# Patient Record
Sex: FEMALE | Race: WHITE | Employment: OTHER | URBAN - NONMETROPOLITAN AREA
[De-identification: names, ages, dates, MRNs, and addresses within clinical notes are randomized per-mention and may not be internally consistent; named-entity substitution may affect disease eponyms.]

---

## 2016-10-18 LAB — DIABETIC RETINOPATHY: POSITIVE

## 2019-04-25 LAB
ALBUMIN SERPL-MCNC: NORMAL G/DL
ALP BLD-CCNC: NORMAL U/L
ALT SERPL-CCNC: NORMAL U/L
ANION GAP SERPL CALCULATED.3IONS-SCNC: NORMAL MMOL/L
AST SERPL-CCNC: NORMAL U/L
BILIRUB SERPL-MCNC: NORMAL MG/DL (ref 0.1–1.4)
BUN BLDV-MCNC: NORMAL MG/DL
CALCIUM SERPL-MCNC: NORMAL MG/DL
CHLORIDE BLD-SCNC: NORMAL MMOL/L
CO2: NORMAL MMOL/L
CREAT SERPL-MCNC: NORMAL MG/DL
CREATININE, URINE: 128
GFR CALCULATED: NORMAL
GLUCOSE BLD-MCNC: NORMAL MG/DL
MICROALBUMIN/CREAT 24H UR: 1.5 MG/G{CREAT}
MICROALBUMIN/CREAT UR-RTO: 11.7
POTASSIUM SERPL-SCNC: NORMAL MMOL/L
SODIUM BLD-SCNC: NORMAL MMOL/L
TOTAL PROTEIN: NORMAL
TSH SERPL DL<=0.05 MIU/L-ACNC: NORMAL UIU/ML

## 2019-05-06 RX ORDER — INSULIN GLARGINE 100 [IU]/ML
65 INJECTION, SOLUTION SUBCUTANEOUS NIGHTLY
Qty: 5 PEN | Refills: 3 | Status: SHIPPED | OUTPATIENT
Start: 2019-05-06 | End: 2019-10-28 | Stop reason: SDUPTHER

## 2019-05-06 RX ORDER — PEN NEEDLE, DIABETIC 32 GX 1/4"
NEEDLE, DISPOSABLE MISCELLANEOUS
Refills: 3 | COMMUNITY
Start: 2019-02-27 | End: 2019-05-06 | Stop reason: SDUPTHER

## 2019-05-06 RX ORDER — INSULIN GLARGINE 100 [IU]/ML
65 INJECTION, SOLUTION SUBCUTANEOUS NIGHTLY
COMMUNITY
Start: 2019-05-05 | End: 2019-05-06 | Stop reason: SDUPTHER

## 2019-05-06 RX ORDER — PEN NEEDLE, DIABETIC 32 GX 1/4"
NEEDLE, DISPOSABLE MISCELLANEOUS
Qty: 100 EACH | Refills: 3 | Status: SHIPPED
Start: 2019-05-06 | End: 2020-03-02 | Stop reason: SDUPTHER

## 2019-05-30 ENCOUNTER — TELEPHONE (OUTPATIENT)
Dept: FAMILY MEDICINE CLINIC | Age: 74
End: 2019-05-30

## 2019-05-30 DIAGNOSIS — K64.9 ACUTE HEMORRHOID: Primary | ICD-10-CM

## 2019-05-30 NOTE — TELEPHONE ENCOUNTER
Pt states she has external hemrrohoids. She has been doing suppositories but these aren't helping the external and neither is the OTC hemorrhoid creams.  Wants to know if you can order something else for her

## 2019-06-18 ENCOUNTER — TELEPHONE (OUTPATIENT)
Dept: FAMILY MEDICINE CLINIC | Age: 74
End: 2019-06-18

## 2019-06-18 DIAGNOSIS — Z79.4 TYPE 2 DIABETES MELLITUS WITHOUT COMPLICATION, WITH LONG-TERM CURRENT USE OF INSULIN (HCC): Primary | ICD-10-CM

## 2019-06-18 DIAGNOSIS — E11.9 TYPE 2 DIABETES MELLITUS WITHOUT COMPLICATION, WITH LONG-TERM CURRENT USE OF INSULIN (HCC): Primary | ICD-10-CM

## 2019-06-18 NOTE — TELEPHONE ENCOUNTER
Pt states she is having a hard time checking her blood sugar and she wants the freestyle [facility]

## 2019-06-18 NOTE — TELEPHONE ENCOUNTER
Left a message for patient to return call to nurse line. Want to know if she spoke with insurance company in regards to this.

## 2019-06-22 RX ORDER — FLASH GLUCOSE SENSOR
KIT MISCELLANEOUS
Qty: 1 EACH | Refills: 0 | Status: SHIPPED | OUTPATIENT
Start: 2019-06-22 | End: 2022-06-16 | Stop reason: SDUPTHER

## 2019-06-25 RX ORDER — AMLODIPINE BESYLATE 5 MG/1
5 TABLET ORAL DAILY
Qty: 30 TABLET | Status: CANCELLED | OUTPATIENT
Start: 2019-06-25

## 2019-06-25 RX ORDER — LEVOTHYROXINE SODIUM 0.1 MG/1
100 TABLET ORAL DAILY
Qty: 30 TABLET | Status: CANCELLED | OUTPATIENT
Start: 2019-06-25

## 2019-06-25 RX ORDER — GLIPIZIDE 10 MG/1
10 TABLET, FILM COATED, EXTENDED RELEASE ORAL DAILY
Qty: 30 TABLET | Refills: 5 | Status: SHIPPED | OUTPATIENT
Start: 2019-06-25 | End: 2019-10-28 | Stop reason: SDUPTHER

## 2019-06-27 DIAGNOSIS — E11.9 DIABETES MELLITUS WITHOUT COMPLICATION (HCC): Primary | ICD-10-CM

## 2019-06-27 RX ORDER — FLASH GLUCOSE SENSOR
KIT MISCELLANEOUS
Qty: 1 EACH | Refills: 0 | Status: SHIPPED | OUTPATIENT
Start: 2019-06-27 | End: 2022-06-16 | Stop reason: SDUPTHER

## 2019-06-27 RX ORDER — FLASH GLUCOSE SENSOR
1 KIT MISCELLANEOUS 2 TIMES DAILY
Qty: 1 EACH | Refills: 0 | Status: SHIPPED | OUTPATIENT
Start: 2019-06-27 | End: 2022-06-16 | Stop reason: SDUPTHER

## 2019-06-27 NOTE — TELEPHONE ENCOUNTER
UNFORTUNATELY THIS WAS PRINTED I DID NOT GET IT. PLEASE PRINT AGAIN TODAY IF ABLE SO WE CAN HAVE YOU SIGN AND FAX IN.  DOES NOT ALLOW ME TO PRINT FOR YOU IN PHONE ENCOUNTER

## 2019-07-08 ENCOUNTER — TELEPHONE (OUTPATIENT)
Dept: FAMILY MEDICINE CLINIC | Age: 74
End: 2019-07-08

## 2019-07-16 LAB
AVERAGE GLUCOSE: NORMAL
CHOLESTEROL, TOTAL: 193 MG/DL
CHOLESTEROL/HDL RATIO: 2
HBA1C MFR BLD: 9 %
HDLC SERPL-MCNC: 56 MG/DL (ref 35–70)
LDL CHOLESTEROL CALCULATED: 113 MG/DL (ref 0–160)
TRIGL SERPL-MCNC: 228 MG/DL
VLDLC SERPL CALC-MCNC: NORMAL MG/DL

## 2019-07-23 ENCOUNTER — PROCEDURE VISIT (OUTPATIENT)
Dept: PODIATRY | Age: 74
End: 2019-07-23
Payer: MEDICARE

## 2019-07-23 VITALS
SYSTOLIC BLOOD PRESSURE: 123 MMHG | TEMPERATURE: 96.3 F | BODY MASS INDEX: 28.34 KG/M2 | WEIGHT: 160 LBS | DIASTOLIC BLOOD PRESSURE: 78 MMHG

## 2019-07-23 DIAGNOSIS — E11.9 TYPE 2 DIABETES MELLITUS WITHOUT COMPLICATION, WITHOUT LONG-TERM CURRENT USE OF INSULIN (HCC): ICD-10-CM

## 2019-07-23 DIAGNOSIS — R26.2 DIFFICULTY WALKING: ICD-10-CM

## 2019-07-23 DIAGNOSIS — M79.674 PAIN IN TOE OF RIGHT FOOT: ICD-10-CM

## 2019-07-23 DIAGNOSIS — B35.1 TINEA UNGUIUM: Primary | ICD-10-CM

## 2019-07-23 DIAGNOSIS — M79.675 PAIN IN LEFT TOE(S): ICD-10-CM

## 2019-07-23 DIAGNOSIS — I73.9 PERIPHERAL VASCULAR DISEASE, UNSPECIFIED (HCC): ICD-10-CM

## 2019-07-23 PROCEDURE — 11721 DEBRIDE NAIL 6 OR MORE: CPT | Performed by: PODIATRIST

## 2019-07-23 NOTE — PROGRESS NOTES
[x] [x] [x] [x] [x] [x] [x] [x] [x] [x]  5 4 3 2 1 1 2 3 4 5                          Right                                        Left    Incurvation/Ingrowin   [x] [x] [x] [x] [x] [x] [x] [x] [x] [x]  5 4 3 2 1 1 2 3 4 5                         Right                                        Left    Inflammation/Pain   [x] [x] [x] [x] [x] [x] [x] [x] [x] [x]  5 4 3 2 1 1 2 3 4 5                         Right                                        Left      Dermatologic Exam:  Skin lesion/ulceration . Skin callus bilaterally plantar aspect plantar to the second and third metatarsal heads both lesions are about 1 cm x 1 cm tissue  Loss of hair  lower extremity      Musculoskeletal:     1st MPJ ROM decreased, Bilateral.  Muscle Bilateral.  Pain present upon palpation of toenails 1-5, Bilateral. decreased medial longitudinal arch, Bilateral.  Ankle ROM decreased,Bilateral.    Dorsally contracted digits     Vascular: DP and PT pulses absent  CFT <absent seconds, Bilateral.  Hair growth absent to the level of the digits, Bilateral.  Edema minimal Bilateral.  Varicosities severe Bilateral.     Neurological: Sensation diminshed to light touch to level of digits, Bilateral.  Protective sensation decreased sites via 5.07/10g [place]-Ayanna Monofilament, Bilateral.  negative Tinel's, Bilateral.  negative Valleix sign, Bilateral.      Integument: nails   thickened > 3.0 mm, dystrophic and crumbly, discolored with subungual debris. Toes cool to touch    Visual inspection:  Deformity: hammertoe deformity priti feet  amputation: absent    Edema:   Sensory exam:  Monofilament sensation: abnormal - 6/10 via SW 5.07/10g monofilament to the plantar foot bilateral feet    Pulses:     Pinprick: Impaired  Proprioception: Impaired  Vibration (128 Hz): Impaired       DM with PVD       [x]Yes    []no    Foot Exam     Ortho Exam      Assessment:  69 y.o. female with:  1. Tinea unguium    2.  Peripheral vascular disease, unspecified ([facility] 3. Type 2 diabetes mellitus without complication, without long-term current use of insulin (HCC)    4. Pain in left toe(s)    5. Pain in toe of right foot    6. Difficulty walking     No orders of the defined types were placed in this encounter. Q7   []Yes    []No                Q8   [x]Yes    []No                     Q9   []Yes    []No    Plan:   Pt was evaluated and examined. Patient was given personalized discharge instructions. Nails 1-10 were debrided sharply in length and thickness with a nipper and , without incident. Pt will follow up in 3 months or sooner if any problems arise. Diagnosis was discussed with the pt and all of their questions were answered in detail. Proper foot hygiene and care was discussed with the pt. Informed patient on proper diabetic foot care and importance of tight glycemic control. Patient to check feet daily and contact the office with any questions/problems/concerns.    Other comorbidity noted and will be managed by PCP.  7/23/2019    Electronically signed by Sharla Durand DPM on 7/23/2019 at 1:21 PM  7/23/2019

## 2019-07-31 ENCOUNTER — OFFICE VISIT (OUTPATIENT)
Dept: FAMILY MEDICINE CLINIC | Age: 74
End: 2019-07-31
Payer: MEDICARE

## 2019-07-31 VITALS
HEART RATE: 70 BPM | DIASTOLIC BLOOD PRESSURE: 68 MMHG | OXYGEN SATURATION: 98 % | SYSTOLIC BLOOD PRESSURE: 120 MMHG | WEIGHT: 160.6 LBS | HEIGHT: 63 IN | BODY MASS INDEX: 28.46 KG/M2

## 2019-07-31 DIAGNOSIS — F32.9 MAJOR DEPRESSIVE DISORDER WITH SINGLE EPISODE, REMISSION STATUS UNSPECIFIED: ICD-10-CM

## 2019-07-31 DIAGNOSIS — E11.9 TYPE 2 DIABETES MELLITUS WITHOUT COMPLICATION, WITH LONG-TERM CURRENT USE OF INSULIN (HCC): Primary | ICD-10-CM

## 2019-07-31 DIAGNOSIS — E78.5 HYPERLIPIDEMIA, UNSPECIFIED HYPERLIPIDEMIA TYPE: ICD-10-CM

## 2019-07-31 DIAGNOSIS — K21.9 GASTROESOPHAGEAL REFLUX DISEASE WITHOUT ESOPHAGITIS: ICD-10-CM

## 2019-07-31 DIAGNOSIS — Z79.4 TYPE 2 DIABETES MELLITUS WITHOUT COMPLICATION, WITH LONG-TERM CURRENT USE OF INSULIN (HCC): Primary | ICD-10-CM

## 2019-07-31 DIAGNOSIS — E03.9 HYPOTHYROIDISM, UNSPECIFIED TYPE: ICD-10-CM

## 2019-07-31 PROCEDURE — 99215 OFFICE O/P EST HI 40 MIN: CPT | Performed by: INTERNAL MEDICINE

## 2019-07-31 ASSESSMENT — PATIENT HEALTH QUESTIONNAIRE - PHQ9
2. FEELING DOWN, DEPRESSED OR HOPELESS: 1
SUM OF ALL RESPONSES TO PHQ QUESTIONS 1-9: 2
SUM OF ALL RESPONSES TO PHQ QUESTIONS 1-9: 2
SUM OF ALL RESPONSES TO PHQ9 QUESTIONS 1 & 2: 2
1. LITTLE INTEREST OR PLEASURE IN DOING THINGS: 1

## 2019-09-25 RX ORDER — AMLODIPINE BESYLATE 5 MG/1
5 TABLET ORAL DAILY
Qty: 90 TABLET | Refills: 1 | Status: SHIPPED | OUTPATIENT
Start: 2019-09-25 | End: 2019-10-28 | Stop reason: SDUPTHER

## 2019-09-25 RX ORDER — PRAVASTATIN SODIUM 20 MG
20 TABLET ORAL DAILY
Qty: 90 TABLET | Refills: 1 | Status: SHIPPED | OUTPATIENT
Start: 2019-09-25 | End: 2019-10-28 | Stop reason: SDUPTHER

## 2019-09-25 RX ORDER — LEVOTHYROXINE SODIUM 0.1 MG/1
100 TABLET ORAL DAILY
Qty: 90 TABLET | Refills: 1 | Status: SHIPPED | OUTPATIENT
Start: 2019-09-25 | End: 2019-10-28 | Stop reason: SDUPTHER

## 2019-10-11 ENCOUNTER — HOSPITAL ENCOUNTER (OUTPATIENT)
Age: 74
Discharge: HOME | End: 2019-10-13
Payer: MEDICARE

## 2019-10-11 ENCOUNTER — OFFICE VISIT (OUTPATIENT)
Dept: FAMILY MEDICINE CLINIC | Age: 74
End: 2019-10-11
Payer: MEDICARE

## 2019-10-11 VITALS — HEART RATE: 78 BPM | DIASTOLIC BLOOD PRESSURE: 70 MMHG | OXYGEN SATURATION: 97 % | SYSTOLIC BLOOD PRESSURE: 130 MMHG

## 2019-10-11 DIAGNOSIS — M79.641 BILATERAL HAND PAIN: Primary | ICD-10-CM

## 2019-10-11 DIAGNOSIS — M79.642 BILATERAL HAND PAIN: Primary | ICD-10-CM

## 2019-10-11 DIAGNOSIS — M79.642 BILATERAL HAND PAIN: ICD-10-CM

## 2019-10-11 DIAGNOSIS — M79.641 BILATERAL HAND PAIN: ICD-10-CM

## 2019-10-11 LAB — RHEUMATOID FACTOR: 19 IU/ML (ref 0–13)

## 2019-10-11 PROCEDURE — 3017F COLORECTAL CA SCREEN DOC REV: CPT | Performed by: INTERNAL MEDICINE

## 2019-10-11 PROCEDURE — G8419 CALC BMI OUT NRM PARAM NOF/U: HCPCS | Performed by: INTERNAL MEDICINE

## 2019-10-11 PROCEDURE — G8427 DOCREV CUR MEDS BY ELIG CLIN: HCPCS | Performed by: INTERNAL MEDICINE

## 2019-10-11 PROCEDURE — 4040F PNEUMOC VAC/ADMIN/RCVD: CPT | Performed by: INTERNAL MEDICINE

## 2019-10-11 PROCEDURE — 86431 RHEUMATOID FACTOR QUANT: CPT

## 2019-10-11 PROCEDURE — 99213 OFFICE O/P EST LOW 20 MIN: CPT | Performed by: INTERNAL MEDICINE

## 2019-10-11 PROCEDURE — 36415 COLL VENOUS BLD VENIPUNCTURE: CPT

## 2019-10-11 PROCEDURE — G8400 PT W/DXA NO RESULTS DOC: HCPCS | Performed by: INTERNAL MEDICINE

## 2019-10-11 PROCEDURE — G8484 FLU IMMUNIZE NO ADMIN: HCPCS | Performed by: INTERNAL MEDICINE

## 2019-10-11 PROCEDURE — 1090F PRES/ABSN URINE INCON ASSESS: CPT | Performed by: INTERNAL MEDICINE

## 2019-10-11 PROCEDURE — 1036F TOBACCO NON-USER: CPT | Performed by: INTERNAL MEDICINE

## 2019-10-11 PROCEDURE — 1123F ACP DISCUSS/DSCN MKR DOCD: CPT | Performed by: INTERNAL MEDICINE

## 2019-10-11 RX ORDER — PREDNISONE 10 MG/1
TABLET ORAL
Qty: 12 TABLET | Refills: 0 | Status: SHIPPED | OUTPATIENT
Start: 2019-10-11 | End: 2019-10-17

## 2019-10-12 ENCOUNTER — TELEPHONE (OUTPATIENT)
Dept: FAMILY MEDICINE CLINIC | Age: 74
End: 2019-10-12

## 2019-10-12 DIAGNOSIS — M79.642 BILATERAL HAND PAIN: Primary | ICD-10-CM

## 2019-10-12 DIAGNOSIS — M79.641 BILATERAL HAND PAIN: Primary | ICD-10-CM

## 2019-10-22 ENCOUNTER — TELEPHONE (OUTPATIENT)
Dept: ADMINISTRATIVE | Age: 74
End: 2019-10-22

## 2019-10-28 ENCOUNTER — TELEPHONE (OUTPATIENT)
Dept: FAMILY MEDICINE CLINIC | Age: 74
End: 2019-10-28

## 2019-10-28 ENCOUNTER — OFFICE VISIT (OUTPATIENT)
Dept: FAMILY MEDICINE CLINIC | Age: 74
End: 2019-10-28
Payer: MEDICARE

## 2019-10-28 VITALS
HEART RATE: 78 BPM | DIASTOLIC BLOOD PRESSURE: 68 MMHG | BODY MASS INDEX: 29.23 KG/M2 | OXYGEN SATURATION: 97 % | WEIGHT: 165 LBS | SYSTOLIC BLOOD PRESSURE: 150 MMHG

## 2019-10-28 DIAGNOSIS — E78.5 HYPERLIPIDEMIA, UNSPECIFIED HYPERLIPIDEMIA TYPE: ICD-10-CM

## 2019-10-28 DIAGNOSIS — I10 ESSENTIAL HYPERTENSION: ICD-10-CM

## 2019-10-28 DIAGNOSIS — M79.642 BILATERAL HAND PAIN: Primary | ICD-10-CM

## 2019-10-28 DIAGNOSIS — M79.641 BILATERAL HAND PAIN: Primary | ICD-10-CM

## 2019-10-28 DIAGNOSIS — Z79.4 TYPE 2 DIABETES MELLITUS WITH DIABETIC POLYNEUROPATHY, WITH LONG-TERM CURRENT USE OF INSULIN (HCC): ICD-10-CM

## 2019-10-28 DIAGNOSIS — E11.42 TYPE 2 DIABETES MELLITUS WITH DIABETIC POLYNEUROPATHY, WITH LONG-TERM CURRENT USE OF INSULIN (HCC): ICD-10-CM

## 2019-10-28 DIAGNOSIS — F32.9 MAJOR DEPRESSIVE DISORDER WITH SINGLE EPISODE, REMISSION STATUS UNSPECIFIED: ICD-10-CM

## 2019-10-28 DIAGNOSIS — E03.9 HYPOTHYROIDISM, UNSPECIFIED TYPE: ICD-10-CM

## 2019-10-28 PROCEDURE — 1036F TOBACCO NON-USER: CPT | Performed by: INTERNAL MEDICINE

## 2019-10-28 PROCEDURE — 99214 OFFICE O/P EST MOD 30 MIN: CPT | Performed by: INTERNAL MEDICINE

## 2019-10-28 PROCEDURE — G8427 DOCREV CUR MEDS BY ELIG CLIN: HCPCS | Performed by: INTERNAL MEDICINE

## 2019-10-28 PROCEDURE — G8484 FLU IMMUNIZE NO ADMIN: HCPCS | Performed by: INTERNAL MEDICINE

## 2019-10-28 PROCEDURE — G8400 PT W/DXA NO RESULTS DOC: HCPCS | Performed by: INTERNAL MEDICINE

## 2019-10-28 PROCEDURE — G8417 CALC BMI ABV UP PARAM F/U: HCPCS | Performed by: INTERNAL MEDICINE

## 2019-10-28 PROCEDURE — 2022F DILAT RTA XM EVC RTNOPTHY: CPT | Performed by: INTERNAL MEDICINE

## 2019-10-28 PROCEDURE — 4040F PNEUMOC VAC/ADMIN/RCVD: CPT | Performed by: INTERNAL MEDICINE

## 2019-10-28 PROCEDURE — 1090F PRES/ABSN URINE INCON ASSESS: CPT | Performed by: INTERNAL MEDICINE

## 2019-10-28 PROCEDURE — 3017F COLORECTAL CA SCREEN DOC REV: CPT | Performed by: INTERNAL MEDICINE

## 2019-10-28 PROCEDURE — 1123F ACP DISCUSS/DSCN MKR DOCD: CPT | Performed by: INTERNAL MEDICINE

## 2019-10-28 RX ORDER — ESCITALOPRAM OXALATE 10 MG/1
10 TABLET ORAL DAILY
COMMUNITY
End: 2022-01-12 | Stop reason: ALTCHOICE

## 2019-10-28 RX ORDER — PRAVASTATIN SODIUM 20 MG
20 TABLET ORAL DAILY
Qty: 90 TABLET | Refills: 1 | Status: SHIPPED
Start: 2019-10-28 | End: 2020-05-27 | Stop reason: SDUPTHER

## 2019-10-28 RX ORDER — GLIPIZIDE 10 MG/1
10 TABLET, FILM COATED, EXTENDED RELEASE ORAL DAILY
Qty: 90 TABLET | Refills: 1 | Status: SHIPPED
Start: 2019-10-28 | End: 2020-03-02 | Stop reason: SDUPTHER

## 2019-10-28 RX ORDER — LEVOTHYROXINE SODIUM 0.1 MG/1
100 TABLET ORAL DAILY
Qty: 90 TABLET | Refills: 1 | Status: SHIPPED
Start: 2019-10-28 | End: 2020-05-27 | Stop reason: SDUPTHER

## 2019-10-28 RX ORDER — AMLODIPINE BESYLATE 5 MG/1
5 TABLET ORAL DAILY
Qty: 90 TABLET | Refills: 1 | Status: SHIPPED
Start: 2019-10-28 | End: 2020-05-27 | Stop reason: SDUPTHER

## 2019-10-28 RX ORDER — INSULIN GLARGINE 100 [IU]/ML
65 INJECTION, SOLUTION SUBCUTANEOUS NIGHTLY
Qty: 5 PEN | Refills: 3 | Status: SHIPPED | OUTPATIENT
Start: 2019-10-28 | End: 2019-12-05 | Stop reason: SDUPTHER

## 2019-10-29 ENCOUNTER — TELEPHONE (OUTPATIENT)
Dept: FAMILY MEDICINE CLINIC | Age: 74
End: 2019-10-29

## 2019-11-04 ENCOUNTER — PROCEDURE VISIT (OUTPATIENT)
Dept: PODIATRY | Age: 74
End: 2019-11-04
Payer: MEDICARE

## 2019-11-04 VITALS — OXYGEN SATURATION: 95 % | HEIGHT: 63 IN | TEMPERATURE: 96.9 F | WEIGHT: 163 LBS | BODY MASS INDEX: 28.88 KG/M2

## 2019-11-04 DIAGNOSIS — E11.9 TYPE 2 DIABETES MELLITUS WITHOUT COMPLICATION, WITHOUT LONG-TERM CURRENT USE OF INSULIN (HCC): ICD-10-CM

## 2019-11-04 DIAGNOSIS — M79.674 PAIN IN TOE OF RIGHT FOOT: ICD-10-CM

## 2019-11-04 DIAGNOSIS — I73.9 PERIPHERAL VASCULAR DISEASE, UNSPECIFIED (HCC): ICD-10-CM

## 2019-11-04 DIAGNOSIS — B35.1 TINEA UNGUIUM: Primary | ICD-10-CM

## 2019-11-04 DIAGNOSIS — M79.675 PAIN IN LEFT TOE(S): ICD-10-CM

## 2019-11-04 DIAGNOSIS — R26.2 DIFFICULTY WALKING: ICD-10-CM

## 2019-11-04 PROCEDURE — 11721 DEBRIDE NAIL 6 OR MORE: CPT | Performed by: PODIATRIST

## 2019-11-25 LAB
BANDED NEUTROPHILS RELATIVE PERCENT: 3 % (ref 0–6)
BASOPHILS ABSOLUTE: 0 K/UL (ref 0–0.2)
BASOPHILS RELATIVE PERCENT: 0 % (ref 0–1.5)
CELLS COUNTED: 100
DIABETIC RETINOPATHY: POSITIVE
DIFFERENTIAL, MANUAL: MANUAL DIFF
EOSINOPHILS ABSOLUTE: 0.65 K/UL (ref 0–0.33)
EOSINOPHILS RELATIVE PERCENT: 8 % (ref 0–3)
HCT VFR BLD CALC: 43.6 % (ref 36–44)
HEMOGLOBIN: 14.4 G/DL (ref 12–15)
LYMPHOCYTES # BLD: 27 % (ref 24–44)
LYMPHOCYTES ABSOLUTE: 2.21 K/UL (ref 1.1–4.8)
MCH RBC QN AUTO: 29.1 PG (ref 28–34)
MCHC RBC AUTO-ENTMCNC: 33 G/DL (ref 33–37)
MCV RBC AUTO: 88.2 FL (ref 80–100)
MONOCYTES # BLD: 9 % (ref 3.4–9)
MONOCYTES ABSOLUTE: 0.73 K/UL (ref 0.2–0.7)
NEUTROPHILS ABSOLUTE: 4.58 K/UL (ref 1.83–8.7)
PDW BLD-RTO: 13.7 % (ref 10.9–14.3)
PLATELET # BLD: 230 K/UL (ref 150–450)
PLATELET ESTIMATE: ABNORMAL
PMV BLD AUTO: 9.1 FL (ref 7.4–10.4)
RBC # BLD: 4.95 M/UL (ref 4–4.9)
RBC COMMENT: ABNORMAL
SEG NEUTROPHILS: 53 % (ref 40–74)
WBC # BLD: 8.2 K/UL (ref 4.5–11)

## 2019-12-05 RX ORDER — INSULIN GLARGINE 100 [IU]/ML
65 INJECTION, SOLUTION SUBCUTANEOUS NIGHTLY
Qty: 15 PEN | Refills: 1 | Status: SHIPPED | OUTPATIENT
Start: 2019-12-05 | End: 2019-12-20 | Stop reason: SDUPTHER

## 2019-12-19 RX ORDER — INSULIN GLARGINE 100 [IU]/ML
65 INJECTION, SOLUTION SUBCUTANEOUS NIGHTLY
Qty: 15 PEN | Refills: 1 | OUTPATIENT
Start: 2019-12-19

## 2019-12-21 RX ORDER — INSULIN GLARGINE 100 [IU]/ML
65 INJECTION, SOLUTION SUBCUTANEOUS NIGHTLY
Qty: 15 PEN | Refills: 1 | Status: SHIPPED
Start: 2019-12-21 | End: 2020-05-27 | Stop reason: SDUPTHER

## 2020-01-14 ENCOUNTER — TELEPHONE (OUTPATIENT)
Dept: FAMILY MEDICINE CLINIC | Age: 75
End: 2020-01-14

## 2020-01-14 NOTE — TELEPHONE ENCOUNTER
Patient requested dates of her surgeries, gave her info from care everywhere. She also requested x-ray reports sent to [facility] in [facility].   Faxed to [phone] on 1/14 431pm

## 2020-01-27 ENCOUNTER — TELEPHONE (OUTPATIENT)
Dept: FAMILY MEDICINE CLINIC | Age: 75
End: 2020-01-27

## 2020-01-28 LAB
ALBUMIN SERPL-MCNC: 4.6 G/DL
ALP BLD-CCNC: 73 U/L
ALT SERPL-CCNC: 22 U/L
ANION GAP SERPL CALCULATED.3IONS-SCNC: 1.6 MMOL/L
AST SERPL-CCNC: 19 U/L
AVERAGE GLUCOSE: NORMAL
BILIRUB SERPL-MCNC: 0.9 MG/DL (ref 0.1–1.4)
BUN BLDV-MCNC: 23 MG/DL
CALCIUM SERPL-MCNC: 10.3 MG/DL
CHLORIDE BLD-SCNC: 102 MMOL/L
CHOLESTEROL, TOTAL: 180 MG/DL
CHOLESTEROL/HDL RATIO: 3.6
CO2: 30 MMOL/L
CREAT SERPL-MCNC: 0.96 MG/DL
CREATININE, URINE: 164
GFR CALCULATED: 59
GLUCOSE BLD-MCNC: 83 MG/DL
HBA1C MFR BLD: 8.2 %
HDLC SERPL-MCNC: 50 MG/DL (ref 35–70)
LDL CHOLESTEROL CALCULATED: 93 MG/DL (ref 0–160)
MICROALBUMIN/CREAT 24H UR: 1.1 MG/G{CREAT}
MICROALBUMIN/CREAT UR-RTO: 7
POTASSIUM SERPL-SCNC: 4.3 MMOL/L
SODIUM BLD-SCNC: 141 MMOL/L
TOTAL PROTEIN: 7.4
TRIGL SERPL-MCNC: 258 MG/DL
VLDLC SERPL CALC-MCNC: 130 MG/DL

## 2020-01-28 NOTE — TELEPHONE ENCOUNTER
Patient calling back in. She just wanted to clarify. Patient states she had an original appointment with Everette Wilhelm in November but cancelled because she was told it was the wrong doctor. She received a call from 's office who told her they do surgery but they also do other things, such as injections. When patient followed with them they decided to go with the surgery cause the injections would raise her blood sugar. Patient just wants to update you on this.   Thanks

## 2020-02-03 ENCOUNTER — PROCEDURE VISIT (OUTPATIENT)
Dept: PODIATRY | Age: 75
End: 2020-02-03
Payer: MEDICARE

## 2020-02-03 VITALS
SYSTOLIC BLOOD PRESSURE: 136 MMHG | DIASTOLIC BLOOD PRESSURE: 82 MMHG | WEIGHT: 158 LBS | BODY MASS INDEX: 28 KG/M2 | HEIGHT: 63 IN | TEMPERATURE: 97.8 F

## 2020-02-03 PROBLEM — E11.9 TYPE 2 DIABETES MELLITUS WITHOUT COMPLICATION, WITHOUT LONG-TERM CURRENT USE OF INSULIN (HCC): Status: RESOLVED | Noted: 2019-07-23 | Resolved: 2020-02-03

## 2020-02-03 PROBLEM — I73.9 PERIPHERAL VASCULAR DISEASE, UNSPECIFIED (HCC): Status: RESOLVED | Noted: 2019-07-23 | Resolved: 2020-02-03

## 2020-02-03 PROBLEM — M79.674 PAIN IN TOE OF RIGHT FOOT: Status: RESOLVED | Noted: 2019-07-23 | Resolved: 2020-02-03

## 2020-02-03 PROBLEM — R26.2 DIFFICULTY WALKING: Status: RESOLVED | Noted: 2019-07-23 | Resolved: 2020-02-03

## 2020-02-03 PROBLEM — B35.1 TINEA UNGUIUM: Status: RESOLVED | Noted: 2019-07-23 | Resolved: 2020-02-03

## 2020-02-03 PROBLEM — M79.675 PAIN IN LEFT TOE(S): Status: RESOLVED | Noted: 2019-07-23 | Resolved: 2020-02-03

## 2020-02-03 PROCEDURE — 11721 DEBRIDE NAIL 6 OR MORE: CPT | Performed by: PODIATRIST

## 2020-02-03 ASSESSMENT — ENCOUNTER SYMPTOMS
RESPIRATORY NEGATIVE: 1
GASTROINTESTINAL NEGATIVE: 1

## 2020-02-03 NOTE — PROGRESS NOTES
[address] [facility] PODIATRY  [address] [facility] [address]  Dept: [phone]  Dept Fax: [phone]    DIABETIC NAIL PROGRESS NOTE  Date of patient's visit: 2/3/2020  Patient's Name:  Gillian Castaneda YOB: 1945            Patient Care Team:  Anton Macias MD as PCP - General (Internal Medicine)  Anton Macias MD as PCP - REHABILITATION [facility]  Светлана Glass DPM as Consulting Physician (Podiatry)          Chief Complaint   Patient presents with    Toe Pain     saw PCP Dr. Javi Aguilar on 10/28/2019       Subjective: Gillian Castaneda comes to clinic for Toe Pain (saw PCP Dr. Javi Aguilar on 10/28/2019)    she is a diabetic and states that diabetic foot exam .  Pt currently has complaint of thickened, elongated nails that they cannot manage by themselves. Pt's primary care physician is Anton Macias MD last seen   . 10-28-19     Lab Results   Component Value Date    LABA1C 9.0 07/16/2019      Complains of numbness in the feet bilat.   Past Medical History:   Diagnosis Date    Atrial fibrillation ([address]     Chronic kidney disease     Colon polyps     Diabetes mellitus ([address]     Diabetic neuropathy ([address]     Diabetic retinopathy ([address]     Essential hypertension 10/28/2019    Fatty liver     Gastritis     GERD (gastroesophageal reflux disease)     Hyperlipidemia     Hypertension     Hypothyroidism     Irritable bowel syndrome     Major depressive disorder with single episode 10/28/2019    Osteopenia     Photosensitivity disorder     chronic    RBBB     Sleep apnea     on BIPAP    Thyroid disease     Thyroid nodule     neg bx-chronic thyroiditis    Type 2 diabetes mellitus with diabetic polyneuropathy, with long-term current use of insulin ([address] 7/23/2019    Vitamin D deficiency        Allergies   Allergen Reactions    Seasonal      Current Outpatient Medications on File Prior to Visit   Medication Sig Dispense Refill    LE   Nails thick yellow   Absent pt pulses   Cool touch   CFT <absent seconds, Bilateral.  Hair growth absent to the level of the digits, Bilateral.  Edema minimal Bilateral.  Varicosities severe Bilateral.     Neurological: Sensation diminshed to light touch to level of digits, Bilateral.  Protective sensation decreased sites via 5.07/10g [place]Ayanna Monofilament, Bilateral.  negative Tinel's, Bilateral.  negative Valleix sign, Bilateral.      Integument: nails   thickened > 3.0 mm, dystrophic and crumbly, discolored with subungual debris. Toes cool to touch    Visual inspection:  Deformity: hammertoe deformity priti feet  amputation: absent    Edema:   Sensory exam:  Monofilament sensation: abnormal - 6/10 via SW 5.07/10g monofilament to the plantar foot bilateral feet    Pulses:     Pinprick: Impaired  Proprioception: Impaired  Vibration (128 Hz): Impaired       DM with PVD       [x]Yes    []no    Foot Exam    General  General Appearance: appears stated age and healthy   Orientation: alert and oriented to person, place, and time       Right Foot/Ankle     Inspection and Palpation  Skin Exam: abnormal color; no skin changes     Neurovascular  Dorsalis pedis: 2+  Posterior tibial: absent      Left Foot/Ankle      Inspection and Palpation  Skin Exam: skin changes and abnormal color; Neurovascular  Dorsalis pedis: 2+  Posterior tibial: absent             Ortho Exam      Assessment:  69 y.o. female with:  1. Tinea unguium    2. Type 2 diabetes mellitus without complication, without long-term current use of insulin ([address]    3. Peripheral vascular disease, unspecified ([address]    4. Pain in left toe(s)    5. Pain in toe of right foot     No orders of the defined types were placed in this encounter. Q7   []Yes    []No                Q8   [x]Yes    []No                     Q9   []Yes    []No    Plan:   Pt was evaluated and examined. Patient was given personalized discharge instructions.   Nails 1-10 were

## 2020-03-02 ENCOUNTER — OFFICE VISIT (OUTPATIENT)
Dept: FAMILY MEDICINE CLINIC | Age: 75
End: 2020-03-02
Payer: MEDICARE

## 2020-03-02 ENCOUNTER — TELEPHONE (OUTPATIENT)
Dept: FAMILY MEDICINE CLINIC | Age: 75
End: 2020-03-02

## 2020-03-02 ENCOUNTER — HOSPITAL ENCOUNTER (OUTPATIENT)
Age: 75
Discharge: HOME | End: 2020-03-04
Payer: MEDICARE

## 2020-03-02 VITALS
DIASTOLIC BLOOD PRESSURE: 64 MMHG | HEART RATE: 79 BPM | WEIGHT: 160.58 LBS | BODY MASS INDEX: 28.45 KG/M2 | SYSTOLIC BLOOD PRESSURE: 108 MMHG | HEIGHT: 63 IN | OXYGEN SATURATION: 97 %

## 2020-03-02 LAB — VALPROIC ACID LEVEL: 54 MCG/ML (ref 50–100)

## 2020-03-02 PROCEDURE — 1123F ACP DISCUSS/DSCN MKR DOCD: CPT | Performed by: INTERNAL MEDICINE

## 2020-03-02 PROCEDURE — G8417 CALC BMI ABV UP PARAM F/U: HCPCS | Performed by: INTERNAL MEDICINE

## 2020-03-02 PROCEDURE — G8427 DOCREV CUR MEDS BY ELIG CLIN: HCPCS | Performed by: INTERNAL MEDICINE

## 2020-03-02 PROCEDURE — 36415 COLL VENOUS BLD VENIPUNCTURE: CPT

## 2020-03-02 PROCEDURE — 1090F PRES/ABSN URINE INCON ASSESS: CPT | Performed by: INTERNAL MEDICINE

## 2020-03-02 PROCEDURE — 99214 OFFICE O/P EST MOD 30 MIN: CPT | Performed by: INTERNAL MEDICINE

## 2020-03-02 PROCEDURE — 3046F HEMOGLOBIN A1C LEVEL >9.0%: CPT | Performed by: INTERNAL MEDICINE

## 2020-03-02 PROCEDURE — 1036F TOBACCO NON-USER: CPT | Performed by: INTERNAL MEDICINE

## 2020-03-02 PROCEDURE — 80164 ASSAY DIPROPYLACETIC ACD TOT: CPT

## 2020-03-02 PROCEDURE — 3017F COLORECTAL CA SCREEN DOC REV: CPT | Performed by: INTERNAL MEDICINE

## 2020-03-02 PROCEDURE — 4040F PNEUMOC VAC/ADMIN/RCVD: CPT | Performed by: INTERNAL MEDICINE

## 2020-03-02 PROCEDURE — 2022F DILAT RTA XM EVC RTNOPTHY: CPT | Performed by: INTERNAL MEDICINE

## 2020-03-02 PROCEDURE — G8482 FLU IMMUNIZE ORDER/ADMIN: HCPCS | Performed by: INTERNAL MEDICINE

## 2020-03-02 PROCEDURE — G8400 PT W/DXA NO RESULTS DOC: HCPCS | Performed by: INTERNAL MEDICINE

## 2020-03-02 RX ORDER — DIVALPROEX SODIUM 250 MG/1
500 TABLET, DELAYED RELEASE ORAL NIGHTLY
COMMUNITY
End: 2022-06-16 | Stop reason: SDUPTHER

## 2020-03-02 RX ORDER — PEN NEEDLE, DIABETIC 32 GX 1/4"
NEEDLE, DISPOSABLE MISCELLANEOUS
Qty: 100 EACH | Refills: 3 | Status: SHIPPED
Start: 2020-03-02 | End: 2021-08-03 | Stop reason: SDUPTHER

## 2020-03-02 RX ORDER — GLIPIZIDE 10 MG/1
10 TABLET, FILM COATED, EXTENDED RELEASE ORAL DAILY
Qty: 90 TABLET | Refills: 1 | Status: SHIPPED
Start: 2020-03-02 | End: 2020-04-20 | Stop reason: SDUPTHER

## 2020-03-02 NOTE — PROGRESS NOTES
[facility]     3/2/20  Sandy Anthony : 1945 Sex: female  Age: 69 y.o. Chief Complaint   Patient presents with    Diabetes       HPI    Patient presents today for follow-up visit on her medical problems. Abhijeet Abdalla is finally gotten into rheumatology and I reviewed their note. They did not feel she had any major rheumatologic disorder. They felt she was having triggering of her fingers of her right hand and referred her to orthopedics who is going to see her upcoming for potential injections. States her blood pressure is been good. Blood sugars have been up and down. Her last hemoglobin A1c was 8.2. She does follow with endocrinology as well for this. They did blood work in January and I do not have copies but we were attempting get them. She did see psychiatry and they are requesting some labs which we will check including valproic acid and for which some endocrine labs drawn to them as well when available. She is down 5 pounds intentionally. She is asking about the [employer] monitor for glucose. She is going to check with her insurance to see if it is covered. She is following with endocrine ophthalmology,GYN, pulmonary and psychiatry, cardiology      Review of Systems     Const: Denies chills, fever and sweats. Eyes: Reports glaucoma Recent cataract/glaucoma surgery./ diabetic retinopathy changes. /Ophthalmology. States the  patient does have post cataract surgery that will eventually need some laser surgery done  ENMT: Denies ear symptoms. Reports postnasal drip, but denies other nasal symptoms. Denies mouth or throat  symptoms. CV: Denies chest pain, orthopnea and palpitations--recent atrial fibrillation. Currently back in sinus rhythm and off of anticoagulation per cardiology  Resp: Denies cough, SOB and wheezing. GI: Denies diarrhea, nausea and vomiting. : Urinary: denies dysuria, frequency and frequent UTI's.   Musculo: Reports arthritis, lower back pain and right hip ([address] [place] [address]) [facility], PLEASE DISPENSE 1 KIT TO PATIENT, Disp: 1 each, Rfl: 0    Continuous Blood Gluc Sensor ([address] [place] [address]) [facility], Please dispense one free style geetha kit, Disp: 1 each, Rfl: 0    Insulin Lispro (HUMALOG KWIKPEN SC), Inject into the skin Sliding scale, Disp: , Rfl:     latanoprost (XALATAN) 0.005 % ophthalmic solution, 1 drop nightly, Disp: , Rfl:     Omega-3 Fatty Acids (OMEGA 3 500 PO), Take by mouth, Disp: , Rfl:     Calcium Carb-Cholecalciferol (CALCIUM 1000 + D PO), Take by mouth, Disp: , Rfl:   Allergies   Allergen Reactions    Seasonal        Past Medical History:   Diagnosis Date    Atrial fibrillation (HCC)     Chronic kidney disease     Colon polyps     Diabetes mellitus ([address]     Diabetic neuropathy ([address]     Diabetic retinopathy ([address]     Essential hypertension 10/28/2019    Fatty liver     Gastritis     GERD (gastroesophageal reflux disease)     Hyperlipidemia     Hypertension     Hypothyroidism     Irritable bowel syndrome     Major depressive disorder with single episode 10/28/2019    Osteopenia     Photosensitivity disorder     chronic    RBBB     Sleep apnea     on BIPAP    Thyroid disease     Thyroid nodule     neg bx-chronic thyroiditis    Type 2 diabetes mellitus with diabetic polyneuropathy, with long-term current use of insulin ([address] 7/23/2019    Vitamin D deficiency      Past Surgical History:   Procedure Laterality Date    APPENDECTOMY      CARPAL TUNNEL RELEASE Bilateral     CATARACT REMOVAL Bilateral     CHOLECYSTECTOMY      GYNECOLOGIC CRYOSURGERY      HYSTERECTOMY     Manolo Champagne Later TONSILLECTOMY       Family History   Problem Relation Age of Onset    Diabetes Paternal Grandmother     Diabetes Father     Lung Cancer Mother     Pancreatic Cancer Brother     Diabetes Brother      Social History     Socioeconomic History    Marital status:       Spouse name: Not on file    Number of children: Not on file    Years of education: Not on file    Highest education level: Not on file   Occupational History    Not on file   Social Needs    Financial resource strain: Not on file    Food insecurity:     Worry: Not on file     Inability: Not on file    Transportation needs:     Medical: Not on file     Non-medical: Not on file   Tobacco Use    Smoking status: Never Smoker    Smokeless tobacco: Never Used   Substance and Sexual Activity    Alcohol use: Never     Frequency: Never    Drug use: Never    Sexual activity: Not Currently   Lifestyle    Physical activity:     Days per week: Not on file     Minutes per session: Not on file    Stress: Not on file   Relationships    Social connections:     Talks on phone: Not on file     Gets together: Not on file     Attends [employer] service: Not on file     Active member of club or organization: Not on file     Attends meetings of clubs or organizations: Not on file     Relationship status: Not on file    Intimate partner violence:     Fear of current or ex partner: Not on file     Emotionally abused: Not on file     Physically abused: Not on file     Forced sexual activity: Not on file   Other Topics Concern    Not on file   Social History Narrative    Not on file       Vitals:    03/02/20 1353   BP: 108/64   Pulse: 79   SpO2: 97%   Weight: 160 lb 9.3 oz (72.8 kg)   Height: 5' 3\" (1.6 m)       Physical Exam    Const: Appears well developed and well nourished. No signs of acute distress present. Neck: Supple and symmetric. Palpation reveals no adenopathy. No masses appreciated. Thyroid exhibits no nodule  or thyromegaly. No JVD. Carotids: 2+ and equal bilaterally, without bruits. Resp: No rales, rhonchi or wheezes appreciated over the lungs bilaterally. CV: Rate is regular. Rhythm is regular. S1 is normal. S2 is normal. No gallop or rubs. No heart murmur  appreciated. Extremities: No clubbing, cyanosis or edema. Abdomen:  Bowel sounds are normoactive. Palpation reveals softness, with no distension, organomegaly or  tenderness. No abdominal masses palpable. No palpable hepatosplenomegaly. Musculo: Walks with a normal gait. Upper Extremities: Full ROM bilaterally. Lower Extremities: Full ROM  bilaterally. Neuro: Alert and oriented x3. Mood is normal. Affect is normal. Speech is articulate and fluent. Upper Extremities:  motor strength is intact. Normal muscle tone bilaterally. Lower Extremities: motor strength is intact. Normal muscle  tone bilaterally. Sensation intact to light touch  Psych: Patient's attitude is cooperative. Patient's affect is  normal. Judgement is realistic. Insight is appropriate. Assessment and Plan:  Brandon Whitt was seen today for diabetes. Diagnoses and all orders for this visit:    Bilateral hand pain    Hypothyroidism, unspecified type    Major depressive disorder with single episode, remission status unspecified  -     VALPROIC ACID LEVEL, TOTAL; Future    Essential hypertension    Type 2 diabetes mellitus without complication, with long-term current use of insulin (HCC)  -     glipiZIDE (GLUCOTROL XL) 10 MG extended release tablet; Take 1 tablet by mouth daily  -     BD PEN NEEDLE MICRO U/F 32G X 6 MM MISC; USE WITH LANTUS DAILY      Plan: She is going to see orthopedics for upcoming hand injections for triggering fingers. Continue monitoring blood pressure and blood sugar closely. Prescription management performed. Blood work to monitor disease progression medication use. Send copy of the blood work to [facility] of psychiatry. We will also await lab work to review from endocrine. I told her I may need to check some things of my own next time I see her in 3 months. Fall precautions. Continue to follow with above consultants. Notify us with problems in the interim. Return in about 3 months (around 6/2/2020). Seen By:  Saad Santiago MD      *Document was created using voice recognition software. Note was reviewed however may contain grammatical errors.

## 2020-03-24 ENCOUNTER — HOSPITAL ENCOUNTER (OUTPATIENT)
Age: 75
Discharge: HOME | End: 2020-03-26
Payer: MEDICARE

## 2020-03-24 PROCEDURE — 87070 CULTURE OTHR SPECIMN AEROBIC: CPT

## 2020-03-27 LAB — GENITAL CULTURE, ROUTINE: NORMAL

## 2020-04-20 RX ORDER — GLIPIZIDE 10 MG/1
10 TABLET, FILM COATED, EXTENDED RELEASE ORAL DAILY
Qty: 90 TABLET | Refills: 0 | Status: SHIPPED
Start: 2020-04-20 | End: 2020-11-12 | Stop reason: SDUPTHER

## 2020-05-11 ENCOUNTER — PROCEDURE VISIT (OUTPATIENT)
Dept: PODIATRY | Age: 75
End: 2020-05-11
Payer: MEDICARE

## 2020-05-11 ENCOUNTER — HOSPITAL ENCOUNTER (OUTPATIENT)
Age: 75
Discharge: HOME | End: 2020-05-13
Payer: MEDICARE

## 2020-05-11 VITALS
SYSTOLIC BLOOD PRESSURE: 135 MMHG | HEIGHT: 63 IN | DIASTOLIC BLOOD PRESSURE: 75 MMHG | BODY MASS INDEX: 28.87 KG/M2 | TEMPERATURE: 98.6 F

## 2020-05-11 PROCEDURE — 87070 CULTURE OTHR SPECIMN AEROBIC: CPT

## 2020-05-11 PROCEDURE — 99213 OFFICE O/P EST LOW 20 MIN: CPT | Performed by: PODIATRIST

## 2020-05-11 PROCEDURE — 11042 DBRDMT SUBQ TIS 1ST 20SQCM/<: CPT | Performed by: PODIATRIST

## 2020-05-11 PROCEDURE — G8417 CALC BMI ABV UP PARAM F/U: HCPCS | Performed by: PODIATRIST

## 2020-05-11 PROCEDURE — 11721 DEBRIDE NAIL 6 OR MORE: CPT | Performed by: PODIATRIST

## 2020-05-11 PROCEDURE — 1090F PRES/ABSN URINE INCON ASSESS: CPT | Performed by: PODIATRIST

## 2020-05-11 PROCEDURE — L3260 AMBULATORY SURGICAL BOOT EAC: HCPCS | Performed by: PODIATRIST

## 2020-05-11 PROCEDURE — 3052F HG A1C>EQUAL 8.0%<EQUAL 9.0%: CPT | Performed by: PODIATRIST

## 2020-05-11 PROCEDURE — G8427 DOCREV CUR MEDS BY ELIG CLIN: HCPCS | Performed by: PODIATRIST

## 2020-05-11 PROCEDURE — 1123F ACP DISCUSS/DSCN MKR DOCD: CPT | Performed by: PODIATRIST

## 2020-05-11 PROCEDURE — 2022F DILAT RTA XM EVC RTNOPTHY: CPT | Performed by: PODIATRIST

## 2020-05-11 PROCEDURE — 87186 SC STD MICRODIL/AGAR DIL: CPT

## 2020-05-11 PROCEDURE — 3017F COLORECTAL CA SCREEN DOC REV: CPT | Performed by: PODIATRIST

## 2020-05-11 PROCEDURE — 87077 CULTURE AEROBIC IDENTIFY: CPT

## 2020-05-11 PROCEDURE — G8400 PT W/DXA NO RESULTS DOC: HCPCS | Performed by: PODIATRIST

## 2020-05-11 PROCEDURE — 4040F PNEUMOC VAC/ADMIN/RCVD: CPT | Performed by: PODIATRIST

## 2020-05-11 PROCEDURE — 1036F TOBACCO NON-USER: CPT | Performed by: PODIATRIST

## 2020-05-11 RX ORDER — DOXYCYCLINE HYCLATE 100 MG
100 TABLET ORAL 2 TIMES DAILY
Qty: 20 TABLET | Refills: 0 | Status: SHIPPED | OUTPATIENT
Start: 2020-05-11 | End: 2020-05-21

## 2020-05-11 ASSESSMENT — ENCOUNTER SYMPTOMS
GASTROINTESTINAL NEGATIVE: 1
RESPIRATORY NEGATIVE: 1

## 2020-05-11 NOTE — PROGRESS NOTES
DISPENSE 1 KIT TO PATIENT 1 each 0    Continuous Blood Gluc Sensor ([address] [place] [address]) [facility] Please dispense one free style geetha kit 1 each 0    latanoprost (XALATAN) 0.005 % ophthalmic solution 1 drop nightly      Omega-3 Fatty Acids (OMEGA 3 500 PO) Take by mouth      Calcium Carb-Cholecalciferol (CALCIUM 1000 + D PO) Take by mouth       No current facility-administered medications on file prior to visit. Review of Systems   Constitutional: Negative. HENT: Negative. Respiratory: Negative. Cardiovascular: Negative. Gastrointestinal: Negative. Review of Systems:   History obtained from chart review and the patient  General ROS: negative for - chills, fatigue, fever, night sweats or weight gain  Constitutional: Negative for chills, diaphoresis, fatigue, fever and unexpected weight change. Musculoskeletal: Positive for arthralgias, gait problem and joint swelling. Neurological ROS: negative for - behavioral changes, confusion, headaches or seizures. Negative for weakness and numbness. Dermatological ROS: negative for - mole changes, rash  Cardiovascular: Negative for leg swelling. Gastrointestinal: Negative for constipation, diarrhea, nausea and vomiting. Objective:  General: AAO x 3 in NAD.     Derm  Toenail Description nails are thick and mycotic yellow incurvated causing pain with shoe gear  Sites of Onychomycosis Involvement (Check affected area)  [x] [x] [x] [x] [x] [x] [x] [x] [x] [x]  5 4 3 2 1 1 2 3 4 5                          Right                                        Left    Thickness  [x] [x] [x] [x] [x] [x] [x] [x] [x] [x]  5 4 3 2 1 1 2 3 4 5                         Right                                        Left    Dystrophic Changes   [x] [x] [x] [x] [x] [x] [x] [x] [x] [x]  5 4 3 2 1 1 2 3 4 5                         Right                                        Left    Color   [x] [x] [x] [x] [x] [x] [x] [x] [x] [x]  5 4 3 2 1 1 2 3 4 5 Inspection and Palpation  Skin Exam: abnormal color; no skin changes     Neurovascular  Dorsalis pedis: 2+  Posterior tibial: absent      Left Foot/Ankle      Inspection and Palpation  Skin Exam: skin changes and abnormal color; (Plantar aspect left hallux distal phalanx there is an ulcer that is approximately 1 cm x 2 cm there are 2 sections the medial aspect is circular the distal aspect is raised discolored with dried blood erythematous surrounding it purulent drainage noted no )    Neurovascular  Dorsalis pedis: 2+  Posterior tibial: absent           Xr Foot Left (min 3 Views)    Result Date: 5/11/2020  XRAY INTERPREATION Indication: Pain left foot Examination: 3 views weightbearing left foot Narrative: There were no signs of osseous pathology no fractures dislocations noted joints were all within normal limits  Interpretation: No fractures or osseous pathology noted    Ortho Exam      Assessment:  70 y.o. female with:  1. Diabetic ulcer of toe of left foot associated with type 2 diabetes mellitus, with fat layer exposed ([address]    2. Skin ulcer of left foot, limited to breakdown of skin ([address]    3. Tinea unguium    4. Type 2 diabetes mellitus without complication, without long-term current use of insulin ([address]    5. Peripheral vascular disease, unspecified ([address]    6. Pain in left toe(s)    7.  Pain in toe of right foot       Orders Placed This Encounter   Procedures    Culture, Wound     Dm ulcer left great toe     Standing Status:   Future     Standing Expiration Date:   5/11/2021    XR FOOT LEFT (MIN 3 VIEWS)     Standing Status:   Future     Number of Occurrences:   1     Standing Expiration Date:   5/11/2021     Order Specific Question:   Reason for exam:     Answer:   ulcer    MT AMBULATORY SURGICAL BOOT EAC           Q7   []Yes    []No                Q8   [x]Yes    []No                     Q9   []Yes    []No    Plan: The ulcer was addressed today using a sterile 15 blade and pickups and tissue nippers excisional debridement was undertaken. The ulcer was debrided all necrotic tissue approximately 1 cm was debrided through the subcutaneous all debridement was taken down through subcutaneous tissue evacuating all necrotic tissue culture and sensitivity were taken of the drainage. X-ray was taken patient was placed on doxycycline 1 tab twice daily a postop surgical shoe dressing changes daily Silvadene Adaptic and Coban patient advised if any increased redness pain to go immediately to the ER  Pt was evaluated and examined. Patient was given personalized discharge instructions. Nails 1-10 were debrided sharply in length and thickness with a nipper and , without incident. Pt will follow up in 3 months or sooner if any problems arise. Diagnosis was discussed with the pt and all of their questions were answered in detail. Proper foot hygiene and care was discussed with the pt. Informed patient on proper diabetic foot care and importance of tight glycemic control. Patient to check feet daily and contact the office with any questions/problems/concerns. Other comorbidity noted and will be managed by PCP.  5/11/2020    Electronically signed by Umair Atkins DPM on 5/11/2020 at 2:24 PM  5/11/2020  The patient was dispensed a postop shoe. It is medically necessary and within the standard of care for the patient's diagnosis. Its purpose is to control the motion of the foot and to allow a decrease in inflammation. The patient was instructed on its proper application and use. The patient was also instructed to watch for areas of rubbing, irritation, blister formation, or any other signs of abnormal pressure. If this occurs, the patient is to contact the office immediately. The ABN was reviewed and signed by the patient prior to leaving this appointment.

## 2020-05-13 LAB
ORGANISM: ABNORMAL
WOUND/ABSCESS: ABNORMAL

## 2020-05-22 ENCOUNTER — OFFICE VISIT (OUTPATIENT)
Dept: PODIATRY | Age: 75
End: 2020-05-22
Payer: MEDICARE

## 2020-05-22 VITALS
SYSTOLIC BLOOD PRESSURE: 123 MMHG | HEIGHT: 63 IN | WEIGHT: 160 LBS | DIASTOLIC BLOOD PRESSURE: 64 MMHG | TEMPERATURE: 97.5 F | BODY MASS INDEX: 28.35 KG/M2

## 2020-05-22 PROCEDURE — 3052F HG A1C>EQUAL 8.0%<EQUAL 9.0%: CPT | Performed by: PODIATRIST

## 2020-05-22 PROCEDURE — G8427 DOCREV CUR MEDS BY ELIG CLIN: HCPCS | Performed by: PODIATRIST

## 2020-05-22 PROCEDURE — 99213 OFFICE O/P EST LOW 20 MIN: CPT | Performed by: PODIATRIST

## 2020-05-22 PROCEDURE — 1036F TOBACCO NON-USER: CPT | Performed by: PODIATRIST

## 2020-05-22 PROCEDURE — G8417 CALC BMI ABV UP PARAM F/U: HCPCS | Performed by: PODIATRIST

## 2020-05-22 NOTE — PROGRESS NOTES
Father     Lung Cancer Mother     Pancreatic Cancer Brother     Diabetes Brother      Social History     Socioeconomic History    Marital status:      Spouse name: Not on file    Number of children: Not on file    Years of education: Not on file    Highest education level: Not on file   Occupational History    Not on file   Social Needs    Financial resource strain: Not on file    Food insecurity     Worry: Not on file     Inability: Not on file    Transportation needs     Medical: Not on file     Non-medical: Not on file   Tobacco Use    Smoking status: Never Smoker    Smokeless tobacco: Never Used   Substance and Sexual Activity    Alcohol use: Never     Frequency: Never    Drug use: Never    Sexual activity: Not Currently   Lifestyle    Physical activity     Days per week: Not on file     Minutes per session: Not on file    Stress: Not on file   Relationships    Social connections     Talks on phone: Not on file     Gets together: Not on file     Attends Episcopal service: Not on file     Active member of club or organization: Not on file     Attends meetings of clubs or organizations: Not on file     Relationship status: Not on file    Intimate partner violence     Fear of current or ex partner: Not on file     Emotionally abused: Not on file     Physically abused: Not on file     Forced sexual activity: Not on file   Other Topics Concern    Not on file   Social History Narrative    Not on file       Vitals:    05/22/20 1524   BP: 123/64   Temp: 97.5 °F (36.4 °C)   TempSrc: Temporal   Weight: 160 lb (72.6 kg)   Height: 5' 3\" (1.6 m)     Staphylococcus epidermidis    WOUND/ABSCESS 05/11/2020  2:00 PM [address] [facility] growth    Testing Performed By     Gerardo Scott Name Director Address Valid Date Range   49-[facility] - [address] [facility] LAB Moreno Chavira MD [address] [place] [address].   [address] [place] [address] 12/03/19

## 2020-05-22 NOTE — PROGRESS NOTES
Negative for constipation, diarrhea, nausea and vomiting. Objective:       Physical Exam:  /64   Temp 97.5 °F (36.4 °C) (Temporal)   Ht 5' 3\" (1.6 m)   Wt 160 lb (72.6 kg)   BMI 28.34 kg/m²     Pulses ***      Wound #: ***    {HH WOUND DBFB:828259}   {HH WOUND LOCATION:105882} { WOUND LOCATION:439787}    Wound measurements:  #1  {NUMBER:26899} {MM/CM:277719316} by {NUMBER:69700} {MM/CM:996417322}  by   {NUMBER:53249} {MM/CM:863780388}         Wound Depth: {DEPTH OF DEBRIDEMENT:91032154}. Drainage:    {drainage:14144} Type: {drainage:40890}    Wound Bed status:   Granulation Tissue: {0-100%:532008360}   Necrotic {0-100%:742823358}  Type: ***  Epithelialization {0-100%:894952870}   Hypergranular {0-100%:779487495}   Periwound hyperkeratosis:  {DESC; PRESENT/ABSENT:60593::\"present\"}  Erythema {DESC; PRESENT/ABSENT:66483::\"present\"}  Odor:{yes no:904290}  Maceration:{yes no:621724}  Undermining/tract/tunneling:{yes VY:807417}  Culture taken:         Component Collected Lab   Organism Abnormal  05/11/2020  2:00 PM [facility]   Staphylococcus epidermidis    WOUND/ABSCESS 05/11/2020  2:00 PM [address]   Heavy growth    Testing Performed By     Lab - Abbreviation Name Director Address Valid Date Range   49- - [address] [facility] LAB Saranya East MD [address] [place] [address].   [address] [place] [address] 12/03/19 1502-Present   Narrative   Performed by: [address] [place] [address] Lab   Source: TOE       Site:              Susceptibility     Staphylococcus epidermidis (1)     Antibiotic Interpretation MATTHEW Status    clindamycin Resistant >=^4 mcg/mL     doxycycline Sensitive <=^0.5 mcg/mL     erythromycin Resistant >=^8 mcg/mL     gentamicin Sensitive <=^0.5 mcg/mL     oxacillin Resistant <=^0.25 mcg/mL     trimethoprim-sulfamethoxazole Resistant >=^320 mcg/mL     vancomycin Sensitive ^1 mcg/mL     Lab and Collection Culture, Wound - 2020       Assessment:     Assessment: Patient is a 70 y.o. female with:  No diagnosis found. Overall Wound Assessment  Wound is {BETTER/WORSE:29327}. Size has {DECREASED/INCREASED/UNCHANGED:}  Appearance has {IMPROVED/NO CHANGE/WORSE:}      Plan:     Pt examined and evaluated. Current condition and treatment options discussed in detail. Ines Simmons Age:  70 y.o. Gender:  female   :  1945  Today's Date:  2020      Procedure: With the patient in {DESC; PRONE / SUPINE / LATERAL:50026} position, Lidocaine soaked gauze was applied per physician order at beginning of wound evaluation. It was subsequently removed. Using a {DEBRIDEMENT INSTRUMENTS:00046::\"Pick-up\"}, the wound was debrided down to and included the removal of the following tissue:     [] epidermis, [] dermis, []  subcutaneous tissue,  [] muscle/fascia tissue,   and/or  [] bone     Also debrided was all  [] fibrin, [] biofilm, [] slough,  [] necrotic,  [] hypergranulation tissue, and/or  [] hyperkeratotic tissue. Wound was copiously irrigated with normal saline solution. Bleeding:  {ESTIMATED BLOOD URFE:733371505}. Hemostasis:  {Procedures; hemostasis methods:15661}     {0-100%:812983819}  of wound debrided. Patient tolerated procedure {DESC; WELL/FAIRLY WELL/POORLY:85483}. Pain {NUMBERS 0-10:40623} / 10 during procedure and pain {NUMBERS 0-10:61301} / 10 after procedure. Discussed appropriate home care of this wound. Wound redressed. Patient instructions were given.           Electronically signed by Iris Sifuentes DPM on 2020 at 3:43 PM  2020

## 2020-05-27 ENCOUNTER — HOSPITAL ENCOUNTER (OUTPATIENT)
Age: 75
Discharge: HOME | End: 2020-05-29
Payer: MEDICARE

## 2020-05-27 ENCOUNTER — OFFICE VISIT (OUTPATIENT)
Dept: FAMILY MEDICINE CLINIC | Age: 75
End: 2020-05-27
Payer: MEDICARE

## 2020-05-27 VITALS
HEIGHT: 63 IN | TEMPERATURE: 97.3 F | HEART RATE: 71 BPM | BODY MASS INDEX: 28.77 KG/M2 | SYSTOLIC BLOOD PRESSURE: 112 MMHG | DIASTOLIC BLOOD PRESSURE: 60 MMHG | WEIGHT: 162.4 LBS | OXYGEN SATURATION: 97 %

## 2020-05-27 LAB
ALBUMIN SERPL-MCNC: 4.1 G/DL (ref 3.5–5.2)
ALP BLD-CCNC: 68 U/L (ref 35–104)
ALT SERPL-CCNC: 21 U/L (ref 0–32)
ANION GAP SERPL CALCULATED.3IONS-SCNC: 19 MMOL/L (ref 7–16)
AST SERPL-CCNC: 23 U/L (ref 0–31)
BASOPHILS ABSOLUTE: 0.09 E9/L (ref 0–0.2)
BASOPHILS RELATIVE PERCENT: 1.2 % (ref 0–2)
BILIRUB SERPL-MCNC: 0.4 MG/DL (ref 0–1.2)
BUN BLDV-MCNC: 27 MG/DL (ref 8–23)
CALCIUM SERPL-MCNC: 10 MG/DL (ref 8.6–10.2)
CHLORIDE BLD-SCNC: 98 MMOL/L (ref 98–107)
CO2: 20 MMOL/L (ref 22–29)
CREAT SERPL-MCNC: 1.2 MG/DL (ref 0.5–1)
CREATININE URINE: 75 MG/DL (ref 29–226)
EOSINOPHILS ABSOLUTE: 0.46 E9/L (ref 0.05–0.5)
EOSINOPHILS RELATIVE PERCENT: 6 % (ref 0–6)
GFR AFRICAN AMERICAN: 53
GFR NON-AFRICAN AMERICAN: 44 ML/MIN/1.73
GLUCOSE BLD-MCNC: 343 MG/DL (ref 74–99)
HBA1C MFR BLD: 9.5 % (ref 4–5.6)
HCT VFR BLD CALC: 44.8 % (ref 34–48)
HEMOGLOBIN: 14.3 G/DL (ref 11.5–15.5)
IMMATURE GRANULOCYTES #: 0.04 E9/L
IMMATURE GRANULOCYTES %: 0.5 % (ref 0–5)
LYMPHOCYTES ABSOLUTE: 2.48 E9/L (ref 1.5–4)
LYMPHOCYTES RELATIVE PERCENT: 32.4 % (ref 20–42)
MCH RBC QN AUTO: 29.7 PG (ref 26–35)
MCHC RBC AUTO-ENTMCNC: 31.9 % (ref 32–34.5)
MCV RBC AUTO: 93.1 FL (ref 80–99.9)
MICROALBUMIN UR-MCNC: <12 MG/L
MICROALBUMIN/CREAT UR-RTO: ABNORMAL (ref 0–30)
MONOCYTES ABSOLUTE: 0.8 E9/L (ref 0.1–0.95)
MONOCYTES RELATIVE PERCENT: 10.5 % (ref 2–12)
NEUTROPHILS ABSOLUTE: 3.78 E9/L (ref 1.8–7.3)
NEUTROPHILS RELATIVE PERCENT: 49.4 % (ref 43–80)
PDW BLD-RTO: 13.5 FL (ref 11.5–15)
PLATELET # BLD: 215 E9/L (ref 130–450)
PMV BLD AUTO: 11.2 FL (ref 7–12)
POTASSIUM SERPL-SCNC: 4.7 MMOL/L (ref 3.5–5)
RBC # BLD: 4.81 E12/L (ref 3.5–5.5)
SODIUM BLD-SCNC: 137 MMOL/L (ref 132–146)
T4 FREE: 1.25 NG/DL (ref 0.93–1.7)
TOTAL PROTEIN: 7.6 G/DL (ref 6.4–8.3)
TSH SERPL DL<=0.05 MIU/L-ACNC: 5.91 UIU/ML (ref 0.27–4.2)
WBC # BLD: 7.7 E9/L (ref 4.5–11.5)

## 2020-05-27 PROCEDURE — 84439 ASSAY OF FREE THYROXINE: CPT

## 2020-05-27 PROCEDURE — 83036 HEMOGLOBIN GLYCOSYLATED A1C: CPT

## 2020-05-27 PROCEDURE — 82570 ASSAY OF URINE CREATININE: CPT

## 2020-05-27 PROCEDURE — 1090F PRES/ABSN URINE INCON ASSESS: CPT | Performed by: INTERNAL MEDICINE

## 2020-05-27 PROCEDURE — G8400 PT W/DXA NO RESULTS DOC: HCPCS | Performed by: INTERNAL MEDICINE

## 2020-05-27 PROCEDURE — G8417 CALC BMI ABV UP PARAM F/U: HCPCS | Performed by: INTERNAL MEDICINE

## 2020-05-27 PROCEDURE — 2022F DILAT RTA XM EVC RTNOPTHY: CPT | Performed by: INTERNAL MEDICINE

## 2020-05-27 PROCEDURE — 36415 COLL VENOUS BLD VENIPUNCTURE: CPT

## 2020-05-27 PROCEDURE — 84443 ASSAY THYROID STIM HORMONE: CPT

## 2020-05-27 PROCEDURE — 82044 UR ALBUMIN SEMIQUANTITATIVE: CPT

## 2020-05-27 PROCEDURE — 1036F TOBACCO NON-USER: CPT | Performed by: INTERNAL MEDICINE

## 2020-05-27 PROCEDURE — 85025 COMPLETE CBC W/AUTO DIFF WBC: CPT

## 2020-05-27 PROCEDURE — 1123F ACP DISCUSS/DSCN MKR DOCD: CPT | Performed by: INTERNAL MEDICINE

## 2020-05-27 PROCEDURE — 3017F COLORECTAL CA SCREEN DOC REV: CPT | Performed by: INTERNAL MEDICINE

## 2020-05-27 PROCEDURE — 80053 COMPREHEN METABOLIC PANEL: CPT

## 2020-05-27 PROCEDURE — G8427 DOCREV CUR MEDS BY ELIG CLIN: HCPCS | Performed by: INTERNAL MEDICINE

## 2020-05-27 PROCEDURE — 99214 OFFICE O/P EST MOD 30 MIN: CPT | Performed by: INTERNAL MEDICINE

## 2020-05-27 PROCEDURE — 4040F PNEUMOC VAC/ADMIN/RCVD: CPT | Performed by: INTERNAL MEDICINE

## 2020-05-27 PROCEDURE — 3052F HG A1C>EQUAL 8.0%<EQUAL 9.0%: CPT | Performed by: INTERNAL MEDICINE

## 2020-05-27 RX ORDER — INSULIN GLARGINE 100 [IU]/ML
65 INJECTION, SOLUTION SUBCUTANEOUS NIGHTLY
Qty: 15 PEN | Refills: 1 | Status: SHIPPED
Start: 2020-05-27 | End: 2020-09-25

## 2020-05-27 RX ORDER — LEVOTHYROXINE SODIUM 0.1 MG/1
100 TABLET ORAL DAILY
Qty: 90 TABLET | Refills: 1 | Status: SHIPPED
Start: 2020-05-27 | End: 2020-12-02 | Stop reason: SDUPTHER

## 2020-05-27 RX ORDER — AMLODIPINE BESYLATE 5 MG/1
5 TABLET ORAL DAILY
Qty: 90 TABLET | Refills: 1 | Status: SHIPPED
Start: 2020-05-27 | End: 2020-11-23

## 2020-05-27 RX ORDER — PRAVASTATIN SODIUM 20 MG
20 TABLET ORAL DAILY
Qty: 90 TABLET | Refills: 1 | Status: SHIPPED
Start: 2020-05-27 | End: 2020-11-23

## 2020-05-27 NOTE — PROGRESS NOTES
[facility]     [address]  [address] Dwyer : 1945 Sex: female  Age: 70 y.o. Chief Complaint   Patient presents with    Hypertension    Diabetes    Depression       HPI    Patient presents today for 3-month follow-up visit on her multiple medical problems. Reviewed last couple notes. Patient states her blood pressures have been in a good range. Blood sugars have been \"up and down\" last hemoglobin A1c done several months ago was 8.2. She is not very compliant with diet. She is up 2 pounds from last visit. She does state that she did develop a blister to her left great toe which is now pretty much healed up. She was placed on oral and topical antibiotics by podiatry. She does have diabetic neuropathy as a cause of the above. She has been stable from a depression standpoint. She is following with endocrine ophthalmology,GYN, pulmonary and psychiatry, cardiology    Review of Systems     Const: Denies chills, fever and sweats. Eyes: Reports glaucoma Recent cataract/glaucoma surgery./ diabetic retinopathy changes. /Ophthalmology. States the  patient does have post cataract surgery that will eventually need some laser surgery done  ENMT: Denies ear symptoms. Reports postnasal drip, but denies other nasal symptoms. Denies mouth or throat  symptoms. CV: Denies chest pain, orthopnea and palpitations--recent atrial fibrillation. Currently back in sinus rhythm and off of anticoagulation per cardiology  Resp: Denies cough, SOB and wheezing. GI: Denies diarrhea, nausea and vomiting. : Urinary: denies dysuria, frequency and frequent UTI's. Musculo: Reports arthritis, lower back pain and right hip pain/Improved  Skin: Denies eczema, pruritus, rash. Positive left great toe blister healed  Neuro: Denies dizziness, headache, seizures and syncope.  Diabetic neuropathy.   Psych: Reports depression and stress/back in counseling.  endocrine: Reports diabetes marginally controlled/marginal compliance      REST OF PERTINENT ROS GONE OVER AND WAS NEGATIVE. PMH:  Problem List: Type II diabetes mellitus uncontrolled, Essential hypertension, Taking medication, Depressive disorder,  Hypothyroidism, Hyperlipidemia, Type 2 diabetes mellitus  Bryce Singh  1945 Page #2  Health Maintenance:  Influenza Vaccination - (2017)  Couseled on Home Safety - (2017)  Mammogram - (2017)  Bone Density Test Screening - (2012)  Colonoscopy - (2010)  Colonoscopy - (2014)  Colonoscopy - (2017)  Colonoscopy Screening - (2010)  Colonoscopy Screening - (2014)  Colonoscopy Screening - (2017)  Mammogram Screening - (2006)  Mammogram Screening - (3/11/2008)  Mammogram Screening - (2/15/2010)  Mammogram Screening - (2017)  Colonoscopy - ,2/10,-due 17-due 22  Stress Test - , -negative  EKG - ,,,, ,10/18  Rectal Exam - dr Oumou Jay - \"  Breast Exam - \"  EGD - 2/10  Duplex Carotid Ultasound - 3/09-nl  capsule endoscopy - [facility] 4/10-nl  Influenza Vaccination - (10/2018)  Prevnar Vaccine - (2017)  Pneumonia Vaccination - ()  Zoster/Shingles Vaccine - had Zostavax, gave slip for Shingrix  Medical Problems:  photosensitivity disorder/chronic, hx pos Lyla  thyroid nodule-neg bx - chronic thyroiditis  IBS, Depression, Non Insulin Dependent Diabetes, Hyperlipidemia, tubal ligation, appy, hysterectomy-still has  ovaries--non cancerous, Gastroesophageal Reflux Disease (GERD), Hypothyroidism, occular htn, Diabetic Neuropathy  Osteopenia - declined med  Difficult Intubation, sees dr li/gyn, dr Dagoberto Laird eye,psych NP, Hypertension  CKD - sees renal  sinus surg - dr Josefina Palma  follows with counsellor,ophthalmology - GYN  cholecystectomy, Gastritis, vit D defic, Colon Polyps, Fatty Liver, bilateral cataract surg  pelvic and sacral fracture - 2016  Diabetic Retinopathy, Left carpal tunnel surgery. , Right carpal tunnel Continuous Blood Gluc Sensor ([facility]) [facility], 1 box by Does not apply route 2 times daily, Disp: 1 each, Rfl: 0    Continuous Blood Gluc Sensor ([facility]) [facility], PLEASE DISPENSE 1 KIT TO PATIENT, Disp: 1 each, Rfl: 0    Continuous Blood Gluc Sensor ([facility]) [facility], Please dispense one free style geetha kit, Disp: 1 each, Rfl: 0    latanoprost (XALATAN) 0.005 % ophthalmic solution, 1 drop nightly, Disp: , Rfl:     Omega-3 Fatty Acids (OMEGA 3 500 PO), Take by mouth, Disp: , Rfl:     Calcium Carb-Cholecalciferol (CALCIUM 1000 + D PO), Take by mouth, Disp: , Rfl:   Allergies   Allergen Reactions    Seasonal        Past Medical History:   Diagnosis Date    Atrial fibrillation (HCC)     Chronic kidney disease     Colon polyps     Diabetes mellitus ([address]     Diabetic neuropathy ([address]     Diabetic retinopathy ([address]     Essential hypertension 10/28/2019    Fatty liver     Gastritis     GERD (gastroesophageal reflux disease)     Hyperlipidemia     Hypertension     Hypothyroidism     Irritable bowel syndrome     Major depressive disorder with single episode 10/28/2019    Osteopenia     Photosensitivity disorder     chronic    RBBB     Sleep apnea     on BIPAP    Thyroid disease     Thyroid nodule     neg bx-chronic thyroiditis    Type 2 diabetes mellitus with diabetic polyneuropathy, with long-term current use of insulin ([address] 7/23/2019    Vitamin D deficiency      Past Surgical History:   Procedure Laterality Date    APPENDECTOMY      CARPAL TUNNEL RELEASE Bilateral     CATARACT REMOVAL Bilateral     CHOLECYSTECTOMY      GYNECOLOGIC CRYOSURGERY      HYSTERECTOMY     Belen Gardiner Person TONSILLECTOMY       Family History   Problem Relation Age of Onset    Diabetes Paternal Grandmother     Diabetes Father     Lung Cancer Mother     Pancreatic Cancer Brother     Diabetes Brother      Social History

## 2020-05-28 ENCOUNTER — TELEPHONE (OUTPATIENT)
Dept: FAMILY MEDICINE CLINIC | Age: 75
End: 2020-05-28

## 2020-05-28 NOTE — TELEPHONE ENCOUNTER
Kidney function is down slightly. Increase fluid intake. Blood sugar 343 along with hemoglobin A1c of 9.5 which is horrible. She needs to get back with endocrine let them make further adjustments. Please send copy of these labs to her endocrinologist Dr. Jeny Jacobo and psychiatry Naif Murphy at [facility].

## 2020-06-17 ENCOUNTER — TELEPHONE (OUTPATIENT)
Dept: FAMILY MEDICINE CLINIC | Age: 75
End: 2020-06-17

## 2020-06-22 ENCOUNTER — OFFICE VISIT (OUTPATIENT)
Dept: PODIATRY | Age: 75
End: 2020-06-22
Payer: MEDICARE

## 2020-06-22 VITALS
SYSTOLIC BLOOD PRESSURE: 139 MMHG | HEIGHT: 63 IN | BODY MASS INDEX: 28.88 KG/M2 | WEIGHT: 163 LBS | TEMPERATURE: 98.2 F | DIASTOLIC BLOOD PRESSURE: 81 MMHG

## 2020-06-22 PROCEDURE — G8417 CALC BMI ABV UP PARAM F/U: HCPCS | Performed by: PODIATRIST

## 2020-06-22 PROCEDURE — 11042 DBRDMT SUBQ TIS 1ST 20SQCM/<: CPT | Performed by: PODIATRIST

## 2020-06-22 PROCEDURE — G8427 DOCREV CUR MEDS BY ELIG CLIN: HCPCS | Performed by: PODIATRIST

## 2020-06-22 PROCEDURE — 99212 OFFICE O/P EST SF 10 MIN: CPT | Performed by: PODIATRIST

## 2020-06-22 PROCEDURE — 1090F PRES/ABSN URINE INCON ASSESS: CPT | Performed by: PODIATRIST

## 2020-06-22 PROCEDURE — G8400 PT W/DXA NO RESULTS DOC: HCPCS | Performed by: PODIATRIST

## 2020-06-22 PROCEDURE — 1123F ACP DISCUSS/DSCN MKR DOCD: CPT | Performed by: PODIATRIST

## 2020-06-22 PROCEDURE — 3046F HEMOGLOBIN A1C LEVEL >9.0%: CPT | Performed by: PODIATRIST

## 2020-06-22 PROCEDURE — 3017F COLORECTAL CA SCREEN DOC REV: CPT | Performed by: PODIATRIST

## 2020-06-22 PROCEDURE — 4040F PNEUMOC VAC/ADMIN/RCVD: CPT | Performed by: PODIATRIST

## 2020-06-22 PROCEDURE — 1036F TOBACCO NON-USER: CPT | Performed by: PODIATRIST

## 2020-06-22 PROCEDURE — 2022F DILAT RTA XM EVC RTNOPTHY: CPT | Performed by: PODIATRIST

## 2020-06-22 NOTE — PROGRESS NOTES
constipation, diarrhea, nausea and vomiting. Objective:       Physical Exam:  /81   Temp 98.2 °F (36.8 °C) (Temporal)   Ht 5' 3\" (1.6 m)   Wt 163 lb (73.9 kg)   BMI 28.87 kg/m²     Pulses       Wound #: 1    diabetic foot ulcer   left left    Wound measurements:  #1  1 mm by 1 mm  by   1 mm         Wound Depth: subcutaneous. Drainage:    Negative drainage Type: Negative drainage    Wound Bed status:   Granulation Tissue: 100%   Necrotic 100%  Type: Dry eschar  Epithelialization 100%   Hypergranular 100%   Periwound hyperkeratosis:  absent  Erythema absent  Odor:no  Maceration:no  Undermining/tract/tunneling:no  Culture taken:   no             Assessment:     Assessment: Patient is a 70 y.o. female with:  1. Skin ulcer of left foot, limited to breakdown of skin ([address]    2. Diabetic ulcer of toe of left foot associated with type 2 diabetes mellitus, with fat layer exposed ([address]        Overall Wound Assessment  Wound is has significantly improved. Size has decreased  Appearance has significantly improved      Plan:     Pt examined and evaluated. Current condition and treatment options discussed in detail. Cheikh Kemp Age:  70 y.o. Gender:  female   :  1945  Today's Date:  2020      Procedure: With the patient in supine position, Lidocaine soaked gauze was applied per physician order at beginning of wound evaluation. It was subsequently removed. Using a #15 blade scalpel, tissue nippers and Pick-up, the wound was debrided down to and included the removal of the following tissue:     [] epidermis, [] dermis, [x]  subcutaneous tissue,  [] muscle/fascia tissue,   and/or  [] bone     Also debrided was all  [] fibrin, [] biofilm, [] slough,  [x] necrotic,  [] hypergranulation tissue, and/or  [] hyperkeratotic tissue. Wound was copiously irrigated with normal saline solution. Bleeding:  None. Hemostasis:       100%  of wound debrided.     Patient tolerated procedure well.      Pain 0 / 10 during procedure and pain 0 / 10 after procedure. Discussed appropriate home care of this wound. Wound redressed. Patient instructions were given. Patient is to covered during the day leave open at night for 1 week.   Patient is to reappoint in 4 to 6 weeks unless the ulcer progressively gets worse      Electronically signed by Ysabel Lopez DPM on 6/22/2020 at 1:37 PM  6/22/2020

## 2020-07-29 ENCOUNTER — TELEPHONE (OUTPATIENT)
Dept: FAMILY MEDICINE CLINIC | Age: 75
End: 2020-07-29

## 2020-07-29 NOTE — TELEPHONE ENCOUNTER
Pt states that you had wanted her to have labs done. Please place lab orders. She will have done at [facility].   (Call pt when lab orders have been placed)

## 2020-07-30 ENCOUNTER — HOSPITAL ENCOUNTER (OUTPATIENT)
Age: 75
Discharge: HOME | End: 2020-08-01
Payer: MEDICARE

## 2020-07-30 LAB
ALBUMIN SERPL-MCNC: 4.2 G/DL (ref 3.5–5.2)
ALP BLD-CCNC: 64 U/L (ref 35–104)
ALT SERPL-CCNC: 20 U/L (ref 0–32)
ANION GAP SERPL CALCULATED.3IONS-SCNC: 15 MMOL/L (ref 7–16)
AST SERPL-CCNC: 18 U/L (ref 0–31)
BASOPHILS ABSOLUTE: 0.1 E9/L (ref 0–0.2)
BASOPHILS RELATIVE PERCENT: 1.2 % (ref 0–2)
BILIRUB SERPL-MCNC: 0.4 MG/DL (ref 0–1.2)
BUN BLDV-MCNC: 23 MG/DL (ref 8–23)
CALCIUM SERPL-MCNC: 10.1 MG/DL (ref 8.6–10.2)
CHLORIDE BLD-SCNC: 101 MMOL/L (ref 98–107)
CHOLESTEROL, TOTAL: 161 MG/DL (ref 0–199)
CO2: 25 MMOL/L (ref 22–29)
CREAT SERPL-MCNC: 1 MG/DL (ref 0.5–1)
CREATININE URINE: 73 MG/DL (ref 29–226)
EOSINOPHILS ABSOLUTE: 0.51 E9/L (ref 0.05–0.5)
EOSINOPHILS RELATIVE PERCENT: 6.3 % (ref 0–6)
GFR AFRICAN AMERICAN: >60
GFR NON-AFRICAN AMERICAN: 54 ML/MIN/1.73
GLUCOSE BLD-MCNC: 209 MG/DL (ref 74–99)
HBA1C MFR BLD: 9.3 % (ref 4–5.6)
HCT VFR BLD CALC: 43 % (ref 34–48)
HDLC SERPL-MCNC: 47 MG/DL
HEMOGLOBIN: 13.7 G/DL (ref 11.5–15.5)
IMMATURE GRANULOCYTES #: 0.06 E9/L
IMMATURE GRANULOCYTES %: 0.7 % (ref 0–5)
LDL CHOLESTEROL CALCULATED: 63 MG/DL (ref 0–99)
LYMPHOCYTES ABSOLUTE: 3.45 E9/L (ref 1.5–4)
LYMPHOCYTES RELATIVE PERCENT: 42.5 % (ref 20–42)
MCH RBC QN AUTO: 29.3 PG (ref 26–35)
MCHC RBC AUTO-ENTMCNC: 31.9 % (ref 32–34.5)
MCV RBC AUTO: 92.1 FL (ref 80–99.9)
MICROALBUMIN UR-MCNC: <12 MG/L
MICROALBUMIN/CREAT UR-RTO: ABNORMAL (ref 0–30)
MONOCYTES ABSOLUTE: 0.9 E9/L (ref 0.1–0.95)
MONOCYTES RELATIVE PERCENT: 11.1 % (ref 2–12)
NEUTROPHILS ABSOLUTE: 3.09 E9/L (ref 1.8–7.3)
NEUTROPHILS RELATIVE PERCENT: 38.2 % (ref 43–80)
PDW BLD-RTO: 13.1 FL (ref 11.5–15)
PLATELET # BLD: 187 E9/L (ref 130–450)
PMV BLD AUTO: 10.5 FL (ref 7–12)
POTASSIUM SERPL-SCNC: 4.2 MMOL/L (ref 3.5–5)
RBC # BLD: 4.67 E12/L (ref 3.5–5.5)
SODIUM BLD-SCNC: 141 MMOL/L (ref 132–146)
T4 FREE: 1.28 NG/DL (ref 0.93–1.7)
TOTAL PROTEIN: 7 G/DL (ref 6.4–8.3)
TRIGL SERPL-MCNC: 254 MG/DL (ref 0–149)
TSH SERPL DL<=0.05 MIU/L-ACNC: 3.5 UIU/ML (ref 0.27–4.2)
VLDLC SERPL CALC-MCNC: 51 MG/DL
WBC # BLD: 8.1 E9/L (ref 4.5–11.5)

## 2020-07-30 PROCEDURE — 83036 HEMOGLOBIN GLYCOSYLATED A1C: CPT

## 2020-07-30 PROCEDURE — 84439 ASSAY OF FREE THYROXINE: CPT

## 2020-07-30 PROCEDURE — 84443 ASSAY THYROID STIM HORMONE: CPT

## 2020-07-30 PROCEDURE — 80061 LIPID PANEL: CPT

## 2020-07-30 PROCEDURE — 80053 COMPREHEN METABOLIC PANEL: CPT

## 2020-07-30 PROCEDURE — 82044 UR ALBUMIN SEMIQUANTITATIVE: CPT

## 2020-07-30 PROCEDURE — 85025 COMPLETE CBC W/AUTO DIFF WBC: CPT

## 2020-07-30 PROCEDURE — 82570 ASSAY OF URINE CREATININE: CPT

## 2020-07-30 PROCEDURE — 36415 COLL VENOUS BLD VENIPUNCTURE: CPT

## 2020-07-31 ENCOUNTER — TELEPHONE (OUTPATIENT)
Dept: FAMILY MEDICINE CLINIC | Age: 75
End: 2020-07-31

## 2020-07-31 NOTE — TELEPHONE ENCOUNTER
As usual hemoglobin A1c is poor. She is at 9.3. Rest of labs are stable. She needs to follow with endocrine. Send them a copy of these labs please.   Dr. Travis Bonilla

## 2020-08-24 ENCOUNTER — OFFICE VISIT (OUTPATIENT)
Dept: FAMILY MEDICINE CLINIC | Age: 75
End: 2020-08-24
Payer: MEDICARE

## 2020-08-24 VITALS
HEIGHT: 63 IN | WEIGHT: 164.6 LBS | SYSTOLIC BLOOD PRESSURE: 122 MMHG | OXYGEN SATURATION: 98 % | HEART RATE: 72 BPM | DIASTOLIC BLOOD PRESSURE: 60 MMHG | TEMPERATURE: 97.8 F | BODY MASS INDEX: 29.16 KG/M2

## 2020-08-24 PROCEDURE — 1090F PRES/ABSN URINE INCON ASSESS: CPT | Performed by: INTERNAL MEDICINE

## 2020-08-24 PROCEDURE — G8400 PT W/DXA NO RESULTS DOC: HCPCS | Performed by: INTERNAL MEDICINE

## 2020-08-24 PROCEDURE — G8427 DOCREV CUR MEDS BY ELIG CLIN: HCPCS | Performed by: INTERNAL MEDICINE

## 2020-08-24 PROCEDURE — G8417 CALC BMI ABV UP PARAM F/U: HCPCS | Performed by: INTERNAL MEDICINE

## 2020-08-24 PROCEDURE — 3046F HEMOGLOBIN A1C LEVEL >9.0%: CPT | Performed by: INTERNAL MEDICINE

## 2020-08-24 PROCEDURE — 2022F DILAT RTA XM EVC RTNOPTHY: CPT | Performed by: INTERNAL MEDICINE

## 2020-08-24 PROCEDURE — 3017F COLORECTAL CA SCREEN DOC REV: CPT | Performed by: INTERNAL MEDICINE

## 2020-08-24 PROCEDURE — 4040F PNEUMOC VAC/ADMIN/RCVD: CPT | Performed by: INTERNAL MEDICINE

## 2020-08-24 PROCEDURE — 1123F ACP DISCUSS/DSCN MKR DOCD: CPT | Performed by: INTERNAL MEDICINE

## 2020-08-24 PROCEDURE — 99213 OFFICE O/P EST LOW 20 MIN: CPT | Performed by: INTERNAL MEDICINE

## 2020-08-24 PROCEDURE — 1036F TOBACCO NON-USER: CPT | Performed by: INTERNAL MEDICINE

## 2020-08-24 ASSESSMENT — PATIENT HEALTH QUESTIONNAIRE - PHQ9
2. FEELING DOWN, DEPRESSED OR HOPELESS: 1
SUM OF ALL RESPONSES TO PHQ QUESTIONS 1-9: 1
SUM OF ALL RESPONSES TO PHQ QUESTIONS 1-9: 1
1. LITTLE INTEREST OR PLEASURE IN DOING THINGS: 0
SUM OF ALL RESPONSES TO PHQ9 QUESTIONS 1 & 2: 1

## 2020-08-24 NOTE — PROGRESS NOTES
diabetes marginally controlled/marginal compliance         REST OF PERTINENT ROS GONE OVER AND WAS NEGATIVE. Current Outpatient Medications:     LANTUS SOLOSTAR 100 UNIT/ML injection pen, Inject 65 Units into the skin nightly, Disp: 15 pen, Rfl: 1    metoprolol tartrate (LOPRESSOR) 25 MG tablet, Take 1 tablet by mouth 2 times daily, Disp: 180 tablet, Rfl: 1    pravastatin (PRAVACHOL) 20 MG tablet, Take 1 tablet by mouth daily, Disp: 90 tablet, Rfl: 1    levothyroxine (SYNTHROID) 100 MCG tablet, Take 1 tablet by mouth Daily, Disp: 90 tablet, Rfl: 1    amLODIPine (NORVASC) 5 MG tablet, Take 1 tablet by mouth daily, Disp: 90 tablet, Rfl: 1    silver sulfADIAZINE (SILVADENE) 1 % cream, Apply topically daily. , Disp: 400 g, Rfl: 0    glipiZIDE (GLUCOTROL XL) 10 MG extended release tablet, Take 1 tablet by mouth daily, Disp: 90 tablet, Rfl: 0    omega-3 acid ethyl esters (LOVAZA) 1 g capsule, TK 2 CS PO BID, Disp: , Rfl:     INSULIN LISPRO SC, Inject into the skin, Disp: , Rfl:     Multiple Vitamins-Minerals (VITEYES COMPLETE PO), Take by mouth, Disp: , Rfl:     nystatin-triamcinolone (MYCOLOG II) 756744-9.1 UNIT/GM-% cream, Apply topically 2 times daily. , Disp: 1 Tube, Rfl: 1    divalproex (DEPAKOTE) 250 MG DR tablet, Take 250 mg by mouth nightly Take 2 tablets by mouth every night at bedtime, Disp: , Rfl:     BD PEN NEEDLE MICRO U/F 32G X 6 MM MISC, USE WITH LANTUS DAILY, Disp: 100 each, Rfl: 3    escitalopram (LEXAPRO) 10 MG tablet, Take 10 mg by mouth daily, Disp: , Rfl:     JARDIANCE 25 MG tablet, 25 mg daily , Disp: , Rfl: 3    Continuous Blood Gluc Sensor (FREESTYLE ANT SENSOR SYSTEM) MISC, 1 box by Does not apply route 2 times daily, Disp: 1 each, Rfl: 0    Continuous Blood Gluc Sensor (FREESTYLE ANT SENSOR SYSTEM) [facility], PLEASE DISPENSE 1 KIT TO PATIENT, Disp: 1 each, Rfl: 0    Continuous Blood Gluc Sensor ([address] [place] [address]) MISC, Please dispense one free style ant kit, Disp: 1 each, Rfl: 0    latanoprost (XALATAN) 0.005 % ophthalmic solution, 1 drop nightly, Disp: , Rfl:     Omega-3 Fatty Acids (OMEGA 3 500 PO), Take by mouth, Disp: , Rfl:     Calcium Carb-Cholecalciferol (CALCIUM 1000 + D PO), Take by mouth, Disp: , Rfl:   Allergies   Allergen Reactions    Seasonal        Past Medical History:   Diagnosis Date    Atrial [facility] ([facility]     Chronic kidney disease     Colon polyps     Diabetes mellitus ([facility]     Diabetic neuropathy ([facility]     Diabetic retinopathy ([facility]     Essential hypertension 10/28/2019    Fatty liver     Gastritis     GERD (gastroesophageal reflux disease)     Hyperlipidemia     Hypertension     Hypothyroidism     Irritable bowel syndrome     Major depressive disorder with single episode 10/28/2019    Osteopenia     Photosensitivity disorder     chronic    RBBB     Sleep apnea     on BIPAP    Thyroid disease     Thyroid nodule     neg bx-chronic thyroiditis    Type 2 diabetes mellitus with diabetic polyneuropathy, with long-term current use of insulin ([facility] 7/23/2019    Vitamin D deficiency      Past Surgical History:   Procedure Laterality Date    APPENDECTOMY      CARPAL TUNNEL RELEASE Bilateral     CATARACT REMOVAL Bilateral     CHOLECYSTECTOMY      GYNECOLOGIC CRYOSURGERY      HYSTERECTOMY     Chin Reid Mail TONSILLECTOMY       Family History   Problem Relation Age of Onset    Diabetes Paternal Grandmother     Diabetes Father     Lung Cancer Mother     Pancreatic Cancer Brother     Diabetes Brother      Social History     Socioeconomic History    Marital status:       Spouse name: Not on file    Number of children: Not on file    Years of education: Not on file    Highest education level: Not on file   Occupational History    Not on file   Social Needs    Financial resource strain: Not on file    Food insecurity     Worry: Not on file     Inability: Not on file   [employer] needs     Medical: Not on file     Non-medical: Not on file   Tobacco Use    Smoking status: Never Smoker    Smokeless tobacco: Never Used   Substance and Sexual Activity    Alcohol use: Never     Frequency: Never    Drug use: Never    Sexual activity: Not Currently   Lifestyle    Physical activity     Days per week: Not on file     Minutes per session: Not on file    Stress: Not on file   Relationships    Social connections     Talks on phone: Not on file     Gets together: Not on file     Attends Restorationism service: Not on file     Active member of club or organization: Not on file     Attends meetings of clubs or organizations: Not on file     Relationship status: Not on file    Intimate partner violence     Fear of current or ex partner: Not on file     Emotionally abused: Not on file     Physically abused: Not on file     Forced sexual activity: Not on file   Other Topics Concern    Not on file   Social History Narrative    Not on file       Vitals:    08/24/20 1250   BP: 122/60   Pulse: 72   Temp: 97.8 °F (36.6 °C)   TempSrc: Temporal   SpO2: 98%   Weight: 164 lb 9.6 oz (74.7 kg)   Height: 5' 3\" (1.6 m)       Physical Exam      Const: Appears well developed and well nourished. No signs of acute distress present. Neck: Supple and symmetric. Palpation reveals no adenopathy. No masses appreciated. Thyroid exhibits no nodule  or thyromegaly. No JVD. Carotids: 2+ and equal bilaterally, without bruits. Resp: No rales, rhonchi or wheezes appreciated over the lungs bilaterally. CV: Rate is regular. Rhythm is regular. S1 is normal. S2 is normal. No gallop or rubs. No heart murmur  appreciated. Extremities: No clubbing, cyanosis or edema. Abdomen: Bowel sounds are normoactive. Palpation reveals softness, with no distension, organomegaly or  tenderness. No abdominal masses palpable. No palpable hepatosplenomegaly. Musculo: Walks with a normal gait. Upper Extremities: Full ROM bilaterally.  Lower Extremities: Full ROM  bilaterally. Neuro: Alert and oriented x3. Mood is normal. Affect is normal. Speech is articulate and fluent. Upper Extremities:  motor strength is intact. Normal muscle tone bilaterally. Lower Extremities: motor strength is intact. Normal muscle  tone bilaterally. Sensation intact to light touch  Psych: Patient's attitude is cooperative. Patient's affect is  normal. Judgement is realistic. Insight is appropriate.           Assessment and Plan:  Cinthia was seen today for diabetes. Diagnoses and all orders for this visit:    Essential hypertension    Type 2 diabetes mellitus with diabetic polyneuropathy, with long-term current use of insulin ([facility]    Hyperlipidemia, unspecified hyperlipidemia type    Hypothyroidism, unspecified type      Plan: I did review all of her recent blood work And would not check anything further today. She is in need of no medication refills. I did ask her to continue monitoring blood pressure and blood sugar closely. Increase activity level weight loss attempts. Follow with above consultants. We will see her back in 3 months and check all of her labs at that point notify us of problems in the interim. Return in about 3 months (around 11/24/2020). Seen By:  Cristina Yung MD      *Document was created using voice recognition software. Note was reviewed however may contain grammatical errors.

## 2020-10-05 ENCOUNTER — TELEPHONE (OUTPATIENT)
Dept: FAMILY MEDICINE CLINIC | Age: 75
End: 2020-10-05

## 2020-10-05 NOTE — TELEPHONE ENCOUNTER
[facility] calling please refer her to dr Alison Aguilar. Dr Rachael Rivero the sleep specialist is no longer practicing.

## 2020-10-06 ENCOUNTER — TELEPHONE (OUTPATIENT)
Dept: FAMILY MEDICINE CLINIC | Age: 75
End: 2020-10-06

## 2020-11-10 RX ORDER — INSULIN GLARGINE 100 [IU]/ML
65 INJECTION, SOLUTION SUBCUTANEOUS NIGHTLY
Qty: 15 ML | Refills: 1 | Status: SHIPPED
Start: 2020-11-10 | End: 2020-12-02 | Stop reason: SDUPTHER

## 2020-11-10 NOTE — TELEPHONE ENCOUNTER
Last Appointment:  8/24/2020  Future Appointments   Date Time Provider Coleman Garcia   11/11/2020 10:30 AM Adolph Cody DPM [facility]   11/18/2020  2:30 PM Lisa Warren MD [facility]   12/2/2020  1:30 PM Jessica Hickey  [facility] left message asking for refill on lantus pens

## 2020-11-11 ENCOUNTER — PROCEDURE VISIT (OUTPATIENT)
Dept: PODIATRY | Age: 75
End: 2020-11-11
Payer: MEDICARE

## 2020-11-11 VITALS — TEMPERATURE: 97.6 F | DIASTOLIC BLOOD PRESSURE: 65 MMHG | SYSTOLIC BLOOD PRESSURE: 130 MMHG

## 2020-11-11 PROCEDURE — 11721 DEBRIDE NAIL 6 OR MORE: CPT | Performed by: PODIATRIST

## 2020-11-11 ASSESSMENT — ENCOUNTER SYMPTOMS
GASTROINTESTINAL NEGATIVE: 1
RESPIRATORY NEGATIVE: 1

## 2020-11-11 NOTE — PROGRESS NOTES
[facility] PODIATRY  [address] [facility] [address] [address]  Dept: [phone]  Dept Fax: [phone]    DIABETIC NAIL PROGRESS NOTE  Date of patient's visit: 11/11/2020  Patient's Name:  Dion Desai YOB: 1945            Patient Care Team:  Lilli Romero MD as PCP - General (Internal Medicine)  Lilli Romero MD as PCP - [facility] Empaneled Provider  Warden Miya DPM as Consulting Physician (Podiatry)          Chief Complaint   Patient presents with    Diabetes     saw pcp Dr. Edie Aguila on 8/24/2020    Toe Pain       Subjective: Dion Desai comes to clinic for Diabetes (saw pcp Dr. Edie Aguila on 8/24/2020) and Toe Pain    she is a diabetic and states that diabetic foot exam .  Pt currently has complaint of thickened, elongated nails that they cannot manage by themselves. Pt's primary care physician is Lilli Romero MD l   Lab Results   Component Value Date    LABA1C 9.3 (H) 07/30/2020      Complains of numbness in the feet bilat.   Past Medical History:   Diagnosis Date    Atrial fibrillation ([address]     Chronic kidney disease     Colon polyps     Diabetes mellitus ([address]     Diabetic neuropathy ([address]     Diabetic retinopathy ([address]     Essential hypertension 10/28/2019    Fatty liver     Gastritis     GERD (gastroesophageal reflux disease)     Hyperlipidemia     Hypertension     Hypothyroidism     Irritable bowel syndrome     Major depressive disorder with single episode 10/28/2019    Osteopenia     Photosensitivity disorder     chronic    RBBB     Sleep apnea     on BIPAP    Thyroid disease     Thyroid nodule     neg bx-chronic thyroiditis    Type 2 diabetes mellitus with diabetic polyneuropathy, with long-term current use of insulin ([address] 7/23/2019    Vitamin D deficiency        Allergies   Allergen Reactions    Seasonal      Current Outpatient Medications on File Prior to Visit   Medication Sig Dispense Cardiovascular: Negative. Gastrointestinal: Negative. Review of Systems:   History obtained from chart review and the patient  General ROS: negative for - chills, fatigue, fever, night sweats or weight gain  Constitutional: Negative for chills, diaphoresis, fatigue, fever and unexpected weight change. Musculoskeletal: Positive for arthralgias, gait problem and joint swelling. Neurological ROS: negative for - behavioral changes, confusion, headaches or seizures. Negative for weakness and numbness. Dermatological ROS: negative for - mole changes, rash  Cardiovascular: Negative for leg swelling. Gastrointestinal: Negative for constipation, diarrhea, nausea and vomiting. Objective:  General: AAO x 3 in NAD. Derm  Toenail Description nails are thick and mycotic yellow incurvated causing pain with shoe gear  Sites of Onychomycosis Involvement (Check affected area)  [x] [x] [x] [x] [x] [x] [x] [x] [x] [x]  5 4 3 2 1 1 2 3 4 5                          Right                                        Left    Thickness  [x] [x] [x] [x] [x] [x] [x] [x] [x] [x]  5 4 3 2 1 1 2 3 4 5                         Right                                        Left    Dystrophic Changes   [x] [x] [x] [x] [x] [x] [x] [x] [x] [x]  5 4 3 2 1 1 2 3 4 5                         Right                                        Left    Color   [x] [x] [x] [x] [x] [x] [x] [x] [x] [x]  5 4 3 2 1 1 2 3 4 5                          Right                                        Left    Incurvation/Ingrowin   [x] [x] [x] [x] [x] [x] [x] [x] [x] [x]  5 4 3 2 1 1 2 3 4 5                         Right                                        Left    Inflammation/Pain   [x] [x] [x] [x] [x] [x] [x] [x] [x] [x]  5 4 3 2 1 1 2 3 4 5                         Right                                        Left      Dermatologic Exam:  Skin lesion/ulceration .    Skin callus bilaterally plantar aspect plantar to the second and third metatarsal heads both lesions are about 1 cm x 1 cm tissue  Loss of hair  lower extremity      Musculoskeletal:     1st MPJ ROM decreased, Bilateral.  Muscle Bilateral.  Pain present upon palpation of toenails 1-5, Bilateral. decreased medial longitudinal arch, Bilateral.  Ankle ROM decreased,Bilateral.    Dorsally contracted digits     Vascular:  Loss of hair  LE   Nails thick yellow   Absent pt pulses   Cool touch   CFT <absent seconds, Bilateral.  Hair growth absent to the level of the digits, Bilateral.  Edema minimal Bilateral.  Varicosities severe Bilateral.     Neurological: Sensation diminshed to light touch to level of digits, Bilateral.  Protective sensation decreased sites via 5.07/10g [place] Monofilament, Bilateral.  negative Tinel's, Bilateral.  negative Valleix sign, Bilateral.      Integument: nails   thickened > 3.0 mm, dystrophic and crumbly, discolored with subungual debris. Toes cool to touch    Visual inspection:  Deformity: hammertoe deformity priti feet  amputation: absent    Edema:   Sensory exam:  Monofilament sensation: abnormal - 6/10 via SW 5.07/10g monofilament to the plantar foot bilateral feet    Pulses:     Pinprick: Impaired  Proprioception: Impaired  Vibration (128 Hz): Impaired       DM with PVD       [x]Yes    []no    Foot Exam    General  General Appearance: appears stated age and healthy   Orientation: alert and oriented to person, place, and time       Right Foot/Ankle     Inspection and Palpation  Skin Exam: abnormal color; no skin changes     Neurovascular  Dorsalis pedis: 2+  Posterior tibial: absent      Left Foot/Ankle      Inspection and Palpation  Skin Exam: skin changes and abnormal color; Neurovascular  Dorsalis pedis: 2+  Posterior tibial: absent           Xr Foot Left (min 3 Views)      Ortho Exam      Assessment:  70 y.o. female with:  1. Tinea unguium    2. Diabetic ulcer of toe of left foot associated with type 2 diabetes mellitus, with fat layer exposed ([address]    3. Type 2 diabetes mellitus without complication, without long-term current use of insulin ([facility]    4. Peripheral vascular disease, unspecified ([facility]    5. Pain in toe of right foot    6. Difficulty walking       Orders Placed This Encounter   Procedures     DIABETES FOOT EXAM           Q7   []Yes    []No                Q8   [x]Yes    []No                     Q9   []Yes    []No    Plan:Visual inspection:  Deformity/amputation: absent  Skin lesions/pre-ulcerative calluses: present -   Edema: right- trace, left- trace    Sensory exam:  Monofilament sensation: abnormal -   (minimum of 5 random plantar locations tested, avoiding callused areas - > 1 area with absence of sensation is + for neuropathy)    Plus at least one of the following:  Pulses: abnormal - ,   Pinprick: Impaired  Proprioception: Impaired  Vibration (128 Hz): Impaired  Pt was evaluated and examined. Patient was given personalized discharge instructions. Nails 1-10 were debrided sharply in length and thickness with a nipper and , without incident. Pt will follow up in 3 months or sooner if any problems arise. Diagnosis was discussed with the pt and all of their questions were answered in detail. Proper foot hygiene and care was discussed with the pt. Informed patient on proper diabetic foot care and importance of tight glycemic control. Patient to check feet daily and contact the office with any questions/problems/concerns.    Other comorbidity noted and will be managed by PCP.  11/11/2020    Electronically signed by Mario Rodrigues DPM on 11/11/2020 at 11:04 AM  11/11/2020

## 2020-11-12 RX ORDER — GLIPIZIDE 10 MG/1
10 TABLET, FILM COATED, EXTENDED RELEASE ORAL DAILY
Qty: 30 TABLET | Refills: 0 | Status: SHIPPED
Start: 2020-11-12 | End: 2020-12-02 | Stop reason: SDUPTHER

## 2020-11-12 NOTE — TELEPHONE ENCOUNTER
Last Appointment:  8/24/2020  Future Appointments   Date Time Provider Coleman Garcia   11/18/2020  2:30 PM Viry Hernandez MD [facility]   12/2/2020  1:30 PM MD PRITI Reaves [facility]   2/1/2021  1:30 PM Ifrah Diop DPM [facility] Podiatry [facility] calling she will not have enough glipizde xl 10mg daily til appt. Will you order to jono?

## 2020-11-30 ENCOUNTER — TELEPHONE (OUTPATIENT)
Dept: FAMILY MEDICINE CLINIC | Age: 75
End: 2020-11-30

## 2020-11-30 NOTE — TELEPHONE ENCOUNTER
Called patient to see if she would be willing to do a virtual visit instead of coming into the office on Wednesday December 2nd. Left message for patient to call back.

## 2020-12-02 ENCOUNTER — VIRTUAL VISIT (OUTPATIENT)
Dept: FAMILY MEDICINE CLINIC | Age: 75
End: 2020-12-02
Payer: MEDICARE

## 2020-12-02 PROCEDURE — 4040F PNEUMOC VAC/ADMIN/RCVD: CPT | Performed by: INTERNAL MEDICINE

## 2020-12-02 PROCEDURE — 3017F COLORECTAL CA SCREEN DOC REV: CPT | Performed by: INTERNAL MEDICINE

## 2020-12-02 PROCEDURE — G8417 CALC BMI ABV UP PARAM F/U: HCPCS | Performed by: INTERNAL MEDICINE

## 2020-12-02 PROCEDURE — G8484 FLU IMMUNIZE NO ADMIN: HCPCS | Performed by: INTERNAL MEDICINE

## 2020-12-02 PROCEDURE — 2022F DILAT RTA XM EVC RTNOPTHY: CPT | Performed by: INTERNAL MEDICINE

## 2020-12-02 PROCEDURE — 99214 OFFICE O/P EST MOD 30 MIN: CPT | Performed by: INTERNAL MEDICINE

## 2020-12-02 PROCEDURE — G8400 PT W/DXA NO RESULTS DOC: HCPCS | Performed by: INTERNAL MEDICINE

## 2020-12-02 PROCEDURE — 1090F PRES/ABSN URINE INCON ASSESS: CPT | Performed by: INTERNAL MEDICINE

## 2020-12-02 PROCEDURE — 1036F TOBACCO NON-USER: CPT | Performed by: INTERNAL MEDICINE

## 2020-12-02 PROCEDURE — 1123F ACP DISCUSS/DSCN MKR DOCD: CPT | Performed by: INTERNAL MEDICINE

## 2020-12-02 PROCEDURE — 3046F HEMOGLOBIN A1C LEVEL >9.0%: CPT | Performed by: INTERNAL MEDICINE

## 2020-12-02 PROCEDURE — G8427 DOCREV CUR MEDS BY ELIG CLIN: HCPCS | Performed by: INTERNAL MEDICINE

## 2020-12-02 RX ORDER — FLUCONAZOLE 150 MG/1
150 TABLET ORAL ONCE
Qty: 5 TABLET | Refills: 0 | Status: SHIPPED | OUTPATIENT
Start: 2020-12-02 | End: 2020-12-02

## 2020-12-02 RX ORDER — LEVOTHYROXINE SODIUM 0.1 MG/1
100 TABLET ORAL DAILY
Qty: 90 TABLET | Refills: 1 | Status: SHIPPED
Start: 2020-12-02 | End: 2021-06-01

## 2020-12-02 RX ORDER — GLIPIZIDE 10 MG/1
10 TABLET, FILM COATED, EXTENDED RELEASE ORAL DAILY
Qty: 90 TABLET | Refills: 1 | Status: SHIPPED
Start: 2020-12-02 | End: 2021-06-01

## 2020-12-02 RX ORDER — INSULIN GLARGINE 100 [IU]/ML
65 INJECTION, SOLUTION SUBCUTANEOUS NIGHTLY
Qty: 15 ML | Refills: 1 | Status: SHIPPED
Start: 2020-12-02 | End: 2020-12-10 | Stop reason: SDUPTHER

## 2020-12-03 NOTE — PROGRESS NOTES
TeleMedicine Video Visit    This visit was performed as a virtual video visit using a synchronous, two-way, audio-video telehealth technology platform. Patient identification was verified at the start of the visit, including the patient's telephone number and physical location. I discussed with the patient the nature of our telehealth visits, that:     1. Due to the nature of an audio- video modality, the only components of a physical exam that could be done are the elements supported by direct observation. 2. I would evaluate the patient and recommend diagnostics and treatments based on my assessment. 3. If it was felt that the patient should be evaluated in clinic or an emergency room setting, then they would be directed there. 4. Our sessions are not being recorded and that personal health information is protected. 5. Our team would provide follow up care in person if/when the patient needs it. Patient does agree to proceed with telemedicine consultation. Patient's location: home address in [facility] [address]  Physician  location Office address in [place] other people involved in call--none      Time spent: Greater than 30    This visit was completed virtually using Doxy. me        [employer] [facility]     [address]  [address]el[address] Fish : 1945 Sex: female  Age: 70 y.o. Chief Complaint   Patient presents with    Diabetes    Hypertension       HPI    Patient encounter today via virtual video visit secondary to ongoing COVID-19 pandemic status. This is a 3-month visit. In general she says she is doing fairly well. She is complaining of recurrent yeast infection for which she has received Mycolog-II from her gynecologist.  She states it really does not work well. I told her these infections are most likely related to the Jardiance she is using for diabetes. I did give her Diflucan in hopes of relieving this.   I also asked her to call her endocrinologist and explained the situation as they may want to switch (11/6/2017)  Couseled on Home Safety - (8/11/2017)  Mammogram - (2/14/2017)  Bone Density Test Screening - (6/13/2012)  Colonoscopy - (2/22/2010)  Colonoscopy - (12/11/2014)  Colonoscopy - (2/22/2017)  Colonoscopy Screening - (2/22/2010)  Colonoscopy Screening - (12/11/2014)  Colonoscopy Screening - (2/22/2017)  Mammogram Screening - (1/9/2006)  Mammogram Screening - (3/11/2008)  Mammogram Screening - (2/15/2010)  Mammogram Screening - (2/14/2017)  Colonoscopy - 1996,2/10,12/14-due 17-due 22  Stress Test - 2005, 11/18-negative  EKG - 6/08,5/13,4/14,6/14, 4/17,10/18  Rectal Exam - dr King Singh - \"  Breast Exam - \"  EGD - 2/10  Duplex Carotid Ultasound - 3/09-nl  capsule endoscopy - [facility] 4/10-nl  Influenza Vaccination - (10/2018)  Prevnar Vaccine - (4/2017)  Pneumonia Vaccination - (2004)  Zoster/Shingles Vaccine - had Zostavax, gave slip for Shingrix  Medical Problems:  photosensitivity disorder/chronic, hx pos Lyla  thyroid nodule-neg bx - chronic thyroiditis  IBS, Depression, Non Insulin Dependent Diabetes, Hyperlipidemia, tubal ligation, appy, hysterectomy-still has  ovaries--non cancerous, Gastroesophageal Reflux Disease (GERD), Hypothyroidism, occular htn, Diabetic Neuropathy  Osteopenia - declined med  Difficult Intubation, sees dr li/gyn, dr Stephani Chaves eye,psych NP, Hypertension  CKD - sees renal  sinus surg - dr Emory Motta  follows with counsellor,ophthalmology - GYN  cholecystectomy, Gastritis, vit D defic, Colon Polyps, Fatty Liver, bilateral cataract surg  pelvic and sacral fracture - 2016  Diabetic Retinopathy, Left carpal tunnel surgery. , Right carpal tunnel release, RBBB  Sleep Apnea - on BIPAP  Hospitalization 10/18 - New onset atrial fibrillation, UTI, CHF, pneumonia  Atrial Fibrillation, type 2 Non-Stemi  Reviewed and updated. SH:  Marital: Legal Status: . Personal Habits: Cigarette Use: Negative For current cigarette smoker. Alcohol: does not use alcohol. Exercise  Type: She works as a  in [employer]. She has a Bachelors - Degree. --now RETIRED.               Current Outpatient Medications:     levothyroxine (SYNTHROID) 100 MCG tablet, Take 1 tablet by mouth Daily, Disp: 90 tablet, Rfl: 1    glipiZIDE (GLUCOTROL XL) 10 MG extended release tablet, Take 1 tablet by mouth daily, Disp: 90 tablet, Rfl: 1    LANTUS SOLOSTAR 100 UNIT/ML injection pen, Inject 65 Units into the skin nightly, Disp: 15 mL, Rfl: 1    fluconazole (DIFLUCAN) 150 MG tablet, Take 1 tablet by mouth once for 1 dose, Disp: 5 tablet, Rfl: 0    pravastatin (PRAVACHOL) 20 MG tablet, TAKE 1 TABLET DAILY, Disp: 90 tablet, Rfl: 1    amLODIPine (NORVASC) 5 MG tablet, TAKE 1 TABLET DAILY, Disp: 90 tablet, Rfl: 1    metoprolol tartrate (LOPRESSOR) 25 MG tablet, TAKE 1 TABLET TWICE A DAY, Disp: 180 tablet, Rfl: 1    silver sulfADIAZINE (SILVADENE) 1 % cream, Apply topically daily. , Disp: 400 g, Rfl: 0    omega-3 acid ethyl esters (LOVAZA) 1 g capsule, TK 2 CS PO BID, Disp: , Rfl:     INSULIN LISPRO SC, Inject into the skin, Disp: , Rfl:     Multiple Vitamins-Minerals (VITEYES COMPLETE PO), Take by mouth, Disp: , Rfl:     nystatin-triamcinolone (MYCOLOG II) 415645-5.6 UNIT/GM-% cream, Apply topically 2 times daily. , Disp: 1 Tube, Rfl: 1    divalproex (DEPAKOTE) 250 MG DR tablet, Take 250 mg by mouth nightly Take 2 tablets by mouth every night at bedtime, Disp: , Rfl:     BD PEN NEEDLE MICRO U/F 32G X 6 MM MISC, USE WITH LANTUS DAILY, Disp: 100 each, Rfl: 3    escitalopram (LEXAPRO) 10 MG tablet, Take 10 mg by mouth daily, Disp: , Rfl:     JARDIANCE 25 MG tablet, 25 mg daily , Disp: , Rfl: 3    Continuous Blood Gluc Sensor (FREESTYLE ANT SENSOR SYSTEM) MISC, 1 box by Does not apply route 2 times daily, Disp: 1 each, Rfl: 0    Continuous Blood Gluc Sensor (FREESTYLE ANT SENSOR SYSTEM) MISC, PLEASE DISPENSE 1 KIT TO PATIENT, Disp: 1 each, Rfl: 0    Continuous Blood Gluc Sensor ([facility]) MISC, Please dispense one free style geetha kit, Disp: 1 each, Rfl: 0    latanoprost (XALATAN) 0.005 % ophthalmic solution, 1 drop nightly, Disp: , Rfl:     Omega-3 Fatty Acids (OMEGA 3 500 PO), Take by mouth, Disp: , Rfl:     Calcium Carb-Cholecalciferol (CALCIUM 1000 + D PO), Take by mouth, Disp: , Rfl:   Allergies   Allergen Reactions    Seasonal        Past Medical History:   Diagnosis Date    Atrial fibrillation ([facility]     Chronic kidney disease     Colon polyps     Diabetes mellitus ([facility]     Diabetic neuropathy ([facility]     Diabetic retinopathy ([facility]     Essential hypertension 10/28/2019    Fatty liver     Gastritis     GERD (gastroesophageal reflux disease)     Hyperlipidemia     Hypertension     Hypothyroidism     Irritable bowel syndrome     Major depressive disorder with single episode 10/28/2019    Osteopenia     Photosensitivity disorder     chronic    RBBB     Sleep apnea     on BIPAP    Thyroid disease     Thyroid nodule     neg bx-chronic thyroiditis    Type 2 diabetes mellitus with diabetic polyneuropathy, with long-term current use of insulin ([facility] 7/23/2019    Vitamin D deficiency      Past Surgical History:   Procedure Laterality Date    APPENDECTOMY      CARPAL TUNNEL RELEASE Bilateral     CATARACT REMOVAL Bilateral     CHOLECYSTECTOMY      GYNECOLOGIC CRYOSURGERY      HYSTERECTOMY     Ambar [place] TONSILLECTOMY       Family History   Problem Relation Age of Onset    Diabetes Paternal Grandmother     Diabetes Father     Lung Cancer Mother     Pancreatic Cancer Brother     Diabetes Brother      Social History     Socioeconomic History    Marital status:       Spouse name: Not on file    Number of children: Not on file    Years of education: Not on file    Highest education level: Not on file   Occupational History    Not on file   Social Needs    Financial resource strain: Not on file    Food insecurity     Worry: Not on file Inability: Not on file    Transportation needs     Medical: Not on file     Non-medical: Not on file   Tobacco Use    Smoking status: Never Smoker    Smokeless tobacco: Never Used   Substance and Sexual Activity    Alcohol use: Never     Frequency: Never    Drug use: Never    Sexual activity: Not Currently   Lifestyle    Physical activity     Days per week: Not on file     Minutes per session: Not on file    Stress: Not on file   Relationships    Social connections     Talks on phone: Not on file     Gets together: Not on file     Attends [employer] service: Not on file     Active member of club or organization: Not on file     Attends meetings of clubs or organizations: Not on file     Relationship status: Not on file    Intimate partner violence     Fear of current or ex partner: Not on file     Emotionally abused: Not on file     Physically abused: Not on file     Forced sexual activity: Not on file   Other Topics Concern    Not on file   Social History Narrative    Not on file       There were no vitals filed for this visit. Physical Exam  Constitutional:       General: She is not in acute distress. HENT:      Head: Normocephalic and atraumatic. Neck:      Musculoskeletal: Normal range of motion. Pulmonary:      Effort: Pulmonary effort is normal.   Musculoskeletal: Normal range of motion. Neurological:      Mental Status: She is alert and oriented to person, place, and time. Psychiatric:         Mood and Affect: Mood normal.         Behavior: Behavior normal.         Thought Content: Thought content normal.         Judgment: Judgment normal.                 Assessment and Plan:  Una Lopez was seen today for diabetes and hypertension. Diagnoses and all orders for this visit:    Hypothyroidism, unspecified type  -     levothyroxine (SYNTHROID) 100 MCG tablet; Take 1 tablet by mouth Daily  -     T4, Free; Future  -     TSH without Reflex;  Future  Stable on thyroid replacement    Essential hypertension  -     CBC Auto Differential; Future  -     Comprehensive Metabolic Panel; Future  Stable on medication    Obstructive sleep apnea  Stable on nasal BiPAP    Type 2 diabetes mellitus with diabetic polyneuropathy, with long-term current use of insulin (HCC)  Still with marginal control and follows with endocrine    Major depressive disorder with single episode, remission status unspecified  Stable on antidepressant medication and follows with psychiatry    Hyperlipidemia, unspecified hyperlipidemia type  -     Lipid Panel; Future  Stable on statin medication    Other orders  -     glipiZIDE (GLUCOTROL XL) 10 MG extended release tablet; Take 1 tablet by mouth daily  -     LANTUS SOLOSTAR 100 UNIT/ML injection pen; Inject 65 Units into the skin nightly  -     Hemoglobin A1C; Future  -     Microalbumin / Creatinine Urine Ratio; Future  -     fluconazole (DIFLUCAN) 150 MG tablet; Take 1 tablet by mouth once for 1 dose      Plan: I will see her back in 3 months and as needed. Continue to monitor blood pressure and blood sugar very closely. Stressed to be more compliant with diet regarding her diabetes. Continue to follow with above consultants. I did ask her to call endocrine regarding the Jardiance with possible change in medication because of recurrent yeast infections. I did start her on Diflucan. Warned of potential side effects. Prescription management performed. Blood work in the next couple weeks [facility] with copy to Dr. Arabella Aggarwal of endocrine. Fall precautions. Notify us of problems in the interim. Face-to-face time greater than 25 minutes with greater than 50% of that time spent in counseling and coordination of care. Return in about 3 months (around 3/2/2021). Seen By:  Deniz Pereyra MD      *Document was created using voice recognition software. Note was reviewed however may contain grammatical errors.

## 2020-12-10 RX ORDER — INSULIN GLARGINE 100 [IU]/ML
65 INJECTION, SOLUTION SUBCUTANEOUS NIGHTLY
Qty: 15 PEN | Refills: 1 | Status: SHIPPED
Start: 2020-12-10 | End: 2021-02-11

## 2020-12-10 NOTE — TELEPHONE ENCOUNTER
Patient requested refills to be changed so that she can 3 boxes as she was before instead of 1. Med is pended if that is okay.

## 2021-02-08 ENCOUNTER — PROCEDURE VISIT (OUTPATIENT)
Dept: PODIATRY | Age: 76
End: 2021-02-08
Payer: MEDICARE

## 2021-02-08 VITALS
SYSTOLIC BLOOD PRESSURE: 128 MMHG | HEIGHT: 63 IN | WEIGHT: 160 LBS | BODY MASS INDEX: 28.35 KG/M2 | TEMPERATURE: 97.9 F | DIASTOLIC BLOOD PRESSURE: 60 MMHG

## 2021-02-08 DIAGNOSIS — B35.1 TINEA UNGUIUM: Primary | ICD-10-CM

## 2021-02-08 DIAGNOSIS — M79.674 PAIN IN TOE OF RIGHT FOOT: ICD-10-CM

## 2021-02-08 DIAGNOSIS — E11.621 DIABETIC ULCER OF TOE OF LEFT FOOT ASSOCIATED WITH TYPE 2 DIABETES MELLITUS, WITH FAT LAYER EXPOSED (HCC): ICD-10-CM

## 2021-02-08 DIAGNOSIS — I73.9 PERIPHERAL VASCULAR DISEASE, UNSPECIFIED (HCC): ICD-10-CM

## 2021-02-08 DIAGNOSIS — L97.522 DIABETIC ULCER OF TOE OF LEFT FOOT ASSOCIATED WITH TYPE 2 DIABETES MELLITUS, WITH FAT LAYER EXPOSED (HCC): ICD-10-CM

## 2021-02-08 DIAGNOSIS — E11.9 TYPE 2 DIABETES MELLITUS WITHOUT COMPLICATION, WITHOUT LONG-TERM CURRENT USE OF INSULIN (HCC): ICD-10-CM

## 2021-02-08 DIAGNOSIS — R26.2 DIFFICULTY WALKING: ICD-10-CM

## 2021-02-08 PROCEDURE — 11721 DEBRIDE NAIL 6 OR MORE: CPT | Performed by: PODIATRIST

## 2021-02-08 NOTE — PROGRESS NOTES
[facility] PODIATRY  [address] [facility] [address]  Dept: [phone]  Dept Fax: [phone]    DIABETIC NAIL PROGRESS NOTE  Date of patient's visit: 2/8/2021  Patient's Name:  Quan Barrientos YOB: 1945            Patient Care Team:  Kalina Clinton MD as PCP - General (Internal Medicine)  Kalina Clinton MD as PCP - [facility]  Lena Villanueva DPM as Consulting Physician (Lyn March)  Charlene Khan MD as Consulting Physician (Pulmonology)          Chief Complaint   Patient presents with    Diabetes     saw pcp Dr. Fely Goodwin on 12/2/2020    Toe Pain       Subjective: Quan Barrientos comes to clinic for Diabetes (saw pcp Dr. Fely Goodwin on 12/2/2020) and Toe Pain    she is a diabetic and states that diabetic foot exam .  Pt currently has complaint of thickened, elongated nails that they cannot manage by themselves. Pt's primary care physician is Kalina Clinton MD l   Lab Results   Component Value Date    LABA1C 9.3 (H) 07/30/2020      Complains of numbness in the feet bilat.   Past Medical History:   Diagnosis Date    Atrial fibrillation ([address]     Chronic kidney disease     Colon polyps     Diabetes mellitus ([address]     Diabetic neuropathy ([address]     Diabetic retinopathy ([address]     Essential hypertension 10/28/2019    Fatty liver     Gastritis     GERD (gastroesophageal reflux disease)     Hyperlipidemia     Hypertension     Hypothyroidism     Irritable bowel syndrome     Major depressive disorder with single episode 10/28/2019    Osteopenia     Photosensitivity disorder     chronic    RBBB     Sleep apnea     on BIPAP    Thyroid disease     Thyroid nodule     neg bx-chronic thyroiditis    Type 2 diabetes mellitus with diabetic polyneuropathy, with long-term current use of insulin ([address] 7/23/2019    Vitamin D deficiency        Allergies   Allergen Reactions    Seasonal      Current Outpatient Medications on File Prior to Visit   Medication Sig Dispense Refill    LANTUS SOLOSTAR 100 UNIT/ML injection pen Inject 65 Units into the skin nightly 15 pen 1    levothyroxine (SYNTHROID) 100 MCG tablet Take 1 tablet by mouth Daily 90 tablet 1    glipiZIDE (GLUCOTROL XL) 10 MG extended release tablet Take 1 tablet by mouth daily 90 tablet 1    pravastatin (PRAVACHOL) 20 MG tablet TAKE 1 TABLET DAILY 90 tablet 1    amLODIPine (NORVASC) 5 MG tablet TAKE 1 TABLET DAILY 90 tablet 1    metoprolol tartrate (LOPRESSOR) 25 MG tablet TAKE 1 TABLET TWICE A  tablet 1    silver sulfADIAZINE (SILVADENE) 1 % cream Apply topically daily. 400 g 0    omega-3 acid ethyl esters (LOVAZA) 1 g capsule TK 2 CS PO BID      INSULIN LISPRO SC Inject into the skin      Multiple Vitamins-Minerals (VITEYES COMPLETE PO) Take by mouth      nystatin-triamcinolone (MYCOLOG II) 442563-7.1 UNIT/GM-% cream Apply topically 2 times daily. 1 Tube 1    divalproex (DEPAKOTE) 250 MG DR tablet Take 250 mg by mouth nightly Take 2 tablets by mouth every night at bedtime      BD PEN NEEDLE MICRO U/F 32G X 6 MM MISC USE WITH LANTUS DAILY 100 each 3    escitalopram (LEXAPRO) 10 MG tablet Take 10 mg by mouth daily      JARDIANCE 25 MG tablet 25 mg daily   3    Continuous Blood Gluc Sensor (FREESTYLE ANT SENSOR SYSTEM) MISC 1 box by Does not apply route 2 times daily 1 each 0    Continuous Blood Gluc Sensor (FREESTYLE ANT SENSOR SYSTEM) [facility] PLEASE DISPENSE 1 KIT TO PATIENT 1 each 0    Continuous Blood Gluc Sensor (FREESTYLE ANT SENSOR SYSTEM) [facility] Please dispense one free style ant kit 1 each 0    latanoprost (XALATAN) 0.005 % ophthalmic solution 1 drop nightly      Omega-3 Fatty Acids (OMEGA 3 500 PO) Take by mouth      Calcium Carb-Cholecalciferol (CALCIUM 1000 + D PO) Take by mouth       No current facility-administered medications on file prior to visit. Review of Systems   Musculoskeletal: Positive for arthralgias. Review of Systems:   History obtained from chart review and the patient  General ROS: negative for - chills, fatigue, fever, night sweats or weight gain  Constitutional: Negative for chills, diaphoresis, fatigue, fever and unexpected weight change. Musculoskeletal: Positive for arthralgias, gait problem and joint swelling. Neurological ROS: negative for - behavioral changes, confusion, headaches or seizures. Negative for weakness and numbness. Dermatological ROS: negative for - mole changes, rash  Cardiovascular: Negative for leg swelling. Gastrointestinal: Negative for constipation, diarrhea, nausea and vomiting. Objective:  General: AAO x 3 in NAD. Derm  Toenail Description nails are thick and mycotic yellow incurvated causing pain with shoe gear  Sites of Onychomycosis Involvement (Check affected area)  [x] [x] [x] [x] [x] [x] [x] [x] [x] [x]  5 4 3 2 1 1 2 3 4 5                          Right                                        Left    Thickness  [x] [x] [x] [x] [x] [x] [x] [x] [x] [x]  5 4 3 2 1 1 2 3 4 5                         Right                                        Left    Dystrophic Changes   [x] [x] [x] [x] [x] [x] [x] [x] [x] [x]  5 4 3 2 1 1 2 3 4 5                         Right                                        Left    Color   [x] [x] [x] [x] [x] [x] [x] [x] [x] [x]  5 4 3 2 1 1 2 3 4 5                          Right                                        Left    Incurvation/Ingrowin   [x] [x] [x] [x] [x] [x] [x] [x] [x] [x]  5 4 3 2 1 1 2 3 4 5                         Right                                        Left    Inflammation/Pain   [x] [x] [x] [x] [x] [x] [x] [x] [x] [x]  5 4 3 2 1 1 2 3 4 5                         Right                                        Left      Dermatologic Exam:  Skin lesion/ulceration .    Skin callus bilaterally plantar aspect plantar to the second and third metatarsal heads both lesions are about 1 cm x 1 cm tissue  Loss of hair lower extremity      Musculoskeletal:     1st MPJ ROM decreased, Bilateral.  Muscle Bilateral.  Pain present upon palpation of toenails 1-5, Bilateral. decreased medial longitudinal arch, Bilateral.  Ankle ROM decreased,Bilateral.    Dorsally contracted digits     Vascular:  Loss of hair  LE   Nails thick yellow   Absent pt pulses   Cool touch   CFT <absent seconds, Bilateral.  Hair growth absent to the level of the digits, Bilateral.  Edema minimal Bilateral.  Varicosities severe Bilateral.     Neurological: Sensation diminshed to light touch to level of digits, Bilateral.  Protective sensation decreased sites via 5.07/10g [place]-Ayanna Monofilament, Bilateral.  negative Tinel's, Bilateral.  negative Valleix sign, Bilateral.      Integument: nails   thickened > 3.0 mm, dystrophic and crumbly, discolored with subungual debris. Toes cool to touch    Visual inspection:  Deformity: hammertoe deformity priti feet  amputation: absent    Edema:   Sensory exam:  Monofilament sensation: abnormal - 6/10 via SW 5.07/10g monofilament to the plantar foot bilateral feet    Pulses:     Pinprick: Impaired  Proprioception: Impaired  Vibration (128 Hz): Impaired       DM with PVD       [x]Yes    []no    Foot Exam    General  General Appearance: appears stated age and healthy   Orientation: alert and oriented to person, place, and time       Right Foot/Ankle     Inspection and Palpation  Skin Exam: abnormal color; no skin changes     Neurovascular  Dorsalis pedis: 2+  Posterior tibial: absent      Left Foot/Ankle      Inspection and Palpation  Skin Exam: skin changes and abnormal color; Neurovascular  Dorsalis pedis: 2+  Posterior tibial: absent           Xr Foot Left (min 3 Views)      Ortho Exam      Assessment:  70 y.o. female with:  1. Tinea unguium    2. Type 2 diabetes mellitus without complication, without long-term current use of insulin ([address]    3.  Diabetic ulcer of toe of left foot associated with type 2 diabetes

## 2021-02-11 DIAGNOSIS — E11.42 TYPE 2 DIABETES MELLITUS WITH DIABETIC POLYNEUROPATHY, WITH LONG-TERM CURRENT USE OF INSULIN (HCC): ICD-10-CM

## 2021-02-11 DIAGNOSIS — Z79.4 TYPE 2 DIABETES MELLITUS WITH DIABETIC POLYNEUROPATHY, WITH LONG-TERM CURRENT USE OF INSULIN (HCC): ICD-10-CM

## 2021-02-11 RX ORDER — INSULIN GLARGINE 100 [IU]/ML
INJECTION, SOLUTION SUBCUTANEOUS
Qty: 15 ML | Refills: 5 | Status: SHIPPED
Start: 2021-02-11 | End: 2021-03-19 | Stop reason: SDUPTHER

## 2021-02-11 NOTE — TELEPHONE ENCOUNTER
Last Appointment:  12/2/2020  Future Appointments   Date Time Provider Coleman Garcia   3/4/2021 11:00 AM Fazal Carvalho  [address] [place] [address]   5/10/2021  1:00 PM Nan Louise DPM [facility] Pod[facility]   11/18/2021  2:30 PM Marjan Vegas MD [facility]

## 2021-03-08 ENCOUNTER — TELEPHONE (OUTPATIENT)
Dept: FAMILY MEDICINE CLINIC | Age: 76
End: 2021-03-08

## 2021-03-19 DIAGNOSIS — E11.42 TYPE 2 DIABETES MELLITUS WITH DIABETIC POLYNEUROPATHY, WITH LONG-TERM CURRENT USE OF INSULIN (HCC): ICD-10-CM

## 2021-03-19 DIAGNOSIS — Z79.4 TYPE 2 DIABETES MELLITUS WITH DIABETIC POLYNEUROPATHY, WITH LONG-TERM CURRENT USE OF INSULIN (HCC): ICD-10-CM

## 2021-03-19 RX ORDER — INSULIN GLARGINE 100 [IU]/ML
65 INJECTION, SOLUTION SUBCUTANEOUS NIGHTLY
Qty: 15 PEN | Refills: 3 | Status: SHIPPED
Start: 2021-03-19 | End: 2021-10-13 | Stop reason: SDUPTHER

## 2021-03-19 NOTE — TELEPHONE ENCOUNTER
Last Appointment:  12/2/2020  Future Appointments   Date Time Provider Coleman Garcia   4/5/2021  2:45 PM Carmencita Thao  [address] [place] [address]   5/10/2021  1:00 PM Joe Bauer DPM Col Podiatry [facility]   11/18/2021  2:30 PM Shalini Jacques MD [facility]      [facility] calling [facility]us [facility]

## 2021-04-05 ENCOUNTER — OFFICE VISIT (OUTPATIENT)
Dept: FAMILY MEDICINE CLINIC | Age: 76
End: 2021-04-05
Payer: MEDICARE

## 2021-04-05 VITALS
SYSTOLIC BLOOD PRESSURE: 136 MMHG | WEIGHT: 164.6 LBS | TEMPERATURE: 98.1 F | OXYGEN SATURATION: 97 % | HEART RATE: 87 BPM | DIASTOLIC BLOOD PRESSURE: 62 MMHG | BODY MASS INDEX: 29.16 KG/M2 | HEIGHT: 63 IN

## 2021-04-05 DIAGNOSIS — E11.42 TYPE 2 DIABETES MELLITUS WITH DIABETIC POLYNEUROPATHY, WITH LONG-TERM CURRENT USE OF INSULIN (HCC): ICD-10-CM

## 2021-04-05 DIAGNOSIS — Z79.4 TYPE 2 DIABETES MELLITUS WITH DIABETIC POLYNEUROPATHY, WITH LONG-TERM CURRENT USE OF INSULIN (HCC): ICD-10-CM

## 2021-04-05 DIAGNOSIS — E78.5 HYPERLIPIDEMIA, UNSPECIFIED HYPERLIPIDEMIA TYPE: ICD-10-CM

## 2021-04-05 DIAGNOSIS — Z00.00 ROUTINE GENERAL MEDICAL EXAMINATION AT A HEALTH CARE FACILITY: ICD-10-CM

## 2021-04-05 DIAGNOSIS — G47.33 OBSTRUCTIVE SLEEP APNEA: ICD-10-CM

## 2021-04-05 DIAGNOSIS — Z92.29 HISTORY OF REGULAR MEDICATION USE: ICD-10-CM

## 2021-04-05 DIAGNOSIS — E03.9 HYPOTHYROIDISM, UNSPECIFIED TYPE: ICD-10-CM

## 2021-04-05 DIAGNOSIS — F32.9 MAJOR DEPRESSIVE DISORDER WITH SINGLE EPISODE, REMISSION STATUS UNSPECIFIED: ICD-10-CM

## 2021-04-05 DIAGNOSIS — I10 ESSENTIAL HYPERTENSION: ICD-10-CM

## 2021-04-05 DIAGNOSIS — Z00.00 INITIAL MEDICARE ANNUAL WELLNESS VISIT: Primary | ICD-10-CM

## 2021-04-05 DIAGNOSIS — Z92.29 HISTORY OF REGULAR MEDICATION USE: Primary | ICD-10-CM

## 2021-04-05 LAB
ALBUMIN SERPL-MCNC: 4.2 G/DL (ref 3.5–5.2)
ALP BLD-CCNC: 59 U/L (ref 35–104)
ALT SERPL-CCNC: 19 U/L (ref 0–32)
ANION GAP SERPL CALCULATED.3IONS-SCNC: 10 MMOL/L (ref 7–16)
AST SERPL-CCNC: 21 U/L (ref 0–31)
BASOPHILS ABSOLUTE: 0.06 E9/L (ref 0–0.2)
BASOPHILS RELATIVE PERCENT: 1.1 % (ref 0–2)
BILIRUB SERPL-MCNC: 0.6 MG/DL (ref 0–1.2)
BUN BLDV-MCNC: 15 MG/DL (ref 8–23)
CALCIUM SERPL-MCNC: 9.2 MG/DL (ref 8.6–10.2)
CHLORIDE BLD-SCNC: 101 MMOL/L (ref 98–107)
CHOLESTEROL, TOTAL: 171 MG/DL (ref 0–199)
CO2: 26 MMOL/L (ref 22–29)
CREAT SERPL-MCNC: 0.8 MG/DL (ref 0.5–1)
CREATININE URINE: 159 MG/DL (ref 29–226)
EOSINOPHILS ABSOLUTE: 0.31 E9/L (ref 0.05–0.5)
EOSINOPHILS RELATIVE PERCENT: 5.5 % (ref 0–6)
GFR AFRICAN AMERICAN: >60
GFR NON-AFRICAN AMERICAN: >60 ML/MIN/1.73
GLUCOSE BLD-MCNC: 303 MG/DL (ref 74–99)
HBA1C MFR BLD: 9.3 % (ref 4–5.6)
HCT VFR BLD CALC: 39.4 % (ref 34–48)
HDLC SERPL-MCNC: 44 MG/DL
HEMOGLOBIN: 13.1 G/DL (ref 11.5–15.5)
IMMATURE GRANULOCYTES #: 0.05 E9/L
IMMATURE GRANULOCYTES %: 0.9 % (ref 0–5)
LDL CHOLESTEROL CALCULATED: 71 MG/DL (ref 0–99)
LYMPHOCYTES ABSOLUTE: 1.82 E9/L (ref 1.5–4)
LYMPHOCYTES RELATIVE PERCENT: 32.1 % (ref 20–42)
MCH RBC QN AUTO: 30.3 PG (ref 26–35)
MCHC RBC AUTO-ENTMCNC: 33.2 % (ref 32–34.5)
MCV RBC AUTO: 91.2 FL (ref 80–99.9)
MICROALBUMIN UR-MCNC: 34.3 MG/L
MICROALBUMIN/CREAT UR-RTO: 21.6 (ref 0–30)
MONOCYTES ABSOLUTE: 0.59 E9/L (ref 0.1–0.95)
MONOCYTES RELATIVE PERCENT: 10.4 % (ref 2–12)
NEUTROPHILS ABSOLUTE: 2.84 E9/L (ref 1.8–7.3)
NEUTROPHILS RELATIVE PERCENT: 50 % (ref 43–80)
PDW BLD-RTO: 13.1 FL (ref 11.5–15)
PLATELET # BLD: 156 E9/L (ref 130–450)
PMV BLD AUTO: 10.7 FL (ref 7–12)
POTASSIUM SERPL-SCNC: 4.9 MMOL/L (ref 3.5–5)
RBC # BLD: 4.32 E12/L (ref 3.5–5.5)
SODIUM BLD-SCNC: 137 MMOL/L (ref 132–146)
T4 FREE: 1.36 NG/DL (ref 0.93–1.7)
TOTAL PROTEIN: 7 G/DL (ref 6.4–8.3)
TRIGL SERPL-MCNC: 278 MG/DL (ref 0–149)
TSH SERPL DL<=0.05 MIU/L-ACNC: 2.87 UIU/ML (ref 0.27–4.2)
VALPROIC ACID LEVEL: 51 MCG/ML (ref 50–100)
VLDLC SERPL CALC-MCNC: 56 MG/DL
WBC # BLD: 5.7 E9/L (ref 4.5–11.5)

## 2021-04-05 PROCEDURE — 1090F PRES/ABSN URINE INCON ASSESS: CPT | Performed by: INTERNAL MEDICINE

## 2021-04-05 PROCEDURE — 2022F DILAT RTA XM EVC RTNOPTHY: CPT | Performed by: INTERNAL MEDICINE

## 2021-04-05 PROCEDURE — 3046F HEMOGLOBIN A1C LEVEL >9.0%: CPT | Performed by: INTERNAL MEDICINE

## 2021-04-05 PROCEDURE — G8417 CALC BMI ABV UP PARAM F/U: HCPCS | Performed by: INTERNAL MEDICINE

## 2021-04-05 PROCEDURE — 4040F PNEUMOC VAC/ADMIN/RCVD: CPT | Performed by: INTERNAL MEDICINE

## 2021-04-05 PROCEDURE — G8427 DOCREV CUR MEDS BY ELIG CLIN: HCPCS | Performed by: INTERNAL MEDICINE

## 2021-04-05 PROCEDURE — 99214 OFFICE O/P EST MOD 30 MIN: CPT | Performed by: INTERNAL MEDICINE

## 2021-04-05 PROCEDURE — 1123F ACP DISCUSS/DSCN MKR DOCD: CPT | Performed by: INTERNAL MEDICINE

## 2021-04-05 PROCEDURE — G8400 PT W/DXA NO RESULTS DOC: HCPCS | Performed by: INTERNAL MEDICINE

## 2021-04-05 PROCEDURE — 3017F COLORECTAL CA SCREEN DOC REV: CPT | Performed by: INTERNAL MEDICINE

## 2021-04-05 PROCEDURE — G0438 PPPS, INITIAL VISIT: HCPCS | Performed by: INTERNAL MEDICINE

## 2021-04-05 PROCEDURE — 1036F TOBACCO NON-USER: CPT | Performed by: INTERNAL MEDICINE

## 2021-04-05 RX ORDER — ICOSAPENT ETHYL 1000 MG/1
2 CAPSULE ORAL 2 TIMES DAILY
COMMUNITY
Start: 2021-03-29 | End: 2022-06-16 | Stop reason: SDUPTHER

## 2021-04-05 ASSESSMENT — LIFESTYLE VARIABLES
HOW OFTEN DO YOU HAVE A DRINK CONTAINING ALCOHOL: 0
AUDIT TOTAL SCORE: INCOMPLETE
AUDIT-C TOTAL SCORE: INCOMPLETE
HOW OFTEN DO YOU HAVE A DRINK CONTAINING ALCOHOL: NEVER

## 2021-04-05 ASSESSMENT — PATIENT HEALTH QUESTIONNAIRE - PHQ9
2. FEELING DOWN, DEPRESSED OR HOPELESS: 0
SUM OF ALL RESPONSES TO PHQ QUESTIONS 1-9: 0
1. LITTLE INTEREST OR PLEASURE IN DOING THINGS: 0

## 2021-04-05 NOTE — PROGRESS NOTES
mouth daily  Historical Provider, MD   Continuous Blood Gluc Sensor (FREESTYLE ANT SENSOR SYSTEM) MISC 1 box by Does not apply route 2 times daily  Omar Elizondo MD   Continuous Blood Gluc Sensor ([address] [place] [address]) MISC PLEASE DISPENSE 1 KIT TO PATIENT  Omar Elizondo MD   Continuous Blood Gluc Sensor ([address] [place] [address]) MIS Please dispense one free style Donna Morrison MD   latanoprost (XALATAN) 0.005 % ophthalmic solution 1 drop nightly  Historical Provider, MD   Calcium Carb-Cholecalciferol (CALCIUM 1000 + D PO) Take by mouth  Historical Provider, MD       Past Medical History:   Diagnosis Date    Atrial fibrillation ([address]     Chronic kidney disease     Colon polyps     Diabetes mellitus ([address]     Diabetic neuropathy ([address]     Diabetic retinopathy ([address]     Essential hypertension 10/28/2019    Fatty liver     Gastritis     GERD (gastroesophageal reflux disease)     Hyperlipidemia     Hypertension     Hypothyroidism     Irritable bowel syndrome     Major depressive disorder with single episode 10/28/2019    Osteopenia     Photosensitivity disorder     chronic    RBBB     Sleep apnea     on BIPAP    Thyroid disease     Thyroid nodule     neg bx-chronic thyroiditis    Type 2 diabetes mellitus with diabetic polyneuropathy, with long-term current use of insulin ([address] 7/23/2019    Vitamin D deficiency        Past Surgical History:   Procedure Laterality Date    APPENDECTOMY      CARPAL TUNNEL RELEASE Bilateral     CATARACT REMOVAL Bilateral     CHOLECYSTECTOMY      GYNECOLOGIC CRYOSURGERY      HYSTERECTOMY      SINUS SURGERY      Dr. Stanislaw Hutson TONSILLECTOMY         Family History   Problem Relation Age of Onset    Diabetes Paternal Grandmother     Diabetes Father     Lung Cancer Mother     Pancreatic Cancer Brother     Diabetes Brother        CareTeam (Including outside providers/suppliers regularly involved in providing care):   Patient Care Team:  Kirk Garcia MD as PCP - General (Internal Medicine)  Kirk Garcia MD as PCP - [facility] Empaneled Provider  Magalis Loco DPM as Consulting Physician (Podiatry)  Paul Turner MD as Consulting Physician (Pulmonology)    Wt Readings from Last 3 Encounters:   04/05/21 164 lb 9.6 oz (74.7 kg)   02/08/21 160 lb (72.6 kg)   11/18/20 165 lb (74.8 kg)     There were no vitals filed for this visit. There is no height or weight on file to calculate BMI. Based upon direct observation of the patient, evaluation of cognition reveals recent and remote memory intact. Patient's complete Health Risk Assessment and screening values have been reviewed and are found in Flowsheets. The following problems were reviewed today and where indicated follow up appointments were made and/or referrals ordered. Positive Risk Factor Screenings with Interventions:          General Health and ACP:  General  In general, how would you say your health is?: Very Good  In the past 7 days, have you experienced any of the following? New or Increased Pain, New or Increased Fatigue, Loneliness, Social Isolation, Stress or Anger?: (!) New or Increased Fatigue, Social Isolation  Do you get the social and emotional support that you need?: Yes  Do you have a Living Will?: Yes  Advance Directives     Power of [address] Will ACP-Advance Directive ACP-Power of     Not on File Not on File Not on File Not on File      General Health Risk Interventions:  · Has had some fatigue and mild lack of motivation especially in view of the Covid status. She has been vaccinated now just recently and is going to be able to socialize a little bit more. She does also follow with psych.         Personalized Preventive Plan   Current Health Maintenance Status  Immunization History   Administered Date(s) Administered    DT (pediatric) 09/22/2004    Influenza Virus Vaccine 09/21/2009, 11/03/2010, 10/24/2011, 09/13/2012, 10/17/2014, 10/19/2015,

## 2021-04-05 NOTE — PATIENT INSTRUCTIONS
Personalized Preventive Plan for Idelia Jasper - 4/5/2021  Medicare offers a range of preventive health benefits. Some of the tests and screenings are paid in full while other may be subject to a deductible, co-insurance, and/or copay. Some of these benefits include a comprehensive review of your medical history including lifestyle, illnesses that may run in your family, and various assessments and screenings as appropriate. After reviewing your medical record and screening and assessments performed today your provider may have ordered immunizations, labs, imaging, and/or referrals for you. A list of these orders (if applicable) as well as your Preventive Care list are included within your After Visit Summary for your review. Other Preventive Recommendations:    · A preventive eye exam performed by an eye specialist is recommended every 1-2 years to screen for glaucoma; cataracts, macular degeneration, and other eye disorders. · A preventive dental visit is recommended every 6 months. · Try to get at least 150 minutes of exercise per week or 10,000 steps per day on a pedometer . · Order or download the FREE \"Exercise & Physical Activity: Your Everyday Guide\" from [employer]. Call 9-955.839.9086 or search [employer] online. · You need 1381-0367 mg of calcium and 7765-5267 IU of vitamin D per day. It is possible to meet your calcium requirement with diet alone, but a vitamin D supplement is usually necessary to meet this goal.  · When exposed to the sun, use a sunscreen that protects against both UVA and UVB radiation with an SPF of 30 or greater. Reapply every 2 to 3 hours or after sweating, drying off with a towel, or swimming. · Always wear a seat belt when traveling in a car. Always wear a helmet when riding a bicycle or motorcycle.

## 2021-04-06 ENCOUNTER — TELEPHONE (OUTPATIENT)
Dept: FAMILY MEDICINE CLINIC | Age: 76
End: 2021-04-06

## 2021-04-06 NOTE — PROGRESS NOTES
[facility]     21  Minor Nicole : 1945 Sex: female  Age: 78 y.o. Chief Complaint   Patient presents with    Hypertension    Diabetes       HPI    Presents today for 3-month follow-up visit on her multiple medical problems. In general she feels she is doing reasonably well. States she does have a lot of fatigue and sleeps a lot. Some of that she feels is motivational related. She does not keep busy she gets bored and wants to sleep. She does follow with psych and is currently on  medication. She has not been checking blood sugars very closely. And blood pressure she is usually only having checked in the doctor's office and not checking at home. I told her she needs to do both sugars and pressures on a regular basis and document numbers so appropriate adjustments may be made in her care. She tells me she has gotten her Covid shots her thyroids been okay on thyroid replacement. Her lipids have been good on statin medication. She continues to successfully use her BiPAP machine for her obstructive sleep apnea. She is following with endocrine ophthalmology,GYN, pulmonary and psychiatry, cardiology      Review of Systems      Const: Denies chills, fever and sweats. Eyes: Reports glaucoma Recent cataract/glaucoma surgery./ diabetic retinopathy changes. /Ophthalmology. States the  patient does have post cataract surgery that will eventually need some laser surgery done  ENMT: Denies ear symptoms. Reports postnasal drip, but denies other nasal symptoms. Denies mouth or throat  symptoms. CV: Denies chest pain, orthopnea and palpitations--history of atrial fibrillation. Currently back in sinus rhythm and off of anticoagulation per cardiology  Resp: Denies cough, SOB and wheezing. GI: Denies diarrhea, nausea and vomiting. : Urinary: denies dysuria, frequency and frequent UTI's.   Musculo: Reports arthritis, lower back pain and right hip pain/Improved  Skin: Denies eczema, dr Bruno eye,psych NP, Hypertension  CKD - sees renal  sinus surg - dr Paula Kenny  follows with counsellor,ophthalmology - GYN  cholecystectomy, Gastritis, vit D defic, Colon Polyps, Fatty Liver, bilateral cataract surg  pelvic and sacral fracture - 2016  Diabetic Retinopathy, Left carpal tunnel surgery. , Right carpal tunnel release, RBBB  Sleep Apnea - on BIPAP  Hospitalization 10/18 - New onset atrial fibrillation, UTI, CHF, pneumonia  Atrial Fibrillation, type 2 Non-Stemi  [facility]-sees neurology  Reviewed and updated. SH:  Marital: Legal Status: . Personal Habits: Cigarette Use: Negative For current cigarette smoker. Alcohol: does not use alcohol. Exercise  Type: She works as a  in [employer]. She has a Bachelors - Degree. --now RETIRED.               Current Outpatient Medications:     LANTUS SOLOSTAR 100 UNIT/ML injection pen, Inject 65 Units into the skin nightly, Disp: 15 pen, Rfl: 3    levothyroxine (SYNTHROID) 100 MCG tablet, Take 1 tablet by mouth Daily, Disp: 90 tablet, Rfl: 1    glipiZIDE (GLUCOTROL XL) 10 MG extended release tablet, Take 1 tablet by mouth daily, Disp: 90 tablet, Rfl: 1    pravastatin (PRAVACHOL) 20 MG tablet, TAKE 1 TABLET DAILY, Disp: 90 tablet, Rfl: 1    amLODIPine (NORVASC) 5 MG tablet, TAKE 1 TABLET DAILY, Disp: 90 tablet, Rfl: 1    metoprolol tartrate (LOPRESSOR) 25 MG tablet, TAKE 1 TABLET TWICE A DAY, Disp: 180 tablet, Rfl: 1    silver sulfADIAZINE (SILVADENE) 1 % cream, Apply topically daily. , Disp: 400 g, Rfl: 0    omega-3 acid ethyl esters (LOVAZA) 1 g capsule, TK 2 CS PO BID, Disp: , Rfl:     INSULIN LISPRO SC, Inject into the skin, Disp: , Rfl:     Multiple Vitamins-Minerals (VITEYES COMPLETE PO), Take by mouth, Disp: , Rfl:     nystatin-triamcinolone (MYCOLOG II) 450827-8.9 UNIT/GM-% cream, Apply topically 2 times daily. , Disp: 1 Tube, Rfl: 1    divalproex (DEPAKOTE) 250 MG DR tablet, Take 500 mg by mouth nightly , Disp: , Rfl:     BD PEN NEEDLE MICRO U/F 32G X 6 MM MISC, USE WITH LANTUS DAILY, Disp: 100 each, Rfl: 3    escitalopram (LEXAPRO) 10 MG tablet, Take 10 mg by mouth daily, Disp: , Rfl:     Continuous Blood Gluc Sensor ([address] [place] [address]) MISC, 1 box by Does not apply route 2 times daily, Disp: 1 each, Rfl: 0    Continuous Blood Gluc Sensor (FREESTYLE ANT SENSOR SYSTEM) MISC, PLEASE DISPENSE 1 KIT TO PATIENT, Disp: 1 each, Rfl: 0    Continuous Blood Gluc Sensor ([address] [place] [address]) MISC, Please dispense one free style ant kit, Disp: 1 each, Rfl: 0    latanoprost (XALATAN) 0.005 % ophthalmic solution, 1 drop nightly, Disp: , Rfl:     Calcium Carb-Cholecalciferol (CALCIUM 1000 + D PO), Take by mouth, Disp: , Rfl:     VASCEPA 1 g CAPS capsule, Take 2 capsules by mouth 2 times daily, Disp: , Rfl:   Allergies   Allergen Reactions    Seasonal        Past Medical History:   Diagnosis Date    Atrial fibrillation (HCC)     Chronic kidney disease     Colon polyps     Diabetes mellitus ([address]     Diabetic neuropathy ([address]     Diabetic retinopathy ([address]     Essential hypertension 10/28/2019    Fatty liver     Gastritis     GERD (gastroesophageal reflux disease)     Hyperlipidemia     Hypertension     Hypothyroidism     Irritable bowel syndrome     Major depressive disorder with single episode 10/28/2019    Osteopenia     Photosensitivity disorder     chronic    RBBB     Sleep apnea     on BIPAP    Thyroid disease     Thyroid nodule     neg bx-chronic thyroiditis    Type 2 diabetes mellitus with diabetic polyneuropathy, with long-term current use of insulin ([address] 7/23/2019    Vitamin D deficiency      Past Surgical History:   Procedure Laterality Date    APPENDECTOMY      CARPAL TUNNEL RELEASE Bilateral     CATARACT REMOVAL Bilateral     CHOLECYSTECTOMY      GYNECOLOGIC CRYOSURGERY      HYSTERECTOMY      SINUS SURGERY      Dr. Guillaume Stoner TONSILLECTOMY       Family History   Problem Relation Age of Onset    Diabetes Paternal Grandmother     Diabetes Father     Lung Cancer Mother     Pancreatic Cancer Brother     Diabetes Brother      Social History     Socioeconomic History    Marital status:      Spouse name: Not on file    Number of children: Not on file    Years of education: Not on file    Highest education level: Not on file   Occupational History    Not on file   Social Needs    Financial resource strain: Not on file    Food insecurity     Worry: Not on file     Inability: Not on file    Transportation needs     Medical: Not on file     Non-medical: Not on file   Tobacco Use    Smoking status: Never Smoker    Smokeless tobacco: Never Used   Substance and Sexual Activity    Alcohol use: Never     Frequency: Never    Drug use: Never    Sexual activity: Not Currently   Lifestyle    Physical activity     Days per week: Not on file     Minutes per session: Not on file    Stress: Not on file   Relationships    Social connections     Talks on phone: Not on file     Gets together: Not on file     Attends [employer] service: Not on file     Active member of club or organization: Not on file     Attends meetings of clubs or organizations: Not on file     Relationship status: Not on file    Intimate partner violence     Fear of current or ex partner: Not on file     Emotionally abused: Not on file     Physically abused: Not on file     Forced sexual activity: Not on file   Other Topics Concern    Not on file   Social History Narrative    Not on file       Vitals:    04/05/21 1518   BP: 136/62   Pulse: 87   Temp: 98.1 °F (36.7 °C)   TempSrc: Temporal   SpO2: 97%   Weight: 164 lb 9.6 oz (74.7 kg)   Height: 5' 3\" (1.6 m)       Physical Exam      Const: Appears well developed and well nourished. No signs of acute distress present. Neck: Supple and symmetric. Palpation reveals no adenopathy. No masses appreciated. Thyroid exhibits no nodule  or thyromegaly. No JVD.  Carotids: 2+ and equal bilaterally, without bruits. Resp: No rales, rhonchi or wheezes appreciated over the lungs bilaterally. CV: Rate is regular. Rhythm is regular. S1 is normal. S2 is normal. No gallop or rubs. No heart murmur  appreciated. Extremities: No clubbing, cyanosis or edema. Abdomen: Bowel sounds are normoactive. Palpation reveals softness, with no distension, organomegaly or  tenderness. No abdominal masses palpable. No palpable hepatosplenomegaly. Musculo: Walks with a normal gait. Upper Extremities: Full ROM bilaterally. Lower Extremities: Full ROM  bilaterally. Neuro: Alert and oriented x3. Mood is normal. Affect is normal. Speech is articulate and fluent. Upper Extremities:  motor strength is intact. Normal muscle tone bilaterally. Lower Extremities: motor strength is intact. Normal muscle  tone bilaterally. Sensation intact to light touch  Psych: Patient's attitude is cooperative. Patient's affect is  normal. Judgement is realistic. Insight is appropriate. Assessment and Plan:  Carola Christianson was seen today for hypertension and diabetes. Diagnoses and all orders for this visit:    History of regular medication use  -     VALPROIC ACID LEVEL, TOTAL; Future    Type 2 diabetes mellitus with diabetic polyneuropathy, with long-term current use of insulin ([facility])  -     Hemoglobin A1C; Future  -     Microalbumin / Creatinine Urine Ratio; Future    Hypothyroidism, unspecified type  -     T4, Free; Future  -     TSH without Reflex; Future    Essential hypertension  -     Comprehensive Metabolic Panel; Future  -     CBC Auto Differential; Future    Obstructive sleep apnea    Major depressive disorder with single episode, remission status unspecified    Hyperlipidemia, unspecified hyperlipidemia type  -     Lipid Panel; Future    Plan: I will see her back in months and as needed. Needs to start monitoring blood pressure and blood sugar much more closely. Follow-up with above consultants.   Blood work to monitor disease

## 2021-04-06 NOTE — TELEPHONE ENCOUNTER
Hemoglobin A1c remains poor at 9.3 with a glucose of 303. Have patient call endocrine to make adjustments in her medications.   Send copy of this blood work to both Dr. Zacarias Fish office/endocrine and Saul Higuera of psychiatry in [place]

## 2021-04-08 ENCOUNTER — TELEPHONE (OUTPATIENT)
Dept: FAMILY MEDICINE CLINIC | Age: 76
End: 2021-04-08

## 2021-04-08 NOTE — TELEPHONE ENCOUNTER
Pt called to say that she is scheduled to receive her second covid vaccine on 4/12/21. She had a nuclear stress test done and she wanted to know if she could still receive the covid vaccine?

## 2021-04-23 ENCOUNTER — TELEPHONE (OUTPATIENT)
Dept: FAMILY MEDICINE CLINIC | Age: 76
End: 2021-04-23

## 2021-04-23 ENCOUNTER — OFFICE VISIT (OUTPATIENT)
Dept: FAMILY MEDICINE CLINIC | Age: 76
End: 2021-04-23
Payer: MEDICARE

## 2021-04-23 VITALS
DIASTOLIC BLOOD PRESSURE: 68 MMHG | HEIGHT: 63 IN | SYSTOLIC BLOOD PRESSURE: 120 MMHG | BODY MASS INDEX: 29.23 KG/M2 | TEMPERATURE: 97.5 F | WEIGHT: 165 LBS | HEART RATE: 82 BPM | OXYGEN SATURATION: 96 %

## 2021-04-23 DIAGNOSIS — R93.89 CHEST X-RAY ABNORMALITY: ICD-10-CM

## 2021-04-23 DIAGNOSIS — R10.9 FLANK PAIN: Primary | ICD-10-CM

## 2021-04-23 DIAGNOSIS — R10.11 RIGHT UPPER QUADRANT PAIN: ICD-10-CM

## 2021-04-23 LAB
ALBUMIN SERPL-MCNC: NORMAL G/DL
ALP BLD-CCNC: NORMAL U/L
ALT SERPL-CCNC: NORMAL U/L
ANION GAP SERPL CALCULATED.3IONS-SCNC: NORMAL MMOL/L
AST SERPL-CCNC: NORMAL U/L
BASOPHILS ABSOLUTE: NORMAL
BASOPHILS RELATIVE PERCENT: NORMAL
BILIRUB SERPL-MCNC: NORMAL MG/DL
BUN BLDV-MCNC: NORMAL MG/DL
CALCIUM SERPL-MCNC: NORMAL MG/DL
CHLORIDE BLD-SCNC: NORMAL MMOL/L
CO2: NORMAL
CREAT SERPL-MCNC: NORMAL MG/DL
EOSINOPHILS ABSOLUTE: NORMAL
EOSINOPHILS RELATIVE PERCENT: NORMAL
GFR CALCULATED: NORMAL
GLUCOSE BLD-MCNC: NORMAL MG/DL
HCT VFR BLD CALC: NORMAL %
HEMOGLOBIN: NORMAL
LYMPHOCYTES ABSOLUTE: NORMAL
LYMPHOCYTES RELATIVE PERCENT: NORMAL
MCH RBC QN AUTO: NORMAL PG
MCHC RBC AUTO-ENTMCNC: NORMAL G/DL
MCV RBC AUTO: NORMAL FL
MONOCYTES ABSOLUTE: NORMAL
MONOCYTES RELATIVE PERCENT: NORMAL
NEUTROPHILS ABSOLUTE: NORMAL
NEUTROPHILS RELATIVE PERCENT: NORMAL
PDW BLD-RTO: NORMAL %
PLATELET # BLD: NORMAL 10*3/UL
PMV BLD AUTO: NORMAL FL
POTASSIUM SERPL-SCNC: NORMAL MMOL/L
RBC # BLD: NORMAL 10*6/UL
SODIUM BLD-SCNC: NORMAL MMOL/L
TOTAL PROTEIN: NORMAL
WBC # BLD: NORMAL 10*3/UL

## 2021-04-23 PROCEDURE — 71046 X-RAY EXAM CHEST 2 VIEWS: CPT | Performed by: PHYSICIAN ASSISTANT

## 2021-04-23 PROCEDURE — G8427 DOCREV CUR MEDS BY ELIG CLIN: HCPCS | Performed by: PHYSICIAN ASSISTANT

## 2021-04-23 PROCEDURE — G8417 CALC BMI ABV UP PARAM F/U: HCPCS | Performed by: PHYSICIAN ASSISTANT

## 2021-04-23 PROCEDURE — 99214 OFFICE O/P EST MOD 30 MIN: CPT | Performed by: PHYSICIAN ASSISTANT

## 2021-04-23 PROCEDURE — 1123F ACP DISCUSS/DSCN MKR DOCD: CPT | Performed by: PHYSICIAN ASSISTANT

## 2021-04-23 PROCEDURE — 4040F PNEUMOC VAC/ADMIN/RCVD: CPT | Performed by: PHYSICIAN ASSISTANT

## 2021-04-23 PROCEDURE — 1036F TOBACCO NON-USER: CPT | Performed by: PHYSICIAN ASSISTANT

## 2021-04-23 PROCEDURE — G8400 PT W/DXA NO RESULTS DOC: HCPCS | Performed by: PHYSICIAN ASSISTANT

## 2021-04-23 PROCEDURE — 1090F PRES/ABSN URINE INCON ASSESS: CPT | Performed by: PHYSICIAN ASSISTANT

## 2021-04-23 PROCEDURE — 3017F COLORECTAL CA SCREEN DOC REV: CPT | Performed by: PHYSICIAN ASSISTANT

## 2021-04-23 RX ORDER — DIVALPROEX SODIUM 250 MG/1
TABLET, EXTENDED RELEASE ORAL
COMMUNITY
Start: 2021-03-31 | End: 2021-04-23 | Stop reason: ALTCHOICE

## 2021-04-23 NOTE — PROGRESS NOTES
23  Dony Easton : 1945 Sex: female  Age 78 y.o. Subjective:  Chief Complaint   Patient presents with    Flank Pain     flank and side for 1 week         HPI:   Dony Easton , 78 y.o. female presents to [facility] care for evaluation of right-sided pain for 1 week. Patient states the pain is intermittent. She has been unable to identify any pattern to what makes it come and go or better or worse but overall she does states that it is present every evening and that it tends to be worse in the evening. Patient states it feels as though someone punched her but she has not had any trauma or injury. She currently rates pain as a 5 but states at night it will be an 8. Patient has been able to eat and drink normally during this time. She denies any constipation or diarrhea. She denies any nausea or vomiting. She denies any chest pain. Patient denies any shortness of breath but states at times she noticed that she does have to stop and take a deep breath, this is been ongoing for several weeks without any recent changes. Denies any painful respirations. She denies any urinary symptoms such as dysuria urgency frequency hematuria. Denies history of kidney stones. Patient does relate that she woke up on  with chest pain that radiated into her left arm. She did go to [place] ED where she was evaluated and then admitted overnight. She had a nuclear stress test that was negative for any cardiac damage and was told to start on a baby aspirin. Patient has not had any further chest pain. Pt denies other symptoms and presents for evaluation.       ROS:   Unless otherwise stated in this report the patient's positive and negative responses for review of systems for constitutional, eyes, ENT, cardiovascular, respiratory, gastrointestinal, neurological, , musculoskeletal, and integument systems and related systems to the presenting problem are either stated in the history of present tablet, TAKE 1 TABLET TWICE A DAY, Disp: 180 tablet, Rfl: 1    silver sulfADIAZINE (SILVADENE) 1 % cream, Apply topically daily. , Disp: 400 g, Rfl: 0    omega-3 acid ethyl esters (LOVAZA) 1 g capsule, TK 2 CS PO BID, Disp: , Rfl:     INSULIN LISPRO SC, Inject into the skin, Disp: , Rfl:     Multiple Vitamins-Minerals (VITEYES COMPLETE PO), Take by mouth, Disp: , Rfl:     nystatin-triamcinolone (MYCOLOG II) 454744-5.8 UNIT/GM-% cream, Apply topically 2 times daily. , Disp: 1 Tube, Rfl: 1    divalproex (DEPAKOTE) 250 MG DR tablet, Take 500 mg by mouth nightly , Disp: , Rfl:     BD PEN NEEDLE MICRO U/F 32G X 6 MM MISC, USE WITH LANTUS DAILY, Disp: 100 each, Rfl: 3    escitalopram (LEXAPRO) 10 MG tablet, Take 10 mg by mouth daily, Disp: , Rfl:     Continuous Blood Gluc Sensor (FREESTYLE ANT SENSOR SYSTEM) MISC, 1 box by Does not apply route 2 times daily, Disp: 1 each, Rfl: 0    Continuous Blood Gluc Sensor (FREESTYLE ANT SENSOR SYSTEM) [facility], PLEASE DISPENSE 1 KIT TO PATIENT, Disp: 1 each, Rfl: 0    Continuous Blood Gluc Sensor ([address] [place] [address]) [facility], Please dispense one free style ant kit, Disp: 1 each, Rfl: 0    latanoprost (XALATAN) 0.005 % ophthalmic solution, 1 drop nightly, Disp: , Rfl:     Calcium Carb-Cholecalciferol (CALCIUM 1000 + D PO), Take by mouth, Disp: , Rfl:     Allergies: Allergies   Allergen Reactions    Seasonal        Social History:     Social History     Tobacco Use    Smoking status: Never Smoker    Smokeless tobacco: Never Used   Substance Use Topics    Alcohol use: Never     Frequency: Never    Drug use: Never       Physical Exam:     Vitals:    04/23/21 1215   BP: 120/68   Pulse: 82   Temp: 97.5 °F (36.4 °C)   SpO2: 96%   Weight: 165 lb (74.8 kg)   Height: 5' 3\" (1.6 m)         Exam:  Const: Appears healthy and well developed. No signs of acute distress present. Vitals reviewed per triage. Head/Face: Normocephalic, atraumatic. Facies is symmetric. Eyes: Conjunctive are clear bilaterally  ENMT:  Nares are patent. Buccal mucosa is moist.  No erythema in the posterior pharynx. Neck: Supple and symmetric. Palpation reveals no adenopathy. Trachea midline. Resp: Lungs are clear bilaterally. No adventitious sounds audible. CV: S1 is normal. S2 is normal.Pulses are equal bilaterally. Abdomen: Bowel sounds are positive. It is soft and nontender. Nondistended. No rebound rigidity or guarding. Musculo: Nontender over her chest wall or lateral rib cage. No crepitus swelling tenderness noted. Patient moves extremities without pain or limitation. No pedal edema. Skin: Skin is warm and dry. Neuro: Alert and oriented x3. Speech is articulate and fluent. Psych: Patient's mood and affect is appropriate to situation. Testing:           Medical Decision Making:     Upon presentation patient is alert and nontoxic in appearance. She is in no acute distress. Her vital signs are reviewed and they are within normal limits. Per her history, she has been having pain in her upper right quadrant and flank area intermittently for 1 week. She denies any other associated symptoms with this. On her physical exam, she is not currently having pain and  there is no reproducibility to her pain as she has a very benign exam on walk-in today. I did check a point-of-care urinalysis which is negative for blood or signs of infection. I have reviewed the ED visit and hospital stay she had April 7 and April 8. At that time she had a negative chest x-ray troponin D-dimer chemistry and EKG. cardiology note is reviewed. At that time EKG showed a sinus rhythm with chronic right bundle branch block. Nuclear stress test was done and is negative for any signs of ischemia. Given her lack of chest pain and shortness of breath I do not believe this is cardiac in nature especially in light of a negative nuclear stress test on April 8.   I did repeat the chest x-ray to rule out a pleural effusion or subtle pneumonia as well as repeat CBC chemistry and D-dimer. Chest x-ray was negative for infiltrate pneumothorax or pleural effusion but it did show 0.7 cm x 2.0 cm oval density in the left mid lobe that is new from her chest x-ray on April 7. D-dimer was negative  CBC was unremarkable and chemistry does reveal a glucose of 353,  the patient is a known diabetic as well as patient had stopped between her express visit and going to get her lab work for breakfast.    I have reviewed the chest x-ray and lab work with the patient. At this time she is not having pain. I am uncertain of the etiology of this pain but do believe she needs further evaluation. I have instructed her to call her PCP to make an appointment for next week for reevaluation as well as further evaluation of the new density seen on her chest x-ray. In the interim if her pain would worsen or new symptoms would develop she would need to go through the ED. Patient voices understanding of this plan. Clinical Impression:   Kyle Neal was seen today for flank pain. Diagnoses and all orders for this visit:    Flank pain  -     XR CHEST (2 VW)  -     CBC Auto Differential; Future  -     Comprehensive Metabolic Panel; Future  -     D-DIMER, QUANTITATIVE; Future    Right upper quadrant pain  -     XR CHEST (2 VW)  -     CBC Auto Differential; Future  -     Comprehensive Metabolic Panel; Future  -     D-DIMER, QUANTITATIVE; Future    Chest x-ray abnormality        The patient is to call for any concerns or return if any of the signs or symptoms worsen. The patient is to follow-up with PCP in the next 2-3 days for repeat evaluation repeat assessment or go directly to the emergency department.      SIGNATURE: Abi Dwyer PA-C

## 2021-04-23 NOTE — TELEPHONE ENCOUNTER
Andrew Vargas NP from [facility] called to inform nurse that patient came into clinic today with right sided lateral rib pain. Rowena Decent intermittent pain. . Patient stated she was not having any pain during the visit today. Ophelia Dos Santos sent her for labs and a chest xray. . Chest xray showed . 7cm by 2.0cm oval density in left lung.. Dr. Janeth Kelley read the report. . Patient is going to follow up with Dr. More Guerra here within the next week or 2.

## 2021-04-28 ENCOUNTER — OFFICE VISIT (OUTPATIENT)
Dept: FAMILY MEDICINE CLINIC | Age: 76
End: 2021-04-28
Payer: MEDICARE

## 2021-04-28 VITALS
OXYGEN SATURATION: 98 % | HEIGHT: 63 IN | TEMPERATURE: 97.3 F | SYSTOLIC BLOOD PRESSURE: 130 MMHG | DIASTOLIC BLOOD PRESSURE: 58 MMHG | HEART RATE: 69 BPM | BODY MASS INDEX: 29.23 KG/M2 | WEIGHT: 165 LBS

## 2021-04-28 DIAGNOSIS — I10 ESSENTIAL HYPERTENSION: ICD-10-CM

## 2021-04-28 DIAGNOSIS — Z79.4 TYPE 2 DIABETES MELLITUS WITH DIABETIC POLYNEUROPATHY, WITH LONG-TERM CURRENT USE OF INSULIN (HCC): ICD-10-CM

## 2021-04-28 DIAGNOSIS — R93.89 ABNORMAL CXR: ICD-10-CM

## 2021-04-28 DIAGNOSIS — R10.9 ABDOMINAL PAIN, UNSPECIFIED ABDOMINAL LOCATION: ICD-10-CM

## 2021-04-28 DIAGNOSIS — E03.9 HYPOTHYROIDISM, UNSPECIFIED TYPE: ICD-10-CM

## 2021-04-28 DIAGNOSIS — E11.42 TYPE 2 DIABETES MELLITUS WITH DIABETIC POLYNEUROPATHY, WITH LONG-TERM CURRENT USE OF INSULIN (HCC): ICD-10-CM

## 2021-04-28 DIAGNOSIS — R10.9 FLANK PAIN: ICD-10-CM

## 2021-04-28 PROCEDURE — 1090F PRES/ABSN URINE INCON ASSESS: CPT | Performed by: INTERNAL MEDICINE

## 2021-04-28 PROCEDURE — 3046F HEMOGLOBIN A1C LEVEL >9.0%: CPT | Performed by: INTERNAL MEDICINE

## 2021-04-28 PROCEDURE — 99214 OFFICE O/P EST MOD 30 MIN: CPT | Performed by: INTERNAL MEDICINE

## 2021-04-28 PROCEDURE — 1036F TOBACCO NON-USER: CPT | Performed by: INTERNAL MEDICINE

## 2021-04-28 PROCEDURE — 2022F DILAT RTA XM EVC RTNOPTHY: CPT | Performed by: INTERNAL MEDICINE

## 2021-04-28 PROCEDURE — G8427 DOCREV CUR MEDS BY ELIG CLIN: HCPCS | Performed by: INTERNAL MEDICINE

## 2021-04-28 PROCEDURE — 4040F PNEUMOC VAC/ADMIN/RCVD: CPT | Performed by: INTERNAL MEDICINE

## 2021-04-28 PROCEDURE — 3017F COLORECTAL CA SCREEN DOC REV: CPT | Performed by: INTERNAL MEDICINE

## 2021-04-28 PROCEDURE — G8417 CALC BMI ABV UP PARAM F/U: HCPCS | Performed by: INTERNAL MEDICINE

## 2021-04-28 PROCEDURE — 1123F ACP DISCUSS/DSCN MKR DOCD: CPT | Performed by: INTERNAL MEDICINE

## 2021-04-28 PROCEDURE — G8400 PT W/DXA NO RESULTS DOC: HCPCS | Performed by: INTERNAL MEDICINE

## 2021-04-28 RX ORDER — ASPIRIN 81 MG/1
81 TABLET ORAL DAILY
COMMUNITY
End: 2022-06-16 | Stop reason: ALTCHOICE

## 2021-04-28 NOTE — PROGRESS NOTES
[facility]     21  Yaneth Braga : 1945 Sex: female  Age: 78 y.o. Chief Complaint   Patient presents with    Follow-up     follow-up from [facility] ; still having RUQ pain;        HPI  Patient presents today for express follow-up related to right sided pain to abdomen flank area. Patient was hospitalized with chest pain back on 2021 with chest pain. She was admitted observation seen by cardiology. Nuclear stress test was done and unremarkable. She has had no further chest discomfort but does now complain of right upper quadrant with some flank discomfort starting several days after her discharge from the hospital.  I reviewed the hospital notes available to me. As well as x-rays and labs. She ended up going back to [facility] on  for the above complaint and was sent into the hospital to have x-rays done and labs. Both of which I reviewed. The x-ray did read as 7 cm x 2 cm oval density left midlung zone which was stated to be new from examination on the seventh. CAT scan was advised. Again she is having no further chest pain. She denies any nausea vomiting diarrhea change in bowel habits or any urinary difficulty associated with this abdominal pain. Denies any fever or chills. Blood sugars do continue to be elevated with last hemoglobin A1c of 9.6. She does follow with endocrinology does have an appointment upcoming. She tells me blood pressures have been in good range when she does check it although is not very often. Today's numbers look okay. Her lipids have been good on her statin medication. She does continue to successfully use her BiPAP machine for her obstructive sleep apnea. She is following with endocrine ophthalmology,GYN, pulmonary and psychiatry, cardiology    Review of Systems     Const: Denies chills, fever and sweats. Eyes: Reports glaucoma Recent cataract/glaucoma surgery./ diabetic retinopathy changes. /Ophthalmology.  States Duplex Carotid Ultasound - 3/09-nl  capsule endoscopy - [facility] 4/10-nl   MMSE-2/21-23/30-performed by neurology  Influenza Vaccination - (10/2018)  Prevnar Vaccine - (4/2017)  Pneumonia Vaccination - (2004)  Zoster/Shingles Vaccine - had Zostavax, gave slip for Shingrix  Medical Problems:  photosensitivity disorder/chronic, hx pos Lyla  thyroid nodule-neg bx - chronic thyroiditis  IBS, Depression, Non Insulin Dependent Diabetes, Hyperlipidemia, tubal ligation, appy, hysterectomy-still has  ovaries--non cancerous, Gastroesophageal Reflux Disease (GERD), Hypothyroidism, occular htn, Diabetic Neuropathy  Osteopenia - declined med  Difficult Intubation, sees dr li/gyn, dr Shoemaker [facility] eye,psych NP, Hypertension  CKD - sees renal  sinus surg - dr Myles Given  follows with counsellor,ophthalmology - GYN  cholecystectomy, Gastritis, vit D defic, Colon Polyps, Fatty Liver, bilateral cataract surg  pelvic and sacral fracture - 2016  Diabetic Retinopathy, Left carpal tunnel surgery. , Right carpal tunnel release, RBBB  Sleep Apnea - on BIPAP  Hospitalization 10/18 - New onset atrial fibrillation, UTI, CHF, pneumonia  Atrial Fibrillation, type 2 Non-Stemi  [facility]-sees neurology  Reviewed and updated. SH:  Marital: Legal Status: . Personal Habits: Cigarette Use: Negative For current cigarette smoker. Alcohol: does not use alcohol. Exercise  Type: She works as a  in [employer]. She has a Bachelors - Degree. --now retired          Current Outpatient Medications:     aspirin 81 MG EC tablet, Take 81 mg by mouth daily, Disp: , Rfl:     VASCEPA 1 g CAPS capsule, Take 2 capsules by mouth 2 times daily, Disp: , Rfl:     LANTUS SOLOSTAR 100 UNIT/ML injection pen, Inject 65 Units into the skin nightly, Disp: 15 pen, Rfl: 3    levothyroxine (SYNTHROID) 100 MCG tablet, Take 1 tablet by mouth Daily, Disp: 90 tablet, Rfl: 1    glipiZIDE (GLUCOTROL XL) 10 MG extended release tablet, Take 1 tablet by mouth daily, Disp: 90 tablet, Rfl: 1    pravastatin (PRAVACHOL) 20 MG tablet, TAKE 1 TABLET DAILY, Disp: 90 tablet, Rfl: 1    amLODIPine (NORVASC) 5 MG tablet, TAKE 1 TABLET DAILY, Disp: 90 tablet, Rfl: 1    metoprolol tartrate (LOPRESSOR) 25 MG tablet, TAKE 1 TABLET TWICE A DAY, Disp: 180 tablet, Rfl: 1    silver sulfADIAZINE (SILVADENE) 1 % cream, Apply topically daily. , Disp: 400 g, Rfl: 0    omega-3 acid ethyl esters (LOVAZA) 1 g capsule, TK 2 CS PO BID, Disp: , Rfl:     INSULIN LISPRO SC, Inject into the skin, Disp: , Rfl:     Multiple Vitamins-Minerals (VITEYES COMPLETE PO), Take by mouth, Disp: , Rfl:     nystatin-triamcinolone (MYCOLOG II) 576499-3.7 UNIT/GM-% cream, Apply topically 2 times daily. , Disp: 1 Tube, Rfl: 1    divalproex (DEPAKOTE) 250 MG DR tablet, Take 500 mg by mouth nightly , Disp: , Rfl:     BD PEN NEEDLE MICRO U/F 32G X 6 MM MISC, USE WITH LANTUS DAILY, Disp: 100 each, Rfl: 3    escitalopram (LEXAPRO) 10 MG tablet, Take 10 mg by mouth daily, Disp: , Rfl:     Continuous Blood Gluc Sensor (FREESTYLE ANT SENSOR SYSTEM) MISC, 1 box by Does not apply route 2 times daily, Disp: 1 each, Rfl: 0    Continuous Blood Gluc Sensor (FREESTYLE ANT SENSOR SYSTEM) [facility], PLEASE DISPENSE 1 KIT TO PATIENT, Disp: 1 each, Rfl: 0    Continuous Blood Gluc Sensor ([address] [place] [address]) [facility], Please dispense one free style ant kit, Disp: 1 each, Rfl: 0    latanoprost (XALATAN) 0.005 % ophthalmic solution, 1 drop nightly, Disp: , Rfl:     Calcium Carb-Cholecalciferol (CALCIUM 1000 + D PO), Take by mouth, Disp: , Rfl:   Allergies   Allergen Reactions    Seasonal        Past Medical History:   Diagnosis Date    Atrial fibrillation (HCC)     Chronic kidney disease     Colon polyps     Diabetes mellitus ([address]     Diabetic neuropathy ([address]     [facility] retinopathy [facility]     Essential hypertension 10/28/2019    Fatty liver     Gastritis     GERD (gastroesophageal reflux disease)     Hyperlipidemia     Hypertension     Hypothyroidism     Irritable bowel syndrome     Major depressive disorder with single episode 10/28/2019    Osteopenia     Photosensitivity disorder     chronic    RBBB     Sleep apnea     on BIPAP    Thyroid disease     Thyroid nodule     neg bx-chronic thyroiditis    Type 2 diabetes mellitus with diabetic polyneuropathy, with long-term current use of insulin ([facility] 7/23/2019    Vitamin D deficiency      Past Surgical History:   Procedure Laterality Date    APPENDECTOMY      CARPAL TUNNEL RELEASE Bilateral     CATARACT REMOVAL Bilateral     CHOLECYSTECTOMY     [place] TONSILLECTOMY       Family History   Problem Relation Age of Onset    Diabetes Paternal Grandmother     Diabetes Father     Lung Cancer Mother     Pancreatic Cancer Brother     Diabetes Brother      Social History     Socioeconomic History    Marital status:       Spouse name: Not on file    Number of children: Not on file    Years of education: Not on file    Highest education level: Not on file   Occupational History    Not on file   Social Needs    Financial resource strain: Not on file    Food insecurity     Worry: Not on file     Inability: Not on file    Transportation needs     Medical: Not on file     Non-medical: Not on file   Tobacco Use    Smoking status: Never Smoker    Smokeless tobacco: Never Used   Substance and Sexual Activity    Alcohol use: Never     Frequency: Never    Drug use: Never    Sexual activity: Not Currently   Lifestyle    Physical activity     Days per week: Not on file     Minutes per session: Not on file    Stress: Not on file   Relationships    Social connections     Talks on phone: Not on file     Gets together: Not on file     Attends Buddhism service: Not on file     Active member of club or organization: Not on file     Attends meetings of clubs or organizations: Not on file Relationship status: Not on file    Intimate partner violence     Fear of current or ex partner: Not on file     Emotionally abused: Not on file     Physically abused: Not on file     Forced sexual activity: Not on file   Other Topics Concern    Not on file   Social History Narrative    Not on file       Vitals:    04/28/21 1039   BP: (!) 130/58   Pulse: 69   Temp: 97.3 °F (36.3 °C)   TempSrc: Temporal   SpO2: 98%   Weight: 165 lb (74.8 kg)   Height: 5' 3\" (1.6 m)       Physical Exam    Const: Appears well developed and well nourished. No signs of acute distress present. Neck: Supple and symmetric. Palpation reveals no adenopathy. No masses appreciated. Thyroid exhibits no nodule  or thyromegaly. No JVD. Carotids: 2+ and equal bilaterally, without bruits. Resp: No rales, rhonchi or wheezes appreciated over the lungs bilaterally. CV: Rate is regular. Rhythm is regular. S1 is normal. S2 is normal. No gallop or rubs. No heart murmur  appreciated. Extremities: No clubbing, cyanosis or edema. Abdomen: Patient does have reproducible abdominal tenderness from the midline to the right more so right upper quadrant. Patient has had appendectomy and cholecystectomy in the past.  There is no rebound or guarding noted. No notable flank tenderness to percussion noted. Bowel sounds are normoactive. Palpation reveals softness, with no distension, organomegaly or   No abdominal masses palpable. No palpable hepatosplenomegaly. Musculo: Walks with a normal gait. Upper Extremities: Full ROM bilaterally. Lower Extremities: Full ROM  bilaterally. Neuro: Alert and oriented x3. Mood is normal. Affect is normal. Speech is articulate and fluent. Upper Extremities:  motor strength is intact. Normal muscle tone bilaterally. Lower Extremities: motor strength is intact. Normal muscle  tone bilaterally. Sensation intact to light touch  Psych: Patient's attitude is cooperative. Patient's affect is  normal. Judgement is realistic.

## 2021-04-29 ENCOUNTER — TELEPHONE (OUTPATIENT)
Dept: FAMILY MEDICINE CLINIC | Age: 76
End: 2021-04-29

## 2021-04-29 DIAGNOSIS — R10.9 FLANK PAIN: ICD-10-CM

## 2021-04-29 DIAGNOSIS — R10.10 PAIN OF UPPER ABDOMEN: Primary | ICD-10-CM

## 2021-04-29 NOTE — TELEPHONE ENCOUNTER
Called and spoke with Sharif Hobson at [facility], let her know that we were changing the order. She said to go ahead and fax the new order over and they will change it. Order has been faxed.

## 2021-04-30 ENCOUNTER — TELEPHONE (OUTPATIENT)
Dept: FAMILY MEDICINE CLINIC | Age: 76
End: 2021-04-30

## 2021-04-30 ENCOUNTER — HOSPITAL ENCOUNTER (OUTPATIENT)
Dept: CT IMAGING | Age: 76
Discharge: HOME | End: 2021-05-02
Payer: MEDICARE

## 2021-04-30 DIAGNOSIS — R93.5 ABNORMAL CT OF THE ABDOMEN: Primary | ICD-10-CM

## 2021-04-30 DIAGNOSIS — R10.10 PAIN OF UPPER ABDOMEN: ICD-10-CM

## 2021-04-30 DIAGNOSIS — R10.9 FLANK PAIN: ICD-10-CM

## 2021-04-30 PROCEDURE — 6360000004 HC RX CONTRAST MEDICATION: Performed by: RADIOLOGY

## 2021-04-30 PROCEDURE — 74177 CT ABD & PELVIS W/CONTRAST: CPT

## 2021-04-30 RX ADMIN — IOPAMIDOL 75 ML: 755 INJECTION, SOLUTION INTRAVENOUS at 16:14

## 2021-05-04 DIAGNOSIS — R93.5 ABNORMAL CT OF THE ABDOMEN: ICD-10-CM

## 2021-05-04 LAB
BILIRUBIN URINE: NEGATIVE
BLOOD, URINE: NEGATIVE
CLARITY: CLEAR
COLOR: YELLOW
GLUCOSE URINE: 250 MG/DL
KETONES, URINE: NEGATIVE MG/DL
LEUKOCYTE ESTERASE, URINE: NEGATIVE
NITRITE, URINE: NEGATIVE
PH UA: 6.5 (ref 5–9)
PROTEIN UA: NEGATIVE MG/DL
SPECIFIC GRAVITY UA: 1.01 (ref 1–1.03)
UROBILINOGEN, URINE: 0.2 E.U./DL

## 2021-05-06 ENCOUNTER — OFFICE VISIT (OUTPATIENT)
Dept: FAMILY MEDICINE CLINIC | Age: 76
End: 2021-05-06
Payer: MEDICARE

## 2021-05-06 VITALS
TEMPERATURE: 97.2 F | HEART RATE: 75 BPM | SYSTOLIC BLOOD PRESSURE: 122 MMHG | DIASTOLIC BLOOD PRESSURE: 64 MMHG | WEIGHT: 165 LBS | BODY MASS INDEX: 29.23 KG/M2 | HEIGHT: 63 IN | OXYGEN SATURATION: 95 %

## 2021-05-06 DIAGNOSIS — E11.42 TYPE 2 DIABETES MELLITUS WITH DIABETIC POLYNEUROPATHY, WITH LONG-TERM CURRENT USE OF INSULIN (HCC): ICD-10-CM

## 2021-05-06 DIAGNOSIS — M54.6 ACUTE RIGHT-SIDED THORACIC BACK PAIN: ICD-10-CM

## 2021-05-06 DIAGNOSIS — Z79.4 TYPE 2 DIABETES MELLITUS WITH DIABETIC POLYNEUROPATHY, WITH LONG-TERM CURRENT USE OF INSULIN (HCC): ICD-10-CM

## 2021-05-06 DIAGNOSIS — J98.11 ATELECTASIS: ICD-10-CM

## 2021-05-06 DIAGNOSIS — R93.5 ABNORMAL CT OF THE ABDOMEN: ICD-10-CM

## 2021-05-06 DIAGNOSIS — I10 ESSENTIAL HYPERTENSION: ICD-10-CM

## 2021-05-06 DIAGNOSIS — R07.9 CHEST PAIN, UNSPECIFIED TYPE: Primary | ICD-10-CM

## 2021-05-06 LAB — URINE CULTURE, ROUTINE: NORMAL

## 2021-05-06 PROCEDURE — 3046F HEMOGLOBIN A1C LEVEL >9.0%: CPT | Performed by: INTERNAL MEDICINE

## 2021-05-06 PROCEDURE — 1036F TOBACCO NON-USER: CPT | Performed by: INTERNAL MEDICINE

## 2021-05-06 PROCEDURE — 1090F PRES/ABSN URINE INCON ASSESS: CPT | Performed by: INTERNAL MEDICINE

## 2021-05-06 PROCEDURE — 1123F ACP DISCUSS/DSCN MKR DOCD: CPT | Performed by: INTERNAL MEDICINE

## 2021-05-06 PROCEDURE — 3017F COLORECTAL CA SCREEN DOC REV: CPT | Performed by: INTERNAL MEDICINE

## 2021-05-06 PROCEDURE — G8427 DOCREV CUR MEDS BY ELIG CLIN: HCPCS | Performed by: INTERNAL MEDICINE

## 2021-05-06 PROCEDURE — 99214 OFFICE O/P EST MOD 30 MIN: CPT | Performed by: INTERNAL MEDICINE

## 2021-05-06 PROCEDURE — 4040F PNEUMOC VAC/ADMIN/RCVD: CPT | Performed by: INTERNAL MEDICINE

## 2021-05-06 PROCEDURE — G8417 CALC BMI ABV UP PARAM F/U: HCPCS | Performed by: INTERNAL MEDICINE

## 2021-05-06 PROCEDURE — 2022F DILAT RTA XM EVC RTNOPTHY: CPT | Performed by: INTERNAL MEDICINE

## 2021-05-06 PROCEDURE — G8400 PT W/DXA NO RESULTS DOC: HCPCS | Performed by: INTERNAL MEDICINE

## 2021-05-06 RX ORDER — GABAPENTIN 100 MG/1
CAPSULE ORAL
Qty: 90 CAPSULE | Refills: 1 | Status: SHIPPED
Start: 2021-05-06 | End: 2021-05-19 | Stop reason: DRUGHIGH

## 2021-05-06 NOTE — PROGRESS NOTES
Mary Kay Mayes [facility]EL      23  Stephaniemichael Nava : 1945 Sex: female  Age: 78 y.o. Chief Complaint   Patient presents with    Other     1 week follow-up       HPI  Gloria Manzano comes in today for 1 week follow-up visit on her pain. Reviewed previous note including cardiac work-up. The pain seems to be up higher than she initially described more to the lower chest and very upper abdomen. She did have a CAT scan of the abdomen and pelvis done it did demonstrate some subcentimeter hypodense renal lesions which in talking to radiology today about this case felt these were tiny cysts. They do mention the bladder is partially distended with mild wall thickening which they state is probably normal variant although uncertain significance. She did have a normal urinalysis. She does have constipation noted. They do mention no other significant abnormalities outside of some atelectasis in the lung bases which the radiologist felt could be followed by a chest x-ray. She is not complaining of respiratory symptoms of cough or shortness of breath. She does have the pain as a stated to the lower chest on the right radiating around to the back thoracic region. There is no rash of suspected shingles. Does describe it as a burning sensation sometime and other times more sharp while other times feels like she has been punched. She associates it with nothing specific as far as foods or liquids or any certain activity or movement. Blood sugars continue to be marginal.  Blood pressures have been stable. Lipids have been good on her statin medication. Continues with her BiPAP machine successfully for her obstructive sleep apnea  She is following with endocrine ophthalmology,GYN, pulmonary and psychiatry, cardiology    Review of Systems     Const: Denies chills, fever and sweats. Eyes: Reports glaucoma Recent cataract/glaucoma surgery./ diabetic retinopathy changes. /Ophthalmology.  States the  patient does have post cataract surgery that will eventually need some laser surgery done  ENMT: Denies ear symptoms. Reports postnasal drip, but denies other nasal symptoms. Denies mouth or throat  symptoms. CV: Denies chest pain, orthopnea and palpitations--history of atrial fibrillation. Currently back in sinus rhythm and off of anticoagulation per cardiology  Resp: Denies cough, SOB and wheezing. GI: Denies diarrhea, nausea and vomiting. She does have a right upper quadrant abdominal pain/lower right chest discomfort radiating around to the side occasionally the flank area and thoracic region. .  : Urinary: denies dysuria, frequency and frequent UTI's. Musculo: Reports arthritis, lower back pain and right hip pain/Improved  Skin: Denies eczema, pruritus, rash. Neuro: Denies dizziness, headache, seizures and syncope.  Diabetic neuropathy. Psych: Reports depression and stress/back in counseling.  Some lack of motivation  endocrine: Reports diabetes marginally controlled/marginal compliance           REST OF PERTINENT ROS GONE OVER AND WAS NEGATIVE.      PMH:  Problem List: Type II diabetes mellitus uncontrolled, Essential hypertension, Taking medication, Depressive disorder,  Hypothyroidism, Hyperlipidemia, Type 2 diabetes mellitus  Kofi Burt  1945 Page #2  Health Maintenance:  Influenza Vaccination - (2017)  Couseled on Home Safety - (2017)  Mammogram - (2017)  Bone Density Test Screening - (2012)  Colonoscopy - (2010)  Colonoscopy - (2014)  Colonoscopy - (2017)  Colonoscopy Screening - (2010)  Colonoscopy Screening - (2014)  Colonoscopy Screening - (2017)  Mammogram Screening - (2006)  Mammogram Screening - (3/11/2008)  Mammogram Screening - (2/15/2010)  Mammogram Screening - (2017)  Colonoscopy - ,2/10,-due 17-due 22  Stress Test - , -negative  EKG - ,,,, ,10/18  Rectal Exam - dr Ember Cole - \" Breast Exam - \"  EGD - 2/10  Duplex Carotid Ultasound - 3/09-nl  capsule endoscopy - [facility] 4/10-nl   MMSE-2/21-23/30-performed by neurology  Influenza Vaccination - (10/2018)  Prevnar Vaccine - (4/2017)  Pneumonia Vaccination - (2004)  Zoster/Shingles Vaccine - had Zostavax, gave slip for Shingrix  Medical Problems:  photosensitivity disorder/chronic, hx pos Lyla  thyroid nodule-neg bx - chronic thyroiditis  IBS, Depression, Non Insulin Dependent Diabetes, Hyperlipidemia, tubal ligation, appy, hysterectomy-still has  ovaries--non cancerous, Gastroesophageal Reflux Disease (GERD), Hypothyroidism, occular htn, Diabetic Neuropathy  Osteopenia - declined med  Difficult Intubation, sees dr li/gyn, dr Zheng Ramos eye,psych NP, Hypertension  CKD - sees renal  sinus surg - dr Gilford Rodney  follows with counsellor,ophthalmology - GYN  cholecystectomy, Gastritis, vit D defic, Colon Polyps, Fatty Liver, bilateral cataract surg  pelvic and sacral fracture - 2016  Diabetic Retinopathy, Left carpal tunnel surgery. , Right carpal tunnel release, RBBB  Sleep Apnea - on BIPAP  Hospitalization 10/18 - New onset atrial fibrillation, UTI, CHF, pneumonia  Atrial Fibrillation, type 2 Non-Stemi  [facility]-sees neurology  Reviewed and updated. SH:  Marital: Legal Status: . Personal Habits: Cigarette Use: Negative For current cigarette smoker. Alcohol: does not use alcohol. Exercise  Type: She works as a  in [employer]. She has a Bachelors - Degree. --now retired              Current Outpatient Medications:     gabapentin (NEURONTIN) 100 MG capsule, 1 po tonight, 2 po tomorrow night and 3 po nightly thereafter, Disp: 90 capsule, Rfl: 1    aspirin 81 MG EC tablet, Take 81 mg by mouth daily, Disp: , Rfl:     VASCEPA 1 g CAPS capsule, Take 2 capsules by mouth 2 times daily, Disp: , Rfl:     LANTUS SOLOSTAR 100 UNIT/ML injection pen, Inject 65 Units into the skin nightly, Disp: 15 pen, Rfl: 3    levothyroxine (SYNTHROID) 100 MCG tablet, Take 1 tablet by mouth Daily, Disp: 90 tablet, Rfl: 1    glipiZIDE (GLUCOTROL XL) 10 MG extended release tablet, Take 1 tablet by mouth daily, Disp: 90 tablet, Rfl: 1    pravastatin (PRAVACHOL) 20 MG tablet, TAKE 1 TABLET DAILY, Disp: 90 tablet, Rfl: 1    amLODIPine (NORVASC) 5 MG tablet, TAKE 1 TABLET DAILY, Disp: 90 tablet, Rfl: 1    metoprolol tartrate (LOPRESSOR) 25 MG tablet, TAKE 1 TABLET TWICE A DAY, Disp: 180 tablet, Rfl: 1    silver sulfADIAZINE (SILVADENE) 1 % cream, Apply topically daily. , Disp: 400 g, Rfl: 0    omega-3 acid ethyl esters (LOVAZA) 1 g capsule, TK 2 CS PO BID, Disp: , Rfl:     INSULIN LISPRO SC, Inject into the skin, Disp: , Rfl:     Multiple Vitamins-Minerals (VITEYES COMPLETE PO), Take by mouth, Disp: , Rfl:     nystatin-triamcinolone (MYCOLOG II) 073584-0.8 UNIT/GM-% cream, Apply topically 2 times daily. , Disp: 1 Tube, Rfl: 1    divalproex (DEPAKOTE) 250 MG DR tablet, Take 500 mg by mouth nightly , Disp: , Rfl:     BD PEN NEEDLE MICRO U/F 32G X 6 MM MISC, USE WITH LANTUS DAILY, Disp: 100 each, Rfl: 3    escitalopram (LEXAPRO) 10 MG tablet, Take 10 mg by mouth daily, Disp: , Rfl:     Continuous Blood Gluc Sensor (FREESTYLE ANT SENSOR SYSTEM) MISC, 1 box by Does not apply route 2 times daily, Disp: 1 each, Rfl: 0    Continuous Blood Gluc Sensor (FREESTYLE ANT SENSOR SYSTEM) [facility], PLEASE DISPENSE 1 KIT TO PATIENT, Disp: 1 each, Rfl: 0    Continuous Blood Gluc Sensor ([address] [place] [address]) [facility], Please dispense one free style ant kit, Disp: 1 each, Rfl: 0    latanoprost (XALATAN) 0.005 % ophthalmic solution, 1 drop nightly, Disp: , Rfl:     Calcium Carb-Cholecalciferol (CALCIUM 1000 + D PO), Take by mouth, Disp: , Rfl:   Allergies   Allergen Reactions    Seasonal        Past Medical History:   Diagnosis Date    Atrial fibrillation (HCC)     Chronic kidney disease     Colon polyps     Diabetes mellitus ([address]     Diabetic neuropathy ([facility]     file     Attends Sabianism service: Not on file     Active member of club or organization: Not on file     Attends meetings of clubs or organizations: Not on file     Relationship status: Not on file    Intimate partner violence     Fear of current or ex partner: Not on file     Emotionally abused: Not on file     Physically abused: Not on file     Forced sexual activity: Not on file   Other Topics Concern    Not on file   Social History Narrative    Not on file       Vitals:    05/06/21 1301   BP: 122/64   Pulse: 75   Temp: 97.2 °F (36.2 °C)   TempSrc: Temporal   SpO2: 95%   Weight: 165 lb (74.8 kg)   Height: 5' 3\" (1.6 m)       Physical Exam    Const: Appears well developed and well nourished. No signs of acute distress present. Neck: Supple and symmetric. Palpation reveals no adenopathy. No masses appreciated. Thyroid exhibits no nodule  or thyromegaly. No JVD. Carotids: 2+ and equal bilaterally, without bruits. Resp: No rales, rhonchi or wheezes appreciated over the lungs bilaterally. CV: Rate is regular. Rhythm is regular. S1 is normal. S2 is normal. No gallop or rubs. No heart murmur  appreciated. Extremities: No clubbing, cyanosis or edema. Abdomen: Patient does have mild abdominal tenderness from the midline to the right more so right upper quadrant right lower chest with reproducible pain to palpation around the lateral aspect and into the right thoracic region. No gross deformities noted. No skin rashes noted. .  Patient has had appendectomy and cholecystectomy in the past.  There is no rebound or guarding noted. No notable flank tenderness to percussion noted. Bowel sounds are normoactive. Palpation reveals softness, with no distension, organomegaly or   No abdominal masses palpable. No palpable hepatosplenomegaly. Musculo: Walks with a normal gait. Upper Extremities: Full ROM bilaterally. Lower Extremities: Full ROM  bilaterally. Neuro: Alert and oriented x3.  Mood is normal. Affect is normal. Speech is articulate and fluent. Upper Extremities:  motor strength is intact. Normal muscle tone bilaterally. Lower Extremities: motor strength is intact. Normal muscle  tone bilaterally. Sensation intact to light touch  Psych: Patient's attitude is cooperative. Patient's affect is  normal. Judgement is realistic. Insight is appropriate.           Assessment and Plan:  Greg Hickman was seen today for other. Diagnoses and all orders for this visit:    Chest pain, unspecified type  -     CT CHEST WO CONTRAST; Future    Type 2 diabetes mellitus with diabetic polyneuropathy, with long-term current use of insulin ([facility])    Essential hypertension    Abnormal CT of the abdomen  -     CT CHEST WO CONTRAST; Future    Atelectasis  -     CT CHEST WO CONTRAST; Future    Acute right-sided thoracic back pain  -     XR THORACIC SPINE (3 VIEWS); Future    Other orders  -     gabapentin (NEURONTIN) 100 MG capsule; 1 po tonight, 2 po tomorrow night and 3 po nightly thereafter    Plan: After full discussion with patient I did elect to do CAT scan of the chest without contrast as well as thoracic spine films. Still uncertain as to the etiology of this discomfort she is describing. It is sounding perhaps more musculoskeletal in nature. Although uncertain. I did elect to start gabapentin chances of more neuropathic pain related to the back OARRS report was run and okay. Side effects were gone over. Fall precautions. Again I did speak with radiology today. Consideration to have urology look at at some point related to bladder thickening. We will see back in 1 week and as needed. Notify with problems in the interim. Return in about 1 week (around 5/13/2021). Seen By:  Patricia Dillard MD      *Document was created using voice recognition software. Note was reviewed however may contain grammatical errors.

## 2021-05-13 ENCOUNTER — TELEPHONE (OUTPATIENT)
Dept: FAMILY MEDICINE CLINIC | Age: 76
End: 2021-05-13

## 2021-05-13 NOTE — TELEPHONE ENCOUNTER
Meche calling you currently have her on gabapentin 100mg 3 po nightly. This is not controlling her pain during the day. What do you suggest she do to control her pain during the day?

## 2021-05-16 ENCOUNTER — HOSPITAL ENCOUNTER (OUTPATIENT)
Dept: GENERAL RADIOLOGY | Age: 76
Discharge: HOME | End: 2021-05-18
Payer: MEDICARE

## 2021-05-16 ENCOUNTER — HOSPITAL ENCOUNTER (OUTPATIENT)
Age: 76
Discharge: HOME | End: 2021-05-18
Payer: MEDICARE

## 2021-05-16 ENCOUNTER — HOSPITAL ENCOUNTER (OUTPATIENT)
Dept: CT IMAGING | Age: 76
Discharge: HOME | End: 2021-05-18
Payer: MEDICARE

## 2021-05-16 DIAGNOSIS — R07.9 CHEST PAIN, UNSPECIFIED TYPE: ICD-10-CM

## 2021-05-16 DIAGNOSIS — R93.5 ABNORMAL CT OF THE ABDOMEN: ICD-10-CM

## 2021-05-16 DIAGNOSIS — M54.6 ACUTE RIGHT-SIDED THORACIC BACK PAIN: ICD-10-CM

## 2021-05-16 DIAGNOSIS — J98.11 ATELECTASIS: ICD-10-CM

## 2021-05-16 PROCEDURE — 72070 X-RAY EXAM THORAC SPINE 2VWS: CPT

## 2021-05-16 PROCEDURE — 71250 CT THORAX DX C-: CPT

## 2021-05-17 ENCOUNTER — TELEPHONE (OUTPATIENT)
Dept: FAMILY MEDICINE CLINIC | Age: 76
End: 2021-05-17

## 2021-05-19 ENCOUNTER — OFFICE VISIT (OUTPATIENT)
Dept: FAMILY MEDICINE CLINIC | Age: 76
End: 2021-05-19
Payer: MEDICARE

## 2021-05-19 VITALS
WEIGHT: 165 LBS | OXYGEN SATURATION: 97 % | SYSTOLIC BLOOD PRESSURE: 122 MMHG | BODY MASS INDEX: 29.23 KG/M2 | TEMPERATURE: 98.6 F | DIASTOLIC BLOOD PRESSURE: 54 MMHG | HEIGHT: 63 IN | HEART RATE: 75 BPM

## 2021-05-19 DIAGNOSIS — Z79.4 TYPE 2 DIABETES MELLITUS WITH DIABETIC POLYNEUROPATHY, WITH LONG-TERM CURRENT USE OF INSULIN (HCC): ICD-10-CM

## 2021-05-19 DIAGNOSIS — E11.42 TYPE 2 DIABETES MELLITUS WITH DIABETIC POLYNEUROPATHY, WITH LONG-TERM CURRENT USE OF INSULIN (HCC): ICD-10-CM

## 2021-05-19 DIAGNOSIS — M54.6 ACUTE RIGHT-SIDED THORACIC BACK PAIN: ICD-10-CM

## 2021-05-19 DIAGNOSIS — I10 ESSENTIAL HYPERTENSION: ICD-10-CM

## 2021-05-19 DIAGNOSIS — R10.10 PAIN OF UPPER ABDOMEN: ICD-10-CM

## 2021-05-19 PROCEDURE — G8400 PT W/DXA NO RESULTS DOC: HCPCS | Performed by: INTERNAL MEDICINE

## 2021-05-19 PROCEDURE — G8427 DOCREV CUR MEDS BY ELIG CLIN: HCPCS | Performed by: INTERNAL MEDICINE

## 2021-05-19 PROCEDURE — 1090F PRES/ABSN URINE INCON ASSESS: CPT | Performed by: INTERNAL MEDICINE

## 2021-05-19 PROCEDURE — 1123F ACP DISCUSS/DSCN MKR DOCD: CPT | Performed by: INTERNAL MEDICINE

## 2021-05-19 PROCEDURE — 3017F COLORECTAL CA SCREEN DOC REV: CPT | Performed by: INTERNAL MEDICINE

## 2021-05-19 PROCEDURE — 99214 OFFICE O/P EST MOD 30 MIN: CPT | Performed by: INTERNAL MEDICINE

## 2021-05-19 PROCEDURE — 2022F DILAT RTA XM EVC RTNOPTHY: CPT | Performed by: INTERNAL MEDICINE

## 2021-05-19 PROCEDURE — G8417 CALC BMI ABV UP PARAM F/U: HCPCS | Performed by: INTERNAL MEDICINE

## 2021-05-19 PROCEDURE — 1036F TOBACCO NON-USER: CPT | Performed by: INTERNAL MEDICINE

## 2021-05-19 PROCEDURE — 3046F HEMOGLOBIN A1C LEVEL >9.0%: CPT | Performed by: INTERNAL MEDICINE

## 2021-05-19 PROCEDURE — 4040F PNEUMOC VAC/ADMIN/RCVD: CPT | Performed by: INTERNAL MEDICINE

## 2021-05-19 RX ORDER — FAMOTIDINE 20 MG/1
20 TABLET, FILM COATED ORAL 2 TIMES DAILY
Qty: 60 TABLET | Refills: 3 | Status: SHIPPED
Start: 2021-05-19 | End: 2021-05-19

## 2021-05-19 RX ORDER — GABAPENTIN 300 MG/1
300 CAPSULE ORAL 2 TIMES DAILY
Qty: 60 CAPSULE | Refills: 2 | Status: SHIPPED
Start: 2021-05-19 | End: 2021-06-22

## 2021-05-19 SDOH — ECONOMIC STABILITY: FOOD INSECURITY: WITHIN THE PAST 12 MONTHS, YOU WORRIED THAT YOUR FOOD WOULD RUN OUT BEFORE YOU GOT MONEY TO BUY MORE.: NEVER TRUE

## 2021-05-19 NOTE — PROGRESS NOTES
was 9.3. Lipids have been stable on statin medication. She does continue with the BiPAP machine successfully for her obstructive sleep apnea. She is following with endocrine ophthalmology,GYN, pulmonary, neurology  psychiatry, cardiology     Review of Systems     Const: Denies chills, fever and sweats. Eyes: Reports glaucoma Recent cataract/glaucoma surgery./ diabetic retinopathy changes. /Ophthalmology. States the  patient does have post cataract surgery that will eventually need some laser surgery done  ENMT: Denies ear symptoms. Reports postnasal drip, but denies other nasal symptoms. Denies mouth or throat  symptoms. CV: Denies chest pain, orthopnea and palpitations--history of atrial fibrillation. Currently back in sinus rhythm and off of anticoagulation per cardiology  Resp: Denies cough, SOB and wheezing. GI: Denies diarrhea, nausea and vomiting.  She does have a right upper quadrant abdominal pain/lower right chest discomfort radiating around to the side occasionally the flank area and thoracic spine region. .  : Urinary: denies dysuria, frequency and frequent UTI's. Musculo: Reports arthritis, lower back pain and right hip pain/Improved  Skin: Denies eczema, pruritus, rash. Neuro: Denies dizziness, headache, seizures and syncope.  Diabetic neuropathy. Psych: Reports depression and stress/back in counseling.  Some lack of motivation  endocrine: Reports diabetes marginally controlled/marginal compliance           REST OF PERTINENT ROS GONE OVER AND WAS NEGATIVE.        PMH:  Problem List: Type II diabetes mellitus uncontrolled, Essential hypertension, Taking medication, Depressive disorder,  Hypothyroidism, Hyperlipidemia, Type 2 diabetes mellitus  Anna Marie Mendez  1945 Page #2  Health Maintenance:  Influenza Vaccination - (2017)  Couseled on Home Safety - (2017)  Mammogram - (2017)  Bone Density Test Screening - (2012)  Colonoscopy - (2010)  Colonoscopy - (12/11/2014)  Colonoscopy - (2/22/2017)  Colonoscopy Screening - (2/22/2010)  Colonoscopy Screening - (12/11/2014)  Colonoscopy Screening - (2/22/2017)  Mammogram Screening - (1/9/2006)  Mammogram Screening - (3/11/2008)  Mammogram Screening - (2/15/2010)  Mammogram Screening - (2/14/2017)  Colonoscopy - 1996,2/10,12/14-due 17-due 22  Stress Test - 2005, 11/18-negative  EKG - 6/08,5/13,4/14,6/14, 4/17,10/18  Rectal Exam - dr Gina Pepper - \"  Breast Exam - \"  EGD - 2/10  Duplex Carotid Ultasound - 3/09-nl  capsule endoscopy - [facility] 4/10-nl   MMSE-2/21-23/30-performed by neurology  Influenza Vaccination - (10/2018)  Prevnar Vaccine - (4/2017)  Pneumonia Vaccination - (2004)  Zoster/Shingles Vaccine - had Zostavax, gave slip for Shingrix  Medical Problems:  photosensitivity disorder/chronic, hx pos Lyla  thyroid nodule-neg bx - chronic thyroiditis  IBS, Depression, Non Insulin Dependent Diabetes, Hyperlipidemia, tubal ligation, appy, hysterectomy-still has  ovaries--non cancerous, Gastroesophageal Reflux Disease (GERD), Hypothyroidism, occular htn, Diabetic Neuropathy  Osteopenia - declined med  Difficult Intubation, sees dr li/gyn, dr Kimberlyn Arciniega eye,psych NP, Hypertension  CKD - sees renal  sinus surg - dr Heidi Cheema  follows with counsellor,ophthalmology - GYN  cholecystectomy, Gastritis, vit D defic, Colon Polyps, Fatty Liver, bilateral cataract surg  pelvic and sacral fracture - 2016  Diabetic Retinopathy, Left carpal tunnel surgery. , Right carpal tunnel release, RBBB  Sleep Apnea - on BIPAP  Hospitalization 10/18 - New onset atrial fibrillation, UTI, CHF, pneumonia  Atrial Fibrillation, type 2 Non-Stemi  [facility]-sees neurology  Reviewed and updated. SH:  Marital: Legal Status: . Personal Habits: Cigarette Use: Negative For current cigarette smoker. Alcohol: does not use alcohol. Exercise  Type: She works as a  in [employer]. She has a Bachelors - Degree. --now retired                 Current Outpatient Medications:     gabapentin (NEURONTIN) 300 MG capsule, Take 1 capsule by mouth 2 times daily for 90 days. , Disp: 60 capsule, Rfl: 2    aspirin 81 MG EC tablet, Take 81 mg by mouth daily, Disp: , Rfl:     VASCEPA 1 g CAPS capsule, Take 2 capsules by mouth 2 times daily, Disp: , Rfl:     LANTUS SOLOSTAR 100 UNIT/ML injection pen, Inject 65 Units into the skin nightly, Disp: 15 pen, Rfl: 3    levothyroxine (SYNTHROID) 100 MCG tablet, Take 1 tablet by mouth Daily, Disp: 90 tablet, Rfl: 1    glipiZIDE (GLUCOTROL XL) 10 MG extended release tablet, Take 1 tablet by mouth daily, Disp: 90 tablet, Rfl: 1    pravastatin (PRAVACHOL) 20 MG tablet, TAKE 1 TABLET DAILY, Disp: 90 tablet, Rfl: 1    amLODIPine (NORVASC) 5 MG tablet, TAKE 1 TABLET DAILY, Disp: 90 tablet, Rfl: 1    metoprolol tartrate (LOPRESSOR) 25 MG tablet, TAKE 1 TABLET TWICE A DAY, Disp: 180 tablet, Rfl: 1    silver sulfADIAZINE (SILVADENE) 1 % cream, Apply topically daily. , Disp: 400 g, Rfl: 0    omega-3 acid ethyl esters (LOVAZA) 1 g capsule, TK 2 CS PO BID, Disp: , Rfl:     INSULIN LISPRO SC, Inject into the skin, Disp: , Rfl:     Multiple Vitamins-Minerals (VITEYES COMPLETE PO), Take by mouth, Disp: , Rfl:     nystatin-triamcinolone (MYCOLOG II) 505618-4.1 UNIT/GM-% cream, Apply topically 2 times daily. , Disp: 1 Tube, Rfl: 1    divalproex (DEPAKOTE) 250 MG DR tablet, Take 500 mg by mouth nightly , Disp: , Rfl:     BD PEN NEEDLE MICRO U/F 32G X 6 MM MISC, USE WITH LANTUS DAILY, Disp: 100 each, Rfl: 3    escitalopram (LEXAPRO) 10 MG tablet, Take 10 mg by mouth daily, Disp: , Rfl:     Continuous Blood Gluc Sensor (FREESTYLE ANT SENSOR SYSTEM) MISC, 1 box by Does not apply route 2 times daily, Disp: 1 each, Rfl: 0    Continuous Blood Gluc Sensor (FREESTYLE ANT SENSOR SYSTEM) MISC, PLEASE DISPENSE 1 KIT TO PATIENT, Disp: 1 each, Rfl: 0    Continuous Blood Gluc Sensor ([facility]) MISC, Please dispense one free Activity    Alcohol use: Never    Drug use: Never    Sexual activity: Not Currently   Other Topics Concern    Not on file   Social History Narrative    Not on file     Social Determinants of Health     Financial Resource Strain: Low Risk     Difficulty of Paying Living Expenses: Not hard at all   Food Insecurity: No Food Insecurity    Worried About Running Out of Food in the Last Year: Never true    [address] in the Last Year: Never true   Transportation Needs:     Lack of Transportation (Medical):  Lack of Transportation (Non-Medical):    Physical Activity:     Days of Exercise per Week:     Minutes of Exercise per Session:    Stress:     Feeling of Stress :    Social Connections:     Frequency of Communication with Friends and Family:     Frequency of Social Gatherings with Friends and Family:     Attends Spiritism Services:     Active Member of Clubs or Organizations:     Attends Club or Organization Meetings:     Marital Status:    Intimate Partner Violence:     Fear of Current or Ex-Partner:     Emotionally Abused:     Physically Abused:     Sexually Abused:        Vitals:    05/19/21 1330   BP: (!) 122/54   Pulse: 75   Temp: 98.6 °F (37 °C)   TempSrc: Temporal   SpO2: 97%   Weight: 165 lb (74.8 kg)   Height: 5' 3\" (1.6 m)       Physical Exam    Const: Appears well developed and well nourished. No signs of acute distress present. Neck: Supple and symmetric. Palpation reveals no adenopathy. No masses appreciated. Thyroid exhibits no nodule  or thyromegaly. No JVD. Carotids: 2+ and equal bilaterally, without bruits. Resp: No rales, rhonchi or wheezes appreciated over the lungs bilaterally. CV: Rate is regular. Rhythm is regular. S1 is normal. S2 is normal. No gallop or rubs. No heart murmur  appreciated. Extremities: No clubbing, cyanosis or edema.   Abdomen: Patient does have mild abdominal tenderness from the midline to the right more so right upper quadrant right lower chest with reproducible pain to palpation around the lateral aspect and into the right thoracic region. No gross deformities noted. No skin rashes noted. Velma Feldman has had appendectomy and cholecystectomy in the past. Rubi Mettle is no rebound or guarding noted.  No notable flank tenderness to percussion noted.  Bowel sounds are normoactive. Palpation reveals softness, with no distension, organomegaly or   No abdominal masses palpable. No palpable hepatosplenomegaly. Musculo: Walks with a normal gait. Upper Extremities: Full ROM bilaterally. Lower Extremities: Full ROM  bilaterally. Neuro: Alert and oriented x3. Mood is normal. Affect is normal. Speech is articulate and fluent. Upper Extremities:  motor strength is intact. Normal muscle tone bilaterally. Lower Extremities: motor strength is intact. Normal muscle  tone bilaterally. Sensation intact to light touch  Psych: Patient's attitude is cooperative. Patient's affect is  normal. Judgement is realistic. Insight is appropriate.           Assessment and Plan:  Kavya Flores was seen today for follow-up. Diagnoses and all orders for this visit:    Acute right-sided thoracic back pain  -     Morgan Mcpherson MD, Physical Medicine and Rehabilitation, [place]    Pain of upper abdomen  -     Jordyn Pedro MD, Physical Medicine and Rehabilitation, [place]    Type 2 diabetes mellitus with diabetic polyneuropathy, with long-term current use of insulin ([facility]    Essential hypertension    Other orders  -     Discontinue: famotidine (PEPCID) 20 MG tablet; Take 1 tablet by mouth 2 times daily  -     gabapentin (NEURONTIN) 300 MG capsule; Take 1 capsule by mouth 2 times daily for 90 days. Plan: I will see patient back at upcoming appointment in July. Trial of Pepcid as well as increasing gabapentin. I did refer her to physical medicine for evaluation thinking this may be coming from the back with radicular symptoms from the thoracic region.   If this fails may have GI take a look at her. Thus far otherwise studies look pretty unrevealing. Continue to monitor blood pressure and blood sugar closely. Warned of potential side effects of the gabapentin. Fall precautions. Give us a call in couple weeks with an update. Return keep appt. Seen By:  Dimitry Fairchild MD      *Document was created using voice recognition software. Note was reviewed however may contain grammatical errors.

## 2021-05-22 DIAGNOSIS — I10 ESSENTIAL HYPERTENSION: ICD-10-CM

## 2021-05-24 RX ORDER — PRAVASTATIN SODIUM 20 MG
TABLET ORAL
Qty: 90 TABLET | Refills: 1 | Status: SHIPPED
Start: 2021-05-24 | End: 2021-10-13 | Stop reason: SDUPTHER

## 2021-05-24 RX ORDER — AMLODIPINE BESYLATE 5 MG/1
TABLET ORAL
Qty: 90 TABLET | Refills: 1 | Status: SHIPPED
Start: 2021-05-24 | End: 2021-10-13 | Stop reason: SDUPTHER

## 2021-05-24 NOTE — TELEPHONE ENCOUNTER
Last Appointment:  5/19/2021  Future Appointments   Date Time Provider Coleman Garcia   6/16/2021 11:00 AM Tracy Murillo DPM Col Podiatry [facility]   7/14/2021  1:15 PM Gregory Arita MD [facility]   11/18/2021  2:30 PM Hai Mireles MD [facility]

## 2021-05-31 DIAGNOSIS — E03.9 HYPOTHYROIDISM, UNSPECIFIED TYPE: ICD-10-CM

## 2021-05-31 DIAGNOSIS — E11.42 TYPE 2 DIABETES MELLITUS WITH DIABETIC POLYNEUROPATHY, WITH LONG-TERM CURRENT USE OF INSULIN (HCC): ICD-10-CM

## 2021-05-31 DIAGNOSIS — Z79.4 TYPE 2 DIABETES MELLITUS WITH DIABETIC POLYNEUROPATHY, WITH LONG-TERM CURRENT USE OF INSULIN (HCC): ICD-10-CM

## 2021-06-01 ENCOUNTER — TELEPHONE (OUTPATIENT)
Dept: FAMILY MEDICINE CLINIC | Age: 76
End: 2021-06-01

## 2021-06-01 RX ORDER — LEVOTHYROXINE SODIUM 0.1 MG/1
TABLET ORAL
Qty: 90 TABLET | Refills: 0 | Status: SHIPPED
Start: 2021-06-01 | End: 2021-07-14 | Stop reason: SDUPTHER

## 2021-06-01 RX ORDER — GLIPIZIDE 10 MG/1
TABLET, FILM COATED, EXTENDED RELEASE ORAL
Qty: 90 TABLET | Refills: 0 | Status: SHIPPED
Start: 2021-06-01 | End: 2021-07-14 | Stop reason: SDUPTHER

## 2021-06-01 NOTE — TELEPHONE ENCOUNTER
Last Appointment:  5/19/2021  Future Appointments   Date Time Provider Coleman Garcia   6/16/2021 11:00 AM Igor Gamez DPM Col Podiatry [facility]   6/22/2021 11:00 AM DO Kiko Mattson PMR [facility]   7/14/2021  1:15 PM Carloz Hernandez MD [facility]   11/18/2021  2:30 PM Elnita Closs, MD [facility]

## 2021-06-01 NOTE — TELEPHONE ENCOUNTER
Increase the gabapentin to 300mg a.m. and 600mg  P.m. maximize her acetaminophen dose up to 3000 mg a day and have her  lidocaine patches to be placed over the areas that cause most pain. See if Lora Minor could get her appointment moved up please.

## 2021-06-01 NOTE — TELEPHONE ENCOUNTER
The patient said that she was unable to get an appt w/ physical medicine until 6/26.  She said that she is still in a great deal of pain and wants to know if there is a medication she can be given to lessen the pain

## 2021-06-16 ENCOUNTER — PROCEDURE VISIT (OUTPATIENT)
Dept: PODIATRY | Age: 76
End: 2021-06-16
Payer: MEDICARE

## 2021-06-16 VITALS — SYSTOLIC BLOOD PRESSURE: 144 MMHG | TEMPERATURE: 98.2 F | DIASTOLIC BLOOD PRESSURE: 74 MMHG

## 2021-06-16 DIAGNOSIS — L97.522 DIABETIC ULCER OF TOE OF LEFT FOOT ASSOCIATED WITH TYPE 2 DIABETES MELLITUS, WITH FAT LAYER EXPOSED (HCC): ICD-10-CM

## 2021-06-16 DIAGNOSIS — I73.9 PERIPHERAL VASCULAR DISEASE, UNSPECIFIED (HCC): ICD-10-CM

## 2021-06-16 DIAGNOSIS — E11.9 TYPE 2 DIABETES MELLITUS WITHOUT COMPLICATION, WITHOUT LONG-TERM CURRENT USE OF INSULIN (HCC): ICD-10-CM

## 2021-06-16 DIAGNOSIS — E11.621 DIABETIC ULCER OF TOE OF LEFT FOOT ASSOCIATED WITH TYPE 2 DIABETES MELLITUS, WITH FAT LAYER EXPOSED (HCC): ICD-10-CM

## 2021-06-16 DIAGNOSIS — R26.2 DIFFICULTY WALKING: ICD-10-CM

## 2021-06-16 DIAGNOSIS — B35.1 TINEA UNGUIUM: Primary | ICD-10-CM

## 2021-06-16 DIAGNOSIS — M79.674 PAIN IN TOE OF RIGHT FOOT: ICD-10-CM

## 2021-06-16 PROCEDURE — 11721 DEBRIDE NAIL 6 OR MORE: CPT | Performed by: PODIATRIST

## 2021-06-16 NOTE — PROGRESS NOTES
[facility] PODIATRY  [address] [facility] [address]  Dept: [phone]  Dept Fax: [phone]    DIABETIC NAIL PROGRESS NOTE  Date of patient's visit: 6/16/2021  Patient's Name:  Berta Monzon YOB: 1945            Patient Care Team:  Norma Allred MD as PCP - General (Internal Medicine)  Norma Allred MD as PCP - [facility] [place] [facility]  Lewis Christian DPM as Consulting Physician (Radames Arnett)  Chloe Faye MD as Consulting Physician (Pulmonology)          Chief Complaint   Patient presents with    Toe Pain     saw pcp Dr. Santo Hwang 5/19/2021    Diabetes       Subjective: Berta Monzon comes to clinic for Toe Pain (saw pcp Dr. Santo Hwang 5/19/2021) and Diabetes    she is a diabetic and states that diabetic foot exam .  Pt currently has complaint of thickened, elongated nails that they cannot manage by themselves. Pt's primary care physician is Norma Allred MD l   Lab Results   Component Value Date    LABA1C 9.6 (H) 04/07/2021      Complains of numbness in the feet bilat.   Past Medical History:   Diagnosis Date    Atrial fibrillation ([address]     Chronic kidney disease     Colon polyps     Diabetes mellitus ([address]     Diabetic neuropathy ([address]     Diabetic retinopathy ([address]     Essential hypertension 10/28/2019    Fatty liver     Gastritis     GERD (gastroesophageal reflux disease)     Hyperlipidemia     Hypertension     Hypothyroidism     Irritable bowel syndrome     Major depressive disorder with single episode 10/28/2019    Osteopenia     Photosensitivity disorder     chronic    RBBB     Sleep apnea     on BIPAP    Thyroid disease     Thyroid nodule     neg bx-chronic thyroiditis    Type 2 diabetes mellitus with diabetic polyneuropathy, with long-term current use of insulin ([address] 7/23/2019    Vitamin D deficiency        Allergies   Allergen Reactions    Seasonal      Current Outpatient Medications on File Prior to Visit   Medication Sig Dispense Refill    glipiZIDE (GLUCOTROL XL) 10 MG extended release tablet TAKE 1 TABLET DAILY 90 tablet 0    levothyroxine (SYNTHROID) 100 MCG tablet TAKE 1 TABLET DAILY 90 tablet 0    amLODIPine (NORVASC) 5 MG tablet TAKE 1 TABLET DAILY 90 tablet 1    metoprolol tartrate (LOPRESSOR) 25 MG tablet TAKE 1 TABLET TWICE A  tablet 1    pravastatin (PRAVACHOL) 20 MG tablet TAKE 1 TABLET DAILY 90 tablet 1    gabapentin (NEURONTIN) 300 MG capsule Take 1 capsule by mouth 2 times daily for 90 days. 60 capsule 2    famotidine (PEPCID) 20 MG tablet Take 1 tablet by mouth 2 times daily 180 tablet 0    aspirin 81 MG EC tablet Take 81 mg by mouth daily      VASCEPA 1 g CAPS capsule Take 2 capsules by mouth 2 times daily      LANTUS SOLOSTAR 100 UNIT/ML injection pen Inject 65 Units into the skin nightly 15 pen 3    silver sulfADIAZINE (SILVADENE) 1 % cream Apply topically daily. 400 g 0    omega-3 acid ethyl esters (LOVAZA) 1 g capsule TK 2 CS PO BID      INSULIN LISPRO SC Inject into the skin      Multiple Vitamins-Minerals (VITEYES COMPLETE PO) Take by mouth      nystatin-triamcinolone (MYCOLOG II) 593221-5.1 UNIT/GM-% cream Apply topically 2 times daily.  1 Tube 1    divalproex (DEPAKOTE) 250 MG DR tablet Take 500 mg by mouth nightly       BD PEN NEEDLE MICRO U/F 32G X 6 MM MISC USE WITH LANTUS DAILY 100 each 3    escitalopram (LEXAPRO) 10 MG tablet Take 10 mg by mouth daily      Continuous Blood Gluc Sensor (FREESTYLE ANT SENSOR SYSTEM) MISC 1 box by Does not apply route 2 times daily 1 each 0    Continuous Blood Gluc Sensor (FREESTYLE ANT SENSOR SYSTEM) [facility] PLEASE DISPENSE 1 KIT TO PATIENT 1 each 0    Continuous Blood Gluc Sensor (FREESTYLE ANT SENSOR SYSTEM) [facility] Please dispense one free style ant kit 1 each 0    latanoprost (XALATAN) 0.005 % ophthalmic solution 1 drop nightly      Calcium Carb-Cholecalciferol (CALCIUM 1000 + D PO) Take by mouth       No current facility-administered medications on file prior to visit. Review of Systems   Musculoskeletal: Positive for arthralgias. Review of Systems:   History obtained from chart review and the patient  General ROS: negative for - chills, fatigue, fever, night sweats or weight gain  Constitutional: Negative for chills, diaphoresis, fatigue, fever and unexpected weight change. Musculoskeletal: Positive for arthralgias, gait problem and joint swelling. Neurological ROS: negative for - behavioral changes, confusion, headaches or seizures. Negative for weakness and numbness. Dermatological ROS: negative for - mole changes, rash  Cardiovascular: Negative for leg swelling. Gastrointestinal: Negative for constipation, diarrhea, nausea and vomiting. Objective:  General: AAO x 3 in NAD.     Derm  Toenail Description nails are thick and mycotic yellow incurvated causing pain with shoe gear  Sites of Onychomycosis Involvement (Check affected area)  [x] [x] [x] [x] [x] [x] [x] [x] [x] [x]  5 4 3 2 1 1 2 3 4 5                          Right                                        Left    Thickness  [x] [x] [x] [x] [x] [x] [x] [x] [x] [x]  5 4 3 2 1 1 2 3 4 5                         Right                                        Left    Dystrophic Changes   [x] [x] [x] [x] [x] [x] [x] [x] [x] [x]  5 4 3 2 1 1 2 3 4 5                         Right                                        Left    Color   [x] [x] [x] [x] [x] [x] [x] [x] [x] [x]  5 4 3 2 1 1 2 3 4 5                          Right                                        Left    Incurvation/Ingrowin   [x] [x] [x] [x] [x] [x] [x] [x] [x] [x]  5 4 3 2 1 1 2 3 4 5                         Right                                        Left    Inflammation/Pain   [x] [x] [x] [x] [x] [x] [x] [x] [x] [x]  5 4 3 2 1 1 2 3 4 5                         Right                                        Left      Dermatologic Exam:  Skin lesion/ulceration . Skin callus bilaterally plantar aspect plantar to the second and third metatarsal heads both lesions are about 1 cm x 1 cm tissue  Loss of hair  lower extremity      Musculoskeletal:     1st MPJ ROM decreased, Bilateral.  Muscle Bilateral.  Pain present upon palpation of toenails 1-5, Bilateral. decreased medial longitudinal arch, Bilateral.  Ankle ROM decreased,Bilateral.    Dorsally contracted digits     Vascular:  Loss of hair  LE   Nails thick yellow   Absent pt pulses   Cool touch   CFT <absent seconds, Bilateral.  Hair growth absent to the level of the digits, Bilateral.  Edema minimal Bilateral.  Varicosities severe Bilateral.     Neurological: Sensation diminshed to light touch to level of digits, Bilateral.  Protective sensation decreased sites via 5.07/10g [place] Monofilament, Bilateral.  negative Tinel's, Bilateral.  negative Valleix sign, Bilateral.      Integument: nails   thickened > 3.0 mm, dystrophic and crumbly, discolored with subungual debris. Toes cool to touch    Visual inspection:  Deformity: hammertoe deformity priti feet  amputation: absent    Edema:   Sensory exam:  Monofilament sensation: abnormal - 6/10 via SW 5.07/10g monofilament to the plantar foot bilateral feet    Pulses:     Pinprick: Impaired  Proprioception: Impaired  Vibration (128 Hz): Impaired       DM with PVD       [x]Yes    []no    Foot Exam    General  General Appearance: appears stated age and healthy   Orientation: alert and oriented to person, place, and time       Right Foot/Ankle     Inspection and Palpation  Skin Exam: abnormal color; no skin changes     Neurovascular  Dorsalis pedis: 2+  Posterior tibial: absent      Left Foot/Ankle      Inspection and Palpation  Skin Exam: skin changes and abnormal color; Neurovascular  Dorsalis pedis: 2+  Posterior tibial: absent           Xr Foot Left (min 3 Views)      Ortho Exam      Assessment:  78 y.o. female with:  1. Tinea unguium    2. Type 2 diabetes mellitus without complication, without long-term current use of insulin ([address]    3. Diabetic ulcer of toe of left foot associated with type 2 diabetes mellitus, with fat layer exposed ([address]    4. Peripheral vascular disease, unspecified ([address]    5. Pain in toe of right foot    6. Difficulty walking       Orders Placed This Encounter   Procedures     DIABETES FOOT EXAM           Q7   []Yes    []No                Q8   [x]Yes    []No                     Q9   []Yes    []No    Plan:Visual inspection:  Deformity/amputation: absent  Skin lesions/pre-ulcerative calluses: present -   Edema: right- trace, left- trace    Sensory exam:  Monofilament sensation: abnormal -   (minimum of 5 random plantar locations tested, avoiding callused areas - > 1 area with absence of sensation is + for neuropathy)    Plus at least one of the following:  Pulses: abnormal - ,   Pinprick: Impaired  Proprioception: Impaired  Vibration (128 Hz): Impaired  Pt was evaluated and examined. Patient was given personalized discharge instructions. Nails 1-10 were debrided sharply in length and thickness with a nipper and , without incident. Pt will follow up in 3 months or sooner if any problems arise. Diagnosis was discussed with the pt and all of their questions were answered in detail. Proper foot hygiene and care was discussed with the pt. Informed patient on proper diabetic foot care and importance of tight glycemic control. Patient to check feet daily and contact the office with any questions/problems/concerns.    Other comorbidity noted and will be managed by PCP.  6/16/2021    Electronically signed by Yin Hardin DPM on 6/16/2021 at 11:18 AM  6/16/2021

## 2021-06-22 ENCOUNTER — OFFICE VISIT (OUTPATIENT)
Dept: PHYSICAL MEDICINE AND REHAB | Age: 76
End: 2021-06-22
Payer: MEDICARE

## 2021-06-22 VITALS
BODY MASS INDEX: 29.41 KG/M2 | HEIGHT: 63 IN | SYSTOLIC BLOOD PRESSURE: 156 MMHG | WEIGHT: 166 LBS | HEART RATE: 74 BPM | DIASTOLIC BLOOD PRESSURE: 76 MMHG

## 2021-06-22 DIAGNOSIS — M54.10 RADICULAR PAIN: ICD-10-CM

## 2021-06-22 DIAGNOSIS — M79.2 NEUROPATHIC PAIN: Primary | ICD-10-CM

## 2021-06-22 PROCEDURE — 1090F PRES/ABSN URINE INCON ASSESS: CPT | Performed by: PHYSICAL MEDICINE & REHABILITATION

## 2021-06-22 PROCEDURE — 3017F COLORECTAL CA SCREEN DOC REV: CPT | Performed by: PHYSICAL MEDICINE & REHABILITATION

## 2021-06-22 PROCEDURE — 99204 OFFICE O/P NEW MOD 45 MIN: CPT | Performed by: PHYSICAL MEDICINE & REHABILITATION

## 2021-06-22 PROCEDURE — G8417 CALC BMI ABV UP PARAM F/U: HCPCS | Performed by: PHYSICAL MEDICINE & REHABILITATION

## 2021-06-22 PROCEDURE — G8427 DOCREV CUR MEDS BY ELIG CLIN: HCPCS | Performed by: PHYSICAL MEDICINE & REHABILITATION

## 2021-06-22 PROCEDURE — 1036F TOBACCO NON-USER: CPT | Performed by: PHYSICAL MEDICINE & REHABILITATION

## 2021-06-22 PROCEDURE — G8400 PT W/DXA NO RESULTS DOC: HCPCS | Performed by: PHYSICAL MEDICINE & REHABILITATION

## 2021-06-22 PROCEDURE — 4040F PNEUMOC VAC/ADMIN/RCVD: CPT | Performed by: PHYSICAL MEDICINE & REHABILITATION

## 2021-06-22 PROCEDURE — 1123F ACP DISCUSS/DSCN MKR DOCD: CPT | Performed by: PHYSICAL MEDICINE & REHABILITATION

## 2021-06-22 RX ORDER — GABAPENTIN 100 MG/1
CAPSULE ORAL
COMMUNITY
Start: 2021-06-02 | End: 2021-06-22

## 2021-06-22 RX ORDER — PREGABALIN 75 MG/1
75 CAPSULE ORAL 2 TIMES DAILY
Qty: 60 CAPSULE | Refills: 1 | Status: SHIPPED
Start: 2021-06-22 | End: 2021-12-17 | Stop reason: ALTCHOICE

## 2021-06-22 NOTE — PROGRESS NOTES
Ezio Barreto, [address] [facility]  [address] [facility]  Phone: [phone]  Fax: [phone]    PCP: Dimitry Fairchild MD  Date of visit: 6/22/21    Chief Complaint   Patient presents with    Back Pain     right sided upper back       Dear Dr. Roly Barriga,     Thank you for referring your patient to be seen. As you know,  Sarai Velez is a 78 y.o. female with past medical history as below who presents with right upper quadrant pain for 2 month(s). There was a sudden onset of pain after no known injury. Now, the pain is intermittent and occurs daily. The pain is rated Pain Score:   0 - No pain, is described as burning in the RUQ on the skin and it can radiate to midline below sternum and sometimes to back. The symptoms have been better since onset. The pain is better with nothing in particular. She thinks gabapentin is helping but is unsure. The pain is worse with none. There is no associated numbness/tingling. There is no weakness. There is no bowel/bladder changes. She has had a medical work up. There is no associated rash about the area. She has had shingles on her face in the past.     The prior workup has included: Xray T spine, CT chest, CT abd/pelvis    The prior treatment has included:  PT: none   Membrane stabilizers: neurontin      Allergies   Allergen Reactions    Seasonal        Current Outpatient Medications   Medication Sig Dispense Refill    pregabalin (LYRICA) 75 MG capsule Take 1 capsule by mouth 2 times daily for 30 days.  60 capsule 1    glipiZIDE (GLUCOTROL XL) 10 MG extended release tablet TAKE 1 TABLET DAILY 90 tablet 0    levothyroxine (SYNTHROID) 100 MCG tablet TAKE 1 TABLET DAILY 90 tablet 0    amLODIPine (NORVASC) 5 MG tablet TAKE 1 TABLET DAILY 90 tablet 1    metoprolol tartrate (LOPRESSOR) 25 MG tablet TAKE 1 TABLET TWICE A  tablet 1    pravastatin (PRAVACHOL) 20 MG tablet TAKE 1 TABLET DAILY 90 tablet 1    famotidine (PEPCID) 20 MG tablet Take 1 tablet by mouth 2 times daily 180 tablet 0    aspirin 81 MG EC tablet Take 81 mg by mouth daily      VASCEPA 1 g CAPS capsule Take 2 capsules by mouth 2 times daily      LANTUS SOLOSTAR 100 UNIT/ML injection pen Inject 65 Units into the skin nightly 15 pen 3    INSULIN LISPRO SC Inject into the skin      Multiple Vitamins-Minerals (VITEYES COMPLETE PO) Take by mouth      divalproex (DEPAKOTE) 250 MG DR tablet Take 500 mg by mouth nightly       BD PEN NEEDLE MICRO U/F 32G X 6 MM MISC USE WITH LANTUS DAILY 100 each 3    escitalopram (LEXAPRO) 10 MG tablet Take 10 mg by mouth daily      Continuous Blood Gluc Sensor (FREESTYLE ANT SENSOR SYSTEM) MISC 1 box by Does not apply route 2 times daily 1 each 0    Continuous Blood Gluc Sensor (FREESTYLE ANT SENSOR SYSTEM) [facility] PLEASE DISPENSE 1 KIT TO PATIENT 1 each 0    Continuous Blood Gluc Sensor (FREESTYLE ANT SENSOR SYSTEM) [facility] Please dispense one free style ant kit 1 each 0    latanoprost (XALATAN) 0.005 % ophthalmic solution 1 drop nightly      Calcium Carb-Cholecalciferol (CALCIUM 1000 + D PO) Take by mouth      silver sulfADIAZINE (SILVADENE) 1 % cream Apply topically daily. (Patient not taking: Reported on 6/22/2021) 400 g 0    omega-3 acid ethyl esters (LOVAZA) 1 g capsule TK 2 CS PO BID (Patient not taking: Reported on 6/22/2021)      nystatin-triamcinolone (MYCOLOG II) 315571-7.1 UNIT/GM-% cream Apply topically 2 times daily. (Patient not taking: Reported on 6/22/2021) 1 Tube 1     No current facility-administered medications for this visit.        Past Medical History:   Diagnosis Date    Atrial fibrillation ([address]     Chronic kidney disease     Colon polyps     Diabetes mellitus ([address]     Diabetic neuropathy ([address]     [facility] retinopathy [facility]     Essential hypertension 10/28/2019    Fatty liver     Gastritis     GERD (gastroesophageal reflux disease)     Hyperlipidemia     Hypertension     Hypothyroidism     Irritable bowel syndrome     Major depressive disorder with single episode 10/28/2019    Osteopenia     Photosensitivity disorder     chronic    RBBB     Sleep apnea     on BIPAP    Thyroid disease     Thyroid nodule     neg bx-chronic thyroiditis    Type 2 diabetes mellitus with diabetic polyneuropathy, with long-term current use of insulin ([facility] 7/23/2019    Vitamin D deficiency        Past Surgical History:   Procedure Laterality Date    APPENDECTOMY      CARPAL TUNNEL RELEASE Bilateral     CATARACT REMOVAL Bilateral     CHOLECYSTECTOMY     Cholo Lerma TONSILLECTOMY         Family History   Problem Relation Age of Onset    Diabetes Paternal Grandmother     Diabetes Father     Lung Cancer Mother     Pancreatic Cancer Brother     Diabetes Brother        Social History     Tobacco Use    Smoking status: Never Smoker    Smokeless tobacco: Never Used   Substance Use Topics    Alcohol use: Never    Drug use: Never          Functional Status: The patient is able to ambulate and perform activities of daily living without the use of an assistive device. ROS: For more complete ROS answered by the patient, please see . Constitutional: Denies fevers, chills, night sweats, unintentional weight loss     Skin: Denies rash or skin changes     Eyes: Denies vision changes    Ears/Nose/Throat: Denies nasal congestion or sore throat     Respiratory: Denies SOB or cough     Cardiovascular: Denies CP, palpitations, edema      Gastrointestinal: Denies abdominal pain,  N/V, constipation, or diarrhea    Genitourinary: Denies urinary symptoms    Neurologic: See HPI.     MSK: See HPI.      Psychiatric: Denies sleep disturbance, anxiety, depression    Hematologic/Lymphatic/Immunologic: Denies bruising       Physical Exam:   Blood pressure (!) 156/76, pulse 74, height 5' 3\" (1.6 m), the consultation and for allowing me to participate in the care of this patient. Sincerely,     Charles Bhardwaj DO, [facility]   Board Certified Physical Medicine and Rehabilitation    Controlled Substance Monitoring:    Acute and Chronic Pain Monitoring:   RX Monitoring 6/22/2021   Periodic Controlled Substance Monitoring No signs of potential drug abuse or diversion identified.

## 2021-07-06 ENCOUNTER — TELEPHONE (OUTPATIENT)
Dept: PHYSICAL MEDICINE AND REHAB | Age: 76
End: 2021-07-06

## 2021-07-06 DIAGNOSIS — M54.10 RADICULAR PAIN: Primary | ICD-10-CM

## 2021-07-06 DIAGNOSIS — M51.24 THORACIC DISC HERNIATION: ICD-10-CM

## 2021-07-06 NOTE — TELEPHONE ENCOUNTER
Called and spoke with the patient and she was informed of the results of MRI. Patient stated that she does not know of any pain management places by her and she would go to [place]. Please advise.

## 2021-07-06 NOTE — TELEPHONE ENCOUNTER
----- Message from Ana Dominguez DO sent at 7/6/2021 11:59 AM EDT -----  Please call patient with MRI results-- there is a right herniated disc at T7-8 impressing the spinal cord and a right herniated disc at T8-9. This could explain her pain on the right side. I recommend referral to pain clinic for possible injections. I can place a referral wherever she would like to go. Let me know.

## 2021-07-07 ENCOUNTER — TELEPHONE (OUTPATIENT)
Dept: FAMILY MEDICINE CLINIC | Age: 76
End: 2021-07-07

## 2021-07-07 DIAGNOSIS — R93.7 ABNORMAL MRI, THORACIC SPINE: Primary | ICD-10-CM

## 2021-07-07 NOTE — TELEPHONE ENCOUNTER
The patient called in and said that Dr. Michael Garcia ordered an MRI and this came back that she has a herniated right disc and they recommended a pain clinic for possible injections. She wanted to know if you had someone you would recommend that would be close to [place].

## 2021-07-07 NOTE — TELEPHONE ENCOUNTER
Pt advised. She asked if a referral can be placed prior to her appt?    She is frustrated and \"Fed up with the pain\"     Please advise

## 2021-07-14 ENCOUNTER — OFFICE VISIT (OUTPATIENT)
Dept: FAMILY MEDICINE CLINIC | Age: 76
End: 2021-07-14
Payer: MEDICARE

## 2021-07-14 VITALS
WEIGHT: 167 LBS | BODY MASS INDEX: 29.59 KG/M2 | HEART RATE: 70 BPM | TEMPERATURE: 96.5 F | SYSTOLIC BLOOD PRESSURE: 130 MMHG | DIASTOLIC BLOOD PRESSURE: 58 MMHG | HEIGHT: 63 IN | OXYGEN SATURATION: 96 %

## 2021-07-14 DIAGNOSIS — I10 ESSENTIAL HYPERTENSION: ICD-10-CM

## 2021-07-14 DIAGNOSIS — F32.9 MAJOR DEPRESSIVE DISORDER WITH SINGLE EPISODE, REMISSION STATUS UNSPECIFIED: ICD-10-CM

## 2021-07-14 DIAGNOSIS — H57.12 PAIN OF LEFT EYE: ICD-10-CM

## 2021-07-14 DIAGNOSIS — E11.42 TYPE 2 DIABETES MELLITUS WITH DIABETIC POLYNEUROPATHY, WITH LONG-TERM CURRENT USE OF INSULIN (HCC): ICD-10-CM

## 2021-07-14 DIAGNOSIS — R93.7 ABNORMAL MRI, THORACIC SPINE: ICD-10-CM

## 2021-07-14 DIAGNOSIS — E78.5 HYPERLIPIDEMIA, UNSPECIFIED HYPERLIPIDEMIA TYPE: ICD-10-CM

## 2021-07-14 DIAGNOSIS — E03.9 HYPOTHYROIDISM, UNSPECIFIED TYPE: ICD-10-CM

## 2021-07-14 DIAGNOSIS — Z79.4 TYPE 2 DIABETES MELLITUS WITH DIABETIC POLYNEUROPATHY, WITH LONG-TERM CURRENT USE OF INSULIN (HCC): ICD-10-CM

## 2021-07-14 PROCEDURE — 2022F DILAT RTA XM EVC RTNOPTHY: CPT | Performed by: INTERNAL MEDICINE

## 2021-07-14 PROCEDURE — 4040F PNEUMOC VAC/ADMIN/RCVD: CPT | Performed by: INTERNAL MEDICINE

## 2021-07-14 PROCEDURE — G8427 DOCREV CUR MEDS BY ELIG CLIN: HCPCS | Performed by: INTERNAL MEDICINE

## 2021-07-14 PROCEDURE — G8400 PT W/DXA NO RESULTS DOC: HCPCS | Performed by: INTERNAL MEDICINE

## 2021-07-14 PROCEDURE — 1036F TOBACCO NON-USER: CPT | Performed by: INTERNAL MEDICINE

## 2021-07-14 PROCEDURE — 1090F PRES/ABSN URINE INCON ASSESS: CPT | Performed by: INTERNAL MEDICINE

## 2021-07-14 PROCEDURE — 99214 OFFICE O/P EST MOD 30 MIN: CPT | Performed by: INTERNAL MEDICINE

## 2021-07-14 PROCEDURE — 3046F HEMOGLOBIN A1C LEVEL >9.0%: CPT | Performed by: INTERNAL MEDICINE

## 2021-07-14 PROCEDURE — G8417 CALC BMI ABV UP PARAM F/U: HCPCS | Performed by: INTERNAL MEDICINE

## 2021-07-14 PROCEDURE — 3017F COLORECTAL CA SCREEN DOC REV: CPT | Performed by: INTERNAL MEDICINE

## 2021-07-14 PROCEDURE — 1123F ACP DISCUSS/DSCN MKR DOCD: CPT | Performed by: INTERNAL MEDICINE

## 2021-07-14 RX ORDER — FAMOTIDINE 20 MG/1
20 TABLET, FILM COATED ORAL 2 TIMES DAILY
Qty: 180 TABLET | Refills: 1 | Status: SHIPPED
Start: 2021-07-14 | End: 2022-01-12 | Stop reason: SDUPTHER

## 2021-07-14 RX ORDER — PYRIDOXINE HCL (VITAMIN B6) 50 MG
TABLET ORAL
COMMUNITY

## 2021-07-14 RX ORDER — LEVOTHYROXINE SODIUM 0.1 MG/1
TABLET ORAL
Qty: 90 TABLET | Refills: 1 | Status: SHIPPED
Start: 2021-07-14 | End: 2021-08-30

## 2021-07-14 RX ORDER — GLIPIZIDE 10 MG/1
TABLET, FILM COATED, EXTENDED RELEASE ORAL
Qty: 90 TABLET | Refills: 1 | Status: SHIPPED
Start: 2021-07-14 | End: 2021-08-30

## 2021-07-14 NOTE — PROGRESS NOTES
[address] [facility]     21  Alexei Whitfield : 1945 Sex: female  Age: 78 y.o. Chief Complaint   Patient presents with    Hypertension     3 months       HPI    Patient presents today for follow-up visit on her multiple medical problems. Since I have seen her we had sent her to physical medicine who did MRI and felt that her abdominal pain was radicular from her thoracic spine. The interns placed her on Lyrica and referred her to pain clinic however patient requested to be sent locally in [place] so I switched her referral to Dr. Simin Larkin whom she is going to see tomorrow. She is getting some relief with the Lyrica. Blood pressures she is not checking. I told her she must get a new monitor and start checking her pressures she is hypertensive and on medication. She agrees to do so. Today's number was okay. Blood sugars likewise she is not checking. She has an uncontrolled diabetic who is seeing endocrine at this time and should be due to see them sometime in the next month again as well. Last hemoglobin A1c was 9.3. Her lipids have been stable on statin medication which she is tolerating. Depression has been stable on her SSRI. Thyroid has been stable on thyroid replacement. She does continue with the BiPAP machine successfully for obstructive sleep apnea. She is complaining of new issue today with some left eye discomfort feeling like something is in the eye. States she woke up with it this morning. There was nothing obviously in the eye. She did try to flush it some without success. She was having some blurring vision to that side as well. She is following with endocrine ophthalmology,GYN, pulmonary, neurology  psychiatry, cardiology, physical medicine and pain management.     Review of Systems     Const: Denies chills, fever and sweats. Eyes: Reports glaucoma Recent cataract/glaucoma surgery./ diabetic retinopathy changes. /Ophthalmology.  States the  patient does have post cataract surgery that will eventually need some laser surgery done--new eye changes as stated above. ENMT: Denies ear symptoms. Reports postnasal drip, but denies other nasal symptoms. Denies mouth or throat  symptoms. CV: Denies chest pain, orthopnea and palpitations--history of atrial fibrillation. Currently back in sinus rhythm and off of anticoagulation per cardiology  Resp: Denies cough, SOB and wheezing. GI: Denies diarrhea, nausea and vomiting.  She does have a right upper quadrant abdominal pain/lower right chest discomfort radiating around to the side occasionally the flank area and thoracic spine region. .  : Urinary: denies dysuria, frequency and frequent UTI's. Musculo: Reports arthritis, lower back pain and right hip pain/Improved  Skin: Denies eczema, pruritus, rash. Neuro: Denies dizziness, headache, seizures and syncope.  Diabetic neuropathy. Psych: Reports depression and stress/back in counseling.  Some lack of motivation  endocrine: Reports diabetes marginally controlled/marginal compliance         REST OF PERTINENT ROS GONE OVER AND WAS NEGATIVE.      PMH:  Problem List: Type II diabetes mellitus uncontrolled, Essential hypertension, Taking medication, Depressive disorder,  Hypothyroidism, Hyperlipidemia, Type 2 diabetes mellitus  Jung Lagunas DOB 1945 Page #2  Health Maintenance:  Influenza Vaccination - (2017)  Couseled on Home Safety - (2017)  Mammogram - (2017)  Bone Density Test Screening - (2012)  Colonoscopy - (2010)  Colonoscopy - (2014)  Colonoscopy - (2017)  Colonoscopy Screening - (2010)  Colonoscopy Screening - (2014)  Colonoscopy Screening - (2017)  Mammogram Screening - (2006)  Mammogram Screening - (3/11/2008)  Mammogram Screening - (2/15/2010)  Mammogram Screening - (2017)  Colonoscopy - ,2/10,-due 17-due 22  Stress Test - -negative  EKG - ,,,, ,10/18  Rectal Exam - dr Mica Watson - \"  Breast Exam - \"  EGD - 2/10  Duplex Carotid Ultasound - 3/09-nl  capsule endoscopy - [facility] 4/10-nl   MMSE-2/21-23/30-performed by neurology  Influenza Vaccination - (10/2018)  Prevnar Vaccine - (4/2017)  Pneumonia Vaccination - (2004)  Zoster/Shingles Vaccine - had Zostavax, gave slip for Shingrix  Medical Problems:  photosensitivity disorder/chronic, hx pos Lyla  thyroid nodule-neg bx - chronic thyroiditis  IBS, Depression, Non Insulin Dependent Diabetes, Hyperlipidemia, tubal ligation, appy, hysterectomy-still has  ovaries--non cancerous, Gastroesophageal Reflux Disease (GERD), Hypothyroidism, occular htn, Diabetic Neuropathy  Osteopenia - declined med  Difficult Intubation, sees dr li/gyn, dr Mcmanus [facility],psych NP, Hypertension  CKD - sees renal  sinus surg - dr Shiv Chino  follows with counsellor,ophthalmology - GYN  cholecystectomy, Gastritis, vit D defic, Colon Polyps, Fatty Liver, bilateral cataract surg  pelvic and sacral fracture - 2016  Diabetic Retinopathy, Left carpal tunnel surgery. , Right carpal tunnel release, RBBB  Sleep Apnea - on BIPAP  Hospitalization 10/18 - New onset atrial fibrillation, UTI, CHF, pneumonia  Atrial Fibrillation-no longer on anticoagulation per cardiology, type 2 Non-Stemi  [facility]-sees neurology  Reviewed and updated. SH:  Marital: Legal Status: . Personal Habits: Cigarette Use: Negative For current cigarette smoker. Alcohol: does not use alcohol. Exercise  Type: She works as a  in [employer]. She has a Bachelors - Degree. --now retired           Current Outpatient Medications:     insulin lispro (HUMALOG) 100 UNIT/ML injection vial, Inject into the skin 3 times daily (before meals), Disp: , Rfl:     Cyanocobalamin (B-12) 5000 MCG CAPS, Take by mouth, Disp: , Rfl:     famotidine (PEPCID) 20 MG tablet, Take 1 tablet by mouth 2 times daily, Disp: 180 tablet, Rfl: 1    levothyroxine (SYNTHROID) 100 MCG tablet, TAKE 1 TABLET [facility])     Essential hypertension 10/28/2019    Fatty liver     Gastritis     GERD (gastroesophageal reflux disease)     Hyperlipidemia     Hypertension     Hypothyroidism     Irritable bowel syndrome     Major depressive disorder with single episode 10/28/2019    Osteopenia     Photosensitivity disorder     chronic    RBBB     Sleep apnea     on BIPAP    Thyroid disease     Thyroid nodule     neg bx-chronic thyroiditis    Type 2 diabetes mellitus with diabetic polyneuropathy, with long-term current use of insulin ([address] 7/23/2019    Vitamin D deficiency      Past Surgical History:   Procedure Laterality Date    APPENDECTOMY      CARPAL TUNNEL RELEASE Bilateral     CATARACT REMOVAL Bilateral     CHOLECYSTECTOMY     Saad Hancock TONSILLECTOMY       Family History   Problem Relation Age of Onset    Diabetes Paternal Grandmother     Diabetes Father     Lung Cancer Mother     Pancreatic Cancer Brother     Diabetes Brother      Social History     Socioeconomic History    Marital status:      Spouse name: Not on file    Number of children: Not on file    Years of education: Not on file    Highest education level: Not on file   Occupational History    Not on file   Tobacco Use    Smoking status: Never Smoker    Smokeless tobacco: Never Used   Substance and Sexual Activity    Alcohol use: Never    Drug use: Never    Sexual activity: Not Currently   Other Topics Concern    Not on file   Social History Narrative    Not on file     Social Determinants of Health     Financial Resource Strain: Low Risk     Difficulty of Paying Living Expenses: Not hard at all   Food Insecurity: No Food Insecurity    Worried About Running Out of Food in the Last Year: Never true    [address] in the Last Year: Never true   Transportation Needs:     Lack of Transportation (Medical):      Lack of Transportation (Non-Medical): Physical Activity:     Days of Exercise per Week:     Minutes of Exercise per Session:    Stress:     Feeling of Stress :    Social Connections:     Frequency of Communication with Friends and Family:     Frequency of Social Gatherings with Friends and Family:     Attends Jainism Services:     Active Member of Clubs or Organizations:     Attends Club or Organization Meetings:     Marital Status:    Intimate Partner Violence:     Fear of Current or Ex-Partner:     Emotionally Abused:     Physically Abused:     Sexually Abused:        Vitals:    07/14/21 1315   BP: (!) 130/58   Pulse: 70   Temp: 96.5 °F (35.8 °C)   TempSrc: Temporal   SpO2: 96%   Weight: 167 lb (75.8 kg)   Height: 5' 3\" (1.6 m)       Physical Exam    Const: Appears well developed and well nourished. No signs of acute distress present. Eye: Eye examination demonstrating some mild erythema of conjunctiva. Watering. No foreign body to inspection. Did have some mild tenderness to palpation of the globe on the left side. The eye was stained with fluorescein and no corneal abrasions were noted. The eye was subsequently flushed and we did speak with her optometrist Dr. Sumi Guan and she will see the patient today. Neck: Supple and symmetric. Palpation reveals no adenopathy. No masses appreciated. Thyroid exhibits no nodule  or thyromegaly. No JVD. Carotids: 2+ and equal bilaterally, without bruits. Resp: No rales, rhonchi or wheezes appreciated over the lungs bilaterally. CV: Rate is regular. Rhythm is regular. S1 is normal. S2 is normal. No gallop or rubs. No heart murmur  appreciated. Extremities: No clubbing, cyanosis or edema. Abdomen: Patient does have mild abdominal tenderness from the midline to the right more so right upper quadrant right lower chest with reproducible pain to palpation around the lateral aspect and into the right thoracic region. No gross deformities noted. No skin rashes noted. Linda Beaver has had appendectomy and cholecystectomy in the past. Maureen An is no rebound or guarding noted.  No notable flank tenderness to percussion noted.  Bowel sounds are normoactive. Palpation reveals softness, with no distension, organomegaly or   No abdominal masses palpable. No palpable hepatosplenomegaly. Psych: Patient's attitude is cooperative. Patient's affect is  normal. Judgement is realistic. Insight is appropriate.         Assessment and Plan:  Liv Schwartz was seen today for hypertension. Diagnoses and all orders for this visit:    Hypothyroidism, unspecified type  -     levothyroxine (SYNTHROID) 100 MCG tablet; TAKE 1 TABLET DAILY  -     T4, Free; Future  -     TSH without Reflex; Future    Type 2 diabetes mellitus with diabetic polyneuropathy, with long-term current use of insulin (HCC)  -     glipiZIDE (GLUCOTROL XL) 10 MG extended release tablet; TAKE 1 TABLET DAILY  -     Hemoglobin A1C; Future  -     Microalbumin / Creatinine Urine Ratio; Future    Abnormal MRI, thoracic spine    Essential hypertension  -     Comprehensive Metabolic Panel; Future  -     CBC Auto Differential; Future  -     Magnesium; Future    Major depressive disorder with single episode, remission status unspecified    Hyperlipidemia, unspecified hyperlipidemia type  -     Lipid Panel; Future    Pain of left eye    Other orders  -     famotidine (PEPCID) 20 MG tablet; Take 1 tablet by mouth 2 times daily    Plan: Patient will be seen by her optometrist today related to her left eye pain and redness. [facility] work in the next week in [facility] to monitor disease progression and medication use. Prescription management performed. Weight loss attempts. Stressed better compliance with her blood pressure and diabetes checks. Follow with Dr. Tracey Baker of pain management tomorrow. Follow with other consultants as above. I will see him back in the next 3 months and as needed. Notify us of problems in the interim. Return in about 3 months (around 10/14/2021).     Seen By:  Darrell Perez MD      *Document was created using voice recognition software. Note was reviewed however may contain grammatical errors.

## 2021-07-21 DIAGNOSIS — Z79.4 TYPE 2 DIABETES MELLITUS WITHOUT COMPLICATION, WITH LONG-TERM CURRENT USE OF INSULIN (HCC): ICD-10-CM

## 2021-07-21 DIAGNOSIS — E11.9 TYPE 2 DIABETES MELLITUS WITHOUT COMPLICATION, WITH LONG-TERM CURRENT USE OF INSULIN (HCC): ICD-10-CM

## 2021-07-23 DIAGNOSIS — E11.9 TYPE 2 DIABETES MELLITUS WITHOUT COMPLICATION, WITH LONG-TERM CURRENT USE OF INSULIN (HCC): ICD-10-CM

## 2021-07-23 DIAGNOSIS — Z79.4 TYPE 2 DIABETES MELLITUS WITHOUT COMPLICATION, WITH LONG-TERM CURRENT USE OF INSULIN (HCC): ICD-10-CM

## 2021-08-03 DIAGNOSIS — E11.9 TYPE 2 DIABETES MELLITUS WITHOUT COMPLICATION, WITH LONG-TERM CURRENT USE OF INSULIN (HCC): ICD-10-CM

## 2021-08-03 DIAGNOSIS — Z79.4 TYPE 2 DIABETES MELLITUS WITHOUT COMPLICATION, WITH LONG-TERM CURRENT USE OF INSULIN (HCC): ICD-10-CM

## 2021-08-03 RX ORDER — PEN NEEDLE, DIABETIC 32 GX 1/4"
NEEDLE, DISPOSABLE MISCELLANEOUS
Qty: 100 EACH | Refills: 3 | Status: SHIPPED
Start: 2021-08-03 | End: 2022-04-11 | Stop reason: SDUPTHER

## 2021-08-16 ENCOUNTER — TELEPHONE (OUTPATIENT)
Dept: PHYSICAL MEDICINE AND REHAB | Age: 76
End: 2021-08-16

## 2021-08-16 NOTE — TELEPHONE ENCOUNTER
Patient called in stating she is out of Lyrica and did not know if she needed to titer down to stop taking the medication? States she no longer has the pain after the injections she received from her other provider. Please advise.

## 2021-08-16 NOTE — TELEPHONE ENCOUNTER
Okay. If she wants to try to come off of it then taper what she has left. Take 75mg daily instead of BID until she runs out.

## 2021-08-16 NOTE — TELEPHONE ENCOUNTER
Called and spoke with patient, informed her she could taper her Lyrica, instead of BID she could take once a day until out and then stop. Patient verbalized an understanding.

## 2021-08-16 NOTE — TELEPHONE ENCOUNTER
Patient states she has about a week left when I called her back that she was not fully out just wanted to know if she needed a refill.

## 2021-08-29 DIAGNOSIS — Z79.4 TYPE 2 DIABETES MELLITUS WITH DIABETIC POLYNEUROPATHY, WITH LONG-TERM CURRENT USE OF INSULIN (HCC): ICD-10-CM

## 2021-08-29 DIAGNOSIS — E03.9 HYPOTHYROIDISM, UNSPECIFIED TYPE: ICD-10-CM

## 2021-08-29 DIAGNOSIS — E11.42 TYPE 2 DIABETES MELLITUS WITH DIABETIC POLYNEUROPATHY, WITH LONG-TERM CURRENT USE OF INSULIN (HCC): ICD-10-CM

## 2021-08-30 RX ORDER — LEVOTHYROXINE SODIUM 0.1 MG/1
TABLET ORAL
Qty: 90 TABLET | Refills: 1 | Status: SHIPPED
Start: 2021-08-30 | End: 2022-01-12 | Stop reason: SDUPTHER

## 2021-08-30 RX ORDER — GLIPIZIDE 10 MG/1
TABLET, FILM COATED, EXTENDED RELEASE ORAL
Qty: 90 TABLET | Refills: 1 | Status: SHIPPED
Start: 2021-08-30 | End: 2022-01-12 | Stop reason: SDUPTHER

## 2021-08-30 NOTE — TELEPHONE ENCOUNTER
Last Appointment:  7/14/2021  Future Appointments   Date Time Provider Coleman Garcia   9/13/2021  2:00 PM Edward Liang DPM [facility]   10/13/2021  1:15 PM MD Deyvi Hoang [facility]   11/18/2021  2:30 PM Porsche Valdes MD [facility]

## 2021-09-13 ENCOUNTER — PROCEDURE VISIT (OUTPATIENT)
Dept: PODIATRY | Age: 76
End: 2021-09-13
Payer: MEDICARE

## 2021-09-13 VITALS
BODY MASS INDEX: 29.58 KG/M2 | DIASTOLIC BLOOD PRESSURE: 72 MMHG | WEIGHT: 167 LBS | SYSTOLIC BLOOD PRESSURE: 130 MMHG | TEMPERATURE: 97 F

## 2021-09-13 DIAGNOSIS — R26.2 DIFFICULTY WALKING: ICD-10-CM

## 2021-09-13 DIAGNOSIS — M79.674 PAIN IN TOE OF RIGHT FOOT: ICD-10-CM

## 2021-09-13 DIAGNOSIS — B35.1 TINEA UNGUIUM: Primary | ICD-10-CM

## 2021-09-13 DIAGNOSIS — E11.9 TYPE 2 DIABETES MELLITUS WITHOUT COMPLICATION, WITHOUT LONG-TERM CURRENT USE OF INSULIN (HCC): ICD-10-CM

## 2021-09-13 DIAGNOSIS — I73.9 PERIPHERAL VASCULAR DISEASE, UNSPECIFIED (HCC): ICD-10-CM

## 2021-09-13 PROCEDURE — 11721 DEBRIDE NAIL 6 OR MORE: CPT | Performed by: PODIATRIST

## 2021-09-13 NOTE — PROGRESS NOTES
[facility] PODIATRY  [address] [facility] [address]  Dept: [phone]  Dept Fax: [phone]    DIABETIC NAIL PROGRESS NOTE  Date of patient's visit: 9/13/2021  Patient's Name:  Olinda Chandler YOB: 1945            Patient Care Team:  Yanely Rizzo MD as PCP - General (Internal Medicine)  Yanely Rizzo MD as PCP - [facility]  Dom Marshall DPM as Consulting Physician (Sola Kothari)  Farzaneh Casillas MD as Consulting Physician (Pulmonology)          Chief Complaint   Patient presents with    Toe Pain     SAW PCP DR. HOWARD ZAVALETA 7/14/21    Diabetes       Subjective: Olinda Chandler comes to clinic for Toe Pain (SAW PCP DR. HOWARD ZAVALETA 7/14/21) and Diabetes    she is a diabetic and states that diabetic foot exam .  Pt currently has complaint of thickened, elongated nails that they cannot manage by themselves. Pt's primary care physician is Yanely Rizzo MD l   Lab Results   Component Value Date    LABA1C 11.5 (H) 07/24/2021      Complains of numbness in the feet bilat.   Past Medical History:   Diagnosis Date    Atrial fibrillation ([address]     Chronic kidney disease     Colon polyps     Diabetes mellitus ([address]     Diabetic neuropathy ([address]     Diabetic retinopathy ([address]     Essential hypertension 10/28/2019    Fatty liver     Gastritis     GERD (gastroesophageal reflux disease)     Hyperlipidemia     Hypertension     Hypothyroidism     Irritable bowel syndrome     Major depressive disorder with single episode 10/28/2019    Osteopenia     Photosensitivity disorder     chronic    RBBB     Sleep apnea     on BIPAP    Thyroid disease     Thyroid nodule     neg bx-chronic thyroiditis    Type 2 diabetes mellitus with diabetic polyneuropathy, with long-term current use of insulin ([address] 7/23/2019    Vitamin D deficiency        Allergies   Allergen Reactions    Seasonal      Current Outpatient Medications on File Prior to Visit   Medication Sig Dispense Refill    levothyroxine (SYNTHROID) 100 MCG tablet TAKE 1 TABLET DAILY 90 tablet 1    glipiZIDE (GLUCOTROL XL) 10 MG extended release tablet TAKE 1 TABLET DAILY 90 tablet 1    BD PEN NEEDLE MICRO U/F 32G X 6 MM MISC USE WITH LANTUS DAILY 100 each 3    insulin lispro (HUMALOG) 100 UNIT/ML injection vial Inject into the skin 3 times daily (before meals)      Cyanocobalamin (B-12) 5000 MCG CAPS Take by mouth      famotidine (PEPCID) 20 MG tablet Take 1 tablet by mouth 2 times daily 180 tablet 1    amLODIPine (NORVASC) 5 MG tablet TAKE 1 TABLET DAILY 90 tablet 1    metoprolol tartrate (LOPRESSOR) 25 MG tablet TAKE 1 TABLET TWICE A  tablet 1    pravastatin (PRAVACHOL) 20 MG tablet TAKE 1 TABLET DAILY 90 tablet 1    aspirin 81 MG EC tablet Take 81 mg by mouth daily      VASCEPA 1 g CAPS capsule Take 2 capsules by mouth 2 times daily      LANTUS SOLOSTAR 100 UNIT/ML injection pen Inject 65 Units into the skin nightly 15 pen 3    Multiple Vitamins-Minerals (VITEYES COMPLETE PO) Take by mouth      divalproex (DEPAKOTE) 250 MG DR tablet Take 500 mg by mouth nightly       escitalopram (LEXAPRO) 10 MG tablet Take 10 mg by mouth daily      Continuous Blood Gluc Sensor (FREESTYLE ANT SENSOR SYSTEM) MISC 1 box by Does not apply route 2 times daily 1 each 0    Continuous Blood Gluc Sensor (FREESTYLE ANT SENSOR SYSTEM) [facility] PLEASE DISPENSE 1 KIT TO PATIENT 1 each 0    Continuous Blood Gluc Sensor (FREESTYLE ANT SENSOR SYSTEM) [facility] Please dispense one free style nat kit 1 each 0    latanoprost (XALATAN) 0.005 % ophthalmic solution 1 drop nightly      Calcium Carb-Cholecalciferol (CALCIUM 1000 + D PO) Take by mouth      pregabalin (LYRICA) 75 MG capsule Take 1 capsule by mouth 2 times daily for 30 days. 60 capsule 1     No current facility-administered medications on file prior to visit.        Review of Systems   Musculoskeletal: Positive for arthralgias. Review of Systems:   History obtained from chart review and the patient  General ROS: negative for - chills, fatigue, fever, night sweats or weight gain  Constitutional: Negative for chills, diaphoresis, fatigue, fever and unexpected weight change. Musculoskeletal: Positive for arthralgias, gait problem and joint swelling. Neurological ROS: negative for - behavioral changes, confusion, headaches or seizures. Negative for weakness and numbness. Dermatological ROS: negative for - mole changes, rash  Cardiovascular: Negative for leg swelling. Gastrointestinal: Negative for constipation, diarrhea, nausea and vomiting. Objective:  General: AAO x 3 in NAD. Derm  Toenail Description nails are thick and mycotic yellow incurvated causing pain with shoe gear  Sites of Onychomycosis Involvement (Check affected area)  [x] [x] [x] [x] [x] [x] [x] [x] [x] [x]  5 4 3 2 1 1 2 3 4 5                          Right                                        Left    Thickness  [x] [x] [x] [x] [x] [x] [x] [x] [x] [x]  5 4 3 2 1 1 2 3 4 5                         Right                                        Left    Dystrophic Changes   [x] [x] [x] [x] [x] [x] [x] [x] [x] [x]  5 4 3 2 1 1 2 3 4 5                         Right                                        Left    Color   [x] [x] [x] [x] [x] [x] [x] [x] [x] [x]  5 4 3 2 1 1 2 3 4 5                          Right                                        Left    Incurvation/Ingrowin   [x] [x] [x] [x] [x] [x] [x] [x] [x] [x]  5 4 3 2 1 1 2 3 4 5                         Right                                        Left    Inflammation/Pain   [x] [x] [x] [x] [x] [x] [x] [x] [x] [x]  5 4 3 2 1 1 2 3 4 5                         Right                                        Left      Dermatologic Exam:  Skin lesion/ulceration .    Skin callus bilaterally plantar aspect plantar to the second and third metatarsal heads both lesions are about 1 cm x 1 cm tissue  Loss of hair  lower extremity      Musculoskeletal:     1st MPJ ROM decreased, Bilateral.  Muscle Bilateral.  Pain present upon palpation of toenails 1-5, Bilateral. decreased medial longitudinal arch, Bilateral.  Ankle ROM decreased,Bilateral.    Dorsally contracted digits     Vascular:  Loss of hair  LE   Nails thick yellow   Absent pt pulses   Cool touch   CFT <absent seconds, Bilateral.  Hair growth absent to the level of the digits, Bilateral.  Edema minimal Bilateral.  Varicosities severe Bilateral.     Neurological: Sensation diminshed to light touch to level of digits, Bilateral.  Protective sensation decreased sites via 5.07/10g Olla-Ayanna Monofilament, Bilateral.  negative Tinel's, Bilateral.  negative Valleix sign, Bilateral.      Integument: nails   thickened > 3.0 mm, dystrophic and crumbly, discolored with subungual debris. Toes cool to touch    Visual inspection:  Deformity: hammertoe deformity priti feet  amputation: absent    Edema:   Sensory exam:  Monofilament sensation: abnormal - 6/10 via SW 5.07/10g monofilament to the plantar foot bilateral feet    Pulses:     Pinprick: Impaired  Proprioception: Impaired  Vibration (128 Hz): Impaired       DM with PVD       [x]Yes    []no    Foot Exam    General  General Appearance: appears stated age and healthy   Orientation: alert and oriented to person, place, and time       Right Foot/Ankle     Inspection and Palpation  Skin Exam: abnormal color; no skin changes     Neurovascular  Dorsalis pedis: 2+  Posterior tibial: absent      Left Foot/Ankle      Inspection and Palpation  Skin Exam: skin changes and abnormal color; Neurovascular  Dorsalis pedis: 2+  Posterior tibial: absent           Xr Foot Left (min 3 Views)      Ortho Exam      Assessment:  78 y.o. female with:  1. Tinea unguium    2. Type 2 diabetes mellitus without complication, without long-term current use of insulin ([address]    3.  Peripheral vascular disease, unspecified ([address] 4. Pain in toe of right foot    5. Difficulty walking       Orders Placed This Encounter   Procedures     DIABETES FOOT EXAM           Q7   []Yes    []No                Q8   [x]Yes    []No                     Q9   []Yes    []No    Plan:Visual inspection:  Deformity/amputation: absent  Skin lesions/pre-ulcerative calluses: present -   Edema: right- trace, left- trace    Sensory exam:  Monofilament sensation: abnormal -   (minimum of 5 random plantar locations tested, avoiding callused areas - > 1 area with absence of sensation is + for neuropathy)    Plus at least one of the following:  Pulses: abnormal - ,   Pinprick: Impaired  Proprioception: Impaired  Vibration (128 Hz): Impaired  Pt was evaluated and examined. Patient was given personalized discharge instructions. Nails 1-10 were debrided sharply in length and thickness with a nipper and , without incident. Pt will follow up in 3 months or sooner if any problems arise. Diagnosis was discussed with the pt and all of their questions were answered in detail. Proper foot hygiene and care was discussed with the pt. Informed patient on proper diabetic foot care and importance of tight glycemic control. Patient to check feet daily and contact the office with any questions/problems/concerns.    Other comorbidity noted and will be managed by PCP.  9/13/2021    Electronically signed by Scott Perez DPM on 9/13/2021 at 2:10 PM  9/13/2021

## 2021-10-13 ENCOUNTER — OFFICE VISIT (OUTPATIENT)
Dept: FAMILY MEDICINE CLINIC | Age: 76
End: 2021-10-13
Payer: MEDICARE

## 2021-10-13 VITALS
DIASTOLIC BLOOD PRESSURE: 54 MMHG | SYSTOLIC BLOOD PRESSURE: 134 MMHG | HEART RATE: 84 BPM | BODY MASS INDEX: 29.45 KG/M2 | OXYGEN SATURATION: 97 % | HEIGHT: 63 IN | WEIGHT: 166.2 LBS | TEMPERATURE: 98.1 F

## 2021-10-13 DIAGNOSIS — E78.5 HYPERLIPIDEMIA, UNSPECIFIED HYPERLIPIDEMIA TYPE: ICD-10-CM

## 2021-10-13 DIAGNOSIS — G47.33 OBSTRUCTIVE SLEEP APNEA: ICD-10-CM

## 2021-10-13 DIAGNOSIS — I10 ESSENTIAL HYPERTENSION: ICD-10-CM

## 2021-10-13 DIAGNOSIS — E03.9 HYPOTHYROIDISM, UNSPECIFIED TYPE: ICD-10-CM

## 2021-10-13 DIAGNOSIS — E11.42 TYPE 2 DIABETES MELLITUS WITH DIABETIC POLYNEUROPATHY, WITH LONG-TERM CURRENT USE OF INSULIN (HCC): ICD-10-CM

## 2021-10-13 DIAGNOSIS — Z79.4 TYPE 2 DIABETES MELLITUS WITH DIABETIC POLYNEUROPATHY, WITH LONG-TERM CURRENT USE OF INSULIN (HCC): ICD-10-CM

## 2021-10-13 DIAGNOSIS — Z92.29 HISTORY OF REGULAR MEDICATION USE: ICD-10-CM

## 2021-10-13 DIAGNOSIS — F32.9 MAJOR DEPRESSIVE DISORDER WITH SINGLE EPISODE, REMISSION STATUS UNSPECIFIED: ICD-10-CM

## 2021-10-13 PROCEDURE — 4040F PNEUMOC VAC/ADMIN/RCVD: CPT | Performed by: INTERNAL MEDICINE

## 2021-10-13 PROCEDURE — 99214 OFFICE O/P EST MOD 30 MIN: CPT | Performed by: INTERNAL MEDICINE

## 2021-10-13 PROCEDURE — 1090F PRES/ABSN URINE INCON ASSESS: CPT | Performed by: INTERNAL MEDICINE

## 2021-10-13 PROCEDURE — G8484 FLU IMMUNIZE NO ADMIN: HCPCS | Performed by: INTERNAL MEDICINE

## 2021-10-13 PROCEDURE — G8400 PT W/DXA NO RESULTS DOC: HCPCS | Performed by: INTERNAL MEDICINE

## 2021-10-13 PROCEDURE — G8427 DOCREV CUR MEDS BY ELIG CLIN: HCPCS | Performed by: INTERNAL MEDICINE

## 2021-10-13 PROCEDURE — G8417 CALC BMI ABV UP PARAM F/U: HCPCS | Performed by: INTERNAL MEDICINE

## 2021-10-13 PROCEDURE — 2022F DILAT RTA XM EVC RTNOPTHY: CPT | Performed by: INTERNAL MEDICINE

## 2021-10-13 PROCEDURE — 3017F COLORECTAL CA SCREEN DOC REV: CPT | Performed by: INTERNAL MEDICINE

## 2021-10-13 PROCEDURE — G0008 ADMIN INFLUENZA VIRUS VAC: HCPCS | Performed by: INTERNAL MEDICINE

## 2021-10-13 PROCEDURE — 3046F HEMOGLOBIN A1C LEVEL >9.0%: CPT | Performed by: INTERNAL MEDICINE

## 2021-10-13 PROCEDURE — 1123F ACP DISCUSS/DSCN MKR DOCD: CPT | Performed by: INTERNAL MEDICINE

## 2021-10-13 PROCEDURE — 90694 VACC AIIV4 NO PRSRV 0.5ML IM: CPT | Performed by: INTERNAL MEDICINE

## 2021-10-13 PROCEDURE — 1036F TOBACCO NON-USER: CPT | Performed by: INTERNAL MEDICINE

## 2021-10-13 RX ORDER — AMLODIPINE BESYLATE 5 MG/1
TABLET ORAL
Qty: 90 TABLET | Refills: 1 | Status: SHIPPED
Start: 2021-10-13 | End: 2021-12-27 | Stop reason: SDUPTHER

## 2021-10-13 RX ORDER — INSULIN GLARGINE 100 [IU]/ML
65 INJECTION, SOLUTION SUBCUTANEOUS NIGHTLY
Qty: 15 PEN | Refills: 3 | Status: SHIPPED
Start: 2021-10-13 | End: 2022-01-10 | Stop reason: SDUPTHER

## 2021-10-13 RX ORDER — PRAVASTATIN SODIUM 20 MG
TABLET ORAL
Qty: 90 TABLET | Refills: 1 | Status: SHIPPED
Start: 2021-10-13 | End: 2021-12-27 | Stop reason: SDUPTHER

## 2021-10-14 NOTE — PROGRESS NOTES
[facility]     10/13/21  Narendra Figueroa : 1945 Sex: female  Age: 78 y.o. Chief Complaint   Patient presents with    Hypothyroidism     3 months    Hypertension       HPI  Presents today for 3-month follow-up visit on her multiple medical problems. I have seen her she has seen pain management for epidurals which is helped her significantly. Dietary, endocrine very poorly controlled sugars and hemoglobin A1c of 11.5 last check, ophthalmology and podiatry. Did review all of their notes. She is not checking her blood pressures despite being on antihypertensive medication. I told her this is very important and she needs to start doing so. But she been okay on her statin medication. Depression has been stable on her SSRI. Obstructive sleep apnea has been stable on her BiPAP. Her thyroid numbers have been stable on her thyroid replacement. Other issue today is that of her memory. She states she has become very forgetful. She did have mental status testing done through neurology earlier in the year scoring 24 out of 30. She was told she had MCI at that time. States she is going to call neurology in the next week or so and try to get in sooner. She has not been getting lost.  And does feel that she is very functional.  Weight has been stable. She is following with endocrine ophthalmology,GYN, pulmonary, neurology  psychiatry, cardiology, physical medicine and pain management.         Review of Systems     Const: Denies chills, fever and sweats. Eyes: Reports glaucoma Recent cataract/glaucoma surgery./ diabetic retinopathy changes. /Ophthalmology. States the  patient does have post cataract surgery changes that will eventually need some laser surgery done  ENMT: Denies ear symptoms. Reports postnasal drip, but denies other nasal symptoms. Denies mouth or throat  symptoms. CV: Denies chest pain, orthopnea and palpitations--history of atrial fibrillation.  Currently back in sinus rhythm and off of anticoagulation per cardiology  Resp: Denies cough, SOB and wheezing. GI: Denies diarrhea, nausea and vomiting.    : Urinary: denies dysuria, frequency and frequent UTI's. Musculo: Reports arthritis, lower back pain and right hip pain/Improved  Skin: Denies eczema, pruritus, rash. Neuro: Denies dizziness, headache, seizures and syncope.  Diabetic neuropathy. Psych: Reports depression and stress/back in counseling.  Some lack of motivation  endocrine: Reports diabetes marginally controlled/marginal compliance            REST OF PERTINENT ROS GONE OVER AND WAS NEGATIVE.    PMH:  Problem List: Type II diabetes mellitus uncontrolled, Essential hypertension, Taking medication, Depressive disorder,  Hypothyroidism, Hyperlipidemia, Type 2 diabetes mellitus  Greg Vidales  1945 Page #2  Health Maintenance:  Influenza Vaccination - (2017)  Couseled on Home Safety - (2017)  Mammogram - (2017)  Bone Density Test Screening - (2012)  Colonoscopy - (2010)  Colonoscopy - (2014)  Colonoscopy - (2017)  Colonoscopy Screening - (2010)  Colonoscopy Screening - (2014)  Colonoscopy Screening - (2017)  Mammogram Screening - (2006)  Mammogram Screening - (3/11/2008)  Mammogram Screening - (2/15/2010)  Mammogram Screening - (2017)  Colonoscopy - ,2/10,-due 17-due 22  Stress Test - , -negative  EKG - ,,,, ,10/18  Rectal Exam - dr Alston Gilford - \"  Breast Exam - \"  EGD - 2/10  Duplex Carotid Ultasound - 3/09-nl  capsule endoscopy - [facility] 4/10-nl   MMSE---performed by neurology  Influenza Vaccination - (10/2018)  Prevnar Vaccine - (2017)  Pneumonia Vaccination - ()  Zoster/Shingles Vaccine - had Zostavax, gave slip for Shingrix  Medical Problems:  photosensitivity disorder/chronic, hx pos Lyla  thyroid nodule-neg bx - chronic thyroiditis  IBS, Depression, Non Insulin Dependent Diabetes, , Rfl:     famotidine (PEPCID) 20 MG tablet, Take 1 tablet by mouth 2 times daily, Disp: 180 tablet, Rfl: 1    aspirin 81 MG EC tablet, Take 81 mg by mouth daily, Disp: , Rfl:     VASCEPA 1 g CAPS capsule, Take 2 capsules by mouth 2 times daily, Disp: , Rfl:     Multiple Vitamins-Minerals (VITEYES COMPLETE PO), Take by mouth, Disp: , Rfl:     divalproex (DEPAKOTE) 250 MG DR tablet, Take 500 mg by mouth nightly , Disp: , Rfl:     escitalopram (LEXAPRO) 10 MG tablet, Take 10 mg by mouth daily, Disp: , Rfl:     Continuous Blood Gluc Sensor ([address] [place] [address]) [facility] [place], 1 box by Does not apply route 2 times daily, Disp: 1 each, Rfl: 0    Continuous Blood Gluc Sensor (FREESTYLE ANT SENSOR SYSTEM) [facility] [place], PLEASE DISPENSE 1 KIT TO PATIENT, Disp: 1 each, Rfl: 0    Continuous Blood Gluc Sensor ([address] [place] [address]) [facility] [place], Please dispense one free style ant kit, Disp: 1 each, Rfl: 0    latanoprost (XALATAN) 0.005 % ophthalmic solution, 1 drop nightly, Disp: , Rfl:     Calcium Carb-Cholecalciferol (CALCIUM 1000 + D PO), Take by mouth, Disp: , Rfl:     pregabalin (LYRICA) 75 MG capsule, Take 1 capsule by mouth 2 times daily for 30 days. , Disp: 60 capsule, Rfl: 1  Allergies   Allergen Reactions    Seasonal        Past Medical History:   Diagnosis Date    Atrial fibrillation (HCC)     Chronic kidney disease     Colon polyps     Diabetes mellitus ([address]     Diabetic neuropathy [facility]     Diabetic retinopathy ([facility]     Essential hypertension 10/28/2019    Fatty liver     Gastritis     GERD (gastroesophageal reflux disease)     Hyperlipidemia     Hypertension     Hypothyroidism     Irritable bowel syndrome     Major depressive disorder with single episode 10/28/2019    Osteopenia     Photosensitivity disorder     chronic    RBBB     Sleep apnea     on BIPAP    Thyroid disease     Thyroid nodule     neg bx-chronic thyroiditis    Type 2 diabetes mellitus with diabetic polyneuropathy, with long-term current use of insulin ([facility] 7/23/2019    Vitamin D deficiency      Past Surgical History:   Procedure Laterality Date    APPENDECTOMY      CARPAL TUNNEL RELEASE Bilateral     CATARACT REMOVAL Bilateral     CHOLECYSTECTOMY     Osvaldo Garcia TONSILLECTOMY       Family History   Problem Relation Age of Onset    Diabetes Paternal Grandmother     Diabetes Father     Lung Cancer Mother     Pancreatic Cancer Brother     Diabetes Brother      Social History     Socioeconomic History    Marital status:      Spouse name: Not on file    Number of children: Not on file    Years of education: Not on file    Highest education level: Not on file   Occupational History    Not on file   Tobacco Use    Smoking status: Never Smoker    Smokeless tobacco: Never Used   Substance and Sexual Activity    Alcohol use: Never    Drug use: Never    Sexual activity: Not Currently   Other Topics Concern    Not on file   Social History Narrative    Not on file     Social Determinants of Health     Financial Resource Strain: Low Risk     Difficulty of Paying Living Expenses: Not hard at all   Food Insecurity: No Food Insecurity    Worried About Running Out of Food in the Last Year: Never true    [address] in the Last Year: Never true   Transportation Needs:     Lack of Transportation (Medical):      Lack of Transportation (Non-Medical):    Physical Activity:     Days of Exercise per Week:     Minutes of Exercise per Session:    Stress:     Feeling of Stress :    Social Connections:     Frequency of Communication with Friends and Family:     Frequency of Social Gatherings with Friends and Family:     Attends Hinduism Services:     Active Member of Clubs or Organizations:     Attends Club or Organization Meetings:     Marital Status:    Intimate Partner Violence:     Fear of Current or Ex-Partner:     Emotionally Abused:     Physically Abused:     Sexually Abused:        Vitals:    10/13/21 1324   BP: (!) 134/54   Pulse: 84   Temp: 98.1 °F (36.7 °C)   TempSrc: Temporal   SpO2: 97%   Weight: 166 lb 3.2 oz (75.4 kg)   Height: 5' 3\" (1.6 m)       Physical Exam      Const: Appears well developed and well nourished. No signs of acute distress present. Neck: Supple and symmetric. Palpation reveals no adenopathy. No masses appreciated. Thyroid exhibits no nodule  or thyromegaly. No JVD. Carotids: 2+ and equal bilaterally, without bruits. Resp: No rales, rhonchi or wheezes appreciated over the lungs bilaterally. CV: Rate is regular. Rhythm is regular. S1 is normal. S2 is normal. No gallop or rubs. No heart murmur  appreciated. Extremities: No clubbing, cyanosis or edema. Abdomen:   Bowel sounds are normoactive. Palpation reveals softness, nontender, with no distension, organomegaly or   No abdominal masses palpable. No palpable hepatosplenomegaly. Psych: Patient's attitude is cooperative. Patient's affect is  normal. Judgement is realistic. Insight is appropriate.            Assessment and Plan:  Rene Santana was seen today for hypothyroidism and hypertension. Diagnoses and all orders for this visit:    Essential hypertension  -     amLODIPine (NORVASC) 5 MG tablet; TAKE 1 TABLET DAILY  -     metoprolol tartrate (LOPRESSOR) 25 MG tablet; TAKE 1 TABLET TWICE A DAY  -     Comprehensive Metabolic Panel; Future  -     CBC Auto Differential; Future  Appears to be stable on antihypertensive medication. She is not checking at home however. Type 2 diabetes mellitus with diabetic polyneuropathy, with long-term current use of insulin (HCC)  -     LANTUS SOLOSTAR 100 UNIT/ML injection pen; Inject 65 Units into the skin nightly  -     Hemoglobin A1C; Future  -     Microalbumin / Creatinine Urine Ratio;  Future  -     [facility], [place]  Poorly controlled and following with endocrine  Admits to poor compliance    Hypothyroidism, unspecified type  -     T4, Free; Future  -     TSH without Reflex; Future  Stable on thyroid replacement    Major depressive disorder with single episode, remission status unspecified  Stable on medication and following with psych    Hyperlipidemia, unspecified hyperlipidemia type  -     Lipid Panel; Future  Stable on statin medication    Obstructive sleep apnea  Stable on BiPAP    History of regular medication use    Other orders  -     INFLUENZA, QUADV, ADJUVANTED, 65 YRS =, IM, PF, PREFILL SYR, 0.5ML (FLUAD)  -     pravastatin (PRAVACHOL) 20 MG tablet; TAKE 1 TABLET DAILY    Plan: I will see her back in 3 months and as needed. We will get blood work fasting within the next 2 weeks to monitor disease progression and medication use. And we will send copy to her endocrinologist.  With above consultants. Set her up for diabetic teaching. I do not think she is following proper diet. Pressure management performed. Weight loss attempts. Fall precautions. Notify us of problems in the interim. Return in about 3 months (around 1/13/2022). Seen By:  Holli Pisano MD      *Document was created using voice recognition software. Note was reviewed however may contain grammatical errors.

## 2021-10-25 ENCOUNTER — TELEPHONE (OUTPATIENT)
Dept: FAMILY MEDICINE CLINIC | Age: 76
End: 2021-10-25

## 2021-10-25 NOTE — TELEPHONE ENCOUNTER
Patient calling in she is eligible for covid booster. Do you recommend she get the moderna(which she originally got ) or pfizer? She heard on the news you can mix and match. Per patient ok to leave a detailed message.

## 2021-10-27 ENCOUNTER — OFFICE VISIT (OUTPATIENT)
Dept: FAMILY MEDICINE CLINIC | Age: 76
End: 2021-10-27
Payer: MEDICARE

## 2021-10-27 ENCOUNTER — TELEPHONE (OUTPATIENT)
Dept: FAMILY MEDICINE CLINIC | Age: 76
End: 2021-10-27

## 2021-10-27 VITALS
OXYGEN SATURATION: 97 % | HEART RATE: 84 BPM | DIASTOLIC BLOOD PRESSURE: 70 MMHG | TEMPERATURE: 97.5 F | SYSTOLIC BLOOD PRESSURE: 125 MMHG | WEIGHT: 168 LBS | BODY MASS INDEX: 29.76 KG/M2 | RESPIRATION RATE: 16 BRPM

## 2021-10-27 DIAGNOSIS — Z79.4 TYPE 2 DIABETES MELLITUS WITH DIABETIC POLYNEUROPATHY, WITH LONG-TERM CURRENT USE OF INSULIN (HCC): ICD-10-CM

## 2021-10-27 DIAGNOSIS — K59.00 CONSTIPATION, UNSPECIFIED CONSTIPATION TYPE: Primary | ICD-10-CM

## 2021-10-27 DIAGNOSIS — E11.42 TYPE 2 DIABETES MELLITUS WITH DIABETIC POLYNEUROPATHY, WITH LONG-TERM CURRENT USE OF INSULIN (HCC): ICD-10-CM

## 2021-10-27 DIAGNOSIS — I10 ESSENTIAL HYPERTENSION: ICD-10-CM

## 2021-10-27 DIAGNOSIS — E78.5 HYPERLIPIDEMIA, UNSPECIFIED HYPERLIPIDEMIA TYPE: ICD-10-CM

## 2021-10-27 DIAGNOSIS — E03.9 HYPOTHYROIDISM, UNSPECIFIED TYPE: ICD-10-CM

## 2021-10-27 LAB
ALBUMIN SERPL-MCNC: 4.5 G/DL (ref 3.5–5.2)
ALP BLD-CCNC: 68 U/L (ref 35–104)
ALT SERPL-CCNC: 25 U/L (ref 0–32)
ANION GAP SERPL CALCULATED.3IONS-SCNC: 11 MMOL/L (ref 7–16)
AST SERPL-CCNC: 26 U/L (ref 0–31)
BASOPHILS ABSOLUTE: 0.07 E9/L (ref 0–0.2)
BASOPHILS RELATIVE PERCENT: 1.3 % (ref 0–2)
BILIRUB SERPL-MCNC: 0.6 MG/DL (ref 0–1.2)
BUN BLDV-MCNC: 17 MG/DL (ref 6–23)
CALCIUM SERPL-MCNC: 9.5 MG/DL (ref 8.6–10.2)
CHLORIDE BLD-SCNC: 98 MMOL/L (ref 98–107)
CHOLESTEROL, TOTAL: 188 MG/DL (ref 0–199)
CO2: 27 MMOL/L (ref 22–29)
CREAT SERPL-MCNC: 0.9 MG/DL (ref 0.5–1)
CREATININE URINE: 121 MG/DL (ref 29–226)
EOSINOPHILS ABSOLUTE: 0.33 E9/L (ref 0.05–0.5)
EOSINOPHILS RELATIVE PERCENT: 6.2 % (ref 0–6)
GFR AFRICAN AMERICAN: >60
GFR NON-AFRICAN AMERICAN: >60 ML/MIN/1.73
GLUCOSE BLD-MCNC: 219 MG/DL (ref 74–99)
HBA1C MFR BLD: 9.7 % (ref 4–5.6)
HCT VFR BLD CALC: 42 % (ref 34–48)
HDLC SERPL-MCNC: 49 MG/DL
HEMOGLOBIN: 14 G/DL (ref 11.5–15.5)
IMMATURE GRANULOCYTES #: 0.03 E9/L
IMMATURE GRANULOCYTES %: 0.6 % (ref 0–5)
LDL CHOLESTEROL CALCULATED: 87 MG/DL (ref 0–99)
LYMPHOCYTES ABSOLUTE: 2.12 E9/L (ref 1.5–4)
LYMPHOCYTES RELATIVE PERCENT: 39.6 % (ref 20–42)
MCH RBC QN AUTO: 29.5 PG (ref 26–35)
MCHC RBC AUTO-ENTMCNC: 33.3 % (ref 32–34.5)
MCV RBC AUTO: 88.6 FL (ref 80–99.9)
MICROALBUMIN UR-MCNC: 33.2 MG/L
MICROALBUMIN/CREAT UR-RTO: 27.4 (ref 0–30)
MONOCYTES ABSOLUTE: 0.52 E9/L (ref 0.1–0.95)
MONOCYTES RELATIVE PERCENT: 9.7 % (ref 2–12)
NEUTROPHILS ABSOLUTE: 2.28 E9/L (ref 1.8–7.3)
NEUTROPHILS RELATIVE PERCENT: 42.6 % (ref 43–80)
PDW BLD-RTO: 13.2 FL (ref 11.5–15)
PLATELET # BLD: 185 E9/L (ref 130–450)
PMV BLD AUTO: 10.2 FL (ref 7–12)
POTASSIUM SERPL-SCNC: 4.3 MMOL/L (ref 3.5–5)
RBC # BLD: 4.74 E12/L (ref 3.5–5.5)
SODIUM BLD-SCNC: 136 MMOL/L (ref 132–146)
T4 FREE: 1.28 NG/DL (ref 0.93–1.7)
TOTAL PROTEIN: 6.7 G/DL (ref 6.4–8.3)
TRIGL SERPL-MCNC: 261 MG/DL (ref 0–149)
TSH SERPL DL<=0.05 MIU/L-ACNC: 3.41 UIU/ML (ref 0.27–4.2)
VLDLC SERPL CALC-MCNC: 52 MG/DL
WBC # BLD: 5.4 E9/L (ref 4.5–11.5)

## 2021-10-27 PROCEDURE — G8484 FLU IMMUNIZE NO ADMIN: HCPCS | Performed by: NURSE PRACTITIONER

## 2021-10-27 PROCEDURE — 4040F PNEUMOC VAC/ADMIN/RCVD: CPT | Performed by: NURSE PRACTITIONER

## 2021-10-27 PROCEDURE — G8400 PT W/DXA NO RESULTS DOC: HCPCS | Performed by: NURSE PRACTITIONER

## 2021-10-27 PROCEDURE — 1090F PRES/ABSN URINE INCON ASSESS: CPT | Performed by: NURSE PRACTITIONER

## 2021-10-27 PROCEDURE — 3017F COLORECTAL CA SCREEN DOC REV: CPT | Performed by: NURSE PRACTITIONER

## 2021-10-27 PROCEDURE — 99213 OFFICE O/P EST LOW 20 MIN: CPT | Performed by: NURSE PRACTITIONER

## 2021-10-27 PROCEDURE — 1123F ACP DISCUSS/DSCN MKR DOCD: CPT | Performed by: NURSE PRACTITIONER

## 2021-10-27 PROCEDURE — 1036F TOBACCO NON-USER: CPT | Performed by: NURSE PRACTITIONER

## 2021-10-27 PROCEDURE — G8417 CALC BMI ABV UP PARAM F/U: HCPCS | Performed by: NURSE PRACTITIONER

## 2021-10-27 PROCEDURE — G8427 DOCREV CUR MEDS BY ELIG CLIN: HCPCS | Performed by: NURSE PRACTITIONER

## 2021-10-27 RX ORDER — INSULIN LISPRO 100 [IU]/ML
INJECTION, SOLUTION INTRAVENOUS; SUBCUTANEOUS
Qty: 5 PEN | Refills: 5 | Status: SHIPPED
Start: 2021-10-27 | End: 2022-01-10 | Stop reason: SDUPTHER

## 2021-10-27 NOTE — TELEPHONE ENCOUNTER
Name of Medication(s) Requested:  Humalog Pen    Pharmacy Requested:   [facility]    Medication(s) pended? [x] Yes  [] No    Last Appointment:  10/13/2021    Future appts:  Future Appointments   Date Time Provider Coleman Garcia   11/18/2021  2:30 PM Iven Goodell, MD [facility]   12/13/2021  1:45 PM Nish Limon DPM [facility]   1/12/2022  2:45 PM James Vasquez MD [facility]          Does patient need call back? [] Yes  [x] No      Emily Neither needing a new script for the Humalog pens sent to [place].

## 2021-10-27 NOTE — PATIENT INSTRUCTIONS
Patient Education        Constipation: Care Instructions  Your Care Instructions     Constipation means that you have a hard time passing stools (bowel movements). People pass stools from 3 times a day to once every 3 days. What is normal for you may be different. Constipation may occur with pain in the rectum and cramping. The pain may get worse when you try to pass stools. Sometimes there are small amounts of bright red blood on toilet paper or the surface of stools. This is because of enlarged veins near the rectum (hemorrhoids). A few changes in your diet and lifestyle may help you avoid ongoing constipation. Your doctor may also prescribe medicine to help loosen your stool. Some medicines can cause constipation. These include pain medicines and antidepressants. Tell your doctor about all the medicines you take. Your doctor may want to make a medicine change to ease your symptoms. Follow-up care is a key part of your treatment and safety. Be sure to make and go to all appointments, and call your doctor if you are having problems. It's also a good idea to know your test results and keep a list of the medicines you take. How can you care for yourself at home? · Drink plenty of fluids. If you have kidney, heart, or liver disease and have to limit fluids, talk with your doctor before you increase the amount of fluids you drink. · Include high-fiber foods in your diet each day. These include fruits, vegetables, beans, and whole grains. · Get at least 30 minutes of exercise on most days of the week. Walking is a good choice. You also may want to do other activities, such as running, swimming, cycling, or playing tennis or team sports. · Take a fiber supplement, such as Citrucel or Metamucil, every day. Read and follow all instructions on the label. · Schedule time each day for a bowel movement. A daily routine may help. Take your time having your bowel movement.   · Support your feet with a small step stool when you sit on the toilet. This helps flex your hips and places your pelvis in a squatting position. · Your doctor may recommend an over-the-counter laxative to relieve your constipation. Examples are Milk of Magnesia and MiraLax. Read and follow all instructions on the label. Do not use laxatives on a long-term basis. When should you call for help? Call your doctor now or seek immediate medical care if:    · You have new or worse belly pain.     · You have new or worse nausea or vomiting.     · You have blood in your stools. Watch closely for changes in your health, and be sure to contact your doctor if:    · Your constipation is getting worse.     · You do not get better as expected. Where can you learn more? Go to [email or web address] and sign in to your [employer] account. Enter 21 [phone] in the [employer] box to learn more about \"Constipation: Care Instructions. \"     If you do not have an account, please click on the \"Sign Up Now\" link. Current as of: July 1, 2021               Content Version: 13.0  © 0625-5512 Healthwise, Incorporated. Care instructions adapted under license by [facility] ([facility]). If you have questions about a medical condition or this instruction, always ask your healthcare professional. [address] any warranty or liability for your use of this information.

## 2021-10-27 NOTE — PROGRESS NOTES
10/27/21  Cheri Narayanan : 1945 Sex: female  Age 78 y.o. Subjective:  Chief Complaint   Patient presents with    Constipation     bloating and nausea with eating        HPI:   Cheri Narayanan , 78 y.o. female presents to the clinic for evaluation of intermittent constipation x 2 weeks. The patient also reports intermittent bloating and nausea. Patient denies any dietary or medication changes. The patient has not taken any treatment for symptoms. The patient reports unchanged symptoms over time. The patient denies blood or mucus of stools. The patient also denies headache, fever, chest pain, abdominal pain, shortness of breath, and vomiting / diarrhea. ROS:   Unless otherwise stated in this report the patient's positive and negative responses for review of systems for constitutional, eyes, ENT, cardiovascular, respiratory, gastrointestinal, neurological, , musculoskeletal, and integument systems and related systems to the presenting problem are either stated in the history of present illness or were not pertinent or were negative for the symptoms and/or complaints related to the presenting medical problem. Positives and pertinent negatives as per HPI. All others reviewed and are negative.       PMH:     Past Medical History:   Diagnosis Date    Atrial fibrillation ([address]     Chronic kidney disease     Colon polyps     Diabetes mellitus ([address]     Diabetic neuropathy ([address]     Diabetic retinopathy ([address]     Essential hypertension 10/28/2019    Fatty liver     Gastritis     GERD (gastroesophageal reflux disease)     Hyperlipidemia     Hypertension     Hypothyroidism     Irritable bowel syndrome     Major depressive disorder with single episode 10/28/2019    Osteopenia     Photosensitivity disorder     chronic    RBBB     Sleep apnea     on BIPAP    Thyroid disease     Thyroid nodule     neg bx-chronic thyroiditis    Type 2 diabetes mellitus with diabetic polyneuropathy, with long-term current use of insulin ([facility] 7/23/2019    Vitamin D deficiency        Past Surgical History:   Procedure Laterality Date    APPENDECTOMY      CARPAL TUNNEL RELEASE Bilateral     CATARACT REMOVAL Bilateral     CHOLECYSTECTOMY     Donya Delarosa         Family History   Problem Relation Age of Onset    Diabetes Paternal Grandmother     Diabetes Father     Lung Cancer Mother     Pancreatic Cancer Brother     Diabetes Brother        Medications:     Current Outpatient Medications:     amLODIPine (NORVASC) 5 MG tablet, TAKE 1 TABLET DAILY, Disp: 90 tablet, Rfl: 1    LANTUS SOLOSTAR 100 UNIT/ML injection pen, Inject 65 Units into the skin nightly, Disp: 15 pen, Rfl: 3    metoprolol tartrate (LOPRESSOR) 25 MG tablet, TAKE 1 TABLET TWICE A DAY, Disp: 180 tablet, Rfl: 1    pravastatin (PRAVACHOL) 20 MG tablet, TAKE 1 TABLET DAILY, Disp: 90 tablet, Rfl: 1    levothyroxine (SYNTHROID) 100 MCG tablet, TAKE 1 TABLET DAILY, Disp: 90 tablet, Rfl: 1    glipiZIDE (GLUCOTROL XL) 10 MG extended release tablet, TAKE 1 TABLET DAILY, Disp: 90 tablet, Rfl: 1    BD PEN NEEDLE MICRO U/F 32G X 6 MM MISC, USE WITH LANTUS DAILY, Disp: 100 each, Rfl: 3    insulin lispro (HUMALOG) 100 UNIT/ML injection vial, Inject into the skin 3 times daily (before meals), Disp: , Rfl:     Cyanocobalamin (B-12) 5000 MCG CAPS, Take by mouth, Disp: , Rfl:     famotidine (PEPCID) 20 MG tablet, Take 1 tablet by mouth 2 times daily, Disp: 180 tablet, Rfl: 1    aspirin 81 MG EC tablet, Take 81 mg by mouth daily, Disp: , Rfl:     VASCEPA 1 g CAPS capsule, Take 2 capsules by mouth 2 times daily, Disp: , Rfl:     Multiple Vitamins-Minerals (VITEYES COMPLETE PO), Take by mouth, Disp: , Rfl:     divalproex (DEPAKOTE) 250 MG DR tablet, Take 500 mg by mouth nightly , Disp: , Rfl:     escitalopram (LEXAPRO) 10 MG tablet, Take 10 mg by mouth daily, Disp: , Rfl:     Continuous Blood Gluc Sensor ([address] [place] [address]) [facility] [place], 1 box by Does not apply route 2 times daily, Disp: 1 each, Rfl: 0    Continuous Blood Gluc Sensor (FREESTYLE ANT SENSOR SYSTEM) [facility] [place] PLEASE DISPENSE 1 KIT TO PATIENT, Disp: 1 each, Rfl: 0    Continuous Blood Gluc Sensor ([address] [facility]) [facility] [place] Please dispense one free style ant kit, Disp: 1 each, Rfl: 0    latanoprost (XALATAN) 0.005 % ophthalmic solution, 1 drop nightly, Disp: , Rfl:     Calcium Carb-Cholecalciferol (CALCIUM 1000 + D PO), Take by mouth, Disp: , Rfl:     pregabalin (LYRICA) 75 MG capsule, Take 1 capsule by mouth 2 times daily for 30 days. , Disp: 60 capsule, Rfl: 1    Allergies: Allergies   Allergen Reactions    Seasonal        Social History:     Social History     Tobacco Use    Smoking status: Never Smoker    Smokeless tobacco: Never Used   Substance Use Topics    Alcohol use: Never    Drug use: Never       Patient lives at home. Physical Exam:     Vitals:    10/27/21 1258   BP: 125/70   Pulse: 84   Resp: 16   Temp: 97.5 °F (36.4 °C)   TempSrc: Temporal   SpO2: 97%   Weight: 168 lb (76.2 kg)       Physical Exam (PE)    Physical Exam  Constitutional:       Appearance: Normal appearance. HENT:      Head: Normocephalic. Right Ear: External ear normal.      Left Ear: External ear normal.      Nose: Nose normal.      Mouth/Throat:      Mouth: Mucous membranes are moist.      Pharynx: Oropharynx is clear. Eyes:      Pupils: Pupils are equal, round, and reactive to light. Cardiovascular:      Rate and Rhythm: Normal rate and regular rhythm. Pulses: Normal pulses. Heart sounds: Normal heart sounds. Pulmonary:      Effort: Pulmonary effort is normal.      Breath sounds: Normal breath sounds. Abdominal:      General: Bowel sounds are normal. There is no distension. Palpations: Abdomen is soft. Tenderness: There is no abdominal tenderness. There is no guarding or rebound. Musculoskeletal:         General: Normal range of motion. Cervical back: Normal range of motion and neck supple. Skin:     General: Skin is warm and dry. Capillary Refill: Capillary refill takes less than 2 seconds. Neurological:      General: No focal deficit present. Mental Status: She is alert and oriented to person, place, and time. Psychiatric:         Mood and Affect: Mood normal.         Behavior: Behavior normal.          Testing:   (All laboratory and radiology results have been personally reviewed by myself)  Labs:  No results found for this visit on 10/27/21. Imaging: All Radiology results interpreted by Radiologist unless otherwise noted. No orders to display       Assessment / Plan:   The patient's vitals, allergies, medications, and past medical history have been reviewed. Sunny Ulloa was seen today for constipation. Diagnoses and all orders for this visit:    Constipation, unspecified constipation type        - Disposition: Home    - Educational material printed for patient's review and were included in patient instructions. After Visit Summary and given to patient at the end of visit. - The patient is to call for any concerns or return if any changing symptoms. The patient is to follow-up with PCP in the next 2-3 days for repeat evaluation. Discussed symptomatic treatments with the patient today including incorporating fiber 20 g daily, increase water intake 8 cups daily, increase daily exercise. Also discussed supplementing fiber with 1 tablespoon of Metamucil daily in 8 ounces of water. Red flags were discussed with the patient today. If symptoms worsen the patient is to go directly to the emergency department. Pt verbalizes understanding and is in agreement with plan of care. All questions answered. SIGNATURE: ILIANA Castañeda    *NOTE: This report was transcribed using voice recognition software.  Every effort was made to ensure accuracy; however, inadvertent computerized transcription errors may be present.

## 2021-10-28 ENCOUNTER — TELEPHONE (OUTPATIENT)
Dept: FAMILY MEDICINE CLINIC | Age: 76
End: 2021-10-28

## 2021-10-28 NOTE — TELEPHONE ENCOUNTER
Hemoglobin A1c went from 11.5-9.7. Still very poor but coming down.   Send copy of these labs to her endocrinologist.

## 2021-11-02 ENCOUNTER — TELEPHONE (OUTPATIENT)
Dept: FAMILY MEDICINE CLINIC | Age: 76
End: 2021-11-02

## 2021-11-02 NOTE — TELEPHONE ENCOUNTER
----- Message from Neva Levi sent at 11/1/2021  4:42 PM EDT -----  Subject: Message to Provider    QUESTIONS  Information for Provider? Patient calling with covid questions, would like   to know if family member could still possibly be shedding virus, they were   never tested so unsure whether they had covid. States they had symptoms a   month ago, wants to know so she does not expose herself. Please call back   to advise.   ---------------------------------------------------------------------------  --------------  CALL BACK INFO  What is the best way for the office to contact you? OK to leave message on   voicemail  Preferred Call Back Phone Number? [phone]  ---------------------------------------------------------------------------  --------------  SCRIPT ANSWERS  Relationship to Patient?  Self

## 2021-11-15 ENCOUNTER — HOSPITAL ENCOUNTER (OUTPATIENT)
Dept: DIABETES SERVICES | Age: 76
Setting detail: THERAPIES SERIES
Discharge: HOME | End: 2021-11-15
Payer: MEDICARE

## 2021-11-15 PROCEDURE — G0108 DIAB MANAGE TRN  PER INDIV: HCPCS

## 2021-11-15 SDOH — ECONOMIC STABILITY: FOOD INSECURITY: ADDITIONAL INFORMATION: NO

## 2021-11-15 ASSESSMENT — PROBLEM AREAS IN DIABETES QUESTIONNAIRE (PAID)
PAID-5 TOTAL SCORE: 0
FEELING SCARED WHEN YOU THINK ABOUT LIVING WITH DIABETES: 0
COPING WITH COMPLICATIONS OF DIABETES: 0
FEELING DEPRESSED WHEN YOU THINK ABOUT LIVING WITH DIABETES: 0
FEELING THAT DIABETES IS TAKING UP TOO MUCH OF YOUR MENTAL AND PHYSICAL ENERGY EVERY DAY: 0
WORRYING ABOUT THE FUTURE AND THE POSSIBILITY OF SERIOUS COMPLICATIONS: 0

## 2021-11-15 NOTE — PROGRESS NOTES
Diabetes Self-Management Education Record    Participant Name: Glenis Harvey  Referring Provider: Cindy Jhaveri MD  Assessment/Evaluation Ratings:  1=Needs Instruction   4=Demonstrates Understanding/Competency  2=Needs Review   NC=Not Covered    3=Comprehends Key Points  N/A=Not Applicable  Topics/Learning Objectives Pre-session Assess Date:  Instructor initials/date  11/15/21 KMS Instr. Date    Instructor initials/date  11/15/21 KMS Follow-up Post- session Eval Comments   Diabetes disease process & Treatment process:   -Define type of diabetes in simple terms.  - Describe the ABCs of  diabetes management  -Identify own type of diabetes  -Identify lifestyle changes/treatment options  -other:  2 [x] All     []  []  []  []  []  4 Type 2 DM since 1994   A1C 9.7%    Developing strategies for Healthy coping/psychosocial issues:    -Describe feelings about living with diabetes  -Identify coping strategies and sources of stress  -Identify support needed & support network available  -Complete PAID-5 Diabetes questionnaire 1 [x] All     []  []  []    []  4   11/15/21 KMS  PAID-5 Score: 0       Prevention, detection & treatment of Chronic complications:    -Identify the prevention, detection and treatment for complications including immunizations, preventive eye, foot, dental and renal exams as indicated per the participant's duration of diabetes and health status.  -Define the natural course of diabetes and the relationship of blood glucose levels to long term complications of diabetes.   1 [x] All     []            []  4 11/15/21 KMS  Neuropathy    Prevention, detection & treatment of acute complications:    -State the causes,signs & symptoms of hyper & hypoglycemia, and prevention & treatment strategies.   -Describe sick day guidelines  DKA /indications for ketone testing &  when to call physician  2 [x] All     []      []    4          -Identify severe weather/situation crisis  & diabetes supplies management  [] Using medications safely:   -State effects of diabetes medicines on blood glucose levels;  -List diabetes medication taken, action & side effects 2 [x] All     []  []  4 11/15/21 KMS  Glipizide 10 mg XR daily    Insulin/Injectables/glucagon  -Name appropriate injection sites; proper storage; supplies needed;  2   [x]  4 11/15/21 KMS  Humalog 5 units plus s.s. per meal TID  Lantus 65 units nightly     Demonstrate proper technique  []      Monitoring blood glucose, interpreting and using results:   -Identify the purpose of testing   -Identify recommended & personal blood glucose targets & HgbA1C target levels  -State the Importance of logging blood glucose levels for pattern recognition;   -State benefits of reading/using pt generated health data  -Verbalize safe lancet disposal 2 [x] All     []  []    []  []  []  4 11/15/21 KMS  FreeStyle Lisa CGM, monitors 3 times daily before meals    -Demonstrate proper testing technique  []      Incorporating physical activity into lifestyle:   -State effect of exercise on blood glucose levels;   -State benefits of regular exercise;   -Define safety considerations/food choices if needed.  -Describe contraindications/maintenance of activity. 1 [x] All     []  []    []  []  4 11/15/21 KMS  Not physically active, plans to restart exercising using [employer] membership    Incorporating nutritional management into lifestyle:   -Describe effect of type, amount & timing of food on blood glucose  -Describe methods for preparing and planning healthy meals  -Correctly read food labels  -Name 3 foods high in Carbohydrate 1 [x] All       []    []    []  []  4 11/15/21 Antonieta Montero a good understanding of which foods are carbohydrates, how to use the plate method, and how to read labels to determine carb portions. She will aim for 3 carb choices per meal and a 1 carb snack at . She tends to wake up late and stay up late, so her first meal is usually around 10 a.m. to 12 p.m. She will space out her meals every 4.5 to 5 hours, accordingly.     -Plan a carbohydrate-controlled meal based on individualized meal plan  -Demonstrate CHO counting/portion control   []  []      Developing strategies for problem solving to promote health/change behavior. -Identify 7 self-care behaviors; Personal health risk factors; Benefits, challenges & strategies for behavioral change and set an individualized goal selection. 1 [x]  4 11/15/21 KMS  [x]Nutrition:  Eat three carb consistent meals daily on a schedule. []Monitoring  []Exercise  []Medication  []Other     Identified Barriers to learning/adherence to self management plan:    Emotional  []  other    Instruction Method:  Lecture/Discussion and Handouts    Supporting Education Materials/Equipment Provided: Self-management manual and Nutritional Packet   []Macedonian materials       [] services     []Other:      Encounter Type Date Attended Start Time End Time Comments No Show Dates   Assessment          Session 1         Session 2        1:1 DSMES  11/15/21 KMS 1435 [id number]      In person Follow-up         Gestational Diabetes         Individual MNT        Meter Instrx        Insulin Instrx           Additional Comments: [x] Pt seen individually due to Covid-19 Safety precautions and no group session available. Doctor notified via EMR.          Date:   Follow-up goal attainment based on patients initial DSMES goal    Dr Notified by [] EMR []Fax        []Post class Hgb A1C  []Medication compliance   []Plate method/meal plan compliance   []Able to state the number of Carbohydrate servings eaten at B,L,D   []Testing blood glucose as prescribed by PCP   []Exercise Routine   []Other:   []Other:     []Patient lost to follow-up  Dr Won Donaldson by []EMR []Fax     Personal Support Plan:      [x] Keep all scheduled doctor appointments   [] Make and keep appointments with specialists (foot, eye, dentist) as recommended   [] Consult my pharmacist about all new medications or to ask any medication questions   [] Get tested for sleep apnea   [] Seek help for:   [] Make an appointment with:   [] Attend smoking cessation classes or call 1-800-QUIT-NOW  [] Attend Diabetes Support Group   [] Use diabetes magazines, books, or credible web-sites like the ADA for more information  [x] Increase exercise at home or join an exercise program:   [] Other:

## 2021-11-15 NOTE — LETTER
[facility]- Diabetes Education Department Initial Diabetes Education Letter    11/15/2021       Re:     Shawn Stephens         :  1945  Dear Dr. Michael Davenport: Thank you for referring your patient, Shawn Stephens, for diabetes education. Shawn Stephens has completed their comprehensive education plan today which included the topics below:    Nursing/Medical [x]      Nutrition [x]  [] Diabetes disease process & treatment   [] Diabetes medication use and safety   [] Self-monitoring blood glucose/interpreting results  [] Prevention, detection and treatment of acute complications  [] Prevention, detection and treatment of chronic complications  [] Developing strategies to address psychosocial issues    [] Nutritional management: basic principles   [] Carbohydrate counting, plate method, label reading  [] Benefits of/ways to incorporate physical activity  [] Problem solving and preparing for crisis situations  [] Diabetes supply management  [] Goal setting and behavior modification       PAID -5 (Problem Areas in Diabetes) Survey Results  0  A total score of 8 or greater indicates possible diabetes related emotional distress which warrants further assessment.  [] [address] and Crisis Resource information provided. Comments:     PATIENT SELECTED GOAL:   [x]  I will follow my meal plan and measure my carbohydrate foods. []  I will increase my activity to:  []  I will check my blood glucose as ordered by my doctor. []  I will take my medications at the correct times as ordered by my doctor.   []  Other:      DIABETES SELF-MANAGEMENT SUPPORT PLAN/REFERRALS (patient identified):  [x] Keep my scheduled visits with my doctor   [] Make and keep appointments with specialists (foot, eye, dentist) as recommended  [] Consult my pharmacist with all new medications and/or any medication questions  [] Get tested for sleep apnea  [] Seek help for:   [] Make an appointment with:   [] Attend smoking cessation classes or call help-line (9-717-QUIT-NOW; [phone])  [] Attend a diabetes support group  [] Use diabetes magazines, books, or the American Diabetes Association website (www.diabetes. org) for more information    [x] Start or increase exercising at home or join a program:  [] Other: There will be a follow-up in 3 months to evaluate the attainment of their chosen goal, and self identified support plan and you will be notified of their progress. Thank you for referring this patient to our program.  Please do not hesitate to call if you have any questions at [phone] [facility] or [facility]) or [phone] ([address] [place] [address]).         Sincerely,    Jas Bonilla, RN, BSN, [facility] Diabetes Education Department  American Diabetes Association Recognized DSMES Program

## 2021-12-03 DIAGNOSIS — E11.42 TYPE 2 DIABETES MELLITUS WITH DIABETIC POLYNEUROPATHY, WITH LONG-TERM CURRENT USE OF INSULIN (HCC): ICD-10-CM

## 2021-12-03 DIAGNOSIS — Z79.4 TYPE 2 DIABETES MELLITUS WITH DIABETIC POLYNEUROPATHY, WITH LONG-TERM CURRENT USE OF INSULIN (HCC): ICD-10-CM

## 2021-12-06 RX ORDER — INSULIN GLARGINE 100 [IU]/ML
INJECTION, SOLUTION SUBCUTANEOUS
Qty: 15 PEN | Refills: 3 | OUTPATIENT
Start: 2021-12-06

## 2021-12-06 NOTE — TELEPHONE ENCOUNTER
Last Appointment:  10/13/2021  Future Appointments   Date Time Provider Coleman Langfordi   12/13/2021  1:45 PM ALMA Gupta [facility]   1/12/2022  2:45 PM MD PRITI Mares [facility]   11/22/2022  1:15 PM JULIET Weeks - NP [facility]

## 2021-12-13 ENCOUNTER — PROCEDURE VISIT (OUTPATIENT)
Dept: PODIATRY | Age: 76
End: 2021-12-13
Payer: MEDICARE

## 2021-12-13 VITALS
SYSTOLIC BLOOD PRESSURE: 142 MMHG | TEMPERATURE: 98.2 F | BODY MASS INDEX: 29.76 KG/M2 | DIASTOLIC BLOOD PRESSURE: 72 MMHG | WEIGHT: 168 LBS

## 2021-12-13 DIAGNOSIS — E11.9 TYPE 2 DIABETES MELLITUS WITHOUT COMPLICATION, WITHOUT LONG-TERM CURRENT USE OF INSULIN (HCC): ICD-10-CM

## 2021-12-13 DIAGNOSIS — I73.9 PERIPHERAL VASCULAR DISEASE, UNSPECIFIED (HCC): ICD-10-CM

## 2021-12-13 DIAGNOSIS — M79.674 PAIN IN TOE OF RIGHT FOOT: ICD-10-CM

## 2021-12-13 DIAGNOSIS — R26.2 DIFFICULTY WALKING: ICD-10-CM

## 2021-12-13 DIAGNOSIS — B35.1 TINEA UNGUIUM: Primary | ICD-10-CM

## 2021-12-13 PROCEDURE — 11721 DEBRIDE NAIL 6 OR MORE: CPT | Performed by: PODIATRIST

## 2021-12-13 NOTE — PROGRESS NOTES
[facility] PODIATRY  [facility] [address]  Dept: [phone]  Dept Fax: [phone]    DIABETIC NAIL PROGRESS NOTE  Date of patient's visit: 12/13/2021  Patient's Name:  Marlen Burrows YOB: 1945            Patient Care Team:  James Vasquez MD as PCP - General (Internal Medicine)  James Vasquez MD as PCP - [address] [place] [address] Provider  Nish Limon DPM as Consulting Physician (Podiatry)  Iven Goodell, MD as Consulting Physician (Pulmonology)          Chief Complaint   Patient presents with    Diabetes     saw pcp dr. Carey sandoval 11/18/21    Toe Pain       Subjective: Marlen Burrows comes to clinic for Diabetes (saw pcp dr. Peng Khan 11/18/21) and Toe Pain    she is a diabetic and states that diabetic foot exam .  Pt currently has complaint of thickened, elongated nails that they cannot manage by themselves. Pt's primary care physician is James Vasquez MD l   Lab Results   Component Value Date    LABA1C 9.7 (H) 11/29/2021      Complains of numbness in the feet bilat.   Past Medical History:   Diagnosis Date    Atrial fibrillation ([address]     Chronic kidney disease     Colon polyps     Diabetes mellitus ([address]     Diabetic neuropathy ([address]     Diabetic retinopathy ([address]     Essential hypertension 10/28/2019    Fatty liver     Gastritis     GERD (gastroesophageal reflux disease)     Hyperlipidemia     Hypertension     Hypothyroidism     Irritable bowel syndrome     Major depressive disorder with single episode 10/28/2019    Osteopenia     Photosensitivity disorder     chronic    RBBB     Sleep apnea     on BIPAP    Thyroid disease     Thyroid nodule     neg bx-chronic thyroiditis    Type 2 diabetes mellitus with diabetic polyneuropathy, with long-term current use of insulin ([address] 7/23/2019    Vitamin D deficiency        Allergies   Allergen Reactions    Seasonal      Current Outpatient Medications on File Prior to Visit   Medication Sig Dispense Refill    insulin lispro, 1 Unit Dial, (HUMALOG KWIKPEN) 100 UNIT/ML SOPN Use on a sliding scale 3 times a day with meals. Maximum dose 30 units per day. 5 pen 5    amLODIPine (NORVASC) 5 MG tablet TAKE 1 TABLET DAILY 90 tablet 1    LANTUS SOLOSTAR 100 UNIT/ML injection pen Inject 65 Units into the skin nightly 15 pen 3    metoprolol tartrate (LOPRESSOR) 25 MG tablet TAKE 1 TABLET TWICE A  tablet 1    pravastatin (PRAVACHOL) 20 MG tablet TAKE 1 TABLET DAILY 90 tablet 1    levothyroxine (SYNTHROID) 100 MCG tablet TAKE 1 TABLET DAILY 90 tablet 1    glipiZIDE (GLUCOTROL XL) 10 MG extended release tablet TAKE 1 TABLET DAILY 90 tablet 1    BD PEN NEEDLE MICRO U/F 32G X 6 MM MISC USE WITH LANTUS DAILY 100 each 3    Cyanocobalamin (B-12) 5000 MCG CAPS Take by mouth      famotidine (PEPCID) 20 MG tablet Take 1 tablet by mouth 2 times daily 180 tablet 1    aspirin 81 MG EC tablet Take 81 mg by mouth daily      VASCEPA 1 g CAPS capsule Take 2 capsules by mouth 2 times daily      Multiple Vitamins-Minerals (VITEYES COMPLETE PO) Take by mouth      divalproex (DEPAKOTE) 250 MG DR tablet Take 500 mg by mouth nightly       escitalopram (LEXAPRO) 10 MG tablet Take 10 mg by mouth daily      Continuous Blood Gluc Sensor (FREESTYLE ANT SENSOR SYSTEM) MISC 1 box by Does not apply route 2 times daily 1 each 0    Continuous Blood Gluc Sensor (FREESTYLE ANT SENSOR SYSTEM) [facility] PLEASE DISPENSE 1 KIT TO PATIENT 1 each 0    Continuous Blood Gluc Sensor (FREESTYLE ANT SENSOR SYSTEM) [facility] Please dispense one free style ant kit 1 each 0    latanoprost (XALATAN) 0.005 % ophthalmic solution 1 drop nightly      Calcium Carb-Cholecalciferol (CALCIUM 1000 + D PO) Take by mouth      pregabalin (LYRICA) 75 MG capsule Take 1 capsule by mouth 2 times daily for 30 days. 60 capsule 1     No current facility-administered medications on file prior to visit. Review of Systems   Musculoskeletal: Positive for arthralgias. Review of Systems:   History obtained from chart review and the patient  General ROS: negative for - chills, fatigue, fever, night sweats or weight gain  Constitutional: Negative for chills, diaphoresis, fatigue, fever and unexpected weight change. Musculoskeletal: Positive for arthralgias, gait problem and joint swelling. Neurological ROS: negative for - behavioral changes, confusion, headaches or seizures. Negative for weakness and numbness. Dermatological ROS: negative for - mole changes, rash  Cardiovascular: Negative for leg swelling. Gastrointestinal: Negative for constipation, diarrhea, nausea and vomiting. Objective:  General: AAO x 3 in NAD. Derm  Toenail Description nails are thick and mycotic yellow incurvated causing pain with shoe gear  Sites of Onychomycosis Involvement (Check affected area)  [x] [x] [x] [x] [x] [x] [x] [x] [x] [x]  5 4 3 2 1 1 2 3 4 5                          Right                                        Left    Thickness  [x] [x] [x] [x] [x] [x] [x] [x] [x] [x]  5 4 3 2 1 1 2 3 4 5                         Right                                        Left    Dystrophic Changes   [x] [x] [x] [x] [x] [x] [x] [x] [x] [x]  5 4 3 2 1 1 2 3 4 5                         Right                                        Left    Color   [x] [x] [x] [x] [x] [x] [x] [x] [x] [x]  5 4 3 2 1 1 2 3 4 5                          Right                                        Left    Incurvation/Ingrowin   [x] [x] [x] [x] [x] [x] [x] [x] [x] [x]  5 4 3 2 1 1 2 3 4 5                         Right                                        Left    Inflammation/Pain   [x] [x] [x] [x] [x] [x] [x] [x] [x] [x]  5 4 3 2 1 1 2 3 4 5                         Right                                        Left      Dermatologic Exam:  Skin lesion/ulceration .    Skin callus bilaterally plantar aspect plantar to the second and third metatarsal heads both lesions are about 1 cm x 1 cm tissue  Loss of hair  lower extremity      Musculoskeletal:     1st MPJ ROM decreased, Bilateral.  Muscle Bilateral.  Pain present upon palpation of toenails 1-5, Bilateral. decreased medial longitudinal arch, Bilateral.  Ankle ROM decreased,Bilateral.    Dorsally contracted digits     Vascular:  Loss of hair  LE   Nails thick yellow   Absent pt pulses   Cool touch   CFT <absent seconds, Bilateral.  Hair growth absent to the level of the digits, Bilateral.  Edema minimal Bilateral.  Varicosities severe Bilateral.     Neurological: Sensation diminshed to light touch to level of digits, Bilateral.  Protective sensation decreased sites via 5.07/10g Heflin-Ayanna Monofilament, Bilateral.  negative Tinel's, Bilateral.  negative Valleix sign, Bilateral.      Integument: nails   thickened > 3.0 mm, dystrophic and crumbly, discolored with subungual debris. Toes cool to touch    Visual inspection:  Deformity: hammertoe deformity priti feet  amputation: absent    Edema:   Sensory exam:  Monofilament sensation: abnormal - 6/10 via SW 5.07/10g monofilament to the plantar foot bilateral feet    Pulses:     Pinprick: Impaired  Proprioception: Impaired  Vibration (128 Hz): Impaired       DM with PVD       [x]Yes    []no    Foot Exam    General  General Appearance: appears stated age and healthy   Orientation: alert and oriented to person, place, and time       Right Foot/Ankle     Inspection and Palpation  Skin Exam: abnormal color; no skin changes     Neurovascular  Dorsalis pedis: 2+  Posterior tibial: absent      Left Foot/Ankle      Inspection and Palpation  Skin Exam: skin changes and abnormal color; Neurovascular  Dorsalis pedis: 2+  Posterior tibial: absent           Xr Foot Left (min 3 Views)      Ortho Exam      Assessment:  78 y.o. female with:  1. Tinea unguium    2.  Type 2 diabetes mellitus without complication, without long-term current use of insulin ([address] 3. Peripheral vascular disease, unspecified ([facility]    4. Pain in toe of right foot    5. Difficulty walking       Orders Placed This Encounter   Procedures     DIABETES FOOT EXAM           Q7   []Yes    []No                Q8   [x]Yes    []No                     Q9   []Yes    []No    Plan:Visual inspection:  Deformity/amputation: absent  Skin lesions/pre-ulcerative calluses: present -   Edema: right- trace, left- trace    Sensory exam:  Monofilament sensation: abnormal -   (minimum of 5 random plantar locations tested, avoiding callused areas - > 1 area with absence of sensation is + for neuropathy)    Plus at least one of the following:  Pulses: abnormal - ,   Pinprick: Impaired  Proprioception: Impaired  Vibration (128 Hz): Impaired  Pt was evaluated and examined. Patient was given personalized discharge instructions. Nails 1-10 were debrided sharply in length and thickness with a nipper and , without incident. Pt will follow up in 3 months or sooner if any problems arise. Diagnosis was discussed with the pt and all of their questions were answered in detail. Proper foot hygiene and care was discussed with the pt. Informed patient on proper diabetic foot care and importance of tight glycemic control. Patient to check feet daily and contact the office with any questions/problems/concerns.    Other comorbidity noted and will be managed by PCP.  12/13/2021    Electronically signed by Mendel Calix, DPM on 12/13/2021 at 2:05 PM  12/13/2021

## 2021-12-17 ENCOUNTER — OFFICE VISIT (OUTPATIENT)
Dept: FAMILY MEDICINE CLINIC | Age: 76
End: 2021-12-17
Payer: MEDICARE

## 2021-12-17 ENCOUNTER — TELEPHONE (OUTPATIENT)
Dept: FAMILY MEDICINE CLINIC | Age: 76
End: 2021-12-17

## 2021-12-17 VITALS
DIASTOLIC BLOOD PRESSURE: 60 MMHG | WEIGHT: 161 LBS | HEIGHT: 63 IN | OXYGEN SATURATION: 98 % | TEMPERATURE: 97.2 F | HEART RATE: 78 BPM | BODY MASS INDEX: 28.53 KG/M2 | SYSTOLIC BLOOD PRESSURE: 132 MMHG

## 2021-12-17 DIAGNOSIS — F41.9 ANXIETY: ICD-10-CM

## 2021-12-17 DIAGNOSIS — R11.0 NAUSEA: Primary | ICD-10-CM

## 2021-12-17 DIAGNOSIS — R19.7 DIARRHEA, UNSPECIFIED TYPE: ICD-10-CM

## 2021-12-17 LAB
Lab: NORMAL
PERFORMING INSTRUMENT: NORMAL
QC PASS/FAIL: NORMAL
SARS-COV-2, POC: NORMAL

## 2021-12-17 PROCEDURE — 1090F PRES/ABSN URINE INCON ASSESS: CPT | Performed by: INTERNAL MEDICINE

## 2021-12-17 PROCEDURE — G8484 FLU IMMUNIZE NO ADMIN: HCPCS | Performed by: INTERNAL MEDICINE

## 2021-12-17 PROCEDURE — 87426 SARSCOV CORONAVIRUS AG IA: CPT | Performed by: INTERNAL MEDICINE

## 2021-12-17 PROCEDURE — G8400 PT W/DXA NO RESULTS DOC: HCPCS | Performed by: INTERNAL MEDICINE

## 2021-12-17 PROCEDURE — G8417 CALC BMI ABV UP PARAM F/U: HCPCS | Performed by: INTERNAL MEDICINE

## 2021-12-17 PROCEDURE — 99213 OFFICE O/P EST LOW 20 MIN: CPT | Performed by: INTERNAL MEDICINE

## 2021-12-17 PROCEDURE — 1123F ACP DISCUSS/DSCN MKR DOCD: CPT | Performed by: INTERNAL MEDICINE

## 2021-12-17 PROCEDURE — 4040F PNEUMOC VAC/ADMIN/RCVD: CPT | Performed by: INTERNAL MEDICINE

## 2021-12-17 PROCEDURE — 3017F COLORECTAL CA SCREEN DOC REV: CPT | Performed by: INTERNAL MEDICINE

## 2021-12-17 PROCEDURE — 1036F TOBACCO NON-USER: CPT | Performed by: INTERNAL MEDICINE

## 2021-12-17 PROCEDURE — G8427 DOCREV CUR MEDS BY ELIG CLIN: HCPCS | Performed by: INTERNAL MEDICINE

## 2021-12-17 RX ORDER — ALPRAZOLAM 0.25 MG/1
0.25 TABLET ORAL 2 TIMES DAILY PRN
Qty: 30 TABLET | Refills: 0 | Status: SHIPPED | OUTPATIENT
Start: 2021-12-17 | End: 2022-01-16

## 2021-12-17 RX ORDER — ONDANSETRON 4 MG/1
4 TABLET, FILM COATED ORAL 3 TIMES DAILY PRN
Qty: 15 TABLET | Refills: 0 | Status: SHIPPED
Start: 2021-12-17 | End: 2022-01-12 | Stop reason: SDUPTHER

## 2021-12-17 ASSESSMENT — ENCOUNTER SYMPTOMS
ABDOMINAL PAIN: 0
COUGH: 0
DIARRHEA: 1
NAUSEA: 1
VOMITING: 1
SHORTNESS OF BREATH: 0

## 2021-12-17 NOTE — TELEPHONE ENCOUNTER
Patient called. She said she has not felt well for past two days and has felt shaky, vomiting and nauseas. She said she has been very stressed all week. I explained to her she may need to be seen and she was not happy about that and stated it was unacceptable that she had to be seen for her symptoms she is experiencing. I advised her to be seen through express care. Please advise.

## 2021-12-18 NOTE — PROGRESS NOTES
[address] [place]     21  Constance Nunez : 1945 Sex: female  Age: 78 y.o. Chief Complaint   Patient presents with    Nausea & Vomiting     wednesday; upset stomach, nausea, diarrhea, vomitting    Diarrhea     think this is all mental, not physical       HPI    Patient presents to express care today complaining of 2 days worth of upset stomach/nausea, vomiting. Denies fever or chills. Denies loss of taste or smell. She states she feels this is all psychological issues been very upset recently about her son and daughter-in-law and her [place] plans. Apparently is not going to be able to go to visit them because of some personal issues. She has been upset. She thinks this has exacerbated her symptoms. States she is not eating very much. She is anxious. She is currently on Lexapro. States she had a friend bring her in today she did not feel she was able to drive because of not feeling well. Patient is vaccinated against Covid. Review of Systems   Constitutional: Negative for chills and fever. HENT: Negative. Respiratory: Negative for cough and shortness of breath. Cardiovascular: Negative for chest pain and palpitations. Gastrointestinal: Positive for diarrhea, nausea and vomiting. Negative for abdominal pain. Endocrine:        Type 2 diabetes   Genitourinary: Negative. Musculoskeletal: Negative for myalgias. Neurological: Negative for headaches. Psychiatric/Behavioral: Positive for dysphoric mood. Negative for suicidal ideas. The patient is nervous/anxious. REST OF PERTINENT ROS GONE OVER AND WAS NEGATIVE. Current Outpatient Medications:     ondansetron (ZOFRAN) 4 MG tablet, Take 1 tablet by mouth 3 times daily as needed for Nausea or Vomiting, Disp: 15 tablet, Rfl: 0    ALPRAZolam (XANAX) 0.25 MG tablet, Take 1 tablet by mouth 2 times daily as needed for Anxiety for up to 30 days. , Disp: 30 tablet, Rfl: 0    insulin lispro, 1 Unit Dial, (HUMALOG KWIKPEN) 100 UNIT/ML SOPN, Use on a sliding scale 3 times a day with meals.  Maximum dose 30 units per day., Disp: 5 pen, Rfl: 5    amLODIPine (NORVASC) 5 MG tablet, TAKE 1 TABLET DAILY, Disp: 90 tablet, Rfl: 1    LANTUS SOLOSTAR 100 UNIT/ML injection pen, Inject 65 Units into the skin nightly, Disp: 15 pen, Rfl: 3    metoprolol tartrate (LOPRESSOR) 25 MG tablet, TAKE 1 TABLET TWICE A DAY, Disp: 180 tablet, Rfl: 1    pravastatin (PRAVACHOL) 20 MG tablet, TAKE 1 TABLET DAILY, Disp: 90 tablet, Rfl: 1    levothyroxine (SYNTHROID) 100 MCG tablet, TAKE 1 TABLET DAILY, Disp: 90 tablet, Rfl: 1    glipiZIDE (GLUCOTROL XL) 10 MG extended release tablet, TAKE 1 TABLET DAILY, Disp: 90 tablet, Rfl: 1    BD PEN NEEDLE MICRO U/F 32G X 6 MM MISC, USE WITH LANTUS DAILY, Disp: 100 each, Rfl: 3    Cyanocobalamin (B-12) 5000 MCG CAPS, Take by mouth, Disp: , Rfl:     famotidine (PEPCID) 20 MG tablet, Take 1 tablet by mouth 2 times daily, Disp: 180 tablet, Rfl: 1    aspirin 81 MG EC tablet, Take 81 mg by mouth daily, Disp: , Rfl:     VASCEPA 1 g CAPS capsule, Take 2 capsules by mouth 2 times daily, Disp: , Rfl:     Multiple Vitamins-Minerals (VITEYES COMPLETE PO), Take by mouth, Disp: , Rfl:     divalproex (DEPAKOTE) 250 MG DR tablet, Take 500 mg by mouth nightly , Disp: , Rfl:     escitalopram (LEXAPRO) 10 MG tablet, Take 10 mg by mouth daily, Disp: , Rfl:     Continuous Blood Gluc Sensor (FREESTYLE ANT SENSOR SYSTEM) MISC, 1 box by Does not apply route 2 times daily, Disp: 1 each, Rfl: 0    Continuous Blood Gluc Sensor (FREESTYLE ANT SENSOR SYSTEM) [facility], PLEASE DISPENSE 1 KIT TO PATIENT, Disp: 1 each, Rfl: 0    Continuous Blood Gluc Sensor ([address] [place] [address]) [facility], Please dispense one free style ant kit, Disp: 1 each, Rfl: 0    latanoprost (XALATAN) 0.005 % ophthalmic solution, 1 drop nightly, Disp: , Rfl:     Calcium Carb-Cholecalciferol (CALCIUM 1000 + D PO), Take by mouth, Disp: , Rfl:   Allergies   Allergen Reactions    Seasonal        Past Medical History:   Diagnosis Date    Atrial fibrillation (HCC)     Chronic kidney disease     Colon polyps     Diabetes mellitus ([facility]     Diabetic neuropathy ([facility]     [facility]     Essential hypertension 10/28/2019    Fatty liver     Gastritis     GERD (gastroesophageal reflux disease)     Hyperlipidemia     Hypertension     Hypothyroidism     Irritable bowel syndrome     Major depressive disorder with single episode 10/28/2019    Osteopenia     Photosensitivity disorder     chronic    RBBB     Sleep apnea     on BIPAP    Thyroid disease     Thyroid nodule     neg bx-chronic thyroiditis    Type 2 diabetes mellitus with diabetic polyneuropathy, with long-term current use of insulin ([facility] 7/23/2019    Vitamin D deficiency      Past Surgical History:   Procedure Laterality Date    APPENDECTOMY      CARPAL TUNNEL RELEASE Bilateral     CATARACT REMOVAL Bilateral     CHOLECYSTECTOMY     [facility] TONSILLECTOMY       Family History   Problem Relation Age of Onset    Diabetes Paternal Grandmother     Diabetes Father     Lung Cancer Mother     Pancreatic Cancer Brother     Diabetes Brother      Social History     Socioeconomic History    Marital status:       Spouse name: Not on file    Number of children: Not on file    Years of education: Not on file    Highest education level: Not on file   Occupational History    Not on file   Tobacco Use    Smoking status: Never Smoker    Smokeless tobacco: Never Used   Substance and Sexual Activity    Alcohol use: Never    Drug use: Never    Sexual activity: Not Currently   Other Topics Concern    Not on file   Social History Narrative    Not on file     Social Determinants of Health     Financial Resource Strain: Low Risk     Difficulty of Paying Living Expenses: Not hard at all   Food Insecurity: No Food Insecurity    Worried About [facility] in the Last Year: Never true    Ran Out of Food in the Last Year: Never true   Transportation Needs:     Lack of Transportation (Medical): Not on file    Lack of Transportation (Non-Medical): Not on file   Physical Activity:     Days of Exercise per Week: Not on file    Minutes of Exercise per Session: Not on file   Stress:     Feeling of Stress : Not on file   Social Connections:     Frequency of Communication with Friends and Family: Not on file    Frequency of Social Gatherings with Friends and Family: Not on file    Attends [employer] Services: Not on file    Active Member of [address] [place] [address] or Organizations: Not on file    Attends Club or Organization Meetings: Not on file    Marital Status: Not on file   Intimate Partner Violence:     Fear of Current or Ex-Partner: Not on file    Emotionally Abused: Not on file    Physically Abused: Not on file    Sexually Abused: Not on file   Housing Stability:     Unable to Pay for Housing in the Last Year: Not on file    Number of [place] in the Last Year: Not on file    Unstable Housing in the Last Year: Not on file       Vitals:    12/17/21 1541   BP: 132/60   Pulse: 78   Temp: 97.2 °F (36.2 °C)   TempSrc: Temporal   SpO2: 98%   Weight: 161 lb (73 kg)   Height: 5' 3\" (1.6 m)       Physical Exam  Vitals and nursing note reviewed. Constitutional:       General: She is not in acute distress. Cardiovascular:      Rate and Rhythm: Normal rate and regular rhythm. Pulmonary:      Effort: Pulmonary effort is normal. No respiratory distress. Breath sounds: Normal breath sounds. No wheezing, rhonchi or rales. Abdominal:      General: Bowel sounds are normal. There is no distension. Palpations: Abdomen is soft. Tenderness: There is no abdominal tenderness. Musculoskeletal:      Cervical back: Normal range of motion and neck supple. No tenderness. Skin:     General: Skin is warm and dry. Neurological:      Mental Status: She is alert and oriented to person, place, and time. Psychiatric:         Mood and Affect: Mood normal.         Behavior: Behavior normal.         Thought Content: Thought content normal.         Judgment: Judgment normal.                 Assessment and Plan:  Wes Farris was seen today for nausea & vomiting and diarrhea. Diagnoses and all orders for this visit:    Nausea  -     POCT COVID-19, Antigen    Diarrhea, unspecified type  -     POCT COVID-19, Antigen    Anxiety  -     ALPRAZolam (XANAX) 0.25 MG tablet; Take 1 tablet by mouth 2 times daily as needed for Anxiety for up to 30 days. Other orders  -     ondansetron (ZOFRAN) 4 MG tablet; Take 1 tablet by mouth 3 times daily as needed for Nausea or Vomiting      Plan: I gave her Zofran for nausea. Asked her to start pushing fluids. Limited supply of Xanax for increased anxiety which I told her would only be on a temporary basis. Warned of potential side effects of the medication. OARRS report was run and okay. Covid antigen testing was negative. PCR testing was sent and I asked patient to maintain quarantine until this result is back. She is to keep next appointment. Notify me if not improving. Return for keep appt. Seen By:  Gianna Arciniega MD      *Document was created using voice recognition software. Note was reviewed however may contain grammatical errors.

## 2021-12-22 ENCOUNTER — TELEPHONE (OUTPATIENT)
Dept: FAMILY MEDICINE CLINIC | Age: 76
End: 2021-12-22

## 2021-12-22 NOTE — TELEPHONE ENCOUNTER
Meche calling to tell you that the nausea and diarrhea. That has passed since taking the medication that you gave her. However, she is bloated and now has black, sticky stools. She is only passing small amounts, but all are black and sticky.

## 2021-12-23 ENCOUNTER — OFFICE VISIT (OUTPATIENT)
Dept: FAMILY MEDICINE CLINIC | Age: 76
End: 2021-12-23
Payer: MEDICARE

## 2021-12-23 VITALS
OXYGEN SATURATION: 95 % | SYSTOLIC BLOOD PRESSURE: 128 MMHG | WEIGHT: 162.2 LBS | DIASTOLIC BLOOD PRESSURE: 88 MMHG | RESPIRATION RATE: 17 BRPM | HEIGHT: 63 IN | HEART RATE: 84 BPM | TEMPERATURE: 98.8 F | BODY MASS INDEX: 28.74 KG/M2

## 2021-12-23 DIAGNOSIS — K59.00 CONSTIPATION, UNSPECIFIED CONSTIPATION TYPE: Primary | ICD-10-CM

## 2021-12-23 DIAGNOSIS — K92.1 BLACK STOOL: ICD-10-CM

## 2021-12-23 DIAGNOSIS — E11.9 TYPE 2 DIABETES MELLITUS WITHOUT COMPLICATION, WITH LONG-TERM CURRENT USE OF INSULIN (HCC): ICD-10-CM

## 2021-12-23 DIAGNOSIS — Z79.4 TYPE 2 DIABETES MELLITUS WITHOUT COMPLICATION, WITH LONG-TERM CURRENT USE OF INSULIN (HCC): ICD-10-CM

## 2021-12-23 PROCEDURE — G8484 FLU IMMUNIZE NO ADMIN: HCPCS | Performed by: INTERNAL MEDICINE

## 2021-12-23 PROCEDURE — 99213 OFFICE O/P EST LOW 20 MIN: CPT | Performed by: INTERNAL MEDICINE

## 2021-12-23 PROCEDURE — 2022F DILAT RTA XM EVC RTNOPTHY: CPT | Performed by: INTERNAL MEDICINE

## 2021-12-23 PROCEDURE — 4040F PNEUMOC VAC/ADMIN/RCVD: CPT | Performed by: INTERNAL MEDICINE

## 2021-12-23 PROCEDURE — G8427 DOCREV CUR MEDS BY ELIG CLIN: HCPCS | Performed by: INTERNAL MEDICINE

## 2021-12-23 PROCEDURE — G8400 PT W/DXA NO RESULTS DOC: HCPCS | Performed by: INTERNAL MEDICINE

## 2021-12-23 PROCEDURE — 1123F ACP DISCUSS/DSCN MKR DOCD: CPT | Performed by: INTERNAL MEDICINE

## 2021-12-23 PROCEDURE — 1090F PRES/ABSN URINE INCON ASSESS: CPT | Performed by: INTERNAL MEDICINE

## 2021-12-23 PROCEDURE — 1036F TOBACCO NON-USER: CPT | Performed by: INTERNAL MEDICINE

## 2021-12-23 PROCEDURE — G8417 CALC BMI ABV UP PARAM F/U: HCPCS | Performed by: INTERNAL MEDICINE

## 2021-12-23 PROCEDURE — 3017F COLORECTAL CA SCREEN DOC REV: CPT | Performed by: INTERNAL MEDICINE

## 2021-12-23 NOTE — TELEPHONE ENCOUNTER
Advised ja of the response. She is too nervous to wait even thru the long weekend. She is going to come in to [facility] some time today.

## 2021-12-24 PROBLEM — E11.9 TYPE 2 DIABETES MELLITUS (HCC): Status: ACTIVE | Noted: 2021-12-24

## 2021-12-24 ASSESSMENT — ENCOUNTER SYMPTOMS
DIARRHEA: 1
SHORTNESS OF BREATH: 0
COUGH: 0
NAUSEA: 0
BLOOD IN STOOL: 0
CONSTIPATION: 1
ABDOMINAL DISTENTION: 0
ABDOMINAL PAIN: 0

## 2021-12-24 NOTE — PROGRESS NOTES
[facility]     21  Colon [place] : 1945 Sex: female  Age: 78 y.o. Chief Complaint   Patient presents with    Other     Patient stated that she is still have GI problems ( discoloration in fecal)    Anorexia     patient stated that food is not setting well with her x 1 day        HPI    Patient presents to express care today complaining of her bowels. States she did have some loose stool initially several days ago has now become constipated. She denies any abdominal pain. She has had no blood in stool. She does admit to couple episodes of black stool yesterday. When she did start to move her bowels some. She states she has not had Pepto-Bismol for a few days but did have it back in the beginning when she was having some diarrhea. She tells me now that she thinks she did turn the corner this morning and is doing better from the stool standpoint as she did move her bowels well today. Denies any fever or chills. Denies nausea or vomiting. States she has had some decreased appetite. Review of Systems   Constitutional: Negative for chills and fever. Respiratory: Negative for cough and shortness of breath. Gastrointestinal: Positive for constipation and diarrhea. Negative for abdominal distention, abdominal pain, blood in stool and nausea. Genitourinary: Negative for dysuria, frequency and pelvic pain. Neurological: Negative for light-headedness and headaches. REST OF PERTINENT ROS GONE OVER AND WAS NEGATIVE. Current Outpatient Medications:     ondansetron (ZOFRAN) 4 MG tablet, Take 1 tablet by mouth 3 times daily as needed for Nausea or Vomiting, Disp: 15 tablet, Rfl: 0    ALPRAZolam (XANAX) 0.25 MG tablet, Take 1 tablet by mouth 2 times daily as needed for Anxiety for up to 30 days. , Disp: 30 tablet, Rfl: 0    insulin lispro, 1 Unit Dial, (HUMALOG KWIKPEN) 100 UNIT/ML SOPN, Use on a sliding scale 3 times a day with meals. Maximum dose 30 units per day., Disp: 5 pen, Rfl: 5    amLODIPine (NORVASC) 5 MG tablet, TAKE 1 TABLET DAILY, Disp: 90 tablet, Rfl: 1    LANTUS SOLOSTAR 100 UNIT/ML injection pen, Inject 65 Units into the skin nightly, Disp: 15 pen, Rfl: 3    metoprolol tartrate (LOPRESSOR) 25 MG tablet, TAKE 1 TABLET TWICE A DAY, Disp: 180 tablet, Rfl: 1    pravastatin (PRAVACHOL) 20 MG tablet, TAKE 1 TABLET DAILY, Disp: 90 tablet, Rfl: 1    levothyroxine (SYNTHROID) 100 MCG tablet, TAKE 1 TABLET DAILY, Disp: 90 tablet, Rfl: 1    glipiZIDE (GLUCOTROL XL) 10 MG extended release tablet, TAKE 1 TABLET DAILY, Disp: 90 tablet, Rfl: 1    BD PEN NEEDLE MICRO U/F 32G X 6 MM MISC, USE WITH LANTUS DAILY, Disp: 100 each, Rfl: 3    Cyanocobalamin (B-12) 5000 MCG CAPS, Take by mouth, Disp: , Rfl:     famotidine (PEPCID) 20 MG tablet, Take 1 tablet by mouth 2 times daily, Disp: 180 tablet, Rfl: 1    aspirin 81 MG EC tablet, Take 81 mg by mouth daily, Disp: , Rfl:     VASCEPA 1 g CAPS capsule, Take 2 capsules by mouth 2 times daily, Disp: , Rfl:     Multiple Vitamins-Minerals (VITEYES COMPLETE PO), Take by mouth, Disp: , Rfl:     divalproex (DEPAKOTE) 250 MG DR tablet, Take 500 mg by mouth nightly , Disp: , Rfl:     escitalopram (LEXAPRO) 10 MG tablet, Take 10 mg by mouth daily, Disp: , Rfl:     Continuous Blood Gluc Sensor (FREESTYLE ANT SENSOR SYSTEM) MISC, 1 box by Does not apply route 2 times daily, Disp: 1 each, Rfl: 0    Continuous Blood Gluc Sensor (FREESTYLE ANT SENSOR SYSTEM) [facility], PLEASE DISPENSE 1 KIT TO PATIENT, Disp: 1 each, Rfl: 0    Continuous Blood Gluc Sensor ([address] [place] [address]) [facility], Please dispense one free style ant kit, Disp: 1 each, Rfl: 0    latanoprost (XALATAN) 0.005 % ophthalmic solution, 1 drop nightly, Disp: , Rfl:     Calcium Carb-Cholecalciferol (CALCIUM 1000 + D PO), Take by mouth, Disp: , Rfl:   Allergies   Allergen Reactions    Seasonal        Past Medical History:   Diagnosis Date    Atrial fibrillation ([facility]     Chronic kidney disease     Colon polyps     Diabetes mellitus ([facility]     Diabetic neuropathy ([facility]     [facility]     Essential hypertension 10/28/2019    Fatty liver     Gastritis     GERD (gastroesophageal reflux disease)     Hyperlipidemia     Hypertension     Hypothyroidism     Irritable bowel syndrome     Major depressive disorder with single episode 10/28/2019    Osteopenia     Photosensitivity disorder     chronic    RBBB     Sleep apnea     on BIPAP    Thyroid disease     Thyroid nodule     neg bx-chronic thyroiditis    Type 2 diabetes mellitus with diabetic polyneuropathy, with long-term current use of insulin ([facility] 7/23/2019    Vitamin D deficiency      Past Surgical History:   Procedure Laterality Date    APPENDECTOMY      CARPAL TUNNEL RELEASE Bilateral     CATARACT REMOVAL Bilateral     CHOLECYSTECTOMY     [facility] M[facility]ica TONSILLECTOMY       Family History   Problem Relation Age of Onset    Diabetes Paternal Grandmother     Diabetes Father     Lung Cancer Mother     Pancreatic Cancer Brother     Diabetes Brother      Social History     Socioeconomic History    Marital status:       Spouse name: Not on file    Number of children: Not on file    Years of education: Not on file    Highest education level: Not on file   Occupational History    Not on file   Tobacco Use    Smoking status: Never Smoker    Smokeless tobacco: Never Used   Substance and Sexual Activity    Alcohol use: Never    Drug use: Never    Sexual activity: Not Currently   Other Topics Concern    Not on file   Social History Narrative    Not on file     Social Determinants of Health     Financial Resource Strain: Low Risk     Difficulty of Paying Living Expenses: Not hard at all   Food Insecurity: No Food Insecurity    Worried About [facility] in the Last Year: Never true    Ran Out of Food in the Last Year: Never true   Transportation Needs:     Lack of Transportation (Medical): Not on file    Lack of Transportation (Non-Medical): Not on file   Physical Activity:     Days of Exercise per Week: Not on file    Minutes of Exercise per Session: Not on file   Stress:     Feeling of Stress : Not on file   Social Connections:     Frequency of Communication with Friends and Family: Not on file    Frequency of Social Gatherings with Friends and Family: Not on file    Attends Gnosticist Services: Not on file    Active Member of [address] [place] [address] [employer] or Organizations: Not on file    Attends Club or Organization Meetings: Not on file    Marital Status: Not on file   Intimate Partner Violence:     Fear of Current or Ex-Partner: Not on file    Emotionally Abused: Not on file    Physically Abused: Not on file    Sexually Abused: Not on file   Housing Stability:     Unable to Pay for Housing in the Last Year: Not on file    Number of [place] in the Last Year: Not on file    Unstable Housing in the Last Year: Not on file       Vitals:    12/23/21 1513   BP: 128/88   Pulse: 84   Resp: 17   Temp: 98.8 °F (37.1 °C)   TempSrc: Temporal   SpO2: 95%   Weight: 162 lb 3.2 oz (73.6 kg)   Height: 5' 3\" (1.6 m)       Physical Exam  Vitals and nursing note reviewed. Constitutional:       General: She is not in acute distress. Appearance: She is not ill-appearing. Cardiovascular:      Rate and Rhythm: Normal rate and regular rhythm. Pulmonary:      Effort: Pulmonary effort is normal.      Breath sounds: Normal breath sounds. Abdominal:      General: Bowel sounds are normal. There is no distension. Palpations: Abdomen is soft. Tenderness: There is no abdominal tenderness. Genitourinary:     Rectum: Normal. Guaiac result negative. Comments: Rectal exam: Sphincter tone good. Stool for occult blood guaiac negative. No rectal masses noted.   Skin:     General: Skin is warm and dry. Neurological:      General: No focal deficit present. Mental Status: She is alert and oriented to person, place, and time. Psychiatric:         Mood and Affect: Mood normal.         Behavior: Behavior normal.         Thought Content: Thought content normal.         Judgment: Judgment normal.                 Assessment and Plan:  Chace Dale was seen today for other and anorexia. Diagnoses and all orders for this visit:    Constipation, unspecified constipation type    Type 2 diabetes mellitus without complication, with long-term current use of insulin ([address]    Black stool    Plan: Patient states she thinks she is doing better at this time. With negative Hemoccult to dark stool was related to the Pepto-Bismol she had taken several days ago. I did ask her to start using Metamucil to regulate her bowels and add fiber to her diet. Keep upcoming appointment. Notify us if not improving. Make sure drinking enough liquids. No follow-ups on file. Seen By:  Ann Antoine MD      *Document was created using voice recognition software. Note was reviewed however may contain grammatical errors.

## 2021-12-27 DIAGNOSIS — I10 ESSENTIAL HYPERTENSION: ICD-10-CM

## 2021-12-27 RX ORDER — PRAVASTATIN SODIUM 20 MG
TABLET ORAL
Qty: 90 TABLET | Refills: 1 | Status: SHIPPED
Start: 2021-12-27 | End: 2022-06-16

## 2021-12-27 RX ORDER — AMLODIPINE BESYLATE 5 MG/1
TABLET ORAL
Qty: 90 TABLET | Refills: 1 | Status: SHIPPED
Start: 2021-12-27 | End: 2022-01-12 | Stop reason: SDUPTHER

## 2021-12-27 NOTE — TELEPHONE ENCOUNTER
Pt would like her script sent to [facility].     Last Appointment:  12/23/2021  Future Appointments   Date Time Provider Coleman Garcia   1/12/2022  2:45 PM Ann Antoine MD [address] [place] [address]   3/21/2022  1:00 PM Marco Lamb DPM [facility]   11/22/2022  1:15 PM JULIET Harris - NP [facility]

## 2022-01-03 ENCOUNTER — TELEPHONE (OUTPATIENT)
Dept: FAMILY MEDICINE CLINIC | Age: 77
End: 2022-01-03

## 2022-01-03 DIAGNOSIS — R19.7 DIARRHEA, UNSPECIFIED TYPE: Primary | ICD-10-CM

## 2022-01-03 NOTE — TELEPHONE ENCOUNTER
Meche calling about her recent Covid test. It was a rapid, and she is still experiencing symptoms. She has lost 7 pounds, and dose not feel any better. She is asking if you recommend a PCR test, since the other test was a rapid and may be wrong.

## 2022-01-03 NOTE — TELEPHONE ENCOUNTER
At the visit she had told me that she felt she turned the corner and was starting to feel better. I will put a PCR order in that she could have done. no way for me to tell. If she continues with her symptoms she will need to be seen.   Make sure she is hydrating herself

## 2022-01-04 ENCOUNTER — OFFICE VISIT (OUTPATIENT)
Dept: FAMILY MEDICINE CLINIC | Age: 77
End: 2022-01-04
Payer: MEDICARE

## 2022-01-04 VITALS
HEART RATE: 73 BPM | WEIGHT: 164 LBS | RESPIRATION RATE: 18 BRPM | TEMPERATURE: 97.5 F | SYSTOLIC BLOOD PRESSURE: 128 MMHG | BODY MASS INDEX: 29.06 KG/M2 | DIASTOLIC BLOOD PRESSURE: 66 MMHG | HEIGHT: 63 IN | OXYGEN SATURATION: 98 %

## 2022-01-04 DIAGNOSIS — R53.83 FATIGUE, UNSPECIFIED TYPE: ICD-10-CM

## 2022-01-04 DIAGNOSIS — R53.83 FATIGUE, UNSPECIFIED TYPE: Primary | ICD-10-CM

## 2022-01-04 DIAGNOSIS — R11.0 NAUSEA: ICD-10-CM

## 2022-01-04 DIAGNOSIS — R19.7 DIARRHEA, UNSPECIFIED TYPE: ICD-10-CM

## 2022-01-04 LAB
ALBUMIN SERPL-MCNC: 4.2 G/DL (ref 3.5–5.2)
ALP BLD-CCNC: 72 U/L (ref 35–104)
ALT SERPL-CCNC: 18 U/L (ref 0–32)
ANION GAP SERPL CALCULATED.3IONS-SCNC: 16 MMOL/L (ref 7–16)
AST SERPL-CCNC: 18 U/L (ref 0–31)
BASOPHILS ABSOLUTE: 0.09 E9/L (ref 0–0.2)
BASOPHILS RELATIVE PERCENT: 1.2 % (ref 0–2)
BILIRUB SERPL-MCNC: 0.6 MG/DL (ref 0–1.2)
BUN BLDV-MCNC: 10 MG/DL (ref 6–23)
CALCIUM SERPL-MCNC: 10.6 MG/DL (ref 8.6–10.2)
CHLORIDE BLD-SCNC: 100 MMOL/L (ref 98–107)
CO2: 25 MMOL/L (ref 22–29)
CREAT SERPL-MCNC: 0.9 MG/DL (ref 0.5–1)
EOSINOPHILS ABSOLUTE: 0.32 E9/L (ref 0.05–0.5)
EOSINOPHILS RELATIVE PERCENT: 4.4 % (ref 0–6)
GFR AFRICAN AMERICAN: >60
GFR NON-AFRICAN AMERICAN: >60 ML/MIN/1.73
GLUCOSE BLD-MCNC: 115 MG/DL (ref 74–99)
HCT VFR BLD CALC: 40.9 % (ref 34–48)
HEMOGLOBIN: 13.3 G/DL (ref 11.5–15.5)
IMMATURE GRANULOCYTES #: 0.05 E9/L
IMMATURE GRANULOCYTES %: 0.7 % (ref 0–5)
LYMPHOCYTES ABSOLUTE: 2.79 E9/L (ref 1.5–4)
LYMPHOCYTES RELATIVE PERCENT: 38.7 % (ref 20–42)
MCH RBC QN AUTO: 28.5 PG (ref 26–35)
MCHC RBC AUTO-ENTMCNC: 32.5 % (ref 32–34.5)
MCV RBC AUTO: 87.6 FL (ref 80–99.9)
MONOCYTES ABSOLUTE: 0.64 E9/L (ref 0.1–0.95)
MONOCYTES RELATIVE PERCENT: 8.9 % (ref 2–12)
NEUTROPHILS ABSOLUTE: 3.32 E9/L (ref 1.8–7.3)
NEUTROPHILS RELATIVE PERCENT: 46.1 % (ref 43–80)
PDW BLD-RTO: 13.3 FL (ref 11.5–15)
PLATELET # BLD: 258 E9/L (ref 130–450)
PMV BLD AUTO: 10.3 FL (ref 7–12)
POTASSIUM SERPL-SCNC: 4.2 MMOL/L (ref 3.5–5)
RBC # BLD: 4.67 E12/L (ref 3.5–5.5)
SODIUM BLD-SCNC: 141 MMOL/L (ref 132–146)
TOTAL PROTEIN: 7.5 G/DL (ref 6.4–8.3)
WBC # BLD: 7.2 E9/L (ref 4.5–11.5)

## 2022-01-04 PROCEDURE — 4040F PNEUMOC VAC/ADMIN/RCVD: CPT | Performed by: PHYSICIAN ASSISTANT

## 2022-01-04 PROCEDURE — 1036F TOBACCO NON-USER: CPT | Performed by: PHYSICIAN ASSISTANT

## 2022-01-04 PROCEDURE — 99213 OFFICE O/P EST LOW 20 MIN: CPT | Performed by: PHYSICIAN ASSISTANT

## 2022-01-04 PROCEDURE — G8484 FLU IMMUNIZE NO ADMIN: HCPCS | Performed by: PHYSICIAN ASSISTANT

## 2022-01-04 PROCEDURE — G8427 DOCREV CUR MEDS BY ELIG CLIN: HCPCS | Performed by: PHYSICIAN ASSISTANT

## 2022-01-04 PROCEDURE — G8400 PT W/DXA NO RESULTS DOC: HCPCS | Performed by: PHYSICIAN ASSISTANT

## 2022-01-04 PROCEDURE — G8417 CALC BMI ABV UP PARAM F/U: HCPCS | Performed by: PHYSICIAN ASSISTANT

## 2022-01-04 PROCEDURE — 1090F PRES/ABSN URINE INCON ASSESS: CPT | Performed by: PHYSICIAN ASSISTANT

## 2022-01-04 PROCEDURE — 1123F ACP DISCUSS/DSCN MKR DOCD: CPT | Performed by: PHYSICIAN ASSISTANT

## 2022-01-04 PROCEDURE — 3017F COLORECTAL CA SCREEN DOC REV: CPT | Performed by: PHYSICIAN ASSISTANT

## 2022-01-04 ASSESSMENT — ENCOUNTER SYMPTOMS
COUGH: 0
ABDOMINAL PAIN: 0
DIARRHEA: 0
SORE THROAT: 0
PHOTOPHOBIA: 0
NAUSEA: 0
VOMITING: 0
SHORTNESS OF BREATH: 0
BACK PAIN: 0

## 2022-01-04 NOTE — PROGRESS NOTES
22  Elle Worrell : 1945 Sex: female  Age 78 y.o. Subjective:  Chief Complaint   Patient presents with    Nausea         81-year-old female with a history of hypertension, diabetes, hyperlipidemia, depression and hypothyroidism presents to the walk-in clinic for a PCR COVID-19 test.  Patient states she started to have nausea, vomiting and diarrhea on . At that time she had a rapid test which was negative. Patient states overall her symptoms are starting to improve. She was able to drive herself here. She states now she is left with fatigue. She states that she easily gets worn out after simple household chores like taking out the trash. She is eating and drinking. She states she knows she is not drinking as much as she should. She denies any fever or chills. No abdominal pain. No continued diarrhea. No shortness of breath or chest pain. No headache or neck pain. No lateralizing weakness. Review of Systems   Constitutional: Positive for fatigue. Negative for chills and fever. HENT: Negative for congestion, ear pain and sore throat. Eyes: Negative for photophobia and visual disturbance. Respiratory: Negative for cough and shortness of breath. Cardiovascular: Negative for chest pain. Gastrointestinal: Negative for abdominal pain, diarrhea, nausea and vomiting. Genitourinary: Negative for difficulty urinating, dysuria, frequency and urgency. Musculoskeletal: Negative for back pain, neck pain and neck stiffness. Skin: Negative for rash. Neurological: Negative for dizziness, syncope, weakness, light-headedness and headaches. Hematological: Negative for adenopathy. Does not bruise/bleed easily. Psychiatric/Behavioral: Negative for agitation and confusion. All other systems reviewed and are negative.         PMH:     Past Medical History:   Diagnosis Date    Atrial fibrillation ([facility]     Chronic kidney disease     Colon polyps     [facility]     Diabetic neuropathy ([facility]     Diabetic retinopathy ([facility]     Essential hypertension 10/28/2019    Fatty liver     Gastritis     GERD (gastroesophageal reflux disease)     Hyperlipidemia     Hypertension     Hypothyroidism     Irritable bowel syndrome     Major depressive disorder with single episode 10/28/2019    Osteopenia     Photosensitivity disorder     chronic    RBBB     Sleep apnea     on BIPAP    Thyroid disease     Thyroid nodule     neg bx-chronic thyroiditis    Type 2 diabetes mellitus with diabetic polyneuropathy, with long-term current use of insulin ([facility] 7/23/2019    Vitamin D deficiency        Past Surgical History:   Procedure Laterality Date    APPENDECTOMY      CARPAL TUNNEL RELEASE Bilateral     CATARACT REMOVAL Bilateral     CHOLECYSTECTOMY      GYNECOLOGIC CRYOSURGERY      HYSTERECTOMY      SINUS SURGERY      Dr. Chi Joseph TONSILLECTOMY         Family History   Problem Relation Age of Onset    Diabetes Paternal Grandmother     Diabetes Father     Lung Cancer Mother     Pancreatic Cancer Brother     Diabetes Brother        Medications:     Current Outpatient Medications:     amLODIPine (NORVASC) 5 MG tablet, TAKE 1 TABLET DAILY, Disp: 90 tablet, Rfl: 1    pravastatin (PRAVACHOL) 20 MG tablet, TAKE 1 TABLET DAILY, Disp: 90 tablet, Rfl: 1    ondansetron (ZOFRAN) 4 MG tablet, Take 1 tablet by mouth 3 times daily as needed for Nausea or Vomiting, Disp: 15 tablet, Rfl: 0    ALPRAZolam (XANAX) 0.25 MG tablet, Take 1 tablet by mouth 2 times daily as needed for Anxiety for up to 30 days. , Disp: 30 tablet, Rfl: 0    insulin lispro, 1 Unit Dial, (HUMALOG KWIKPEN) 100 UNIT/ML SOPN, Use on a sliding scale 3 times a day with meals.  Maximum dose 30 units per day., Disp: 5 pen, Rfl: 5    LANTUS SOLOSTAR 100 UNIT/ML injection pen, Inject 65 Units into the skin nightly, Disp: 15 pen, Rfl: 3    metoprolol tartrate (LOPRESSOR) 25 MG tablet, TAKE 1 TABLET TWICE A DAY, Disp: 180 tablet, Rfl: 1    levothyroxine (SYNTHROID) 100 MCG tablet, TAKE 1 TABLET DAILY, Disp: 90 tablet, Rfl: 1    glipiZIDE (GLUCOTROL XL) 10 MG extended release tablet, TAKE 1 TABLET DAILY, Disp: 90 tablet, Rfl: 1    BD PEN NEEDLE MICRO U/F 32G X 6 MM MISC, USE WITH LANTUS DAILY, Disp: 100 each, Rfl: 3    Cyanocobalamin (B-12) 5000 MCG CAPS, Take by mouth, Disp: , Rfl:     famotidine (PEPCID) 20 MG tablet, Take 1 tablet by mouth 2 times daily, Disp: 180 tablet, Rfl: 1    aspirin 81 MG EC tablet, Take 81 mg by mouth daily, Disp: , Rfl:     VASCEPA 1 g CAPS capsule, Take 2 capsules by mouth 2 times daily, Disp: , Rfl:     Multiple Vitamins-Minerals (VITEYES COMPLETE PO), Take by mouth, Disp: , Rfl:     divalproex (DEPAKOTE) 250 MG DR tablet, Take 500 mg by mouth nightly , Disp: , Rfl:     escitalopram (LEXAPRO) 10 MG tablet, Take 10 mg by mouth daily, Disp: , Rfl:     Continuous Blood Gluc Sensor (FREESTYLE ANT SENSOR SYSTEM) MISC, 1 box by Does not apply route 2 times daily, Disp: 1 each, Rfl: 0    Continuous Blood Gluc Sensor (FREESTYLE ANT SENSOR SYSTEM) [facility] [place] [place], PLEASE DISPENSE 1 KIT TO PATIENT, Disp: 1 each, Rfl: 0    Continuous Blood Gluc Sensor ([address] [place] [address]) [facility] [place] [place], Please dispense one free style ant kit, Disp: 1 each, Rfl: 0    latanoprost (XALATAN) 0.005 % ophthalmic solution, 1 drop nightly, Disp: , Rfl:     Calcium Carb-Cholecalciferol (CALCIUM 1000 + D PO), Take by mouth, Disp: , Rfl:     Allergies: Allergies   Allergen Reactions    Seasonal        Social History:     Social History     Tobacco Use    Smoking status: Never Smoker    Smokeless tobacco: Never Used   Substance Use Topics    Alcohol use: Never    Drug use: Never       Patient lives at home.     Physical Exam:     Vitals:    01/04/22 1054   BP: 128/66   Pulse: 73   Resp: 18   Temp: 97.5 °F (36.4 °C)   TempSrc: Temporal   SpO2: 98%   Weight: 164 lb (74.4 kg)   Height: 5' 3\" (1.6 m) Exam:  Physical Exam  Vitals and nursing note reviewed. Constitutional:       General: She is not in acute distress. Appearance: She is well-developed. HENT:      Head: Normocephalic and atraumatic. Right Ear: Tympanic membrane normal.      Left Ear: Tympanic membrane normal.      Nose: Nose normal.      Mouth/Throat:      Mouth: Mucous membranes are moist.      Pharynx: Oropharynx is clear. Eyes:      Conjunctiva/sclera: Conjunctivae normal.      Pupils: Pupils are equal, round, and reactive to light. Cardiovascular:      Rate and Rhythm: Normal rate and regular rhythm. Pulmonary:      Effort: Pulmonary effort is normal. No respiratory distress. Breath sounds: Normal breath sounds. Abdominal:      General: Bowel sounds are normal.      Palpations: Abdomen is soft. Tenderness: There is no abdominal tenderness. Musculoskeletal:         General: Normal range of motion. Cervical back: Normal range of motion. No rigidity. Lymphadenopathy:      Cervical: No cervical adenopathy. Skin:     General: Skin is warm and dry. Neurological:      General: No focal deficit present. Mental Status: She is alert and oriented to person, place, and time. Psychiatric:         Mood and Affect: Mood normal.         Behavior: Behavior normal.         Thought Content: Thought content normal.         Judgment: Judgment normal.           Testing:           Medical Decision Making:     Patient upon arrival did not appear toxic or lethargic. Vital signs were reviewed. Past medical history reviewed. Allergies reviewed. Medications reviewed. Patient is presenting with the above complaint of fatigue. Differential diagnosis was discussed. Patient's PCP has ordered a Covid PCR test and this was collected today with pending results. She will have CBC and CMP drawn. She is to drink plenty of fluids.   She is to follow-up with her PCP and states she has an appointment next week.  She is to return to the emergency department for worsening symptoms. Clinical Impression:   James Mullen was seen today for nausea. Diagnoses and all orders for this visit:    Fatigue, unspecified type  -     CBC WITH AUTO DIFFERENTIAL; Future  -     COMPREHENSIVE METABOLIC PANEL; Future    Nausea        The patient is to call for any concerns or return if any of the signs or symptoms worsen. The patient is to follow-up with PCP in the next 2-3 days for repeat evaluation repeat assessment or go directly to the emergency department. SIGNATURE: Pretty Nieves PA-C

## 2022-01-06 ENCOUNTER — TELEPHONE (OUTPATIENT)
Dept: FAMILY MEDICINE CLINIC | Age: 77
End: 2022-01-06

## 2022-01-06 LAB
SARS-COV-2: NOT DETECTED
SOURCE: NORMAL

## 2022-01-10 DIAGNOSIS — Z79.4 TYPE 2 DIABETES MELLITUS WITH DIABETIC POLYNEUROPATHY, WITH LONG-TERM CURRENT USE OF INSULIN (HCC): ICD-10-CM

## 2022-01-10 DIAGNOSIS — E11.42 TYPE 2 DIABETES MELLITUS WITH DIABETIC POLYNEUROPATHY, WITH LONG-TERM CURRENT USE OF INSULIN (HCC): ICD-10-CM

## 2022-01-10 RX ORDER — INSULIN GLARGINE 100 [IU]/ML
65 INJECTION, SOLUTION SUBCUTANEOUS NIGHTLY
Qty: 15 PEN | Refills: 3 | Status: SHIPPED
Start: 2022-01-10 | End: 2022-06-16 | Stop reason: SDUPTHER

## 2022-01-10 RX ORDER — INSULIN LISPRO 100 [IU]/ML
INJECTION, SOLUTION INTRAVENOUS; SUBCUTANEOUS
Qty: 15 PEN | Refills: 1 | Status: SHIPPED
Start: 2022-01-10 | End: 2022-10-10 | Stop reason: SDUPTHER

## 2022-01-10 NOTE — TELEPHONE ENCOUNTER
Last Appointment:  12/23/2021  Future Appointments   Date Time Provider Coleman Garcia   1/12/2022  2:45 PM Jody Velázquez  [address] [place] [address]   3/21/2022  1:00 PM Estuardo Marino DPM Col Podiat[facility]   11/22/2022  1:15 PM JULIET Steel - NP [facility]      Changed pharmacies with new year.  rxs pended

## 2022-01-10 NOTE — TELEPHONE ENCOUNTER
Called patient and notified her that her covid results came back and she is negative. Patient verbalized understanding and thanked me for calling.

## 2022-01-10 NOTE — TELEPHONE ENCOUNTER
Quite honestly I do not know why endocrine is not ordering her diabetes related medications since they taking care of her diabetes I would like them to start ordering. . I sent them over but she should discuss that with them

## 2022-01-12 ENCOUNTER — TELEPHONE (OUTPATIENT)
Dept: FAMILY MEDICINE CLINIC | Age: 77
End: 2022-01-12

## 2022-01-12 ENCOUNTER — OFFICE VISIT (OUTPATIENT)
Dept: FAMILY MEDICINE CLINIC | Age: 77
End: 2022-01-12
Payer: MEDICARE

## 2022-01-12 VITALS
WEIGHT: 164 LBS | DIASTOLIC BLOOD PRESSURE: 56 MMHG | HEART RATE: 72 BPM | BODY MASS INDEX: 29.06 KG/M2 | SYSTOLIC BLOOD PRESSURE: 122 MMHG | HEIGHT: 63 IN | OXYGEN SATURATION: 96 % | TEMPERATURE: 98 F

## 2022-01-12 DIAGNOSIS — E03.9 HYPOTHYROIDISM, UNSPECIFIED TYPE: ICD-10-CM

## 2022-01-12 DIAGNOSIS — R14.0 ABDOMINAL BLOATING: ICD-10-CM

## 2022-01-12 DIAGNOSIS — E78.5 HYPERLIPIDEMIA, UNSPECIFIED HYPERLIPIDEMIA TYPE: ICD-10-CM

## 2022-01-12 DIAGNOSIS — R53.83 FATIGUE, UNSPECIFIED TYPE: ICD-10-CM

## 2022-01-12 DIAGNOSIS — I10 ESSENTIAL HYPERTENSION: ICD-10-CM

## 2022-01-12 DIAGNOSIS — G47.33 OBSTRUCTIVE SLEEP APNEA: ICD-10-CM

## 2022-01-12 DIAGNOSIS — R11.0 NAUSEA: ICD-10-CM

## 2022-01-12 DIAGNOSIS — Z79.4 TYPE 2 DIABETES MELLITUS WITH DIABETIC POLYNEUROPATHY, WITH LONG-TERM CURRENT USE OF INSULIN (HCC): ICD-10-CM

## 2022-01-12 DIAGNOSIS — F32.9 MAJOR DEPRESSIVE DISORDER WITH SINGLE EPISODE, REMISSION STATUS UNSPECIFIED: ICD-10-CM

## 2022-01-12 DIAGNOSIS — F41.9 ANXIETY: ICD-10-CM

## 2022-01-12 DIAGNOSIS — E11.42 TYPE 2 DIABETES MELLITUS WITH DIABETIC POLYNEUROPATHY, WITH LONG-TERM CURRENT USE OF INSULIN (HCC): ICD-10-CM

## 2022-01-12 LAB
ALBUMIN SERPL-MCNC: 4.4 G/DL (ref 3.5–5.2)
ALP BLD-CCNC: 79 U/L (ref 35–104)
ALT SERPL-CCNC: 17 U/L (ref 0–32)
AMYLASE: 67 U/L (ref 20–100)
ANION GAP SERPL CALCULATED.3IONS-SCNC: 22 MMOL/L (ref 7–16)
AST SERPL-CCNC: 20 U/L (ref 0–31)
BILIRUB SERPL-MCNC: 0.6 MG/DL (ref 0–1.2)
BUN BLDV-MCNC: 17 MG/DL (ref 6–23)
CALCIUM SERPL-MCNC: 10.2 MG/DL (ref 8.6–10.2)
CHLORIDE BLD-SCNC: 94 MMOL/L (ref 98–107)
CO2: 21 MMOL/L (ref 22–29)
CREAT SERPL-MCNC: 0.9 MG/DL (ref 0.5–1)
GFR AFRICAN AMERICAN: >60
GFR NON-AFRICAN AMERICAN: >60 ML/MIN/1.73
GLUCOSE BLD-MCNC: 327 MG/DL (ref 74–99)
POTASSIUM SERPL-SCNC: 4.4 MMOL/L (ref 3.5–5)
SODIUM BLD-SCNC: 137 MMOL/L (ref 132–146)
T4 FREE: 1.33 NG/DL (ref 0.93–1.7)
TOTAL PROTEIN: 7.7 G/DL (ref 6.4–8.3)
TSH SERPL DL<=0.05 MIU/L-ACNC: 2.03 UIU/ML (ref 0.27–4.2)

## 2022-01-12 PROCEDURE — G8427 DOCREV CUR MEDS BY ELIG CLIN: HCPCS | Performed by: INTERNAL MEDICINE

## 2022-01-12 PROCEDURE — 99214 OFFICE O/P EST MOD 30 MIN: CPT | Performed by: INTERNAL MEDICINE

## 2022-01-12 PROCEDURE — G8417 CALC BMI ABV UP PARAM F/U: HCPCS | Performed by: INTERNAL MEDICINE

## 2022-01-12 PROCEDURE — 1123F ACP DISCUSS/DSCN MKR DOCD: CPT | Performed by: INTERNAL MEDICINE

## 2022-01-12 PROCEDURE — 2022F DILAT RTA XM EVC RTNOPTHY: CPT | Performed by: INTERNAL MEDICINE

## 2022-01-12 PROCEDURE — 1036F TOBACCO NON-USER: CPT | Performed by: INTERNAL MEDICINE

## 2022-01-12 PROCEDURE — 3046F HEMOGLOBIN A1C LEVEL >9.0%: CPT | Performed by: INTERNAL MEDICINE

## 2022-01-12 PROCEDURE — 4040F PNEUMOC VAC/ADMIN/RCVD: CPT | Performed by: INTERNAL MEDICINE

## 2022-01-12 PROCEDURE — 3017F COLORECTAL CA SCREEN DOC REV: CPT | Performed by: INTERNAL MEDICINE

## 2022-01-12 PROCEDURE — G8484 FLU IMMUNIZE NO ADMIN: HCPCS | Performed by: INTERNAL MEDICINE

## 2022-01-12 PROCEDURE — 1090F PRES/ABSN URINE INCON ASSESS: CPT | Performed by: INTERNAL MEDICINE

## 2022-01-12 PROCEDURE — G8400 PT W/DXA NO RESULTS DOC: HCPCS | Performed by: INTERNAL MEDICINE

## 2022-01-12 RX ORDER — ALPRAZOLAM 0.25 MG/1
0.25 TABLET ORAL 2 TIMES DAILY PRN
Qty: 30 TABLET | Refills: 0 | Status: CANCELLED | OUTPATIENT
Start: 2022-01-12 | End: 2022-02-11

## 2022-01-12 RX ORDER — AMLODIPINE BESYLATE 5 MG/1
TABLET ORAL
Qty: 90 TABLET | Refills: 1 | Status: SHIPPED
Start: 2022-01-12 | End: 2022-06-16 | Stop reason: SDUPTHER

## 2022-01-12 RX ORDER — FAMOTIDINE 20 MG/1
20 TABLET, FILM COATED ORAL 2 TIMES DAILY
Qty: 180 TABLET | Refills: 1 | Status: SHIPPED
Start: 2022-01-12 | End: 2022-06-16 | Stop reason: SDUPTHER

## 2022-01-12 RX ORDER — DONEPEZIL HYDROCHLORIDE 10 MG/1
10 TABLET, FILM COATED ORAL NIGHTLY
COMMUNITY
End: 2022-06-16

## 2022-01-12 RX ORDER — GLIPIZIDE 10 MG/1
TABLET, FILM COATED, EXTENDED RELEASE ORAL
Qty: 90 TABLET | Refills: 1 | Status: SHIPPED
Start: 2022-01-12 | End: 2022-06-16 | Stop reason: SDUPTHER

## 2022-01-12 RX ORDER — ONDANSETRON 4 MG/1
4 TABLET, FILM COATED ORAL 3 TIMES DAILY PRN
Qty: 30 TABLET | Refills: 0 | Status: SHIPPED | OUTPATIENT
Start: 2022-01-12

## 2022-01-12 RX ORDER — LEVOTHYROXINE SODIUM 0.1 MG/1
TABLET ORAL
Qty: 90 TABLET | Refills: 1 | Status: SHIPPED
Start: 2022-01-12 | End: 2022-06-16 | Stop reason: SDUPTHER

## 2022-01-12 RX ORDER — DULOXETIN HYDROCHLORIDE 20 MG/1
20 CAPSULE, DELAYED RELEASE ORAL DAILY
COMMUNITY
Start: 2022-01-04 | End: 2022-06-16 | Stop reason: SDUPTHER

## 2022-01-13 ENCOUNTER — TELEPHONE (OUTPATIENT)
Dept: FAMILY MEDICINE CLINIC | Age: 77
End: 2022-01-13

## 2022-01-13 NOTE — PROGRESS NOTES
[facility]     22  Rowan Olsen : 1945 Sex: female  Age: 78 y.o. Chief Complaint   Patient presents with    Hypertension     3 months    Nausea     going onto 4 weeks where she hasnt felt good       HPI  Patient presents today for 3-month follow-up visit on her medical problems. Looked over last several notes couple of which were [facility] visits abdominal discomfort and diarrhea. She states she is continued to have a bloating type sensation. Diarrhea has subsided. She additionally complains of fatigue and some nausea intermittently. This has not felt right for the past 3 to 4 weeks. We did once again go through the whole scenario and upon prompting her she does state there was a medication started by her neurologist Dr. Floyd Morel. This was Aricept for memory/MCI. I do not have her note. This was started just before all of her symptoms began. I told her there is rather high frequency of gastrointestinal complaints with the medication and wondering if this is not the source. I asked her to go every other day for a week and then stop it as a trial.  She tells me blood pressures been in a good range. Her thyroid numbers are stable on replacement therapy. Lipids are stable on her statin medication. Depression has been stable on her SSRI which she is tolerating. Obstructive sleep apnea stable on her BiPAP that she has recently seen pulmonary for this. Weight is down 2 pounds from last visit. I did review endocrine and pulmonary notes. She is following with endocrine ophthalmology,GYN, pulmonary, neurology  psychiatry, cardiology, physical medicine and pain management. Review of Systems     Const: Denies chills, fever and sweats. Eyes: Reports glaucoma Recent cataract/glaucoma surgery./ diabetic retinopathy changes. /Ophthalmology.  States the  patient does have post cataract surgery changes that will eventually need some laser surgery done  ENMT: Denies ear symptoms. Reports postnasal drip, but denies other nasal symptoms. Denies mouth or throat  symptoms. CV: Denies chest pain, orthopnea and palpitations--history of atrial fibrillation. Currently back in sinus rhythm and off of anticoagulation per cardiology  Resp: Denies cough, SOB and wheezing. GI:  patient has had abdominal bloating, diarrhea, nausea-see above   : Urinary: denies dysuria, frequency and frequent UTI's. Musculo: Reports arthritis, lower back pain and right hip pain/Improved  Skin: Denies eczema, pruritus, rash. Neuro: Denies dizziness, headache, seizures and syncope.  Diabetic neuropathy. Psych: Reports depression and stress/back in counseling.  Some lack of motivation seems to have some improvement on her SSRI  endocrine: Reports diabetes marginally controlled/marginal compliance              REST OF PERTINENT ROS GONE OVER AND WAS NEGATIVE.      PMH:  Problem List: Type II diabetes mellitus uncontrolled, Essential hypertension, Taking medication, Depressive disorder,  Hypothyroidism, Hyperlipidemia, Type 2 diabetes mellitus  Jodie Bonds  1945 Page #2  Health Maintenance:  Influenza Vaccination - (2017)  Couseled on Home Safety - (2017)  Mammogram - (2017)  Bone Density Test Screening - (2012)  Colonoscopy - (2010)  Colonoscopy - (2014)  Colonoscopy - (2017)  Colonoscopy Screening - (2010)  Colonoscopy Screening - (2014)  Colonoscopy Screening - (2017)  Mammogram Screening - (2006)  Mammogram Screening - (3/11/2008)  Mammogram Screening - (2/15/2010)  Mammogram Screening - (2017)  Colonoscopy - ,2/10,-due 17-due 22  Stress Test - , -negative  EKG - ,,,, ,10/18  Rectal Exam - dr Edmundo Boston - \"  Breast Exam - \"  EGD - 2/10  Duplex Carotid Ultasound - 3/09-nl  capsule endoscopy - [facility] 4/10-nl   MMSE---performed by neurology  Influenza Vaccination - (10/2018)  Prevnar Vaccine - (4/2017)  Pneumonia Vaccination - (2004)  Zoster/Shingles Vaccine - had Zostavax, gave slip for Shingrix    Medical Problems:  photosensitivity disorder/chronic, hx pos Lyla  thyroid nodule-neg bx - chronic thyroiditis  IBS, Depression, Non Insulin Dependent Diabetes, Hyperlipidemia, tubal ligation, appy, hysterectomy-still has  ovaries--non cancerous, Gastroesophageal Reflux Disease (GERD), Hypothyroidism, occular htn, Diabetic Neuropathy  Osteopenia - declined med  Difficult Intubation, sees dr li/gyn, dr Claudio Ramirez eye,psych NP, Hypertension  CKD - sees renal  sinus surg - dr Becky Avalos  follows with counsellor,ophthalmology - GYN  cholecystectomy, Gastritis, vit D defic, Colon Polyps, Fatty Liver, bilateral cataract surg  pelvic and sacral fracture - 2016  Diabetic Retinopathy, Left carpal tunnel surgery. , Right carpal tunnel release, RBBB  Sleep Apnea - on BIPAP  Hospitalization 10/18 - New onset atrial fibrillation, UTI, CHF, pneumonia  Atrial Fibrillation-no longer on anticoagulation per cardiology, type 2 Non-Stemi  MCI-sees neurology  Reviewed and updated. SH:  Marital: Legal Status: . Personal Habits: Cigarette Use: Negative For current cigarette smoker. Alcohol: does not use alcohol. Exercise  Type: She works as a  in [employer]. She has a Bachelors - Degree. --now retired                     Current Outpatient Medications:     DULoxetine (CYMBALTA) 20 MG extended release capsule, , Disp: , Rfl:     donepezil (ARICEPT) 10 MG tablet, Take 10 mg by mouth nightly, Disp: , Rfl:     famotidine (PEPCID) 20 MG tablet, Take 1 tablet by mouth 2 times daily, Disp: 180 tablet, Rfl: 1    amLODIPine (NORVASC) 5 MG tablet, TAKE 1 TABLET DAILY, Disp: 90 tablet, Rfl: 1    glipiZIDE (GLUCOTROL XL) 10 MG extended release tablet, Take 1 tablet daily, Disp: 90 tablet, Rfl: 1    ondansetron (ZOFRAN) 4 MG tablet, Take 1 tablet by mouth 3 times daily as needed for Nausea or Vomiting, Disp: 30 tablet, Rfl: 0    levothyroxine (SYNTHROID) 100 MCG tablet, Take 1 tablet daily, Disp: 90 tablet, Rfl: 1    metoprolol tartrate (LOPRESSOR) 25 MG tablet, TAKE 1 TABLET TWICE A DAY, Disp: 180 tablet, Rfl: 1    insulin lispro, 1 Unit Dial, (HUMALOG KWIKPEN) 100 UNIT/ML SOPN, Use on a sliding scale 3 times a day with meals. Maximum dose 30 units per day., Disp: 15 pen, Rfl: 1    LANTUS SOLOSTAR 100 UNIT/ML injection pen, Inject 65 Units into the skin nightly, Disp: 15 pen, Rfl: 3    pravastatin (PRAVACHOL) 20 MG tablet, TAKE 1 TABLET DAILY, Disp: 90 tablet, Rfl: 1    ALPRAZolam (XANAX) 0.25 MG tablet, Take 1 tablet by mouth 2 times daily as needed for Anxiety for up to 30 days. , Disp: 30 tablet, Rfl: 0    BD PEN NEEDLE MICRO U/F 32G X 6 MM MISC, USE WITH LANTUS DAILY, Disp: 100 each, Rfl: 3    Cyanocobalamin (B-12) 50 MCG TABS, Take by mouth , Disp: , Rfl:     aspirin 81 MG EC tablet, Take 81 mg by mouth daily, Disp: , Rfl:     VASCEPA 1 g CAPS capsule, Take 2 capsules by mouth 2 times daily, Disp: , Rfl:     Multiple Vitamins-Minerals (VITEYES COMPLETE PO), Take by mouth, Disp: , Rfl:     divalproex (DEPAKOTE) 250 MG DR tablet, Take 500 mg by mouth nightly , Disp: , Rfl:     Continuous Blood Gluc Sensor ([address] [place] [address]) [facility], 1 box by Does not apply route 2 times daily, Disp: 1 each, Rfl: 0    Continuous Blood Gluc Sensor (FREESTYLE ANT SENSOR SYSTEM) [facility], PLEASE DISPENSE 1 KIT TO PATIENT, Disp: 1 each, Rfl: 0    Continuous Blood Gluc Sensor ([address] [place] [address]) [facility], Please dispense one free style ant kit, Disp: 1 each, Rfl: 0    latanoprost (XALATAN) 0.005 % ophthalmic solution, 1 drop nightly, Disp: , Rfl:     Calcium Carb-Cholecalciferol (CALCIUM 1000 + D PO), Take by mouth, Disp: , Rfl:   Allergies   Allergen Reactions    Seasonal        Past Medical History:   Diagnosis Date    Atrial fibrillation (HCC)     Chronic kidney disease     Colon polyps     Diabetes mellitus [facility]     Diabetic neuropathy ([facility]     Diabetic retinopathy ([facility]     Essential hypertension 10/28/2019    Fatty liver     Gastritis     GERD (gastroesophageal reflux disease)     Hyperlipidemia     Hypertension     Hypothyroidism     Irritable bowel syndrome     Major depressive disorder with single episode 10/28/2019    Osteopenia     Photosensitivity disorder     chronic    RBBB     Sleep apnea     on BIPAP    Thyroid disease     Thyroid nodule     neg bx-chronic thyroiditis    Type 2 diabetes mellitus with diabetic polyneuropathy, with long-term current use of insulin ([facility] 7/23/2019    Vitamin D deficiency      Past Surgical History:   Procedure Laterality Date    APPENDECTOMY      CARPAL TUNNEL RELEASE Bilateral     CATARACT REMOVAL Bilateral     CHOLECYSTECTOMY     Isabella Frost TONSILLECTOMY       Family History   Problem Relation Age of Onset    Diabetes Paternal Grandmother     Diabetes Father     Lung Cancer Mother     Pancreatic Cancer Brother     Diabetes Brother      Social History     Socioeconomic History    Marital status:       Spouse name: Not on file    Number of children: Not on file    Years of education: Not on file    Highest education level: Not on file   Occupational History    Not on file   Tobacco Use    Smoking status: Never Smoker    Smokeless tobacco: Never Used   Substance and Sexual Activity    Alcohol use: Never    Drug use: Never    Sexual activity: Not Currently   Other Topics Concern    Not on file   Social History Narrative    Not on file     Social Determinants of Health     Financial Resource Strain: Low Risk     Difficulty of Paying Living Expenses: Not hard at all   Food Insecurity: No Food Insecurity    Worried About [address] in the Last Year: Never true    [address] [facility] [address] in the Last Year: Never true Transportation Needs:     Lack of Transportation (Medical): Not on file    Lack of Transportation (Non-Medical): Not on file   Physical Activity:     Days of Exercise per Week: Not on file    Minutes of Exercise per Session: Not on file   Stress:     Feeling of Stress : Not on file   Social Connections:     Frequency of Communication with Friends and Family: Not on file    Frequency of Social Gatherings with Friends and Family: Not on file    Attends Hindu Services: Not on file    Active Member of [address] [place] [address] or Organizations: Not on file    Attends Club or Organization Meetings: Not on file    Marital Status: Not on file   Intimate Partner Violence:     Fear of Current or Ex-Partner: Not on file    Emotionally Abused: Not on file    Physically Abused: Not on file    Sexually Abused: Not on file   Housing Stability:     Unable to Pay for Housing in the Last Year: Not on file    Number of [place] in the Last Year: Not on file    Unstable Housing in the Last Year: Not on file       Vitals:    01/12/22 1451   BP: (!) 122/56   Pulse: 72   Temp: 98 °F (36.7 °C)   TempSrc: Temporal   SpO2: 96%   Weight: 164 lb (74.4 kg)   Height: 5' 3\" (1.6 m)       Physical Exam    Const: Appears well developed and well nourished. No signs of acute distress present. Neck: Supple and symmetric. Palpation reveals no adenopathy. No masses appreciated. Thyroid exhibits no nodule  or thyromegaly. No JVD. Carotids: 2+ and equal bilaterally, without bruits. Resp: No rales, rhonchi or wheezes appreciated over the lungs bilaterally. CV: Rate is regular. Rhythm is regular. S1 is normal. S2 is normal. No gallop or rubs. No heart murmur  appreciated. Extremities: No clubbing, cyanosis or edema. Abdomen:   Bowel sounds are normoactive. Palpation reveals softness, nontender, with no distension, organomegaly or   No abdominal masses palpable. No palpable hepatosplenomegaly. Psych: Patient's attitude is cooperative.  Patient's how she does off of her Aricept first.  I feel patient is going to need colonoscopy and EGD. Fall precautions. Notify us of problems in the interim. Return in about 3 months (around 4/12/2022). Seen By:  Antolin Sahu MD      *Document was created using voice recognition software. Note was reviewed however may contain grammatical errors.

## 2022-01-13 NOTE — TELEPHONE ENCOUNTER
Labs stable outside of blood sugar of 327.   Send copy of this to Dr. Reinoso President her endocrinologist.

## 2022-02-07 ENCOUNTER — OFFICE VISIT (OUTPATIENT)
Dept: FAMILY MEDICINE CLINIC | Age: 77
End: 2022-02-07
Payer: MEDICARE

## 2022-02-07 ENCOUNTER — TELEPHONE (OUTPATIENT)
Dept: FAMILY MEDICINE CLINIC | Age: 77
End: 2022-02-07

## 2022-02-07 VITALS
WEIGHT: 164 LBS | TEMPERATURE: 97.3 F | SYSTOLIC BLOOD PRESSURE: 136 MMHG | OXYGEN SATURATION: 99 % | HEART RATE: 76 BPM | BODY MASS INDEX: 29.06 KG/M2 | DIASTOLIC BLOOD PRESSURE: 68 MMHG | HEIGHT: 63 IN

## 2022-02-07 DIAGNOSIS — K59.00 CONSTIPATION, UNSPECIFIED CONSTIPATION TYPE: ICD-10-CM

## 2022-02-07 DIAGNOSIS — R11.0 NAUSEA: Primary | ICD-10-CM

## 2022-02-07 PROCEDURE — 4040F PNEUMOC VAC/ADMIN/RCVD: CPT | Performed by: INTERNAL MEDICINE

## 2022-02-07 PROCEDURE — 3017F COLORECTAL CA SCREEN DOC REV: CPT | Performed by: INTERNAL MEDICINE

## 2022-02-07 PROCEDURE — 99213 OFFICE O/P EST LOW 20 MIN: CPT | Performed by: INTERNAL MEDICINE

## 2022-02-07 PROCEDURE — G8417 CALC BMI ABV UP PARAM F/U: HCPCS | Performed by: INTERNAL MEDICINE

## 2022-02-07 PROCEDURE — 1036F TOBACCO NON-USER: CPT | Performed by: INTERNAL MEDICINE

## 2022-02-07 PROCEDURE — G8400 PT W/DXA NO RESULTS DOC: HCPCS | Performed by: INTERNAL MEDICINE

## 2022-02-07 PROCEDURE — 1123F ACP DISCUSS/DSCN MKR DOCD: CPT | Performed by: INTERNAL MEDICINE

## 2022-02-07 PROCEDURE — 1090F PRES/ABSN URINE INCON ASSESS: CPT | Performed by: INTERNAL MEDICINE

## 2022-02-07 PROCEDURE — G8484 FLU IMMUNIZE NO ADMIN: HCPCS | Performed by: INTERNAL MEDICINE

## 2022-02-07 PROCEDURE — G8427 DOCREV CUR MEDS BY ELIG CLIN: HCPCS | Performed by: INTERNAL MEDICINE

## 2022-02-07 NOTE — TELEPHONE ENCOUNTER
Patient called and stated she is still having difficulty with nausea, still having constipation. She is taking metamucil. Is there anything else she should be taking?

## 2022-02-07 NOTE — PROGRESS NOTES
[facility]     22  Barak Heaton : 1945 Sex: female  Age: 78 y.o. Chief Complaint   Patient presents with    Nausea     anytime she eats, she doesnt feel good after    Constipation     takes metamucil; can go but not all the way; will only go a little bit; feels really bloated       HPI  Patient presents today for acute visit complaining of ongoing nausea and constipation (formerly was having diarrhea). Patient has been using Metamucil daily. Just recently. Has been taking it every couple days and then went to daily just a couple days ago. She denies any abdominal pain with this. Denies any black tarry stool or blood in the stool. She has had ongoing nausea and GI complaints now for the last couple months. Complains of early satiety weight is down a few pounds. She is due for colonoscopy this year. Denies fever or chills. Did have her wean off her Aricept last visit thinking this might have been part of the cause she states she felt better for several days but now symptoms back again. Review of Systems   Const: Denies chills, fever and sweats. Eyes: Reports glaucoma Recent cataract/glaucoma surgery./ diabetic retinopathy changes. /Ophthalmology. States the  patient does have post cataract surgery changes that will eventually need some laser surgery done  ENMT: Denies ear symptoms. Reports postnasal drip, but denies other nasal symptoms. Denies mouth or throat  symptoms. CV: Denies chest pain, orthopnea and palpitations--history of atrial fibrillation. Currently back in sinus rhythm and off of anticoagulation per cardiology  Resp: Denies cough, SOB and wheezing. GI:  patient has had abdominal bloating, constipation, nausea-see above   : Urinary: denies dysuria, frequency and frequent UTI's. Musculo: Reports arthritis, lower back pain and right hip pain/Improved  Skin: Denies eczema, pruritus, rash.    Neuro: Denies dizziness, headache, seizures and syncope.  Diabetic neuropathy. Psych: Reports depression and stress/back in counseling.  Some lack of motivation seems to have some improvement on her SSRI  endocrine: Reports diabetes marginally controlled/marginal compliance               REST OF PERTINENT ROS GONE OVER AND WAS NEGATIVE. Current Outpatient Medications:     METAMUCIL FIBER PO, Take by mouth, Disp: , Rfl:     DULoxetine (CYMBALTA) 20 MG extended release capsule, , Disp: , Rfl:     donepezil (ARICEPT) 10 MG tablet, Take 10 mg by mouth nightly, Disp: , Rfl:     famotidine (PEPCID) 20 MG tablet, Take 1 tablet by mouth 2 times daily, Disp: 180 tablet, Rfl: 1    amLODIPine (NORVASC) 5 MG tablet, TAKE 1 TABLET DAILY, Disp: 90 tablet, Rfl: 1    glipiZIDE (GLUCOTROL XL) 10 MG extended release tablet, Take 1 tablet daily, Disp: 90 tablet, Rfl: 1    ondansetron (ZOFRAN) 4 MG tablet, Take 1 tablet by mouth 3 times daily as needed for Nausea or Vomiting, Disp: 30 tablet, Rfl: 0    levothyroxine (SYNTHROID) 100 MCG tablet, Take 1 tablet daily, Disp: 90 tablet, Rfl: 1    metoprolol tartrate (LOPRESSOR) 25 MG tablet, TAKE 1 TABLET TWICE A DAY, Disp: 180 tablet, Rfl: 1    insulin lispro, 1 Unit Dial, (HUMALOG KWIKPEN) 100 UNIT/ML SOPN, Use on a sliding scale 3 times a day with meals.  Maximum dose 30 units per day., Disp: 15 pen, Rfl: 1    LANTUS SOLOSTAR 100 UNIT/ML injection pen, Inject 65 Units into the skin nightly, Disp: 15 pen, Rfl: 3    pravastatin (PRAVACHOL) 20 MG tablet, TAKE 1 TABLET DAILY, Disp: 90 tablet, Rfl: 1    BD PEN NEEDLE MICRO U/F 32G X 6 MM MISC, USE WITH LANTUS DAILY, Disp: 100 each, Rfl: 3    Cyanocobalamin (B-12) 50 MCG TABS, Take by mouth , Disp: , Rfl:     aspirin 81 MG EC tablet, Take 81 mg by mouth daily, Disp: , Rfl:     VASCEPA 1 g CAPS capsule, Take 2 capsules by mouth 2 times daily, Disp: , Rfl:     Multiple Vitamins-Minerals (VITEYES COMPLETE PO), Take by mouth, Disp: , Rfl:     divalproex (DEPAKOTE) 250 MG DR tablet, Take 500 mg by mouth nightly , Disp: , Rfl:     Continuous Blood Gluc Sensor ([facility]) [facility], 1 box by Does not apply route 2 times daily, Disp: 1 each, Rfl: 0    Continuous Blood Gluc Sensor (FREESTYLE ANT SENSOR SYSTEM) [facility], PLEASE DISPENSE 1 KIT TO PATIENT, Disp: 1 each, Rfl: 0    Continuous Blood Gluc Sensor ([facility]) [facility], Please dispense one free style ant kit, Disp: 1 each, Rfl: 0    latanoprost (XALATAN) 0.005 % ophthalmic solution, 1 drop nightly, Disp: , Rfl:     Calcium Carb-Cholecalciferol (CALCIUM 1000 + D PO), Take by mouth, Disp: , Rfl:   Allergies   Allergen Reactions    Seasonal        Past Medical History:   Diagnosis Date    Atrial fibrillation (HCC)     Chronic kidney disease     Colon polyps     Diabetes mellitus ([address]     Diabetic neuropathy ([address]     Diabetic retinopathy ([address]     Essential hypertension 10/28/2019    Fatty liver     Gastritis     GERD (gastroesophageal reflux disease)     Hyperlipidemia     Hypertension     Hypothyroidism     Irritable bowel syndrome     Major depressive disorder with single episode 10/28/2019    Osteopenia     Photosensitivity disorder     chronic    RBBB     Sleep apnea     on BIPAP    Thyroid disease     Thyroid nodule     neg bx-chronic thyroiditis    Type 2 diabetes mellitus with diabetic polyneuropathy, with long-term current use of insulin ([address] 7/23/2019    Vitamin D deficiency      Past Surgical History:   Procedure Laterality Date    APPENDECTOMY      CARPAL TUNNEL RELEASE Bilateral     CATARACT REMOVAL Bilateral     CHOLECYSTECTOMY      GYNECOLOGIC CRYOSURGERY      HYSTERECTOMY     Catarina Stiles TONSILLECTOMY       Family History   Problem Relation Age of Onset    Diabetes Paternal Grandmother     Diabetes Father     Lung Cancer Mother     Pancreatic Cancer Brother     Diabetes Brother      Social History Socioeconomic History    Marital status:      Spouse name: Not on file    Number of children: Not on file    Years of education: Not on file    Highest education level: Not on file   Occupational History    Not on file   Tobacco Use    Smoking status: Never Smoker    Smokeless tobacco: Never Used   Substance and Sexual Activity    Alcohol use: Never    Drug use: Never    Sexual activity: Not Currently   Other Topics Concern    Not on file   Social History Narrative    Not on file     Social Determinants of Health     Financial Resource Strain: Low Risk     Difficulty of Paying Living Expenses: Not hard at all   Food Insecurity: No Food Insecurity    Worried About Running Out of Food in the Last Year: Never true    [address] in the Last Year: Never true   Transportation Needs:     Lack of Transportation (Medical): Not on file    Lack of Transportation (Non-Medical):  Not on file   Physical Activity:     Days of Exercise per Week: Not on file    Minutes of Exercise per Session: Not on file   Stress:     Feeling of Stress : Not on file   Social Connections:     Frequency of Communication with Friends and Family: Not on file    Frequency of Social Gatherings with Friends and Family: Not on file    Attends Oriental orthodox Services: Not on file    Active Member of [address] [place] [address] or Organizations: Not on file    Attends Club or Organization Meetings: Not on file    Marital Status: Not on file   Intimate Partner Violence:     Fear of Current or Ex-Partner: Not on file    Emotionally Abused: Not on file    Physically Abused: Not on file    Sexually Abused: Not on file   Housing Stability:     Unable to Pay for Housing in the Last Year: Not on file    Number of [place] in the Last Year: Not on file    Unstable Housing in the Last Year: Not on file       Vitals:    02/07/22 1401   BP: 136/68   Pulse: 76   Temp: 97.3 °F (36.3 °C)   TempSrc: Temporal   SpO2: 99%   Weight: 164 lb (74.4 kg) Height: 5' 3\" (1.6 m)       Physical Exam    Const: Appears well developed and well nourished. No signs of acute distress present. Neck: Supple and symmetric. Palpation reveals no adenopathy. No masses appreciated. Thyroid exhibits no nodule  or thyromegaly. No JVD. Carotids: 2+ and equal bilaterally, without bruits. Resp: No rales, rhonchi or wheezes appreciated over the lungs bilaterally. CV: Rate is regular. Rhythm is regular. S1 is normal. S2 is normal. No gallop or rubs. No heart murmur  appreciated. Extremities: No clubbing, cyanosis or edema. Abdomen:   Bowel sounds are normoactive. Palpation reveals softness, nontender, with no distension, organomegaly or   No abdominal masses palpable. No palpable hepatosplenomegaly. Psych: Patient's attitude is cooperative. Patient's affect is  normal. Judgement is realistic. Insight is appropriate.               Assessment and Plan:  Long Jackson was seen today for nausea and constipation. Diagnoses and all orders for this visit:    Nausea  -     Lalito Concepcion MD, General Surgery, [facility]    Constipation, unspecified constipation type  -     Lalito Concepcion MD, General Surgery, [facility]    Plan: I told her at this point we will have her increase her Metamucil to twice a day. Add stool softener. Push fluids. Try prune juice. Set her up with Dr. Ricky Nowak for evaluation and probable upper or lower scope. Keep next appointment. Notify us with problems in the interim. Return for keep a. Seen By:  Danielle Norwood MD      *Document was created using voice recognition software. Note was reviewed however may contain grammatical errors.

## 2022-02-18 ENCOUNTER — OFFICE VISIT (OUTPATIENT)
Dept: SURGERY | Age: 77
End: 2022-02-18
Payer: MEDICARE

## 2022-02-18 VITALS
TEMPERATURE: 96.8 F | RESPIRATION RATE: 18 BRPM | HEART RATE: 84 BPM | BODY MASS INDEX: 29.06 KG/M2 | DIASTOLIC BLOOD PRESSURE: 70 MMHG | OXYGEN SATURATION: 98 % | SYSTOLIC BLOOD PRESSURE: 160 MMHG | WEIGHT: 164 LBS | HEIGHT: 63 IN

## 2022-02-18 DIAGNOSIS — R68.81 EARLY SATIETY: Primary | ICD-10-CM

## 2022-02-18 DIAGNOSIS — K58.2 IRRITABLE BOWEL SYNDROME WITH BOTH CONSTIPATION AND DIARRHEA: ICD-10-CM

## 2022-02-18 PROCEDURE — 4040F PNEUMOC VAC/ADMIN/RCVD: CPT | Performed by: SURGERY

## 2022-02-18 PROCEDURE — 1036F TOBACCO NON-USER: CPT | Performed by: SURGERY

## 2022-02-18 PROCEDURE — 99203 OFFICE O/P NEW LOW 30 MIN: CPT | Performed by: SURGERY

## 2022-02-18 PROCEDURE — 1090F PRES/ABSN URINE INCON ASSESS: CPT | Performed by: SURGERY

## 2022-02-18 PROCEDURE — G8400 PT W/DXA NO RESULTS DOC: HCPCS | Performed by: SURGERY

## 2022-02-18 PROCEDURE — 1123F ACP DISCUSS/DSCN MKR DOCD: CPT | Performed by: SURGERY

## 2022-02-18 PROCEDURE — G8484 FLU IMMUNIZE NO ADMIN: HCPCS | Performed by: SURGERY

## 2022-02-18 PROCEDURE — G8417 CALC BMI ABV UP PARAM F/U: HCPCS | Performed by: SURGERY

## 2022-02-18 PROCEDURE — G8427 DOCREV CUR MEDS BY ELIG CLIN: HCPCS | Performed by: SURGERY

## 2022-02-18 PROCEDURE — 3017F COLORECTAL CA SCREEN DOC REV: CPT | Performed by: SURGERY

## 2022-02-18 NOTE — PATIENT INSTRUCTIONS
Patient Education        Irritable Bowel Syndrome: Care Instructions  Your Care Instructions  Irritable bowel syndrome, or IBS, is a problem with the intestines that causes belly pain, bloating, gas, constipation, and diarrhea. The cause of IBS is not well known. IBS can last for many years, but it does not get worse over time or lead to serious disease. Most people can control their symptoms by changing their diet and reducing stress. Follow-up care is a key part of your treatment and safety. Be sure to make and go to all appointments, and call your doctor if you are having problems. It's also a good idea to know your test results and keep a list of the medicines you take. How can you care for yourself at home? · To reduce diarrhea, limit or avoid:  ? Alcohol. ? Caffeine, which is found in coffee, tea, lauren, and chocolate. ? Nicotine from smoking or chewing tobacco.  ? Gas-producing foods, such as beans, broccoli, cabbage, or apples. ? Dairy products that contain lactose (milk sugar), such as ice cream or milk. ? Foods and drinks high in sugar, especially fruit juice, soda, candy, and other packaged sweets (such as cookies). ? Foods high in fat, including fernandez, sausage, butter, oils, and anything deep-fried. ? Sorbitol and xylitol. These are artificial sweeteners found in some sugarless candies and chewing gum. · To reduce constipation:  ? Slowly increase the amount of fiber you eat. For some people who have IBS, eating more fiber may make some symptoms worse, including bloating. Adding fiber slowly may help you avoid these problems. ? Include fruits, vegetables, beans, and whole grains in your diet each day. These foods are high in fiber. ? Drink plenty of fluids. If you have kidney, heart, or liver disease and have to limit fluids, talk with your doctor before you increase the amount of fluids you drink. ? Get some exercise every day.  Build up slowly to 30 to 60 minutes a day on 5 or more days of the week. ? Take a fiber supplement, such as Citrucel or Metamucil, every day if needed. Read and follow all instructions on the label. ? Schedule time each day for a bowel movement. Having a daily routine may help. Take your time and do not strain when having a bowel movement. · Keep a daily diary of what you eat and what symptoms you have. This may help find foods that cause you problems. · Eat slowly. Try to make mealtime relaxing. · Find ways to reduce stress. When should you call for help? Call your doctor now or seek immediate medical care if:    · Your pain is different than usual or occurs with fever.     · You lose weight without trying, or you lose your appetite and you do not know why.     · Your symptoms often wake you from sleep.     · Your stools are black and tarlike or have streaks of blood. Watch closely for changes in your health, and be sure to contact your doctor if:    · Your IBS symptoms get worse or begin to disrupt your day-to-day life.     · You become more tired than usual.     · Your home treatment stops working. Where can you learn more? Go to [email or web address] and sign in to your [employer] account. Enter L194 in the [facility] box to learn more about \"Irritable Bowel Syndrome: Care Instructions. \"     If you do not have an account, please click on the \"Sign Up Now\" link. Current as of: September 8, 2021               Content Version: 13.1  © 6068-6785 [email or web address]. Care instructions adapted under license by [facility] ([facility]). If you have questions about a medical condition or this instruction, always ask your healthcare professional. [place] [address] any warranty or liability for your use of this information. Patient Education        Learning About the Low FODMAP Diet for Irritable Bowel Syndrome (IBS)  What is the low-FODMAP diet?   A low-FODMAP diet is used to find out if certain foods make irritable bowel syndrome (IBS) worse. You stop eating high-FODMAP foods for 2 to 6 weeks. Then you slowly add them back to see how your body reacts. This is called an elimination diet. A dietitian or doctor can help you follow this diet. FODMAPs are types of carbohydrates that can be hard for your body to digest. They are in many types of foods. FODMAP stands for:  · F ermentable. · O ligosaccharides. · D isaccharides. · M onosaccharides. · A nd p olyols. If you have IBS, foods that are high in FODMAPs may make your symptoms worse. When you are on this diet, you can still eat carbohydrates that are low in FODMAPs. This includes certain fruits, vegetables, grains, and low-lactose dairy products. What is it used for? This diet is used to help manage symptoms of irritable bowel syndrome (IBS). The diet limits foods that are high in FODMAPs. High-FODMAP foods can be hard to digest. They pull more fluid into your intestines. They are also easily fermented. This can lead to bloating, belly pain, gas, and diarrhea. The low-FODMAP diet can help you figure out what foods to avoid. And it can help you find foods that are easier to digest.  This diet can help with IBS symptoms. But it's not a cure. You will still need to manage your condition. How does it work? At first, you won't eat any high-FODMAP foods for a few weeks. It can be helpful to work with a dietitian who is trained in the [address] when you try this diet. They can help you find recipes and FODMAP food lists to use while you are on the diet. After 2 to 6 weeks, you will start to try high-FODMAP foods again. You will add those foods back to your diet, one at a time. Your doctor or dietitian will probably have you wait a few days before you add each new food. Keep a food diary. You can write down the foods you try and note how they make you feel.   After a few weeks, you may have a better idea of what foods you should avoid and what foods you can eat without triggering IBS symptoms. What are the risks? There is some risk of not getting all of the vitamins and nutrients you need on the low-FODMAP diet. These include:  · Folate. · Thiamin. · Vitamin B6.  · Calcium. · Vitamin D. Your dietitian or doctor can help you find other sources of these if needed. This diet may limit your fiber intake. If you need more fiber, ask your doctor or dietitian about low-FODMAP fiber sources. What foods are on the low-FODMAP diet? Here is a guide to foods that you can eat, plus the foods that you should avoid, when you are on the low-FODMAP diet. Grains  Okay to eat: Foods made from grains like arrowroot, buckwheat, cornmeal, millet, and oats. You can also eat potato flour, quinoa, rice, sorghum, tapioca, and teff. Cereals, pasta, breads, corn tortillas and baked goods made from these grains are also okay. (These grains may be labeled \"gluten-free. \")  Avoid: Grains like wheat, barley, and rye. Avoid ingredients such as bulgur, couscous, durum, and semolina. And avoid cereals, breads, and pastas made from these grains. Avoid chickpea, lentil, and pea flour. Proteins  Okay to eat: Most meat, fish, and eggs without high-FODMAP sauces. You can have small amounts of almonds or hazelnuts (10 nuts). Macadamia nuts, peanuts, pecans, pine nuts, and walnuts are also okay. You can also eat jagruti and pumpkin seeds, tofu, and tempeh. Avoid: Beans, chickpeas, lentils, and soybeans. Avoid pistachio and cashew nuts. Avoid fatty or fried meats. And some sausages may have high-FODMAP ingredients. Dairy  Okay to eat: Lactose-free dairy milks. Rice milk and almond milk are okay. So are lactose-free yogurts, kefirs, ice creams, and sorbet from low-FODMAP fruits and sweeteners. (These are often labeled \"lactose-free. \") You can have small amounts (2 Tbsp) of cottage, cream, or ricotta cheese. Hard cheeses like cheddar, Upatoi, ROSS, and Swiss are okay.  So are small amounts (1 oz) of aged sauce (no garlic), tamari, and vinegar are also okay. Sweeteners that are okay include sugar (sucrose), powdered (confectioner's) sugar, brown sugar, glucose, and maple syrup. You can also have some artificial sweeteners like aspartame, saccharine, and stevia. Avoid: Chutneys, hummus, jellies, garlic sauces, and gravies made with onion or garlic. Avoid pickles, relish, some salad dressings and soup stocks, salsa, and tomato paste. And avoid sauces and other foods with high fructose corn syrup, honey, molasses, and agave. Avoid artificial sweeteners (isomalt, mannitol, malitol, sorbitol, and xylitol). Avoid corn syrup solids, fructose, fruit juice concentrate, and polydextrose. Other foods and drinks  Okay to have: Water, soda water, tonic, soft drinks sweetened with sugar, ½ cup of low-FODMAP fruit juice, and most teas and alcohols. You can also eat foods made with baking powder and soda, cocoa, and gelatin. Avoid: Juices from high-FODMAP fruits and vegetables. And avoid fortified asia, chamomile and fennel teas, chicory-based drinks and coffee substitutes, and bouillon cubes. Follow-up care is a key part of your treatment and safety. Be sure to make and go to all appointments, and call your doctor if you are having problems. It's also a good idea to know your test results and keep a list of the medicines you take. Where can you learn more? Go to https://[email or web address] [email or web address] org and sign in to your [facility] account. Enter L235 in the [facility] box to learn more about \"Learning About the Low FODMAP Diet for Irritable Bowel Syndrome (IBS). \"     If you do not have an account, please click on the \"Sign Up Now\" link. Current as of: September 8, 2021               Content Version: 13.1  © 4527-3070 [employer]. Care instructions adapted under license by [facility] ([facility]).  If you have questions about a medical condition or this instruction, always ask your healthcare professional. [employer] disclaims any warranty or liability for your use of this information.

## 2022-02-18 NOTE — PROGRESS NOTES
[address] [facility] Note    Assessment/Plan:      Diagnosis Orders   1. Early satiety  NM GASTRIC EMPTYING    Plan for EGD evaluation. Also get gastric emptying test given her diabetes with poor control    2. Irritable bowel syndrome with both constipation and diarrhea  linaclotide (LINZESS) 145 MCG capsule    Constipation predominant. Trial Linzess. We will plan for colonoscopy. Return for Endoscopy. Chief Complaint   Patient presents with    New Patient     colonoscopy        PCP: Veronica Mock MD    HPI: Cecilia Villa is a 78 y.o. female who presents in consultation for GI issues. She is issues for over 2 months she states. She will have nausea and vomiting. She also has intermittent constipation alternating with loose and occasionally normal stools. Rarely will she have watery diarrhea. She says she started Aricept around the time that it began, so they did stop that but she still having issues. She does complain of early satiety symptoms. She denies any reflux. She does have diabetic neuropathy and retinopathy. She does not want to take a PPI. She is taking Metamucil as well as has tried multiple stool softeners and is taking prunes. She had an x-ray showing some colonic fecal retention. She denies any melena or hematochezia. Her last colonoscopy was around 5 years ago and she has a history of polyps. She does complain of some lower abdominal discomfort intermittently but no pain per se. She has poorly controlled diabetes with hemoglobins in the 9-11 range. She has not had prior EGD.     Past Medical History:   Diagnosis Date    Atrial fibrillation ([address]     Chronic kidney disease     Colon polyps     Diabetes mellitus ([address]     Diabetic neuropathy ([address]     Diabetic retinopathy ([address]     Essential hypertension 10/28/2019    Fatty liver     Gastritis     GERD (gastroesophageal reflux disease)     Hyperlipidemia     Hypertension     Hypothyroidism     Irritable bowel syndrome     Major depressive disorder with single episode 10/28/2019    Osteopenia     Photosensitivity disorder     chronic    RBBB     Sleep apnea     on BIPAP    Thyroid disease     Thyroid nodule     neg bx-chronic thyroiditis    Type 2 diabetes mellitus with diabetic polyneuropathy, with long-term current use of insulin ([facility] 7/23/2019    Vitamin D deficiency        Past Surgical History:   Procedure Laterality Date    APPENDECTOMY      CARPAL TUNNEL RELEASE Bilateral     CATARACT REMOVAL Bilateral     CHOLECYSTECTOMY      GYNECOLOGIC CRYOSURGERY      Brennan Santos         Prior to Admission medications    Medication Sig Start Date End Date Taking? Authorizing Provider   linaclotide Chidi Benton) 145 MCG capsule Take 1 capsule by mouth every morning (before breakfast) 2/18/22  Yes Ranjith Ash MD   METAMUCIL FIBER PO Take by mouth   Yes Historical Provider, MD   DULoxetine (CYMBALTA) 20 MG extended release capsule  1/4/22  Yes Historical Provider, MD   donepezil (ARICEPT) 10 MG tablet Take 10 mg by mouth nightly   Yes Historical Provider, MD   famotidine (PEPCID) 20 MG tablet Take 1 tablet by mouth 2 times daily 1/12/22  Yes Colt Meyer MD   amLODIPine (NORVASC) 5 MG tablet TAKE 1 TABLET DAILY 1/12/22  Yes Colt Meyer MD   glipiZIDE (GLUCOTROL XL) 10 MG extended release tablet Take 1 tablet daily 1/12/22  Yes Colt Meyer MD   ondansetron (ZOFRAN) 4 MG tablet Take 1 tablet by mouth 3 times daily as needed for Nausea or Vomiting 1/12/22  Yes Colt Meyer MD   levothyroxine (SYNTHROID) 100 MCG tablet Take 1 tablet daily 1/12/22  Yes Colt Meyer MD   metoprolol tartrate (LOPRESSOR) 25 MG tablet TAKE 1 TABLET TWICE A DAY 1/12/22  Yes Colt Meyer MD   insulin lispro, 1 Unit Dial, (HUMALOG KWIKPEN) 100 UNIT/ML SOPN Use on a sliding scale 3 times a day with meals. Maximum dose 30 units per day.  1/10/22  Yes Colt Meyer MD LANTUS SOLOSTAR 100 UNIT/ML injection pen Inject 65 Units into the skin nightly 1/10/22  Yes Diya Nur MD   pravastatin (PRAVACHOL) 20 MG tablet TAKE 1 TABLET DAILY 12/27/21  Yes Diya Nur MD   BD PEN NEEDLE MICRO U/F 32G X 6 MM MISC USE WITH LANTUS DAILY 8/3/21  Yes Diya Nur MD   Cyanocobalamin (B-12) 50 MCG TABS Take by mouth    Yes Historical Provider, MD   aspirin 81 MG EC tablet Take 81 mg by mouth daily   Yes Historical Provider, MD   VASCEPA 1 g CAPS capsule Take 2 capsules by mouth 2 times daily 3/29/21  Yes Historical Provider, MD   Multiple Vitamins-Minerals (VITEYES COMPLETE PO) Take by mouth   Yes Historical Provider, MD   divalproex (DEPAKOTE) 250 MG DR tablet Take 500 mg by mouth nightly    Yes Historical Provider, MD   Continuous Blood Gluc Sensor ([address] [facility]) MISC 1 box by Does not apply route 2 times daily 6/27/19  Yes Diya Nur MD   Continuous Blood Gluc Sensor ([address]) MISC PLEASE DISPENSE 1 KIT TO PATIENT 6/27/19  Yes Diya Nur MD   Continuous Blood Gluc Sensor ([address]) [address] Please dispense one free style geetha kit 6/22/19  Yes Diya Nur MD   latanoprost (XALATAN) 0.005 % ophthalmic solution 1 drop nightly   Yes Historical Provider, MD   Calcium Carb-Cholecalciferol (CALCIUM 1000 + D PO) Take by mouth   Yes Historical Provider, MD       Allergies   Allergen Reactions    Seasonal        Social History     Socioeconomic History    Marital status:       Spouse name: None    Number of children: None    Years of education: None    Highest education level: None   Occupational History    None   Tobacco Use    Smoking status: Never Smoker    Smokeless tobacco: Never Used   Substance and Sexual Activity    Alcohol use: Never    Drug use: Never    Sexual activity: Not Currently   Other Topics Concern    None   Social History Narrative    None     Social Determinants of Health     Financial Resource Strain: Low Risk     Difficulty of Paying Living Expenses: Not hard at all   Food Insecurity: No Food Insecurity    Worried About Running Out of Food in the Last Year: Never true    Junaid of Food in the Last Year: Never true   Transportation Needs:     Lack of Transportation (Medical): Not on file    Lack of Transportation (Non-Medical): Not on file   Physical Activity:     Days of Exercise per Week: Not on file    Minutes of Exercise per Session: Not on file   Stress:     Feeling of Stress : Not on file   Social Connections:     Frequency of Communication with Friends and Family: Not on file    Frequency of Social Gatherings with Friends and Family: Not on file    Attends Voodoo Services: Not on file    Active Member of [address] [place] [address] [employer] or Organizations: Not on file    Attends Club or Organization Meetings: Not on file    Marital Status: Not on file   Intimate Partner Violence:     Fear of Current or Ex-Partner: Not on file    Emotionally Abused: Not on file    Physically Abused: Not on file    Sexually Abused: Not on file   Housing Stability:     Unable to Pay for Housing in the Last Year: Not on file    Number of [place] in the Last Year: Not on file    Unstable Housing in the Last Year: Not on file       Family History   Problem Relation Age of Onset    Diabetes Paternal Grandmother     Diabetes Father     Lung Cancer Mother     Pancreatic Cancer Brother     Diabetes Brother        Review of Systems   All other systems reviewed and are negative. Objective:  Vitals:    02/18/22 1024   BP: (!) 160/70   Pulse: 84   Resp: 18   Temp: 96.8 °F (36 °C)   TempSrc: Temporal   SpO2: 98%   Weight: 164 lb (74.4 kg)   Height: 5' 3\" (1.6 m)          Physical Exam  HENT:      Head: Normocephalic and atraumatic. Eyes:      General:         Right eye: No discharge. Left eye: No discharge. Neck:      Trachea: No tracheal deviation.    Cardiovascular:      Rate and Rhythm: Normal rate. Pulmonary:      Effort: Pulmonary effort is normal. No respiratory distress. Abdominal:      General: There is no distension. Palpations: Abdomen is soft. Tenderness: There is no guarding or rebound. Skin:     General: Skin is warm and dry. Neurological:      Mental Status: She is alert and oriented to person, place, and time. Fatou Leonard MD      NOTE: This report, in part or full,may have been transcribed using voice recognition software. Every effort was made to ensure accuracy; however, inadvertent computerized transcription errors may be present. Please excuse any transcriptional grammatical or spelling errors that may have escaped my editorial review.     CC: Gary Frye MD

## 2022-02-21 ENCOUNTER — TELEPHONE (OUTPATIENT)
Dept: SURGERY | Age: 77
End: 2022-02-21

## 2022-02-21 NOTE — TELEPHONE ENCOUNTER
Prior Authorization Form:      DEMOGRAPHICS:                     Patient Name:  Gio Butcher  Patient :  1945            Insurance:  Payor: MEDICARE / Plan: MEDICARE PART A AND B / Product Type: *No Product type* /   Insurance ID Number:    Payor/Plan Subscr  Sex Relation Sub. Ins. ID Effective Group Num   1. MEDICARE - Hussain Romero* 1945 Female Self [id number]                                    [address] [address]   2.  [address]* 1945 Female Self [id number] Plan F                                   [address]         DIAGNOSIS & PROCEDURE:                       Procedure/Operation: egd/colonoscopy            CPT Code: 47321/35724     Diagnosis:  Early satiety/ibs with constipation    ICD10 Code: R68.61/ K58.2    Location:  [facility]    Surgeon:  Dr. Kashif Menchaca INFORMATION:                          Date: 22    Time: 12:30pm              Anesthesia:  [facility]                                                       Status:  Outpatient        Special Comments:         Electronically signed by Jadon Artis on 2022 at 1:16 PM

## 2022-02-21 NOTE — TELEPHONE ENCOUNTER
MA called patient regarding her gastric emptying study test. It is scheduled at [facility] on March 14th at 9:00am. Patient is to arrive at 8:45. And enter through the xray department. She is NPO after midnight. Follow up is 3/25 in [facility] at 10:15am    Electronically signed by Melody Xiao on 2/21/2022 at 12:40 PM

## 2022-02-21 NOTE — TELEPHONE ENCOUNTER
Milo Montes is scheduled for egd/colonoscopy with Dr Paulina Phelps on 4/6/22 at 12:30pm at [facility]. Patient was told to arrive at 11:30am. Patient needs to be NPO after midnight the night before procedure. All surgery instructions were explained to the patient and a surgery letter was also mailed out. MA informed patient that PAT will also be calling to review pre-op instructions and medications. Patient verbalized understanding.     Electronically signed by Marcie Lopez on 2/21/2022 at 1:15 PM

## 2022-02-22 ENCOUNTER — FOLLOWUP TELEPHONE ENCOUNTER (OUTPATIENT)
Dept: DIABETES SERVICES | Age: 77
End: 2022-02-22

## 2022-02-22 NOTE — PROGRESS NOTES
Diabetes Self-Management Education Record    Participant Name: Svetlana Rodriguez  Referring Provider: Sergei Bee MD  Assessment/Evaluation Ratings:  1=Needs Instruction   4=Demonstrates Understanding/Competency  2=Needs Review   NC=Not Covered    3=Comprehends Key Points  N/A=Not Applicable  Topics/Learning Objectives Pre-session Assess Date:  Instructor initials/date  11/15/21 KMS Instr. Date    Instructor initials/date  11/15/21 KMS Follow-up Post- session Eval Comments   Diabetes disease process & Treatment process:   -Define type of diabetes in simple terms.  - Describe the ABCs of  diabetes management  -Identify own type of diabetes  -Identify lifestyle changes/treatment options  -other:  2 [x] All     []  []  []  []  []  4 Type 2 DM since 1994   A1C 9.7%    Developing strategies for Healthy coping/psychosocial issues:    -Describe feelings about living with diabetes  -Identify coping strategies and sources of stress  -Identify support needed & support network available  -Complete PAID-5 Diabetes questionnaire 1 [x] All     []  []  []    []  4   11/15/21 KMS  PAID-5 Score: 0       Prevention, detection & treatment of Chronic complications:    -Identify the prevention, detection and treatment for complications including immunizations, preventive eye, foot, dental and renal exams as indicated per the participant's duration of diabetes and health status.  -Define the natural course of diabetes and the relationship of blood glucose levels to long term complications of diabetes.   1 [x] All     []            []  4 11/15/21 KMS  Neuropathy    Prevention, detection & treatment of acute complications:    -State the causes,signs & symptoms of hyper & hypoglycemia, and prevention & treatment strategies.   -Describe sick day guidelines  DKA /indications for ketone testing &  when to call physician  2 [x] All     []      []    4          -Identify severe weather/situation crisis  & diabetes supplies management  [] Using medications safely:   -State effects of diabetes medicines on blood glucose levels;  -List diabetes medication taken, action & side effects 2 [x] All     []  []  4 11/15/21 KMS  Glipizide 10 mg XR daily    Insulin/Injectables/glucagon  -Name appropriate injection sites; proper storage; supplies needed;  2   [x]  4 11/15/21 KMS  Humalog 5 units plus s.s. per meal TID  Lantus 65 units nightly     Demonstrate proper technique  []      Monitoring blood glucose, interpreting and using results:   -Identify the purpose of testing   -Identify recommended & personal blood glucose targets & HgbA1C target levels  -State the Importance of logging blood glucose levels for pattern recognition;   -State benefits of reading/using pt generated health data  -Verbalize safe lancet disposal 2 [x] All     []  []    []  []  []  4 11/15/21 KMS  FreeStyle Lisa CGM, monitors 3 times daily before meals    -Demonstrate proper testing technique  []      Incorporating physical activity into lifestyle:   -State effect of exercise on blood glucose levels;   -State benefits of regular exercise;   -Define safety considerations/food choices if needed.  -Describe contraindications/maintenance of activity. 1 [x] All     []  []    []  []  4 11/15/21 KMS  Not physically active, plans to restart exercising using [employer] membership    Incorporating nutritional management into lifestyle:   -Describe effect of type, amount & timing of food on blood glucose  -Describe methods for preparing and planning healthy meals  -Correctly read food labels  -Name 3 foods high in Carbohydrate 1 [x] All       []    []    []  []  4 11/15/21 Citlalli Escobedo a good understanding of which foods are carbohydrates, how to use the plate method, and how to read labels to determine carb portions. She will aim for 3 carb choices per meal and a 1 carb snack at . She tends to wake up late and stay up late, so her first meal is usually around 10 a.m. to 12 p.m. She will space out her meals every 4.5 to 5 hours, accordingly.     -Plan a carbohydrate-controlled meal based on individualized meal plan  -Demonstrate CHO counting/portion control   []  []      Developing strategies for problem solving to promote health/change behavior. -Identify 7 self-care behaviors; Personal health risk factors; Benefits, challenges & strategies for behavioral change and set an individualized goal selection. 1 [x]  4 11/15/21 KMS  [x]Nutrition:  Eat three carb consistent meals daily on a schedule. []Monitoring  []Exercise  []Medication  []Other     Identified Barriers to learning/adherence to self management plan:    Emotional  []  other    Instruction Method:  Lecture/Discussion and Handouts    Supporting Education Materials/Equipment Provided: Self-management manual and Nutritional Packet   []Wallisian materials       [] services     []Other:      Encounter Type Date Attended Start Time End Time Comments No Show Dates   Assessment          Session 1         Session 2        1:1 DSMES  11/15/21 KMS 1435 [id number]      In person Follow-up         Gestational Diabetes         Individual MNT        Meter Instrx        Insulin Instrx           Additional Comments: [x] Pt seen individually due to Covid-19 Safety precautions and no group session available. Doctor notified via EMR. Date: 2/22/22   Follow-up goal attainment based on patients initial DSMES goal: Eat three, carb-consistent meals. Goal met 75% of the time--patient due for colonoscopy in April, has been having constipation/nausea since December, making it difficult to eat regular sized meals. Has lost weight d/t reduced portions.       Notified by [x] EMR []Fax        []Post class Hgb A1C  [x]Medication compliance   [x]Plate method/meal plan compliance   []Able to state the number of Carbohydrate servings eaten at B,L,D   []Testing blood glucose as prescribed by PCP   []Exercise Routine   []Other:   []Other:     []Patient lost to follow-up  Dr Notified by []EMR []Fax     Personal Support Plan:      [x] Keep all scheduled doctor appointments   [] Make and keep appointments with specialists (foot, eye, dentist) as recommended   [] Consult my pharmacist about all new medications or to ask any medication questions   [] Get tested for sleep apnea   [] Seek help for:   [] Make an appointment with:   [] Attend smoking cessation classes or call 1-800-QUIT-NOW  [] Attend Diabetes Support Group   [] Use diabetes magazines, books, or credible web-sites like the ADA for more information  [x] Increase exercise at home or join an exercise program:   [] Other:

## 2022-02-22 NOTE — LETTER
[facility] Diabetes Education DSMES Follow-up Letter    Name: Maryann Squires  :   1945    Follow-up plan/Date:   2022               Rajat Ni     Dear Dr. Clifton Dunn: Thank you for referring  Maryann Squires  to [facility] Diabetes Education Services. Maryann Squires  has completed their personalized comprehensive education plan. The education plan included the following topics: Diabetes Disease Process, Nutrition, Exercise, Glucose Monitoring, Acute and Chronic Complication, Behavioral and Lifestyle Change, Healthy Coping and goal setting. We contact participants 3 months after attending our services to review their progress on their chosen goal.      The following area was chosen: [x] Healthy Eating   [] Being Active  [] Monitoring [] Problem Solving [] Taking Medication [] Healthy Coping  []Reducing Risks    Selected goal outcome Post Education: Eat three carb-consistent meals daily. Patient states they met their goal 75% of time. She has been experiencing nausea/constipation since December, which has reduced her portion size to less than usual as of late. She hopes to be able to follow her normal meal plan once her GI troubles are resolved. Thank you for referring this patient to our program. Please do not hesitate to call if we can be of any service for this patient.     Roverto Harley or [facility]: Fredis: Johanna    [facility] Diabetes Education Department  American Diabetes Association Recognized [facility] Program

## 2022-02-23 ENCOUNTER — TELEPHONE (OUTPATIENT)
Dept: SURGERY | Age: 77
End: 2022-02-23

## 2022-02-23 NOTE — TELEPHONE ENCOUNTER
MA received a called from patient regarding the linzess. Patient states she is taking linzess, metamucil, mild laxative and also prunes. She feels she is not moving her bowels completely. Would like to know since she is on linzess if she can come off of the other laxatives. Patient last saw you on 2/19/22 and is having a colonoscopy done on 4/6/22. Please advise.     Electronically signed by Christelle Wei on 2/23/2022 at 2:00 PM

## 2022-02-24 NOTE — TELEPHONE ENCOUNTER
MA spoke to patient regarding her message. Dr. Fay Payne advised she should stay on the linzess and metamucil. Patient acknowleged.

## 2022-03-18 ENCOUNTER — TELEPHONE (OUTPATIENT)
Dept: SURGERY | Age: 77
End: 2022-03-18

## 2022-03-18 NOTE — TELEPHONE ENCOUNTER
Patient advised about the mild gastric empyting study results. Patient will keep appointment on 3/25/22

## 2022-03-18 NOTE — TELEPHONE ENCOUNTER
Patient called regarding her Gastric swallowing test on 3/14/22. Patient has a follow up appointment on 3/25/22 with Dr. Tavares Schulte but states that she has been having anxiety over this test. Patient states that she has had this ongoing problem since December 2021. Patient is scheduled for EGD on 4/6/22. Patient wanted to me to check with Doctor and see if she can get her results on this test sooner.     Electronically signed by López Johnston on 3/18/2022 at 9:03 AM

## 2022-03-21 ENCOUNTER — PROCEDURE VISIT (OUTPATIENT)
Dept: PODIATRY | Age: 77
End: 2022-03-21
Payer: MEDICARE

## 2022-03-21 VITALS
WEIGHT: 164 LBS | TEMPERATURE: 98.2 F | DIASTOLIC BLOOD PRESSURE: 80 MMHG | SYSTOLIC BLOOD PRESSURE: 138 MMHG | BODY MASS INDEX: 29.05 KG/M2

## 2022-03-21 DIAGNOSIS — M79.674 PAIN IN TOE OF RIGHT FOOT: ICD-10-CM

## 2022-03-21 DIAGNOSIS — R26.2 DIFFICULTY WALKING: ICD-10-CM

## 2022-03-21 DIAGNOSIS — B35.1 TINEA UNGUIUM: Primary | ICD-10-CM

## 2022-03-21 DIAGNOSIS — E11.9 TYPE 2 DIABETES MELLITUS WITHOUT COMPLICATION, WITHOUT LONG-TERM CURRENT USE OF INSULIN (HCC): ICD-10-CM

## 2022-03-21 DIAGNOSIS — I73.9 PERIPHERAL VASCULAR DISEASE, UNSPECIFIED (HCC): ICD-10-CM

## 2022-03-21 PROCEDURE — 11721 DEBRIDE NAIL 6 OR MORE: CPT | Performed by: PODIATRIST

## 2022-03-21 NOTE — PROGRESS NOTES
[facility] PODIATRY  [address] [facility] [address]  Dept: [phone]  Dept Fax: [phone]    DIABETIC NAIL PROGRESS NOTE  Date of patient's visit: 3/21/2022  Patient's Name:  Myah Godoy YOB: 1945            Patient Care Team:  Gary Frye MD as PCP - General (Internal Medicine)  [facility]  Ashley Spence DPM as Consulting Physician (Gabriela Mcknight)  Britney Nunes MD as Consulting Physician (Pulmonology)          Chief Complaint   Patient presents with    Toe Pain     saw pcp Dr. Merle Freeamn 2/18/22    Diabetes       Subjective: Myah Godoy comes to clinic for Toe Pain (saw pcp Dr. Merle Freeman 2/18/22) and Diabetes    she is a diabetic and states that diabetic foot exam .  Pt currently has complaint of thickened, elongated nails that they cannot manage by themselves. Pt's primary care physician is Gary Frye MD l   Lab Results   Component Value Date    LABA1C 9.7 (H) 11/29/2021      Complains of numbness in the feet bilat.   Past Medical History:   Diagnosis Date    Atrial fibrillation ([address]     Chronic kidney disease     Colon polyps     Diabetes mellitus ([address]     Diabetic neuropathy ([address]     Diabetic retinopathy ([address]     Essential hypertension 10/28/2019    Fatty liver     Gastritis     GERD (gastroesophageal reflux disease)     Hyperlipidemia     Hypertension     Hypothyroidism     Irritable bowel syndrome     Major depressive disorder with single episode 10/28/2019    Osteopenia     Photosensitivity disorder     chronic    RBBB     Sleep apnea     on BIPAP    Thyroid disease     Thyroid nodule     neg bx-chronic thyroiditis    Type 2 diabetes mellitus with diabetic polyneuropathy, with long-term current use of insulin ([address] 7/23/2019    Vitamin D deficiency        Allergies   Allergen Reactions    Seasonal      Current Outpatient Medications on File Prior to Visit   Medication Sig Dispense Refill    linaclotide (LINZESS) 145 MCG capsule Take 1 capsule by mouth every morning (before breakfast) 30 capsule 2    METAMUCIL FIBER PO Take by mouth      DULoxetine (CYMBALTA) 20 MG extended release capsule       donepezil (ARICEPT) 10 MG tablet Take 10 mg by mouth nightly      famotidine (PEPCID) 20 MG tablet Take 1 tablet by mouth 2 times daily 180 tablet 1    amLODIPine (NORVASC) 5 MG tablet TAKE 1 TABLET DAILY 90 tablet 1    glipiZIDE (GLUCOTROL XL) 10 MG extended release tablet Take 1 tablet daily 90 tablet 1    ondansetron (ZOFRAN) 4 MG tablet Take 1 tablet by mouth 3 times daily as needed for Nausea or Vomiting 30 tablet 0    levothyroxine (SYNTHROID) 100 MCG tablet Take 1 tablet daily 90 tablet 1    metoprolol tartrate (LOPRESSOR) 25 MG tablet TAKE 1 TABLET TWICE A  tablet 1    insulin lispro, 1 Unit Dial, (HUMALOG KWIKPEN) 100 UNIT/ML SOPN Use on a sliding scale 3 times a day with meals. Maximum dose 30 units per day.  15 pen 1    LANTUS SOLOSTAR 100 UNIT/ML injection pen Inject 65 Units into the skin nightly 15 pen 3    pravastatin (PRAVACHOL) 20 MG tablet TAKE 1 TABLET DAILY 90 tablet 1    BD PEN NEEDLE MICRO U/F 32G X 6 MM MISC USE WITH LANTUS DAILY 100 each 3    Cyanocobalamin (B-12) 50 MCG TABS Take by mouth       aspirin 81 MG EC tablet Take 81 mg by mouth daily      VASCEPA 1 g CAPS capsule Take 2 capsules by mouth 2 times daily      Multiple Vitamins-Minerals (VITEYES COMPLETE PO) Take by mouth      divalproex (DEPAKOTE) 250 MG DR tablet Take 500 mg by mouth nightly       Continuous Blood Gluc Sensor (FREESTYLE ANT SENSOR SYSTEM) MISC 1 box by Does not apply route 2 times daily 1 each 0    Continuous Blood Gluc Sensor (FREESTYLE ANT SENSOR SYSTEM) [facility] PLEASE DISPENSE 1 KIT TO PATIENT 1 each 0    Continuous Blood Gluc Sensor (FREESTYLE ANT SENSOR SYSTEM) [facility] Please dispense one free style ant kit 1 each 0    latanoprost (XALATAN) 0.005 % ophthalmic solution 1 drop nightly      Calcium Carb-Cholecalciferol (CALCIUM 1000 + D PO) Take by mouth       No current facility-administered medications on file prior to visit. Review of Systems   Musculoskeletal: Positive for arthralgias. Review of Systems:   History obtained from chart review and the patient  General ROS: negative for - chills, fatigue, fever, night sweats or weight gain  Constitutional: Negative for chills, diaphoresis, fatigue, fever and unexpected weight change. Musculoskeletal: Positive for arthralgias, gait problem and joint swelling. Neurological ROS: negative for - behavioral changes, confusion, headaches or seizures. Negative for weakness and numbness. Dermatological ROS: negative for - mole changes, rash  Cardiovascular: Negative for leg swelling. Gastrointestinal: Negative for constipation, diarrhea, nausea and vomiting. Objective:  General: AAO x 3 in NAD.     Derm  Toenail Description nails are thick and mycotic yellow incurvated causing pain with shoe gear  Sites of Onychomycosis Involvement (Check affected area)  [x] [x] [x] [x] [x] [x] [x] [x] [x] [x]  5 4 3 2 1 1 2 3 4 5                          Right                                        Left    Thickness  [x] [x] [x] [x] [x] [x] [x] [x] [x] [x]  5 4 3 2 1 1 2 3 4 5                         Right                                        Left    Dystrophic Changes   [x] [x] [x] [x] [x] [x] [x] [x] [x] [x]  5 4 3 2 1 1 2 3 4 5                         Right                                        Left    Color   [x] [x] [x] [x] [x] [x] [x] [x] [x] [x]  5 4 3 2 1 1 2 3 4 5                          Right                                        Left    Incurvation/Ingrowin   [x] [x] [x] [x] [x] [x] [x] [x] [x] [x]  5 4 3 2 1 1 2 3 4 5                         Right                                        Left    Inflammation/Pain [x] [x] [x] [x] [x] [x] [x] [x] [x] [x]  5 4 3 2 1 1 2 3 4 5                         Right                                        Left      Dermatologic Exam:  Skin lesion/ulceration . Skin callus bilaterally plantar aspect plantar to the second and third metatarsal heads both lesions are about 1 cm x 1 cm tissue  Loss of hair  lower extremity      Musculoskeletal:     1st MPJ ROM decreased, Bilateral.  Muscle Bilateral.  Pain present upon palpation of toenails 1-5, Bilateral. decreased medial longitudinal arch, Bilateral.  Ankle ROM decreased,Bilateral.    Dorsally contracted digits     Vascular:  Loss of hair  LE   Nails thick yellow   Absent pt pulses   Cool touch   CFT <absent seconds, Bilateral.  Hair growth absent to the level of the digits, Bilateral.  Edema minimal Bilateral.  Varicosities severe Bilateral.     Neurological: Sensation diminshed to light touch to level of digits, Bilateral.  Protective sensation decreased sites via 5.07/10g [place] Monofilament, Bilateral.  negative Tinel's, Bilateral.  negative Valleix sign, Bilateral.      Integument: nails   thickened > 3.0 mm, dystrophic and crumbly, discolored with subungual debris. Toes cool to touch    Visual inspection:  Deformity: hammertoe deformity priti feet  amputation: absent    Edema:   Sensory exam:  Monofilament sensation: abnormal - 6/10 via SW 5.07/10g monofilament to the plantar foot bilateral feet    Pulses:     Pinprick: Impaired  Proprioception: Impaired  Vibration (128 Hz):  Impaired       DM with PVD       [x]Yes    []no    Foot Exam    General  General Appearance: appears stated age and healthy   Orientation: alert and oriented to person, place, and time       Right Foot/Ankle     Inspection and Palpation  Skin Exam: abnormal color; no skin changes     Neurovascular  Dorsalis pedis: 2+  Posterior tibial: absent      Left Foot/Ankle      Inspection and Palpation  Skin Exam: skin changes and abnormal color; Neurovascular  Dorsalis pedis: 2+  Posterior tibial: absent           Xr Foot Left (min 3 Views)      Ortho Exam      Assessment:  78 y.o. female with:  1. Tinea unguium    2. Type 2 diabetes mellitus without complication, without long-term current use of insulin ([address]    3. Peripheral vascular disease, unspecified ([address]    4. Pain in toe of right foot    5. Difficulty walking       Orders Placed This Encounter   Procedures     DIABETES FOOT EXAM           Q7   []Yes    []No                Q8   [x]Yes    []No                     Q9   []Yes    []No    Plan:Visual inspection:  Deformity/amputation: absent  Skin lesions/pre-ulcerative calluses: present -   Edema: right- trace, left- trace    Sensory exam:  Monofilament sensation: abnormal -   (minimum of 5 random plantar locations tested, avoiding callused areas - > 1 area with absence of sensation is + for neuropathy)    Plus at least one of the following:  Pulses: abnormal - ,   Pinprick: Impaired  Proprioception: Impaired  Vibration (128 Hz): Impaired  Pt was evaluated and examined. Patient was given personalized discharge instructions. Nails 1-10 were debrided sharply in length and thickness with a nipper and , without incident. Pt will follow up in 3 months or sooner if any problems arise. Diagnosis was discussed with the pt and all of their questions were answered in detail. Proper foot hygiene and care was discussed with the pt. Informed patient on proper diabetic foot care and importance of tight glycemic control. Patient to check feet daily and contact the office with any questions/problems/concerns.    Other comorbidity noted and will be managed by PCP.  3/21/2022    Electronically signed by Quinn Castano DPM on 3/21/2022 at 1:26 PM  3/21/2022

## 2022-03-25 ENCOUNTER — OFFICE VISIT (OUTPATIENT)
Dept: SURGERY | Age: 77
End: 2022-03-25
Payer: MEDICARE

## 2022-03-25 VITALS
BODY MASS INDEX: 29.41 KG/M2 | TEMPERATURE: 97.8 F | HEART RATE: 70 BPM | WEIGHT: 166 LBS | HEIGHT: 63 IN | SYSTOLIC BLOOD PRESSURE: 145 MMHG | DIASTOLIC BLOOD PRESSURE: 75 MMHG

## 2022-03-25 DIAGNOSIS — K58.2 IRRITABLE BOWEL SYNDROME WITH BOTH CONSTIPATION AND DIARRHEA: ICD-10-CM

## 2022-03-25 DIAGNOSIS — K30 DELAYED GASTRIC EMPTYING: Primary | ICD-10-CM

## 2022-03-25 PROCEDURE — G8427 DOCREV CUR MEDS BY ELIG CLIN: HCPCS | Performed by: SURGERY

## 2022-03-25 PROCEDURE — 1036F TOBACCO NON-USER: CPT | Performed by: SURGERY

## 2022-03-25 PROCEDURE — G8484 FLU IMMUNIZE NO ADMIN: HCPCS | Performed by: SURGERY

## 2022-03-25 PROCEDURE — 1123F ACP DISCUSS/DSCN MKR DOCD: CPT | Performed by: SURGERY

## 2022-03-25 PROCEDURE — 4040F PNEUMOC VAC/ADMIN/RCVD: CPT | Performed by: SURGERY

## 2022-03-25 PROCEDURE — 99213 OFFICE O/P EST LOW 20 MIN: CPT | Performed by: SURGERY

## 2022-03-25 PROCEDURE — 3017F COLORECTAL CA SCREEN DOC REV: CPT | Performed by: SURGERY

## 2022-03-25 PROCEDURE — G8400 PT W/DXA NO RESULTS DOC: HCPCS | Performed by: SURGERY

## 2022-03-25 PROCEDURE — G8417 CALC BMI ABV UP PARAM F/U: HCPCS | Performed by: SURGERY

## 2022-03-25 PROCEDURE — 1090F PRES/ABSN URINE INCON ASSESS: CPT | Performed by: SURGERY

## 2022-03-25 NOTE — PATIENT INSTRUCTIONS
Patient Education        Gastroparesis: Care Instructions  Overview     When you have gastroparesis, your stomach takes a lot longer to empty. This delay can cause belly pain, bloating, and belching. It also can cause hiccups, heartburn, nausea or vomiting. You may not feel like eating. These symptoms may come and go. They most often occur during and after meals. You may feel full after only a few bites of food. This condition occurs when the nerves or muscles to the stomach don't work properly. Diabetes is one of the most common causes of this nerve damage. Gastroparesis can make it harder to control your blood sugar levels. But keeping your blood sugar levels under control may help with your symptoms. Parkinson's disease, stroke, and some medicines can also cause this condition. Home treatment can often help. Follow-up care is a key part of your treatment and safety. Be sure to make and go to all appointments, and call your doctor if you are having problems. It's also a good idea to know your test results and keep a list of the medicines you take. How can you care for yourself at home? · Eat several small meals each day rather than three large meals. · Eat foods that are low in fiber and fat. · If your doctor suggests it, take medicines that help the stomach empty more quickly. These are called motility agents. When should you call for help? Call your doctor now or seek immediate medical care if:    · You are vomiting.     · You have new or worse belly pain.     · You have a fever.     · You cannot pass stools or gas. Watch closely for changes in your health, and be sure to contact your doctor if you have any problems. Where can you learn more? Go to https://Traxomohan.Primus Power. org and sign in to your [employer]ics account. Enter M106 in the Attune box to learn more about \"Gastroparesis: Care Instructions. \"     If you do not have an account, please click on the \"Sign Up Now\" link.  Current as of: September 8, 2021               Content Version: 13.1  © 6567-7284 Healthwise, [employer]. Care instructions adapted under license by [facility] ([facility]). If you have questions about a medical condition or this instruction, always ask your healthcare professional. [address] any warranty or liability for your use of this information. Patient Education        Learning About Low-Fat Eating  What is low-fat eating? Most food has some fat in it. Your body needs some fat to be healthy. But some kinds of fats are healthier than others. In a low-fat eating plan, you try to choose healthier fats and eat fewer unhealthy fats. Healthy fats include olive and canola oil. Try to avoid eating too much saturated fat, such as in cheese and meats. You do not need to cut all fat from your diet. But you can make healthier choices about the types and amount of fat you eat. Even though it is a good idea to choose healthier fats, it is still important to be careful of how much fat you eat, because all fats are high in calories. What are the different types of fats? Unhealthy fat  · Saturated fat. Saturated fats are mostly in animal foods, such as meat and dairy foods. Tropical oils, such as coconut oil, palm oil, and cocoa butter, are also saturated fats. Healthy fats  · Monounsaturated fat. Monounsaturated fats are liquid at room temperature but get solid when refrigerated. Eating foods that are high in this fat may help lower your \"bad\" (LDL) cholesterol, keep your \"good\" (HDL) cholesterol level up, and lower your chances of getting coronary artery disease. This fat is found in canola oil, olive oil, peanut oil, olives, avocados, nuts, and nut butters. · Polyunsaturated fat. Polyunsaturated fats are liquid at room temperature. They are in safflower, sunflower, and corn oils. They are also the main fat in seafood. Omega-3 fatty acids are types of polyunsaturated fat.  Eating fish may lower your chances of getting coronary artery disease. Fatty fish such as salmon and mackerel contain these healthy fatty acids. So do ground flaxseeds and flaxseed oil, soybeans, walnuts, and seeds. Why cut down on unhealthy fats? Eating foods that contain saturated fats can raise the LDL (\"bad\") cholesterol in your blood. Having a high level of LDL cholesterol increases your chance of hardening of the arteries (atherosclerosis), which can lead to heart disease, heart attack, and stroke. In general:  · No more than 10% of your daily calories should come from saturated fat. This is about 20 grams in a 2,000-calorie diet. · No more than 10% of your daily calories should come from polyunsaturated fat. This is about 20 grams in a 2,000-calorie diet. · Monounsaturated fats can be up to 15% of your daily calories. This is about 25 to 30 grams in a 2,000-calorie diet. If you're not sure how much fat you should be eating or how many calories you need each day to stay at a healthy weight, talk to a registered dietitian. A dietitian can help you create a plan that's right for you. What can you do to cut down on fat? Foods like cheese, butter, sausage, and desserts can have a lot of unhealthy fats. Try these tips for healthier meals at home and when you eat out. At home  · Fill up on fruits, vegetables, and whole grains. · Think of meat as a side dish instead of as the main part of your meal.  · When you do eat meat, make it extra-lean ground beef (97% lean), ground turkey breast (without skin added), meats with fat trimmed off before cooking, or skinless chicken. · Try main dishes that use whole wheat pasta, brown rice, dried beans, or vegetables. · Use cooking methods that use little or no fat, such as broiling, steaming, or grilling. Use cooking spray instead of oil. If you use oil, use a monounsaturated oil, such as canola or olive oil. · Read food labels on canned, bottled, or packaged foods.  Choose temp= 100.6F those with little saturated fat. When eating out at a restaurant  · Order foods that are broiled or poached instead of fried or breaded. · Cut back on the amount of butter or margarine that you use on bread. Use small amounts of olive oil instead. · Order sauces, gravies, and salad dressings on the side, and use only a little. · When you order pasta, choose tomato sauce instead of cream sauce. · Ask for salsa with your baked potato instead of sour cream, butter, cheese, or fernandez. Where can you learn more? Go to https://Xanicpepiceweb.Wickr. org and sign in to your [employer] account. Enter M530 in the Getup Cloud box to learn more about \"Learning About Low-Fat Eating. \"     If you do not have an account, please click on the \"Sign Up Now\" link. Current as of: September 8, 2021               Content Version: 13.1  © 2006-2021 Snackr. Care instructions adapted under license by [facility] ([facility]). If you have questions about a medical condition or this instruction, always ask your healthcare professional. [place] [address] any warranty or liability for your use of this information. Patient Education        Low-Fiber Diet: Care Instructions  Overview     When your bowels are irritated, you may need to limit fiber in your diet until the problem clears up. Your doctor and dietitian can help you design a low-fiber diet based on your health and what you prefer to eat. Ask your doctor how long you should stay on a low-fiber diet. Your doctor probably will have you start adding more fiber to your diet as you get better. Always talk with your doctor or dietitian before you make changes in your diet. Follow-up care is a key part of your treatment and safety. Be sure to make and go to all appointments, and call your doctor if you are having problems. It's also a good idea to know your test results and keep a list of the medicines you take.   How can you care for yourself at home? · Choose white or refined grains, and avoid whole grains. That means eating white or refined cereals, breads, crackers, rice, or pasta. · Choose fruits and vegetables that have been peeled and cooked. Avoid fruits and vegetables that are raw or that have skins, seeds, or hulls. ? Eat cooked or canned fruit. Low-fiber fruits include applesauce, canned peaches, canned pears, and fruit juice without pulp. ? Eat low-fiber vegetables, which include well-cooked vegetables and vegetable juice. · Drink or eat milk, yogurt, or other milk products if you can digest dairy without too many problems. Your doctor may limit milk and milk products for a while. If so, they may recommend a calcium and vitamin D supplement. · Eat well-cooked meat, such as chicken, turkey, fish, or lean meat. You also can eat eggs and tofu. · Avoid these foods:  ? Bran, brown or wild rice, oatmeal, granola, corn, levi crackers, barley, and whole wheat and other whole-grain breads, such as rye bread  ? Cereals with more than 2 grams of fiber a serving  ? Berries, rhubarb, prunes, prune juice, and all dried fruits  ? Raw vegetables and fruits  ? Foods that may cause gas, such as raw or cooked cabbage, broccoli, brussels sprouts, and cauliflower  ? Cooked dried beans, lentils, and split peas  ? Crunchy peanut butter  ? Ice cream with fruit pieces in it  ? Coconut, nuts, popcorn, raisins, and seeds, or any ice cream, yogurt, or cheese with these in them  Where can you learn more? Go to https://BusyFlowpewilbereweb.healthMyGardenSchool. org and sign in to your Wiz Maps account. Enter X724 in the TC Website Promotions box to learn more about \"Low-Fiber Diet: Care Instructions. \"     If you do not have an account, please click on the \"Sign Up Now\" link. Current as of: September 8, 2021               Content Version: 13.1  © 2006-2021 Healthwise, T[employer]-f Systems. Care instructions adapted under license by [facility] ([facility]).  If you have questions about a medical condition or this instruction, always ask your healthcare professional. [place] [address] any warranty or liability for your use of this information.

## 2022-03-25 NOTE — PROGRESS NOTES
[address] [facility] Note    Assessment/Plan:     Diagnosis Orders   1. Delayed gastric emptying      Dietary modifications discussed. EGD is pending. 2. Irritable bowel syndrome with both constipation and diarrhea      Constipation predominant. Continue Linzess as needed. Colonoscopy pending. Return for Endoscopy. Chief Complaint   Patient presents with    Results     gastric swallowing results        PCP: Jonas Wiley MD    HPI: Darren Yoon is a 78 y.o. female here for follow-up of gastric emptying study for early satiety. It did showed mildly delayed gastric emptying. She does have her endoscopy scheduled for 2 weeks from now. She says her nausea is not as severe. There is no emesis. Cole Hansen does help her have bowel movements. She denies any melena or hematochezia. There is no significant abdominal pain currently      Review of Systems   All other systems reviewed and are negative. The remainder of the past medical, past surgical, family, and psychosocial history, as well as medication and allergy review, were completed and are as documented elsewhere in the chart. Objective:  Vitals:    03/25/22 1017   BP: (!) 145/75   Pulse: 70   Temp: 97.8 °F (36.6 °C)   Weight: 166 lb (75.3 kg)   Height: 5' 3\" (1.6 m)          Physical Exam  Constitutional:       General: She is not in acute distress. Appearance: She is not diaphoretic. Cardiovascular:      Rate and Rhythm: Normal rate. Pulmonary:      Effort: Pulmonary effort is normal. No respiratory distress. Abdominal:      General: There is no distension. Palpations: Abdomen is soft. Tenderness: There is no abdominal tenderness. There is no guarding or rebound. Angeline Dove MD      NOTE: This report, in part or full, may have been transcribed using voice recognition software.  Every effort was made to ensure accuracy; however, inadvertent computerized transcription errors may be present. Please excuse any transcriptional grammatical or spelling errors that may have escaped my editorial review.       CC: Deidre Wu MD

## 2022-03-28 ENCOUNTER — TELEPHONE (OUTPATIENT)
Dept: FAMILY MEDICINE CLINIC | Age: 77
End: 2022-03-28

## 2022-03-28 NOTE — TELEPHONE ENCOUNTER
Patient calling in she is getting a colonoscopy and upper scope on 04/06. She will be on a 2 day cleanse prior. They told her to contact pcp for advisement on diabetic meds and insulin use.  Please advise

## 2022-03-28 NOTE — TELEPHONE ENCOUNTER
Please find out exactly how she is taking her insulin. Not sure why she is not calling endocrine to address this.   let me know what her dose is  please

## 2022-03-29 NOTE — TELEPHONE ENCOUNTER
Have her take a half dose of her Lantus days of her prep and watch blood sugars closely. Also hold her glipizide those days. Make sure she has Instant-Glucose available in case she drops.

## 2022-03-30 NOTE — TELEPHONE ENCOUNTER
Called and notified the patient of the instructions from Dr Robyn Arciniega. She verbalized understanding and wrote them down and read them back to me correctly.

## 2022-04-04 ENCOUNTER — TELEPHONE (OUTPATIENT)
Dept: FAMILY MEDICINE CLINIC | Age: 77
End: 2022-04-04

## 2022-04-04 NOTE — TELEPHONE ENCOUNTER
Patient realized she was still taking pepcid and didn't realize this, she wants to know if she should still be taking this? Also, it was translated to her that she was to stop her Humalog sliding scale + 8 additional units. This is not in the instructions you gave. So she would like this clarified as well.     She begins her colonoscopy prep today

## 2022-04-04 NOTE — PROGRESS NOTES
[facility] PRE-ADMISSION TESTING INSTRUCTIONS      Have you been tested for COVID  No           Have you been told you were positive for COVID No  Have you had any known exposure to someone that is positive for COVID No  Do you have a cough                   No              Do you have shortness of breath No                 Do you have a sore throat            No                Are you having chills                    No                Are you having muscle aches. No                    Please come to the hospital wearing a mask and have your significant other wear a mask as well. Both of you should check your temperature before leaving to come here,  if it is 100 or higher please call [phone] for instruction. ARRIVAL INSTRUCTIONS:  [x] Parking the day of Surgery is located in the [facility]. Upon entering the main door make an immediate right to the surgery reception desk. [x] Bring photo ID and insurance card    [] Bring in a copy of Living will or Durable Power of  papers.     [x] Please be sure to arrange for responsible adult to provide transportation to and from the hospital    [x] Please arrange for responsible adult to be with you for the 24 hour period post procedure due to having anesthesia      GENERAL INSTRUCTIONS:    [x] Nothing by mouth after midnight, including gum, candy, mints or water    [x] You may brush your teeth, but do not swallow any water    [x] Take medications as instructed with 1-2 oz of water    [x] Stop herbal supplements and vitamins 5 days prior to procedure    [x] Follow preop dosing of blood thinners per physician instructions    [x] Take 1/2 dose of evening insulin, but no insulin after midnight    [x] No oral diabetic medications after midnight    [x] If diabetic and have low blood sugar or feel symptomatic, take 1-2oz apple juice only    [] Bring inhalers day of surgery    [] Bring C-PAP/ Bi-Pap day of surgery    [] Bring urine specimen day of surgery    [x] Shower or bath with soap, lather and rinse well, AM of Surgery, no lotion, powders or creams to surgical site    [x] Follow bowel prep as instructed per surgeon    [x] No tobacco products within 24 hours of surgery     [x] No alcohol or illegal drug use within 24 hours of surgery.     [x] Jewelry, body piercing's, eyeglasses, contact lenses and dentures are not permitted into surgery (bring cases)      [x] Please do not wear any nail polish, make up or hair products on the day of surgery    [x] You can expect a call the business day prior to procedure to notify you if your arrival time changes    [x] If you receive a survey after surgery we would greatly appreciate your comments    [x] Please notify surgeon if you develop any illness between now and time of surgery (cold, cough, sore throat, fever, nausea, vomiting) or any signs of infections  including skin, wounds, and dental.    []  The Outpatient Pharm[facility] is available to fill your prescription here on your day of surgery, ask your preop nurse for details

## 2022-04-04 NOTE — TELEPHONE ENCOUNTER
Unless Dr. Gi Nunes stopped it she can continue this. She really should not need a sliding scale if she is not eating. Monitor sugars very closely.

## 2022-04-04 NOTE — PROGRESS NOTES
Pt instructed to review pre op colon  prep instructions regarding dosing of Lantus insulin and glipizide as per Dr Anamaria Wyatt. any further questions pt to call office. See office note 3/28/22.  Pt verbalized understanding

## 2022-04-05 ENCOUNTER — TELEPHONE (OUTPATIENT)
Dept: FAMILY MEDICINE CLINIC | Age: 77
End: 2022-04-05

## 2022-04-05 ENCOUNTER — TELEPHONE (OUTPATIENT)
Dept: SURGERY | Age: 77
End: 2022-04-05

## 2022-04-05 NOTE — TELEPHONE ENCOUNTER
I spoke with patient this morning after having seen she went to the emergency room last night for fluctuating blood sugars. She did not take her Lantus last night. She states her blood sugar is going up this morning and 192 currently. I told her to take 20 units of Lantus tonight only set her alarm and make sure she wakes up and check sugar. Monitor closely throughout the day. May cover herself if sugar gets over 250. She is awaiting call from her endocrinologist and I did tell her to expect that and do it over they tell her. She can call me back with any issues. She does have colonoscopy planned around noon.

## 2022-04-05 NOTE — TELEPHONE ENCOUNTER
Patient called stating her blood sugars are all up and down . Lowest it has been is 70. Patient is doing a 2-day prep for her colonoscopy. Patient was unable to reach the office due to calling after hours so she proceeded to the ER. Patient was instructed she needed protein to maintain her sugars. Patient is scheduled for colonoscopy/egd on 4/6/22. Instructed patient to call pcp on to maintain her glucose during the prep. Also patient would like to see a dietician. Patient eats out a lot and does not cook at home and is looking for someone to make her a menu on what she can and can not eat for all meals of the day. Patient has seen one in past and asked her to call her to PCP for the referral back to the dietician.     Electronically signed by Janis Bermudez on 4/5/2022 at 8:58 AM

## 2022-04-06 ENCOUNTER — ANESTHESIA EVENT (OUTPATIENT)
Dept: ENDOSCOPY | Age: 77
End: 2022-04-06
Payer: MEDICARE

## 2022-04-06 ENCOUNTER — HOSPITAL ENCOUNTER (OUTPATIENT)
Age: 77
Setting detail: OUTPATIENT SURGERY
Discharge: HOME | End: 2022-04-06
Attending: SURGERY | Admitting: SURGERY
Payer: MEDICARE

## 2022-04-06 ENCOUNTER — ANESTHESIA (OUTPATIENT)
Dept: ENDOSCOPY | Age: 77
End: 2022-04-06
Payer: MEDICARE

## 2022-04-06 VITALS
DIASTOLIC BLOOD PRESSURE: 69 MMHG | WEIGHT: 161 LBS | HEART RATE: 66 BPM | RESPIRATION RATE: 18 BRPM | OXYGEN SATURATION: 100 % | SYSTOLIC BLOOD PRESSURE: 149 MMHG | TEMPERATURE: 96.9 F | HEIGHT: 63 IN | BODY MASS INDEX: 28.53 KG/M2

## 2022-04-06 VITALS
RESPIRATION RATE: 22 BRPM | SYSTOLIC BLOOD PRESSURE: 127 MMHG | OXYGEN SATURATION: 98 % | DIASTOLIC BLOOD PRESSURE: 59 MMHG

## 2022-04-06 LAB — METER GLUCOSE: 203 MG/DL (ref 74–99)

## 2022-04-06 PROCEDURE — 45385 COLONOSCOPY W/LESION REMOVAL: CPT | Performed by: SURGERY

## 2022-04-06 PROCEDURE — 3700000001 HC ADD 15 MINUTES (ANESTHESIA): Performed by: SURGERY

## 2022-04-06 PROCEDURE — 2580000003 HC RX 258: Performed by: NURSE ANESTHETIST, CERTIFIED REGISTERED

## 2022-04-06 PROCEDURE — 82962 GLUCOSE BLOOD TEST: CPT

## 2022-04-06 PROCEDURE — 3700000000 HC ANESTHESIA ATTENDED CARE: Performed by: SURGERY

## 2022-04-06 PROCEDURE — 88305 TISSUE EXAM BY PATHOLOGIST: CPT | Performed by: ANESTHESIOLOGY

## 2022-04-06 PROCEDURE — 2709999900 HC NON-CHARGEABLE SUPPLY: Performed by: SURGERY

## 2022-04-06 PROCEDURE — 43239 EGD BIOPSY SINGLE/MULTIPLE: CPT | Performed by: SURGERY

## 2022-04-06 PROCEDURE — 88342 IMHCHEM/IMCYTCHM 1ST ANTB: CPT

## 2022-04-06 PROCEDURE — 3609012400 HC EGD TRANSORAL BIOPSY SINGLE/MULTIPLE: Performed by: SURGERY

## 2022-04-06 PROCEDURE — 88305 TISSUE EXAM BY PATHOLOGIST: CPT

## 2022-04-06 PROCEDURE — 7100000010 HC PHASE II RECOVERY - FIRST 15 MIN: Performed by: SURGERY

## 2022-04-06 PROCEDURE — 7100000011 HC PHASE II RECOVERY - ADDTL 15 MIN: Performed by: SURGERY

## 2022-04-06 PROCEDURE — 6360000002 HC RX W HCPCS: Performed by: NURSE ANESTHETIST, CERTIFIED REGISTERED

## 2022-04-06 PROCEDURE — 3609010600 HC COLONOSCOPY POLYPECTOMY SNARE/COLD BIOPSY: Performed by: SURGERY

## 2022-04-06 RX ORDER — SODIUM CHLORIDE 9 MG/ML
INJECTION, SOLUTION INTRAVENOUS CONTINUOUS PRN
Status: DISCONTINUED | OUTPATIENT
Start: 2022-04-06 | End: 2022-04-06 | Stop reason: SDUPTHER

## 2022-04-06 RX ORDER — PROPOFOL 10 MG/ML
INJECTION, EMULSION INTRAVENOUS PRN
Status: DISCONTINUED | OUTPATIENT
Start: 2022-04-06 | End: 2022-04-06 | Stop reason: SDUPTHER

## 2022-04-06 RX ADMIN — PROPOFOL 380 MG: 10 INJECTION, EMULSION INTRAVENOUS at 13:40

## 2022-04-06 RX ADMIN — SODIUM CHLORIDE: 9 INJECTION, SOLUTION INTRAVENOUS at 13:31

## 2022-04-06 ASSESSMENT — PAIN - FUNCTIONAL ASSESSMENT: PAIN_FUNCTIONAL_ASSESSMENT: 0-10

## 2022-04-06 ASSESSMENT — LIFESTYLE VARIABLES: SMOKING_STATUS: 0

## 2022-04-06 NOTE — H&P
[facility] Surgery Clinic Note     Assessment/Plan:       Diagnosis Orders   1. Delayed gastric emptying        Dietary modifications discussed. EGD is pending. 2. Irritable bowel syndrome with both constipation and diarrhea        Constipation predominant. Continue Linzess as needed. Colonoscopy pending.         Return for Endoscopy.             Chief Complaint   Patient presents with    Results       gastric swallowing results          PCP: Sejal Manzano MD     HPI: Evan Ruiz is a 78 y.o. female here for follow-up of gastric emptying study for early satiety. It did showed mildly delayed gastric emptying. She does have her endoscopy scheduled for 2 weeks from now. She says her nausea is not as severe. There is no emesis. Sandy Shipley does help her have bowel movements. She denies any melena or hematochezia. There is no significant abdominal pain currently        Review of Systems   All other systems reviewed and are negative.        The remainder of the past medical, past surgical, family, and psychosocial history, as well as medication and allergy review, were completed and are as documented elsewhere in the chart.            Objective:  Vitals       Vitals:     03/25/22 1017   BP: (!) 145/75   Pulse: 70   Temp: 97.8 °F (36.6 °C)   Weight: 166 lb (75.3 kg)   Height: 5' 3\" (1.6 m)            Physical Exam  Constitutional:       General: She is not in acute distress. Appearance: She is not diaphoretic. Cardiovascular:      Rate and Rhythm: Normal rate. Pulmonary:      Effort: Pulmonary effort is normal. No respiratory distress. Abdominal:      General: There is no distension. Palpations: Abdomen is soft. Tenderness: There is no abdominal tenderness. There is no guarding or rebound.                     Nay Hahn MD        NOTE: This report, in part or full, may have been transcribed using voice recognition software.  Every effort was made to ensure accuracy; however, inadvertent computerized transcription errors may be present. Please excuse any transcriptional grammatical or spelling errors that may have escaped my editorial review.        CC:  Lilli Romero MD

## 2022-04-06 NOTE — ANESTHESIA POSTPROCEDURE EVALUATION
Department of Anesthesiology  Postprocedure Note    Patient: Kendra Frank  MRN: [id number]  [place]: 1945  Date of evaluation: 4/6/2022  Time:  2:04 PM     Procedure Summary     Date: 04/06/22 Room / Location: [facility] / [facility]    Anesthesia Start: 7830 Anesthesia Stop: 7791    Procedures:       EGD BIOPSY (N/A )      COLONOSCOPY POLYPECTOMY SNARE/COLD BIOPSY (N/A ) Diagnosis: (EARLY SATIETY IRRITABLE BOWEL SYNDROME WITH CONSTIPATION)    Surgeons: Willie Apley, MD Responsible Provider: Merary Adam MD    Anesthesia Type: MAC ASA Status: 3          Anesthesia Type: MAC    Malcolm Phase I: Malcolm Score: 10    Malcolm Phase II:      Last vitals: Reviewed and per EMR flowsheets.        Anesthesia Post Evaluation    Patient location during evaluation: PACU  Patient participation: complete - patient participated  Level of consciousness: awake  Airway patency: patent  Nausea & Vomiting: no nausea and no vomiting  Complications: no  Cardiovascular status: hemodynamically stable  Respiratory status: acceptable  Hydration status: euvolemic

## 2022-04-06 NOTE — ANESTHESIA PRE PROCEDURE
Department of Anesthesiology  Preprocedure Note       Name:  Lyubov Pereyra   Age:  78 y.o.  :  1945                                          MRN:  [id number]         Date:  2022      Surgeon: Brittany Del Rosario):  María Rodas MD    Procedure: Procedure(s):  EGD ESOPHAGOGASTRODUODENOSCOPY  COLONOSCOPY DIAGNOSTIC    Medications prior to admission:   Prior to Admission medications    Medication Sig Start Date End Date Taking? Authorizing Provider   linaclotide Gregary Debbie) 145 MCG capsule Take 1 capsule by mouth every morning (before breakfast) 22   María Rodas MD   METAMUCIL FIBER PO Take by mouth    Historical Provider, MD   DULoxetine (CYMBALTA) 20 MG extended release capsule  22   Historical Provider, MD   donepezil (ARICEPT) 10 MG tablet Take 10 mg by mouth nightly    Historical Provider, MD   famotidine (PEPCID) 20 MG tablet Take 1 tablet by mouth 2 times daily 22   Levar Atkinson MD   amLODIPine (NORVASC) 5 MG tablet TAKE 1 TABLET DAILY  Patient taking differently: at bedtime TAKE 1 TABLET 22   Levar Atkinson MD   glipiZIDE (GLUCOTROL XL) 10 MG extended release tablet Take 1 tablet daily  Patient taking differently: Indications: pt to hold days of her prep per pt per Dr Aysha Burnette Take 1 tablet daily 22   Levar Atkinson MD   ondansetron (ZOFRAN) 4 MG tablet Take 1 tablet by mouth 3 times daily as needed for Nausea or Vomiting 22   Levar Atkinson MD   levothyroxine (SYNTHROID) 100 MCG tablet Take 1 tablet daily 22   Levar Atkinson MD   metoprolol tartrate (LOPRESSOR) 25 MG tablet TAKE 1 TABLET TWICE A DAY 22   Levar Atkinson MD   insulin lispro, 1 Unit Dial, (HUMALOG KWIKPEN) 100 UNIT/ML SOPN Use on a sliding scale 3 times a day with meals. Maximum dose 30 units per day. Patient taking differently: Indications: hold am of procedure Use on a sliding scale 3 times a day with meals. Maximum dose 30 units per day.  1/10/22   Levar Atkinson MD   LANTUS SOLOSTAR 100 UNIT/ML injection pen Inject 65 Units into the skin nightly  Patient taking differently: Inject 65 Units into the skin nightly Indications: pt to take 1/2 dose days of her prep per pt per Dr Jasvir Payne  1/10/22   Cuco Fung MD   pravastatin (PRAVACHOL) 20 MG tablet TAKE 1 TABLET DAILY  Patient taking differently: nightly TAKE 1 TABLET 12/27/21   Cuco Fung MD   BD PEN NEEDLE MICRO U/F 32G X 6 MM MISC USE WITH LANTUS DAILY 8/3/21   Cuco Fung MD   Cyanocobalamin (B-12) 50 MCG TABS Take by mouth     Historical Provider, MD   aspirin 81 MG EC tablet Take 81 mg by mouth daily    Historical Provider, MD   VASCEPA 1 g CAPS capsule Take 2 capsules by mouth 2 times daily 3/29/21   Historical Provider, MD   Multiple Vitamins-Minerals (VITEYES COMPLETE PO) Take by mouth    Historical Provider, MD   divalproex (DEPAKOTE) 250 MG DR tablet Take 500 mg by mouth nightly     Historical Provider, MD   Continuous Blood Gluc Sensor ([address] [place] [address]) MISC 1 box by Does not apply route 2 times daily 6/27/19   Cuco Fung MD   Continuous Blood Gluc Sensor (FREESTYLE ANT SENSOR SYSTEM) MISC PLEASE DISPENSE 1 KIT TO PATIENT 6/27/19   Cuco Fung MD   Continuous Blood Gluc Sensor ([address] [place] [address]) MISC Please dispense one free style ant kit 6/22/19   Cuco Fung MD   latanoprost (XALATAN) 0.005 % ophthalmic solution 1 drop nightly    Historical Provider, MD   Calcium Carb-Cholecalciferol (CALCIUM 1000 + D PO) Take by mouth    Historical Provider, MD       Current medications:    No current facility-administered medications for this encounter. Allergies:     Allergies   Allergen Reactions    Seasonal        Problem List:    Patient Active Problem List   Diagnosis Code    Hypothyroidism E03.9    Major depressive disorder with single episode F32.9    Essential hypertension I10    Hyperlipidemia E78.5    Type 2 diabetes mellitus E11.9       Past Medical History:        Diagnosis Date    Chronic kidney disease     Colon polyps     [facility]     Diabetic neuropathy [facility]     Diabetic retinopathy [facility]     Essential hypertension 10/28/2019    Fatty liver     Gastritis     GERD (gastroesophageal reflux disease)     Hyperlipidemia     Hypertension     Hypothyroidism     Irritable bowel syndrome     Major depressive disorder with single episode 10/28/2019    Osteopenia     Photosensitivity disorder     chronic    RBBB     Sleep apnea     on BIPAP    Thyroid disease     Thyroid nodule     neg bx-chronic thyroiditis    Type 2 diabetes mellitus with diabetic polyneuropathy, with long-term current use of insulin ([facility] 7/23/2019    Vitamin D deficiency        Past Surgical History:        Procedure Laterality Date    APPENDECTOMY      CARPAL TUNNEL RELEASE Bilateral     CATARACT REMOVAL Bilateral     CHOLECYSTECTOMY      GYNECOLOGIC CRYOSURGERY      HYSTERECTOMY      SINUS SURGERY      Dr. Yohana Houser TONSILLECTOMY         Social History:    Social History     Tobacco Use    Smoking status: Never Smoker    Smokeless tobacco: Never Used   Substance Use Topics    Alcohol use: Never                                Counseling given: Not Answered      Vital Signs (Current):   Vitals:    04/04/22 1213 04/06/22 1214   BP:  (!) 205/83   Pulse:  80   Resp:  15   Temp:  97.3 °F (36.3 °C)   TempSrc:  Temporal   SpO2:  99%   Weight: 161 lb (73 kg) 161 lb (73 kg)   Height: 5' 3\" (1.6 m) 5' 3\" (1.6 m)                                              BP Readings from Last 3 Encounters:   04/06/22 (!) 205/83   03/25/22 (!) 145/75   03/21/22 138/80       NPO Status:                                                                                 BMI:   Wt Readings from Last 3 Encounters:   04/06/22 161 lb (73 kg)   03/25/22 166 lb (75.3 kg)   03/21/22 164 lb (74.4 kg)     Body mass index is 28.52 kg/m².     CBC:   Lab Results   Component Value Date    WBC 7.2 01/04/2022    RBC 4.67 01/04/2022 HGB 13.3 01/04/2022    HCT 40.9 01/04/2022    MCV 87.6 01/04/2022    RDW 13.3 01/04/2022     01/04/2022       CMP:   Lab Results   Component Value Date     01/12/2022    K 4.4 01/12/2022    CL 94 01/12/2022    CO2 21 01/12/2022    BUN 17 01/12/2022    CREATININE 0.9 01/12/2022    GFRAA >60 01/12/2022    AGRATIO 1.1 11/29/2021    LABGLOM >60 01/12/2022    GLUCOSE 327 01/12/2022    PROT 7.7 01/12/2022    CALCIUM 10.2 01/12/2022    BILITOT 0.6 01/12/2022    ALKPHOS 79 01/12/2022    AST 20 01/12/2022    ALT 17 01/12/2022       POC Tests: No results for input(s): POCGLU, POCNA, POCK, POCCL, POCBUN, POCHEMO, POCHCT in the last 72 hours.     Coags: No results found for: PROTIME, INR, APTT    HCG (If Applicable): No results found for: PREGTESTUR, PREGSERUM, HCG, HCGQUANT     ABGs: No results found for: PHART, PO2ART, AUW1SJH, EWW4HCT, BEART, K9JUYAHK     Type & Screen (If Applicable):  No results found for: LABABO, LABRH    Drug/Infectious Status (If Applicable):  No results found for: HIV, HEPCAB    COVID-19 Screening (If Applicable):   Lab Results   Component Value Date    COVID19 Not Detected 01/04/2022           Anesthesia Evaluation  Patient summary reviewed and Nursing notes reviewed no history of anesthetic complications:   Airway: Mallampati: II  TM distance: >3 FB   Neck ROM: full  Mouth opening: > = 3 FB Dental: normal exam         Pulmonary: breath sounds clear to auscultation  (+) sleep apnea: on CPAP,      (-) not a current smoker                           Cardiovascular:  Exercise tolerance: good (>4 METS),   (+) hypertension:, dysrhythmias: atrial fibrillation, hyperlipidemia      ECG reviewed  Rhythm: regular  Rate: normal           Beta Blocker:  Dose within 24 Hrs         Neuro/Psych:   (+) neuromuscular disease:, depression/anxiety             GI/Hepatic/Renal:   (+) GERD: poorly controlled, bowel prep,           Endo/Other:    (+) DiabetesType II DM, , hypothyroidism::., . Abdominal:             Vascular: negative vascular ROS. Other Findings:             Anesthesia Plan      MAC     ASA 3       Induction: intravenous. Anesthetic plan and risks discussed with patient. Chart reviewed . Patient assessed before induction. I agree with the above note.   JULIET Bryant - Viet Guillermo MD   4/6/2022

## 2022-04-06 NOTE — PROGRESS NOTES
Iv removed without incident, dc instructions provided, questions answered. Escort to the restroom.    Call to ride for transport home

## 2022-04-06 NOTE — OP NOTE
EGD/Colonoscopy Op Note    PATIENT: Sri Larson    DATE OF PROCEDURE: 4/6/2022     SURGEON: Cristobal Mena MD    PREOPERATIVE DIAGNOSIS: Early satiety, constipation    POSTOPERATIVE DIAGNOSIS: Same, erosive gastroduodenitis, colon polyps, moderately poor prep, hemorrhoids    OPERATION: Procedure(s):  EGD BIOPSY  COLONOSCOPY POLYPECTOMY SNARE/COLD BIOPSY    ANESTHESIA: Local monitored anesthesia. ESTIMATED BLOOD LOSS: minimal    COMPLICATIONS: None. SPECIMENS:    ID Type Source Tests Collected by Time Destination   A : ANTRAL BX Tissue Stomach SURGICAL PATHOLOGY Cristobal Mena MD 4/6/2022 1343    B : DUODENAL BX Tissue Duodenum SURGICAL PATHOLOGY Cristobal Mena MD 4/6/2022 1344    C : SIGMOID COLON POLYPECTOMY Tissue Colon SURGICAL PATHOLOGY Cristobal Mena MD 4/6/2022 1401        HISTORY: The patient is a 78y.o. year old female with history of above preop diagnosis. I recommended esophagogastroduodenoscopy and colonoscopy with possible biopsy/polypectomy and I explained the risk, benefits, expected outcome, and alternatives to the procedure. Risks included but are not limited to bleeding, infection, respiratory distress, hypotension, and perforation. Patient understands and is in agreement. PROCEDURE: The patient was given IV conscious sedation per anesthesia. The patient was given supplemental oxygen by nasal cannula. The gastroscope was inserted orally and advanced under direct vision through the esophagus, through the stomach, through the pylorus, and into the duodenum. FINDINGS:    Duodenum: Duodenitis with superficial ulcerations status post biopsies    Stomach: There is gastritis with few areas of erosive gastritis status post biopsies    Esophagus: normal    Larynx: not examined    The scope was removed and the patient tolerated the procedure well.        The colonoscope was inserted per rectum and advanced under direct vision to the cecum without difficulty, identified by appendiceal orifice and ileocecal valve. The prep was Moderately poor so exam was Suboptimal and a lesion could have been missed. FINDINGS:    ANA: Hemorrhoids    Colon: Prep was moderately poor. In the sigmoid there is a 3 mm polyp status post cold snare polypectomy. No other obvious lesions were noted. Rectum/Anus: examined in normal and retroflexed positions -hemorrhoids    The colon was decompressed and the scope was removed. The withdraw time was approximately 9 minutes. The patient tolerated the procedure well. ASSESSMENT/PLAN:   1. Follow-up biopsies  2. Continue Pepcid, Linzess  3. Colorectal Cancer Screening - recommend repeat colonoscopy in 3 years (may change pending biopsy results). Sooner if issues/concerns.     Cher Trujillo MD  04/06/22  2:07 PM

## 2022-04-08 ENCOUNTER — TELEPHONE (OUTPATIENT)
Dept: SURGERY | Age: 77
End: 2022-04-08

## 2022-04-08 NOTE — TELEPHONE ENCOUNTER
MA returned patients phone call regarding a menu of what she should be eating. Instructed patient that she could get a hold of her PCP to have a referral sent in for a dietician. Patient advised.   Electronically signed by Coral Reyes on 4/8/2022 at 12:25 PM

## 2022-04-11 ENCOUNTER — OFFICE VISIT (OUTPATIENT)
Dept: FAMILY MEDICINE CLINIC | Age: 77
End: 2022-04-11
Payer: MEDICARE

## 2022-04-11 VITALS
OXYGEN SATURATION: 99 % | WEIGHT: 160 LBS | HEIGHT: 63 IN | TEMPERATURE: 98.1 F | DIASTOLIC BLOOD PRESSURE: 64 MMHG | HEART RATE: 71 BPM | BODY MASS INDEX: 28.35 KG/M2 | SYSTOLIC BLOOD PRESSURE: 152 MMHG

## 2022-04-11 VITALS
SYSTOLIC BLOOD PRESSURE: 152 MMHG | BODY MASS INDEX: 28.49 KG/M2 | WEIGHT: 160.8 LBS | DIASTOLIC BLOOD PRESSURE: 64 MMHG | OXYGEN SATURATION: 99 % | HEART RATE: 71 BPM | TEMPERATURE: 98.1 F | HEIGHT: 63 IN

## 2022-04-11 DIAGNOSIS — Z79.4 TYPE 2 DIABETES MELLITUS WITH DIABETIC POLYNEUROPATHY, WITH LONG-TERM CURRENT USE OF INSULIN (HCC): ICD-10-CM

## 2022-04-11 DIAGNOSIS — F32.9 MAJOR DEPRESSIVE DISORDER WITH SINGLE EPISODE, REMISSION STATUS UNSPECIFIED: ICD-10-CM

## 2022-04-11 DIAGNOSIS — Z00.00 MEDICARE ANNUAL WELLNESS VISIT, SUBSEQUENT: Primary | ICD-10-CM

## 2022-04-11 DIAGNOSIS — E78.5 HYPERLIPIDEMIA, UNSPECIFIED HYPERLIPIDEMIA TYPE: ICD-10-CM

## 2022-04-11 DIAGNOSIS — G47.33 OBSTRUCTIVE SLEEP APNEA: ICD-10-CM

## 2022-04-11 DIAGNOSIS — I10 ESSENTIAL HYPERTENSION: Primary | ICD-10-CM

## 2022-04-11 DIAGNOSIS — Z92.29 HISTORY OF REGULAR MEDICATION USE: ICD-10-CM

## 2022-04-11 DIAGNOSIS — E03.9 HYPOTHYROIDISM, UNSPECIFIED TYPE: ICD-10-CM

## 2022-04-11 DIAGNOSIS — F41.9 ANXIETY: ICD-10-CM

## 2022-04-11 DIAGNOSIS — E11.42 TYPE 2 DIABETES MELLITUS WITH DIABETIC POLYNEUROPATHY, WITH LONG-TERM CURRENT USE OF INSULIN (HCC): ICD-10-CM

## 2022-04-11 PROCEDURE — 1036F TOBACCO NON-USER: CPT | Performed by: INTERNAL MEDICINE

## 2022-04-11 PROCEDURE — G8427 DOCREV CUR MEDS BY ELIG CLIN: HCPCS | Performed by: INTERNAL MEDICINE

## 2022-04-11 PROCEDURE — G8417 CALC BMI ABV UP PARAM F/U: HCPCS | Performed by: INTERNAL MEDICINE

## 2022-04-11 PROCEDURE — 2022F DILAT RTA XM EVC RTNOPTHY: CPT | Performed by: INTERNAL MEDICINE

## 2022-04-11 PROCEDURE — 99214 OFFICE O/P EST MOD 30 MIN: CPT | Performed by: INTERNAL MEDICINE

## 2022-04-11 PROCEDURE — G8400 PT W/DXA NO RESULTS DOC: HCPCS | Performed by: INTERNAL MEDICINE

## 2022-04-11 PROCEDURE — 3046F HEMOGLOBIN A1C LEVEL >9.0%: CPT | Performed by: INTERNAL MEDICINE

## 2022-04-11 PROCEDURE — 4040F PNEUMOC VAC/ADMIN/RCVD: CPT | Performed by: INTERNAL MEDICINE

## 2022-04-11 PROCEDURE — G0439 PPPS, SUBSEQ VISIT: HCPCS | Performed by: INTERNAL MEDICINE

## 2022-04-11 PROCEDURE — 1090F PRES/ABSN URINE INCON ASSESS: CPT | Performed by: INTERNAL MEDICINE

## 2022-04-11 PROCEDURE — 1123F ACP DISCUSS/DSCN MKR DOCD: CPT | Performed by: INTERNAL MEDICINE

## 2022-04-11 PROCEDURE — 3017F COLORECTAL CA SCREEN DOC REV: CPT | Performed by: INTERNAL MEDICINE

## 2022-04-11 RX ORDER — PEN NEEDLE, DIABETIC 32 GX 1/4"
NEEDLE, DISPOSABLE MISCELLANEOUS
Qty: 100 EACH | Refills: 5 | Status: SHIPPED | OUTPATIENT
Start: 2022-04-11

## 2022-04-11 SDOH — HEALTH STABILITY: PHYSICAL HEALTH: ON AVERAGE, HOW MANY DAYS PER WEEK DO YOU ENGAGE IN MODERATE TO STRENUOUS EXERCISE (LIKE A BRISK WALK)?: 2 DAYS

## 2022-04-11 SDOH — HEALTH STABILITY: PHYSICAL HEALTH: ON AVERAGE, HOW MANY MINUTES DO YOU ENGAGE IN EXERCISE AT THIS LEVEL?: 20 MIN

## 2022-04-11 ASSESSMENT — PATIENT HEALTH QUESTIONNAIRE - PHQ9
SUM OF ALL RESPONSES TO PHQ QUESTIONS 1-9: 0
SUM OF ALL RESPONSES TO PHQ QUESTIONS 1-9: 0
1. LITTLE INTEREST OR PLEASURE IN DOING THINGS: 0
SUM OF ALL RESPONSES TO PHQ QUESTIONS 1-9: 0
10. IF YOU CHECKED OFF ANY PROBLEMS, HOW DIFFICULT HAVE THESE PROBLEMS MADE IT FOR YOU TO DO YOUR WORK, TAKE CARE OF THINGS AT HOME, OR GET ALONG WITH OTHER PEOPLE: 0
SUM OF ALL RESPONSES TO PHQ QUESTIONS 1-9: 0
SUM OF ALL RESPONSES TO PHQ QUESTIONS 1-9: 0
2. FEELING DOWN, DEPRESSED OR HOPELESS: 0
8. MOVING OR SPEAKING SO SLOWLY THAT OTHER PEOPLE COULD HAVE NOTICED. OR THE OPPOSITE, BEING SO FIGETY OR RESTLESS THAT YOU HAVE BEEN MOVING AROUND A LOT MORE THAN USUAL: 0
6. FEELING BAD ABOUT YOURSELF - OR THAT YOU ARE A FAILURE OR HAVE LET YOURSELF OR YOUR FAMILY DOWN: 0
5. POOR APPETITE OR OVEREATING: 0
9. THOUGHTS THAT YOU WOULD BE BETTER OFF DEAD, OR OF HURTING YOURSELF: 0
3. TROUBLE FALLING OR STAYING ASLEEP: 0
SUM OF ALL RESPONSES TO PHQ9 QUESTIONS 1 & 2: 0
4. FEELING TIRED OR HAVING LITTLE ENERGY: 0
SUM OF ALL RESPONSES TO PHQ QUESTIONS 1-9: 0
SUM OF ALL RESPONSES TO PHQ9 QUESTIONS 1 & 2: 0
1. LITTLE INTEREST OR PLEASURE IN DOING THINGS: 0
SUM OF ALL RESPONSES TO PHQ QUESTIONS 1-9: 0
SUM OF ALL RESPONSES TO PHQ QUESTIONS 1-9: 0
7. TROUBLE CONCENTRATING ON THINGS, SUCH AS READING THE NEWSPAPER OR WATCHING TELEVISION: 0
2. FEELING DOWN, DEPRESSED OR HOPELESS: 0

## 2022-04-11 ASSESSMENT — LIFESTYLE VARIABLES
HOW OFTEN DO YOU HAVE A DRINK CONTAINING ALCOHOL: 1
HOW MANY STANDARD DRINKS CONTAINING ALCOHOL DO YOU HAVE ON A TYPICAL DAY: 1 OR 2
HOW OFTEN DO YOU HAVE SIX OR MORE DRINKS ON ONE OCCASION: 1
HOW MANY STANDARD DRINKS CONTAINING ALCOHOL DO YOU HAVE ON A TYPICAL DAY: 1
HOW OFTEN DO YOU HAVE A DRINK CONTAINING ALCOHOL: NEVER

## 2022-04-11 NOTE — PROGRESS NOTES
Medicare Annual Wellness Visit    Tish Alegria is here for Medicare AWV    Assessment & Plan    Recommendations for Preventive Services Due: see orders and patient instructions/AVS.  Recommended screening schedule for the next 5-10 years is provided to the patient in written form: see Patient Instructions/AVS.     No follow-ups on file. Subjective   The following acute and/or chronic problems were also addressed today:  Separate visit was performed and addressed her chronic problems    Patient's complete Health Risk Assessment and screening values have been reviewed and are found in Flowsheets. The following problems were reviewed today and where indicated follow up appointments were made and/or referrals ordered. Positive Risk Factor Screenings with Interventions:             General Health and ACP:  General  In general, how would you say your health is?: Fair  In the past 7 days, have you experienced any of the following: New or Increased Pain, New or Increased Fatigue, Loneliness, Social Isolation, Stress or Anger?: No  Do you get the social and emotional support that you need?: Yes  Do you have a Living Will?: Yes    Advance Directives     Power of [address] Will ACP-Advance Directive ACP-Power of     Not on File Not on File Not on File Not on File      General Health Risk Interventions:  · no issues found              Objective   Vitals:    04/11/22 1324   BP: (!) 152/64   Pulse: 71   Temp: 98.1 °F (36.7 °C)   TempSrc: Temporal   SpO2: 99%   Weight: 160 lb (72.6 kg)   Height: 5' 3\" (1.6 m)      Body mass index is 28.34 kg/m². Allergies   Allergen Reactions    Seasonal      Prior to Visit Medications    Medication Sig Taking?  Authorizing Provider   linaclotide (LINZESS) 145 MCG capsule Take 1 capsule by mouth every morning (before breakfast)  Nicolle Saenz MD   METAMUCIL FIBER PO Take by mouth  Historical Provider, MD   DULoxetine (CYMBALTA) 20 MG extended release capsule   Historical Provider, MD   donepezil (ARICEPT) 10 MG tablet Take 10 mg by mouth nightly  Patient not taking: Reported on 4/11/2022  Historical Provider, MD   famotidine (PEPCID) 20 MG tablet Take 1 tablet by mouth 2 times daily  Patient not taking: Reported on 4/11/2022  Jarek Louis MD   amLODIPine (NORVASC) 5 MG tablet TAKE 1 TABLET DAILY  Patient taking differently: at bedtime TAKE 1 TABLET  Jarek Louis MD   glipiZIDE (GLUCOTROL XL) 10 MG extended release tablet Take 1 tablet daily  Patient taking differently: Take 10 mg by mouth daily   Jarek Louis MD   ondansetron (ZOFRAN) 4 MG tablet Take 1 tablet by mouth 3 times daily as needed for Nausea or Vomiting  Jarek Louis MD   levothyroxine (SYNTHROID) 100 MCG tablet Take 1 tablet daily  Jarek Louis MD   metoprolol tartrate (LOPRESSOR) 25 MG tablet TAKE 1 TABLET TWICE A DAY  Jarek Louis MD   insulin lispro, 1 Unit Dial, (HUMALOG KWIKPEN) 100 UNIT/ML SOPN Use on a sliding scale 3 times a day with meals. Maximum dose 30 units per day. Patient taking differently: Indications: hold am of procedure Use on a sliding scale 3 times a day with meals. Maximum dose 30 units per day.   Jarek Louis MD   LANTUS SOLOSTAR 100 UNIT/ML injection pen Inject 65 Units into the skin nightly  Jarek Louis MD   pravastatin (PRAVACHOL) 20 MG tablet TAKE 1 TABLET DAILY  Patient taking differently: nightly TAKE 1 TABLET  Jarek Louis MD   BD PEN NEEDLE MICRO U/F 32G X 6 MM MISC USE WITH LANTUS DAILY  Jarek Louis MD   Cyanocobalamin (B-12) 50 MCG TABS Take by mouth   Historical Provider, MD   aspirin 81 MG EC tablet Take 81 mg by mouth daily  Historical Provider, MD   VASCEPA 1 g CAPS capsule Take 2 capsules by mouth 2 times daily  Historical Provider, MD   Multiple Vitamins-Minerals (VITEYES COMPLETE PO) Take by mouth  Historical Provider, MD   divalproex (DEPAKOTE) 250 MG DR tablet Take 500 mg by mouth nightly   Historical Provider, MD   Continuous Blood Gluc Sensor ([address] [place] [address]) MISC 1 box by Does not apply route 2 times daily  Rosi Cosme MD   Continuous Blood Gluc Sensor ([address] [place] [address]) MISC PLEASE DISPENSE 1 KIT TO PATIENT  Rosi Cosme MD   Continuous Blood Gluc Sensor ([address] [place] [address]) MISC Please dispense one free style Osbaldoroberto Mcmahan MD   latanoprost (XALATAN) 0.005 % ophthalmic solution 1 drop nightly  Historical Provider, MD   Calcium Carb-Cholecalciferol (CALCIUM 1000 + D PO) Take by mouth  Historical Provider, MD Cisneros (Including outside providers/suppliers regularly involved in providing care):   Patient Care Team:  Rosi Cosme MD as PCP - General (Internal Medicine)  Rosi Cosme MD as PCP - [facility] Empaneled Provider  Azalea Haji DPM as Consulting Physician (Podiatry)  Leopold Lindsay, MD as Consulting Physician (Pulmonology)    Reviewed and updated this visit:

## 2022-04-11 NOTE — PATIENT INSTRUCTIONS
Personalized Preventive Plan for Jung Lagunas - 4/11/2022  Medicare offers a range of preventive health benefits. Some of the tests and screenings are paid in full while other may be subject to a deductible, co-insurance, and/or copay. Some of these benefits include a comprehensive review of your medical history including lifestyle, illnesses that may run in your family, and various assessments and screenings as appropriate. After reviewing your medical record and screening and assessments performed today your provider may have ordered immunizations, labs, imaging, and/or referrals for you. A list of these orders (if applicable) as well as your Preventive Care list are included within your After Visit Summary for your review. Other Preventive Recommendations:    · A preventive eye exam performed by an eye specialist is recommended every 1-2 years to screen for glaucoma; cataracts, macular degeneration, and other eye disorders. · A preventive dental visit is recommended every 6 months. · Try to get at least 150 minutes of exercise per week or 10,000 steps per day on a pedometer . · Order or download the FREE \"Exercise & Physical Activity: Your Everyday Guide\" from [employer]. Call 7-704.788.3938 or search [employer] online. · You need 2662-6082 mg of calcium and 7500-8114 IU of vitamin D per day. It is possible to meet your calcium requirement with diet alone, but a vitamin D supplement is usually necessary to meet this goal.  · When exposed to the sun, use a sunscreen that protects against both UVA and UVB radiation with an SPF of 30 or greater. Reapply every 2 to 3 hours or after sweating, drying off with a towel, or swimming. · Always wear a seat belt when traveling in a car. Always wear a helmet when riding a bicycle or motorcycle.

## 2022-04-12 NOTE — PROGRESS NOTES
[facility]     [facility]  [facility] : 1945 Sex: female  Age: 78 y.o. Chief Complaint   Patient presents with    Hypertension     3 months       HPI  Presents today for follow-up visit on her multiple medical problems. Patient just undergone EGD and colonoscopy by Dr. Johanna Farris. Unfortunately she had a long prep to half days and being diabetic did have some difficulties with her sugars. I made adjustments in her insulin hold her oral medications. She did make it through okay and was found to have erosive gastritis and colonic polyp. I did review this with the patient and she will be seeing Dr. Johanna Farris in the next week or so also. Blood sugars have been in pretty good range on her current medication. Blood pressures have recently in the past week or so been elevated with this colonic prep. Tells me prior to that they had been doing well on her current hypertensive medication. Thyroid numbers have been stable and controlled on thyroid replacement. Her lipids have been stable and controlled on her statin medication. Depression has been stable and controlled on her SSRI. Obstructive sleep apnea stable on her BiPAP which she is using on a regular basis. She does have some complaint of memory issues and has seen neurology on the Mr. Last time in. We did hold her donezepil in the recent past because of loose stool. At this point I think she ought to go back on it again and I relate this to her. She did bring out a list today of things to go over which I took the time to do. She had questions about screening hepatitis C which I told her it looks as though she never has had we will check that. COVID posterior questions which I answered and she plans to get the flu shot. She had questions about gastroparesis which she was told she had and I told her this is all related to her very poorly controlled diabetes HypoTears.   Is not related to medication that she is taking that I am aware of. She needs to be more diligent about her blood sugar management control she had numerous dietary questions related to her diabetes and I did tell her I feel she needs another refresher course with the dietitian and we will set her up. I did review surgery notes and podiatry notes. She is following with endocrine ophthalmology,GYN, pulmonary, neurology  psychiatry, cardiology, physical medicine and pain management.         Review of Systems     Const: Denies chills, fever and sweats. Eyes: Reports glaucoma Recent cataract/glaucoma surgery./ diabetic retinopathy changes. /Ophthalmology. States the  patient does have post cataract surgery changes that will eventually need some laser surgery done  ENMT: Denies ear symptoms. Reports postnasal drip, but denies other nasal symptoms. Denies mouth or throat  symptoms. CV: Denies chest pain, orthopnea and palpitations--history of atrial fibrillation. Currently back in sinus rhythm and off of anticoagulation per cardiology  Resp: Denies cough, SOB and wheezing. GI:  patient has had abdominal bloating, diarrhea, nausea-see above -felt to have diabetic gastroparesis  : Urinary: denies dysuria, frequency and frequent UTI's. Musculo: Reports arthritis, lower back pain and right hip pain/Improved  Skin: Denies eczema, pruritus, rash. Neuro: Denies dizziness, headache, seizures and syncope.  Diabetic neuropathy. Psych: Reports depression and stress/back in counseling.  Is going to have a new counselor upcoming.  Some lack of motivation seems to have some improvement on her SSRI  endocrine: Reports diabetes marginally controlled/marginal compliance             REST OF PERTINENT ROS GONE OVER AND WAS NEGATIVE.      PMH:  Problem List: Type II diabetes mellitus uncontrolled, Essential hypertension, Taking medication, Depressive disorder,  Hypothyroidism, Hyperlipidemia, Type 2 diabetes mellitus  Martha Dutta  1945 Page #2  Health Maintenance:  Influenza Vaccination - (11/6/2017)  Couseled on Home Safety - (8/11/2017)  Mammogram - (2/14/2017)  Bone Density Test Screening - (6/13/2012)  Mammogram Screening - (1/9/2006)  Mammogram Screening - (3/11/2008)  Mammogram Screening - (2/15/2010)  Mammogram Screening - (2/14/2017)  Colonoscopy - 1996,2/10,12/14, 2/17, 4/22  Stress Test - 2005, 11/18-negative  EKG - 6/08,5/13,4/14,6/14, 4/17,10/18  Rectal Exam - dr Donta Costello - \"  Breast Exam - \"  EGD - 2/10, 4/22  Duplex Carotid Ultasound - 3/09-nl  capsule endoscopy - [facility] 4/10-nl   MMSE-2/21-23/30-performed by neurology  Influenza Vaccination - (10/2018)  Prevnar Vaccine - (4/2017)  Pneumonia Vaccination - (2004)  Zoster/Shingles Vaccine - had Zostavax, gave slip for Shingrix     Medical Problems:  photosensitivity disorder/chronic, hx pos Lyla  thyroid nodule-neg bx - chronic thyroiditis  IBS, Depression, Non Insulin Dependent Diabetes, Hyperlipidemia, tubal ligation, appy, hysterectomy-still has  ovaries--non cancerous, Gastroesophageal Reflux Disease (GERD), Hypothyroidism, occular htn, Diabetic Neuropathy  Osteopenia - declined med  Difficult Intubation, sees dr li/gyn, dr Santo Im eye,psych NP, Hypertension  CKD - sees renal  sinus surg - dr Noemi Zabala  follows with counsellor,ophthalmology - GYN  cholecystectomy, Gastritis, vit D defic, Colon Polyps, Fatty Liver, bilateral cataract surg  pelvic and sacral fracture - 2016  Diabetic Retinopathy, Left carpal tunnel surgery. , Right carpal tunnel release, RBBB  Sleep Apnea - on BIPAP  Hospitalization 10/18 - New onset atrial fibrillation, UTI, CHF, pneumonia  Atrial Fibrillation-no longer on anticoagulation per cardiology, type 2 Non-Stemi  [facility]-sees neurology  Reviewed and updated. SH:  Marital: Legal Status: . Personal Habits: Cigarette Use: Negative For current cigarette smoker. Alcohol: does not use alcohol. Exercise  Type: She works as a  in [employer].  She has a Bachelors - Degree. --now retired                     Current Outpatient Medications:     BD PEN NEEDLE MICRO U/F 32G X 6 MM MISC, USE WITH LANTUS DAILY, Disp: 100 each, Rfl: 5    linaclotide (LINZESS) 145 MCG capsule, Take 1 capsule by mouth every morning (before breakfast), Disp: 30 capsule, Rfl: 2    METAMUCIL FIBER PO, Take by mouth, Disp: , Rfl:     DULoxetine (CYMBALTA) 20 MG extended release capsule, , Disp: , Rfl:     amLODIPine (NORVASC) 5 MG tablet, TAKE 1 TABLET DAILY (Patient taking differently: at bedtime TAKE 1 TABLET), Disp: 90 tablet, Rfl: 1    glipiZIDE (GLUCOTROL XL) 10 MG extended release tablet, Take 1 tablet daily (Patient taking differently: Take 10 mg by mouth daily ), Disp: 90 tablet, Rfl: 1    ondansetron (ZOFRAN) 4 MG tablet, Take 1 tablet by mouth 3 times daily as needed for Nausea or Vomiting, Disp: 30 tablet, Rfl: 0    levothyroxine (SYNTHROID) 100 MCG tablet, Take 1 tablet daily, Disp: 90 tablet, Rfl: 1    metoprolol tartrate (LOPRESSOR) 25 MG tablet, TAKE 1 TABLET TWICE A DAY, Disp: 180 tablet, Rfl: 1    insulin lispro, 1 Unit Dial, (HUMALOG KWIKPEN) 100 UNIT/ML SOPN, Use on a sliding scale 3 times a day with meals. Maximum dose 30 units per day. (Patient taking differently: Indications: hold am of procedure Use on a sliding scale 3 times a day with meals.  Maximum dose 30 units per day.), Disp: 15 pen, Rfl: 1    LANTUS SOLOSTAR 100 UNIT/ML injection pen, Inject 65 Units into the skin nightly, Disp: 15 pen, Rfl: 3    pravastatin (PRAVACHOL) 20 MG tablet, TAKE 1 TABLET DAILY (Patient taking differently: nightly TAKE 1 TABLET), Disp: 90 tablet, Rfl: 1    Cyanocobalamin (B-12) 50 MCG TABS, Take by mouth , Disp: , Rfl:     aspirin 81 MG EC tablet, Take 81 mg by mouth daily, Disp: , Rfl:     VASCEPA 1 g CAPS capsule, Take 2 capsules by mouth 2 times daily, Disp: , Rfl:     Multiple Vitamins-Minerals (VITEYES COMPLETE PO), Take by mouth, Disp: , Rfl:     divalproex (DEPAKOTE) 250 MG DR tablet, Take 500 mg by mouth nightly , Disp: , Rfl:     Continuous Blood Gluc Sensor ([facility]) [facility], 1 box by Does not apply route 2 times daily, Disp: 1 each, Rfl: 0    Continuous Blood Gluc Sensor (FREESTYLE ANT SENSOR SYSTEM) [facility], PLEASE DISPENSE 1 KIT TO PATIENT, Disp: 1 each, Rfl: 0    Continuous Blood Gluc Sensor ([facility]) [facility], Please dispense one free style ant kit, Disp: 1 each, Rfl: 0    latanoprost (XALATAN) 0.005 % ophthalmic solution, 1 drop nightly, Disp: , Rfl:     Calcium Carb-Cholecalciferol (CALCIUM 1000 + D PO), Take by mouth, Disp: , Rfl:     donepezil (ARICEPT) 10 MG tablet, Take 10 mg by mouth nightly (Patient not taking: Reported on 4/11/2022), Disp: , Rfl:     famotidine (PEPCID) 20 MG tablet, Take 1 tablet by mouth 2 times daily (Patient not taking: Reported on 4/11/2022), Disp: 180 tablet, Rfl: 1  Allergies   Allergen Reactions    Seasonal        Past Medical History:   Diagnosis Date    Chronic kidney disease     Colon polyps     Diabetes mellitus ([address]     Diabetic neuropathy ([address]     Diabetic retinopathy ([address]     Essential hypertension 10/28/2019    Fatty liver     Gastritis     GERD (gastroesophageal reflux disease)     Hyperlipidemia     Hypertension     Hypothyroidism     Irritable bowel syndrome     Major depressive disorder with single episode 10/28/2019    Osteopenia     Photosensitivity disorder     chronic    RBBB     Sleep apnea     on BIPAP    Thyroid disease     Thyroid nodule     neg bx-chronic thyroiditis    Type 2 diabetes mellitus with diabetic polyneuropathy, with long-term current use of insulin ([address] 7/23/2019    Vitamin D deficiency      Past Surgical History:   Procedure Laterality Date    APPENDECTOMY      CARPAL TUNNEL RELEASE Bilateral     CATARACT REMOVAL Bilateral     CHOLECYSTECTOMY      COLONOSCOPY N/A 4/6/2022    COLONOSCOPY POLYPECTOMY SNARE/COLD BIOPSY performed by Paul Graham MD at [place] [id number]     [address]      Dr. Alma Munroe TONSILLECTOMY      UPPER GASTROINTESTINAL ENDOSCOPY N/A 4/6/2022    EGD [facility]OPSY [facility] at [facility] ENDOSCOPY     Family History   Problem Relation Age of Onset    Diabetes Paternal Grandmother     Diabetes Father     Lung Cancer Mother     Pancreatic Cancer Brother     Diabetes Brother      Social History     Socioeconomic History    Marital status:      Spouse name: Not on file    Number of children: Not on file    Years of education: Not on file    Highest education level: Not on file   Occupational History    Not on file   Tobacco Use    Smoking status: Never Smoker    Smokeless tobacco: Never Used   Substance and Sexual Activity    Alcohol use: Never    Drug use: Never    Sexual activity: Not on file   Other Topics Concern    Not on file   Social History Narrative    Not on file     Social Determinants of Health     Financial Resource Strain: Low Risk     Difficulty of Paying Living Expenses: Not hard at all   Food Insecurity: No Food Insecurity    Worried About Running Out of Food in the Last Year: Never true    [address] in the Last Year: Never true   Transportation Needs:     Lack of Transportation (Medical): Not on file    Lack of Transportation (Non-Medical):  Not on file   Physical Activity: Insufficiently Active    Days of Exercise per Week: 2 days    Minutes of Exercise per Session: 20 min   Stress:     Feeling of Stress : Not on file   Social Connections:     Frequency of Communication with Friends and Family: Not on file    Frequency of Social Gatherings with Friends and Family: Not on file    Attends Evangelical Services: Not on file    Active Member of Clubs or Organizations: Not on file    Attends Club or Organization Meetings: Not on file    Marital Status: Not on file   Intimate Partner Violence:     Fear of Current or Ex-Partner: Not on file    Emotionally Abused: Not on file    Physically Abused: Not on file    Sexually Abused: Not on file   Housing Stability:     Unable to Pay for Housing in the Last Year: Not on file    Number of Places Lived in the Last Year: Not on file    Unstable Housing in the Last Year: Not on file       Vitals:    04/11/22 1313   BP: (!) 152/64   Pulse: 71   Temp: 98.1 °F (36.7 °C)   TempSrc: Temporal   SpO2: 99%   Weight: 160 lb 12.8 oz (72.9 kg)   Height: 5' 3\" (1.6 m)       Physical Exam    Const: Appears well developed and well nourished. No signs of acute distress present. Neck: Supple and symmetric. Palpation reveals no adenopathy. No masses appreciated. Thyroid exhibits no nodule  or thyromegaly. No JVD. Carotids: 2+ and equal bilaterally, without bruits. Resp: No rales, rhonchi or wheezes appreciated over the lungs bilaterally. CV: Rate is regular. Rhythm is regular. S1 is normal. S2 is normal. No gallop or rubs. No heart murmur  appreciated. Extremities: No clubbing, cyanosis or edema. Abdomen:   Bowel sounds are normoactive. Palpation reveals softness, nontender, with no distension, organomegaly or   No abdominal masses palpable. No palpable hepatosplenomegaly. Psych: Patient's attitude is cooperative. Patient's affect is  normal. Judgement is realistic. Insight is appropriate.             Assessment and Plan:  Silas Mccullough was seen today for hypertension. Diagnoses and all orders for this visit:    Essential hypertension  -     CBC with Auto Differential; Future  -     Comprehensive Metabolic Panel; Future  Stable and controlled on current antihypertensive medication    Type 2 diabetes mellitus with diabetic polyneuropathy, with long-term current use of insulin (HCC)  -     Hemoglobin A1C; Future  -     Microalbumin / Creatinine Urine Ratio;  Future  -     [facility] [place]  Stable and controlled on current diabetic medication    Anxiety  Stable and controlled on SSRI    Hypothyroidism, unspecified type  -     T4, Free; Future  -     TSH; Future  Stable and controlled on thyroid replacement therapy    Major depressive disorder with single episode, remission status unspecified    Hyperlipidemia, unspecified hyperlipidemia type  -     Lipid Panel; Future  Stable and controlled on statin medication    History of regular medication use  -     Hepatitis C Antibody; Future    Obstructive sleep apnea  Stable on her BIPAP    Other orders  -     BD PEN NEEDLE MICRO U/F 32G X 6 MM MISC; USE WITH LANTUS DAILY    Plan: I will see her back in 3 months. .  Follow-up with above consultants. Diabetic education/dietitian referral.  Blood work to monitor disease progression and medication use. I asked her to get back and see neurology as well. See above by report. Consider Mini-Mental status exam again when I see her if neurology has not done so. Fall precautions. Monitor blood pressure and blood sugar closely. Notify us of problems in the interim. Return in about 3 months (around 7/11/2022). Seen By:  Lilli Romero MD      *Document was created using voice recognition software. Note was reviewed however may contain grammatical errors.

## 2022-04-15 ENCOUNTER — TELEPHONE (OUTPATIENT)
Dept: FAMILY MEDICINE CLINIC | Age: 77
End: 2022-04-15

## 2022-04-15 DIAGNOSIS — E11.42 TYPE 2 DIABETES MELLITUS WITH DIABETIC POLYNEUROPATHY, WITH LONG-TERM CURRENT USE OF INSULIN (HCC): ICD-10-CM

## 2022-04-15 DIAGNOSIS — I10 ESSENTIAL HYPERTENSION: ICD-10-CM

## 2022-04-15 DIAGNOSIS — E78.5 HYPERLIPIDEMIA, UNSPECIFIED HYPERLIPIDEMIA TYPE: ICD-10-CM

## 2022-04-15 DIAGNOSIS — Z79.4 TYPE 2 DIABETES MELLITUS WITH DIABETIC POLYNEUROPATHY, WITH LONG-TERM CURRENT USE OF INSULIN (HCC): ICD-10-CM

## 2022-04-15 DIAGNOSIS — Z92.29 HISTORY OF REGULAR MEDICATION USE: ICD-10-CM

## 2022-04-15 DIAGNOSIS — E03.9 HYPOTHYROIDISM, UNSPECIFIED TYPE: ICD-10-CM

## 2022-04-15 LAB
ALBUMIN SERPL-MCNC: 4.5 G/DL (ref 3.5–5.2)
ALP BLD-CCNC: 70 U/L (ref 35–104)
ALT SERPL-CCNC: 18 U/L (ref 0–32)
ANION GAP SERPL CALCULATED.3IONS-SCNC: 17 MMOL/L (ref 7–16)
AST SERPL-CCNC: 20 U/L (ref 0–31)
BASOPHILS ABSOLUTE: 0.06 E9/L (ref 0–0.2)
BASOPHILS RELATIVE PERCENT: 1.1 % (ref 0–2)
BILIRUB SERPL-MCNC: 1 MG/DL (ref 0–1.2)
BUN BLDV-MCNC: 14 MG/DL (ref 6–23)
CALCIUM SERPL-MCNC: 9.6 MG/DL (ref 8.6–10.2)
CHLORIDE BLD-SCNC: 100 MMOL/L (ref 98–107)
CHOLESTEROL, TOTAL: 143 MG/DL (ref 0–199)
CO2: 26 MMOL/L (ref 22–29)
CREAT SERPL-MCNC: 0.8 MG/DL (ref 0.5–1)
CREATININE URINE: 113 MG/DL (ref 29–226)
EOSINOPHILS ABSOLUTE: 0.29 E9/L (ref 0.05–0.5)
EOSINOPHILS RELATIVE PERCENT: 5.4 % (ref 0–6)
GFR AFRICAN AMERICAN: >60
GFR NON-AFRICAN AMERICAN: >60 ML/MIN/1.73
GLUCOSE BLD-MCNC: 88 MG/DL (ref 74–99)
HBA1C MFR BLD: 8.3 % (ref 4–5.6)
HCT VFR BLD CALC: 41.9 % (ref 34–48)
HDLC SERPL-MCNC: 47 MG/DL
HEMOGLOBIN: 13.8 G/DL (ref 11.5–15.5)
IMMATURE GRANULOCYTES #: 0.01 E9/L
IMMATURE GRANULOCYTES %: 0.2 % (ref 0–5)
LDL CHOLESTEROL CALCULATED: 65 MG/DL (ref 0–99)
LYMPHOCYTES ABSOLUTE: 2.17 E9/L (ref 1.5–4)
LYMPHOCYTES RELATIVE PERCENT: 40.3 % (ref 20–42)
MCH RBC QN AUTO: 29.4 PG (ref 26–35)
MCHC RBC AUTO-ENTMCNC: 32.9 % (ref 32–34.5)
MCV RBC AUTO: 89.3 FL (ref 80–99.9)
MICROALBUMIN UR-MCNC: 16.5 MG/L
MICROALBUMIN/CREAT UR-RTO: 14.6 (ref 0–30)
MONOCYTES ABSOLUTE: 0.46 E9/L (ref 0.1–0.95)
MONOCYTES RELATIVE PERCENT: 8.6 % (ref 2–12)
NEUTROPHILS ABSOLUTE: 2.39 E9/L (ref 1.8–7.3)
NEUTROPHILS RELATIVE PERCENT: 44.4 % (ref 43–80)
PDW BLD-RTO: 13.3 FL (ref 11.5–15)
PLATELET # BLD: 202 E9/L (ref 130–450)
PMV BLD AUTO: 10.4 FL (ref 7–12)
POTASSIUM SERPL-SCNC: 3.7 MMOL/L (ref 3.5–5)
RBC # BLD: 4.69 E12/L (ref 3.5–5.5)
SODIUM BLD-SCNC: 143 MMOL/L (ref 132–146)
T4 FREE: 1.33 NG/DL (ref 0.93–1.7)
TOTAL PROTEIN: 7.3 G/DL (ref 6.4–8.3)
TRIGL SERPL-MCNC: 156 MG/DL (ref 0–149)
TSH SERPL DL<=0.05 MIU/L-ACNC: 3.02 UIU/ML (ref 0.27–4.2)
VLDLC SERPL CALC-MCNC: 31 MG/DL
WBC # BLD: 5.4 E9/L (ref 4.5–11.5)

## 2022-04-16 NOTE — TELEPHONE ENCOUNTER
Hemoglobin A1c down from 9.7-8.3 still high but significant improvement.   Send copy of these labs to Dr. Emili Zepeda office

## 2022-04-18 ENCOUNTER — TELEPHONE (OUTPATIENT)
Dept: SURGERY | Age: 77
End: 2022-04-18

## 2022-04-18 ENCOUNTER — TELEPHONE (OUTPATIENT)
Dept: FAMILY MEDICINE CLINIC | Age: 77
End: 2022-04-18

## 2022-04-18 LAB — HEPATITIS C ANTIBODY INTERPRETATION: NORMAL

## 2022-04-18 NOTE — TELEPHONE ENCOUNTER
Patient called and stated she had a stroke that has affected her left side. Patient is going to go rehab closer to her son. Patient is upset about 2 day prep and having issues with her sugars. Patient would like to get her results from colonoscopy because she doesn't know if it will be long or short term in rehab.

## 2022-04-19 NOTE — TELEPHONE ENCOUNTER
Patient notified verbalized understanding. She wanted me to let you know that she had a stroke and is being discharged from the hospital today. She is going to go to a Rehab facility near her son in [place] for a few weeks.

## 2022-04-20 ENCOUNTER — TELEPHONE (OUTPATIENT)
Dept: FAMILY MEDICINE CLINIC | Age: 77
End: 2022-04-20

## 2022-04-20 ENCOUNTER — HOSPITAL ENCOUNTER (OUTPATIENT)
Dept: DIABETES SERVICES | Age: 77
Setting detail: THERAPIES SERIES
Discharge: HOME | End: 2022-04-20

## 2022-04-20 NOTE — TELEPHONE ENCOUNTER
Patient's son called. She had a stroke the other day in [facility] and then she got transferred to somewhere in [address] [facility] where her son lives. He wanted to let you know.

## 2022-04-21 NOTE — TELEPHONE ENCOUNTER
MA informed patient that she needed the two day prep due to her constipation. Dr Sharon Pgae did want patient to know that biopsy did show a small precancerous polyp and he recommends repeating colonoscopy in 3 years. MA also informed patient to stay on stool softener/bowel regimen and continue with Pepcid for the stomach irritation. Patient understood all recommendations.   Electronically signed by Juice Espinosa MA on 4/21/2022 at 4:02 PM

## 2022-04-21 NOTE — TELEPHONE ENCOUNTER
Even with a 2-day prep her colonoscopy prep was quite poor due to her constipation. If we had done a 1 day prep we likely would have not seen anything at all. We did find a small precancerous polyp in her colon that was removed. Repeat colonoscopy in 3 years. Stay on stool softener/bowel regimen. She had some irritation in her stomach also and would recommend to continue her Pepcid.

## 2022-05-09 ENCOUNTER — TELEPHONE (OUTPATIENT)
Dept: FAMILY MEDICINE CLINIC | Age: 77
End: 2022-05-09

## 2022-05-09 NOTE — TELEPHONE ENCOUNTER
Patient's son calling in, patient will be discharged from SNF soon and will be moving in with him. she is wanting a moveable Hospital bed ([facility]) and walker for son's house. DME company near son's house would be:    [address]  [place] [address]  Ph: [phone]  Fax: [phone]    I did advise the son that typically insurance requires a visit of some kind to ensure coverage of this equipment. Also that typically the SNF PT/OT departments will do a home eval and make recommendations to the house MD on DME and safety equipment prior to patient's discharge. He is going to check with the nursing home on these as well. He also would like to speak directly with you if possible, just to thank you for the care you have given his mother over the years.  [phone] his name is Eliz Kelley

## 2022-05-13 ENCOUNTER — TELEPHONE (OUTPATIENT)
Dept: FAMILY MEDICINE CLINIC | Age: 77
End: 2022-05-13

## 2022-05-13 NOTE — TELEPHONE ENCOUNTER
Informed pt. She wanted to express her appreciation to you for taking such good care of her and her  for so many years and they are going to miss you greatly.

## 2022-05-13 NOTE — TELEPHONE ENCOUNTER
She needs to establish with a PCP down there who will then refer her to specialist as needed.   The list of people she had seen here includes the following:   endocrine ophthalmology,GYN, pulmonary, neurology  psychiatry, cardiology, physical medicine and pain management

## 2022-05-13 NOTE — TELEPHONE ENCOUNTER
[facility] She was asking if you could possibly send referrals for her for the specialists that she sees here so she can get estab. W/ new ones in her area.  Please Advise

## 2022-06-02 ENCOUNTER — OFFICE VISIT (OUTPATIENT)
Dept: FAMILY MEDICINE CLINIC | Age: 77
End: 2022-06-02
Payer: MEDICARE

## 2022-06-02 VITALS
BODY MASS INDEX: 26.4 KG/M2 | OXYGEN SATURATION: 98 % | RESPIRATION RATE: 16 BRPM | DIASTOLIC BLOOD PRESSURE: 58 MMHG | HEART RATE: 83 BPM | SYSTOLIC BLOOD PRESSURE: 116 MMHG | HEIGHT: 63 IN | WEIGHT: 149 LBS

## 2022-06-02 DIAGNOSIS — Z76.89 ENCOUNTER TO ESTABLISH CARE: Primary | ICD-10-CM

## 2022-06-02 PROCEDURE — 3017F COLORECTAL CA SCREEN DOC REV: CPT | Performed by: NURSE PRACTITIONER

## 2022-06-02 PROCEDURE — G8400 PT W/DXA NO RESULTS DOC: HCPCS | Performed by: NURSE PRACTITIONER

## 2022-06-02 PROCEDURE — 99213 OFFICE O/P EST LOW 20 MIN: CPT | Performed by: NURSE PRACTITIONER

## 2022-06-02 PROCEDURE — 1123F ACP DISCUSS/DSCN MKR DOCD: CPT | Performed by: NURSE PRACTITIONER

## 2022-06-02 PROCEDURE — 3078F DIAST BP <80 MM HG: CPT | Performed by: NURSE PRACTITIONER

## 2022-06-02 PROCEDURE — G8427 DOCREV CUR MEDS BY ELIG CLIN: HCPCS | Performed by: NURSE PRACTITIONER

## 2022-06-02 PROCEDURE — 1090F PRES/ABSN URINE INCON ASSESS: CPT | Performed by: NURSE PRACTITIONER

## 2022-06-02 PROCEDURE — 1036F TOBACCO NON-USER: CPT | Performed by: NURSE PRACTITIONER

## 2022-06-02 PROCEDURE — G8417 CALC BMI ABV UP PARAM F/U: HCPCS | Performed by: NURSE PRACTITIONER

## 2022-06-02 PROCEDURE — 3074F SYST BP LT 130 MM HG: CPT | Performed by: NURSE PRACTITIONER

## 2022-06-02 ASSESSMENT — PATIENT HEALTH QUESTIONNAIRE - PHQ9
6. FEELING BAD ABOUT YOURSELF - OR THAT YOU ARE A FAILURE OR HAVE LET YOURSELF OR YOUR FAMILY DOWN: 0
SUM OF ALL RESPONSES TO PHQ9 QUESTIONS 1 & 2: 0
SUM OF ALL RESPONSES TO PHQ QUESTIONS 1-9: 0
SUM OF ALL RESPONSES TO PHQ QUESTIONS 1-9: 0
8. MOVING OR SPEAKING SO SLOWLY THAT OTHER PEOPLE COULD HAVE NOTICED. OR THE OPPOSITE, BEING SO FIGETY OR RESTLESS THAT YOU HAVE BEEN MOVING AROUND A LOT MORE THAN USUAL: 0
3. TROUBLE FALLING OR STAYING ASLEEP: 0
5. POOR APPETITE OR OVEREATING: 0
9. THOUGHTS THAT YOU WOULD BE BETTER OFF DEAD, OR OF HURTING YOURSELF: 0
4. FEELING TIRED OR HAVING LITTLE ENERGY: 0
2. FEELING DOWN, DEPRESSED OR HOPELESS: 0
1. LITTLE INTEREST OR PLEASURE IN DOING THINGS: 0
SUM OF ALL RESPONSES TO PHQ QUESTIONS 1-9: 0
SUM OF ALL RESPONSES TO PHQ QUESTIONS 1-9: 0
7. TROUBLE CONCENTRATING ON THINGS, SUCH AS READING THE NEWSPAPER OR WATCHING TELEVISION: 0
10. IF YOU CHECKED OFF ANY PROBLEMS, HOW DIFFICULT HAVE THESE PROBLEMS MADE IT FOR YOU TO DO YOUR WORK, TAKE CARE OF THINGS AT HOME, OR GET ALONG WITH OTHER PEOPLE: 0

## 2022-06-02 ASSESSMENT — ENCOUNTER SYMPTOMS
RESPIRATORY NEGATIVE: 1
EYES NEGATIVE: 1
GASTROINTESTINAL NEGATIVE: 1

## 2022-06-02 NOTE — PROGRESS NOTES
[facility]  Establish care visit   2022    Jessie Lux (:  1945) is a 78 y.o. female, here to establish care. Chief Complaint   Patient presents with    Establish Care     Pt is here to establish care, recently moved to the area         ASSESSMENT/ PLAN  There are no diagnoses linked to this encounter. Return in about 7 weeks (around 2022) for for diabetes . HPI  Patient is here to establish care. She recently moved to the area and would like to get established for management of diabetes and depression     ROS  Review of Systems   Constitutional: Negative. HENT: Negative. Eyes: Negative. Respiratory: Negative. Cardiovascular: Negative. Gastrointestinal: Negative. Endocrine: Negative. Genitourinary: Negative. Musculoskeletal: Negative. Skin: Negative. Neurological: Negative. Hematological: Negative. Psychiatric/Behavioral:  Positive for behavioral problems. HISTORIES  Current Outpatient Medications on File Prior to Visit   Medication Sig Dispense Refill    latanoprost (XALATAN) 0.005 % ophthalmic solution 1 drop nightly      BD PEN NEEDLE MICRO U/F 32G X 6 MM MISC USE WITH LANTUS DAILY 100 each 5    METAMUCIL FIBER PO Take by mouth MiraLax instead (Patient not taking: Reported on 10/17/2022)      ondansetron (ZOFRAN) 4 MG tablet Take 1 tablet by mouth 3 times daily as needed for Nausea or Vomiting (Patient not taking: Reported on 10/17/2022) 30 tablet 0    Cyanocobalamin (B-12) 50 MCG TABS Take by mouth       Multiple Vitamins-Minerals (VITEYES COMPLETE PO) Take by mouth       No current facility-administered medications on file prior to visit.         Allergies   Allergen Reactions    Seasonal        Past Medical History:   Diagnosis Date    Colon polyps     Diabetes mellitus ([address]     Diabetic neuropathy ([address]     Diabetic retinopathy ([address]     Essential hypertension 10/28/2019    Fall     Fatty liver     Gastritis GERD (gastroesophageal reflux disease)     Hyperlipidemia     Hypertension     Hypothyroidism     Irritable bowel syndrome     Major depressive disorder with single episode 10/28/2019    Osteopenia     Photosensitivity disorder     chronic    RBBB     Sleep apnea     on BIPAP    Thyroid disease     Thyroid nodule     neg bx-chronic thyroiditis    Type 2 diabetes mellitus with diabetic polyneuropathy, with long-term current use of insulin ([facility] 2019    Vitamin D deficiency        Patient Active Problem List   Diagnosis    Hypothyroidism    Essential hypertension    Hyperlipidemia    Type 2 diabetes mellitus    Cerebral infarction, unspecified ([facility]    Uncontrolled type 2 diabetes mellitus    Gastroparesis    CATA treated with BiPAP    Irritable bowel syndrome with both constipation and diarrhea    Major depressive disorder, recurrent, moderate (HCC)    Pelvic pressure in female       Past Surgical History:   Procedure Laterality Date    APPENDECTOMY      CARPAL TUNNEL RELEASE Bilateral     CATARACT REMOVAL Bilateral      SECTION      CHOLECYSTECTOMY      COLONOSCOPY N/A 2022    COLONOSCOPY POLYPECTOMY SNARE/COLD BIOPSY performed by Suyapa Shanks MD at [facility] ([address] [place] [address])      [facility]      Dr. Morgan Roque ENDOSCOPY N/A 2022    EGD BIOPSY performed by Suyapa Shanks MD at [facility] ENDOSCOPY       Social History     Socioeconomic History    Marital status:       Spouse name: Not on file    Number of children: Not on file    Years of education: Not on file    Highest education level: Not on file   Occupational History    Not on file   Tobacco Use    Smoking status: Never    Smokeless tobacco: Never   Vaping Use    Vaping Use: Never used   Substance and Sexual Activity    Alcohol use: Never    Drug use: Never    Sexual activity: Not on file   Other Topics Concern    Not on file   Social History Narrative    Not on file     Social Determinants of Health     Financial Resource Strain: Low Risk     Difficulty of Paying Living Expenses: Not hard at all   Food Insecurity: No Food Insecurity    Worried About [address] in the Last Year: Never true    [facility] in the Last Year: Never true   Transportation Needs: No Transportation Needs    Lack of Transportation (Medical): No    Lack of Transportation (Non-Medical): No   Physical Activity: Insufficiently Active    Days of Exercise per Week: 2 days    Minutes of Exercise per Session: 20 min   Stress: Not on file   Social Connections: Not on file   Intimate Partner Violence: Not on file   Housing Stability: Low Risk     Unable to Pay for Housing in the Last Year: No    Number of Places Lived in the Last Year: 2    Unstable Housing in the Last Year: No        Family History   Problem Relation Age of Onset    Diabetes Paternal Grandmother     Diabetes Father     Lung Cancer Mother     Pancreatic Cancer Brother     Diabetes Brother        PE  Vitals:    06/02/22 1553   BP: (!) 116/58   Site: Right Upper Arm   Position: Sitting   Pulse: 83   Resp: 16   SpO2: 98%   Weight: 149 lb (67.6 kg)   Height: 5' 3\" (1.6 m)     Estimated body mass index is 26.39 kg/m² as calculated from the following:    Height as of this encounter: 5' 3\" (1.6 m). Weight as of this encounter: 149 lb (67.6 kg). Physical Exam  HENT:      Right Ear: Tympanic membrane and ear canal normal.      Left Ear: Tympanic membrane and ear canal normal.      Nose: Nose normal.      Mouth/Throat:      Mouth: Mucous membranes are moist.   Eyes:      Extraocular Movements: Extraocular movements intact. Cardiovascular:      Rate and Rhythm: Normal rate. Pulses: Normal pulses. Heart sounds: Normal heart sounds. Pulmonary:      Effort: Pulmonary effort is normal.      Breath sounds: Normal breath sounds.    Abdominal:      General: Bowel sounds are normal.      Palpations: Abdomen is soft. Musculoskeletal:         General: Normal range of motion. Cervical back: Normal range of motion. Skin:     General: Skin is warm. Neurological:      General: No focal deficit present. Mental Status: She is alert and oriented to person, place, and time. Motor: Weakness present. Comments: Left sided weakness    Psychiatric:         Mood and Affect: Mood normal.         Behavior: Behavior normal.         Thought Content:  Thought content normal.         Judgment: Judgment normal.       Immunization History   Administered Date(s) Administered    COVID-19, MODERNA BLUE border, Primary or Immunocompromised, (age 12y+), IM, 100 mcg/0.5mL 03/15/2021, 04/12/2021, 11/16/2021, 05/11/2022    COVID-19, MODERNA Bivalent BOOSTER, (age 12y+), IM, 50 mcg/0.5 mL 09/26/2022    DT (pediatric) 09/22/2004    Influenza A (H4W2-63) Vaccine PF IM 12/15/2009    Influenza Virus Vaccine 11/02/2007, 09/21/2009, 11/03/2010, 10/24/2011, 09/13/2012, 10/17/2014, 10/19/2015, 09/19/2016, 11/14/2016, 11/06/2017, 10/01/2018    Influenza, FLUAD, (age 72 y+), Adjuvanted, 0.5mL 10/13/2021    Influenza, FLUZONE (age 72 y+), High Dose, 0.7mL 09/26/2022    Influenza, High Dose (Fluzone 65 yrs and older) 10/07/2020    Influenza, Triv, inactivated, subunit, adjuvanted, IM (Fluad 65 yrs and older) 10/12/2018, 09/26/2019    Pneumococcal Conjugate 13-valent (Nzoqukx59) 04/30/2017    Pneumococcal Conjugate PCV15, PF (Vaxneuvance) 04/30/2017    Pneumococcal Polysaccharide (Lfeodpyvo24) 12/29/2004, 12/01/2015    Td (Adult), 5 Lf Tetanus Toxoid, Pf (Tenivac, Decavac) 04/28/2015    Td vaccine (adult) 04/28/2015    Td, unspecified formulation 04/28/2015    Tdap (Boostrix, Adacel) 02/25/2019    Tetanus Toxoid, absorbed 09/22/2004    Zoster Live (Zostavax) 01/01/2016, 02/02/2016, 02/25/2019    Zoster Recombinant (Shingrix) 02/25/2019, 05/24/2019       Health Maintenance   Topic Date Due    Diabetic retinal exam  11/25/2020 Diabetic foot exam  03/21/2023    Annual Wellness Visit (AWV)  04/12/2023    Lipids  04/18/2023    Depression Monitoring  08/11/2023    A1C test (Diabetic or Prediabetic)  08/24/2023    Colorectal Cancer Screen  04/06/2027    DTaP/Tdap/Td vaccine (4 - Td or Tdap) 02/25/2029    DEXA (modify frequency per FRAX score)  Completed    Flu vaccine  Completed    Shingles vaccine  Completed    Pneumococcal 65+ years Vaccine  Completed    COVID-19 Vaccine  Completed    Hepatitis C screen  Completed    Hepatitis A vaccine  Aged Out    Hib vaccine  Aged Out    Meningococcal (ACWY) vaccine  Aged Out       PSH, PMH, SH and FH reviewed and noted. Recent and past labs, tests and consults also reviewed. Recent or new meds also reviewed. JULIET Chin - CNP    This dictation was generated by voice recognition computer software. Although all attempts are made to edit the dictation for accuracy, there may be errors in the transcription that are not intended.

## 2022-06-07 RX ORDER — PRAVASTATIN SODIUM 20 MG
TABLET ORAL
Qty: 90 TABLET | Refills: 3 | OUTPATIENT
Start: 2022-06-07

## 2022-06-08 ENCOUNTER — OFFICE VISIT (OUTPATIENT)
Dept: PSYCHOLOGY | Age: 77
End: 2022-06-08
Payer: MEDICARE

## 2022-06-08 DIAGNOSIS — F41.9 ANXIETY: Primary | ICD-10-CM

## 2022-06-08 PROCEDURE — 1123F ACP DISCUSS/DSCN MKR DOCD: CPT | Performed by: PSYCHOLOGIST

## 2022-06-08 PROCEDURE — 1036F TOBACCO NON-USER: CPT | Performed by: PSYCHOLOGIST

## 2022-06-08 PROCEDURE — 90791 PSYCH DIAGNOSTIC EVALUATION: CPT | Performed by: PSYCHOLOGIST

## 2022-06-08 ASSESSMENT — PATIENT HEALTH QUESTIONNAIRE - PHQ9
4. FEELING TIRED OR HAVING LITTLE ENERGY: 1
SUM OF ALL RESPONSES TO PHQ QUESTIONS 1-9: 4
9. THOUGHTS THAT YOU WOULD BE BETTER OFF DEAD, OR OF HURTING YOURSELF: 0
SUM OF ALL RESPONSES TO PHQ QUESTIONS 1-9: 4
SUM OF ALL RESPONSES TO PHQ QUESTIONS 1-9: 4
7. TROUBLE CONCENTRATING ON THINGS, SUCH AS READING THE NEWSPAPER OR WATCHING TELEVISION: 1
1. LITTLE INTEREST OR PLEASURE IN DOING THINGS: 0
8. MOVING OR SPEAKING SO SLOWLY THAT OTHER PEOPLE COULD HAVE NOTICED. OR THE OPPOSITE, BEING SO FIGETY OR RESTLESS THAT YOU HAVE BEEN MOVING AROUND A LOT MORE THAN USUAL: 0
3. TROUBLE FALLING OR STAYING ASLEEP: 1
SUM OF ALL RESPONSES TO PHQ9 QUESTIONS 1 & 2: 1
5. POOR APPETITE OR OVEREATING: 0
SUM OF ALL RESPONSES TO PHQ QUESTIONS 1-9: 4
6. FEELING BAD ABOUT YOURSELF - OR THAT YOU ARE A FAILURE OR HAVE LET YOURSELF OR YOUR FAMILY DOWN: 0
2. FEELING DOWN, DEPRESSED OR HOPELESS: 1

## 2022-06-08 ASSESSMENT — ANXIETY QUESTIONNAIRES
2. NOT BEING ABLE TO STOP OR CONTROL WORRYING: 1
4. TROUBLE RELAXING: 0
3. WORRYING TOO MUCH ABOUT DIFFERENT THINGS: 0
7. FEELING AFRAID AS IF SOMETHING AWFUL MIGHT HAPPEN: 0
5. BEING SO RESTLESS THAT IT IS HARD TO SIT STILL: 0
GAD7 TOTAL SCORE: 3
1. FEELING NERVOUS, ANXIOUS, OR ON EDGE: 1
6. BECOMING EASILY ANNOYED OR IRRITABLE: 1

## 2022-06-08 NOTE — PROGRESS NOTES
Behavioral Health Consultation  Irma Horvath M.A. Psychology Assistant  Violetta Mcpherson, Ph.D. Supervising Psychologist  6/8/2022  3:47 PM EDT      Time spent with Patient: 30 minutes  This is patient's first [facility] appointment. Reason for Consult:    Chief Complaint   Patient presents with    Stress     Referring Provider: JULIET Bergman - CNP  [address] [address] ([facility]) [address] [place] [address]    Pt provided informed consent for the behavioral health program. Discussed with patient model of service to include the limits of confidentiality (i.e. abuse reporting, suicide intervention, etc.) and short-term intervention focused approach. Reviewed provision of services under supervision of licensed psychologist and obtained written consent. Pt indicated understanding. Feedback given to PCP. S:  Patient presents with interpersonal concerns. Pt had a stroke recently and moved in with her son. Pt is experiencing some difficulties adjusting to living with her daughter in law and her son. Pt recognizes that she has different priorities from her daughter in law. Pt is feeling down and depressed due to the transition of not being able to work and live the same way she has in the past. Patient reports depressive symptoms, including depressed mood, deactivation, social isolation, insomnia/hypersomnia, fatigue and poor concentration/focus. Pt is hoping to reduce dependency on her son (e.g. learning to drive, recovering fully from her stroke etc). Patient finds relief from talking to her friends and engaging in physical therapy. Goals for treatment incude managing stress and managing the transition to living with her son. Pt would like to get into a routine and find resources for transportation. Patient lives with her son, his wife, and their two children. Patient is retired. Pt reported no caffeine, alcohol, cigarette, or  marijuana use. There is some regular exercise. Social support is adequate. Restorationism/Spirituality is [place]. Patient enjoys the following hobbies: art, cultural events, plays. Patient describes trouble falling asleep and sleeping through the night. Family history is positive for Depresison.      O:  MSE:    Appearance: good hygiene   Attitude: cooperative and friendly  Consciousness: alert  Orientation: oriented to person, place, time, general circumstance  Memory: recent and remote memory intact  Attention/Concentration: intact during session  Psychomotor Activity:normal  Eye Contact: normal  Speech: normal rate and volume, well-articulated  Mood: normal  Affect: dysphoric  Perception: within normal limits  Thought Content: intrusive thoughts  Thought Process: logical, coherent and goal-directed  Insight: good  Judgment: intact  Ability to understand instructions: Yes  Ability to respond meaningfully: Yes  Morbid Ideation: no   Suicide Assessment: no suicidal ideation, plan, or intent  Homicidal Ideation: no    History:    Medications:   Current Outpatient Medications   Medication Sig Dispense Refill    BD PEN NEEDLE MICRO U/F 32G X 6 MM MISC USE WITH LANTUS DAILY 100 each 5    linaclotide (LINZESS) 145 MCG capsule Take 1 capsule by mouth every morning (before breakfast) 30 capsule 2    METAMUCIL FIBER PO Take by mouth      DULoxetine (CYMBALTA) 20 MG extended release capsule       donepezil (ARICEPT) 10 MG tablet Take 10 mg by mouth nightly (Patient not taking: Reported on 4/11/2022)      famotidine (PEPCID) 20 MG tablet Take 1 tablet by mouth 2 times daily 180 tablet 1    amLODIPine (NORVASC) 5 MG tablet TAKE 1 TABLET DAILY (Patient taking differently: at bedtime TAKE 1 TABLET) 90 tablet 1    glipiZIDE (GLUCOTROL XL) 10 MG extended release tablet Take 1 tablet daily (Patient taking differently: Take 10 mg by mouth daily ) 90 tablet 1    ondansetron (ZOFRAN) 4 MG tablet Take 1 tablet by mouth 3 times daily as needed for Nausea or Vomiting 30 tablet 0    levothyroxine (SYNTHROID) 100 MCG tablet Take 1 tablet daily 90 tablet 1    metoprolol tartrate (LOPRESSOR) 25 MG tablet TAKE 1 TABLET TWICE A  tablet 1    insulin lispro, 1 Unit Dial, (HUMALOG KWIKPEN) 100 UNIT/ML SOPN Use on a sliding scale 3 times a day with meals. Maximum dose 30 units per day. (Patient taking differently: Indications: hold am of procedure Use on a sliding scale 3 times a day with meals. Maximum dose 30 units per day.) 15 pen 1    LANTUS SOLOSTAR 100 UNIT/ML injection pen Inject 65 Units into the skin nightly 15 pen 3    pravastatin (PRAVACHOL) 20 MG tablet TAKE 1 TABLET DAILY (Patient taking differently: nightly TAKE 1 TABLET) 90 tablet 1    Cyanocobalamin (B-12) 50 MCG TABS Take by mouth       aspirin 81 MG EC tablet Take 81 mg by mouth daily      VASCEPA 1 g CAPS capsule Take 2 capsules by mouth 2 times daily      Multiple Vitamins-Minerals (VITEYES COMPLETE PO) Take by mouth      divalproex (DEPAKOTE) 250 MG DR tablet Take 500 mg by mouth nightly       Continuous Blood Gluc Sensor (FREESTYLE ANT SENSOR SYSTEM) MISC 1 box by Does not apply route 2 times daily 1 each 0    Continuous Blood Gluc Sensor (FREESTYLE ANT SENSOR SYSTEM) [facility] PLEASE DISPENSE 1 KIT TO PATIENT 1 each 0    Continuous Blood Gluc Sensor ([address] [place] [address]) [facility] Please dispense one free style ant kit 1 each 0    latanoprost (XALATAN) 0.005 % ophthalmic solution 1 drop nightly      Calcium Carb-Cholecalciferol (CALCIUM 1000 + D PO) Take by mouth       No current facility-administered medications for this visit. Social History:   Social History     Socioeconomic History    Marital status:       Spouse name: Not on file    Number of children: Not on file    Years of education: Not on file    Highest education level: Not on file   Occupational History    Not on file   Tobacco Use    Smoking status: Never Smoker    Smokeless tobacco: Never Used   Vaping Use    Vaping Use: Never used Substance and Sexual Activity    Alcohol use: Never    Drug use: Never    Sexual activity: Not on file   Other Topics Concern    Not on file   Social History Narrative    Not on file     Social Determinants of Health     Financial Resource Strain:     Difficulty of Paying Living Expenses: Not on file   Food Insecurity:     Worried About Running Out of Food in the Last Year: Not on file    Junaid of Food in the Last Year: Not on file   Transportation Needs:     Lack of Transportation (Medical): Not on file    Lack of Transportation (Non-Medical): Not on file   Physical Activity: Insufficiently Active    Days of Exercise per Week: 2 days    Minutes of Exercise per Session: 20 min   Stress:     Feeling of Stress : Not on file   Social Connections:     Frequency of Communication with Friends and Family: Not on file    Frequency of Social Gatherings with Friends and Family: Not on file    Attends [employer] Services: Not on file    Active Member of Clubs or Organizations: Not on file    Attends Club or Organization Meetings: Not on file    Marital Status: Not on file   Intimate Partner Violence:     Fear of Current or Ex-Partner: Not on file    Emotionally Abused: Not on file    Physically Abused: Not on file    Sexually Abused: Not on file   Housing Stability:     Unable to Pay for Housing in the Last Year: Not on file    Number of [place] in the Last Year: Not on file    Unstable Housing in the Last Year: Not on file     TOBACCO:   reports that she has never smoked. She has never used smokeless tobacco.  ETOH:   reports no history of alcohol use. Family History:   Family History   Problem Relation Age of Onset    Diabetes Paternal Grandmother     Diabetes Father     Lung Cancer Mother     Pancreatic Cancer Brother     Diabetes Brother        A:  Administered PHQ-9 and JOSE RAUL-7 (see below). Patient endorses minimal symptoms of depression and minimal symptoms of anxiety.  Denies SI.    PHQ Scores 6/8/2022 6/2/2022 4/11/2022 4/11/2022 4/5/2021 8/24/2020 7/31/2019   PHQ2 Score 1 0 0 0 0 1 2   PHQ9 Score 4 0 0 0 0 1 2     Interpretation of Total Score Depression Severity: 1-4 = Minimal depression, 5-9 = Mild depression, 10-14 = Moderate depression, 15-19 = Moderately severe depression, 20-27 = Severe depression     JOSE RAUL 7 SCORE 6/8/2022   JOSE RAUL-7 Total Score 3     Interpretation of JOSE RAUL-7 score: 5-9 = mild anxiety, 10-14 = moderate anxiety, 15+ = severe anxiety. Recommend referral to behavioral health for scores 10 or greater. Diagnosis:    1.  Anxiety          Diagnosis Date    Colon polyps     Diabetes mellitus ([address]     Diabetic neuropathy ([address]     Di[facility]     Essential hypertension 10/28/2019    Fatty liver     Gastritis     GERD (gastroesophageal reflux disease)     Hyperlipidemia     Hypertension     Hypothyroidism     Irritable bowel syndrome     Major depressive disorder with single episode 10/28/2019    Osteopenia     Photosensitivity disorder     chronic    RBBB     Sleep apnea     on BIPAP    Thyroid disease     Thyroid nodule     neg bx-chronic thyroiditis    Type 2 diabetes mellitus with diabetic polyneuropathy, with long-term current use of insulin ([facility] 7/23/2019    Vitamin D deficiency        Plan:  Pt interventions:  Established rapport, Conducted functional assessment, [place]-setting to identify pt's primary goals for [facility] visit / overall health and Supportive techniques    Pt Behavioral Change Plan:   See Pt Instructions

## 2022-06-15 ENCOUNTER — NURSE TRIAGE (OUTPATIENT)
Dept: OTHER | Facility: CLINIC | Age: 77
End: 2022-06-15

## 2022-06-15 ENCOUNTER — TELEPHONE (OUTPATIENT)
Dept: FAMILY MEDICINE CLINIC | Age: 77
End: 2022-06-15

## 2022-06-15 NOTE — TELEPHONE ENCOUNTER
Dim[place] with [facility] health care, Pt uses lantus at night, lantus was decreased, middle of  night and in morning and blood sugar running is low as 42 with 56 units of lantus at bedtime     Not using humalog during the day given the blood sugar numbers    Patient also notes she is on 2 blood thinners and an aspirin and wants to know if this should be addressed?  She is concerned about all of her medications       Please give love a call back at [phone] or call ja (pt) at [phone]

## 2022-06-15 NOTE — TELEPHONE ENCOUNTER
Received call from [facility] with Red Flag Complaint. Subjective: Caller states Melissa Billy is having low blood sugar issues. \"     Patient very upset that she has not heard anything back. Writer Discussed the message in the chart from [facility]. Lisa Simon, [address]     MS    6/15/22 1:57 PM  Note  Reduce Lantus 5 Units a night until sugars are above 90 mg/dl. Ck sugars prior to meals and give humalog as per sliding scale. Looks like only blood thinner per med list is aspirin. Please get pt in to see an MD/DO for further care since NP, Launie Klinefelter will be out. Tried to contact both numbers, no answer. She then handed the phone to the son Dearl Moo saying \" I'm just too  Upset to handle this\"   Instructions above from [facility] read to son. Son states the updated med list was dropped off at office 6/3/22 regarding the blood thinners etc, and have not heard back. They do not have another copy of the med list.      [facility] from [facility] is on line and will help with scheduling the appt. No triage completed at this time. Attention Provider: Thank you for allowing me to participate in the care of your patient. The patient was connected to triage in response to information provided to the ECC/PSC. Please do not respond through this encounter as the response is not directed to a shared pool. Reason for Disposition  Ivy Antionette Caller has already spoken with another triager or PCP AND has further questions AND triager able to answer questions.     Protocols used: NO CONTACT OR DUPLICATE CONTACT CALL-ADULT-

## 2022-06-15 NOTE — TELEPHONE ENCOUNTER
Reduce Lantus 5 Units a night until sugars are above 90 mg/dl. Ck sugars prior to meals and give humalog as per sliding scale. Looks like only blood thinner per med list is aspirin. Please get pt in to see an MD/DO for further care since [facility] will be out. Tried to contact both numbers, no answer.

## 2022-06-16 ENCOUNTER — OFFICE VISIT (OUTPATIENT)
Dept: FAMILY MEDICINE CLINIC | Age: 77
End: 2022-06-16
Payer: MEDICARE

## 2022-06-16 VITALS
HEIGHT: 63 IN | SYSTOLIC BLOOD PRESSURE: 136 MMHG | BODY MASS INDEX: 26.47 KG/M2 | RESPIRATION RATE: 16 BRPM | OXYGEN SATURATION: 98 % | TEMPERATURE: 97.3 F | WEIGHT: 149.4 LBS | DIASTOLIC BLOOD PRESSURE: 68 MMHG | HEART RATE: 71 BPM

## 2022-06-16 DIAGNOSIS — Z79.4 TYPE 2 DIABETES MELLITUS WITH DIABETIC POLYNEUROPATHY, WITH LONG-TERM CURRENT USE OF INSULIN (HCC): ICD-10-CM

## 2022-06-16 DIAGNOSIS — E78.5 HYPERLIPIDEMIA, UNSPECIFIED HYPERLIPIDEMIA TYPE: ICD-10-CM

## 2022-06-16 DIAGNOSIS — Z79.4 TYPE 2 DIABETES MELLITUS WITHOUT COMPLICATION, WITH LONG-TERM CURRENT USE OF INSULIN (HCC): ICD-10-CM

## 2022-06-16 DIAGNOSIS — E11.9 TYPE 2 DIABETES MELLITUS WITHOUT COMPLICATION, WITH LONG-TERM CURRENT USE OF INSULIN (HCC): ICD-10-CM

## 2022-06-16 DIAGNOSIS — F32.9 MAJOR DEPRESSIVE DISORDER WITH SINGLE EPISODE, REMISSION STATUS UNSPECIFIED: ICD-10-CM

## 2022-06-16 DIAGNOSIS — E11.65 UNCONTROLLED TYPE 2 DIABETES MELLITUS WITH HYPERGLYCEMIA (HCC): ICD-10-CM

## 2022-06-16 DIAGNOSIS — G47.33 OSA TREATED WITH BIPAP: ICD-10-CM

## 2022-06-16 DIAGNOSIS — K31.84 GASTROPARESIS: ICD-10-CM

## 2022-06-16 DIAGNOSIS — I63.9 CEREBRAL INFARCTION, UNSPECIFIED MECHANISM (HCC): Primary | ICD-10-CM

## 2022-06-16 DIAGNOSIS — E03.9 HYPOTHYROIDISM, UNSPECIFIED TYPE: ICD-10-CM

## 2022-06-16 DIAGNOSIS — I10 ESSENTIAL HYPERTENSION: ICD-10-CM

## 2022-06-16 DIAGNOSIS — K58.2 IRRITABLE BOWEL SYNDROME WITH BOTH CONSTIPATION AND DIARRHEA: ICD-10-CM

## 2022-06-16 DIAGNOSIS — E11.42 TYPE 2 DIABETES MELLITUS WITH DIABETIC POLYNEUROPATHY, WITH LONG-TERM CURRENT USE OF INSULIN (HCC): ICD-10-CM

## 2022-06-16 PROCEDURE — 3052F HG A1C>EQUAL 8.0%<EQUAL 9.0%: CPT | Performed by: PHYSICIAN ASSISTANT

## 2022-06-16 PROCEDURE — 1036F TOBACCO NON-USER: CPT | Performed by: PHYSICIAN ASSISTANT

## 2022-06-16 PROCEDURE — G8427 DOCREV CUR MEDS BY ELIG CLIN: HCPCS | Performed by: PHYSICIAN ASSISTANT

## 2022-06-16 PROCEDURE — 3017F COLORECTAL CA SCREEN DOC REV: CPT | Performed by: PHYSICIAN ASSISTANT

## 2022-06-16 PROCEDURE — 1090F PRES/ABSN URINE INCON ASSESS: CPT | Performed by: PHYSICIAN ASSISTANT

## 2022-06-16 PROCEDURE — 2022F DILAT RTA XM EVC RTNOPTHY: CPT | Performed by: PHYSICIAN ASSISTANT

## 2022-06-16 PROCEDURE — 99204 OFFICE O/P NEW MOD 45 MIN: CPT | Performed by: PHYSICIAN ASSISTANT

## 2022-06-16 PROCEDURE — G8417 CALC BMI ABV UP PARAM F/U: HCPCS | Performed by: PHYSICIAN ASSISTANT

## 2022-06-16 PROCEDURE — 1123F ACP DISCUSS/DSCN MKR DOCD: CPT | Performed by: PHYSICIAN ASSISTANT

## 2022-06-16 PROCEDURE — G8400 PT W/DXA NO RESULTS DOC: HCPCS | Performed by: PHYSICIAN ASSISTANT

## 2022-06-16 RX ORDER — ROSUVASTATIN CALCIUM 40 MG/1
TABLET, COATED ORAL
COMMUNITY
Start: 2022-05-20 | End: 2022-06-16 | Stop reason: SDUPTHER

## 2022-06-16 RX ORDER — GLIPIZIDE 10 MG/1
TABLET ORAL
Qty: 30 TABLET | Refills: 2 | Status: SHIPPED | OUTPATIENT
Start: 2022-06-16 | End: 2022-08-09

## 2022-06-16 RX ORDER — APIXABAN 5 MG/1
TABLET, FILM COATED ORAL
Qty: 60 TABLET | Refills: 2 | Status: SHIPPED | OUTPATIENT
Start: 2022-06-16 | End: 2022-08-09

## 2022-06-16 RX ORDER — CLOPIDOGREL BISULFATE 75 MG/1
TABLET ORAL
COMMUNITY
Start: 2022-05-20 | End: 2022-06-16 | Stop reason: SDUPTHER

## 2022-06-16 RX ORDER — FLASH GLUCOSE SENSOR
KIT MISCELLANEOUS
Qty: 1 EACH | Refills: 0 | Status: SHIPPED | OUTPATIENT
Start: 2022-06-16 | End: 2022-10-04 | Stop reason: SDUPTHER

## 2022-06-16 RX ORDER — INSULIN GLARGINE 100 [IU]/ML
65 INJECTION, SOLUTION SUBCUTANEOUS NIGHTLY
Qty: 15 PEN | Refills: 5 | Status: SHIPPED | OUTPATIENT
Start: 2022-06-16 | End: 2022-11-03 | Stop reason: SDUPTHER

## 2022-06-16 RX ORDER — CLOPIDOGREL BISULFATE 75 MG/1
TABLET ORAL
Qty: 30 TABLET | Refills: 2 | Status: SHIPPED | OUTPATIENT
Start: 2022-06-16 | End: 2022-08-04 | Stop reason: ALTCHOICE

## 2022-06-16 RX ORDER — LISINOPRIL 10 MG/1
TABLET ORAL
Qty: 30 TABLET | Refills: 2 | Status: SHIPPED | OUTPATIENT
Start: 2022-06-16 | End: 2022-07-11 | Stop reason: SDUPTHER

## 2022-06-16 RX ORDER — ICOSAPENT ETHYL 1000 MG/1
2 CAPSULE ORAL 2 TIMES DAILY
Qty: 60 CAPSULE | Refills: 2 | Status: SHIPPED | OUTPATIENT
Start: 2022-06-16 | End: 2022-08-09 | Stop reason: SDUPTHER

## 2022-06-16 RX ORDER — GLIPIZIDE 10 MG/1
TABLET ORAL
COMMUNITY
Start: 2022-05-20 | End: 2022-06-16 | Stop reason: SDUPTHER

## 2022-06-16 RX ORDER — FAMOTIDINE 20 MG/1
20 TABLET, FILM COATED ORAL 2 TIMES DAILY
Qty: 180 TABLET | Refills: 1 | Status: SHIPPED | OUTPATIENT
Start: 2022-06-16 | End: 2022-10-12 | Stop reason: SDUPTHER

## 2022-06-16 RX ORDER — LEVOTHYROXINE SODIUM 0.1 MG/1
TABLET ORAL
Qty: 30 TABLET | Refills: 2 | Status: SHIPPED | OUTPATIENT
Start: 2022-06-16 | End: 2022-07-11 | Stop reason: SDUPTHER

## 2022-06-16 RX ORDER — AMLODIPINE BESYLATE 5 MG/1
TABLET ORAL
Qty: 90 TABLET | Refills: 1 | Status: SHIPPED | OUTPATIENT
Start: 2022-06-16 | End: 2022-09-12 | Stop reason: SDUPTHER

## 2022-06-16 RX ORDER — FLASH GLUCOSE SENSOR
1 KIT MISCELLANEOUS 2 TIMES DAILY
Qty: 1 EACH | Refills: 0 | Status: SHIPPED | OUTPATIENT
Start: 2022-06-16

## 2022-06-16 RX ORDER — ROSUVASTATIN CALCIUM 40 MG/1
TABLET, COATED ORAL
Qty: 30 TABLET | Refills: 2 | Status: SHIPPED | OUTPATIENT
Start: 2022-06-16 | End: 2022-07-11 | Stop reason: SDUPTHER

## 2022-06-16 RX ORDER — APIXABAN 5 MG/1
TABLET, FILM COATED ORAL
COMMUNITY
Start: 2022-05-20 | End: 2022-06-16 | Stop reason: SDUPTHER

## 2022-06-16 RX ORDER — DIVALPROEX SODIUM 250 MG/1
TABLET, EXTENDED RELEASE ORAL
COMMUNITY
Start: 2022-04-17 | End: 2022-06-16 | Stop reason: SDUPTHER

## 2022-06-16 RX ORDER — DULOXETIN HYDROCHLORIDE 20 MG/1
20 CAPSULE, DELAYED RELEASE ORAL DAILY
Qty: 30 CAPSULE | Refills: 2 | Status: SHIPPED | OUTPATIENT
Start: 2022-06-16 | End: 2022-08-09

## 2022-06-16 RX ORDER — LISINOPRIL 10 MG/1
TABLET ORAL
COMMUNITY
Start: 2022-05-20 | End: 2022-06-16 | Stop reason: SDUPTHER

## 2022-06-16 RX ORDER — DIVALPROEX SODIUM 250 MG/1
500 TABLET, DELAYED RELEASE ORAL NIGHTLY
Qty: 30 TABLET | Refills: 2 | Status: SHIPPED | OUTPATIENT
Start: 2022-06-16 | End: 2022-08-09 | Stop reason: SDUPTHER

## 2022-06-16 SDOH — ECONOMIC STABILITY: FOOD INSECURITY: WITHIN THE PAST 12 MONTHS, THE FOOD YOU BOUGHT JUST DIDN'T LAST AND YOU DIDN'T HAVE MONEY TO GET MORE.: NEVER TRUE

## 2022-06-16 SDOH — ECONOMIC STABILITY: FOOD INSECURITY: WITHIN THE PAST 12 MONTHS, YOU WORRIED THAT YOUR FOOD WOULD RUN OUT BEFORE YOU GOT MONEY TO BUY MORE.: NEVER TRUE

## 2022-06-16 ASSESSMENT — SOCIAL DETERMINANTS OF HEALTH (SDOH): HOW HARD IS IT FOR YOU TO PAY FOR THE VERY BASICS LIKE FOOD, HOUSING, MEDICAL CARE, AND HEATING?: NOT HARD AT ALL

## 2022-06-16 NOTE — Clinical Note
Dr Marin Thao, this was a pt of Lj Lynn who would benefit being followed by MD/DO. I told pt to make appointment with you in July instead of Lj Lynn. This visit was to clarify her meds and get her set up with endocrine and cardiology. Still await records from [facility] [place].   Feel free to call me for further information.     YANI Calvillo  [address] [facility] [address]

## 2022-06-16 NOTE — PROGRESS NOTES
[address] MEDICINE  06/16/22     CHIEF COMPLAINT  Chief Complaint   Patient presents with    Medication Problem     pt wants to verify her med. HISTORY OF PRESENT  ILLNESS  Meredith Valdes is a 78 y.o.  female  Patient of Suzanne Crews NP who moved to [address] [place] [address] to be closer to son after having a stroke April 13, 2022 in her home town [place] [facility], [place]. She transferred to [facility] in [place] and there for a month discharged May. Now stays w son, Heavenly Diego and wife, Alex Jamison and grandson, Svetlana Lewis. Await full report from [facility]. CT reveals sm vessel disease, carotids clear, PMD was  Dr Rosi Cosme in [place]. Has uncontrolled Insulin dependent diabetes for several years. Has been on Lantus 65 U qhs, and Humalog per sliding scale for years. Admits non adherence. Also has  HTN, HLD, HypoThyroid, CATA on BIpap,  Depression, gastroparesis, IBS. Admits since moving to [place] her sugars and diet are out of control, Uses a Lisa glucose monitor and sugars range 47 to 308. Sugars are very low in mornings. Was using a different SS Humalog while in rehab but she has an at home SS used for years. ROS:  Remaining 14 ROS were reviewed and are unremarkable for other constitutional, EENT, cardiac, pulmonary, GI, , neurologic, musculoskeletal, or integumentary complaints. PAST MEDICAL/SURGICAL, SOCIAL, &  FAMILY HISTORY:  Reviewed and updated accordingly.      ALLERGIES : Seasonal    MEDICATIONS:  Current Outpatient Medications   Medication Sig Dispense Refill    ELIQUIS 5 MG TABS tablet TAKE 1 TABLET BY MOUTH TWICE DAILY 60 tablet 2    amLODIPine (NORVASC) 5 MG tablet TAKE 1 TABLET DAILY 90 tablet 1    Continuous Blood Gluc Sensor (FREESTYLE LISA SENSOR SYSTEM) MISC Please dispense one free style lisa kit 1 each 0    Continuous Blood Gluc Sensor (FREESTYLE LISA SENSOR SYSTEM) MISC 1 box by Does not apply route 2 times daily 1 each 0    famotidine (PEPCID) 20 MG tablet Take 1 tablet by mouth 2 times daily 180 tablet 1    LANTUS SOLOSTAR 100 UNIT/ML injection pen Inject 65 Units into the skin nightly 15 pen 5    levothyroxine (SYNTHROID) 100 MCG tablet Take 1 tablet daily 30 tablet 2    linaclotide (LINZESS) 145 MCG capsule Take 1 capsule by mouth every morning (before breakfast) 30 capsule 2    metoprolol tartrate (LOPRESSOR) 25 MG tablet TAKE 1 TABLET TWICE A DAY 60 tablet 2    clopidogrel (PLAVIX) 75 MG tablet TAKE 1 TABLET BY MOUTH EVERY DAY 30 tablet 2    divalproex (DEPAKOTE) 250 MG DR tablet Take 2 tablets by mouth nightly 30 tablet 2    DULoxetine (CYMBALTA) 20 MG extended release capsule Take 1 capsule by mouth daily 30 capsule 2    glipiZIDE (GLUCOTROL) 10 MG tablet TAKE 1 TABLET BY MOUTH EVERY DAY 30 tablet 2    lisinopril (PRINIVIL;ZESTRIL) 10 MG tablet TAKE 1 TABLET BY MOUTH EVERY DAY 30 tablet 2    VASCEPA 1 g CAPS capsule Take 2 capsules by mouth 2 times daily 60 capsule 2    rosuvastatin (CRESTOR) 40 MG tablet TAKE 1 TABLET BY MOUTH EVERY DAY 30 tablet 2    BD PEN NEEDLE MICRO U/F 32G X 6 MM MISC USE WITH LANTUS DAILY 100 each 5    METAMUCIL FIBER PO Take by mouth MiraLax instead      ondansetron (ZOFRAN) 4 MG tablet Take 1 tablet by mouth 3 times daily as needed for Nausea or Vomiting 30 tablet 0    insulin lispro, 1 Unit Dial, (HUMALOG KWIKPEN) 100 UNIT/ML SOPN Use on a sliding scale 3 times a day with meals. Maximum dose 30 units per day. (Patient taking differently: Indications: hold am of procedure Use on a sliding scale 3 times a day with meals. Maximum dose 30 units per day.) 15 pen 1    Cyanocobalamin (B-12) 50 MCG TABS Take by mouth       Multiple Vitamins-Minerals (VITEYES COMPLETE PO) Take by mouth      latanoprost (XALATAN) 0.005 % ophthalmic solution 1 drop nightly      Calcium Carb-Cholecalciferol (CALCIUM 1000 + D PO) Take by mouth       No current facility-administered medications for this visit.         PHYSICAL EXAM Vitals:    06/16/22 1526   BP: 136/68   Site: Right Upper Arm   Position: Sitting   Cuff Size: Medium Adult   Pulse: 71   Resp: 16   Temp: 97.3 °F (36.3 °C)   SpO2: 98%   Weight: 149 lb 6.4 oz (67.8 kg)   Height: 5' 3\" (1.6 m)   Body mass index is 26.47 kg/m². APPEARANCE: Well nourished. No distress. Using walker. HEENT: Head: Normocephalic, atraumatic. EOMI,PERRLA. Conjunctiva pink and moist. Sclera white. Hearing intact. Nares patent. Oral mucosa moist. Throat clear. NECK: No lymphadenopathy or masses. Thyroid is smooth. Moves neck fully. HEART: Reg rate and rhythm. No murmurs, rubs, or gallops. LUNGS: Clear to auscultation. No wheezes, rales, or rhonchi. ABDOMEN:  Soft, bowel sounds present, non-tender, no masses or organomegaly. MUSCULOSKELETAL:  No clubbing, cyanosis or edema. Moves all joints without eliciting pain. NEUROLOGIC: Grossly non focal. CN II-XII intact. No pronator drift. Left sided limb weakness, no facial paralysis. SKIN: Warm, dry, normal turgor. Cap refill <3secs. No rashes, petechiae, purpura. PSYCHIATRIC:  Mood, behavior, and judgement normal. Thought content normal.      ADDITIONAL STUDIES     Pertinent prior laboratory results and/or imaging reviewed. Reviewed Dr Ebony Scanlon This Encounter   Procedures    Amb External Referral To Endocrinology    Amb External Referral To Cardiology       IMPRESSION/ PLAN  1. Cerebral infarction, unspecified mechanism ([address]    2. Uncontrolled type 2 diabetes mellitus with hyperglycemia ([address]    3. Essential hypertension    4. Hyperlipidemia, unspecified hyperlipidemia type    5. Hypothyroidism, unspecified type    6. Major depressive disorder with single episode, remission status unspecified    7. Type 2 diabetes mellitus without complication, with long-term current use of insulin ([address]    8. Type 2 diabetes mellitus with diabetic polyneuropathy, with long-term current use of insulin ([address]    9.  Irritable bowel syndrome with both constipation and diarrhea    10. CATA treated with BiPAP    11. Gastroparesis    - due to low AM sugars, she will decrease Lantus by 5Units Lantus at night until sugars above 80mg/dl in morning fasting. 51U last night and 46 U tonight. - referred to [facility] Endocrinology  - referred to [facility] Cardiology  - advised to set up appointment with Dr Zhao Reardon as her PMD in July 2022. - Med list updated today.          Electronically signed by YANI Payton on 6/16/2022 at 5:05 PM

## 2022-06-17 NOTE — PROGRESS NOTES
Please call patient to help set up follow-up appointment with me in July. Okay to use ED follow-up or other visit type if necessary if no open new patient appointment slots available.

## 2022-06-17 NOTE — PROGRESS NOTES
NOTE from Dr Carin Neville. Maria Del Carmen Casas Please call patient to help set up follow-up appointment with me in July. Okay to use ED follow-up or other visit type if necessary if no open new patient appointment slots available.

## 2022-06-17 NOTE — PROGRESS NOTES
Called and spoke to the patient and provided her with the address and phone number to [facility], advised her to call and make an appt with  in July.

## 2022-06-20 ENCOUNTER — TELEPHONE (OUTPATIENT)
Dept: FAMILY MEDICINE CLINIC | Age: 77
End: 2022-06-20

## 2022-06-20 NOTE — TELEPHONE ENCOUNTER
Patient states she has been taking the medication and does not have any diarrhea side effects any longer  She is wondering is she should resume or is there something else that she should be taking for her memory?

## 2022-06-20 NOTE — TELEPHONE ENCOUNTER
Please inform pt Donepezil was held in April 2022 by Dr Destiny Ashley due to adverse effects of diarrhea.

## 2022-06-20 NOTE — TELEPHONE ENCOUNTER
Donepezil 10mg 1 times daily, pt states that she got refills on her medications and this was not included, she is not sure if she needs to continue to take it.  Please advise

## 2022-06-21 ENCOUNTER — TELEPHONE (OUTPATIENT)
Dept: FAMILY MEDICINE CLINIC | Age: 77
End: 2022-06-21

## 2022-06-21 NOTE — TELEPHONE ENCOUNTER
Krystal Carrel called from [facility]. She said that Meche slipped in the bathroom. Her left ankle is swollen. Because of neuropathy pt is unable to say if she is having pain in her left ankle. Krystal Carrel is requesting a verbal order for a 2 view left ankle Xray. Velia called [address] but it looks like she no longer follows w/ Dr Ladi Villarreal and is now your patient. If this is not correct feel free to forward the message back to me.      Thanks

## 2022-06-22 NOTE — TELEPHONE ENCOUNTER
Pt has been deferred to Dr Melissa Cee since Dian Salas is out of office for a while. Please reach out to him. Or she can go to ER.

## 2022-06-23 NOTE — TELEPHONE ENCOUNTER
Pt called back. She is now scheduled for July 12th. Pt is going to confirm that she will have transportation this day. If she doesn't she will call back and cancel for another day.

## 2022-06-23 NOTE — TELEPHONE ENCOUNTER
Pt called back and is worried because her appt with Dr. Geri Ramos is not until August 11th an she is worried about her memory and is wondering if she can be seen sooner?

## 2022-06-29 ENCOUNTER — SCHEDULED TELEPHONE ENCOUNTER (OUTPATIENT)
Dept: PSYCHOLOGY | Age: 77
End: 2022-06-29
Payer: MEDICARE

## 2022-06-29 ENCOUNTER — TELEPHONE (OUTPATIENT)
Dept: PSYCHOLOGY | Age: 77
End: 2022-06-29

## 2022-06-29 DIAGNOSIS — F41.9 ANXIETY: Primary | ICD-10-CM

## 2022-06-29 PROCEDURE — 98967 PH1 ASSMT&MGMT NQHP 11-20: CPT | Performed by: PSYCHOLOGIST

## 2022-06-29 NOTE — TELEPHONE ENCOUNTER
Late entry. Spoke with patient after needed to cancel today's appointment. She reported feeling distress about current living situation and relationship dynamics. Provided support and scheduled f/u for next week (6/29 at 10:30 am).

## 2022-06-29 NOTE — PROGRESS NOTES
Behavioral Health Consultation  Kinza Denise, Ph.D.  Psychologist  6/29/2022  10:38 AM EDT      Time spent with Patient: 25 minutes  This is patient's second [facility] appointment. Reason for Consult:    Chief Complaint   Patient presents with    Anxiety     Referring Provider: JULIET Jiménez CNP  [address] [address] ([facility]) [address] [place] [address]    Feedback given to PCP. TELEHEALTH VISIT -- Audio Only (During XKXBF-12 public health emergency)  }  Pursuant to the emergency declaration under the [address] [place] [address], formerly [facility] waiver authority and the [employer] Act, this phone call was conducted, with patient's consent, to reduce the patient's risk of exposure to COVID-19 and provide continuity of care for an established patient. Services were provided through a phone call discussion to substitute for in-person clinic visit. Pt gave verbal informed consent to participate in telehealth services. Consent:  She and/or health care decision maker is aware that that she may receive a bill for this telephone service, depending on her insurance coverage, and has provided verbal consent to proceed: Yes    Conducted a risk-benefit analysis and determined that the patient's presenting problems are consistent with the use of telepsychology. Determined that the patient has sufficient knowledge and skills in the use of technology enabling them to adequately benefit from telepsychology. It was determined that this patient was able to be properly treated without an in-person session. Patient verified that they were currently located at the [facility] address that was provided during registration.     Verified the following information:  Patient's identification: Yes  Patient location: [address]  Patient's call back number: [phone]  Patient's emergency contact's name and number, as well as permission to contact them if needed: Extended Emergency Contact Information  Primary Emergency Contact: PRINCESS GUTIERREZ [facility] Phone: [phone]  Mobile Phone: [phone]  Relation: Child  Secondary Emergency Contact: Marlene Cristobal  Home Phone: [phone]  Relation: Daughter-in-Law   needed? No    Provider location: [facility] [place] [address] affirm this is an episode with a patient who has not had a related appointment within my department in the past 7 days or scheduled within the next 24 hours. S:  Patient reported that she and her son went back to her home this weekend to help get ready to put it on the market. She had a few conversations with her son, but he seemed dismissive at times. Patient felt supported by her neighbors, and feels hopeful about selling her home. Processed concerns about finances, current living situation. However, patient notes some positive interactions with her son, grandsons, and daughter-in-law. She feels calmer, more hopeful today.      O:  MSE:    Attitude: cooperative and friendly  Consciousness: alert  Orientation: oriented to person, place, time, general circumstance  Memory: recent and remote memory intact  Attention/Concentration: intact during session  Speech: normal rate and volume, well-articulated  Mood: stressed  Affect: anxious  Perception: within normal limits  Thought Content: all-or-none thinking and intrusive thoughts  Thought Process: logical, coherent and goal-directed  Insight: good  Judgment: intact  Ability to understand instructions: Yes  Ability to respond meaningfully: Yes  Morbid Ideation: no   Suicide Assessment: no suicidal ideation, plan, or intent  Homicidal Ideation: no    History:    Medications:   Current Outpatient Medications   Medication Sig Dispense Refill    ELIQUIS 5 MG TABS tablet TAKE 1 TABLET BY MOUTH TWICE DAILY 60 tablet 2    amLODIPine (NORVASC) 5 MG tablet TAKE 1 TABLET DAILY 90 tablet 1    Continuous Blood Gluc Sensor ([facility]) [facility] Please dispense one free style geetha kit 1 each 0    Continuous Blood Gluc Sensor ([address] [place] [address]) MISC 1 box by Does not apply route 2 times daily 1 each 0    famotidine (PEPCID) 20 MG tablet Take 1 tablet by mouth 2 times daily 180 tablet 1    LANTUS SOLOSTAR 100 UNIT/ML injection pen Inject 65 Units into the skin nightly 15 pen 5    levothyroxine (SYNTHROID) 100 MCG tablet Take 1 tablet daily 30 tablet 2    linaclotide (LINZESS) 145 MCG capsule Take 1 capsule by mouth every morning (before breakfast) 30 capsule 2    metoprolol tartrate (LOPRESSOR) 25 MG tablet TAKE 1 TABLET TWICE A DAY 60 tablet 2    clopidogrel (PLAVIX) 75 MG tablet TAKE 1 TABLET BY MOUTH EVERY DAY 30 tablet 2    divalproex (DEPAKOTE) 250 MG DR tablet Take 2 tablets by mouth nightly 30 tablet 2    DULoxetine (CYMBALTA) 20 MG extended release capsule Take 1 capsule by mouth daily 30 capsule 2    glipiZIDE (GLUCOTROL) 10 MG tablet TAKE 1 TABLET BY MOUTH EVERY DAY 30 tablet 2    lisinopril (PRINIVIL;ZESTRIL) 10 MG tablet TAKE 1 TABLET BY MOUTH EVERY DAY 30 tablet 2    VASCEPA 1 g CAPS capsule Take 2 capsules by mouth 2 times daily 60 capsule 2    rosuvastatin (CRESTOR) 40 MG tablet TAKE 1 TABLET BY MOUTH EVERY DAY 30 tablet 2    BD PEN NEEDLE MICRO U/F 32G X 6 MM MISC USE WITH LANTUS DAILY 100 each 5    METAMUCIL FIBER PO Take by mouth MiraLax instead      ondansetron (ZOFRAN) 4 MG tablet Take 1 tablet by mouth 3 times daily as needed for Nausea or Vomiting 30 tablet 0    insulin lispro, 1 Unit Dial, (HUMALOG KWIKPEN) 100 UNIT/ML SOPN Use on a sliding scale 3 times a day with meals. Maximum dose 30 units per day. (Patient taking differently: Indications: hold am of procedure Use on a sliding scale 3 times a day with meals.  Maximum dose 30 units per day.) 15 pen 1    Cyanocobalamin (B-12) 50 MCG TABS Take by mouth       Multiple Vitamins-Minerals (VITEYES COMPLETE PO) Take by mouth      latanoprost (XALATAN) 0.005 % ophthalmic solution 1 drop nightly      Calcium Carb-Cholecalciferol (CALCIUM 1000 + D PO) Take by mouth       No current facility-administered medications for this visit. Social History:   Social History     Socioeconomic History    Marital status:      Spouse name: Not on file    Number of children: Not on file    Years of education: Not on file    Highest education level: Not on file   Occupational History    Not on file   Tobacco Use    Smoking status: Never Smoker    Smokeless tobacco: Never Used   Vaping Use    Vaping Use: Never used   Substance and Sexual Activity    Alcohol use: Never    Drug use: Never    Sexual activity: Not on file   Other Topics Concern    Not on file   Social History Narrative    Not on file     Social Determinants of Health     Financial Resource Strain: Low Risk     Difficulty of Paying Living Expenses: Not hard at all   Food Insecurity: No Food Insecurity    Worried About Running Out of Food in the Last Year: Never true    [address] in the Last Year: Never true   Transportation Needs:     Lack of Transportation (Medical): Not on file    Lack of Transportation (Non-Medical):  Not on file   Physical Activity: Insufficiently Active    Days of Exercise per Week: 2 days    Minutes of Exercise per Session: 20 min   Stress:     Feeling of Stress : Not on file   Social Connections:     Frequency of Communication with Friends and Family: Not on file    Frequency of Social Gatherings with Friends and Family: Not on file    Attends Confucianist Services: Not on file    Active Member of Clubs or Organizations: Not on file    Attends Club or Organization Meetings: Not on file    Marital Status: Not on file   Intimate Partner Violence:     Fear of Current or Ex-Partner: Not on file    Emotionally Abused: Not on file    Physically Abused: Not on file    Sexually Abused: Not on file   Housing Stability:     Unable to Pay for Housing in the Last Year: Not on file    Number of Places Lived in the Last Year: Not on file    Unstable Housing in the Last Year: Not on file     TOBACCO:   reports that she has never smoked. She has never used smokeless tobacco.  ETOH:   reports no history of alcohol use. Family History:   Family History   Problem Relation Age of Onset    Diabetes Paternal Grandmother     Diabetes Father     Lung Cancer Mother     Pancreatic Cancer Brother     Diabetes Brother      A:  Patient engaged and cooperative. Denies SI. Insight and motivation are good. Diagnosis:    1. Anxiety          Diagnosis Date    Colon polyps     Diabetes mellitus ([address]     Diabetic neuropathy ([address]     Diabetic retinopathy ([address]     Essential hypertension 10/28/2019    Fatty liver     Gastritis     GERD (gastroesophageal reflux disease)     Hyperlipidemia     Hypertension     Hypothyroidism     Irritable bowel syndrome     Major depressive disorder with single episode 10/28/2019    Osteopenia     Photosensitivity disorder     chronic    RBBB     Sleep apnea     on BIPAP    Thyroid disease     Thyroid nodule     neg bx-chronic thyroiditis    Type 2 diabetes mellitus with diabetic polyneuropathy, with long-term current use of insulin ([address] 7/23/2019    Vitamin D deficiency      Plan:  Pt interventions:  Conducted functional assessment, [place]-setting to identify pt's primary goals for [facility] visit / overall health, Supportive techniques and Emphasized self-care as important for managing overall health.     Pt Behavioral Change Plan:   See Pt Instructions

## 2022-07-05 ENCOUNTER — TELEPHONE (OUTPATIENT)
Dept: FAMILY MEDICINE CLINIC | Age: 77
End: 2022-07-05

## 2022-07-05 NOTE — TELEPHONE ENCOUNTER
Returned call to Roman Ferguson, advised her that Melissa Peoples is currently out of the office and this foot drop/AFO has never been discussed here. She states that this has fell through the cracks and the nursing facility she was at should have taken care of it but did not.  Patient does have an appt with  on 07/12 where this can be discussed,

## 2022-07-05 NOTE — TELEPHONE ENCOUNTER
----- Message from Sonu Mandel sent at 7/5/2022  1:09 PM EDT -----  Subject: Message to Provider    QUESTIONS  Information for Provider? [facility] Nurse would like a call   back to see what the steps are and or whom to speak with to get Kezia Cunha   fitted for a AFO for her left leg for her foot drop from the stroke in   April. She can be reached at [phone] ok to leave a message  ---------------------------------------------------------------------------  --------------  [id number] [employer]  [phone]; OK to leave message on voicemail  ---------------------------------------------------------------------------  --------------  SCRIPT ANSWERS  Relationship to Patient? Third Party  Third Party Type? Home Health Care? Representative Name?  [facility] Nurse

## 2022-07-11 ENCOUNTER — OFFICE VISIT (OUTPATIENT)
Dept: FAMILY MEDICINE CLINIC | Age: 77
End: 2022-07-11
Payer: MEDICARE

## 2022-07-11 ENCOUNTER — TELEPHONE (OUTPATIENT)
Dept: FAMILY MEDICINE CLINIC | Age: 77
End: 2022-07-11

## 2022-07-11 VITALS
WEIGHT: 148.6 LBS | OXYGEN SATURATION: 98 % | SYSTOLIC BLOOD PRESSURE: 126 MMHG | DIASTOLIC BLOOD PRESSURE: 60 MMHG | RESPIRATION RATE: 16 BRPM | HEIGHT: 63 IN | HEART RATE: 73 BPM | BODY MASS INDEX: 26.33 KG/M2

## 2022-07-11 DIAGNOSIS — E83.51 CALCIUM DEFICIENCY DISEASE: ICD-10-CM

## 2022-07-11 DIAGNOSIS — E78.2 MIXED HYPERLIPIDEMIA: ICD-10-CM

## 2022-07-11 DIAGNOSIS — E03.9 HYPOTHYROIDISM, UNSPECIFIED TYPE: ICD-10-CM

## 2022-07-11 DIAGNOSIS — K59.04 CHRONIC IDIOPATHIC CONSTIPATION: ICD-10-CM

## 2022-07-11 DIAGNOSIS — K58.2 IRRITABLE BOWEL SYNDROME WITH BOTH CONSTIPATION AND DIARRHEA: ICD-10-CM

## 2022-07-11 DIAGNOSIS — I63.9 CEREBRAL INFARCTION, UNSPECIFIED MECHANISM (HCC): Primary | ICD-10-CM

## 2022-07-11 DIAGNOSIS — M21.372 LEFT FOOT DROP: Primary | ICD-10-CM

## 2022-07-11 DIAGNOSIS — I10 HYPERTENSION, UNSPECIFIED TYPE: ICD-10-CM

## 2022-07-11 PROCEDURE — 99214 OFFICE O/P EST MOD 30 MIN: CPT

## 2022-07-11 PROCEDURE — 1123F ACP DISCUSS/DSCN MKR DOCD: CPT

## 2022-07-11 RX ORDER — PSYLLIUM HUSK 0.4 G
1000 CAPSULE ORAL DAILY
Qty: 90 TABLET | Refills: 1 | Status: SHIPPED | OUTPATIENT
Start: 2022-07-11 | End: 2022-11-03 | Stop reason: SDUPTHER

## 2022-07-11 RX ORDER — ROSUVASTATIN CALCIUM 40 MG/1
TABLET, COATED ORAL
Qty: 90 TABLET | Refills: 1 | Status: SHIPPED | OUTPATIENT
Start: 2022-07-11 | End: 2022-08-31 | Stop reason: SDUPTHER

## 2022-07-11 RX ORDER — ASPIRIN 81 MG/1
81 TABLET, CHEWABLE ORAL DAILY
COMMUNITY
End: 2022-11-03 | Stop reason: SDUPTHER

## 2022-07-11 RX ORDER — LISINOPRIL 10 MG/1
TABLET ORAL
Qty: 90 TABLET | Refills: 1 | Status: SHIPPED | OUTPATIENT
Start: 2022-07-11 | End: 2022-08-31 | Stop reason: SDUPTHER

## 2022-07-11 RX ORDER — LEVOTHYROXINE SODIUM 0.1 MG/1
TABLET ORAL
Qty: 90 TABLET | Refills: 1 | Status: SHIPPED | OUTPATIENT
Start: 2022-07-11 | End: 2022-10-12 | Stop reason: SDUPTHER

## 2022-07-11 ASSESSMENT — ENCOUNTER SYMPTOMS
BACK PAIN: 0
SHORTNESS OF BREATH: 0
WHEEZING: 0
COUGH: 0
CHEST TIGHTNESS: 0

## 2022-07-11 NOTE — TELEPHONE ENCOUNTER
Tesfaye Gibson is calling with [facility] and needs orders for AFO for left drop feet. Please fax to this number is [phone].

## 2022-07-11 NOTE — PROGRESS NOTES
Soha Zelaya (:  1945) is a 78 y.o. female,Established patient, here for evaluation of the following chief complaint(s):  Memory Loss (Pt would like to discuss going back on Donepezil, would like to get back to driving, ) and Incontinence         ASSESSMENT/PLAN:  1. Cerebral infarction, unspecified mechanism ([address]  -She is here today requesting to see a neurologist to see if she can drive. Pt is still in rehab and making good progres. -Donepezil- she was on this med in the past and this was stop d/t side effects. She is also on depakote. Spoke with pt and it's best to see neurologist regarding these meds. She will see neurologist for final approval on driving, she has not seen a neurologist since being D/C from the hospital.       Subjective   SUBJECTIVE/OBJECTIVE:  HPI     Soha Zelaya is a 78 y.o.  female  Patient of [facility] who moved to [address] [place] [address] to be closer to son after having a stroke 2022 in her home town of [place], [place]. She transferred to [facility] [place] and there for a month discharged May. Now stays w son, Jaiden Blackwood and wife, Kyleigh and grandson, Deborah Dimas. Await full report from [facility]. CT reveals sm vessel disease, carotids clear, PMD was  Dr Davis Li in [place].     Has uncontrolled Insulin dependent diabetes for several years. Has been on Lantus 65 U qhs, and Humalog per sliding scale for years. Admits non adherence. Also has  HTN, HLD, HypoThyroid, CATA on BIpap,  Depression, gastroparesis, IBS.      Admits since moving to [place] her sugars and diet are out of control, Uses a Lisa glucose monitor and sugars range 47 to 308. Sugars are very low in mornings. Was using a different SS Humalog while in rehab but she has an at home SS used for years. Moved out [place], living with son. Still getting rehab and thepary.      Driving is her biggest request, she feels that she is able to drive and request to see a neurologist for approval.            Review of Systems   Respiratory: Negative for cough, chest tightness, shortness of breath and wheezing. Musculoskeletal: Negative for back pain and neck stiffness. Neurological: Negative for dizziness, seizures, weakness, light-headedness and headaches. Objective   Physical Exam  Musculoskeletal:         General: Normal range of motion. Cervical back: Normal range of motion. Neurological:      Mental Status: She is alert. Cranial Nerves: Cranial nerve deficit present. Sensory: Sensory deficit present. Comments: Does use a walker to get around, no weakness noted to legs or hands noted                     An electronic signature was used to authenticate this note.     --Catina Benitez, JULIET - CNP

## 2022-07-15 ENCOUNTER — TELEPHONE (OUTPATIENT)
Dept: FAMILY MEDICINE CLINIC | Age: 77
End: 2022-07-15

## 2022-07-15 ENCOUNTER — SCHEDULED TELEPHONE ENCOUNTER (OUTPATIENT)
Dept: PSYCHOLOGY | Age: 77
End: 2022-07-15
Payer: MEDICARE

## 2022-07-15 DIAGNOSIS — I63.9 CEREBRAL INFARCTION, UNSPECIFIED MECHANISM (HCC): Primary | ICD-10-CM

## 2022-07-15 DIAGNOSIS — F41.9 ANXIETY: Primary | ICD-10-CM

## 2022-07-15 PROCEDURE — 98968 PH1 ASSMT&MGMT NQHP 21-30: CPT | Performed by: PSYCHOLOGIST

## 2022-07-15 NOTE — PROGRESS NOTES
Behavioral Health Consultation  Ruthie Lofton, Ph.D.  Psychologist  7/15/2022  2:11 PM EDT      Time spent with Patient: 20 minutes  This is patient's third [facility] appointment. Reason for Consult:    Chief Complaint   Patient presents with    Anxiety       Referring Provider: JULIET Lacy CNP  [address] [address] ([facility]) [address] [place] [address]    Feedback given to PCP. TELEHEALTH VISIT -- Audio Only (During AMUNS-03 public health emergency)    Pursuant to the emergency declaration under the [address] [place] [address], [facility] waiver authority and the [employer] Act, this phone call was conducted, with patient's consent, to reduce the patient's risk of exposure to COVID-19 and provide continuity of care for an established patient. Services were provided through a phone call discussion to substitute for in-person clinic visit. Pt gave verbal informed consent to participate in telehealth services. Consent:  She and/or health care decision maker is aware that that she may receive a bill for this telephone service, depending on her insurance coverage, and has provided verbal consent to proceed: Yes    Conducted a risk-benefit analysis and determined that the patient's presenting problems are consistent with the use of telepsychology. Determined that the patient has sufficient knowledge and skills in the use of technology enabling them to adequately benefit from telepsychology. It was determined that this patient was able to be properly treated without an in-person session. Patient verified that they were currently located at the [facility] address that was provided during registration.     Verified the following information:  Patient's identification: Yes  Patient location: [address]  Patient's call back number: [phone]  Patient's emergency contact's name and number, as well as permission to contact them if needed: Extended Emergency Contact Information  Primary Emergency Contact: PRINCESS GUTIERREZ [facility] Phone: [phone]  Mobile Phone: [phone]  Relation: Child  Secondary Emergency Contact: Jackeline Bullock  Home Phone: [phone]  Relation: Daughter-in-Law   needed? No    Provider location: Haris Gaona[address] [place], [place] [address] this is an episode with a patient who has not had a related appointment within my department in the past 7 days or scheduled within the next 24 hours. S:  Patient reported that she has felt \"on a roller coaster\" since last visit. She expressed frustration with difficulty getting scheduled with neurology. She hopes to be given permission to drive, but needs clearance from neurology first. There has also been continued tension at home. She described ways in which she continues to feel uncomfortable in her son's home. She has had a conversation with her son where she suggested that he accompany her to an in-person visit with [facility]. She identifies a number of anxieties, including being stressed to the point of having another stroke. She feels lonely, hopes she can gain more independence.     O:  MSE:    Attitude: cooperative and friendly  Consciousness: alert  Orientation: oriented to person, place, time, general circumstance  Memory: recent and remote memory intact  Attention/Concentration: intact during session  Speech: normal rate and volume, well-articulated  Mood: anxious  Affect: anxious  Perception: within normal limits  Thought Content: all-or-none thinking and intrusive thoughts  Thought Process: logical, coherent and goal-directed  Insight: good  Judgment: intact  Ability to understand instructions: Yes  Ability to respond meaningfully: Yes  Morbid Ideation: no   Suicide Assessment: no suicidal ideation, plan, or intent  Homicidal Ideation: no    History:    Medications:   Current Outpatient Medications   Medication Sig Dispense Refill    aspirin 81 MG chewable tablet Take 81 mg by mouth daily      linaclotide (LINZESS) 145 MCG capsule Take 1 capsule by mouth every morning (before breakfast) 90 capsule 1    levothyroxine (SYNTHROID) 100 MCG tablet Take 1 tablet daily 90 tablet 1    lisinopril (PRINIVIL;ZESTRIL) 10 MG tablet TAKE 1 TABLET BY MOUTH EVERY DAY 90 tablet 1    rosuvastatin (CRESTOR) 40 MG tablet TAKE 1 TABLET BY MOUTH EVERY DAY 90 tablet 1    Calcium Carb-Cholecalciferol (CALCIUM 1000 + D) 1000-800 MG-UNIT TABS Take 1,000 mg by mouth daily 90 tablet 1    ELIQUIS 5 MG TABS tablet TAKE 1 TABLET BY MOUTH TWICE DAILY 60 tablet 2    amLODIPine (NORVASC) 5 MG tablet TAKE 1 TABLET DAILY 90 tablet 1    Continuous Blood Gluc Sensor (FREESTYLE ANT SENSOR SYSTEM) [facility] Please dispense one free style ant kit 1 each 0    Continuous Blood Gluc Sensor (FREESTYLE ANT SENSOR SYSTEM) MISC 1 box by Does not apply route 2 times daily 1 each 0    famotidine (PEPCID) 20 MG tablet Take 1 tablet by mouth 2 times daily 180 tablet 1    LANTUS SOLOSTAR 100 UNIT/ML injection pen Inject 65 Units into the skin nightly 15 pen 5    metoprolol tartrate (LOPRESSOR) 25 MG tablet TAKE 1 TABLET TWICE A DAY 60 tablet 2    clopidogrel (PLAVIX) 75 MG tablet TAKE 1 TABLET BY MOUTH EVERY DAY 30 tablet 2    divalproex (DEPAKOTE) 250 MG DR tablet Take 2 tablets by mouth nightly 30 tablet 2    DULoxetine (CYMBALTA) 20 MG extended release capsule Take 1 capsule by mouth daily 30 capsule 2    glipiZIDE (GLUCOTROL) 10 MG tablet TAKE 1 TABLET BY MOUTH EVERY DAY 30 tablet 2    VASCEPA 1 g CAPS capsule Take 2 capsules by mouth 2 times daily 60 capsule 2    BD PEN NEEDLE MICRO U/F 32G X 6 MM MISC USE WITH LANTUS DAILY 100 each 5    METAMUCIL FIBER PO Take by mouth MiraLax instead      ondansetron (ZOFRAN) 4 MG tablet Take 1 tablet by mouth 3 times daily as needed for Nausea or Vomiting 30 tablet 0    insulin lispro, 1 Unit Dial, (HUMALOG KWIKPEN) 100 UNIT/ML SOPN Use on a sliding scale 3 times a day with meals.  Maximum dose 30 units per day. (Patient taking differently: Indications: hold am of procedure Use on a sliding scale 3 times a day with meals. Maximum dose 30 units per day.) 15 pen 1    Cyanocobalamin (B-12) 50 MCG TABS Take by mouth       Multiple Vitamins-Minerals (VITEYES COMPLETE PO) Take by mouth      latanoprost (XALATAN) 0.005 % ophthalmic solution 1 drop nightly       No current facility-administered medications for this visit. Social History:   Social History     Socioeconomic History    Marital status:      Spouse name: Not on file    Number of children: Not on file    Years of education: Not on file    Highest education level: Not on file   Occupational History    Not on file   Tobacco Use    Smoking status: Never    Smokeless tobacco: Never   Vaping Use    Vaping Use: Never used   Substance and Sexual Activity    Alcohol use: Never    Drug use: Never    Sexual activity: Not on file   Other Topics Concern    Not on file   Social History Narrative    Not on file     Social Determinants of Health     Financial Resource Strain: Low Risk     Difficulty of Paying Living Expenses: Not hard at all   Food Insecurity: No Food Insecurity    Worried About Running Out of Food in the Last Year: Never true    Junaid of Food in the Last Year: Never true   Transportation Needs: Not on file   Physical Activity: Insufficiently Active    Days of Exercise per Week: 2 days    Minutes of Exercise per Session: 20 min   Stress: Not on file   Social Connections: Not on file   Intimate Partner Violence: Not on file   Housing Stability: Not on file     TOBACCO:   reports that she has never smoked. She has never used smokeless tobacco.  ETOH:   reports no history of alcohol use. Family History:   Family History   Problem Relation Age of Onset    Diabetes Paternal Grandmother     Diabetes Father     Lung Cancer Mother     Pancreatic Cancer Brother     Diabetes Brother      A:  Patient engaged and cooperative. Alise BOYLE.  Insight and motivation are good. Diagnosis:    1. Anxiety          Diagnosis Date    Colon polyps     Diabetes mellitus ([facility]     Diabetic neuropathy ([facility]     Diabetic retinopathy ([facility]     Essential hypertension 10/28/2019    Fatty liver     Gastritis     GERD (gastroesophageal reflux disease)     Hyperlipidemia     Hypertension     Hypothyroidism     Irritable bowel syndrome     Major depressive disorder with single episode 10/28/2019    Osteopenia     Photosensitivity disorder     chronic    RBBB     Sleep apnea     on BIPAP    Thyroid disease     Thyroid nodule     neg bx-chronic thyroiditis    Type 2 diabetes mellitus with diabetic polyneuropathy, with long-term current use of insulin ([facility] 7/23/2019    Vitamin D deficiency      Plan:  Pt interventions:  Conducted functional assessment, Dow-setting to identify pt's primary goals for [facility] visit / overall health, Supportive techniques, and Emphasized self-care as important for managing overall health.     Pt Behavioral Change Plan:   See Pt Instructions

## 2022-07-15 NOTE — TELEPHONE ENCOUNTER
I spoke to the patient who stated that she did call her insurance and got the name of Norbert White from [address] [place] [address], referral pended

## 2022-07-15 NOTE — TELEPHONE ENCOUNTER
Patient called states she called referred Neurologist and can't get in for an appt until Nov. Patient states she will be released from OT this week, and PT next week. Patient asking for a new referral to a different Neurologist to get in sooner because she needs to be released so she can drive.  Please advise patient, Thanks

## 2022-07-22 ENCOUNTER — TELEPHONE (OUTPATIENT)
Dept: FAMILY MEDICINE CLINIC | Age: 77
End: 2022-07-22

## 2022-07-22 NOTE — TELEPHONE ENCOUNTER
Please advise the patient given her previous history of strokes that I would still recommend her to stay on this medication. This medication will help prevent any further buildup of plaques that can further lead to a new stroke. The benefits of this medication outweigh the possible side effects at this time. We can only screen her urine for possible blood but if she is having large visible blood in her urine she can be seen at any time.

## 2022-08-01 NOTE — PROGRESS NOTES
endorsed side effects. Past Medical History:   has a past medical history of Colon polyps, Diabetes mellitus ([address] Diabetic neuropathy ([address] Diabetic retinopathy ([address] Essential hypertension, Fatty liver, Gastritis, GERD (gastroesophageal reflux disease), Hyperlipidemia, Hypertension, Hypothyroidism, Irritable bowel syndrome, Major depressive disorder with single episode, Osteopenia, Photosensitivity disorder, RBBB, Sleep apnea, Thyroid disease, Thyroid nodule, Type 2 diabetes mellitus with diabetic polyneuropathy, with long-term current use of insulin ([address] and Vitamin D deficiency. Surgical History:   has a past surgical history that includes Tonsillectomy; Appendectomy; Gynecologic cryosurgery; Hysterectomy; Cholecystectomy; Cataract removal (Bilateral); Carpal tunnel release (Bilateral); sinus surgery; Upper gastrointestinal endoscopy (N/A, 4/6/2022); and Colonoscopy (N/A, 4/6/2022). Social History:   reports that she has never smoked. She has never used smokeless tobacco. She reports that she does not drink alcohol and does not use drugs. Family History:  family history includes Diabetes in her brother, father, and paternal grandmother; Gordon Piggs in her mother; Pancreatic Cancer in her brother. Home Medications:  Were reviewed and are listed in nursing record and/or below  Prior to Admission medications    Medication Sig Start Date End Date Taking?  Authorizing Provider   aspirin 81 MG chewable tablet Take 81 mg by mouth daily   Yes Historical Provider, MD   linaclotide [place] [employer]) 145 MCG capsule Take 1 capsule by mouth every morning (before breakfast) 7/11/22  Yes JULIET Thorpe CNP   levothyroxine (SYNTHROID) 100 MCG tablet Take 1 tablet daily 7/11/22  Yes JULIET Gaffney CNP   lisinopril (PRINIVIL;ZESTRIL) 10 MG tablet TAKE 1 TABLET BY MOUTH EVERY DAY 7/11/22  Yes JULIET Thorpe CNP   rosuvastatin (CRESTOR) 40 MG tablet TAKE 1 TABLET BY MOUTH EVERY DAY 7/11/22  Yes JULIET Suazo CNP   Calcium Carb-Cholecalciferol (CALCIUM 1000 + D) 1000-800 MG-UNIT TABS Take 1,000 mg by mouth daily 7/11/22  Yes JULIET Suazo CNP   ELIQUIS 5 MG TABS tablet TAKE 1 TABLET BY MOUTH TWICE DAILY 6/16/22  Yes YANI Chambers   amLODIPine (NORVASC) 5 MG tablet TAKE 1 TABLET DAILY 6/16/22  Yes YANI Geller   Continuous Blood Gluc Sensor ([address] [place] [address]) [facility] Please dispense one free style geetha kit 6/16/22  Yes YANI Geller   Continuous Blood Gluc Sensor ([address] [place] [address]) [facility] 1 box by Does not apply route 2 times daily 6/16/22  Yes YANI Geller   famotidine (PEPCID) 20 MG tablet Take 1 tablet by mouth 2 times daily 6/16/22  Yes Katya Viveros [facility] Enrico Durand   LANTUS SOLOSTAR 100 UNIT/ML injection pen Inject 65 Units into the skin nightly 6/16/22  Yes YANI Purdy   metoprolol tartrate (LOPRESSOR) 25 MG tablet TAKE 1 TABLET TWICE A DAY 6/16/22  Yes YANI Geller   divalproex (DEPAKOTE) 250 MG DR tablet Take 2 tablets by mouth nightly 6/16/22  Yes YANI Geller   DULoxetine (CYMBALTA) 20 MG extended release capsule Take 1 capsule by mouth daily 6/16/22  Yes YANI Purdy   glipiZIDE (GLUCOTROL) 10 MG tablet TAKE 1 TABLET BY MOUTH EVERY DAY 6/16/22  Yes Soraya [address] Enrico Durand   VASCEPA 1 g CAPS capsule Take 2 capsules by mouth 2 times daily 6/16/22 9/14/22 Yes YANI Marin   BD PEN NEEDLE MICRO U/F 32G X 6 MM MISC USE WITH LANTUS DAILY 4/11/22  Yes Sylvie Gonsales MD   METAMUCIL FIBER PO Take by mouth MiraLax instead   Yes Historical MD Azul   ondansetron (ZOFRAN) 4 MG tablet Take 1 tablet by mouth 3 times daily as needed for Nausea or Vomiting 1/12/22  Yes Sylvie Gonsales MD   insulin lispro, 1 Unit Dial, (HUMALOG KWIKPEN) 100 UNIT/ML SOPN Use on a sliding scale 3 times a day with meals. Maximum dose 30 units per day.   Patient taking differently: Indications: hold am of procedure Use on a sliding scale 3 times a day with meals. Maximum dose 30 units per day. 1/10/22  Yes Gianna Arciniega MD   Cyanocobalamin (B-12) 50 MCG TABS Take by mouth    Yes Historical Provider, MD   Multiple Vitamins-Minerals (VITEYES COMPLETE PO) Take by mouth   Yes Historical Provider, MD   latanoprost (XALATAN) 0.005 % ophthalmic solution 1 drop nightly   Yes Historical Provider, MD   clopidogrel (PLAVIX) 75 MG tablet TAKE 1 TABLET BY MOUTH EVERY DAY  Patient not taking: Reported on 8/4/2022 6/16/22   YANI Perez        CURRENT Medications:  No current facility-administered medications for this visit. Allergies:  Seasonal     Review of Systems:   A 14 point review of symptoms completed. Pertinent positives identified in the HPI, all other review of symptoms negative as below. Objective:     Vitals:    08/04/22 1432   BP: 130/60   Pulse: 69   Temp: 98.6 °F (37 °C)   SpO2: 98%    Weight: 147 lb 9.6 oz (67 kg)       PHYSICAL EXAM:    General:  Alert and oriented, woman in no acute distress, mildly anxious appearing   Head:  Normocephalic, atraumatic   Eyes:  Conjunctiva/corneas clear, anicteric sclerae    Nose: Nares normal, no drainage or sinus tenderness   Throat: No abnormalities of the lips, oral mucosa or tongue. Neck: Trachea midline. Neck supple with no lymphadenopathy, thyroid not enlarged, symmetric, no tenderness/mass/nodules, no Jugular venous pressure elevation    Lungs:   Clear to auscultation bilaterally, no wheezes, no rales, no respiratory distress   Chest Wall:  No deformity or tenderness to palpation   Heart:  Regular rate and rhythm, normal S1, normal S2, no murmur, no rub, no S3/S4, PMI non-displaced. Abdomen:   Soft, non-tender, with normoactive bowel sounds. No masses, no hepatosplenomegaly   Extremities: No cyanosis, clubbing or pitting edema. Vascular: 2+ radial, 2+ dorsalis pedis and posterior tibial pulses bilaterally. Brisk carotid upstrokes without carotid bruit.     Skin: Skin color, texture, turgor are normal with no rashes or ulceration. Pysch: Euthymic mood, appropriate affect   Neurologic: No slurred speech or facial asymmetry. Left foot drop        Labs:   CBC:   Lab Results   Component Value Date/Time    WBC 6.8 04/19/2022 06:25 AM    RBC 4.65 04/19/2022 06:25 AM    HGB 13.9 04/19/2022 06:25 AM    HCT 40.5 04/19/2022 06:25 AM    MCV 87.2 04/19/2022 06:25 AM    RDW 13.9 04/19/2022 06:25 AM     04/19/2022 06:25 AM     CMP:  Lab Results   Component Value Date/Time     04/19/2022 06:25 AM    K 4.2 04/19/2022 06:25 AM     04/19/2022 06:25 AM    CO2 25 04/19/2022 06:25 AM    BUN 15 04/19/2022 06:25 AM    CREATININE 0.7 04/19/2022 06:25 AM    GFRAA >60 04/15/2022 11:33 AM    AGRATIO 1.1 04/17/2022 10:28 AM    LABGLOM 82 04/19/2022 06:25 AM    GLUCOSE 75 04/19/2022 06:25 AM    PROT 7.5 04/17/2022 10:28 AM    CALCIUM 9.0 04/19/2022 06:25 AM    BILITOT 1.0 04/17/2022 10:28 AM    ALKPHOS 57 04/17/2022 10:28 AM    AST 19 04/17/2022 10:28 AM    ALT 20 04/17/2022 10:28 AM     PT/INR:  No results found for: PTINR  HgBA1c:  Lab Results   Component Value Date    LABA1C 8.2 (H) 04/17/2022     Lab Results   Component Value Date    CKTOTAL 43 04/07/2021    CKMB 2.5 04/07/2021    TROPONINI < 0.03 04/17/2022     Cardiac Data:   CT Chest: 05/17/2021  1. No acute airspace disease identified. 2.  Calcified atheromatous plaque and coronary calcification. 3.  Multilevel degenerative change in the spine with notable disc disease at T7-8 and T8-9.  4.  Findings compatible with hepatic steatosis     MRI Brain :04/17/2022  IMPRESSION:     Acute to subacute infarct in the right para midline tarik. This is associated with   mild edema, but no mass effect or hemorrhage. Mild chronic small vessel ischemic disease. CTA Head: 04/17/2022  IMPRESSION:   CTA of the head is unremarkable. There is no vascular cut off, significant   stenosis, or evidence for vasculitis. CTA of the neck is unremarkable. There are no significant carotid stenoses by   NASCET criteria. [id number]   NSR  Right bundle branch block    Echo: [facility]  EF 60%. Stress Test: 2021        Impression and Plan:      Paroxysmal atrial fibrillation, maintaining sinus rhythm by exam  Right cerebral CVA 3601, unclear embolic  Coronary artery disease by CT  Mild to moderate aortic regurgitation  Normal LV function  Diabetes mellitus  Hypertension  Hyperlipidemia    Patient Active Problem List   Diagnosis    Hypothyroidism    Major depressive disorder with single episode    Essential hypertension    Hyperlipidemia    Type 2 diabetes mellitus    Cerebral infarction, unspecified ([address]    Uncontrolled type 2 diabetes mellitus ([address]    Gastroparesis    CATA treated with BiPAP    Irritable bowel syndrome with both constipation and diarrhea         PLAN:  Continue aspirin and Eliquis twice daily  Continue Lipitor and Vascepa, lipids well controlled  Continue amlodipine, lisinopril, metoprolol for blood pressure management, BP at goal  Non-smoker  5. Recommend E echocardiogram in 3 to 5 years for aortic regurgitation surveillance. Last echo 2022  6. Follow-up with neurology as planned      Follow up with me in 6 months     This note is scribed in the presence of Dave Chairez by Ning Kang RN      The scribes documentation has been prepared under my direction and personally reviewed by me in its entirety. I confirm that the note above accurately reflects all work, treatment, procedures, and medical decision making performed by me. Dae Boogie MD, personally performed the services described in this documentation as scribed by Ning Kang RN in my presence, and it is both accurate and complete to the best of our ability. I will address the patient's cardiac risk factors and adjusted pharmacologic treatment as needed.  In addition, I have reinforced the need for patient

## 2022-08-02 ENCOUNTER — TELEPHONE (OUTPATIENT)
Dept: FAMILY MEDICINE CLINIC | Age: 77
End: 2022-08-02

## 2022-08-02 DIAGNOSIS — Z79.4 TYPE 2 DIABETES MELLITUS WITH DIABETIC POLYNEUROPATHY, WITH LONG-TERM CURRENT USE OF INSULIN (HCC): ICD-10-CM

## 2022-08-02 DIAGNOSIS — E11.42 TYPE 2 DIABETES MELLITUS WITH DIABETIC POLYNEUROPATHY, WITH LONG-TERM CURRENT USE OF INSULIN (HCC): ICD-10-CM

## 2022-08-02 RX ORDER — GLIPIZIDE 10 MG/1
TABLET, FILM COATED, EXTENDED RELEASE ORAL
Qty: 90 TABLET | Refills: 3 | OUTPATIENT
Start: 2022-08-02

## 2022-08-02 NOTE — TELEPHONE ENCOUNTER
----- Message from [facility] sent at 8/1/2022  5:31 PM EDT -----  Subject: Message to Provider    QUESTIONS  Information for Provider? pt wants to reschedule psychology appt tried to   call office no answer and phone was not ringing please contact this pt   back   ---------------------------------------------------------------------------  --------------  [address] [employer]  [phone]; OK to leave message on voicemail  ---------------------------------------------------------------------------  --------------  SCRIPT ANSWERS  Relationship to Patient?  Self

## 2022-08-04 ENCOUNTER — OFFICE VISIT (OUTPATIENT)
Dept: CARDIOLOGY CLINIC | Age: 77
End: 2022-08-04
Payer: MEDICARE

## 2022-08-04 VITALS
SYSTOLIC BLOOD PRESSURE: 130 MMHG | OXYGEN SATURATION: 98 % | WEIGHT: 147.6 LBS | HEIGHT: 63 IN | TEMPERATURE: 98.6 F | HEART RATE: 69 BPM | BODY MASS INDEX: 26.15 KG/M2 | DIASTOLIC BLOOD PRESSURE: 60 MMHG

## 2022-08-04 DIAGNOSIS — E78.5 HYPERLIPIDEMIA, UNSPECIFIED HYPERLIPIDEMIA TYPE: ICD-10-CM

## 2022-08-04 DIAGNOSIS — I10 ESSENTIAL HYPERTENSION: Primary | ICD-10-CM

## 2022-08-04 DIAGNOSIS — I63.9 CEREBRAL INFARCTION, UNSPECIFIED MECHANISM (HCC): ICD-10-CM

## 2022-08-04 PROCEDURE — G8417 CALC BMI ABV UP PARAM F/U: HCPCS | Performed by: INTERNAL MEDICINE

## 2022-08-04 PROCEDURE — 1123F ACP DISCUSS/DSCN MKR DOCD: CPT | Performed by: INTERNAL MEDICINE

## 2022-08-04 PROCEDURE — G8427 DOCREV CUR MEDS BY ELIG CLIN: HCPCS | Performed by: INTERNAL MEDICINE

## 2022-08-04 PROCEDURE — 99204 OFFICE O/P NEW MOD 45 MIN: CPT | Performed by: INTERNAL MEDICINE

## 2022-08-04 PROCEDURE — 1090F PRES/ABSN URINE INCON ASSESS: CPT | Performed by: INTERNAL MEDICINE

## 2022-08-04 PROCEDURE — G8400 PT W/DXA NO RESULTS DOC: HCPCS | Performed by: INTERNAL MEDICINE

## 2022-08-04 PROCEDURE — 3017F COLORECTAL CA SCREEN DOC REV: CPT | Performed by: INTERNAL MEDICINE

## 2022-08-04 PROCEDURE — 1036F TOBACCO NON-USER: CPT | Performed by: INTERNAL MEDICINE

## 2022-08-04 NOTE — PATIENT INSTRUCTIONS
PLAN:  Continue current medications. Eliquis is life long treatment. Recommend Echo/ultrasound of the heart every 3 to 5 years.  Last echo 04/2022      Follow up with me in 6 months

## 2022-08-09 ENCOUNTER — OFFICE VISIT (OUTPATIENT)
Dept: FAMILY MEDICINE CLINIC | Age: 77
End: 2022-08-09
Payer: MEDICARE

## 2022-08-09 VITALS
DIASTOLIC BLOOD PRESSURE: 50 MMHG | WEIGHT: 148 LBS | OXYGEN SATURATION: 98 % | BODY MASS INDEX: 26.22 KG/M2 | HEART RATE: 71 BPM | SYSTOLIC BLOOD PRESSURE: 118 MMHG

## 2022-08-09 DIAGNOSIS — I63.9 CEREBRAL INFARCTION, UNSPECIFIED MECHANISM (HCC): ICD-10-CM

## 2022-08-09 DIAGNOSIS — E78.5 HYPERLIPIDEMIA, UNSPECIFIED HYPERLIPIDEMIA TYPE: ICD-10-CM

## 2022-08-09 DIAGNOSIS — F33.1 MAJOR DEPRESSIVE DISORDER, RECURRENT, MODERATE (HCC): ICD-10-CM

## 2022-08-09 DIAGNOSIS — I10 ESSENTIAL HYPERTENSION: Primary | ICD-10-CM

## 2022-08-09 DIAGNOSIS — F32.9 MAJOR DEPRESSIVE DISORDER WITH SINGLE EPISODE, REMISSION STATUS UNSPECIFIED: ICD-10-CM

## 2022-08-09 DIAGNOSIS — G47.33 OSA TREATED WITH BIPAP: ICD-10-CM

## 2022-08-09 DIAGNOSIS — E11.65 UNCONTROLLED TYPE 2 DIABETES MELLITUS WITH HYPERGLYCEMIA (HCC): ICD-10-CM

## 2022-08-09 PROCEDURE — 3052F HG A1C>EQUAL 8.0%<EQUAL 9.0%: CPT | Performed by: STUDENT IN AN ORGANIZED HEALTH CARE EDUCATION/TRAINING PROGRAM

## 2022-08-09 PROCEDURE — G8417 CALC BMI ABV UP PARAM F/U: HCPCS | Performed by: STUDENT IN AN ORGANIZED HEALTH CARE EDUCATION/TRAINING PROGRAM

## 2022-08-09 PROCEDURE — 1090F PRES/ABSN URINE INCON ASSESS: CPT | Performed by: STUDENT IN AN ORGANIZED HEALTH CARE EDUCATION/TRAINING PROGRAM

## 2022-08-09 PROCEDURE — G8427 DOCREV CUR MEDS BY ELIG CLIN: HCPCS | Performed by: STUDENT IN AN ORGANIZED HEALTH CARE EDUCATION/TRAINING PROGRAM

## 2022-08-09 PROCEDURE — 99213 OFFICE O/P EST LOW 20 MIN: CPT | Performed by: STUDENT IN AN ORGANIZED HEALTH CARE EDUCATION/TRAINING PROGRAM

## 2022-08-09 PROCEDURE — 3017F COLORECTAL CA SCREEN DOC REV: CPT | Performed by: STUDENT IN AN ORGANIZED HEALTH CARE EDUCATION/TRAINING PROGRAM

## 2022-08-09 PROCEDURE — G8400 PT W/DXA NO RESULTS DOC: HCPCS | Performed by: STUDENT IN AN ORGANIZED HEALTH CARE EDUCATION/TRAINING PROGRAM

## 2022-08-09 PROCEDURE — 1036F TOBACCO NON-USER: CPT | Performed by: STUDENT IN AN ORGANIZED HEALTH CARE EDUCATION/TRAINING PROGRAM

## 2022-08-09 PROCEDURE — 1123F ACP DISCUSS/DSCN MKR DOCD: CPT | Performed by: STUDENT IN AN ORGANIZED HEALTH CARE EDUCATION/TRAINING PROGRAM

## 2022-08-09 PROCEDURE — 2022F DILAT RTA XM EVC RTNOPTHY: CPT | Performed by: STUDENT IN AN ORGANIZED HEALTH CARE EDUCATION/TRAINING PROGRAM

## 2022-08-09 RX ORDER — GLIPIZIDE 10 MG/1
TABLET ORAL
Qty: 90 TABLET | Refills: 2 | Status: SHIPPED | OUTPATIENT
Start: 2022-08-09 | End: 2022-09-13

## 2022-08-09 RX ORDER — DIVALPROEX SODIUM 250 MG/1
500 TABLET, DELAYED RELEASE ORAL NIGHTLY
Qty: 180 TABLET | Refills: 1 | Status: SHIPPED | OUTPATIENT
Start: 2022-08-09 | End: 2022-11-03 | Stop reason: SDUPTHER

## 2022-08-09 RX ORDER — APIXABAN 5 MG/1
TABLET, FILM COATED ORAL
Qty: 180 TABLET | Refills: 2 | Status: SHIPPED | OUTPATIENT
Start: 2022-08-09 | End: 2022-09-12

## 2022-08-09 RX ORDER — ICOSAPENT ETHYL 1000 MG/1
2 CAPSULE ORAL 2 TIMES DAILY
Qty: 360 CAPSULE | Refills: 2 | Status: SHIPPED | OUTPATIENT
Start: 2022-08-09 | End: 2022-11-03 | Stop reason: SDUPTHER

## 2022-08-09 RX ORDER — DULOXETIN HYDROCHLORIDE 20 MG/1
20 CAPSULE, DELAYED RELEASE ORAL DAILY
Qty: 90 CAPSULE | Refills: 2 | Status: SHIPPED | OUTPATIENT
Start: 2022-08-09 | End: 2022-09-13

## 2022-08-09 NOTE — PROGRESS NOTES
Patient: Mic Osborn is a 78 y.o. female who presents today with the following Chief Complaint(s):  Chief Complaint   Patient presents with    New Patient         HPI    Stroke April 13th  Going to get APO tomorrow for left ankle with drop foot  Trying to walk without walker    Saw neurologist  They recommended against driving  Has scheduled memory test in October to evaluate ability to drive  Following with Dr. Joe Daugherty ([facility], [phone])  Dr. Joe Daugherty stopped Paxil. Continuing Eliquis and ASA 81mg  Has previously followed with neurologist Dr. Abdiel Corrales due to memory loss  Was placed on aricept previously and had some diarrhea. Reports placed on this a second time without side effects. Depression  Has been on Depakote for years  Previously did see psychiatry for a short period    DM2  Doing 36U nightly    Current Outpatient Medications   Medication Sig Dispense Refill    Handicap Placard MISC by Does not apply route 08/09/22 1 each 0    ELIQUIS 5 MG TABS tablet TAKE 1 TABLET BY MOUTH TWICE DAILY 180 tablet 2    glipiZIDE (GLUCOTROL) 10 MG tablet TAKE 1 TABLET BY MOUTH EVERY DAY 90 tablet 2    metoprolol tartrate (LOPRESSOR) 25 MG tablet TAKE 1 TABLET TWICE A  tablet 2    VASCEPA 1 g CAPS capsule Take 2 capsules by mouth 2 times daily 360 capsule 2    DULoxetine (CYMBALTA) 20 MG extended release capsule Take 1 capsule by mouth in the morning.  90 capsule 2    divalproex (DEPAKOTE) 250 MG DR tablet Take 2 tablets by mouth nightly 180 tablet 1    aspirin 81 MG chewable tablet Take 81 mg by mouth daily      linaclotide (LINZESS) 145 MCG capsule Take 1 capsule by mouth every morning (before breakfast) 90 capsule 1    levothyroxine (SYNTHROID) 100 MCG tablet Take 1 tablet daily 90 tablet 1    lisinopril (PRINIVIL;ZESTRIL) 10 MG tablet TAKE 1 TABLET BY MOUTH EVERY DAY 90 tablet 1    rosuvastatin (CRESTOR) 40 MG tablet TAKE 1 TABLET BY MOUTH EVERY DAY 90 tablet 1    Calcium Carb-Cholecalciferol (CALCIUM 1000 + D) 1000-800 MG-UNIT TABS Take 1,000 mg by mouth daily 90 tablet 1    amLODIPine (NORVASC) 5 MG tablet TAKE 1 TABLET DAILY 90 tablet 1    Continuous Blood Gluc Sensor ([address] [place] [address]) [facility]IS Please dispense one free style geetha kit 1 each 0    Continuous Blood Gluc Sensor ([address] [place] [address]) [facility] 1 box by Does not apply route 2 times daily 1 each 0    famotidine (PEPCID) 20 MG tablet Take 1 tablet by mouth 2 times daily 180 tablet 1    LANTUS SOLOSTAR 100 UNIT/ML injection pen Inject 65 Units into the skin nightly 15 pen 5    BD PEN NEEDLE MICRO U/F 32G X 6 MM MISC USE WITH LANTUS DAILY 100 each 5    METAMUCIL FIBER PO Take by mouth MiraLax instead      ondansetron (ZOFRAN) 4 MG tablet Take 1 tablet by mouth 3 times daily as needed for Nausea or Vomiting 30 tablet 0    insulin lispro, 1 Unit Dial, (HUMALOG KWIKPEN) 100 UNIT/ML SOPN Use on a sliding scale 3 times a day with meals. Maximum dose 30 units per day. (Patient taking differently: Indications: hold am of procedure Use on a sliding scale 3 times a day with meals. Maximum dose 30 units per day.) 15 pen 1    Cyanocobalamin (B-12) 50 MCG TABS Take by mouth       Multiple Vitamins-Minerals (VITEYES COMPLETE PO) Take by mouth      latanoprost (XALATAN) 0.005 % ophthalmic solution 1 drop nightly       No current facility-administered medications for this visit. Patient's past medical history, surgical history, family history, medications,  andallergies  were all reviewed and updated as appropriate today. Review of Systems  All other systems reviewed and negative    Physical Exam  Vitals reviewed. Constitutional:       Appearance: Normal appearance. HENT:      Head: Normocephalic and atraumatic. Cardiovascular:      Rate and Rhythm: Normal rate and regular rhythm. Pulmonary:      Effort: Pulmonary effort is normal.      Breath sounds: Normal breath sounds.    Neurological:      General: No focal deficit present. Mental Status: She is alert and oriented to person, place, and time. Psychiatric:         Behavior: Behavior normal.         Thought Content: Thought content normal.     Vitals:    08/09/22 1425   BP: (!) 118/50   Pulse: 71   SpO2: 98%       Assessment:  Encounter Diagnoses   Name Primary? Essential hypertension Yes    Uncontrolled type 2 diabetes mellitus with hyperglycemia (HCC)     Cerebral infarction, unspecified mechanism (HCC)     CATA treated with BiPAP     Major depressive disorder with single episode, remission status unspecified     Hyperlipidemia, unspecified hyperlipidemia type     Major depressive disorder, recurrent, moderate        Plan:  1. Essential hypertension  At goal today. No hypotensive symptoms. Continue current  medications  - metoprolol tartrate (LOPRESSOR) 25 MG tablet; TAKE 1 TABLET TWICE A DAY  Dispense: 180 tablet; Refill: 2    2. Uncontrolled type 2 diabetes mellitus with hyperglycemia (HCC)  Last A1c 8.2. Will recheck A1c.   - glipiZIDE (GLUCOTROL) 10 MG tablet; TAKE 1 TABLET BY MOUTH EVERY DAY  Dispense: 90 tablet; Refill: 2  - Hemoglobin A1C; Future    3. Cerebral infarction, unspecified mechanism ([facility] [address]  Still unable to drive until cleared by neurology. Planning for formalized memory testing. Will give handicap placard to use when driving with children.   - Handicap Placard MISC; by Does not apply route 08/09/22  Dispense: 1 each; Refill: 0    4. CATA treated with BiPAP      5. Major depressive disorder with single episode, remission status unspecified  Reports depression OK at the moment. Will continue current medications. - DULoxetine (CYMBALTA) 20 MG extended release capsule; Take 1 capsule by mouth in the morning. Dispense: 90 capsule; Refill: 2  - VALPROIC ACID LEVEL, FREE; Future  - divalproex (DEPAKOTE) 250 MG DR tablet; Take 2 tablets by mouth nightly  Dispense: 180 tablet; Refill: 1    6.  Hyperlipidemia, unspecified hyperlipidemia type  At goal on recent lipid panel earlier this year. - VASCEPA 1 g CAPS capsule; Take 2 capsules by mouth 2 times daily  Dispense: 360 capsule; Refill: 2    Patient unsure who she will continue to follow with long term. May continue to see Adarsh Muse or me depending on which her children feels is an easier drive. No follow-ups on file.

## 2022-08-09 NOTE — LETTER
[address]  [facility] [address]  Phone: [phone]  Fax: [phone]    Martir Lyons DO         August 9, 2022     Patient: Fausto Leyva   YOB: 1945   Date of Visit: 8/9/2022       To Whom It May Concern: It is my medical opinion that An Virk requires a disability parking placard for the following reasons:  She has limited walking ability due to a neurologic condition. Duration of need: 3 years    If you have any questions or concerns, please don't hesitate to call.     Sincerely,      Martir Lyons DO

## 2022-08-10 ENCOUNTER — TELEPHONE (OUTPATIENT)
Dept: FAMILY MEDICINE CLINIC | Age: 77
End: 2022-08-10

## 2022-08-10 RX ORDER — DIVALPROEX SODIUM 250 MG/1
TABLET, DELAYED RELEASE ORAL
Qty: 30 TABLET | OUTPATIENT
Start: 2022-08-10

## 2022-08-10 NOTE — TELEPHONE ENCOUNTER
This medication was sent to the pharmacy on 8/9/22 as a 90 day supply with 1 refill. This refill is not needed.

## 2022-08-10 NOTE — TELEPHONE ENCOUNTER
FYI: Patient called wants it noted that she has decided to have  as her PCP. Noted change in patients chart.

## 2022-08-11 ENCOUNTER — OFFICE VISIT (OUTPATIENT)
Dept: PSYCHOLOGY | Age: 77
End: 2022-08-11
Payer: MEDICARE

## 2022-08-11 DIAGNOSIS — F41.9 ANXIETY: Primary | ICD-10-CM

## 2022-08-11 PROCEDURE — 1036F TOBACCO NON-USER: CPT | Performed by: PSYCHOLOGIST

## 2022-08-11 PROCEDURE — 1123F ACP DISCUSS/DSCN MKR DOCD: CPT | Performed by: PSYCHOLOGIST

## 2022-08-11 PROCEDURE — 90832 PSYTX W PT 30 MINUTES: CPT | Performed by: PSYCHOLOGIST

## 2022-08-11 ASSESSMENT — ANXIETY QUESTIONNAIRES
4. TROUBLE RELAXING: 0-NOT AT ALL
6. BECOMING EASILY ANNOYED OR IRRITABLE: 1-SEVERAL DAYS
3. WORRYING TOO MUCH ABOUT DIFFERENT THINGS: 0-NOT AT ALL
1. FEELING NERVOUS, ANXIOUS, OR ON EDGE: 1
5. BEING SO RESTLESS THAT IT IS HARD TO SIT STILL: 0-NOT AT ALL
2. NOT BEING ABLE TO STOP OR CONTROL WORRYING: 1-SEVERAL DAYS
GAD7 TOTAL SCORE: 3

## 2022-08-11 ASSESSMENT — PATIENT HEALTH QUESTIONNAIRE - PHQ9
SUM OF ALL RESPONSES TO PHQ QUESTIONS 1-9: 1
1. LITTLE INTEREST OR PLEASURE IN DOING THINGS: 0
SUM OF ALL RESPONSES TO PHQ QUESTIONS 1-9: 1
SUM OF ALL RESPONSES TO PHQ9 QUESTIONS 1 & 2: 1
2. FEELING DOWN, DEPRESSED OR HOPELESS: 1
SUM OF ALL RESPONSES TO PHQ QUESTIONS 1-9: 1
SUM OF ALL RESPONSES TO PHQ QUESTIONS 1-9: 1

## 2022-08-11 NOTE — PATIENT INSTRUCTIONS
Consider scheduling \"staff meetings\" with Aleksandr Elder and Kathrin George to discuss practical issues related to the home and scheduling. Return to see Dr. Jordana Xie in 2 weeks.

## 2022-08-11 NOTE — PROGRESS NOTES
Behavioral Health Consultation  Livier Gill, Ph.D.  Psychologist  8/11/2022  4:05 PM EDT      Time spent with Patient: 25 minutes  This is patient's fourth  [facility] appointment. Reason for Consult:    Chief Complaint   Patient presents with    Anxiety     Referring Provider: JULIET Samaniego CNP  [address] [address] ([facility]) [address] [place] [address]    Feedback given to PCP. S:  Patient and her son Ralph Posey) present for visit today. Discussed dynamics in their relationship, relationship between patient and her daughter-in-law. Patient and her son identify increased emotional reactivity since her stroke. They agree that this contributes to relationship dynamics. Her son is also concerned about her reactivity if she were to be allowed to drive again. Processed communication strategies and suggested scheduled times for the patient and her son to have conversations about relevant issues related to managing home life.      O:  MSE:    Appearance: good hygiene   Attitude: cooperative and friendly  Consciousness: alert  Orientation: oriented to person, place, time, general circumstance  Memory: recent and remote memory intact  Attention/Concentration: intact during session  Psychomotor Activity:normal  Eye Contact: normal  Speech: normal rate and volume, well-articulated  Mood: variable  Affect: labile  Perception: within normal limits  Thought Content: all-or-none thinking and intrusive thoughts  Thought Process: logical, coherent and goal-directed  Insight: good  Judgment: intact  Ability to understand instructions: Yes  Ability to respond meaningfully: Yes  Morbid Ideation: no   Suicide Assessment: no suicidal ideation, plan, or intent  Homicidal Ideation: no    History:    Medications:   Current Outpatient Medications   Medication Sig Dispense Refill    Handicap Placard MISC by Does not apply route 08/09/22 1 each 0    ELIQUIS 5 MG TABS tablet TAKE 1 TABLET BY MOUTH TWICE DAILY 180 tablet 2    glipiZIDE (GLUCOTROL) 10 MG Maximum dose 30 units per day.) 15 pen 1    Cyanocobalamin (B-12) 50 MCG TABS Take by mouth       Multiple Vitamins-Minerals (VITEYES COMPLETE PO) Take by mouth      latanoprost (XALATAN) 0.005 % ophthalmic solution 1 drop nightly       No current facility-administered medications for this visit. Social History:   Social History     Socioeconomic History    Marital status:      Spouse name: Not on file    Number of children: Not on file    Years of education: Not on file    Highest education level: Not on file   Occupational History    Not on file   Tobacco Use    Smoking status: Never    Smokeless tobacco: Never   Vaping Use    Vaping Use: Never used   Substance and Sexual Activity    Alcohol use: Never    Drug use: Never    Sexual activity: Not on file   Other Topics Concern    Not on file   Social History Narrative    Not on file     Social Determinants of Health     Financial Resource Strain: Low Risk     Difficulty of Paying Living Expenses: Not hard at all   Food Insecurity: No Food Insecurity    Worried About Running Out of Food in the Last Year: Never true    Junaid of Food in the Last Year: Never true   Transportation Needs: Not on file   Physical Activity: Insufficiently Active    Days of Exercise per Week: 2 days    Minutes of Exercise per Session: 20 min   Stress: Not on file   Social Connections: Not on file   Intimate Partner Violence: Not on file   Housing Stability: Not on file     TOBACCO:   reports that she has never smoked. She has never used smokeless tobacco.  ETOH:   reports no history of alcohol use. Family History:   Family History   Problem Relation Age of Onset    Diabetes Paternal Grandmother     Diabetes Father     Lung Cancer Mother     Pancreatic Cancer Brother     Diabetes Brother        A:  Administered PHQ-9 and JOSE RAUL-7 (see below). Patient endorses minimal symptoms of depression and subclinical symptoms of anxiety. Denies SI. Insight and motivation are good.      PHQ Scores 8/11/2022 6/8/2022 6/2/2022 4/11/2022 4/11/2022 4/5/2021 8/24/2020   PHQ2 Score 1 1 0 0 0 0 1   PHQ9 Score 1 4 0 0 0 0 1     Interpretation of Total Score Depression Severity: 1-4 = Minimal depression, 5-9 = Mild depression, 10-14 = Moderate depression, 15-19 = Moderately severe depression, 20-27 = Severe depression    JOSE RAUL 7 SCORE 8/11/2022 6/8/2022   JOSE RAUL-7 Total Score - 3   JOSE RAUL-7 Total Score 3 -     Interpretation of JOSE RAUL-7 score: 5-9 = mild anxiety, 10-14 = moderate anxiety, 15+ = severe anxiety. Recommend referral to behavioral health for scores 10 or greater. Diagnosis:    1. Anxiety          Diagnosis Date    Colon polyps     Diabetes mellitus ([address]     Diabetic neuropathy ([facility] [address]     Diabetic retinopathy ([facility] [address]     Essential hypertension 10/28/2019    Fatty liver     Gastritis     GERD (gastroesophageal reflux disease)     Hyperlipidemia     Hypertension     Hypothyroidism     Irritable bowel syndrome     Major depressive disorder with single episode 10/28/2019    Osteopenia     Photosensitivity disorder     chronic    RBBB     Sleep apnea     on BIPAP    Thyroid disease     Thyroid nodule     neg bx-chronic thyroiditis    Type 2 diabetes mellitus with diabetic polyneuropathy, with long-term current use of insulin ([facility] [address] 7/23/2019    Vitamin D deficiency      Plan:  Pt interventions:  Practiced assertive communication, Trained in improving communication skills, Conducted functional assessment, [place]-setting to identify pt's primary goals for [facility] visit / overall health, Supportive techniques, and Emphasized self-care as important for managing overall health.     Pt Behavioral Change Plan:   See Pt Instructions

## 2022-08-17 ENCOUNTER — TELEPHONE (OUTPATIENT)
Dept: FAMILY MEDICINE CLINIC | Age: 77
End: 2022-08-17

## 2022-08-17 NOTE — TELEPHONE ENCOUNTER
Telephone Call regarding Forms    If the patient has had recent visit with the provider AND discussed the forms during this visit then NO appointment is necessary. Please advise patient that forms require an appointment to be scheduled so that patient and provider can review the necessary documentation together. Type of Form:  Handicap placard  Reason for the form/medical condition: NA     Forms to be sent to:  pt will  In office   Contact Name: Patient     Date needed: 08/18/2022     Appointment scheduled:  No na    Future Appointments   Date Time Provider Coleman Garcia   8/31/2022  1:00 PM Van Boogie, PhD  [facility]   11/22/2022  1:15 PM JULIET Ovalle - NP [facility]       Please consult list of providers who do NOT provide forms for [employer].

## 2022-08-19 ENCOUNTER — TELEPHONE (OUTPATIENT)
Dept: FAMILY MEDICINE CLINIC | Age: 77
End: 2022-08-19

## 2022-08-19 NOTE — TELEPHONE ENCOUNTER
Spoke with pt, pt stated that the handicap placard needs to have an end date on it as to which it does not have this. Please mail this out to the pt once this is completed.

## 2022-08-19 NOTE — TELEPHONE ENCOUNTER
pt stated that she had a review from [place] about her medications with discrepancies that are interacting with one another. She stated that the medication duloxetine and eliquis interact with one another and the duloxetine is acting as a blood thinner     Pt stated that they also noted that she is on famotidine causing confusion and pt stated that she has been diagnosed with cognitive decline. In which she is being tested for this. Pt stated that this medication can cause confusion and cognitive problems. Pt stated that she is on several medications for high cholesterol. Which is a concern for her Pt stated that she is on 3 medications for high BP amlodipine lisinopril and metoprolol as to which is a concern for her. Pt stated that she has also had a fall recently. On Tuesday, she was out at open house when she came outside went across a driveway and gave walked to daughter in law went to walk in grass lost balance fell hard did not break anything has bad bruises across back stiff having pain pt is concerned due to her being on blood thinners pt did not go to the hospital pt been using pain patches and Advil, she is getting up walking around. Pt has not been experiencing any headaches.  Left hip, pain up neck to head that is now gone, up and is walking but stiff bending and reaching is hard still

## 2022-08-19 NOTE — TELEPHONE ENCOUNTER
It says it is good for 3 years. Which would be 3 years from the date she submits this. If needing a specific date then please place 8/19/2025 and mail.  Thank you

## 2022-08-24 ENCOUNTER — OFFICE VISIT (OUTPATIENT)
Dept: FAMILY MEDICINE CLINIC | Age: 77
End: 2022-08-24
Payer: MEDICARE

## 2022-08-24 VITALS
HEIGHT: 63 IN | WEIGHT: 145 LBS | TEMPERATURE: 96.8 F | SYSTOLIC BLOOD PRESSURE: 138 MMHG | DIASTOLIC BLOOD PRESSURE: 70 MMHG | HEART RATE: 76 BPM | OXYGEN SATURATION: 97 % | BODY MASS INDEX: 25.69 KG/M2

## 2022-08-24 DIAGNOSIS — M25.552 LEFT HIP PAIN: Primary | ICD-10-CM

## 2022-08-24 DIAGNOSIS — E11.65 UNCONTROLLED TYPE 2 DIABETES MELLITUS WITH HYPERGLYCEMIA (HCC): ICD-10-CM

## 2022-08-24 DIAGNOSIS — F32.9 MAJOR DEPRESSIVE DISORDER WITH SINGLE EPISODE, REMISSION STATUS UNSPECIFIED: ICD-10-CM

## 2022-08-24 PROCEDURE — 3017F COLORECTAL CA SCREEN DOC REV: CPT | Performed by: STUDENT IN AN ORGANIZED HEALTH CARE EDUCATION/TRAINING PROGRAM

## 2022-08-24 PROCEDURE — 1090F PRES/ABSN URINE INCON ASSESS: CPT | Performed by: STUDENT IN AN ORGANIZED HEALTH CARE EDUCATION/TRAINING PROGRAM

## 2022-08-24 PROCEDURE — G8417 CALC BMI ABV UP PARAM F/U: HCPCS | Performed by: STUDENT IN AN ORGANIZED HEALTH CARE EDUCATION/TRAINING PROGRAM

## 2022-08-24 PROCEDURE — 99213 OFFICE O/P EST LOW 20 MIN: CPT | Performed by: STUDENT IN AN ORGANIZED HEALTH CARE EDUCATION/TRAINING PROGRAM

## 2022-08-24 PROCEDURE — 1036F TOBACCO NON-USER: CPT | Performed by: STUDENT IN AN ORGANIZED HEALTH CARE EDUCATION/TRAINING PROGRAM

## 2022-08-24 PROCEDURE — G8427 DOCREV CUR MEDS BY ELIG CLIN: HCPCS | Performed by: STUDENT IN AN ORGANIZED HEALTH CARE EDUCATION/TRAINING PROGRAM

## 2022-08-24 PROCEDURE — 1123F ACP DISCUSS/DSCN MKR DOCD: CPT | Performed by: STUDENT IN AN ORGANIZED HEALTH CARE EDUCATION/TRAINING PROGRAM

## 2022-08-24 PROCEDURE — G8400 PT W/DXA NO RESULTS DOC: HCPCS | Performed by: STUDENT IN AN ORGANIZED HEALTH CARE EDUCATION/TRAINING PROGRAM

## 2022-08-24 NOTE — PROGRESS NOTES
tarPatient: Ary Pope is a 78 y.o. female who presents today with the following Chief Complaint(s):  Chief Complaint   Patient presents with    Lower Back Pain     Zilphia Moat a week ago Monday -hit her back on the car. Pain is getting worse- using OTC ibuprofen- Not helping. Has noticed bright red blood when having BM's the past few days. HPI    Machanical fall on grass. Zilphia Moat and hit upper back on mirror of car and low back on tire well. Pain started on left hip then moved to mid back. Now pain with bending, reaching, twisting. Has been less able to do her exercises since her injury and now feeling increased weakness. Moving more stiffly and slower. Current Outpatient Medications   Medication Sig Dispense Refill    diclofenac sodium (VOLTAREN) 1 % GEL Apply 4 g topically 4 times daily 100 g 1    Handicap Placard MISC by Does not apply route 08/09/22 1 each 0    ELIQUIS 5 MG TABS tablet TAKE 1 TABLET BY MOUTH TWICE DAILY 180 tablet 2    glipiZIDE (GLUCOTROL) 10 MG tablet TAKE 1 TABLET BY MOUTH EVERY DAY 90 tablet 2    metoprolol tartrate (LOPRESSOR) 25 MG tablet TAKE 1 TABLET TWICE A  tablet 2    VASCEPA 1 g CAPS capsule Take 2 capsules by mouth 2 times daily 360 capsule 2    DULoxetine (CYMBALTA) 20 MG extended release capsule Take 1 capsule by mouth in the morning.  90 capsule 2    divalproex (DEPAKOTE) 250 MG DR tablet Take 2 tablets by mouth nightly 180 tablet 1    aspirin 81 MG chewable tablet Take 81 mg by mouth daily      linaclotide (LINZESS) 145 MCG capsule Take 1 capsule by mouth every morning (before breakfast) 90 capsule 1    levothyroxine (SYNTHROID) 100 MCG tablet Take 1 tablet daily 90 tablet 1    lisinopril (PRINIVIL;ZESTRIL) 10 MG tablet TAKE 1 TABLET BY MOUTH EVERY DAY 90 tablet 1    rosuvastatin (CRESTOR) 40 MG tablet TAKE 1 TABLET BY MOUTH EVERY DAY 90 tablet 1    Calcium Carb-Cholecalciferol (CALCIUM 1000 + D) 1000-800 MG-UNIT TABS Take 1,000 mg by mouth daily 90 tablet 1    amLODIPine (NORVASC) 5 MG tablet TAKE 1 TABLET DAILY 90 tablet 1    Continuous Blood Gluc Sensor ([address] [place] [address]) [facility] Please dispense one free style geetha kit 1 each 0    Continuous Blood Gluc Sensor ([address] [place] [address]) [facility] 1 box by Does not apply route 2 times daily 1 each 0    LANTUS SOLOSTAR 100 UNIT/ML injection pen Inject 65 Units into the skin nightly 15 pen 5    BD PEN NEEDLE MICRO U/F 32G X 6 MM MISC USE WITH LANTUS DAILY 100 each 5    METAMUCIL FIBER PO Take by mouth MiraLax instead      ondansetron (ZOFRAN) 4 MG tablet Take 1 tablet by mouth 3 times daily as needed for Nausea or Vomiting 30 tablet 0    insulin lispro, 1 Unit Dial, (HUMALOG KWIKPEN) 100 UNIT/ML SOPN Use on a sliding scale 3 times a day with meals. Maximum dose 30 units per day. (Patient taking differently: Indications: hold am of procedure Use on a sliding scale 3 times a day with meals. Maximum dose 30 units per day.) 15 pen 1    Cyanocobalamin (B-12) 50 MCG TABS Take by mouth       Multiple Vitamins-Minerals (VITEYES COMPLETE PO) Take by mouth      latanoprost (XALATAN) 0.005 % ophthalmic solution 1 drop nightly      famotidine (PEPCID) 20 MG tablet Take 1 tablet by mouth 2 times daily (Patient not taking: Reported on 8/24/2022) 180 tablet 1     No current facility-administered medications for this visit. Patient's past medical history, surgical history, family history, medications,  andallergies  were all reviewed and updated as appropriate today. Review of Systems  All other systems reviewed and negative    Physical Exam  Vitals reviewed. Constitutional:       Appearance: Normal appearance. HENT:      Head: Normocephalic and atraumatic. Cardiovascular:      Rate and Rhythm: Normal rate and regular rhythm. Pulmonary:      Effort: Pulmonary effort is normal.      Breath sounds: Normal breath sounds. Neurological:      General: No focal deficit present.       Mental Status: She is alert

## 2022-08-25 ENCOUNTER — TELEPHONE (OUTPATIENT)
Dept: FAMILY MEDICINE CLINIC | Age: 77
End: 2022-08-25

## 2022-08-25 DIAGNOSIS — M25.552 LEFT HIP PAIN: ICD-10-CM

## 2022-08-25 DIAGNOSIS — R53.81 PHYSICAL DEBILITY: Primary | ICD-10-CM

## 2022-08-25 DIAGNOSIS — M54.50 ACUTE BILATERAL LOW BACK PAIN WITHOUT SCIATICA: ICD-10-CM

## 2022-08-25 DIAGNOSIS — Z91.81 AT HIGH RISK FOR FALLS: ICD-10-CM

## 2022-08-25 LAB
ESTIMATED AVERAGE GLUCOSE: 191.5 MG/DL
HBA1C MFR BLD: 8.3 %

## 2022-08-25 NOTE — PROGRESS NOTES
Patient calling office because she was told that the referral that was sent for her hip pain needs more clarification to be excepted. She stated that it needs to refer to the left hip pain along with the lower back pain and the arthritis. She is asking that this be redone as soon as possible so that they can start coming to her home.

## 2022-08-25 NOTE — TELEPHONE ENCOUNTER
Patient calling office because she was told that the referral that was sent for her hip pain from her appointment yesterday needs more clarification to be excepted. She stated that it needs to refer to the left hip pain along with the lower back pain and the arthritis. She is asking that this be redone as soon as possible so that they can start coming to her home.

## 2022-08-26 LAB — VALPROIC ACID, FREE: 4.7 UG/ML (ref 7–23)

## 2022-08-30 ENCOUNTER — TELEPHONE (OUTPATIENT)
Dept: FAMILY MEDICINE CLINIC | Age: 77
End: 2022-08-30

## 2022-08-30 NOTE — TELEPHONE ENCOUNTER
Juani Lopez a physical therapist went to see the patient for physical therapy and is looking to get verbal orders for Twice a week this week and once a week for 2 weeks along with 2 remote visits. Please advise. OK TO LEAVE VM IT IS CONFIDENTIAL.

## 2022-08-31 ENCOUNTER — SCHEDULED TELEPHONE ENCOUNTER (OUTPATIENT)
Dept: PSYCHOLOGY | Age: 77
End: 2022-08-31
Payer: MEDICARE

## 2022-08-31 DIAGNOSIS — I10 HYPERTENSION, UNSPECIFIED TYPE: ICD-10-CM

## 2022-08-31 DIAGNOSIS — E78.2 MIXED HYPERLIPIDEMIA: ICD-10-CM

## 2022-08-31 DIAGNOSIS — F41.9 ANXIETY: Primary | ICD-10-CM

## 2022-08-31 DIAGNOSIS — I10 ESSENTIAL HYPERTENSION: ICD-10-CM

## 2022-08-31 PROCEDURE — 98968 PH1 ASSMT&MGMT NQHP 21-30: CPT | Performed by: PSYCHOLOGIST

## 2022-08-31 RX ORDER — AMLODIPINE BESYLATE 5 MG/1
TABLET ORAL
Qty: 90 TABLET | Refills: 3 | OUTPATIENT
Start: 2022-08-31

## 2022-08-31 RX ORDER — ROSUVASTATIN CALCIUM 40 MG/1
TABLET, COATED ORAL
Qty: 90 TABLET | Refills: 1 | Status: SHIPPED | OUTPATIENT
Start: 2022-08-31 | End: 2022-11-03 | Stop reason: SDUPTHER

## 2022-08-31 RX ORDER — LISINOPRIL 10 MG/1
TABLET ORAL
Qty: 30 TABLET | OUTPATIENT
Start: 2022-08-31

## 2022-08-31 RX ORDER — ROSUVASTATIN CALCIUM 40 MG/1
TABLET, COATED ORAL
Qty: 30 TABLET | OUTPATIENT
Start: 2022-08-31

## 2022-08-31 RX ORDER — LISINOPRIL 10 MG/1
TABLET ORAL
Qty: 90 TABLET | Refills: 1 | Status: SHIPPED | OUTPATIENT
Start: 2022-08-31 | End: 2022-11-03 | Stop reason: SDUPTHER

## 2022-08-31 NOTE — PROGRESS NOTES
Behavioral Health Consultation  Shaila Saucedo, Ph.D.  Psychologist  8/31/2022  1:08 PM EDT      Time spent with Patient: 25 minutes  This is patient's fifth [facility] appointment. Reason for Consult:    Chief Complaint   Patient presents with    Anxiety     Referring Provider: JULIET Nichols CNP  [address] [address] ([facility]) [address] [place] [address]    Feedback given to PCP. TELEHEALTH VISIT -- Audio Only (During ZWZSN-30 public health emergency)    Pursuant to the emergency declaration under the [address] [place] [address], [facility] waiver authority and the [employer] and [employer] Act, this phone call was conducted, with patient's consent, to reduce the patient's risk of exposure to COVID-19 and provide continuity of care for an established patient. Services were provided through a phone call discussion to substitute for in-person clinic visit. Pt gave verbal informed consent to participate in telehealth services. Consent:  She and/or health care decision maker is aware that that she may receive a bill for this telephone service, depending on her insurance coverage, and has provided verbal consent to proceed: Yes    Conducted a risk-benefit analysis and determined that the patient's presenting problems are consistent with the use of telepsychology. Determined that the patient has sufficient knowledge and skills in the use of technology enabling them to adequately benefit from telepsychology. It was determined that this patient was able to be properly treated without an in-person session. Patient verified that they were currently located at the [facility] address that was provided during registration.     Verified the following information:  Patient's identification: Yes  Patient location: [address]  Patient's call back number: [phone]  Patient's emergency contact's name and number, as well as permission to contact them if needed: Extended Emergency Contact Information  Primary Emergency Contact: PRINCESS GUTIERREZ [facility] Phone: [phone]  Mobile Phone: [phone]  Relation: Child  Secondary Emergency Contact: Marco Antonio Gorman  Home Phone: [phone]  Relation: Daughter-in-Law   needed? No    Provider location: [place], [address] [place] [address] affirm this is an episode with a patient who has not had a related appointment within my department in the past 7 days or scheduled within the next 24 hours. S:  Patient noted that she fell since last visit and hurt her back. Processed last appointment where her son was present. She felt it was \"productive,\" feels some of the dynamics at home have changed. Mostly, patient is respecting boundaries and roles in the home. This has helped her compartmentalize stress, she feels more in control. \"I'm getting better at letting go of stuff. \" She is relieved to know that her neuropsych appointment was moved up to October. She has also scheduled transportation through senior services. This helps her feel more independent.      O:  MSE:    Attitude: cooperative and friendly  Consciousness: alert  Orientation: oriented to person, place, time, general circumstance  Memory: recent and remote memory intact  Attention/Concentration: intact during session  Speech: normal rate and volume, well-articulated  Mood: stressed  Affect: euthymic  Perception: within normal limits  Thought Content: all-or-none thinking and intrusive thoughts  Thought Process: logical, coherent and goal-directed  Insight: good  Judgment: intact  Ability to understand instructions: Yes  Ability to respond meaningfully: Yes  Morbid Ideation: no   Suicide Assessment: no suicidal ideation, plan, or intent  Homicidal Ideation: no    History:    Medications:   Current Outpatient Medications   Medication Sig Dispense Refill    diclofenac sodium (VOLTAREN) 1 % GEL Apply 4 g topically 4 times daily 100 g 1    Handicap Placard MISC by Does not apply route 08/09/22 1 each 0    ELIQUIS 5 MG TABS tablet TAKE 1 TABLET BY MOUTH TWICE DAILY 180 tablet 2    glipiZIDE (GLUCOTROL) 10 MG tablet TAKE 1 TABLET BY MOUTH EVERY DAY 90 tablet 2    metoprolol tartrate (LOPRESSOR) 25 MG tablet TAKE 1 TABLET TWICE A  tablet 2    VASCEPA 1 g CAPS capsule Take 2 capsules by mouth 2 times daily 360 capsule 2    DULoxetine (CYMBALTA) 20 MG extended release capsule Take 1 capsule by mouth in the morning.  90 capsule 2    divalproex (DEPAKOTE) 250 MG DR tablet Take 2 tablets by mouth nightly 180 tablet 1    aspirin 81 MG chewable tablet Take 81 mg by mouth daily      linaclotide (LINZESS) 145 MCG capsule Take 1 capsule by mouth every morning (before breakfast) 90 capsule 1    levothyroxine (SYNTHROID) 100 MCG tablet Take 1 tablet daily 90 tablet 1    lisinopril (PRINIVIL;ZESTRIL) 10 MG tablet TAKE 1 TABLET BY MOUTH EVERY DAY 90 tablet 1    rosuvastatin (CRESTOR) 40 MG tablet TAKE 1 TABLET BY MOUTH EVERY DAY 90 tablet 1    Calcium Carb-Cholecalciferol (CALCIUM 1000 + D) 1000-800 MG-UNIT TABS Take 1,000 mg by mouth daily 90 tablet 1    amLODIPine (NORVASC) 5 MG tablet TAKE 1 TABLET DAILY 90 tablet 1    Continuous Blood Gluc Sensor (FREESTYLE ANT SENSOR SYSTEM) [facility] Please dispense one free style ant kit 1 each 0    Continuous Blood Gluc Sensor (FREESTYLE ANT SENSOR SYSTEM) MISC 1 box by Does not apply route 2 times daily 1 each 0    famotidine (PEPCID) 20 MG tablet Take 1 tablet by mouth 2 times daily (Patient not taking: Reported on 8/24/2022) 180 tablet 1    LANTUS SOLOSTAR 100 UNIT/ML injection pen Inject 65 Units into the skin nightly 15 pen 5    BD PEN NEEDLE MICRO U/F 32G X 6 MM MISC USE WITH LANTUS DAILY 100 each 5    METAMUCIL FIBER PO Take by mouth MiraLax instead      ondansetron (ZOFRAN) 4 MG tablet Take 1 tablet by mouth 3 times daily as needed for Nausea or Vomiting 30 tablet 0    insulin lispro, 1 Unit Dial, (HUMALOG KWIKPEN) 100 UNIT/ML SOPN Use on a sliding scale 3 times a day with meals. Maximum dose 30 units per day. (Patient taking differently: Indications: hold am of procedure Use on a sliding scale 3 times a day with meals. Maximum dose 30 units per day.) 15 pen 1    Cyanocobalamin (B-12) 50 MCG TABS Take by mouth       Multiple Vitamins-Minerals (VITEYES COMPLETE PO) Take by mouth      latanoprost (XALATAN) 0.005 % ophthalmic solution 1 drop nightly       No current facility-administered medications for this visit. Social History:   Social History     Socioeconomic History    Marital status:      Spouse name: Not on file    Number of children: Not on file    Years of education: Not on file    Highest education level: Not on file   Occupational History    Not on file   Tobacco Use    Smoking status: Never    Smokeless tobacco: Never   Vaping Use    Vaping Use: Never used   Substance and Sexual Activity    Alcohol use: Never    Drug use: Never    Sexual activity: Not on file   Other Topics Concern    Not on file   Social History Narrative    Not on file     Social Determinants of Health     Financial Resource Strain: Low Risk     Difficulty of Paying Living Expenses: Not hard at all   Food Insecurity: No Food Insecurity    Worried About Running Out of Food in the Last Year: Never true    [address] [place] [address] Place of Food in the Last Year: Never true   Transportation Needs: Not on file   Physical Activity: Insufficiently Active    Days of Exercise per Week: 2 days    Minutes of Exercise per Session: 20 min   Stress: Not on file   Social Connections: Not on file   Intimate Partner Violence: Not on file   Housing Stability: Not on file     TOBACCO:   reports that she has never smoked. She has never used smokeless tobacco.  ETOH:   reports no history of alcohol use.   Family History:   Family History   Problem Relation Age of Onset    Diabetes Paternal Grandmother     Diabetes Father     Lung Cancer Mother     Pancreatic Cancer Brother     Diabetes Brother      A:  Patient engaged and cooperative. Denies SI. Insight and motivation are good. Diagnosis:    1. Anxiety          Diagnosis Date    Colon polyps     Diabetes mellitus ([facility]     Diabetic neuropathy ([facility]     Diabetic retinopathy ([facility]     Essential hypertension 10/28/2019    Fall     Fatty liver     Gastritis     GERD (gastroesophageal reflux disease)     Hyperlipidemia     Hypertension     Hypothyroidism     Irritable bowel syndrome     Major depressive disorder with single episode 10/28/2019    Osteopenia     Photosensitivity disorder     chronic    RBBB     Sleep apnea     on BIPAP    Thyroid disease     Thyroid nodule     neg bx-chronic thyroiditis    Type 2 diabetes mellitus with diabetic polyneuropathy, with long-term current use of insulin ([facility] 07/23/2019    Vitamin D deficiency      Plan:  Pt interventions:  Conducted functional assessment, [place]-setting to identify pt's primary goals for [facility] visit / overall health, Supportive techniques, Emphasized self-care as important for managing overall health, and Collaboratively set goals with pt re: treatment.      Pt Behavioral Change Plan:   See Pt Instructions

## 2022-09-12 DIAGNOSIS — K58.2 IRRITABLE BOWEL SYNDROME WITH BOTH CONSTIPATION AND DIARRHEA: ICD-10-CM

## 2022-09-12 DIAGNOSIS — I10 ESSENTIAL HYPERTENSION: ICD-10-CM

## 2022-09-12 DIAGNOSIS — F32.9 MAJOR DEPRESSIVE DISORDER WITH SINGLE EPISODE, REMISSION STATUS UNSPECIFIED: ICD-10-CM

## 2022-09-12 DIAGNOSIS — E11.65 UNCONTROLLED TYPE 2 DIABETES MELLITUS WITH HYPERGLYCEMIA (HCC): ICD-10-CM

## 2022-09-12 RX ORDER — AMLODIPINE BESYLATE 5 MG/1
TABLET ORAL
Qty: 90 TABLET | Refills: 0 | Status: SHIPPED | OUTPATIENT
Start: 2022-09-12 | End: 2022-10-12 | Stop reason: SDUPTHER

## 2022-09-12 RX ORDER — APIXABAN 5 MG/1
TABLET, FILM COATED ORAL
Qty: 60 TABLET | Refills: 1 | Status: SHIPPED | OUTPATIENT
Start: 2022-09-12 | End: 2022-11-03 | Stop reason: SDUPTHER

## 2022-09-12 NOTE — TELEPHONE ENCOUNTER
Refill Request     CONFIRM preferrred pharmacy with the patient. If Mail Order Rx - Pend for 90 day refill. Last Seen: Last Seen Department: 8/24/2022  Last Seen by PCP: 8/24/2022    Last Written: Ryne Quarto 07/11/22 90 with 1  Amlodipine 06/16/22 90 with 1    If no future appointment scheduled, route STAFF MESSAGE with patient name to the [facility] for scheduling. Next Appointment:   Future Appointments   Date Time Provider Coleman Garcia   9/14/2022 11:30 AM Renee Merrill, PhD [address]   11/22/2022  1:15 PM Azael Ruiz, APRN - NP [facility]   11/29/2022  1:00 PM DO KRISTIN Shabazz - [place]       Message sent to [address] [place] [address] to schedule appt with patient?   NO      Requested Prescriptions     Pending Prescriptions Disp Refills    linaclotide (LINZESS) 145 MCG capsule 90 capsule 1     Sig: Take 1 capsule by mouth every morning (before breakfast)    amLODIPine (NORVASC) 5 MG tablet 90 tablet 1     Sig: TAKE 1 TABLET DAILY

## 2022-09-12 NOTE — TELEPHONE ENCOUNTER
Refill Request     CONFIRM preferrred pharmacy with the patient. If Mail Order Rx - Pend for 90 day refill. Last Seen: Last Seen Department: 6/16/2022  Last Seen by PCP: 6/16/2022    Last Written: 08/09/2022 180 tablet 2 refills     If no future appointment scheduled, route STAFF MESSAGE with patient name to the [facility] for scheduling. Next Appointment:   Future Appointments   Date Time Provider Coleman Garcia   9/14/2022 11:30 AM Sharda Loco, PhD [facility]   11/22/2022  1:15 PM JULIET Frank NP [facility]   11/29/2022  1:00 PM DO KRISTIN Pelaez - [place]       Message sent to [address] [place] [address] to schedule appt with patient?   NO        Requested Prescriptions     Pending Prescriptions Disp Refills    ELIQUIS 5 MG TABS tablet [Pharmacy Med Name: ELIQUIS 5MG TABLETS] 60 tablet      Sig: TAKE 1 TABLET BY MOUTH TWICE DAILY

## 2022-09-13 RX ORDER — GLIPIZIDE 10 MG/1
TABLET ORAL
Qty: 90 TABLET | Refills: 1 | Status: SHIPPED | OUTPATIENT
Start: 2022-09-13 | End: 2022-11-03 | Stop reason: SDUPTHER

## 2022-09-13 RX ORDER — DULOXETIN HYDROCHLORIDE 20 MG/1
CAPSULE, DELAYED RELEASE ORAL
Qty: 90 CAPSULE | Refills: 1 | Status: SHIPPED | OUTPATIENT
Start: 2022-09-13 | End: 2022-09-27 | Stop reason: SDUPTHER

## 2022-09-14 ENCOUNTER — SCHEDULED TELEPHONE ENCOUNTER (OUTPATIENT)
Dept: PSYCHOLOGY | Age: 77
End: 2022-09-14
Payer: MEDICARE

## 2022-09-14 DIAGNOSIS — F41.9 ANXIETY: Primary | ICD-10-CM

## 2022-09-14 PROCEDURE — 98968 PH1 ASSMT&MGMT NQHP 21-30: CPT | Performed by: PSYCHOLOGIST

## 2022-09-14 NOTE — PROGRESS NOTES
Extended Emergency Contact Information  Primary Emergency Contact: PRINCESS GUTIERREZ [facility] Phone: [phone]  Mobile Phone: [phone]  Relation: Child  Secondary Emergency Contact: Maura Mccormack  Home Phone: [phone]  Relation: Daughter-in-Law   needed? No    Provider location: [place] [address] [place] [address] this is an episode with a patient who has not had a related appointment within my department in the past 7 days or scheduled within the next 24 hours. S:  Patient reported that there has been increased stress since last visit. Patient is struggling with communication at home, is worried about being home alone more. Processed anxiety about losing her independence and discussed boundaries in relationships.     O:  MSE:    Attitude: cooperative and friendly  Consciousness: alert  Orientation: oriented to person, place, time, general circumstance  Memory: recent and remote memory intact  Attention/Concentration: intact during session  Speech: normal rate and volume, well-articulated  Mood: anxious  Affect: anxious  Perception: within normal limits  Thought Content: all-or-none thinking and intrusive thoughts  Thought Process: logical, coherent and goal-directed  Insight: good  Judgment: intact  Ability to understand instructions: Yes  Ability to respond meaningfully: Yes  Morbid Ideation: no   Suicide Assessment: no suicidal ideation, plan, or intent  Homicidal Ideation: no    History:    Medications:   Current Outpatient Medications   Medication Sig Dispense Refill    ELIQUIS 5 MG TABS tablet TAKE 1 TABLET BY MOUTH TWICE DAILY 60 tablet 1    DULoxetine (CYMBALTA) 20 MG extended release capsule TAKE 1 CAPSULE BY MOUTH DAILY 90 capsule 1    glipiZIDE (GLUCOTROL) 10 MG tablet TAKE 1 TABLET BY MOUTH EVERY DAY 90 tablet 1    linaclotide (LINZESS) 145 MCG capsule Take 1 capsule by mouth every morning (before breakfast) 90 capsule 0    amLODIPine (NORVASC) 5 MG tablet TAKE 1 TABLET DAILY 90 tablet 0 rosuvastatin (CRESTOR) 40 MG tablet TAKE 1 TABLET BY MOUTH EVERY DAY 90 tablet 1    lisinopril (PRINIVIL;ZESTRIL) 10 MG tablet TAKE 1 TABLET BY MOUTH EVERY DAY 90 tablet 1    diclofenac sodium (VOLTAREN) 1 % GEL Apply 4 g topically 4 times daily 100 g 1    Handicap Placard MISC by Does not apply route 08/09/22 1 each 0    metoprolol tartrate (LOPRESSOR) 25 MG tablet TAKE 1 TABLET TWICE A  tablet 2    VASCEPA 1 g CAPS capsule Take 2 capsules by mouth 2 times daily 360 capsule 2    divalproex (DEPAKOTE) 250 MG DR tablet Take 2 tablets by mouth nightly 180 tablet 1    aspirin 81 MG chewable tablet Take 81 mg by mouth daily      levothyroxine (SYNTHROID) 100 MCG tablet Take 1 tablet daily 90 tablet 1    Calcium Carb-Cholecalciferol (CALCIUM 1000 + D) 1000-800 MG-UNIT TABS Take 1,000 mg by mouth daily 90 tablet 1    Continuous Blood Gluc Sensor ([address] [place] [address]) [facility] Please dispense one free style ant kit 1 each 0    Continuous Blood Gluc Sensor (FREESTYLE ANT SENSOR SYSTEM) MISC 1 box by Does not apply route 2 times daily 1 each 0    famotidine (PEPCID) 20 MG tablet Take 1 tablet by mouth 2 times daily (Patient not taking: Reported on 8/24/2022) 180 tablet 1    LANTUS SOLOSTAR 100 UNIT/ML injection pen Inject 65 Units into the skin nightly 15 pen 5    BD PEN NEEDLE MICRO U/F 32G X 6 MM MISC USE WITH LANTUS DAILY 100 each 5    METAMUCIL FIBER PO Take by mouth MiraLax instead      ondansetron (ZOFRAN) 4 MG tablet Take 1 tablet by mouth 3 times daily as needed for Nausea or Vomiting 30 tablet 0    insulin lispro, 1 Unit Dial, (HUMALOG KWIKPEN) 100 UNIT/ML SOPN Use on a sliding scale 3 times a day with meals. Maximum dose 30 units per day. (Patient taking differently: Indications: hold am of procedure Use on a sliding scale 3 times a day with meals.  Maximum dose 30 units per day.) 15 pen 1    Cyanocobalamin (B-12) 50 MCG TABS Take by mouth       Multiple Vitamins-Minerals (VITEYES COMPLETE PO) Take by mouth      latanoprost (XALATAN) 0.005 % ophthalmic solution 1 drop nightly       No current facility-administered medications for this visit. Social History:   Social History     Socioeconomic History    Marital status:      Spouse name: Not on file    Number of children: Not on file    Years of education: Not on file    Highest education level: Not on file   Occupational History    Not on file   Tobacco Use    Smoking status: Never    Smokeless tobacco: Never   Vaping Use    Vaping Use: Never used   Substance and Sexual Activity    Alcohol use: Never    Drug use: Never    Sexual activity: Not on file   Other Topics Concern    Not on file   Social History Narrative    Not on file     Social Determinants of Health     Financial Resource Strain: Low Risk     Difficulty of Paying Living Expenses: Not hard at all   Food Insecurity: No Food Insecurity    Worried About Running Out of Food in the Last Year: Never true    [place] of Food in the Last Year: Never true   Transportation Needs: Not on file   Physical Activity: Insufficiently Active    Days of Exercise per Week: 2 days    Minutes of Exercise per Session: 20 min   Stress: Not on file   Social Connections: Not on file   Intimate Partner Violence: Not on file   Housing Stability: Not on file     TOBACCO:   reports that she has never smoked. She has never used smokeless tobacco.  ETOH:   reports no history of alcohol use. Family History:   Family History   Problem Relation Age of Onset    Diabetes Paternal Grandmother     Diabetes Father     Lung Cancer Mother     Pancreatic Cancer Brother     Diabetes Brother      A:  Patient engaged and cooperative. Denies SI. Insight and motivation are good. Diagnosis:    1.  Anxiety          Diagnosis Date    Colon polyps     Diabetes mellitus ([facility]     Diabetic neuropathy ([facility]     Diabetic retinopathy ([facility]     Essential hypertension 10/28/2019    Fall     Fatty liver     Gastritis     GERD (gastroesophageal reflux disease)     Hyperlipidemia     Hypertension     Hypothyroidism     Irritable bowel syndrome     Major depressive disorder with single episode 10/28/2019    Osteopenia     Photosensitivity disorder     chronic    RBBB     Sleep apnea     on BIPAP    Thyroid disease     Thyroid nodule     neg bx-chronic thyroiditis    Type 2 diabetes mellitus with diabetic polyneuropathy, with long-term current use of insulin ([facility] 07/23/2019    Vitamin D deficiency      Plan:  Pt interventions:  Conducted functional assessment, [place]-setting to identify pt's primary goals for [facility]IND[facility]NAPP [facility] visit / overall health, Supportive techniques, and Emphasized self-care as important for managing overall health.     Pt Behavioral Change Plan:   See Pt Instructions

## 2022-09-15 DIAGNOSIS — E03.9 HYPOTHYROIDISM, UNSPECIFIED TYPE: ICD-10-CM

## 2022-09-15 RX ORDER — LEVOTHYROXINE SODIUM 0.1 MG/1
TABLET ORAL
Qty: 90 TABLET | Refills: 3 | OUTPATIENT
Start: 2022-09-15

## 2022-09-26 DIAGNOSIS — M79.642 BILATERAL HAND PAIN: ICD-10-CM

## 2022-09-26 DIAGNOSIS — F32.9 MAJOR DEPRESSIVE DISORDER WITH SINGLE EPISODE, REMISSION STATUS UNSPECIFIED: ICD-10-CM

## 2022-09-26 DIAGNOSIS — E11.42 TYPE 2 DIABETES MELLITUS WITH DIABETIC POLYNEUROPATHY, WITH LONG-TERM CURRENT USE OF INSULIN (HCC): ICD-10-CM

## 2022-09-26 DIAGNOSIS — M79.641 BILATERAL HAND PAIN: ICD-10-CM

## 2022-09-26 DIAGNOSIS — Z79.4 TYPE 2 DIABETES MELLITUS WITH DIABETIC POLYNEUROPATHY, WITH LONG-TERM CURRENT USE OF INSULIN (HCC): ICD-10-CM

## 2022-09-26 NOTE — TELEPHONE ENCOUNTER
Refill Request     CONFIRM preferrred pharmacy with the patient. If Mail Order Rx - Pend for 90 day refill. Last Seen: Last Seen Department: 8/24/2022  Last Seen by PCP: 8/24/2022    Last Written: 9/25/2019 90 with `1     If no future appointment scheduled, route STAFF MESSAGE with patient name to the [facility] for scheduling. Next Appointment:   Future Appointments   Date Time Provider Coleman Garcia   9/28/2022  1:00 PM Deangelo Jolly, PhD [facility]   10/17/2022  9:00 AM DO KAVYA Thomas OB[facility]   10/17/2022  9:30 AM SCHEDULE, [facility] OB[facility]GYN [facility] 1 [facility] OB[facility]GYN [facility]   11/22/2022  1:15 PM JULIET Pritchett NP [facility]   11/29/2022  1:00 PM DO KRISTIN Cordero  [place] - [facility]       Message sent to [employer] to schedule appt with patient? NO      Requested Prescriptions     Pending Prescriptions Disp Refills    predniSONE (DELTASONE) 10 MG tablet 12 tablet 0     Sig: Take 3 tablets by mouth daily for 2 days, THEN 2 tablets daily for 2 days, THEN 1 tablet daily for 2 days.     pravastatin (PRAVACHOL) 20 MG tablet 90 tablet 1     Sig: Take 1 tablet by mouth daily

## 2022-09-27 RX ORDER — DULOXETIN HYDROCHLORIDE 20 MG/1
20 CAPSULE, DELAYED RELEASE ORAL DAILY
Qty: 90 CAPSULE | Refills: 1 | Status: SHIPPED | OUTPATIENT
Start: 2022-09-27 | End: 2022-11-03 | Stop reason: SDUPTHER

## 2022-09-27 RX ORDER — PRAVASTATIN SODIUM 20 MG
20 TABLET ORAL DAILY
Qty: 90 TABLET | Refills: 1 | Status: SHIPPED | OUTPATIENT
Start: 2022-09-27 | End: 2022-10-10

## 2022-09-27 RX ORDER — INSULIN GLARGINE 100 [IU]/ML
INJECTION, SOLUTION SUBCUTANEOUS
Qty: 45 ML | Refills: 4 | OUTPATIENT
Start: 2022-09-27

## 2022-09-27 RX ORDER — PREDNISONE 10 MG/1
TABLET ORAL
Qty: 12 TABLET | Refills: 0 | Status: CANCELLED | OUTPATIENT
Start: 2022-09-27 | End: 2022-10-03

## 2022-09-27 NOTE — TELEPHONE ENCOUNTER
Last Appointment:  4/11/2022  Future Appointments   Date Time Provider Coleman Langfordi   9/28/2022  1:00 PM Jimena Shelley, PhD [facility] PS[facility]CH[facility]   10/17/2022  9:00 AM DO KAVYA Hudson OB/GYN [facility]   10/17/2022  9:30 AM SCHEDULE, [facility] OB[facility]N [facility] 1 [facility] OB/GYN [facility]   11/22/2022  1:15 PM Kelton Rowe, APRN - NP [facility]   11/29/2022  1:00 PM DO KRISTIN Tate

## 2022-09-28 ENCOUNTER — SCHEDULED TELEPHONE ENCOUNTER (OUTPATIENT)
Dept: PSYCHOLOGY | Age: 77
End: 2022-09-28
Payer: MEDICARE

## 2022-09-28 DIAGNOSIS — F41.9 ANXIETY: Primary | ICD-10-CM

## 2022-09-28 PROCEDURE — 98967 PH1 ASSMT&MGMT NQHP 11-20: CPT | Performed by: PSYCHOLOGIST

## 2022-09-28 NOTE — PROGRESS NOTES
Behavioral Health Consultation  Peter Leon, Ph.D.  Psychologist  9/28/2022  1:16 PM EDT      Time spent with Patient: 20 minutes  This is patient's seventh [facility] appointment. Reason for Consult:    Chief Complaint   Patient presents with    Anxiety     Referring Provider: JULIET Cardenas CNP  [address] ([facility]) [address] [place] [address]    Feedback given to PCP. TELEHEALTH VISIT -- Audio Only (During EDQSX-83 public health emergency)    Pursuant to the emergency declaration under the [facility], [facility] waiver authority and the [employer] Act, this phone call was conducted, with patient's consent, to reduce the patient's risk of exposure to COVID-19 and provide continuity of care for an established patient. Services were provided through a phone call discussion to substitute for in-person clinic visit. Pt gave verbal informed consent to participate in telehealth services. Consent:  She and/or health care decision maker is aware that that she may receive a bill for this telephone service, depending on her insurance coverage, and has provided verbal consent to proceed: Yes    Conducted a risk-benefit analysis and determined that the patient's presenting problems are consistent with the use of telepsychology. Determined that the patient has sufficient knowledge and skills in the use of technology enabling them to adequately benefit from telepsychology. It was determined that this patient was able to be properly treated without an in-person session. Patient verified that they were currently located at the [facility] address that was provided during registration.     Verified the following information:  Patient's identification: Yes  Patient location: [address]  Patient's call back number: [phone]  Patient's emergency contact's name and number, as well as permission to contact them if needed: capsule Take 1 capsule by mouth daily 90 capsule 1    glipiZIDE (GLUCOTROL) 10 MG tablet TAKE 1 TABLET BY MOUTH EVERY DAY 90 tablet 1    linaclotide (LINZESS) 145 MCG capsule Take 1 capsule by mouth every morning (before breakfast) 90 capsule 0    amLODIPine (NORVASC) 5 MG tablet TAKE 1 TABLET DAILY 90 tablet 0    rosuvastatin (CRESTOR) 40 MG tablet TAKE 1 TABLET BY MOUTH EVERY DAY 90 tablet 1    lisinopril (PRINIVIL;ZESTRIL) 10 MG tablet TAKE 1 TABLET BY MOUTH EVERY DAY 90 tablet 1    diclofenac sodium (VOLTAREN) 1 % GEL Apply 4 g topically 4 times daily 100 g 1    Handicap Placard MISC by Does not apply route 08/09/22 1 each 0    metoprolol tartrate (LOPRESSOR) 25 MG tablet TAKE 1 TABLET TWICE A  tablet 2    VASCEPA 1 g CAPS capsule Take 2 capsules by mouth 2 times daily 360 capsule 2    divalproex (DEPAKOTE) 250 MG DR tablet Take 2 tablets by mouth nightly 180 tablet 1    aspirin 81 MG chewable tablet Take 81 mg by mouth daily      levothyroxine (SYNTHROID) 100 MCG tablet Take 1 tablet daily 90 tablet 1    Calcium Carb-Cholecalciferol (CALCIUM 1000 + D) 1000-800 MG-UNIT TABS Take 1,000 mg by mouth daily 90 tablet 1    Continuous Blood Gluc Sensor (FREESTYLE ANT SENSOR SYSTEM) [facility] Please dispense one free style ant kit 1 each 0    Continuous Blood Gluc Sensor (FREESTYLE ANT SENSOR SYSTEM) MISC 1 box by Does not apply route 2 times daily 1 each 0    famotidine (PEPCID) 20 MG tablet Take 1 tablet by mouth 2 times daily (Patient not taking: Reported on 8/24/2022) 180 tablet 1    LANTUS SOLOSTAR 100 UNIT/ML injection pen Inject 65 Units into the skin nightly 15 pen 5    BD PEN NEEDLE MICRO U/F 32G X 6 MM MISC USE WITH LANTUS DAILY 100 each 5    METAMUCIL FIBER PO Take by mouth MiraLax instead      ondansetron (ZOFRAN) 4 MG tablet Take 1 tablet by mouth 3 times daily as needed for Nausea or Vomiting 30 tablet 0    insulin lispro, 1 Unit Dial, (HUMALOG KWIKPEN) 100 UNIT/ML SOPN Use on a sliding scale 3 times a day with meals. Maximum dose 30 units per day. (Patient taking differently: Indications: hold am of procedure Use on a sliding scale 3 times a day with meals. Maximum dose 30 units per day.) 15 pen 1    Cyanocobalamin (B-12) 50 MCG TABS Take by mouth       Multiple Vitamins-Minerals (VITEYES COMPLETE PO) Take by mouth      latanoprost (XALATAN) 0.005 % ophthalmic solution 1 drop nightly       No current facility-administered medications for this visit. Social History:   Social History     Socioeconomic History    Marital status:      Spouse name: Not on file    Number of children: Not on file    Years of education: Not on file    Highest education level: Not on file   Occupational History    Not on file   Tobacco Use    Smoking status: Never    Smokeless tobacco: Never   Vaping Use    Vaping Use: Never used   Substance and Sexual Activity    Alcohol use: Never    Drug use: Never    Sexual activity: Not on file   Other Topics Concern    Not on file   Social History Narrative    Not on file     Social Determinants of Health     Financial Resource Strain: Low Risk     Difficulty of Paying Living Expenses: Not hard at all   Food Insecurity: No Food Insecurity    Worried About Running Out of Food in the Last Year: Never true    Junaid of Food in the Last Year: Never true   Transportation Needs: Not on file   Physical Activity: Insufficiently Active    Days of Exercise per Week: 2 days    Minutes of Exercise per Session: 20 min   Stress: Not on file   Social Connections: Not on file   Intimate Partner Violence: Not on file   Housing Stability: Not on file     TOBACCO:   reports that she has never smoked. She has never used smokeless tobacco.  ETOH:   reports no history of alcohol use.   Family History:   Family History   Problem Relation Age of Onset    Diabetes Paternal Grandmother     Diabetes Father     Lung Cancer Mother     Pancreatic Cancer Brother     Diabetes Brother      A:  Patient engaged and cooperative. No SI. Insight and motivation are good. Diagnosis:    1. Anxiety          Diagnosis Date    Colon polyps     Diabetes mellitus ([facility]     Diabetic neuropathy ([facility]     Diabetic retinopathy ([facility]     Essential hypertension 10/28/2019    Fall     Fatty liver     Gastritis     GERD (gastroesophageal reflux disease)     Hyperlipidemia     Hypertension     Hypothyroidism     Irritable bowel syndrome     Major depressive disorder with single episode 10/28/2019    Osteopenia     Photosensitivity disorder     chronic    RBBB     Sleep apnea     on BIPAP    Thyroid disease     Thyroid nodule     neg bx-chronic thyroiditis    Type 2 diabetes mellitus with diabetic polyneuropathy, with long-term current use of insulin ([facility] 07/23/2019    Vitamin D deficiency      Plan:  Pt interventions:  Conducted functional assessment, [place]-setting to identify pt's primary goals for [facility] visit / overall health, Supportive techniques, and Emphasized self-care as important for managing overall health.     Pt Behavioral Change Plan:   See Pt Instructions

## 2022-10-03 ENCOUNTER — TELEPHONE (OUTPATIENT)
Dept: FAMILY MEDICINE CLINIC | Age: 77
End: 2022-10-03

## 2022-10-03 NOTE — TELEPHONE ENCOUNTER
Refill Request     CONFIRM preferrred pharmacy with the patient. If Mail Order Rx - Pend for 90 day refill. Last Seen: Last Seen Department: 8/24/2022  Last Seen by PCP: 8/24/2022    Last Written: 6/16/2022 1 with 0    If no future appointment scheduled, route STAFF MESSAGE with patient name to the [facility] for scheduling. Next Appointment:   Future Appointments   Date Time Provider Coleman Garcia   10/12/2022  2:00 PM Pepper Melendez, PhD [facility] PS[facility]CHG [facility]   10/17/2022  9:00 AM DO KAVYA Root OB/GYN [facility]   10/17/2022  9:30 AM SCHEDULE, [facility] OB[facility]GYN [facility] OB/GYN [facility]   11/22/2022  1:15 PM JULIET Bennett - NP [facility]   11/29/2022  1:00 PM DO KRISTIN Torre  [facility] - [place]       Message sent to [employer] to schedule appt with patient?   NO      Requested Prescriptions     Pending Prescriptions Disp Refills    Continuous Blood Gluc Sensor (FREESTYLE GEETHA SENSOR SYSTEM) MISC 1 each 0     Sig: Please dispense one free style geetha kit

## 2022-10-03 NOTE — TELEPHONE ENCOUNTER
Refill Request     CONFIRM preferrred pharmacy with the patient. If Mail Order Rx - Pend for 90 day refill. Last Seen: Last Seen Department: 8/24/2022  Last Seen by PCP: 8/24/2022    Last Written: 1/12/2022     If no future appointment scheduled, route STAFF MESSAGE with patient name to the [facility] for scheduling. Next Appointment:   Future Appointments   Date Time Provider Coleman Garcia   10/12/2022  2:00 PM Luciana Smith, PhD [facility] PSYCHG [facility]   10/17/2022  9:00 AM DO KAVYA Franco OB/GYN [facility]   10/17/2022  9:30 AM SCHEDULE, [facility] [place] [facility] OB[facility]GYN [facility]   11/22/2022  1:15 PM JULIET Lorenzana NP [facility]   11/29/2022  1:00 PM DO KRISTIN Willett [facility] [place]       Message sent to [address] [place] [address] to schedule appt with patient?   NO      Requested Prescriptions     Pending Prescriptions Disp Refills    escitalopram (LEXAPRO) 10 MG tablet 30 tablet 0     Sig: Take 1 tablet by mouth daily

## 2022-10-04 RX ORDER — FLASH GLUCOSE SENSOR
1 KIT MISCELLANEOUS
Qty: 6 EACH | Refills: 0 | Status: SHIPPED | OUTPATIENT
Start: 2022-10-04 | End: 2023-01-02

## 2022-10-05 ENCOUNTER — CARE COORDINATION (OUTPATIENT)
Dept: CARE COORDINATION | Age: 77
End: 2022-10-05

## 2022-10-05 RX ORDER — ESCITALOPRAM OXALATE 10 MG/1
10 TABLET ORAL DAILY
Qty: 30 TABLET | Refills: 0 | OUTPATIENT
Start: 2022-10-05

## 2022-10-05 SDOH — ECONOMIC STABILITY: HOUSING INSECURITY: IN THE LAST 12 MONTHS, HOW MANY PLACES HAVE YOU LIVED?: 2

## 2022-10-05 SDOH — ECONOMIC STABILITY: TRANSPORTATION INSECURITY
IN THE PAST 12 MONTHS, HAS THE LACK OF TRANSPORTATION KEPT YOU FROM MEDICAL APPOINTMENTS OR FROM GETTING MEDICATIONS?: NO

## 2022-10-05 SDOH — ECONOMIC STABILITY: HOUSING INSECURITY
IN THE LAST 12 MONTHS, WAS THERE A TIME WHEN YOU DID NOT HAVE A STEADY PLACE TO SLEEP OR SLEPT IN A SHELTER (INCLUDING NOW)?: NO

## 2022-10-05 SDOH — ECONOMIC STABILITY: TRANSPORTATION INSECURITY
IN THE PAST 12 MONTHS, HAS LACK OF TRANSPORTATION KEPT YOU FROM MEETINGS, WORK, OR FROM GETTING THINGS NEEDED FOR DAILY LIVING?: NO

## 2022-10-05 SDOH — ECONOMIC STABILITY: INCOME INSECURITY: IN THE LAST 12 MONTHS, WAS THERE A TIME WHEN YOU WERE NOT ABLE TO PAY THE MORTGAGE OR RENT ON TIME?: NO

## 2022-10-05 NOTE — TELEPHONE ENCOUNTER
Patient called today and states she had an outburst yesterday and she would like to talk with Dr Lyssa Camacho. She was informed that she is out of the office today, Dr Lyssa Camacho will see message tomorrow.
Patient's son is requesting a return call from . He wants to discuss something that occurred today.    Please call him at  [phone]
Spoke with patient and plan to process in more detail at next appointment.
Spoke with patient's son (on HIPAA), who was concerned after overhearing an argument between his wife and his mother (patient). Encouraged him to discuss boundaries with his wife, calmly discuss with his mother. Suggested that he join mom for an upcoming visit with [facility].
CLARKE Reis: CLARKE Reis:  I have seen and evaluated this patient and do not believe the patient is septic at this time.  I will continue to evaluate.

## 2022-10-06 NOTE — CARE COORDINATION
M placed [employer] referral for patient who will be contacting patient regarding programs patient is eligible for and senior housing patient was interested in.     ACM called [facility] who said patient was within delivery service and would just need new Rx faxed over to get started with [facility]. [facility] will have PCP send all Rx over to the [facility].

## 2022-10-07 ENCOUNTER — CARE COORDINATION (OUTPATIENT)
Dept: CARE COORDINATION | Age: 77
End: 2022-10-07

## 2022-10-07 DIAGNOSIS — I10 ESSENTIAL HYPERTENSION: Primary | ICD-10-CM

## 2022-10-07 RX ORDER — DONEPEZIL HYDROCHLORIDE 10 MG/1
10 TABLET, FILM COATED ORAL NIGHTLY
COMMUNITY
End: 2022-11-03 | Stop reason: SDUPTHER

## 2022-10-07 NOTE — CARE COORDINATION
ACM called patient who said she was at an appointment but would call ACM back to verify list of medications.

## 2022-10-07 NOTE — CARE COORDINATION
Remote Patient Monitoring Enrollment Note      Date/Time:  10/7/2022 2:06 PM    Offered patient enrollment in the [employer] Remote Patient Monitoring (RPM) program for in home monitoring for HTN. Patient accepted RPM services. Patient will be monitoring the following daily:  blood pressure reading  blood pressure heart rate    ACM reviewed the information below with patient:    Emergency Contact: Trinidad Starks    [x] A member from the care coordination team will reach out to notify the patient once the RPM kit is ordered. [x] Once the kit is delivered, the [facility] team will contact the patient after UPS deliver to assist with set up. [x] Determined BP cuff size: regular (9.05\"-15.74\")      [x] Determined weight scale: regular (<330lbs)                                                 [x] Hours of ACM monitoring - Monday-Friday 3340-0125                         All questions about RPM program answered at this time.

## 2022-10-07 NOTE — PROGRESS NOTES
10/7/22 4:17 PM       Remote Patient Monitoring Treatment Plan    Received request from OLLIE/TIFFANIE Holliday RN to order remote patient monitoring for in home monitoring of HTN and order completed. Patient will be monitoring blood pressure   pulse ox   weight  survey questions daily. Patient will engage in Remote Patient Monitoring each day to develop the skills necessary for self management. RPM Care Team Responsibilities:   Alerts will be reviewed daily and addressed within 2-4 hours during operational hours (Monday -Friday 9 am-4 pm)  Alert response and intervention documented in patient medical record  Alert response escalated to PCP per protocol and documented in patient medical record  Patient monitored over approximately  days  Discharge from program based on self-management readiness    See care coordination encounters for additional details.      Yessenia Mon DNP, FNP-C, Remote Patient Monitoring NP, (Ph) [phone]

## 2022-10-07 NOTE — TELEPHONE ENCOUNTER
Dr. Phoebe Trinidad I completed medication review with patient and updated patients list. Patient said Lexapro 10mg QD is not currently ordered but has been taking this for a long time. Patient said Silverio Andrade expressed concern with Lexapro and Cymbalta being prescribed together. Patient has not had her lexapro for over a month but has been struggling with depression. Patient is also taking Pravastatin and Rosuvastatin for cholesterol, do you want both of these prescribed together? Patient needs refill for sensors.

## 2022-10-07 NOTE — CARE COORDINATION
Remote Patient Kit Ordering Note      Date/Time:  10/7/2022 2:26 PM      [x] CCSS confirmed patient shipping address  [x] Patient will receive package over the next 2-4 business days. Someone 21 years or older must be present to sign for UPS delivery. [x] Patient to contact virtual installation-specific phone number listed in the patient instructions. [x] If the patient does not contact HRS within 24 hours, an [employer] [facility] will call the patient directly: If the patient does not answer, HRS will follow up with the clinical team notifying them about the unsuccessful attempt to contact the patient. HRS will make three call attempts to the patient. [x] LPN will contact patient once equipment is active to welcome them to the program.                                                         [x] Hours of RPM monitoring - Monday-Friday [id number]                     All questions answered at this time. ACM made aware the Remote Patient Monitoring set up has been completed. CCSS notified patient of RPM equipment order.

## 2022-10-09 DIAGNOSIS — E03.9 HYPOTHYROIDISM, UNSPECIFIED TYPE: ICD-10-CM

## 2022-10-10 ENCOUNTER — TELEPHONE (OUTPATIENT)
Dept: FAMILY MEDICINE CLINIC | Age: 77
End: 2022-10-10

## 2022-10-10 ENCOUNTER — OFFICE VISIT (OUTPATIENT)
Dept: FAMILY MEDICINE CLINIC | Age: 77
End: 2022-10-10
Payer: MEDICARE

## 2022-10-10 VITALS
OXYGEN SATURATION: 98 % | HEART RATE: 75 BPM | BODY MASS INDEX: 24.98 KG/M2 | WEIGHT: 141 LBS | DIASTOLIC BLOOD PRESSURE: 68 MMHG | SYSTOLIC BLOOD PRESSURE: 130 MMHG

## 2022-10-10 DIAGNOSIS — Z79.4 TYPE 2 DIABETES MELLITUS WITH HYPERGLYCEMIA, WITH LONG-TERM CURRENT USE OF INSULIN (HCC): ICD-10-CM

## 2022-10-10 DIAGNOSIS — E11.65 TYPE 2 DIABETES MELLITUS WITH HYPERGLYCEMIA, WITH LONG-TERM CURRENT USE OF INSULIN (HCC): ICD-10-CM

## 2022-10-10 DIAGNOSIS — E03.9 HYPOTHYROIDISM, UNSPECIFIED TYPE: ICD-10-CM

## 2022-10-10 DIAGNOSIS — F33.1 MAJOR DEPRESSIVE DISORDER, RECURRENT, MODERATE (HCC): Primary | ICD-10-CM

## 2022-10-10 DIAGNOSIS — I10 ESSENTIAL HYPERTENSION: ICD-10-CM

## 2022-10-10 PROCEDURE — 1123F ACP DISCUSS/DSCN MKR DOCD: CPT | Performed by: STUDENT IN AN ORGANIZED HEALTH CARE EDUCATION/TRAINING PROGRAM

## 2022-10-10 PROCEDURE — G8400 PT W/DXA NO RESULTS DOC: HCPCS | Performed by: STUDENT IN AN ORGANIZED HEALTH CARE EDUCATION/TRAINING PROGRAM

## 2022-10-10 PROCEDURE — 2022F DILAT RTA XM EVC RTNOPTHY: CPT | Performed by: STUDENT IN AN ORGANIZED HEALTH CARE EDUCATION/TRAINING PROGRAM

## 2022-10-10 PROCEDURE — G8484 FLU IMMUNIZE NO ADMIN: HCPCS | Performed by: STUDENT IN AN ORGANIZED HEALTH CARE EDUCATION/TRAINING PROGRAM

## 2022-10-10 PROCEDURE — G8420 CALC BMI NORM PARAMETERS: HCPCS | Performed by: STUDENT IN AN ORGANIZED HEALTH CARE EDUCATION/TRAINING PROGRAM

## 2022-10-10 PROCEDURE — G8427 DOCREV CUR MEDS BY ELIG CLIN: HCPCS | Performed by: STUDENT IN AN ORGANIZED HEALTH CARE EDUCATION/TRAINING PROGRAM

## 2022-10-10 PROCEDURE — 99214 OFFICE O/P EST MOD 30 MIN: CPT | Performed by: STUDENT IN AN ORGANIZED HEALTH CARE EDUCATION/TRAINING PROGRAM

## 2022-10-10 PROCEDURE — 1090F PRES/ABSN URINE INCON ASSESS: CPT | Performed by: STUDENT IN AN ORGANIZED HEALTH CARE EDUCATION/TRAINING PROGRAM

## 2022-10-10 PROCEDURE — 3052F HG A1C>EQUAL 8.0%<EQUAL 9.0%: CPT | Performed by: STUDENT IN AN ORGANIZED HEALTH CARE EDUCATION/TRAINING PROGRAM

## 2022-10-10 PROCEDURE — 3017F COLORECTAL CA SCREEN DOC REV: CPT | Performed by: STUDENT IN AN ORGANIZED HEALTH CARE EDUCATION/TRAINING PROGRAM

## 2022-10-10 PROCEDURE — 1036F TOBACCO NON-USER: CPT | Performed by: STUDENT IN AN ORGANIZED HEALTH CARE EDUCATION/TRAINING PROGRAM

## 2022-10-10 RX ORDER — LEVOTHYROXINE SODIUM 0.1 MG/1
TABLET ORAL
Qty: 90 TABLET | Refills: 1 | OUTPATIENT
Start: 2022-10-10

## 2022-10-10 RX ORDER — BUPROPION HYDROCHLORIDE 150 MG/1
150 TABLET ORAL EVERY MORNING
Qty: 30 TABLET | Refills: 3 | Status: SHIPPED | OUTPATIENT
Start: 2022-10-10

## 2022-10-10 RX ORDER — INSULIN LISPRO 100 [IU]/ML
INJECTION, SOLUTION INTRAVENOUS; SUBCUTANEOUS
Qty: 15 ADJUSTABLE DOSE PRE-FILLED PEN SYRINGE | Refills: 0 | Status: SHIPPED | OUTPATIENT
Start: 2022-10-10

## 2022-10-10 NOTE — PROGRESS NOTES
Patient: Anne James is a 78 y.o. female who presents today with the following Chief Complaint(s):  Chief Complaint   Patient presents with    Discuss Medications     Take over DM medication, dr is in [place] [address] and is wanting to see closer .          HPI    DM2  On Jardiance 10 mg daily, did have difficulty with nausea when restarting but done at the same time as flu/covid booster. Lantus 40 units nightly  Humalog 4 units with breakfast, 8 units with lunch, 8 units with dinner +2:50 sliding scale. Utilizing freestyle geetha  Not eating a diabetic diet while living with son. Has been trying to meal prep her own meals or get meal service delivery. Has been taking rosuvastatin and pravastatin. Has been off of escitalopram for over a month. On cymbalta. Was previously on both  Has been noticing worsening depression and frustration related to home situation and medical care. Current Outpatient Medications   Medication Sig Dispense Refill    buPROPion (WELLBUTRIN XL) 150 MG extended release tablet Take 1 tablet by mouth every morning 30 tablet 3    insulin lispro, 1 Unit Dial, (HUMALOG KWIKPEN) 100 UNIT/ML SOPN Use on a sliding scale 3 times a day with meals. Maximum dose 30 units per day.  15 Adjustable Dose Pre-filled Pen Syringe 0    donepezil (ARICEPT) 10 MG tablet Take 10 mg by mouth nightly      Continuous Blood Gluc Sensor ([address] [place] [address]) MISC 1 Units by Other route every 14 days Place 1 sensor on back of arm every 14 days 6 each 0    DULoxetine (CYMBALTA) 20 MG extended release capsule Take 1 capsule by mouth daily 90 capsule 1    glipiZIDE (GLUCOTROL) 10 MG tablet TAKE 1 TABLET BY MOUTH EVERY DAY 90 tablet 1    linaclotide (LINZESS) 145 MCG capsule Take 1 capsule by mouth every morning (before breakfast) 90 capsule 0    ELIQUIS 5 MG TABS tablet TAKE 1 TABLET BY MOUTH TWICE DAILY 60 tablet 1    rosuvastatin (CRESTOR) 40 MG tablet TAKE 1 TABLET BY MOUTH EVERY DAY 90 tablet 1 lisinopril (PRINIVIL;ZESTRIL) 10 MG tablet TAKE 1 TABLET BY MOUTH EVERY DAY 90 tablet 1    diclofenac sodium (VOLTAREN) 1 % GEL Apply 4 g topically 4 times daily 100 g 1    Handicap Placard MISC by Does not apply route 08/09/22 1 each 0    metoprolol tartrate (LOPRESSOR) 25 MG tablet TAKE 1 TABLET TWICE A  tablet 2    VASCEPA 1 g CAPS capsule Take 2 capsules by mouth 2 times daily 360 capsule 2    divalproex (DEPAKOTE) 250 MG DR tablet Take 2 tablets by mouth nightly 180 tablet 1    aspirin 81 MG chewable tablet Take 81 mg by mouth daily      Calcium Carb-Cholecalciferol (CALCIUM 1000 + D) 1000-800 MG-UNIT TABS Take 1,000 mg by mouth daily 90 tablet 1    Continuous Blood Gluc Sensor ([address] [place] [address]) MISC 1 box by Does not apply route 2 times daily 1 each 0    LANTUS SOLOSTAR 100 UNIT/ML injection pen Inject 65 Units into the skin nightly (Patient taking differently: Inject 40 Units into the skin nightly) 15 pen 5    BD PEN NEEDLE MICRO U/F 32G X 6 MM MISC USE WITH LANTUS DAILY 100 each 5    METAMUCIL FIBER PO Take by mouth MiraLax instead      ondansetron (ZOFRAN) 4 MG tablet Take 1 tablet by mouth 3 times daily as needed for Nausea or Vomiting 30 tablet 0    Cyanocobalamin (B-12) 50 MCG TABS Take by mouth       Multiple Vitamins-Minerals (VITEYES COMPLETE PO) Take by mouth      latanoprost (XALATAN) 0.005 % ophthalmic solution 1 drop nightly      levothyroxine (SYNTHROID) 100 MCG tablet Take 1 tablet daily 90 tablet 1    amLODIPine (NORVASC) 5 MG tablet TAKE 1 TABLET DAILY 90 tablet 1    famotidine (PEPCID) 20 MG tablet Take 1 tablet by mouth 2 times daily 180 tablet 1     No current facility-administered medications for this visit. Patient's past medical history, surgical history, family history, medications,  andallergies  were all reviewed and updated as appropriate today.       Review of Systems  All other systems reviewed and negative    Physical Exam  Vitals:    10/10/22 1404   BP: 130/68   Pulse: 75   SpO2: 98%       Assessment:  Encounter Diagnoses   Name Primary? Major depressive disorder, recurrent, moderate (HCC) Yes    Essential hypertension     Hypothyroidism, unspecified type     Type 2 diabetes mellitus with hyperglycemia, with long-term current use of insulin ([facility]        Plan:  1. Major depressive disorder, recurrent, moderate ([facility]  Will continue off of lexapro and add wellbutrin rather than continuing SSRI/SNRI combination after discuss fo risks. Will follow up in 1 month. Also discussed working on external stressors and patient reports she is considering assisted living  - buPROPion (WELLBUTRIN XL) 150 MG extended release tablet; Take 1 tablet by mouth every morning  Dispense: 30 tablet; Refill: 3    2. Essential hypertension  At goal    3. Hypothyroidism, unspecified type  At [facility]. Continue 100mcg synthroid. 4. Type 2 diabetes mellitus with hyperglycemia, with long-term current use of insulin (HCC)  Stop pravastatin and continue rosuvastatin. Will renew script for CGM. Work on [employer]. Will also restart jardiance. - insulin lispro, 1 Unit Dial, (HUMALOG KWIKPEN) 100 UNIT/ML SOPN; Use on a sliding scale 3 times a day with meals. Maximum dose 30 units per day. Dispense: 15 Adjustable Dose Pre-filled Pen Syringe; Refill: 0        Return in about 4 weeks (around 11/7/2022).

## 2022-10-10 NOTE — TELEPHONE ENCOUNTER
Order for [place] 2 CGM system submitted to [address] [place] [address] [address] medical via [employer] online portal per PCP verbal request. Will continue to monitor and update patient on order status.

## 2022-10-11 RX ORDER — LEVOTHYROXINE SODIUM 0.1 MG/1
TABLET ORAL
Qty: 90 TABLET | Refills: 1 | OUTPATIENT
Start: 2022-10-11

## 2022-10-11 NOTE — TELEPHONE ENCOUNTER
Pt calling to see why Levothyroxine was denied. She states she takes 100mcg daily. I advised the patient I would ask the PCP and find out and call her back. Please advise does patient need thyroid levels checked first?  Was there another reason?

## 2022-10-12 ENCOUNTER — SCHEDULED TELEPHONE ENCOUNTER (OUTPATIENT)
Dept: PSYCHOLOGY | Age: 77
End: 2022-10-12
Payer: MEDICARE

## 2022-10-12 DIAGNOSIS — K58.2 IRRITABLE BOWEL SYNDROME WITH BOTH CONSTIPATION AND DIARRHEA: ICD-10-CM

## 2022-10-12 DIAGNOSIS — I10 ESSENTIAL HYPERTENSION: ICD-10-CM

## 2022-10-12 DIAGNOSIS — E03.9 HYPOTHYROIDISM, UNSPECIFIED TYPE: ICD-10-CM

## 2022-10-12 DIAGNOSIS — K21.9 GASTROESOPHAGEAL REFLUX DISEASE, UNSPECIFIED WHETHER ESOPHAGITIS PRESENT: Primary | ICD-10-CM

## 2022-10-12 DIAGNOSIS — F41.9 ANXIETY: Primary | ICD-10-CM

## 2022-10-12 PROCEDURE — 98968 PH1 ASSMT&MGMT NQHP 21-30: CPT | Performed by: PSYCHOLOGIST

## 2022-10-12 RX ORDER — LEVOTHYROXINE SODIUM 0.1 MG/1
TABLET ORAL
Qty: 90 TABLET | Refills: 1 | Status: SHIPPED | OUTPATIENT
Start: 2022-10-12 | End: 2022-10-19 | Stop reason: SDUPTHER

## 2022-10-12 RX ORDER — FAMOTIDINE 20 MG/1
20 TABLET, FILM COATED ORAL 2 TIMES DAILY
Qty: 180 TABLET | Refills: 1 | Status: SHIPPED | OUTPATIENT
Start: 2022-10-12 | End: 2022-10-19 | Stop reason: SDUPTHER

## 2022-10-12 RX ORDER — AMLODIPINE BESYLATE 5 MG/1
TABLET ORAL
Qty: 90 TABLET | Refills: 1 | Status: SHIPPED | OUTPATIENT
Start: 2022-10-12 | End: 2022-10-19 | Stop reason: SDUPTHER

## 2022-10-12 NOTE — PROGRESS NOTES
Behavioral Health Consultation  Jessica Perez, Ph.D.  Psychologist  10/12/2022  2:18 PM EDT      Time spent with Patient: 25 minutes  This is patient's eighth [facility] appointment. Reason for Consult:    Chief Complaint   Patient presents with    Anxiety     Referring Provider: JULIET Alva CNP  [address] [id number] ([facility]) [address] [place] [address]    Feedback given to PCP. TELEHEALTH VISIT -- Audio Only (During VOCSR-84 public health emergency)    Pursuant to the emergency declaration under the [facility], [facility] waiver authority and the [email or web address] Act, this phone call was conducted, with patient's consent, to reduce the patient's risk of exposure to COVID-19 and provide continuity of care for an established patient. Services were provided through a phone call discussion to substitute for in-person clinic visit. Pt gave verbal informed consent to participate in telehealth services. Consent:  She and/or health care decision maker is aware that that she may receive a bill for this telephone service, depending on her insurance coverage, and has provided verbal consent to proceed: Yes    Conducted a risk-benefit analysis and determined that the patient's presenting problems are consistent with the use of telepsychology. Determined that the patient has sufficient knowledge and skills in the use of technology enabling them to adequately benefit from telepsychology. It was determined that this patient was able to be properly treated without an in-person session. Patient verified that they were currently located at the [facility] address that was provided during registration.     Verified the following information:  Patient's identification: Yes  Patient location: [address]  Patient's call back number: [phone]  Patient's emergency contact's name and number, as well as permission to contact them if needed: Extended Emergency Contact Information  Primary Emergency Contact: PRINCESS GUTIERREZ [facility] Phone: [phone]  Mobile Phone: [phone]  Relation: Child  Secondary Emergency Contact: Nicole Diego  Home Phone: [phone]  Relation: Daughter-in-Law   needed? No    Provider location: [place] Gaona[address] [place], [place] [address]celi affirm this is an episode with a patient who has not had a related appointment within my department in the past 7 days or scheduled within the next 24 hours. S:  Patient expressed frustration with family dynamics. Feels like she is on a roller coaster. Identified feeling anxious about safety, especially as she becomes older and may lose mobility. However, there are no imminent safety concerns. Encouraged mindfulness and reviewed though stopping as a coping strategy for intrusive thoughts. Patient has been in touch with [facility], who is helping connect her with community resources. Prioritizing medication management and meal services first. Completed neuropsych testing. Dx of MCI was continued. No evidence of impairment related to her stroke.     O:  MSE:    Attitude: cooperative and friendly  Consciousness: alert  Orientation: oriented to person, place, time, general circumstance  Memory: recent and remote memory intact  Attention/Concentration: intact during session  Speech: normal rate and volume, well-articulated  Mood: anxious  Affect: anxious  Perception: within normal limits  Thought Content: all-or-none thinking and intrusive thoughts  Thought Process: logical, coherent and goal-directed  Insight: good  Judgment: intact  Ability to understand instructions: Yes  Ability to respond meaningfully: Yes  Morbid Ideation: no   Suicide Assessment: no suicidal ideation, plan, or intent  Homicidal Ideation: no    History:    Medications:   Current Outpatient Medications   Medication Sig Dispense Refill    levothyroxine (SYNTHROID) 100 MCG tablet Take 1 tablet daily 90 tablet 1    amLODIPine (NORVASC) 5 MG tablet TAKE 1 TABLET DAILY 90 tablet 1    famotidine (PEPCID) 20 MG tablet Take 1 tablet by mouth 2 times daily 180 tablet 1    buPROPion (WELLBUTRIN XL) 150 MG extended release tablet Take 1 tablet by mouth every morning 30 tablet 3    insulin lispro, 1 Unit Dial, (HUMALOG KWIKPEN) 100 UNIT/ML SOPN Use on a sliding scale 3 times a day with meals. Maximum dose 30 units per day.  15 Adjustable Dose Pre-filled Pen Syringe 0    donepezil (ARICEPT) 10 MG tablet Take 10 mg by mouth nightly      Continuous Blood Gluc Sensor ([address] [place] [address]) MISC 1 Units by Other route every 14 days Place 1 sensor on back of arm every 14 days 6 each 0    DULoxetine (CYMBALTA) 20 MG extended release capsule Take 1 capsule by mouth daily 90 capsule 1    glipiZIDE (GLUCOTROL) 10 MG tablet TAKE 1 TABLET BY MOUTH EVERY DAY 90 tablet 1    linaclotide (LINZESS) 145 MCG capsule Take 1 capsule by mouth every morning (before breakfast) 90 capsule 0    ELIQUIS 5 MG TABS tablet TAKE 1 TABLET BY MOUTH TWICE DAILY 60 tablet 1    rosuvastatin (CRESTOR) 40 MG tablet TAKE 1 TABLET BY MOUTH EVERY DAY 90 tablet 1    lisinopril (PRINIVIL;ZESTRIL) 10 MG tablet TAKE 1 TABLET BY MOUTH EVERY DAY 90 tablet 1    diclofenac sodium (VOLTAREN) 1 % GEL Apply 4 g topically 4 times daily 100 g 1    Handicap Placard MISC by Does not apply route 08/09/22 1 each 0    metoprolol tartrate (LOPRESSOR) 25 MG tablet TAKE 1 TABLET TWICE A  tablet 2    VASCEPA 1 g CAPS capsule Take 2 capsules by mouth 2 times daily 360 capsule 2    divalproex (DEPAKOTE) 250 MG DR tablet Take 2 tablets by mouth nightly 180 tablet 1    aspirin 81 MG chewable tablet Take 81 mg by mouth daily      Calcium Carb-Cholecalciferol (CALCIUM 1000 + D) 1000-800 MG-UNIT TABS Take 1,000 mg by mouth daily 90 tablet 1    Continuous Blood Gluc Sensor (ShipziE SENSOR SYSTEM) MISC 1 box by Does not apply route 2 times daily 1 each 0    LANTUS SOLOSTAR 100 UNIT/ML injection pen Inject 65 Units into the skin nightly (Patient taking differently: Inject 40 Units into the skin nightly) 15 pen 5    BD PEN NEEDLE MICRO U/F 32G X 6 MM MISC USE WITH LANTUS DAILY 100 each 5    METAMUCIL FIBER PO Take by mouth MiraLax instead      ondansetron (ZOFRAN) 4 MG tablet Take 1 tablet by mouth 3 times daily as needed for Nausea or Vomiting 30 tablet 0    Cyanocobalamin (B-12) 50 MCG TABS Take by mouth       Multiple Vitamins-Minerals (VITEYES COMPLETE PO) Take by mouth      latanoprost (XALATAN) 0.005 % ophthalmic solution 1 drop nightly       No current facility-administered medications for this visit. Social History:   Social History     Socioeconomic History    Marital status:      Spouse name: Not on file    Number of children: Not on file    Years of education: Not on file    Highest education level: Not on file   Occupational History    Not on file   Tobacco Use    Smoking status: Never    Smokeless tobacco: Never   Vaping Use    Vaping Use: Never used   Substance and Sexual Activity    Alcohol use: Never    Drug use: Never    Sexual activity: Not on file   Other Topics Concern    Not on file   Social History Narrative    Not on file     Social Determinants of Health     Financial Resource Strain: Low Risk     Difficulty of Paying Living Expenses: Not hard at all   Food Insecurity: No Food Insecurity    Worried About Running Out of Food in the Last Year: Never true    [address] in the Last Year: Never true   Transportation Needs: No Transportation Needs    Lack of Transportation (Medical): No    Lack of Transportation (Non-Medical):  No   Physical Activity: Insufficiently Active    Days of Exercise per Week: 2 days    Minutes of Exercise per Session: 20 min   Stress: Not on file   Social Connections: Not on file   Intimate Partner Violence: Not on file   Housing Stability: Low Risk     Unable to Pay for Housing in the Last Year: No    Number of Places Lived in the Last Year: 2 Unstable Housing in the Last Year: No     TOBACCO:   reports that she has never smoked. She has never used smokeless tobacco.  ETOH:   reports no history of alcohol use. Family History:   Family History   Problem Relation Age of Onset    Diabetes Paternal Grandmother     Diabetes Father     Lung Cancer Mother     Pancreatic Cancer Brother     Diabetes Brother      A:  Patient engaged and cooperative. No SI. Insight and motivation are good. Diagnosis:    1.  Anxiety          Diagnosis Date    Colon polyps     Diabetes mellitus ([facility]     Diabetic neuropathy ([facility]     Diabetic retinopathy ([facility]     Essential hypertension 10/28/2019    Fall     Fatty liver     Gastritis     GERD (gastroesophageal reflux disease)     Hyperlipidemia     Hypertension     Hypothyroidism     Irritable bowel syndrome     Major depressive disorder with single episode 10/28/2019    Osteopenia     Photosensitivity disorder     chronic    RBBB     Sleep apnea     on BIPAP    Thyroid disease     Thyroid nodule     neg bx-chronic thyroiditis    Type 2 diabetes mellitus with diabetic polyneuropathy, with long-term current use of insulin ([facility] 07/23/2019    Vitamin D deficiency      Plan:  Pt interventions:  Conducted functional assessment, Trevor-setting to identify pt's primary goals for [facility] visit / overall health, Supportive techniques, Emphasized self-care as important for managing overall health, and Identified maladaptive thoughts    Pt Behavioral Change Plan:   See Pt Instructions

## 2022-10-13 ENCOUNTER — CARE COORDINATION (OUTPATIENT)
Dept: CASE MANAGEMENT | Age: 77
End: 2022-10-13

## 2022-10-13 NOTE — CARE COORDINATION
Remote Patient Monitoring Note      Date/Time:  10/13/2022 4:16 PM  LPN reviewed patients reported daily Remote Patient Monitoring metrics. All reported metrics are within normal limits. Plan/Follow Up:  Will continue to review, monitor and address alerts with follow up based on severity of symptoms and risk factors Kristin Starr LPN, [facility] PH: [phone] ---- Current Patient Metrics ---- Activity: - mins Blood Pressure: 153/68, 74bpm Pulseox: 96%, 70bpm Weight: 143.4lbs Note Created at: 10/13/2022 04:16 PM ET ---- Time-Spent: 2 minutes 0 seconds

## 2022-10-13 NOTE — CARE COORDINATION
Remote Patient Monitoring Welcome Note      Date/Time:  10/13/2022 4:12 PM     Verified patients name and  as identifiers. Completed and confirmed the following:     Emergency Contact: Trinidad Starks, Child    [x] Patient received all RPM equipment (tablet, scale, blood pressure device and cuff, and pulse oximeter)  Cuff Size: Small  []  Regular   [x]  Large  []   Weight Scale: Regular   [x]  Bariatric  []               [x] Instructed patient keep box for use when returning equipment                                                          [x] Reviewed Patient Welcome Letter with patient                         [x] Reviewed expectations for patient and care team  [x] Reviewed RPM consent form         [x] Instructed patient to keep scale on flat surface                                                         [x] Instructed patient to keep tablet plugged in at all times                         [x] Instructed how to contact IT support (number listed on welcome letter)  [x] Provided Remote Patient Monitoring care  information                 All questions answered at this time.    Juan Mooney LPN, [facility] 30: [phone]

## 2022-10-14 ENCOUNTER — CARE COORDINATION (OUTPATIENT)
Dept: CASE MANAGEMENT | Age: 77
End: 2022-10-14

## 2022-10-14 PROBLEM — F32.9 MAJOR DEPRESSIVE DISORDER WITH SINGLE EPISODE: Status: RESOLVED | Noted: 2019-10-28 | Resolved: 2022-10-14

## 2022-10-14 NOTE — CARE COORDINATION
Remote Patient Monitoring Note      Date/Time:  10/14/2022 3:10 PM    LPN reviewed RPM regarding red alert received for pulse ox HR of 103. There was a red alert for pt's pulse ox HR of 103 entered AFTER monitoring hours when pt rechecked her pulse ox later in the afternoon on yesterday. Pt was reviewed DURING monitoring hours and at that time, ALL of pt's vitals were WNL. Pt did not enter any vitals in today. Pt is not to be contacted until there has been two days of vitals not being entered. Pt will continue to have a red alert for pulse ox by default until she checks her pulse ox again and obtains a reading that is WNL. Plan/Follow Up: Will continue to review, monitor and address alerts with follow up based on severity of symptoms and risk factors.  Hui Rios LPN, [facility] PH: [phone] ---- Current Patient Metrics ---- Activity: - mins Blood Pressure: -/-, -bpm Pulseox: -%, -bpm Weight: -lbs Note Created at: 10/14/2022 03:15 PM ET ---- Time-Spent: 2 minutes 0 seconds

## 2022-10-17 ENCOUNTER — OFFICE VISIT (OUTPATIENT)
Dept: OBGYN CLINIC | Age: 77
End: 2022-10-17
Payer: MEDICARE

## 2022-10-17 ENCOUNTER — CARE COORDINATION (OUTPATIENT)
Dept: CARE COORDINATION | Age: 77
End: 2022-10-17

## 2022-10-17 ENCOUNTER — CARE COORDINATION (OUTPATIENT)
Dept: CASE MANAGEMENT | Age: 77
End: 2022-10-17

## 2022-10-17 DIAGNOSIS — K58.2 IRRITABLE BOWEL SYNDROME WITH BOTH CONSTIPATION AND DIARRHEA: ICD-10-CM

## 2022-10-17 DIAGNOSIS — R10.2 PELVIC PAIN: Primary | ICD-10-CM

## 2022-10-17 DIAGNOSIS — R10.2 PELVIC PRESSURE IN FEMALE: Primary | ICD-10-CM

## 2022-10-17 PROCEDURE — G8420 CALC BMI NORM PARAMETERS: HCPCS | Performed by: OBSTETRICS & GYNECOLOGY

## 2022-10-17 PROCEDURE — 1123F ACP DISCUSS/DSCN MKR DOCD: CPT | Performed by: OBSTETRICS & GYNECOLOGY

## 2022-10-17 PROCEDURE — G8400 PT W/DXA NO RESULTS DOC: HCPCS | Performed by: OBSTETRICS & GYNECOLOGY

## 2022-10-17 PROCEDURE — 1090F PRES/ABSN URINE INCON ASSESS: CPT | Performed by: OBSTETRICS & GYNECOLOGY

## 2022-10-17 PROCEDURE — 3017F COLORECTAL CA SCREEN DOC REV: CPT | Performed by: OBSTETRICS & GYNECOLOGY

## 2022-10-17 PROCEDURE — 1036F TOBACCO NON-USER: CPT | Performed by: OBSTETRICS & GYNECOLOGY

## 2022-10-17 PROCEDURE — 99202 OFFICE O/P NEW SF 15 MIN: CPT | Performed by: OBSTETRICS & GYNECOLOGY

## 2022-10-17 PROCEDURE — 76857 US EXAM PELVIC LIMITED: CPT | Performed by: OBSTETRICS & GYNECOLOGY

## 2022-10-17 PROCEDURE — G8484 FLU IMMUNIZE NO ADMIN: HCPCS | Performed by: OBSTETRICS & GYNECOLOGY

## 2022-10-17 PROCEDURE — G8427 DOCREV CUR MEDS BY ELIG CLIN: HCPCS | Performed by: OBSTETRICS & GYNECOLOGY

## 2022-10-17 RX ORDER — EMPAGLIFLOZIN 10 MG/1
TABLET, FILM COATED ORAL
COMMUNITY
Start: 2022-09-23 | End: 2022-11-03 | Stop reason: SDUPTHER

## 2022-10-17 RX ORDER — POLYETHYLENE GLYCOL 3350 17 G/17G
POWDER, FOR SOLUTION ORAL
COMMUNITY
Start: 2022-08-29

## 2022-10-17 ASSESSMENT — ENCOUNTER SYMPTOMS
SHORTNESS OF BREATH: 0
NAUSEA: 0
ABDOMINAL PAIN: 0
COUGH: 0
VOMITING: 0
SORE THROAT: 0
CONSTIPATION: 0
DIARRHEA: 0

## 2022-10-17 NOTE — CARE COORDINATION
Remote Patient Monitoring Note      Date/Time:  10/17/2022 3:30 PM  LPN contacted patient by telephone regarding red alert received for NO vitals in four days. Verified patients name and  as identifiers. Background: RPM for HTN Clinical Interventions: Reviewed and followed up on alerts and treatments-LPN contacted pt r/t red alert for no vital submission in four days. Pt stated that she has been checking vitals daily, thinks something is wrong with monitor. Writer contacted IT to have them call and reset/troubleshoot. Plan/Follow Up: Will continue to review, monitor and address alerts with follow up based on severity of symptoms and risk factors.  Raheel Lux LPN, [address] PH: [phone] ---- Current Patient Metrics ---- Activity: - mins Blood Pressure: -/-, -bpm Pulseox: -%, -bpm Weight: -lbs Note Created at: 10/17/2022 03:32 PM ET ---- Time-Spent: 5 minutes 0 seconds

## 2022-10-17 NOTE — CARE COORDINATION
Ambulatory Care Coordination Note  10/17/2022    ACC: Jama Frederick, RN    ACM completed follow up call with patient who said she is doing well. Patient said she has not heard from the Pill Box but has received all her medications at this time. ACM looked at prescriptions and it looks like all Rx were [facility] to [facility]. ACM will follow up with provider to send all new Rx to the Pill Box as patient would like to have Pill packs as doing her pill box every week is time consuming and patient has been struggling with placing pills in box. Plan:    Route to provider for Rx to be sent to the pill box    Offered patient enrollment in the Remote Patient Monitoring (RPM) program for in-home monitoring: Yes, patient enrolled. Lab Results       None            Care Coordination Interventions    Referral from Primary Care Provider: Yes  Suggested Interventions and Community Resources  Medi Set or Pill Pack: In Process (Comment: Pill Box Medi set packs)  Senior Services: In Process (Comment: Jacques send referral over for MOW and other services patient might qualify for)          Goals Addressed                   This Visit's Progress     Medication Management        I will take my medication as directed. I will notify my provider of any problems with medications, like adverse effects or side effects. I will notify my provider/Care Coordinator if I am unable to afford my medications. I will notify my provider for advice before I stop taking any of my medication. Barriers: overwhelmed by complexity of regimen  Plan for overcoming my barriers: ACM will be connecting to get pill blister packets for patient for easier administration. Confidence: 8/10  Anticipated Goal Completion Date: 11/17/22              Prior to Admission medications    Medication Sig Start Date End Date Taking?  Authorizing Provider   levothyroxine (SYNTHROID) 100 MCG tablet Take 1 tablet daily 10/12/22   Oscar Baires, DO   amLODIPine ([address]) 5 MG tablet TAKE 1 TABLET DAILY 10/12/22   Amy Baires DO   famotidine (PEPCID) 20 MG tablet Take 1 tablet by mouth 2 times daily 10/12/22   Amy Baires DO   buPROPion (WELLBUTRIN XL) 150 MG extended release tablet Take 1 tablet by mouth every morning 10/10/22   Amy Baires DO   insulin lispro, 1 Unit Dial, ([facility]) 100 UNIT/ML SOPN Use on a sliding scale 3 times a day with meals. Maximum dose 30 units per day.  10/10/22   Amy Baires DO   donepezil (ARICEPT) 10 MG tablet Take 10 mg by mouth nightly    Historical Provider, MD   Continuous Blood Gluc Sensor ([address] [place] [address]) MISC 1 Units by Other route every 14 days Place 1 sensor on back of arm every 14 days 10/4/22 1/2/23  Amy Baires DO   DULoxetine (CYMBALTA) 20 MG extended release capsule Take 1 capsule by mouth daily 9/27/22   Amy Baires DO   glipiZIDE (GLUCOTROL) 10 MG tablet TAKE 1 TABLET BY MOUTH EVERY DAY 9/13/22   Jessica Murphy DO   linaclotide [address]) 145 MCG capsule Take 1 capsule by mouth every morning (before breakfast) 9/12/22   Girma Solorzano DO   ELIQUIS 5 MG TABS tablet TAKE 1 TABLET BY MOUTH TWICE DAILY 9/12/22   Girma Solorzano DO   rosuvastatin (CRESTOR) 40 MG tablet TAKE 1 TABLET BY MOUTH EVERY DAY 8/31/22   Amy Baires DO   lisinopril (PRINIVIL;ZESTRIL) 10 MG tablet TAKE 1 TABLET BY MOUTH EVERY DAY 8/31/22   Jessica Murphy DO   diclofenac sodium (VOLTAREN) 1 % GEL Apply 4 g topically 4 times daily 8/24/22   Amy Baires DO   Handicap Placard MISC by Does not apply route 08/09/22 8/9/22   Amy Baires DO   metoprolol tartrate (LOPRESSOR) 25 MG tablet TAKE 1 TABLET TWICE A DAY 8/9/22   Jessica Pinta, DO   VASCEPA 1 g CAPS capsule Take 2 capsules by mouth 2 times daily 8/9/22 11/7/22  Amy Baires,    divalproex (DEPAKOTE) 250 MG DR tablet Take 2 tablets by mouth nightly 8/9/22   Amy Baires,    aspirin 81 MG chewable tablet Take 81 mg by mouth daily Historical Provider, MD   Calcium Carb-Cholecalciferol (CALCIUM 1000 + D) 1000-800 MG-UNIT TABS Take 1,000 mg by mouth daily 7/11/22   JULIET Kitchen CNP   Continuous Blood Gluc Sensor ([address] [place] [address]) MISC 1 box by Does not apply route 2 times daily 6/16/22   [place], PA   LANTUS SOLOSTAR 100 UNIT/ML injection pen Inject 65 Units into the skin nightly  Patient taking differently: Inject 40 Units into the skin nightly 6/16/22   Leda Mcardle, PA   BD PEN NEEDLE MICRO U/F 32G X 6 MM MISC USE WITH LANTUS DAILY 4/11/22   Charlotte Conley MD   METAMUCIL FIBER PO Take by mouth MiraLax instead    Historical Provider, MD   ondansetron (ZOFRAN) 4 MG tablet Take 1 tablet by mouth 3 times daily as needed for Nausea or Vomiting 1/12/22   Charlotte Conley MD   Cyanocobalamin (B-12) 50 MCG TABS Take by mouth     Historical Provider, MD   Multiple Vitamins-Minerals (VITEYES COMPLETE PO) Take by mouth    Historical Provider, MD   latanoprost (XALATAN) 0.005 % ophthalmic solution 1 drop nightly    Historical Provider, MD       Future Appointments   Date Time Provider Coleman Garcia   10/17/2022  9:00 AM Zara Orta DO [facility]   10/17/2022  9:30 AM SCHEDULE, [facility] [address] OB[facility]   10/26/2022 11:30 AM Jorge Luis Castillo, PhD [facility]   11/9/2022  4:00 PM DO KRISTIN Garzon [place] - [facility]   11/22/2022  1:15 PM JULIET Guzman NP [facility]   11/29/2022  1:00 PM DO KRISTIN Garzon  [place] - DYD   ,   Diabetes Assessment      Meal Planning: Avoidance of concentrated sweets, Calorie counting   How often do you test your blood sugar?: Daily, Meals   Do you have barriers with adherence to non-pharmacologic self-management interventions?  (Nutrition/Exercise/Self-Monitoring): No   Have you ever had to go to the ED for symptoms of low blood sugar?: No       No patient-reported symptoms        , and   General Assessment    Do you have any symptoms that are causing concern?: No

## 2022-10-17 NOTE — PROGRESS NOTES
New Patient Gynecologic Exam      CC:   Chief Complaint   Patient presents with    New Patient       HPI:  78 y.o. Ani Bennett presents to establish care and for US and evaluation of pelvic pressure    Patient seen and examined. Patient is overall doing well. Had a stroke in April and recently moved to the area to be with family. Her prior Ob/Gyn recommended a pelvic ultrasound as she was having nausea, vomiting and diarrhea. Recommended US for evaluation of ovaries to rule out as cause. Has had a Colonoscopy for evaluation and had the stroke a week later. Medical history significant for type 2 Diabetes, HTN, anxiety/depression, GERD, hyperlipidemia, hypothyroidism, Colon polyps. Surgical history significant for hysterectomy, colonoscopy, T&A, appendectomy, cholecystectomy, cataract removal, carpal tunnel release, sinus surgery. Obstetric history significant for c/s x1. Health Maintenance:  Birth control: Menopause  Safe relationship: , passed in 2015, had been  almost 36 years    Screening:  Last pap smear: Age 38 prior to her hysterectomy   History of abnormal pap smears: Denies  Mammogram: 2 years ago   Colonoscopy: April 2022  DEXA: 6/2012    Vaccines:  Flu vaccine:has had   COVID-19 vaccine: Has had series and booster    Review of Systems:   Review of Systems   Constitutional:  Negative for chills and fever. HENT:  Negative for congestion and sore throat. Respiratory:  Negative for cough and shortness of breath. Cardiovascular:  Negative for chest pain and palpitations. Gastrointestinal:  Negative for abdominal pain, constipation, diarrhea, nausea and vomiting. Genitourinary:  Negative for dysuria, frequency, menstrual problem, pelvic pain and vaginal discharge. Neurological:  Negative for dizziness and headaches.    Breast: Denies skin changes, nipple discharge, lesions, dimpling, tenderness or palpable masses     Primary Care Physician: Jaclyn Lewis, DO    Obstetric History  OB History    Para Term  AB Living   1 1           SAB IAB Ectopic Molar Multiple Live Births                    # Outcome Date GA Lbr Abner/2nd Weight Sex Delivery Anes PTL Lv   1 Para                Gynecologic History  Menstrual History:  LMP: With hysterectomy   Age of Menarche: 17  Denies bleeding after    Sexual History:  Contraception: see above  Currently is not sexually active    Pap History:  History of abnormal pap smears: see above  Last pap: see above      Medical History:  Past Medical History:   Diagnosis Date    Colon polyps     Diabetes mellitus ([address]     Diabetic neuropathy ([address]     Diabetic retinopathy ([facility] [address]     Essential hypertension 10/28/2019    Fall     Fatty liver     Gastritis     GERD (gastroesophageal reflux disease)     Hyperlipidemia     Hypertension     Hypothyroidism     Irritable bowel syndrome     Major depressive disorder with single episode 10/28/2019    Osteopenia     Photosensitivity disorder     chronic    RBBB     Sleep apnea     on BIPAP    Thyroid disease     Thyroid nodule     neg bx-chronic thyroiditis    Type 2 diabetes mellitus with diabetic polyneuropathy, with long-term current use of insulin (formerly [facility]) 2019    Vitamin D deficiency        Medications:  Current Outpatient Medications   Medication Sig Dispense Refill    polyethylene glycol (GLYCOLAX) 17 GM/SCOOP powder 1 powder in packet DAILY (route: oral)      JARDIANCE 10 MG tablet TAKE 1 TABLET BY MOUTH DAILY      buPROPion (WELLBUTRIN XL) 150 MG extended release tablet Take 1 tablet by mouth every morning 30 tablet 3    insulin lispro, 1 Unit Dial, (HUMALOG KWIKPEN) 100 UNIT/ML SOPN Use on a sliding scale 3 times a day with meals. Maximum dose 30 units per day.  15 Adjustable Dose Pre-filled Pen Syringe 0    donepezil (ARICEPT) 10 MG tablet Take 10 mg by mouth nightly      Continuous Blood Gluc Sensor (kaufDA ANT SENSOR SYSTEM) MISC 1 Units by Other route every 14 days Place 1 sensor on back of arm every 14 days 6 each 0    DULoxetine (CYMBALTA) 20 MG extended release capsule Take 1 capsule by mouth daily 90 capsule 1    linaclotide (LINZESS) 145 MCG capsule Take 1 capsule by mouth every morning (before breakfast) 90 capsule 0    ELIQUIS 5 MG TABS tablet TAKE 1 TABLET BY MOUTH TWICE DAILY 60 tablet 1    rosuvastatin (CRESTOR) 40 MG tablet TAKE 1 TABLET BY MOUTH EVERY DAY 90 tablet 1    lisinopril (PRINIVIL;ZESTRIL) 10 MG tablet TAKE 1 TABLET BY MOUTH EVERY DAY 90 tablet 1    diclofenac sodium (VOLTAREN) 1 % GEL Apply 4 g topically 4 times daily 100 g 1    Handicap Placard MISC by Does not apply route 08/09/22 1 each 0    metoprolol tartrate (LOPRESSOR) 25 MG tablet TAKE 1 TABLET TWICE A  tablet 2    VASCEPA 1 g CAPS capsule Take 2 capsules by mouth 2 times daily 360 capsule 2    divalproex (DEPAKOTE) 250 MG DR tablet Take 2 tablets by mouth nightly 180 tablet 1    aspirin 81 MG chewable tablet Take 81 mg by mouth daily      Calcium Carb-Cholecalciferol (CALCIUM 1000 + D) 1000-800 MG-UNIT TABS Take 1,000 mg by mouth daily 90 tablet 1    Continuous Blood Gluc Sensor ([employer] ANT SENSOR SYSTEM) MISC 1 box by Does not apply route 2 times daily 1 each 0    LANTUS SOLOSTAR 100 UNIT/ML injection pen Inject 65 Units into the skin nightly (Patient taking differently: Inject 40 Units into the skin nightly) 15 pen 5    BD PEN NEEDLE MICRO U/F 32G X 6 MM MISC USE WITH LANTUS DAILY 100 each 5    Cyanocobalamin (B-12) 50 MCG TABS Take by mouth       Multiple Vitamins-Minerals (VITEYES COMPLETE PO) Take by mouth      latanoprost (XALATAN) 0.005 % ophthalmic solution 1 drop nightly      levothyroxine (SYNTHROID) 100 MCG tablet Take 1 tablet daily 90 tablet 1    famotidine (PEPCID) 20 MG tablet Take 1 tablet by mouth 2 times daily 180 tablet 1    amLODIPine (NORVASC) 5 MG tablet TAKE 1 TABLET DAILY 90 tablet 1    glipiZIDE (GLUCOTROL) 10 MG tablet TAKE 1 TABLET BY MOUTH EVERY DAY (Patient not taking: Reported on 10/17/2022) 90 tablet 1    METAMUCIL FIBER PO Take by mouth MiraLax instead (Patient not taking: Reported on 10/17/2022)      ondansetron (ZOFRAN) 4 MG tablet Take 1 tablet by mouth 3 times daily as needed for Nausea or Vomiting (Patient not taking: Reported on 10/17/2022) 30 tablet 0     No current facility-administered medications for this visit. Surgical History:  Past Surgical History:   Procedure Laterality Date    APPENDECTOMY      CARPAL TUNNEL RELEASE Bilateral     CATARACT REMOVAL Bilateral      SECTION      CHOLECYSTECTOMY      COLONOSCOPY N/A 2022    COLONOSCOPY POLYPECTOMY SNARE/COLD BIOPSY performed by Suyapa Shanks MD at [facility] ([place])      [facility] END[facility] N/A 2022    EGD BIOPSY performed by Suyapa Shanks MD at [facility] ENDOSCOPY       Allergies: Allergies   Allergen Reactions    Seasonal        Family History:  Family History   Problem Relation Age of Onset    Diabetes Paternal Grandmother     Diabetes Father     Lung Cancer Mother     Pancreatic Cancer Brother     Diabetes Brother         \Denies personal/family history of cervical, uterine, ovarian, vulvar, breast, or colon cancers. Denies personal/family history of bleeding or clotting disorders  Denies personal/family history of genetic disorders    Social History:  Social History     Socioeconomic History    Marital status:       Spouse name: None    Number of children: None    Years of education: None    Highest education level: None   Tobacco Use    Smoking status: Never    Smokeless tobacco: Never   Vaping Use    Vaping Use: Never used   Substance and Sexual Activity    Alcohol use: Never    Drug use: Never     Social Determinants of Health     Financial Resource Strain: Low Risk     Difficulty of Paying Living Expenses: Not hard at all   Food Insecurity: No Food Insecurity    Worried About Running Out of Food in the Last Year: Never true    Ran Out of Food in the Last Year: Never true   Transportation Needs: No Transportation Needs    Lack of Transportation (Medical): No    Lack of Transportation (Non-Medical): No   Physical Activity: Insufficiently Active    Days of Exercise per Week: 2 days    Minutes of Exercise per Session: 20 min   Housing Stability: Low Risk     Unable to Pay for Housing in the Last Year: No    Number of Places Lived in the Last Year: 2    Unstable Housing in the Last Year: No       Objective: There were no vitals taken for this visit. Exam:   Physical Exam  Vitals reviewed. Exam conducted with a chaperone present. Constitutional:       General: She is not in acute distress. Appearance: She is well-developed. HENT:      Head: Normocephalic and atraumatic. Eyes:      Conjunctiva/sclera: Conjunctivae normal.   Cardiovascular:      Rate and Rhythm: Normal rate. Pulmonary:      Effort: Pulmonary effort is normal. No respiratory distress. Abdominal:      General: There is no distension. Palpations: Abdomen is soft. Tenderness: There is no abdominal tenderness. There is no guarding or rebound. Genitourinary:     General: Normal vulva. Exam position: Lithotomy position. Labia:         Right: No rash, tenderness or lesion. Left: No rash, tenderness or lesion. Vagina: No signs of injury. No vaginal discharge, erythema, tenderness, bleeding, lesions or prolapsed vaginal walls. Adnexa:         Right: No mass, tenderness or fullness. Left: No mass, tenderness or fullness. Comments: Uterus and cervix are surgically absent. Musculoskeletal:         General: Normal range of motion. Skin:     General: Skin is warm and dry. Neurological:      Mental Status: She is alert and oriented to person, place, and time.    Psychiatric:         Mood and Affect: Mood normal.         Behavior: Behavior normal.         Thought Content: Thought content normal.       Ultrasound  PELVIC ULTRASOUND without DOPPLER INTERROGATION   NON OB    DATE: 10/17/22    PHYSICIAN: MARICARMEN Manjarrez D.O.     SONOGRAPHER: DEON Turner RDMS    INDICATION: pelvic pain    TYPE OF SCAN: vaginal    FINDINGS:    The cul de sac is normal. No free fluid appreciated. The cervix is not visualized. Patient has had a hysterectomy. The right ovary is not visualized. The right adnexa is normal.    The left ovary is not visualized. The left adnexa is normal.      IMPRESSION: Non-visualization of uterus consistent with history of hysterectomy. Non visualized right ovary. Non visualized left ovary. Imaging is limited secondary to bowel gas. The patient is well aware of the limitations of ultrasound in the detection of anomalies of the abdomen and pelvis. Assessment/Plan:  78 y.o.  presenting to establish care and for US and evaluation of pelvic pressure    1. Pelvic pressure in female     - Patient with a history of pelvic pressure, nausea, vomiting and diarrhea     - Prior GYN recommended pelvic US to rule out Ovarian etiology     - Is s/p Hysterectomy     - US today with surgically absent uterus and cervix and non-visualization of ovaries     - Differential reviewed     - Reviewed with non-visualization of ovaries unlikely to be the etiology of patient's symptoms     - Differential includes GI, GYN and      - With associated nausea, vomiting, diarrhea and diagnosis of IBS likely to be GI in nature     - Has had Colonoscopy since that time     - Return precautions reviewed     - Will follow-up for annual exam     - Patient instructed to keep a symptom diary as well as follow with PCP - if symptoms persist will consider CT scan as no acute findings present on US    2.  Irritable bowel syndrome with both constipation and diarrhea     - See above     Eric Herman, DO

## 2022-10-18 ENCOUNTER — TELEPHONE (OUTPATIENT)
Dept: PRIMARY CARE CLINIC | Age: 77
End: 2022-10-18

## 2022-10-18 ENCOUNTER — CARE COORDINATION (OUTPATIENT)
Dept: CASE MANAGEMENT | Age: 77
End: 2022-10-18

## 2022-10-18 NOTE — TELEPHONE ENCOUNTER
10/18/22 4:33 PM     Received escalated alert from RPM LPN with the following documentation:  \"Pt rechecked BP and it dropped to 156/72 which is WNL for pt. Pt decided to recheck a third time and BP is now in the yellow zone with BP reading of 177/70. Pt also decided to recheck pulse ox and reading is 88/58. Pt's pulse ox was 93% which was WNL. Pt is denying SOB, and distress at this time. Pt has been moving around and attributes the high BP and the low pulse ox readings to the exertion. \"  Based on pt's BP and pulse ox reading after taking medications, I advised LPN no further intervention is needed, especially with pt's reported asymptomatic status. LPN has appropriately advised pt that she does not need to take VS three times a day with our program. We will continue to offer RPM to pt and respond to her alerts as they occur.      Sheng Peoples, LAUREN, FNP-C, Remote Patient Monitoring NP, () [phone]

## 2022-10-18 NOTE — CARE COORDINATION
Remote Patient Monitoring Note      Date/Time:  10/18/2022 11:28 AM  LPN contacted patient by telephone regarding red alert received for blood pressure reading (182/). Verified patients name and  as identifiers. Background: RPM for HTN BP Triage Are you having any Chest Pain> no Are you having any Shortness of Breath? no Do you have a headache or have any vision changes> no Are you having any numbness or tingling> no Are you having any other health concerns or issues? no BP Cuff Triage Where was the blood pressure cuff placed during today's reading; upper arm, or someplace else (wrist region)> yes Was the blood pressure monitor cord positioned downward on your arm, towards your hand> yes Does it feel like the cuff is a good fit? yes  Does it pop off when the cuff is inflating? no  Is it difficult to Velcro the blood pressure cuff around your arm> no  Did it appear that all the air was out of the blood pressure cuff before you completed your reading today? yes  Do you have difficulty placing the blood pressure cuff on and/or does someone typically assist you> no Were you at rest (sitting) when you completed your blood pressure today? yes Were you at rest for prior to completing today's BP reading? no  Did the patient complete any recent physical activity prior to their blood pressure being taken; dressed, made bed, ambulation> no Was your arm at rest/relaxed, or tense during the reading? yes Were your legs crossed when your blood pressure was taken> no Did you take your blood pressure before or after medications today> After  If afterwards, how long (10 minutes, 30 minutes, 1 hour)> Did not take them yet. Will take them and recheck Clinical Interventions: Reviewed and followed up on alerts and treatments-LPN reviewed red alert r/t BP of 182/67. Pt is denying any symptoms this morning. Will route to PCP and ACM to advise of reading. Plan/Follow Up:  Will continue to review, monitor and address alerts with follow up based on severity of symptoms and risk factors. Vijay Sinclair LPN, [facility] PH: [phone] UPDATE- 10/18/22 at 3:22pm- Pt rechecked BP and it dropped to 156/72 which is WNL for pt. Pt decided to recheck a third time and BP is now in the yellow zone with BP reading of 177/70. Pt also decided to recheck pulse ox and reading is 88/58. Pt's pulse ox was 93% which was WNL. Pt is denying SOB, and distress at this time. Pt has been moving around and attributes the high BP and the low pulse ox readings to the exertion. Pt was advised that she does not need to check her vitals three times a day and to only check vitals when she is rested.  ---- Current Patient Metrics ---- Activity: - mins Blood Pressure: 182/67, 71bpm Pulseox: 93%, 68bpm Weight: 143.2lbs Note Created at: 10/18/2022 12:20 PM ET ---- Time-Spent: 10 minutes 0 seconds

## 2022-10-19 ENCOUNTER — CARE COORDINATION (OUTPATIENT)
Dept: CASE MANAGEMENT | Age: 77
End: 2022-10-19

## 2022-10-19 DIAGNOSIS — E03.9 HYPOTHYROIDISM, UNSPECIFIED TYPE: ICD-10-CM

## 2022-10-19 DIAGNOSIS — I10 ESSENTIAL HYPERTENSION: ICD-10-CM

## 2022-10-19 DIAGNOSIS — K21.9 GASTROESOPHAGEAL REFLUX DISEASE, UNSPECIFIED WHETHER ESOPHAGITIS PRESENT: ICD-10-CM

## 2022-10-19 RX ORDER — LEVOTHYROXINE SODIUM 0.1 MG/1
TABLET ORAL
Qty: 90 TABLET | Refills: 1 | Status: SHIPPED | OUTPATIENT
Start: 2022-10-19

## 2022-10-19 RX ORDER — FAMOTIDINE 20 MG/1
20 TABLET, FILM COATED ORAL 2 TIMES DAILY
Qty: 180 TABLET | Refills: 1 | Status: SHIPPED | OUTPATIENT
Start: 2022-10-19

## 2022-10-19 RX ORDER — AMLODIPINE BESYLATE 5 MG/1
TABLET ORAL
Qty: 90 TABLET | Refills: 1 | Status: SHIPPED | OUTPATIENT
Start: 2022-10-19

## 2022-10-19 NOTE — CARE COORDINATION
Remote Patient Monitoring Note      Date/Time:  10/19/2022 11:12 AM  LPN reviewed RPM regarding red alert received for weight increase (FALSE ). Background: RPM for HTN Clinical Interventions: Reviewed and followed up on alerts and treatments-LPN reviewed FALSE alert r/t wt increase. Pt has a dropped left foot due to a stroke and has trouble stepping on the scale without holding on to the wall or the walker. Pt's starting and correct wt was 143.4 lbs. Pt's wt today is 141.4 lbs. All vitals are WNL. Plan/Follow Up: Will continue to review, monitor and address alerts with follow up based on severity of symptoms and risk factors.  Chanda Larson LPN, [facility] PH: [phone] ---- Current Patient Metrics ---- Activity: - mins Blood Pressure: 170/64, 62bpm Pulseox: 96%, 62bpm Weight: 141.4lbs Note Created at: 10/19/2022 11:18 AM ET ---- Time-Spent: 2 minutes 0 seconds

## 2022-10-19 NOTE — TELEPHONE ENCOUNTER
Refill Request     CONFIRM preferrred pharmacy with the patient. If Mail Order Rx - Pend for 90 day refill. Last Seen: Last Seen Department: 10/10/2022  Last Seen by PCP: Visit date not found    Last Written: 10/12/22    If no future appointment scheduled, route STAFF MESSAGE with patient name to the [facility] for scheduling. Next Appointment:   Future Appointments   Date Time Provider Coleman Garcia   10/26/2022 11:30 AM Brit Ordoñez, PhD [facility]   11/9/2022  4:00 PM DO KRISTIN Cole [place] [facility]   11/22/2022  1:15 PM JULIET Chang NP [facility]   11/29/2022  1:00 PM DO KRISTIN Cole [place] - [facility]       Message sent to [facility] to schedule appt with patient?   NO      Requested Prescriptions     Pending Prescriptions Disp Refills    levothyroxine (SYNTHROID) 100 MCG tablet 90 tablet 1     Sig: Take 1 tablet daily    famotidine (PEPCID) 20 MG tablet 180 tablet 1     Sig: Take 1 tablet by mouth 2 times daily    amLODIPine (NORVASC) 5 MG tablet 90 tablet 1     Sig: TAKE 1 TABLET DAILY      Sent to wrong pharmacy

## 2022-10-20 ENCOUNTER — CARE COORDINATION (OUTPATIENT)
Dept: CARE COORDINATION | Age: 77
End: 2022-10-20

## 2022-10-20 ENCOUNTER — TELEPHONE (OUTPATIENT)
Dept: FAMILY MEDICINE CLINIC | Age: 77
End: 2022-10-20

## 2022-10-20 DIAGNOSIS — R26.81 UNSTEADY GAIT: Primary | ICD-10-CM

## 2022-10-20 NOTE — TELEPHONE ENCOUNTER
Pt called with multiple questions. Patient is asking what time of day to take jardiance prescription. Pt also stated she needs more physical therapy as she has not been exercising lately. Pt stated that the food at the nursing home she is staying at is not very diabetic friendly, so she reached out to us regarding home delivered meals and services but has not heard anything back regarding this.

## 2022-10-20 NOTE — CARE COORDINATION
Remote Patient Monitoring Note      Date/Time:  10/20/2022 2:03 PM      CCSS reviewed patients reported daily Remote Patient Monitoring metrics. All reported metrics are within normal limits. Plan/Follow Up:  Will continue to review, monitor and address alerts with follow up based on severity of symptoms and risk factors ---- Current Patient Metrics ---- Activity: - mins Blood Pressure: 165/61, 61bpm Pulseox: 96%, 57bpm Weight: 139.4lbs Note Created at: 10/20/2022 02:04 PM ET ---- Time-Spent: 2 minutes 0 seconds

## 2022-10-21 ENCOUNTER — CARE COORDINATION (OUTPATIENT)
Dept: CASE MANAGEMENT | Age: 77
End: 2022-10-21

## 2022-10-21 NOTE — TELEPHONE ENCOUNTER
ACM spoke with patient who said she had taken her AM medications (Norvasc and Metoprolol) then took BP 1 hrs after medication administration. Patient said that BP was 179/65 but has not retaken BP. Patient does not feel her BP is running high and is asymptomatic. Please advise on RPM yellow/ red alert.

## 2022-10-24 ENCOUNTER — CARE COORDINATION (OUTPATIENT)
Dept: CASE MANAGEMENT | Age: 77
End: 2022-10-24

## 2022-10-24 NOTE — CARE COORDINATION
Remote Patient Monitoring Note      Date/Time:  10/24/2022 1:33 PM    LPN reviewed RPM regarding red alert received for weight increase (FALSE). Background: RPM FOR HTN Clinical Interventions: Reviewed and followed up on alerts and treatments-LPN reviewed FALSE red alert for wt increase. Pt's starting wt is 143 lbs. Pt has a dropped left foot and is unable to balance well on the scale. Pt uses walker to balance and sometimes pt holds the wall. Pt advised to not do this if she can safely stand unsupported while weighing. Pt often takes wt multiple times and inputs incorrect manual entries. Pt's wt today is 144.4 lbs. All metrics are WNL. Plan/Follow Up: Will continue to review, monitor and address alerts with follow up based on severity of symptoms and risk factors.  Marine Eli LPN, [facility] PH: [phone] ---- Current Patient Metrics ---- Activity: - mins Blood Pressure: 152/66, 59bpm Pulseox: 97%, 60bpm Weight: 144.4lbs Note Created at: 10/24/2022 01:37 PM ET ---- Time-Spent: 2 minutes 0 seconds

## 2022-10-24 NOTE — TELEPHONE ENCOUNTER
Spoke to patient and she stated that she is having a lot of balance issues and is unsteady on her feet, also her left foot is very tight and is wanting PT to help with her balance.

## 2022-10-25 ENCOUNTER — CARE COORDINATION (OUTPATIENT)
Dept: CASE MANAGEMENT | Age: 77
End: 2022-10-25

## 2022-10-25 DIAGNOSIS — E83.51 CALCIUM DEFICIENCY DISEASE: ICD-10-CM

## 2022-10-25 DIAGNOSIS — I10 ESSENTIAL HYPERTENSION: ICD-10-CM

## 2022-10-25 DIAGNOSIS — E11.42 TYPE 2 DIABETES MELLITUS WITH DIABETIC POLYNEUROPATHY, WITH LONG-TERM CURRENT USE OF INSULIN (HCC): ICD-10-CM

## 2022-10-25 DIAGNOSIS — I10 HYPERTENSION, UNSPECIFIED TYPE: ICD-10-CM

## 2022-10-25 DIAGNOSIS — F32.9 MAJOR DEPRESSIVE DISORDER WITH SINGLE EPISODE, REMISSION STATUS UNSPECIFIED: ICD-10-CM

## 2022-10-25 DIAGNOSIS — E78.2 MIXED HYPERLIPIDEMIA: ICD-10-CM

## 2022-10-25 DIAGNOSIS — Z79.4 TYPE 2 DIABETES MELLITUS WITH DIABETIC POLYNEUROPATHY, WITH LONG-TERM CURRENT USE OF INSULIN (HCC): ICD-10-CM

## 2022-10-25 DIAGNOSIS — K58.2 IRRITABLE BOWEL SYNDROME WITH BOTH CONSTIPATION AND DIARRHEA: ICD-10-CM

## 2022-10-25 DIAGNOSIS — E78.5 HYPERLIPIDEMIA, UNSPECIFIED HYPERLIPIDEMIA TYPE: ICD-10-CM

## 2022-10-25 DIAGNOSIS — M25.552 LEFT HIP PAIN: ICD-10-CM

## 2022-10-25 DIAGNOSIS — E11.65 UNCONTROLLED TYPE 2 DIABETES MELLITUS WITH HYPERGLYCEMIA (HCC): ICD-10-CM

## 2022-10-25 NOTE — CARE COORDINATION
Remote Patient Monitoring Note      Date/Time:  10/25/2022 8:18 AM  LPN reviewed RPM regarding red alert received for weight decrease (FALSE ALERT). Background: RPM for HTN Clinical Interventions: Reviewed and followed up on alerts and treatments-LPN reviewed FALSE alert r/t wt. Pt's wt today is 141.2 lbs which is WNL. Pt's dropped left foot makes it difficult to balance on the scale. Inaccurate, manual entries indicate false increase. All vitals are WNL. Plan/Follow Up: Will continue to review, monitor and address alerts with follow up based on severity of symptoms and risk factors.  Edith Calixto LPN, [facility] PH: [phone] ---- Current Patient Metrics ---- Activity: - mins Blood Pressure: 164/66, 56bpm Pulseox: 97%, 58bpm Weight: 141.2lbs Note Created at: 10/25/2022 08:22 AM ET ---- Time-Spent: 2 minutes 0 seconds

## 2022-10-25 NOTE — TELEPHONE ENCOUNTER
Patient called in to ask us if we could help her with medication cost as it is $3,000 to pay for all her mediations.  Please advise

## 2022-10-26 ENCOUNTER — CARE COORDINATION (OUTPATIENT)
Dept: CASE MANAGEMENT | Age: 77
End: 2022-10-26

## 2022-10-26 PROBLEM — R10.2 PELVIC PRESSURE IN FEMALE: Status: ACTIVE | Noted: 2022-10-26

## 2022-10-26 NOTE — CARE COORDINATION
Remote Patient Monitoring Note      Date/Time:  10/26/2022 12:24 PM  LPN reviewed RPM regarding red alert received for weight increase (FALSE ). Background: RPM for HTN Clinical Interventions: Reviewed and followed up on alerts and treatments-LPN reviewed default, FALSE, red alert r/t wt increase. Pt's wt decreased by 2.2 lbs in the past 24 hrs. Plan/Follow Up: Will continue to review, monitor and address alerts with follow up based on severity of symptoms and risk factors.  Chu Moyer LPN, [facility] PH: [phone] ---- Current Patient Metrics ---- Activity: - mins Blood Pressure: 150/65, 63bpm Pulseox: 95%, 65bpm Weight: 139.0lbs Note Created at: 10/26/2022 12:26 PM ET ---- Time-Spent: 2 minutes 0 seconds

## 2022-10-27 ENCOUNTER — CARE COORDINATION (OUTPATIENT)
Dept: CASE MANAGEMENT | Age: 77
End: 2022-10-27

## 2022-10-27 NOTE — CARE COORDINATION
Remote Patient Monitoring Note      Date/Time:  10/27/2022 10:53 AM  LPN reviewed RPM regarding red alert received for weight increase (FALSE). Background: RPM for HTN Clinical Interventions: Reviewed and followed up on alerts and treatments-LPN reviewed FALSE red alert r/t wt increase. Pt's wt today is 140 lbs which is WNL. Default red alert for wt per user error one week ago. Plan/Follow Up: Will continue to review, monitor and address alerts with follow up based on severity of symptoms and risk factors.  Rosmery Sood LPN, [facility] PH: [phone] ---- Current Patient Metrics ---- Activity: - mins Blood Pressure: 159/74, 60bpm Pulseox: 96%, 62bpm Weight: 140.2lbs Note Created at: 10/27/2022 10:56 AM ET ---- Time-Spent: 2 minutes 0 seconds

## 2022-10-28 ENCOUNTER — CARE COORDINATION (OUTPATIENT)
Dept: CASE MANAGEMENT | Age: 77
End: 2022-10-28

## 2022-10-28 ENCOUNTER — SCHEDULED TELEPHONE ENCOUNTER (OUTPATIENT)
Dept: PSYCHOLOGY | Age: 77
End: 2022-10-28
Payer: MEDICARE

## 2022-10-28 DIAGNOSIS — F41.9 ANXIETY: Primary | ICD-10-CM

## 2022-10-28 PROCEDURE — 98968 PH1 ASSMT&MGMT NQHP 21-30: CPT | Performed by: PSYCHOLOGIST

## 2022-10-28 NOTE — PROGRESS NOTES
Behavioral Health Consultation  Max Kelley, Ph.D.  Psychologist  10/28/2022  2:16 PM EDT      Time spent with Patient: 30 minutes  This is patient's ninth [facility] appointment. Reason for Consult:    Chief Complaint   Patient presents with    Anxiety     Referring Provider: JULIET Duque CNP  [address] [id number] ([facility]) [facility]    Feedback given to PCP. TELEHEALTH VISIT -- Audio Only (During [id number] public health emergency)    Pursuant to the emergency declaration under the [address] [place] [address], [facility] waiver authority and the [employer] and [employer] Act, this phone call was conducted, with patient's consent, to reduce the patient's risk of exposure to COVID-19 and provide continuity of care for an established patient. Services were provided through a phone call discussion to substitute for in-person clinic visit. Pt gave verbal informed consent to participate in telehealth services. Consent:  She and/or health care decision maker is aware that that she may receive a bill for this telephone service, depending on her insurance coverage, and has provided verbal consent to proceed: Yes    Conducted a risk-benefit analysis and determined that the patient's presenting problems are consistent with the use of telepsychology. Determined that the patient has sufficient knowledge and skills in the use of technology enabling them to adequately benefit from telepsychology. It was determined that this patient was able to be properly treated without an in-person session. Patient verified that they were currently located at the [facility] address that was provided during registration.     Verified the following information:  Patient's identification: Yes  Patient location: [address]  Patient's call back number: [phone]  Patient's emergency contact's name and number, as well as permission to contact them if needed: Extended Emergency Contact Information  Primary Emergency Contact: PRINCESS GUTIERREZ [facility] Phone: [phone]  Mobile Phone: [phone]  Relation: Child  Secondary Emergency Contact: Fatuma Corley  Home Phone: [phone]  Relation: Daughter-in-Law   needed? No    Provider location: [place], [address] [place] [address] Lai affirm this is an episode with a patient who has not had a related appointment within my department in the past 7 days or scheduled within the next 24 hours. S:  Patient reported that she and her family sat down for a \"meeting,\" which felt very productive. However, patient has been researching independent and assisted living facilities. She acknowledges that her memory is declining, has noticed decreased mobility. Feels sad about these changes, but is hopeful about finding ways to maintain a good quality of life. Provided support and encouragement.     O:  MSE:    Attitude: cooperative and friendly  Consciousness: alert  Orientation: oriented to person, place, time, general circumstance  Memory: recent and remote memory intact  Attention/Concentration: intact during session  Speech: normal rate and volume, well-articulated  Mood: anxious  Affect: euthymic  Perception: within normal limits  Thought Content: all-or-none thinking and intrusive thoughts  Thought Process: logical, coherent and goal-directed  Insight: good  Judgment: intact  Ability to understand instructions: Yes  Ability to respond meaningfully: Yes  Morbid Ideation: no   Suicide Assessment: no suicidal ideation, plan, or intent  Homicidal Ideation: no    History:    Medications:   Current Outpatient Medications   Medication Sig Dispense Refill    levothyroxine (SYNTHROID) 100 MCG tablet Take 1 tablet daily 90 tablet 1    famotidine (PEPCID) 20 MG tablet Take 1 tablet by mouth 2 times daily 180 tablet 1    amLODIPine (NORVASC) 5 MG tablet TAKE 1 TABLET DAILY 90 tablet 1    polyethylene glycol (GLYCOLAX) 17 GM/SCOOP powder 1 powder in packet DAILY (route: oral)      JARDIANCE 10 MG tablet TAKE 1 TABLET BY MOUTH DAILY      buPROPion (WELLBUTRIN XL) 150 MG extended release tablet Take 1 tablet by mouth every morning 30 tablet 3    insulin lispro, 1 Unit Dial, (HUMALOG KWIKPEN) 100 UNIT/ML SOPN Use on a sliding scale 3 times a day with meals. Maximum dose 30 units per day.  15 Adjustable Dose Pre-filled Pen Syringe 0    donepezil (ARICEPT) 10 MG tablet Take 10 mg by mouth nightly      Continuous Blood Gluc Sensor ([address] [place] [address]) MISC 1 Units by Other route every 14 days Place 1 sensor on back of arm every 14 days 6 each 0    DULoxetine (CYMBALTA) 20 MG extended release capsule Take 1 capsule by mouth daily 90 capsule 1    glipiZIDE (GLUCOTROL) 10 MG tablet TAKE 1 TABLET BY MOUTH EVERY DAY (Patient not taking: Reported on 10/17/2022) 90 tablet 1    linaclotide (LINZESS) 145 MCG capsule Take 1 capsule by mouth every morning (before breakfast) 90 capsule 0    ELIQUIS 5 MG TABS tablet TAKE 1 TABLET BY MOUTH TWICE DAILY 60 tablet 1    rosuvastatin (CRESTOR) 40 MG tablet TAKE 1 TABLET BY MOUTH EVERY DAY 90 tablet 1    lisinopril (PRINIVIL;ZESTRIL) 10 MG tablet TAKE 1 TABLET BY MOUTH EVERY DAY 90 tablet 1    diclofenac sodium (VOLTAREN) 1 % GEL Apply 4 g topically 4 times daily 100 g 1    Handicap Placard MISC by Does not apply route 08/09/22 1 each 0    metoprolol tartrate (LOPRESSOR) 25 MG tablet TAKE 1 TABLET TWICE A  tablet 2    VASCEPA 1 g CAPS capsule Take 2 capsules by mouth 2 times daily 360 capsule 2    divalproex (DEPAKOTE) 250 MG DR tablet Take 2 tablets by mouth nightly 180 tablet 1    aspirin 81 MG chewable tablet Take 81 mg by mouth daily      Calcium Carb-Cholecalciferol (CALCIUM 1000 + D) 1000-800 MG-UNIT TABS Take 1,000 mg by mouth daily 90 tablet 1    Continuous Blood Gluc Sensor (TellagenceE SENSOR SYSTEM) MISC 1 box by Does not apply route 2 times daily 1 each 0    LANTUS SOLOSTAR 100 UNIT/ML injection pen Inject 65 Units into the skin nightly (Patient taking differently: Inject 40 Units into the skin nightly) 15 pen 5    BD PEN NEEDLE MICRO U/F 32G X 6 MM MISC USE WITH LANTUS DAILY 100 each 5    METAMUCIL FIBER PO Take by mouth MiraLax instead (Patient not taking: Reported on 10/17/2022)      ondansetron (ZOFRAN) 4 MG tablet Take 1 tablet by mouth 3 times daily as needed for Nausea or Vomiting (Patient not taking: Reported on 10/17/2022) 30 tablet 0    Cyanocobalamin (B-12) 50 MCG TABS Take by mouth       Multiple Vitamins-Minerals (VITEYES COMPLETE PO) Take by mouth      latanoprost (XALATAN) 0.005 % ophthalmic solution 1 drop nightly       No current facility-administered medications for this visit. Social History:   Social History     Socioeconomic History    Marital status:      Spouse name: Not on file    Number of children: Not on file    Years of education: Not on file    Highest education level: Not on file   Occupational History    Not on file   Tobacco Use    Smoking status: Never    Smokeless tobacco: Never   Vaping Use    Vaping Use: Never used   Substance and Sexual Activity    Alcohol use: Never    Drug use: Never    Sexual activity: Not on file   Other Topics Concern    Not on file   Social History Narrative    Not on file     Social Determinants of Health     Financial Resource Strain: Low Risk     Difficulty of Paying Living Expenses: Not hard at all   Food Insecurity: No Food Insecurity    Worried About Running Out of Food in the Last Year: Never true    [address] in the Last Year: Never true   Transportation Needs: No Transportation Needs    Lack of Transportation (Medical): No    Lack of Transportation (Non-Medical):  No   Physical Activity: Insufficiently Active    Days of Exercise per Week: 2 days    Minutes of Exercise per Session: 20 min   Stress: Not on file   Social Connections: Not on file   Intimate Partner Violence: Not on file   Housing Stability: Low Risk     Unable to Pay for Housing in the Last Year: No    Number of Places Lived in the Last Year: 2    Unstable Housing in the Last Year: No     TOBACCO:   reports that she has never smoked. She has never used smokeless tobacco.  ETOH:   reports no history of alcohol use. Family History:   Family History   Problem Relation Age of Onset    Diabetes Paternal Grandmother     Diabetes Father     Lung Cancer Mother     Pancreatic Cancer Brother     Diabetes Brother      A:  Patient engaged and cooperative. Denies SI. Insight and motivation are good. Diagnosis:    1. Anxiety          Diagnosis Date    Colon polyps     Diabetes mellitus ([address]     Diabetic neuropathy ([address]     Diabetic retinopathy ([address]     Essential hypertension 10/28/2019    Fall     Fatty liver     Gastritis     GERD (gastroesophageal reflux disease)     Hyperlipidemia     Hypertension     Hypothyroidism     Irritable bowel syndrome     Major depressive disorder with single episode 10/28/2019    Osteopenia     Photosensitivity disorder     chronic    RBBB     Sleep apnea     on BIPAP    Thyroid disease     Thyroid nodule     neg bx-chronic thyroiditis    Type 2 diabetes mellitus with diabetic polyneuropathy, with long-term current use of insulin ([facility] 07/23/2019    Vitamin D deficiency      Plan:  Pt interventions:  Conducted functional assessment, [place]-setting to identify pt's primary goals for [facility] visit / overall health, Supportive techniques, and Emphasized self-care as important for managing overall health.     Pt Behavioral Change Plan:   See Pt Instructions

## 2022-10-28 NOTE — CARE COORDINATION
Remote Patient Monitoring Note      Date/Time:  10/28/2022 12:12 PM  LPN reviewed RPM regarding red alert received for weight increase (FALSE). Background: RPM for HTN Clinical Interventions: Reviewed and followed up on alerts and treatments-LPN reviewed RPM r/t FALSE red alert r/t wt increase. Pt's wt today is 139 lbs which is WNL. Pt has a dropped left foot from stroke and has trouble balancing on scale and may enter multiple entries. Plan/Follow Up: Will continue to review, monitor and address alerts with follow up based on severity of symptoms and risk factors.  Jean Paul Aamir BURCIAGA, [address] [facility] PH: [phone] ---- Current Patient Metrics ---- Activity: - mins Blood Pressure: 161/76, 64bpm Pulseox: 96%, 64bpm Weight: 139.0lbs Note Created at: 10/28/2022 12:14 PM ET ---- Time-Spent: 2 minutes 0 seconds

## 2022-10-31 ENCOUNTER — CARE COORDINATION (OUTPATIENT)
Dept: CASE MANAGEMENT | Age: 77
End: 2022-10-31

## 2022-10-31 NOTE — CARE COORDINATION
Remote Patient Monitoring Note      Date/Time:  10/31/2022 1:45 PM  LPN reviewed RPM regarding red alert received for weight increase (FALSE). Background: RPM for HTN Clinical Interventions: Reviewed and followed up on alerts and treatments-LPN reviewed Default FALSE red alert r/t wt increase. Pt's wt is 138.8, wt has not changed in three days. Alert due to user error four days ago when incorrect wt value was submitted. Plan/Follow Up: Will continue to review, monitor and address alerts with follow up based on severity of symptoms and risk factors.  Luiza Garcia LPN, [facility] PH: [phone] ---- Current Patient Metrics ---- Activity: - mins Blood Pressure: 132/65, 73bpm Pulseox: 95%, 74bpm Weight: 137.8lbs Note Created at: 10/31/2022 01:47 PM ET ---- Time-Spent: 2 minutes 0 seconds

## 2022-11-01 ENCOUNTER — CARE COORDINATION (OUTPATIENT)
Dept: CARE COORDINATION | Age: 77
End: 2022-11-01

## 2022-11-01 NOTE — CARE COORDINATION
Remote Patient Monitoring Note      Date/Time:  11/1/2022 10:49 AM    CCSS reviewed patients reported daily Remote Patient Monitoring metrics. All reported metrics are within normal limits. Plan/Follow Up:  Will continue to review, monitor and address alerts with follow up based on severity of symptoms and risk factors ---- Current Patient Metrics ---- Activity: - mins Blood Pressure: 148/70, 62bpm Pulseox: 98%, 65bpm Weight: 139.4lbs Note Created at: 11/01/2022 10:49 AM ET ---- Time-Spent: 2 minutes 0 second

## 2022-11-02 ENCOUNTER — CARE COORDINATION (OUTPATIENT)
Dept: CARE COORDINATION | Age: 77
End: 2022-11-02

## 2022-11-02 ENCOUNTER — HOSPITAL ENCOUNTER (OUTPATIENT)
Dept: PHYSICAL THERAPY | Age: 77
Setting detail: THERAPIES SERIES
Discharge: HOME | End: 2022-11-02
Payer: MEDICARE

## 2022-11-02 PROCEDURE — 97530 THERAPEUTIC ACTIVITIES: CPT

## 2022-11-02 PROCEDURE — 97161 PT EVAL LOW COMPLEX 20 MIN: CPT

## 2022-11-02 NOTE — CARE COORDINATION
Remote Patient Monitoring Note      Date/Time:  11/2/2022 10:26 AM    CCSS reviewed patients reported daily Remote Patient Monitoring metrics. All reported metrics are within normal limits. Plan/Follow Up:  Will continue to review, monitor and address alerts with follow up based on severity of symptoms and risk factors ---- Current Patient Metrics ---- Activity: - mins Blood Pressure: 132/64, 62bpm Pulseox: 96%, 62bpm Weight: 138.4lbs Note Created at: 11/02/2022 10:27 AM ET ---- Time-Spent: 2 minutes 0 seconds

## 2022-11-02 NOTE — THERAPY EVALUATION
[facility], [facility], [address], [address]  Phone: [phone]  Fax [phone]    Dear Referring Practitioner: Dr. April Sanchez,     We had the pleasure of evaluating the following patient for physical therapy services at [facility]. A summary of our findings can be found in the initial assessment below. This includes our plan of care. If you have any questions or concerns regarding these findings, please do not hesitate to contact me at the office phone number. Thank you for the referral.         Physician Signature:_______________________________Date:__________________  By signing above (or electronic signature), therapists plan is approved by physician        LOWER EXTREMITY AND BALANCE PHYSICAL THERAPY EVALUATION      Patient: Aislinn Neff (7 y.o. female)   Examination Date: 2022   :  1945 MRN: [id number]   Auth needed? []  Yes    [x]  No   Amount authorized: Insurance: Payor: MEDICARE / Plan: MEDICARE PART A AND B / Product Type: *No Product type* /   Insurance ID: [id number] - (Medicare)  Secondary Insurance (if applicable): [facility] HEALTHCARE   Referring Physician: Azalia Espinal DO     PCP: Isabella Blake DO Medical Diagnosis: Unsteady gait [R26.81]    Preferred Language for Healthcare:     [x] English       [] other: Latex Allergy? []  Yes [x]  No        SUBJECTIVE FINDINGS    History of Present Illness:  Pt presents with c/o decreased balance s/p CVA in April. Notes that she did 6 weeks of IP rehab following CVA and was doing well. However, in August she fell while walking in the grass and hurt her back. Notes that she is unable to drive at this time. Has been dealing with depression due to lack of indep. Pt reports that she does have drop foot on LLE - uses AFO sometimes. Denies N&T. Does note neuropathy from DM. Indep in dressing.  Notes she is afraid of getting into and out of bathtub. Spongebaths at sink. Has two ANNIE at garage - with railing. Notes no issues with steps to enter. Once in the home, everything is on one floor. Using RW for all ambulation. Medical History: CVA, DM, HTN, osteopenia, anx/dep, a-fib  Current Functional Limitations: driving, exercise, lifting heavy objects, reaching over head, squatting  PLOF: indep in ADLs, lifting, living alone, retired    Pain       Denies at rest      OBJECTIVE FINDINGS        Gait/Steps/Balance    []   [facility] [x]    Dysfunction Noted.    Comment: pt ambulated with RW and L AFO, Forward flexed, steppage gait sequence LLE      Sensation/Motor Function   [x] All dermatomes WNL except as marked below   [x] All  myotomes WNL except as marked below      Dermatome Left Right   Anterior groin, 2-3 inches below ASIS (L1-L2)     Middle third anterior thigh (L3)     Patella and med malleolus (L4)     Fibular head and dorsum of foot (L5)     Lateral side and plantar surface of foot (S1)     Medial aspect of posterior thigh (S2)     Perianal area (S3,4)            Range of Motion/Strength Testing     [x] All ROM and strength WNL except as marked below   * Denotes limitation by pain    Range Tested AROM MMT/Resisted    Left Right Left Right   Hip Flexion   4 5   Hip Extension   3 4   Hip Abduction   3+ 4-   Hip ER   3+ 4-   Knee Flexion   5 5   Knee Extension   5 5   Ankle Dorsiflex   3- 5   Inversion   4 5   Eversion   4 5     Flexibility     [x] All flexibility WNL except as marked below  Muscle Left Right   Hamstrings (90/90) []  WNL  [] Tight  [] NT []  WNL  [] Tight  [] NT   Gastroc []  WNL  [x] Tight  [] NT []  WNL  [] Tight  [] NT   TFL/ITB (Estevan) []  WNL  [] Tight  [] NT []  WNL  [] Tight  [] NT   Iliopsoas (Chin) []  WNL  [] Tight  [] NT []  WNL  [] Tight  [] NT   Piriformis []  WNL  [] Tight  [] NT []  WNL  [] Tight  [] NT     Reflexes/Trunk Strength    [x] All WNL except as marked below     Reflex Left Right Strength Strength   Quadriceps (L3,4)   Transv Abdominis    Achilles (S1,2)       Ankle clonus - -     Babinski - -       TUG Time: 15 sec with RW, 18 sec without device    HARRIS BALANCE SCALE 14-Item Long Form Original Version   Total Score: 34/56     1. SITTING TO STANDING   (4) able to stand without using hands and stabilize independently   (3) able to stand independently using hands   (2) able to stand using hands after several tries   (1) needs minimal aid to stand or to stabilize   (0) needs moderate or maximal assist to stand   Score: 3    2. STANDING UNSUPPORTED   (4) able to stand safely 2 minutes   (3) able to stand 2 minutes with supervision   (2) able to stand 30 seconds unsupported   (1) needs several tries to stand 30 seconds unsupported   (0) unable to stand 30 seconds unassisted If a subject is able to stand 2   minutes unsupported, score full points for sitting unsupported. Proceed to   item #4. Score: 4     3. SITTING WITH BACK UNSUPPORTED BUT FEET SUPPORTED   ON FLOOR OR ON A STOOL   (4) able to sit safely and securely 2 minutes   (3) able to sit 2 minutes under supervision   (2) able to sit 30 seconds   (1) able to sit 10 seconds   (0) unable to sit without support 10 seconds   Score: 4     4. STANDING TO SITTING   (4) sits safely with minimal use of hands   (3) controls descent by using hands   (2) uses back of legs against chair to control descent   (1) sits independently but has uncontrolled descent   (0) needs assistance to sit   Score: 4    5. TRANSFERS   (4) able to transfer safely with minor use of hands   (3) able to transfer safely definite need of hands   (2) able to transfer with verbal cueing and/or supervision   (1) needs one person to assist   (0) needs two people to assist or supervise to be safe   Score: 3    6.  STANDING UNSUPPORTED WITH EYES CLOSED   (4) able to stand 10 seconds safely   (3) able to stand 10 seconds with supervision   (2) able to stand 3 seconds   (1) unable to keep eyes closed 3 seconds but stays steady   (0) needs help to keep from falling   Score: 3    7. STANDING UNSUPPORTED WITH FEET TOGETHER   (4) able to place feet together independently and stand 1 minute safely   (3) able to place feet together independently and stand for 1 minute with   supervision   (2) able to place feet together independently but unable to hold for 30 seconds   (1) needs help to attain position but able to stand 15 seconds feet together   (0) needs help to attain position and unable to hold for 15 seconds   Score: 1    8. REACHING FORWARD WITH OUTSTRETCHED ARM WHILE   STANDING   (4) can reach forward confidently >25 cm (10 inches)   (3) can reach forward >12 cm safely (5 inches)   (2) can reach forward >5 cm safely (2 inches)   (1) reaches forward but needs supervision   (0) loses balance while trying/requires external support   Score: 3    9.  OBJECT FROM FLOOR FROM A STANDING POSITION   (4) able to  slipper safely and easily   (3) able to  slipper but needs supervision   (2) unable to  but reaches 2-5cm (1-2 inches) from slipper and keeps   balance independently   (1) unable to  and needs supervision while trying   (0) unable to try/needs assist to keep from losing balance or falling   Score: 3    10. TURNING TO LOOK BEHIND OVER LEFT AND RIGHT   SHOULDERS WHILE STANDING   (4) looks behind from both sides and weight shifts well   (3) looks behind one side only other side shows less weight shift   (2) turns sideways only but maintains balance   (1) needs supervision when turning   (0) needs assist to keep from losing balance or falling   Score: 1    11. TURN 360 DEGREES   (4) able to turn 360 degrees safely in 4 seconds or less   (3) able to turn 360 degrees safely one side only in 4 seconds or less   (2) able to turn 360 degrees safely but slowly   (1) needs close supervision or verbal cueing   (0) needs assistance while turning   Score: 2    12.  PLACING ALTERNATE FOOT ON STEP OR STOOL WHILE   STANDING UNSUPPORTED   (4) able to stand independently and safely and complete 8 steps in 20 seconds   (3) able to stand independently and complete 8 steps >20 seconds   (2) able to complete 4 steps without aid with supervision   (1) able to complete >2 steps needs minimal assist   (0) needs assistance to keep from falling/unable to try   Score: 0    13. STANDING UNSUPPORTED ONE FOOT IN FRONT   (4) able to place foot tandem independently and hold 30 seconds   (3) able to place foot ahead of other independently and hold 30 seconds   (2) able to take small step independently and hold 30 seconds   (1) needs help to step but can hold 15 seconds   (0) loses balance while stepping or standing   Score: 3    14. STANDING ON ONE LEG   (4) able to lift leg independently and hold >10 seconds   (3) able to lift leg independently and hold 5-10 seconds   (2) able to lift leg independently and hold = or >3 seconds   (1) tries to lift leg unable to hold 3 seconds but remains standing   independently   (0) unable to try or needs assist to prevent fall   Score: 0    Max score 56,a person scoring below 45 is considered to be at risk for falling. ASSESSMENT  Pt is a 77 y/o presenting to physical therapy with c/o decreased balance and impaired gait. Thorough evaluation and examination, identified findings consistent with balance deficits associated post CVA. Pt scored a 34/56 on the HARRIS Balance Test, placing her at a high risk for falls. PT recommending skilled physical therapy in order to address goals.      Body Structures, Functions, Activity Limitations Requiring Skilled Therapeutic Intervention: Decreased functional mobility ,Decreased ADL status,Decreased ROM,Decreased body mechanics,Decreased strength,Decreased balance,Increased pain     Statement of Medical Necessity: Physical Therapy is both indicated and medically necessary as outlined in the POC to increase the likelihood of meeting the functionally related goals stated below. Eval Complexity:    Decision Making: LOW Complexity    PLAN OF CARE    Frequency: 2x/wk for 4 weeks  Current Treatment Recommendations: Therapeutic exercise, therapeutic activity, gait training, neuromuscular re-education    G-CODE  LEFS 28/80    GOALS  Short term goal 1: Pt will be indep in HEP  Short term goal 2: Pt will increase B hip strength to 4/5  Short term goal 3: Pt will increase HARRIS Balance score to > 45  Short term goal 4: Pt will ambulate with increased hip extension and glut activation BLE  Short term goal 5: Pt will return to completing all ADLs indep    Thank you for the referral of this patient.      Time In: 1045  Time Out: 1130  Timed Code Treatment Minutes:  10  minutes            Total Treatment Time: 45  minutes      Tanya Coates PT DPT  license #[id number]

## 2022-11-02 NOTE — FLOWSHEET NOTE
[facility], [facility], [address], [address]  Phone: [phone]  Fax [phone]    Physical Therapy Daily Treatment Note    Date:  2022    Patient: Aislinn Neff (25 y.o. female)   Examination Date: 2022   :  1945 MRN: [id number]   Auth needed? []  Yes    [x]  No        Amount authorized: Insurance: Payor: MEDICARE / Plan: MEDICARE PART A AND B / Product Type: *No Product type* /   Insurance ID: [id number] - (Medicare)  Secondary Insurance (if applicable): [facility]   Referring Physician: Azalia Espinal DO     PCP: Isabella Blake DO Medical Diagnosis: Unsteady gait [R26.81]    Preferred Language for Healthcare:     [x] English       [] other: Latex Allergy? []  Yes    [x]  No          Plan of care signed (Y/N):  sent  Visit# / total visits:       G-Code (if applicable):          LEFS     Medicare Cap (if applicable):  103 = total amount used, updated 2022    Time in:   10:45      Timed Treatment: 10  Total Treatment Time:  45  ________________________________________________________________________________________    Pain Level:    /10  SUBJECTIVE:  see eval    OBJECTIVE:     Exercise/Equipment Resistance/Repetitions Other comments                                                                                                                                             Other Therapeutic Activities:  Pt educated on results of HARRIS and TUG tests and how those results affect her ability to function during ADLs.     Manual Treatments:         Modalities:      Test/Measurements:  see eval       ASSESSMENT:    see eval     Treatment/Activity Tolerance:   [x]Patient tolerated treatment well [] Patient limited by fatique  [x]Patient limited by pain [] Patient limited by other medical complications  [] Other:     Goals:    Short term goal 1: Pt will be indep in HEP  Short term goal 2: Pt will increase B hip strength to 4/5  Short term goal 3: Pt will increase HARRIS Balance score to > 45  Short term goal 4: Pt will ambulate with increased hip extension and glut activation BLE  Short term goal 5: Pt will return to completing all ADLs indep    Plan: [x] Continue per plan of care [] Alter current plan (see comments)   [x] Plan of care initiated [] Hold pending MD visit [] Discharge      Plan for Next Session:  Biomechanical correction of problems as they present. Facilitate correct muscle firing patterns and activation with appropriate activities. Progress patient as tolerated.      Re-Certification Due Date:         Signature:  Dewayne Sheppard, PT

## 2022-11-03 RX ORDER — DIVALPROEX SODIUM 250 MG/1
500 TABLET, DELAYED RELEASE ORAL NIGHTLY
Qty: 180 TABLET | Refills: 1 | Status: SHIPPED | OUTPATIENT
Start: 2022-11-03

## 2022-11-03 RX ORDER — DONEPEZIL HYDROCHLORIDE 10 MG/1
10 TABLET, FILM COATED ORAL NIGHTLY
Qty: 90 TABLET | Refills: 1 | Status: SHIPPED | OUTPATIENT
Start: 2022-11-03

## 2022-11-03 RX ORDER — GLIPIZIDE 10 MG/1
TABLET ORAL
Qty: 90 TABLET | Refills: 1 | Status: SHIPPED | OUTPATIENT
Start: 2022-11-03

## 2022-11-03 RX ORDER — ICOSAPENT ETHYL 1000 MG/1
2 CAPSULE ORAL 2 TIMES DAILY
Qty: 360 CAPSULE | Refills: 1 | Status: SHIPPED | OUTPATIENT
Start: 2022-11-03 | End: 2023-02-01

## 2022-11-03 RX ORDER — DULOXETIN HYDROCHLORIDE 20 MG/1
20 CAPSULE, DELAYED RELEASE ORAL DAILY
Qty: 90 CAPSULE | Refills: 1 | Status: SHIPPED | OUTPATIENT
Start: 2022-11-03

## 2022-11-03 RX ORDER — ASPIRIN 81 MG/1
81 TABLET, CHEWABLE ORAL DAILY
Qty: 30 TABLET | Refills: 1 | Status: SHIPPED | OUTPATIENT
Start: 2022-11-03

## 2022-11-03 RX ORDER — ROSUVASTATIN CALCIUM 40 MG/1
TABLET, COATED ORAL
Qty: 90 TABLET | Refills: 1 | Status: SHIPPED | OUTPATIENT
Start: 2022-11-03

## 2022-11-03 RX ORDER — APIXABAN 5 MG/1
TABLET, FILM COATED ORAL
Qty: 180 TABLET | Refills: 1 | Status: SHIPPED | OUTPATIENT
Start: 2022-11-03

## 2022-11-03 RX ORDER — EMPAGLIFLOZIN 10 MG/1
TABLET, FILM COATED ORAL
Qty: 90 TABLET | Refills: 1 | Status: SHIPPED | OUTPATIENT
Start: 2022-11-03

## 2022-11-03 RX ORDER — INSULIN GLARGINE 100 [IU]/ML
40 INJECTION, SOLUTION SUBCUTANEOUS NIGHTLY
Qty: 15 ADJUSTABLE DOSE PRE-FILLED PEN SYRINGE | Refills: 2 | Status: SHIPPED | OUTPATIENT
Start: 2022-11-03

## 2022-11-03 RX ORDER — LISINOPRIL 10 MG/1
TABLET ORAL
Qty: 90 TABLET | Refills: 1 | Status: SHIPPED | OUTPATIENT
Start: 2022-11-03

## 2022-11-03 NOTE — TELEPHONE ENCOUNTER
Called to follow up with patient. She states she has been waiting for a call and hasn't heard anything regarding getting her medications set up in pill packs through the [facility]. I called and spoke with ACM RN, Michael Cooper. She was not aware and had not heard back from patient regarding issues with her medications. I informed her that patient has not heard from pill box and is worried about having to organize her pills herself since her son is going to be out of town. It looks like only 3 of patients medications were sent to the pill box; not all of them as ACM was informed. I pended the rest of the patient medications to be sent to [facility] [address] so patient can receive daily packaged meds. Manasa Strange will help patient if there is any issues regarding cost of medications once the new pharmacy has received orders.       Medications pended for 90 day supply with 1 refill each except for Jardiance which has a limit of 30 days

## 2022-11-04 ENCOUNTER — APPOINTMENT (OUTPATIENT)
Dept: PHYSICAL THERAPY | Age: 77
End: 2022-11-04
Payer: MEDICARE

## 2022-11-04 ENCOUNTER — CARE COORDINATION (OUTPATIENT)
Dept: CARE COORDINATION | Age: 77
End: 2022-11-04

## 2022-11-04 NOTE — TELEPHONE ENCOUNTER
Patient called back. Informed [facility] attempted to get a hold of her. Provided her their number and asked her to call them. Also told her if she doesn't they will call her tomorrow. Patient states she is going to call. Informed her to let me know if there is anything else I can do to help.

## 2022-11-04 NOTE — CARE COORDINATION
Remote Patient Monitoring Note      Date/Time:  11/4/2022 10:18 AM    CCSS reviewed patients reported daily Remote Patient Monitoring metrics. All reported metrics are within normal limits. Plan/Follow Up:  Will continue to review, monitor and address alerts with follow up based on severity of symptoms and risk factors ---- Current Patient Metrics ---- Activity: - mins Blood Pressure: 136/70, 58bpm Pulseox: 96%, 58bpm Weight: 137.8lbs Note Created at: 11/04/2022 10:18 AM ET ---- Time-Spent: 2 minutes 0 secon

## 2022-11-04 NOTE — TELEPHONE ENCOUNTER
Called patient to follow up and see if she heard from [facility]. No answer. Unable to leave VM. Contacted [facility] to see if they were able to get a hold of patient. Pharmacist states they attempted to call patient but the woman who answered told them they had the wrong number. Confirmed patient phone number and it is the same one we have.  She said she will try and reach the patient again tomorrow

## 2022-11-08 ENCOUNTER — CARE COORDINATION (OUTPATIENT)
Dept: CASE MANAGEMENT | Age: 77
End: 2022-11-08

## 2022-11-08 ENCOUNTER — CARE COORDINATION (OUTPATIENT)
Dept: CARE COORDINATION | Age: 77
End: 2022-11-08

## 2022-11-08 DIAGNOSIS — F33.1 MAJOR DEPRESSIVE DISORDER, RECURRENT, MODERATE (HCC): ICD-10-CM

## 2022-11-08 RX ORDER — BUPROPION HYDROCHLORIDE 150 MG/1
150 TABLET ORAL EVERY MORNING
Qty: 90 TABLET | Refills: 3 | Status: SHIPPED | OUTPATIENT
Start: 2022-11-08

## 2022-11-08 NOTE — CARE COORDINATION
Ambulatory Care Coordination Note  11/8/2022    ACC: Bertha Brunson, RN    ACM completed follow up call with patient. Patient said everything was sent over to the [facility] and is awaiting first pill pack deliveries. Patient had no other questions at this time. ACM said she would follow up with the [facility] to discuss delivery. ACM spoke to [address] who said all Rx had been received except for Buproprion. ACM will ask if this can be pended and sent over for medication packaging. [facility] said after that is received medications will get sent to patient. Plan:    Buproprion prescription to be sent to [facility]  F/U call 2 weeks      Offered patient enrollment in the Remote Patient Monitoring (RPM) program for in-home monitoring: Yes, patient enrolled. Lab Results       None            Care Coordination Interventions    Referral from Primary Care Provider: Yes  Suggested Interventions and Community Resources  Medi Set or Pill Pack: In Process (Comment: [facility] Medi set packs)  Senior Services: In Process (Comment: Jacques send referral over for MOW and other services patient might qualify for)          Goals Addressed                   This Visit's Progress     Medication Management   Improving     I will take my medication as directed. I will notify my provider of any problems with medications, like adverse effects or side effects. I will notify my provider/Care Coordinator if I am unable to afford my medications. I will notify my provider for advice before I stop taking any of my medication. Barriers: overwhelmed by complexity of regimen  Plan for overcoming my barriers: ACM will be connecting to get pill blister packets for patient for easier administration. Confidence: 8/10  Anticipated Goal Completion Date: 11/17/22              Prior to Admission medications    Medication Sig Start Date End Date Taking?  Authorizing Provider   donepezil (ARICEPT) 10 MG tablet Take 1 tablet by mouth nightly extended release tablet Take 1 tablet by mouth every morning 10/10/22   Ana Baires DO   insulin lispro, 1 Unit Dial, ([facility]) 100 UNIT/ML SOPN Use on a sliding scale 3 times a day with meals. Maximum dose 30 units per day.  10/10/22   Yaquelin Bailey, DO   Continuous Blood Gluc Sensor ([address] [place] [address]) MISC 1 Units by Other route every 14 days Place 1 sensor on back of arm every 14 days 10/4/22 1/2/23  Yaquelin Bailey, DO   Handicap Placard MISC by Does not apply route 08/09/22 8/9/22   Ana Baires DO   Continuous Blood Gluc Sensor ([address] [place] [address]) MISC 1 box by Does not apply route 2 times daily 6/16/22   YANI Horta   BD PEN NEEDLE MICRO U/F 32G X 6 MM MISC USE WITH LANTUS DAILY 4/11/22   Charity Zaragoza MD   METAMUCIL FIBER PO Take by mouth MiraLax instead  Patient not taking: Reported on 10/17/2022    Historical Provider, MD   ondansetron (ZOFRAN) 4 MG tablet Take 1 tablet by mouth 3 times daily as needed for Nausea or Vomiting  Patient not taking: Reported on 10/17/2022 1/12/22   Charity Zaragoza MD   Cyanocobalamin (B-12) 50 MCG TABS Take by mouth     Historical Provider, MD   Multiple Vitamins-Minerals (VITEYES COMPLETE PO) Take by mouth    Historical Provider, MD   latanoprost (XALATAN) 0.005 % ophthalmic solution 1 drop nightly    Historical Provider, MD       Future Appointments   Date Time Provider Coleman Garcia   11/9/2022  4:00 PM Ana Baires DO [facility]  [place] [facility]   11/14/2022  1:00 PM Sarah Neal, PhD [facility] MMA   11/21/2022  9:15 AM KELLI Silva PT [facility]   11/22/2022  1:15 PM JULIET Her - NP [facility]   11/28/2022 10:45 AM KELLI Silva PT [facility]   11/29/2022  1:00 PM DO KRISTIN Jackson  [place] - [facility]   11/30/2022  1:00 PM Francine Back PT [facility] PT Dee [facility]    and   General Assessment    Do you have any symptoms that are causing concern?: No

## 2022-11-08 NOTE — CARE COORDINATION
Remote Patient Monitoring Note      Date/Time:  2022 3:39 PM  LPN contacted patient by telephone regarding yellow alert received for weight increase (No wt in two days). Verified patients name and  as identifiers. Background: RPM for HTN Clinical Interventions: Reviewed and followed up on alerts and treatments-LPN contacted pt in regards to pt not submitting a wt value in two days. Pt stated that she was out of town due to death in the family and will charge tablet today and submit vitals later today. Plan/Follow Up: Will continue to review, monitor and address alerts with follow up based on severity of symptoms and risk factors.  Saundra Segundo LPN, [facility] PH: [phone] ---- Current Patient Metrics ---- Activity: - mins Blood Pressure: -/-, -bpm Pulseox: -%, -bpm Weight: -lbs Note Created at: 2022 03:41 PM ET ---- Time-Spent: 5 minutes 0 seconds

## 2022-11-09 ENCOUNTER — CARE COORDINATION (OUTPATIENT)
Dept: CARE COORDINATION | Age: 77
End: 2022-11-09

## 2022-11-09 ENCOUNTER — TELEMEDICINE (OUTPATIENT)
Dept: FAMILY MEDICINE CLINIC | Age: 77
End: 2022-11-09
Payer: MEDICARE

## 2022-11-09 ENCOUNTER — APPOINTMENT (OUTPATIENT)
Dept: PHYSICAL THERAPY | Age: 77
End: 2022-11-09
Payer: MEDICARE

## 2022-11-09 DIAGNOSIS — M25.511 ACUTE PAIN OF RIGHT SHOULDER: ICD-10-CM

## 2022-11-09 DIAGNOSIS — E11.9 TYPE 2 DIABETES MELLITUS WITHOUT COMPLICATION, WITHOUT LONG-TERM CURRENT USE OF INSULIN (HCC): ICD-10-CM

## 2022-11-09 DIAGNOSIS — F33.1 MAJOR DEPRESSIVE DISORDER, RECURRENT, MODERATE (HCC): Primary | ICD-10-CM

## 2022-11-09 PROCEDURE — G8427 DOCREV CUR MEDS BY ELIG CLIN: HCPCS | Performed by: STUDENT IN AN ORGANIZED HEALTH CARE EDUCATION/TRAINING PROGRAM

## 2022-11-09 PROCEDURE — 1090F PRES/ABSN URINE INCON ASSESS: CPT | Performed by: STUDENT IN AN ORGANIZED HEALTH CARE EDUCATION/TRAINING PROGRAM

## 2022-11-09 PROCEDURE — G8484 FLU IMMUNIZE NO ADMIN: HCPCS | Performed by: STUDENT IN AN ORGANIZED HEALTH CARE EDUCATION/TRAINING PROGRAM

## 2022-11-09 PROCEDURE — 99213 OFFICE O/P EST LOW 20 MIN: CPT | Performed by: STUDENT IN AN ORGANIZED HEALTH CARE EDUCATION/TRAINING PROGRAM

## 2022-11-09 PROCEDURE — G8420 CALC BMI NORM PARAMETERS: HCPCS | Performed by: STUDENT IN AN ORGANIZED HEALTH CARE EDUCATION/TRAINING PROGRAM

## 2022-11-09 PROCEDURE — 3017F COLORECTAL CA SCREEN DOC REV: CPT | Performed by: STUDENT IN AN ORGANIZED HEALTH CARE EDUCATION/TRAINING PROGRAM

## 2022-11-09 PROCEDURE — 2022F DILAT RTA XM EVC RTNOPTHY: CPT | Performed by: STUDENT IN AN ORGANIZED HEALTH CARE EDUCATION/TRAINING PROGRAM

## 2022-11-09 PROCEDURE — 1123F ACP DISCUSS/DSCN MKR DOCD: CPT | Performed by: STUDENT IN AN ORGANIZED HEALTH CARE EDUCATION/TRAINING PROGRAM

## 2022-11-09 PROCEDURE — 1036F TOBACCO NON-USER: CPT | Performed by: STUDENT IN AN ORGANIZED HEALTH CARE EDUCATION/TRAINING PROGRAM

## 2022-11-09 PROCEDURE — G8400 PT W/DXA NO RESULTS DOC: HCPCS | Performed by: STUDENT IN AN ORGANIZED HEALTH CARE EDUCATION/TRAINING PROGRAM

## 2022-11-09 PROCEDURE — 3052F HG A1C>EQUAL 8.0%<EQUAL 9.0%: CPT | Performed by: STUDENT IN AN ORGANIZED HEALTH CARE EDUCATION/TRAINING PROGRAM

## 2022-11-09 NOTE — PROGRESS NOTES
Luh Billings (:  1945) is a Established patient, here for evaluation of the following:    Assessment & Plan   Below is the assessment and plan developed based on review of pertinent history, physical exam, labs, studies, and medications. 1. Major depressive disorder, recurrent, moderate ([address]  Reports doing well on wellbutrin. No longer needing lexapro. 2. Type 2 diabetes mellitus without complication, without long-term current use of insulin ([facility])  Reports blood sugars improved on CGM and current medications. 3. Acute pain of right shoulder  Unable to perform evaluation over video. Discussed home exercises and use of NSAIDs for improvement. IF not improving then would consider PT if able to have PT for hips and shoulder at the same time. No follow-ups on file. Subjective   HPI    Depression  Feels that mood has improved with wellbutrin and stopping lexapro      Dm2  Has been able to get CGM    Feels pain in her right arm for the last 2 weeks. Was in the car and reached up to get something and immediately felt pain. Feels that she has a \"wrippling\" sensation in right arm. Review of Systems       Objective   Patient-Reported Vitals  No data recorded       Physical Exam             Luh Billings, was evaluated through a synchronous (real-time) audio-video encounter. The patient (or guardian if applicable) is aware that this is a billable service, which includes applicable co-pays. This Virtual Visit was conducted with patient's (and/or legal guardian's) consent. The visit was conducted pursuant to the emergency declaration under the [address] [place] [address] authority and the [employer] Act. Patient identification was verified, and a caregiver was present when appropriate. The patient was located at Home: [address] [address].    Provider was [facility] at [facility] [address] [facility],  [address] [facility] [address].         --Negrito Butler, DO

## 2022-11-09 NOTE — CARE COORDINATION
Remote Patient Monitoring Note      Date/Time:  11/9/2022 10:19 AM    CCSS reviewed patients reported daily Remote Patient Monitoring metrics. All reported metrics are within alert parameters. Plan/Follow Up:  Will continue to review, monitor and address alerts with follow up based on severity of symptoms and risk factors ---- Current Patient Metrics ---- Activity: - mins Blood Pressure: 152/66, 66bpm Pulseox: 97%, 65bpm Weight: 139.6lbs Note Created at: 11/09/2022 10:19 AM ET ---- Time-Spent: 2 minutes 0 seconds

## 2022-11-10 ENCOUNTER — CARE COORDINATION (OUTPATIENT)
Dept: CARE COORDINATION | Age: 77
End: 2022-11-10

## 2022-11-10 NOTE — CARE COORDINATION
Remote Patient Monitoring Note      Date/Time:  11/10/2022 11:21 AM    CCSS reviewed patients reported daily Remote Patient Monitoring metrics. All reported metrics are within alert parameters. Plan/Follow Up:  Will continue to review, monitor and address alerts with follow up based on severity of symptoms and risk factors ---- Current Patient Metrics ---- Activity: - mins Blood Pressure: 153/84, 61bpm Pulseox: 93%, 62bpm Weight: 138.4lbs Note Created at: 11/10/2022 11:22 AM ET ---- Time-Spent: 2 minutes 0 seconds

## 2022-11-11 ENCOUNTER — CARE COORDINATION (OUTPATIENT)
Dept: CARE COORDINATION | Age: 77
End: 2022-11-11

## 2022-11-11 NOTE — CARE COORDINATION
Remote Patient Monitoring Note      Date/Time:  11/11/2022 1:03 PM    CCSS reviewed patients reported daily Remote Patient Monitoring metrics. All reported metrics are within alert parameters. Plan/Follow Up:  Will continue to review, monitor and address alerts with follow up based on severity of symptoms and risk factors ---- Current Patient Metrics ---- Activity: - mins Blood Pressure: 145/68, 67bpm Pulseox: 96%, 68bpm Weight: 138.4lbs Note Created at: 11/11/2022 01:04 PM ET ---- Time-Spent: 2 minutes 0 seconds

## 2022-11-14 ENCOUNTER — SCHEDULED TELEPHONE ENCOUNTER (OUTPATIENT)
Dept: PSYCHOLOGY | Age: 77
End: 2022-11-14
Payer: MEDICARE

## 2022-11-14 ENCOUNTER — CARE COORDINATION (OUTPATIENT)
Dept: CASE MANAGEMENT | Age: 77
End: 2022-11-14

## 2022-11-14 DIAGNOSIS — F41.9 ANXIETY: Primary | ICD-10-CM

## 2022-11-14 PROCEDURE — 98968 PH1 ASSMT&MGMT NQHP 21-30: CPT | Performed by: PSYCHOLOGIST

## 2022-11-14 NOTE — PROGRESS NOTES
Behavioral Health Consultation  Bisi Herrera, Ph.D.  Psychologist  11/14/2022  1:24 PM EST      Time spent with Patient: 25 minutes  This is patient's tenth [facility] appointment. Reason for Consult:    Chief Complaint   Patient presents with    Anxiety     Referring Provider: JULIET Josue - CNP  [address] [address] ([facility]) [address] [place] [address]    Feedback given to PCP. TELEHEALTH VISIT -- Audio Only (During MWLJB-24 public health emergency)    Pursuant to the emergency declaration under the [address] [place] [address], [facility] waiver authority and the [employer] Act, this phone call was conducted, with patient's consent, to reduce the patient's risk of exposure to COVID-19 and provide continuity of care for an established patient. Services were provided through a phone call discussion to substitute for in-person clinic visit. Pt gave verbal informed consent to participate in telehealth services. Consent:  She and/or health care decision maker is aware that that she may receive a bill for this telephone service, depending on her insurance coverage, and has provided verbal consent to proceed: Yes    Conducted a risk-benefit analysis and determined that the patient's presenting problems are consistent with the use of telepsychology. Determined that the patient has sufficient knowledge and skills in the use of technology enabling them to adequately benefit from telepsychology. It was determined that this patient was able to be properly treated without an in-person session. Patient verified that they were currently located at the [facility] address that was provided during registration.     Verified the following information:  Patient's identification: Yes  Patient location: [address]  Patient's call back number: [phone]  Patient's emergency contact's name and number, as well as permission to contact them if needed: Extended Emergency Contact Information  Primary Emergency Contact: PRINCESS GUTIERREZ [facility] Phone: [phone]  Mobile Phone: [phone]  Relation: Child  Secondary Emergency Contact: Marc Norwood  Home Phone: [phone]  Relation: Daughter-in-Law   needed? No    Provider location: [place] Ga[address] [place] [address] affirm this is an episode with a patient who has not had a related appointment within my department in the past 7 days or scheduled within the next 24 hours. S:  Patient reported that she has been having trouble with her telephone. Otherwise, reported that she had a wonderful day yesterday. Has experienced a number of stressors over the past few weeks, but has managed them well. Is still hoping to move into an independent living facility. Has discussed this with her son.       O:  MSE:    Attitude: cooperative and friendly  Consciousness: alert  Orientation: oriented to person, place, time, general circumstance  Memory: recent and remote memory intact  Attention/Concentration: intact during session  Speech: normal rate and volume, well-articulated  Mood: stressed  Affect: euthymic  Perception: within normal limits  Thought Content: all-or-none thinking and intrusive thoughts  Thought Process: logical, coherent and goal-directed  Insight: good  Judgment: intact  Ability to understand instructions: Yes  Ability to respond meaningfully: Yes  Morbid Ideation: no   Suicide Assessment: no suicidal ideation, plan, or intent  Homicidal Ideation: no    History:    Medications:   Current Outpatient Medications   Medication Sig Dispense Refill    buPROPion (WELLBUTRIN XL) 150 MG extended release tablet Take 1 tablet by mouth every morning 90 tablet 3    donepezil (ARICEPT) 10 MG tablet Take 1 tablet by mouth nightly 90 tablet 1    DULoxetine (CYMBALTA) 20 MG extended release capsule Take 1 capsule by mouth daily 90 capsule 1    glipiZIDE (GLUCOTROL) 10 MG tablet TAKE 1 TABLET BY MOUTH EVERY DAY 90 tablet 1 linaclotide (LINZESS) 145 MCG capsule Take 1 capsule by mouth every morning (before breakfast) 90 capsule 1    ELIQUIS 5 MG TABS tablet TAKE 1 TABLET BY MOUTH TWICE DAILY 180 tablet 1    rosuvastatin (CRESTOR) 40 MG tablet TAKE 1 TABLET BY MOUTH EVERY DAY 90 tablet 1    lisinopril (PRINIVIL;ZESTRIL) 10 MG tablet TAKE 1 TABLET BY MOUTH EVERY DAY 90 tablet 1    diclofenac sodium (VOLTAREN) 1 % GEL Apply 4 g topically 4 times daily 100 g 1    metoprolol tartrate (LOPRESSOR) 25 MG tablet TAKE 1 TABLET TWICE A  tablet 1    VASCEPA 1 g CAPS capsule Take 2 capsules by mouth 2 times daily 360 capsule 1    divalproex (DEPAKOTE) 250 MG DR tablet Take 2 tablets by mouth nightly 180 tablet 1    aspirin 81 MG chewable tablet Take 1 tablet by mouth daily 30 tablet 1    Calcium Carb-Cholecalciferol (CALCIUM 1000 + D) 1000-800 MG-UNIT TABS Take 1,000 mg by mouth daily 90 tablet 1    LANTUS SOLOSTAR 100 UNIT/ML injection pen Inject 40 Units into the skin nightly 15 Adjustable Dose Pre-filled Pen Syringe 2    JARDIANCE 10 MG tablet TAKE 1 TABLET BY MOUTH DAILY 90 tablet 1    levothyroxine (SYNTHROID) 100 MCG tablet Take 1 tablet daily 90 tablet 1    famotidine (PEPCID) 20 MG tablet Take 1 tablet by mouth 2 times daily 180 tablet 1    amLODIPine (NORVASC) 5 MG tablet TAKE 1 TABLET DAILY 90 tablet 1    polyethylene glycol (GLYCOLAX) 17 GM/SCOOP powder 1 powder in packet DAILY (route: oral)      insulin lispro, 1 Unit Dial, (HUMALOG KWIKPEN) 100 UNIT/ML SOPN Use on a sliding scale 3 times a day with meals. Maximum dose 30 units per day.  15 Adjustable Dose Pre-filled Pen Syringe 0    Continuous Blood Gluc Sensor (FREESTYLE ANT SENSOR SYSTEM) MISC 1 Units by Other route every 14 days Place 1 sensor on back of arm every 14 days 6 each 0    Handicap Placard MISC by Does not apply route 08/09/22 1 each 0    Continuous Blood Gluc Sensor (FREESTYLE ANT SENSOR SYSTEM) MISC 1 box by Does not apply route 2 times daily 1 each 0    BD PEN NEEDLE MICRO U/F 32G X 6 MM MISC USE WITH LANTUS DAILY 100 each 5    METAMUCIL FIBER PO Take by mouth MiraLax instead (Patient not taking: No sig reported)      ondansetron (ZOFRAN) 4 MG tablet Take 1 tablet by mouth 3 times daily as needed for Nausea or Vomiting (Patient not taking: No sig reported) 30 tablet 0    Cyanocobalamin (B-12) 50 MCG TABS Take by mouth       Multiple Vitamins-Minerals (VITEYES COMPLETE PO) Take by mouth      latanoprost (XALATAN) 0.005 % ophthalmic solution 1 drop nightly       No current facility-administered medications for this visit. Social History:   Social History     Socioeconomic History    Marital status:      Spouse name: Not on file    Number of children: Not on file    Years of education: Not on file    Highest education level: Not on file   Occupational History    Not on file   Tobacco Use    Smoking status: Never    Smokeless tobacco: Never   Vaping Use    Vaping Use: Never used   Substance and Sexual Activity    Alcohol use: Never    Drug use: Never    Sexual activity: Not on file   Other Topics Concern    Not on file   Social History Narrative    Not on file     Social Determinants of Health     Financial Resource Strain: Low Risk     Difficulty of Paying Living Expenses: Not hard at all   Food Insecurity: No Food Insecurity    Worried About Running Out of Food in the Last Year: Never true    [address] in the Last Year: Never true   Transportation Needs: No Transportation Needs    Lack of Transportation (Medical): No    Lack of Transportation (Non-Medical):  No   Physical Activity: Insufficiently Active    Days of Exercise per Week: 2 days    Minutes of Exercise per Session: 20 min   Stress: Not on file   Social Connections: Not on file   Intimate Partner Violence: Not on file   Housing Stability: Low Risk     Unable to Pay for Housing in the Last Year: No    Number of Places Lived in the Last Year: 2    Unstable Housing in the Last Year: No     TOBACCO: reports that she has never smoked. She has never used smokeless tobacco.  ETOH:   reports no history of alcohol use. Family History:   Family History   Problem Relation Age of Onset    Diabetes Paternal Grandmother     Diabetes Father     Lung Cancer Mother     Pancreatic Cancer Brother     Diabetes Brother      A:  Patient engaged and cooperative. Denies SI. Insight and motivation are good. Diagnosis:    1. Anxiety          Diagnosis Date    Colon polyps     Diabetes mellitus ([address]     Diabetic neuropathy ([address]     Diabetic retinopathy ([address]     Essential hypertension 10/28/2019    Fall     Fatty liver     Gastritis     GERD (gastroesophageal reflux disease)     Hyperlipidemia     Hypertension     Hypothyroidism     Irritable bowel syndrome     Major depressive disorder with single episode 10/28/2019    Osteopenia     Photosensitivity disorder     chronic    RBBB     Sleep apnea     on BIPAP    Thyroid disease     Thyroid nodule     neg bx-chronic thyroiditis    Type 2 diabetes mellitus with diabetic polyneuropathy, with long-term current use of insulin ([facility] 07/23/2019    Vitamin D deficiency      Plan:  Pt interventions:  Conducted functional assessment, Redby-setting to identify pt's primary goals for [facility] visit / overall health, Supportive techniques, and Emphasized self-care as important for managing overall health.     Pt Behavioral Change Plan:   See Pt Instructions

## 2022-11-14 NOTE — CARE COORDINATION
Remote Patient Monitoring Note      GómezPN contacted patient by telephone regarding yellow alert received for activity level (No vitals in 2 days). Verified patients name and  as identifiers. Background: RPM for HTN Clinical Interventions: Reviewed and followed up on alerts and treatments-LPN contacted pt in regards to pt not submitting vials in two days. Pt stated that she has been busy and tired but will take her vitals later today. Plan/Follow Up: Will continue to review, monitor and address alerts with follow up based on severity of symptoms and risk factors.  Faviola Manuel LPN, [facility] PH: [phone] ---- Current Patient Metrics ---- Activity: - mins Blood Pressure: -/-, -bpm Pulseox: -%, -bpm Weight: -lbs Note Created at: 2022 04:32 PM ET ---- Time-Spent: 5 minutes 0 seconds

## 2022-11-15 ENCOUNTER — CARE COORDINATION (OUTPATIENT)
Dept: CARE COORDINATION | Age: 77
End: 2022-11-15

## 2022-11-15 ENCOUNTER — APPOINTMENT (OUTPATIENT)
Dept: PHYSICAL THERAPY | Age: 77
End: 2022-11-15
Payer: MEDICARE

## 2022-11-15 ENCOUNTER — HOSPITAL ENCOUNTER (OUTPATIENT)
Dept: PHYSICAL THERAPY | Age: 77
Setting detail: THERAPIES SERIES
Discharge: HOME | End: 2022-11-15
Payer: MEDICARE

## 2022-11-15 PROCEDURE — 97110 THERAPEUTIC EXERCISES: CPT

## 2022-11-15 PROCEDURE — 97112 NEUROMUSCULAR REEDUCATION: CPT

## 2022-11-15 NOTE — FLOWSHEET NOTE
[facility], [facility], [address], [address]  Phone: [phone]  Fax [phone]    Physical Therapy Daily Treatment Note    Date:  11/15/2022    Patient: Idalmis Santos (67 y.o. female)   Examination Date: 2022   :  1945 MRN: [id number]   Auth needed? []  Yes    [x]  No        Amount authorized: Insurance: Payor: MEDICARE / Plan: MEDICARE PART A AND B / Product Type: *No Product type* /   Insurance ID: [id number] - (Medicare)  Secondary Insurance (if applicable): [facility]   Referring Physician: Suly Dexter DO     PCP: Jing Hannah DO Medical Diagnosis: Unsteady gait [R26.81]    Preferred Language for Healthcare:     [x] English       [] other: Latex Allergy? []  Yes    [x]  No          Plan of care signed (Y/N):  sent  Visit# / total visits:       G-Code (if applicable):          LEFS     Medicare Cap (if applicable):  065 = total amount used, updated 11/15/2022    Time in:   3:45      Timed Treatment: 45  Total Treatment Time:  45  ________________________________________________________________________________________    Pain Level:    /10  SUBJECTIVE:  Pt reports that she is doing okay. \"I'm here. \"    OBJECTIVE: [id number]    Exercise/Equipment Resistance/Repetitions Other comments   NuStep 5 min    bridge x10    Supine clams  SL hip ER with stabilization X15  X10 B Green TB                             Standing balance    Feet together EC    Tandem   1 min  1 min BLE    Airex balance    Stance    Mod tandem   1 min  1 min BLE                                                                               Other Therapeutic Activities:  Pt educated on results of HARRIS and TUG tests and how those results affect her ability to function during ADLs. Manual Treatments:         Modalities:      Test/Measurements:  see eval       ASSESSMENT:    Pt tolerated session well.  Initiated HEP without issue. Treatment/Activity Tolerance:   [x]Patient tolerated treatment well [] Patient limited by fatique  [x]Patient limited by pain [] Patient limited by other medical complications  [] Other:     Goals:    Short term goal 1: Pt will be indep in HEP  Short term goal 2: Pt will increase B hip strength to 4/5  Short term goal 3: Pt will increase HARRIS Balance score to > 45  Short term goal 4: Pt will ambulate with increased hip extension and glut activation BLE  Short term goal 5: Pt will return to completing all ADLs indep    Plan: [x] Continue per plan of care [] Alter current plan (see comments)   [] Plan of care initiated [] Hold pending MD visit [] Discharge      Plan for Next Session:  Biomechanical correction of problems as they present. Facilitate correct muscle firing patterns and activation with appropriate activities. Progress patient as tolerated.      Re-Certification Due Date:         Signature:  Mara Rizvi PT

## 2022-11-15 NOTE — CARE COORDINATION
Remote Patient Monitoring Note      Date/Time:  11/15/2022 11:12 AM    CCSS reviewed patients reported daily Remote Patient Monitoring metrics. All reported metrics are within alert parameters. Plan/Follow Up:  Will continue to review, monitor and address alerts with follow up based on severity of symptoms and risk factors ---- Current Patient Metrics ---- Activity: - mins Blood Pressure: 169/73, 64bpm Pulseox: 97%, 63bpm Weight: 137.8lbs Note Created at: 11/15/2022 11:12 AM ET ---- Time-Spent: 2 minutes 0 seconds

## 2022-11-17 ENCOUNTER — CARE COORDINATION (OUTPATIENT)
Dept: CARE COORDINATION | Age: 77
End: 2022-11-17

## 2022-11-17 ENCOUNTER — TELEPHONE (OUTPATIENT)
Dept: FAMILY MEDICINE CLINIC | Age: 77
End: 2022-11-17

## 2022-11-17 ENCOUNTER — APPOINTMENT (OUTPATIENT)
Dept: PHYSICAL THERAPY | Age: 77
End: 2022-11-17
Payer: MEDICARE

## 2022-11-17 DIAGNOSIS — M25.511 ACUTE PAIN OF RIGHT SHOULDER: Primary | ICD-10-CM

## 2022-11-17 NOTE — CARE COORDINATION
Remote Patient Monitoring Note      Date/Time:  11/17/2022 11:17 AM    CCSS reviewed patients reported daily Remote Patient Monitoring metrics. All reported metrics are within alert parameters. Plan/Follow Up:  Will continue to review, monitor and address alerts with follow up based on severity of symptoms and risk factors ---- Current Patient Metrics ---- Activity: - mins Blood Pressure: 164/74, 64bpm Pulseox: 96%, 66bpm Weight: 138.0lbs Note Created at: 11/17/2022 11:17 AM ET ---- Time-Spent: 2 minutes 0 seconds
mammogram

## 2022-11-17 NOTE — TELEPHONE ENCOUNTER
Patient called in with muscle pain in the right arm X 3 weeks and patient is right handed so she uses her right arm more frequently. Patient stated the pain is from the elbow to the shoulder. Patient stated this is a shooting pain only when doing activities and sore when not moving it. Patient had the physical therapist exam it and he said it was muscular and that the patient needed to get it checked out. This started by the patient when the patient was lifting her arm up in the car to reach to get into the car and the patient felt a pull feeling.  Please advise

## 2022-11-18 ENCOUNTER — APPOINTMENT (OUTPATIENT)
Dept: PHYSICAL THERAPY | Age: 77
End: 2022-11-18
Payer: MEDICARE

## 2022-11-18 ENCOUNTER — CARE COORDINATION (OUTPATIENT)
Dept: CARE COORDINATION | Age: 77
End: 2022-11-18

## 2022-11-18 PROBLEM — E11.65 TYPE 2 DIABETES MELLITUS WITH HYPERGLYCEMIA, WITH LONG-TERM CURRENT USE OF INSULIN (HCC): Status: ACTIVE | Noted: 2019-07-23

## 2022-11-18 PROBLEM — I15.2 HYPERTENSION ASSOCIATED WITH TYPE 2 DIABETES MELLITUS (HCC): Status: ACTIVE | Noted: 2019-10-28

## 2022-11-18 PROBLEM — E11.59 HYPERTENSION ASSOCIATED WITH TYPE 2 DIABETES MELLITUS (HCC): Status: ACTIVE | Noted: 2019-10-28

## 2022-11-18 NOTE — TELEPHONE ENCOUNTER
Spoke to patient and informed. Patient stated that she is looking for an ortho physician and will call her insurance and see if they would cover concomitant therapies and the same time.

## 2022-11-18 NOTE — CARE COORDINATION
Remote Patient Monitoring Note      Date/Time:  11/18/2022 2:32 PM    CCSS reviewed patients reported daily Remote Patient Monitoring metrics. All reported metrics are within alert parameters. Plan/Follow Up:  Will continue to review, monitor and address alerts with follow up based on severity of symptoms and risk factors ---- Current Patient Metrics ---- Activity: - mins Blood Pressure: 164/74, 69bpm Pulseox: 94%, 65bpm Weight: 137.2lbs Note Created at: 11/18/2022 02:32 PM ET ---- Time-Spent: 2 minutes 0 seconds

## 2022-11-21 ENCOUNTER — HOSPITAL ENCOUNTER (OUTPATIENT)
Dept: PHYSICAL THERAPY | Age: 77
Setting detail: THERAPIES SERIES
Discharge: HOME | End: 2022-11-21
Payer: MEDICARE

## 2022-11-21 ENCOUNTER — CARE COORDINATION (OUTPATIENT)
Dept: CASE MANAGEMENT | Age: 77
End: 2022-11-21

## 2022-11-21 PROCEDURE — 97110 THERAPEUTIC EXERCISES: CPT

## 2022-11-21 PROCEDURE — 97112 NEUROMUSCULAR REEDUCATION: CPT

## 2022-11-21 NOTE — CARE COORDINATION
Remote Alert Monitoring Note      Date/Time:  2022 8:18 AM  LPN contacted patient by telephone regarding yellow alert received for blood pressure reading (172/73). Verified patients name and  as identifiers. Background: RPM for HTN Refer to 911 immediately if:  Patient unresponsive or unable to provide history  Change in cognition or sudden confusion  Patient unable to respond in complete sentences  Intense chest pain/tightness  Any concern for any clinical emergency  Red Alert: Provider response time of 1 hr required for any red alert requiring intervention  Yellow Alert: Provider response time of 3hr required for any escalated yellow alert BP Triage Are you having any Chest Pain> no Are you having any Shortness of Breath? no Do you have a headache or have any vision changes> no Are you having any numbness or tingling> no Are you having any other health concerns or issues? no Clinical Interventions: Reviewed and followed up on alerts and treatments-LPN contacted pt in regards to yellow alert related to BP reading of 172/73. Pt denied any symptoms and stated that she had not taken her BP meds prior to checking BP. Pt has two appts this morning and has been moving around the house and getting dressed. Writer requested that pt recheck and pt stated that she cannot do that until later this afternoon as she will be leaving soon for her appts. Plan/Follow Up: Will continue to review, monitor and address alerts with follow up based on severity of symptoms and risk factors. Juan Mooney LPN, [address] PH: [phone] ---- Current Patient Metrics ---- Activity: - mins Blood Pressure: 172/73, 60bpm Pulseox: 98%, 60bpm Weight: 138.0lbs Note Created at: 2022 08:26 AM ET ---- Time-Spent: 7 minutes 0 seconds UPDATE- Pt rechecked BP when she arrived home at 15:50 and the new reading was 184/82. Pt is still denying symptoms and stated that she feels fine. Denied HA, Dizziness, chest pain, numbness and tingling.  Pt did have an appt with PT but stated that she rested prior to checking. Pt is having some pain in her right arm that is not new but does not think this would cause a rise in BP. Pt will recheck BP shortly. Writer routed message to PCP and ACM to advise.  Rosmery Sood LPN, [facility] PH: [phone] ---- Current Patient Metrics ---- Activity: - mins Blood Pressure: 172/73, 60bpm Pulseox: 98%, 60bpm Weight: 138.0lbs Note Created at: 11/21/2022 08:26 AM ET ---- Time-Spent: 12 minutes 0 seconds

## 2022-11-21 NOTE — FLOWSHEET NOTE
[address] [facility], [facility], [address], [address]  Phone: [phone]  Fax [phone]    Physical Therapy Daily Treatment Note    Date:  2022    Patient: Judy Bearden (68 y.o. female)   Examination Date: 2022   :  1945 MRN: [id number]   Auth needed? []  Yes    [x]  No        Amount authorized: Insurance: Payor: MEDICARE / Plan: MEDICARE PART A AND B / Product Type: *No Product type* /   Insurance ID: [id number] - (Medicare)  Secondary Insurance (if applicable): [facility]   Referring Physician: Elie Alaniz DO     PCP: Jessica Murphy DO Medical Diagnosis: Unsteady gait [R26.81]    Preferred Language for Healthcare:     [x] English       [] other: Latex Allergy? []  Yes    [x]  No          Plan of care signed (Y/N):  sent  Visit# / total visits:  3/8     G-Code (if applicable):          LEFS     Medicare Cap (if applicable):  291 = total amount used, updated 2022    Time in:   9:15      Timed Treatment: 45  Total Treatment Time:  45  ________________________________________________________________________________________    Pain Level:    /10  SUBJECTIVE:  Pt reports that she is doing okay. Notes that she did not sleep last night and her BP is high this morning.      OBJECTIVE: [id number]    Exercise/Equipment Resistance/Repetitions Other comments   NuStep 5 min    bridge x10    Supine clams  SL hip ER with stabilization X15  X10 B Green TB   Supine marches X10 B Green TB   DKTC on ball x15 Manual resistance   Supine hip flexion    Extension X10  X10  Manual resistance          Standing balance    Feet together EC    Tandem   1 min  1 min BLE    Airex balance    Stance    Mod tandem   1 min  1 min BLE    SLS foot on ball 1 min BLE                                                                      Other Therapeutic Activities:  Pt educated on results of HARRIS and TUG tests and how those results affect her ability to function during ADLs. Manual Treatments:         Modalities:      Test/Measurements:  see eval       ASSESSMENT:    Pt tolerated session well. Session focused on supine exercises this session due to BP. Treatment/Activity Tolerance:   [x]Patient tolerated treatment well [] Patient limited by fatique  [x]Patient limited by pain [] Patient limited by other medical complications  [] Other:     Goals:    Short term goal 1: Pt will be indep in HEP  Short term goal 2: Pt will increase B hip strength to 4/5  Short term goal 3: Pt will increase HARRIS Balance score to > 45  Short term goal 4: Pt will ambulate with increased hip extension and glut activation BLE  Short term goal 5: Pt will return to completing all ADLs indep    Plan: [x] Continue per plan of care [] Alter current plan (see comments)   [] Plan of care initiated [] Hold pending MD visit [] Discharge      Plan for Next Session:  Biomechanical correction of problems as they present. Facilitate correct muscle firing patterns and activation with appropriate activities. Progress patient as tolerated.      Re-Certification Due Date:         Signature:  Naman Jean-Baptiste PT

## 2022-11-22 ENCOUNTER — CARE COORDINATION (OUTPATIENT)
Dept: CARE COORDINATION | Age: 77
End: 2022-11-22

## 2022-11-22 ENCOUNTER — APPOINTMENT (OUTPATIENT)
Dept: PHYSICAL THERAPY | Age: 77
End: 2022-11-22
Payer: MEDICARE

## 2022-11-22 NOTE — CARE COORDINATION
Ambulatory Care Coordination Note  11/22/2022    ACC: Ronda Calderon RN    ACM completed follow up call with patient. Patient said she has not taken BP today but she felt her BP was elevated d/t sever pain she has been having in her right elbow to shoulder for the past 4 weeks. Patient said this pain has been worsening and will be seeing ortho on Friday. ACM said she would alert PCP regarding this. Patient said once she gets up she will take BP reading. Patient denies any other issues at this time. Patient does have [facility] Delivering pills to her now and this is working out well. Plan:    F/U call 1 week       Offered patient enrollment in the Remote Patient Monitoring (RPM) program for in-home monitoring: Yes, patient enrolled. Lab Results       None            Care Coordination Interventions    Referral from Primary Care Provider: Yes  Suggested Interventions and Community Resources  Medi Set or Pill Pack: Completed (Comment: [facility] Medi set packs)  Senior Services: Completed (Comment: Jacques send referral over for MOW and other services patient might qualify for)  Transportation Support: Completed          Goals Addressed    None         Prior to Admission medications    Medication Sig Start Date End Date Taking?  Authorizing Provider   buPROPion (WELLBUTRIN XL) 150 MG extended release tablet Take 1 tablet by mouth every morning 11/8/22   Townsend Sensing, DO   donepezil (ARICEPT) 10 MG tablet Take 1 tablet by mouth nightly 11/3/22   Townsend Sensing, DO   DULoxetine (CYMBALTA) 20 MG extended release capsule Take 1 capsule by mouth daily 11/3/22   Ratna Baires, DO   glipiZIDE (GLUCOTROL) 10 MG tablet TAKE 1 TABLET BY MOUTH EVERY DAY 11/3/22   Townsend Sensing, DO   linaclotide [facility]) 145 MCG capsule Take 1 capsule by mouth every morning (before breakfast) 11/3/22   Townsend Sensing, DO   ELIQUIS 5 MG TABS tablet TAKE 1 TABLET BY MOUTH TWICE DAILY 11/3/22   Ratna Baires, DO   rosuvastatin ([place]) Does not apply route 2 times daily 6/16/22   YANI Gunter   BD PEN NEEDLE MICRO U/F 32G X 6 MM MISC USE WITH LANTUS DAILY 4/11/22   Pallavi Toth MD   METAMUCIL FIBER PO Take by mouth MiraLax instead  Patient not taking: No sig reported    Historical Provider, MD   ondansetron (ZOFRAN) 4 MG tablet Take 1 tablet by mouth 3 times daily as needed for Nausea or Vomiting  Patient not taking: No sig reported 1/12/22   Pallavi Toth MD   Cyanocobalamin (B-12) 50 MCG TABS Take by mouth     Historical Provider, MD   Multiple Vitamins-Minerals (VITEYES COMPLETE PO) Take by mouth    Historical Provider, MD   latanoprost (XALATAN) 0.005 % ophthalmic solution 1 drop nightly    Historical Provider, MD       Future Appointments   Date Time Provider Coleman Garcia   11/25/2022  2:30 PM Vic [facility]orman [facility]   11/28/2022 10:45 AM Nell Richardson PT [place] [facility]   11/29/2022  1:00 PM [facility]  [place] [facility]   11/30/2022  1:00 PM Nell Richardson PT [place] [facility]   12/2/2022  1:30 PM Fran Ovalles,  [facility]   12/5/2022 12:00 PM Mark Montaño, [facility]   12/7/2022 12:00 PM Mark Montaño PT [facility] PT [facility]   12/12/2022 12:00 PM Mark Montaño PT [facility] PT [facility]   12/14/2022 12:00 PM Mark Montaño PT [facility]   ,   Diabetes Assessment      Meal Planning: Avoidance of concentrated sweets, Calorie counting   How often do you test your blood sugar?: Daily, Meals   Do you have barriers with adherence to non-pharmacologic self-management interventions?  (Nutrition/Exercise/Self-Monitoring): No   Have you ever had to go to the ED for symptoms of low blood sugar?: No       No patient-reported symptoms        , and   General Assessment    Do you have any symptoms that are causing concern?: Yes  Progression since Onset: Gradually Worsening  Reported Symptoms: Pain (Comment: Right elbwo to Right shoulder radiating pain)

## 2022-11-22 NOTE — CARE COORDINATION
Remote Patient Monitoring Note      Date/Time:  11/22/2022 1:55 PM    CCSS reviewed patients reported daily Remote Patient Monitoring metrics. All reported metrics are within alert parameters. Plan/Follow Up:  Will continue to review, monitor and address alerts with follow up based on severity of symptoms and risk factors ---- Current Patient Metrics ---- Activity: - mins Blood Pressure: 143/68, 63bpm Pulseox: 98%, 63bpm Survey: - Weight: 136.8lbs Note Created at: 11/22/2022 01:55 PM ET ---- Time-Spent: 2 minutes 0 seconds

## 2022-11-23 ENCOUNTER — CARE COORDINATION (OUTPATIENT)
Dept: CASE MANAGEMENT | Age: 77
End: 2022-11-23

## 2022-11-23 NOTE — CARE COORDINATION
Remote Patient Monitoring Note      Date/Time:  11/23/2022 2:27 PM    LPN reviewed patients reported daily Remote Patient Monitoring metrics. All reported metrics are within alert parameters. Plan/Follow Up:  Will continue to review, monitor and address alerts with follow up based on severity of symptoms and risk factors   Current Patient Metrics ---- Activity: - mins Blood Pressure: 141/70, 61bpm Pulseox: 98%, 64bpm Survey: - Weight: 137.8lbs Note Created at: 11/23/2022 02:28 PM ET ---- Time-Spent: 2 minutes 0 seconds    Laura Man LPN    [address] [facility],  / [address]

## 2022-11-25 ENCOUNTER — CARE COORDINATION (OUTPATIENT)
Dept: CASE MANAGEMENT | Age: 77
End: 2022-11-25

## 2022-11-25 NOTE — CARE COORDINATION
Remote Patient Monitoring Note      Date/Time:  11/25/2022 4:07 PM    LPN reviewed patients reported daily Remote Patient Monitoring metrics. Patient has not updated Metrics today 11/25/2022. Plan/Follow Up: Will continue to review, monitor and address alerts with follow up based on severity of symptoms and risk factors  Current Patient Metrics ---- Activity: - mins Blood Pressure: -/-, -bpm Pulseox: -%, -bpm Survey: - Weight: -lbs Note Created at: 11/25/2022 04:08 PM ET ---- Time-Spent: 3 minutes 0 seconds.     Jasper Ordonez LPN    [phone]  [address] [place] [address] / [place] [address] Coordinator

## 2022-11-28 ENCOUNTER — HOSPITAL ENCOUNTER (OUTPATIENT)
Dept: PHYSICAL THERAPY | Age: 77
Setting detail: THERAPIES SERIES
Discharge: HOME | End: 2022-11-28
Payer: MEDICARE

## 2022-11-28 PROCEDURE — 97112 NEUROMUSCULAR REEDUCATION: CPT

## 2022-11-28 PROCEDURE — 97110 THERAPEUTIC EXERCISES: CPT

## 2022-11-28 NOTE — FLOWSHEET NOTE
[facility], [facility], [address], [address]  Phone: [phone]  Fax [phone]    Physical Therapy Daily Treatment Note    Date:  2022    Patient: Verena Kearns (28 y.o. female)   Examination Date: 2022   :  1945 MRN: [id number]   Auth needed? []  Yes    [x]  No        Amount authorized: Insurance: Payor: MEDICARE / Plan: MEDICARE PART A AND B / Product Type: *No Product type* /   Insurance ID: [id number] - (Medicare)  Secondary Insurance (if applicable): [facility]   Referring Physician: Shilpi Muñiz DO     PCP: Em Prescott DO Medical Diagnosis: Unsteady gait [R26.81]    Preferred Language for Healthcare:     [x] English       [] other: Latex Allergy? []  Yes    [x]  No          Plan of care signed (Y/N):  sent  Visit# / total visits:       G-Code (if applicable):          LEFS     Medicare Cap (if applicable):  172 = total amount used, updated 2022    Time in:   10:50      Timed Treatment: 25  Total Treatment Time:  45  ________________________________________________________________________________________    Pain Level:    /10  SUBJECTIVE:  Pt reports that she is feeling better today than she did last week. Notes that the dizziness cleared over the weekend.      OBJECTIVE: [id number]    Exercise/Equipment Resistance/Repetitions Other comments   NuStep 5 min    bridge x10    Supine clams  SL hip ER with stabilization X15  X10 B Green TB   Supine marches X10 B Green TB   DKTC on ball x15 Manual resistance   Supine hip flexion    Extension X10  X10  Manual resistance          Standing balance    Feet together EC    Tandem   1 min  1 min BLE    Airex balance    Stance    Mod tandem   1 min  1 min BLE    SLS foot on ball 1 min BLE                                                                      Other Therapeutic Activities:  Pt educated on results of HARRIS and TUG tests and how those results affect her ability to function during ADLs. Manual Treatments:         Modalities:      Test/Measurements:  see eval       ASSESSMENT:    Pt tolerated session well. Pt was able to tolerate standing exercises today. Noted that the pt had some LOB throughout standing balance exercises. Treatment/Activity Tolerance:   [x]Patient tolerated treatment well [] Patient limited by fatique  [x]Patient limited by pain [] Patient limited by other medical complications  [] Other:     Goals:    Short term goal 1: Pt will be indep in HEP  Short term goal 2: Pt will increase B hip strength to 4/5  Short term goal 3: Pt will increase HARRIS Balance score to > 45  Short term goal 4: Pt will ambulate with increased hip extension and glut activation BLE  Short term goal 5: Pt will return to completing all ADLs indep    Plan: [x] Continue per plan of care [] Alter current plan (see comments)   [] Plan of care initiated [] Hold pending MD visit [] Discharge      Plan for Next Session:  Biomechanical correction of problems as they present. Facilitate correct muscle firing patterns and activation with appropriate activities. Progress patient as tolerated.      Re-Certification Due Date:         Signature:  Rolande Frankel, PT

## 2022-11-29 ENCOUNTER — OFFICE VISIT (OUTPATIENT)
Dept: FAMILY MEDICINE CLINIC | Age: 77
End: 2022-11-29

## 2022-11-29 ENCOUNTER — CARE COORDINATION (OUTPATIENT)
Dept: CARE COORDINATION | Age: 77
End: 2022-11-29

## 2022-11-29 ENCOUNTER — CARE COORDINATION (OUTPATIENT)
Dept: CASE MANAGEMENT | Age: 77
End: 2022-11-29

## 2022-11-29 VITALS — SYSTOLIC BLOOD PRESSURE: 130 MMHG | HEART RATE: 62 BPM | OXYGEN SATURATION: 100 % | DIASTOLIC BLOOD PRESSURE: 52 MMHG

## 2022-11-29 DIAGNOSIS — E11.59 HYPERTENSION ASSOCIATED WITH TYPE 2 DIABETES MELLITUS (HCC): Primary | ICD-10-CM

## 2022-11-29 DIAGNOSIS — E11.65 TYPE 2 DIABETES MELLITUS WITH HYPERGLYCEMIA, WITH LONG-TERM CURRENT USE OF INSULIN (HCC): ICD-10-CM

## 2022-11-29 DIAGNOSIS — F33.1 MAJOR DEPRESSIVE DISORDER, RECURRENT, MODERATE (HCC): ICD-10-CM

## 2022-11-29 DIAGNOSIS — Z79.4 TYPE 2 DIABETES MELLITUS WITH HYPERGLYCEMIA, WITH LONG-TERM CURRENT USE OF INSULIN (HCC): ICD-10-CM

## 2022-11-29 DIAGNOSIS — I15.2 HYPERTENSION ASSOCIATED WITH TYPE 2 DIABETES MELLITUS (HCC): Primary | ICD-10-CM

## 2022-11-29 LAB — HBA1C MFR BLD: 6.8 %

## 2022-11-29 NOTE — CARE COORDINATION
[facility] did not make outreach today d/t patient having an upcoming appt with Dr. Kim Gutierrez on this date. [facility] will make outreach at a later time.

## 2022-11-29 NOTE — PROGRESS NOTES
Patient: Robert Carrero is a 78 y.o. female who presents today with the following Chief Complaint(s):  Chief Complaint   Patient presents with    Diabetes         Diabetes      DM2  On Jardiance 10 mg daily,  Lantus 40 units nightly  Humalog 4 units with breakfast, 8 units with lunch, 8 units with dinner +2:50 sliding scale. Utilizing freestyle Saravanan Luis Alberto  Has been working on dramatically improving diet. Planning to move out of [facility] home to independent living which she thinks will be good for diet and mental health.      Right arm still in pain  Has not been able to see orthopedic group due to cancellations    Continues to have pain in right shoulder  Has appointment next week      Current Outpatient Medications   Medication Sig Dispense Refill    buPROPion (WELLBUTRIN XL) 150 MG extended release tablet Take 1 tablet by mouth every morning 90 tablet 3    donepezil (ARICEPT) 10 MG tablet Take 1 tablet by mouth nightly 90 tablet 1    DULoxetine (CYMBALTA) 20 MG extended release capsule Take 1 capsule by mouth daily 90 capsule 1    glipiZIDE (GLUCOTROL) 10 MG tablet TAKE 1 TABLET BY MOUTH EVERY DAY 90 tablet 1    linaclotide (LINZESS) 145 MCG capsule Take 1 capsule by mouth every morning (before breakfast) 90 capsule 1    ELIQUIS 5 MG TABS tablet TAKE 1 TABLET BY MOUTH TWICE DAILY 180 tablet 1    rosuvastatin (CRESTOR) 40 MG tablet TAKE 1 TABLET BY MOUTH EVERY DAY 90 tablet 1    lisinopril (PRINIVIL;ZESTRIL) 10 MG tablet TAKE 1 TABLET BY MOUTH EVERY DAY 90 tablet 1    diclofenac sodium (VOLTAREN) 1 % GEL Apply 4 g topically 4 times daily 100 g 1    metoprolol tartrate (LOPRESSOR) 25 MG tablet TAKE 1 TABLET TWICE A  tablet 1    VASCEPA 1 g CAPS capsule Take 2 capsules by mouth 2 times daily 360 capsule 1    divalproex (DEPAKOTE) 250 MG DR tablet Take 2 tablets by mouth nightly 180 tablet 1    aspirin 81 MG chewable tablet Take 1 tablet by mouth daily 30 tablet 1    Calcium Carb-Cholecalciferol (CALCIUM 1000 + D) 1000-800 MG-UNIT TABS Take 1,000 mg by mouth daily 90 tablet 1    LANTUS SOLOSTAR 100 UNIT/ML injection pen Inject 40 Units into the skin nightly 15 Adjustable Dose Pre-filled Pen Syringe 2    JARDIANCE 10 MG tablet TAKE 1 TABLET BY MOUTH DAILY 90 tablet 1    levothyroxine (SYNTHROID) 100 MCG tablet Take 1 tablet daily 90 tablet 1    famotidine (PEPCID) 20 MG tablet Take 1 tablet by mouth 2 times daily 180 tablet 1    amLODIPine (NORVASC) 5 MG tablet TAKE 1 TABLET DAILY 90 tablet 1    polyethylene glycol (GLYCOLAX) 17 GM/SCOOP powder 1 powder in packet DAILY (route: oral)      insulin lispro, 1 Unit Dial, (HUMALOG KWIKPEN) 100 UNIT/ML SOPN Use on a sliding scale 3 times a day with meals. Maximum dose 30 units per day. 15 Adjustable Dose Pre-filled Pen Syringe 0    Continuous Blood Gluc Sensor ([employer] ANT SENSOR SYSTEM) MISC 1 Units by Other route every 14 days Place 1 sensor on back of arm every 14 days 6 each 0    Handicap Placard MISC by Does not apply route 08/09/22 1 each 0    Continuous Blood Gluc Sensor ([address] [place] [address]) MISC 1 box by Does not apply route 2 times daily 1 each 0    BD PEN NEEDLE MICRO U/F 32G X 6 MM MISC USE WITH LANTUS DAILY 100 each 5    METAMUCIL FIBER PO Take by mouth MiraLax instead      ondansetron (ZOFRAN) 4 MG tablet Take 1 tablet by mouth 3 times daily as needed for Nausea or Vomiting 30 tablet 0    Cyanocobalamin (B-12) 50 MCG TABS Take by mouth       Multiple Vitamins-Minerals (VITEYES COMPLETE PO) Take by mouth      latanoprost (XALATAN) 0.005 % ophthalmic solution 1 drop nightly       No current facility-administered medications for this visit. Patient's past medical history, surgical history, family history, medications,  andallergies  were all reviewed and updated as appropriate today. Review of Systems  All other systems reviewed and negative    Physical Exam  Vitals reviewed. Constitutional:       Appearance: Normal appearance.    HENT: Head: Normocephalic and atraumatic. Cardiovascular:      Rate and Rhythm: Normal rate and regular rhythm. Pulmonary:      Effort: Pulmonary effort is normal.      Breath sounds: Normal breath sounds. Neurological:      General: No focal deficit present. Mental Status: She is alert and oriented to person, place, and time. Psychiatric:         Behavior: Behavior normal.         Thought Content: Thought content normal.     Vitals:    11/29/22 1307   BP: (!) 130/52   Pulse: 62   SpO2: 100%       Assessment:  Encounter Diagnoses   Name Primary? Hypertension associated with type 2 diabetes mellitus ([facility] Yes    Type 2 diabetes mellitus with hyperglycemia, with long-term current use of insulin (HCC)     Major depressive disorder, recurrent, moderate (HCC)        Plan:  1. Hypertension associated with type 2 diabetes mellitus ([facility]  At goal. Asymptomatic without orthostatic episodes. 2. Type 2 diabetes mellitus with hyperglycemia, with long-term current use of insulin (HCC)  A1c now at goal. A1c improved from 8.3 to 6.8 today. Continue current medications and freestyle geetha. - POCT glycosylated hemoglobin (Hb A1C)    3. Major depressive disorder, recurrent, moderate (HCC)  Reports stable on wellbutrin 150mg, depakote, cymbalta      No follow-ups on file.

## 2022-11-29 NOTE — CARE COORDINATION
Remote Patient Monitoring Note      Date/Time:  11/29/2022 3:34 PM    LPN reviewed patients reported daily Remote Patient Monitoring metrics. All reported metrics are within alert parameters. Plan/Follow Up:  Will continue to review, monitor and address alerts with follow up based on severity of symptoms and risk factors  Current Patient Metrics ---- Activity: - mins Blood Pressure: 141/73, 66bpm Pulseox: 96%, 65bpm Survey: - Weight: 135.2lbs Note Created at: 11/29/2022 03:34 PM ET ---- Time-Spent: 3 minutes 0 seconds

## 2022-11-30 ENCOUNTER — HOSPITAL ENCOUNTER (OUTPATIENT)
Dept: PHYSICAL THERAPY | Age: 77
Setting detail: THERAPIES SERIES
Discharge: HOME | End: 2022-11-30
Payer: MEDICARE

## 2022-11-30 ENCOUNTER — CARE COORDINATION (OUTPATIENT)
Dept: CASE MANAGEMENT | Age: 77
End: 2022-11-30

## 2022-11-30 PROCEDURE — 97110 THERAPEUTIC EXERCISES: CPT

## 2022-11-30 PROCEDURE — 97112 NEUROMUSCULAR REEDUCATION: CPT

## 2022-11-30 NOTE — CARE COORDINATION
Remote Alert Monitoring Note      Date/Time:  2022 10:16 AM    LPN contacted patient by telephone regarding red alert received for weight increase (FALSE). Verified patients name and  as identifiers. Background: RPM for HTN Refer to 911 immediately if:  Patient unresponsive or unable to provide history  Change in cognition or sudden confusion  Patient unable to respond in complete sentences  Intense chest pain/tightness  Any concern for any clinical emergency  Red Alert: Provider response time of 1 hr required for any red alert requiring intervention  Yellow Alert: Provider response time of 3hr required for any escalated yellow alert Clinical Interventions: Reviewed and followed up on alerts and treatments-LPN contacted pt in regards to FALSE red alert related to wt increase. Pt stated that she was fully dressed and had her brace on prior to weighing her self. Pt usually weighs prior to getting dressed. Wt increase is false. Plan/Follow Up: Will continue to review, monitor and address alerts with follow up based on severity of symptoms and risk factors.  Dorene Whitten LPN, [facility] PH: [phone] ---- Current Patient Metrics ---- Activity: - mins Blood Pressure: 157/83, 70bpm Pulseox: 97%, 70bpm Survey: - Weight: 140.4lbs Note Created at: 2022 10:19 AM ET ---- Time-Spent: 5 minutes 0 seconds

## 2022-11-30 NOTE — FLOWSHEET NOTE
[facility], [facility], [address], [address]  Phone: [phone]  Fax [phone]    Physical Therapy Daily Treatment Note    Date:  2022    Patient: Vianney Shipley (14 y.o. female)   Examination Date: 2022   :  1945 MRN: [id number]   Auth needed? []  Yes    [x]  No        Amount authorized: Insurance: Payor: MEDICARE / Plan: MEDICARE PART A AND B / Product Type: *No Product type* /   Insurance ID: [id number] - (Medicare)  Secondary Insurance (if applicable): [facility]   Referring Physician: Chadwick David DO     PCP: Rakesh Garcia DO Medical Diagnosis: Unsteady gait [R26.81]    Preferred Language for Healthcare:     [x] English       [] other: Latex Allergy? []  Yes    [x]  No          Plan of care signed (Y/N):  sent  Visit# / total visits:       G-Code (if applicable):          LEFS     Medicare Cap (if applicable):  509 = total amount used, updated 2022    Time in:   10:50      Timed Treatment: 25  Total Treatment Time:  45  ________________________________________________________________________________________    Pain Level:    /10  SUBJECTIVE:  Pt reports that she is feeling tired today due to appointments prior to therapy.      OBJECTIVE: [id number]    Exercise/Equipment Resistance/Repetitions Other comments   NuStep 5 min    bridge x10    Supine clams  SL hip ER with stabilization X15  X10 B Green TB   Supine marches X10 B Green TB   DKTC on ball x15 Manual resistance   Supine hip flexion    Extension X10  X10  Manual resistance          Standing balance    Feet together EC    Tandem   1 min  1 min BLE    Airex balance    Stance    Mod tandem   1 min  1 min BLE    SLS foot on ball 1 min BLE    Side stepping X4 laps Green TB   Standing hip abd 2x10 B Green TB   Resisted walking  X2 laps in // bars [place] TB                                               Other Therapeutic Activities:  Pt educated on results of HARRIS and TUG tests and how those results affect her ability to function during ADLs. Manual Treatments:         Modalities:      Test/Measurements:  see eval       ASSESSMENT:    Pt tolerated session well. Pt was able to progress standing exercises today. Noted that the pt had some LOB throughout standing balance exercises. Treatment/Activity Tolerance:   [x]Patient tolerated treatment well [] Patient limited by fatique  [x]Patient limited by pain [] Patient limited by other medical complications  [] Other:     Goals:    Short term goal 1: Pt will be indep in HEP  Short term goal 2: Pt will increase B hip strength to 4/5  Short term goal 3: Pt will increase HARRIS Balance score to > 45  Short term goal 4: Pt will ambulate with increased hip extension and glut activation BLE  Short term goal 5: Pt will return to completing all ADLs indep    Plan: [x] Continue per plan of care [] Alter current plan (see comments)   [] Plan of care initiated [] Hold pending MD visit [] Discharge      Plan for Next Session:  Biomechanical correction of problems as they present. Facilitate correct muscle firing patterns and activation with appropriate activities. Progress patient as tolerated.      Re-Certification Due Date:         Signature:  Moreno Irizarry, PT

## 2022-12-01 ENCOUNTER — CARE COORDINATION (OUTPATIENT)
Dept: CARE COORDINATION | Age: 77
End: 2022-12-01

## 2022-12-01 ENCOUNTER — CARE COORDINATION (OUTPATIENT)
Dept: CASE MANAGEMENT | Age: 77
End: 2022-12-01

## 2022-12-01 NOTE — CARE COORDINATION
Remote Patient Monitoring Note      Date/Time:  12/1/2022 11:34 AM    LPN reviewed patients reported daily Remote Patient Monitoring metrics. All reported metrics are within alert parameters. Plan/Follow Up:  Will continue to review, monitor and address alerts with follow up based on severity of symptoms and risk factors  Current Patient Metrics ---- Activity: - mins Blood Pressure: 150/74, 70bpm Pulseox: 97%, 70bpm Survey: - Weight: 136.4lbs Note Created at: 12/01/2022 11:35 AM ET ---- Time-Spent: 3 minutes 0 seconds

## 2022-12-01 NOTE — CARE COORDINATION
Ambulatory Care Coordination Note  12/1/2022    ACC: Emily Felder RN    ACM completed follow up call with patient who said she is doing well minus the shoulder pain. Patient will be able to follow up with shoulder specialist on Monday 12/5/22. Patient said her other appts had been cancelled d/t provider emergencies. Patient said [facility] is going well. Patient said she will be moving into a senior assisted living at the end of the month [facility]. ACM patient and had a discussion about this and patient became tearful. ACM informed patient she would call at the end of the month to make sure transition to [facility] went smoothly. Plan:    F/U call 1 month    Offered patient enrollment in the Remote Patient Monitoring (RPM) program for in-home monitoring: Yes, patient enrolled. Lab Results       None            Care Coordination Interventions    Referral from Primary Care Provider: Yes  Suggested Interventions and Community Resources  Medi Set or Pill Pack: Completed (Comment: [facility] Medi set packs)  Senior Services: Completed (Comment: Jacques send referral over for MOW and other services patient might qualify for)  Transportation Support: Completed  Zone Management Tools: Completed          Goals Addressed    None         Prior to Admission medications    Medication Sig Start Date End Date Taking?  Authorizing Provider   buPROPion (WELLBUTRIN XL) 150 MG extended release tablet Take 1 tablet by mouth every morning 11/8/22   Tamara Zamora,    donepezil (ARICEPT) 10 MG tablet Take 1 tablet by mouth nightly 11/3/22   Tamara Zamora DO   DULoxetine (CYMBALTA) 20 MG extended release capsule Take 1 capsule by mouth daily 11/3/22   Brandie Baires, DO   glipiZIDE (GLUCOTROL) 10 MG tablet TAKE 1 TABLET BY MOUTH EVERY DAY 11/3/22   Tamara Zamora DO   linaclotide Jada Norma) 145 MCG capsule Take 1 capsule by mouth every morning (before breakfast) 11/3/22   Tamara Zamora DO   ELIQUIS 5 MG TABS tablet TAKE 1 TABLET BY MOUTH TWICE DAILY 11/3/22   [facility], DO   rosuvastatin (CRESTOR) 40 MG tablet TAKE 1 TABLET BY MOUTH EVERY DAY 11/3/22   [facility], DO   lisinopril (PRINIVIL;ZESTRIL) 10 MG tablet TAKE 1 TABLET BY MOUTH EVERY DAY 11/3/22   Rui Mealing, DO   diclofenac sodium (VOLTAREN) 1 % GEL Apply 4 g topically 4 times daily 11/3/22   [facility], DO   metoprolol tartrate (LOPRESSOR) 25 MG tablet TAKE 1 TABLET TWICE A DAY 11/3/22   Rui Mealing, DO   VASCEPA 1 g CAPS capsule Take 2 capsules by mouth 2 times daily 11/3/22 2/1/23  [facility], DO   divalproex (DEPAKOTE) 250 MG DR tablet Take 2 tablets by mouth nightly 11/3/22   Rui Mealing, DO   aspirin 81 MG chewable tablet Take 1 tablet by mouth daily 11/3/22   [facility], DO   Calcium Carb-Cholecalciferol (CALCIUM 1000 + D) 1000-800 MG-UNIT TABS Take 1,000 mg by mouth daily 11/3/22   [facility], DO   LANTUS SOLOSTAR 100 UNIT/ML injection pen Inject 40 Units into the skin nightly 11/3/22   [facility], DO   JARDIANCE 10 MG tablet TAKE 1 TABLET BY MOUTH DAILY 11/3/22   Rui Mealing, DO   levothyroxine (SYNTHROID) 100 MCG tablet Take 1 tablet daily 10/19/22   Rui Mealing, DO   famotidine (PEPCID) 20 MG tablet Take 1 tablet by mouth 2 times daily 10/19/22   [facility], DO   amLODIPine (NORVASC) 5 MG tablet TAKE 1 TABLET DAILY 10/19/22   [facility], DO   polyethylene glycol (GLYCOLAX) 17 GM/SCOOP powder 1 powder in packet DAILY (route: oral) 8/29/22   Historical Provider, MD   insulin lispro, 1 Unit Dial, (HUMALOG KWIKPEN) 100 UNIT/ML SOPN Use on a sliding scale 3 times a day with meals. Maximum dose 30 units per day.  10/10/22   Rui Mealing, DO   Continuous Blood Gluc Sensor ([address]) MISC 1 Units by Other route every 14 days Place 1 sensor on back of arm every 14 days 10/4/22 1/2/23  Rui Mealing, DO   Handicap Placard MISC by Does not apply route 08/09/22 8/9/22   Jono Candelario DO   Continuous Blood Gluc Sensor ([address] [place] [address]) MISC 1 box by Does not apply route 2 times daily 6/16/22   Debbi Frankel, [place]   BD PEN NEEDLE MICRO U/F 32G X 6 MM MISC USE WITH LANTUS DAILY 4/11/22   Radha Chandler MD   METAMUCIL FIBER PO Take by mouth MiraLax instead    Historical Provider, MD   ondansetron (ZOFRAN) 4 MG tablet Take 1 tablet by mouth 3 times daily as needed for Nausea or Vomiting 1/12/22   Radha Chandler MD   Cyanocobalamin (B-12) 50 MCG TABS Take by mouth     Historical Provider, MD   Multiple Vitamins-Minerals (VITEYES COMPLETE PO) Take by mouth    Historical Provider, MD   latanoprost (XALATAN) 0.005 % ophthalmic solution 1 drop nightly    Historical Provider, MD       Future Appointments   Date Time Provider Coleman Garcia   12/2/2022  1:30 PM Renato Hercules, PhD [facility]   12/5/2022 12:00 PM Ricky Rome, PT [facility] PT [facility]   12/5/2022  1:30 PM AMIRA [facility]   12/7/2022 12:00 PM Ricky Rome, PT [facility] PT [facility]   12/12/2022 12:00 PM [facility], PT [facility]   12/14/2022 12:00 PM Ricky Rome, PT [facility]   2/28/2023  3:00 PM DO KRISTIN Freitas - [facility]   ,   Diabetes Assessment      Meal Planning: Avoidance of concentrated sweets, Calorie counting   How often do you test your blood sugar?: Daily, Meals   Do you have barriers with adherence to non-pharmacologic self-management interventions?  (Nutrition/Exercise/Self-Monitoring): No   Have you ever had to go to the ED for symptoms of low blood sugar?: No       No patient-reported symptoms        , and   General Assessment    Do you have any symptoms that are causing concern?: No

## 2022-12-02 ENCOUNTER — SCHEDULED TELEPHONE ENCOUNTER (OUTPATIENT)
Dept: PSYCHOLOGY | Age: 77
End: 2022-12-02

## 2022-12-02 DIAGNOSIS — F41.9 ANXIETY: Primary | ICD-10-CM

## 2022-12-02 NOTE — PROGRESS NOTES
Behavioral Health Consultation  Zoey Moody, Ph.D.  Psychologist  12/2/2022  1:35 PM EST      Time spent with Patient: 25 minutes  This is patient's  eleventh  [facility] appointment. Reason for Consult:    Chief Complaint   Patient presents with    Anxiety     Referring Provider: JULIET Grayson CNP  [address] [address] ([facility]) [address] [place] [address]    Feedback given to PCP. TELEHEALTH VISIT -- Audio Only (During CBPSD-72 public health emergency)    Pursuant to the emergency declaration under the [address] [place] [address], [facility] waiver authority and the [employer] Act, this phone call was conducted, with patient's consent, to reduce the patient's risk of exposure to COVID-19 and provide continuity of care for an established patient. Services were provided through a phone call discussion to substitute for in-person clinic visit. Pt gave verbal informed consent to participate in telehealth services. Consent:  She and/or health care decision maker is aware that that she may receive a bill for this telephone service, depending on her insurance coverage, and has provided verbal consent to proceed: Yes    Conducted a risk-benefit analysis and determined that the patient's presenting problems are consistent with the use of telepsychology. Determined that the patient has sufficient knowledge and skills in the use of technology enabling them to adequately benefit from telepsychology. It was determined that this patient was able to be properly treated without an in-person session. Patient verified that they were currently located at the [facility] address that was provided during registration.     Verified the following information:  Patient's identification: Yes  Patient location: [address]  Patient's call back number: [phone]  Patient's emergency contact's name and number, as well as permission to contact them if needed: Extended Emergency Contact Information  Primary Emergency Contact: PRINCESS GUTIERREZ [facility] Phone: [phone]  Mobile Phone: [phone]  Relation: Child  Secondary Emergency Contact: Melva Later  Home Phone: [phone]  Relation: Daughter-in-Law   needed? No    Provider location: [address] [place] [address], [address] [place] [address] this is an episode with a patient who has not had a related appointment within my department in the past 7 days or scheduled within the next 24 hours. S:  Patient reported that she will be moving into the [facility] at the end of the year. She feels relieved to have this plan in place. Looks forward to meeting other residents, becoming part of the community. Processed feelings of guilt for \"going back on a promise\" to live with her son and his family. Overall, patient is confident and excited about her choice. Plan to f/u in 1 month.      O:  MSE:    Attitude: cooperative and friendly  Consciousness: alert  Orientation: oriented to person, place, time, general circumstance  Memory: recent and remote memory intact  Attention/Concentration: intact during session  Speech: normal rate and volume, well-articulated  Mood: stable  Affect: euthymic  Perception: within normal limits  Thought Content: all-or-none thinking and intrusive thoughts  Thought Process: logical, coherent and goal-directed  Insight: good  Judgment: intact  Ability to understand instructions: Yes  Ability to respond meaningfully: Yes  Morbid Ideation: no   Suicide Assessment: no suicidal ideation, plan, or intent  Homicidal Ideation: no    History:    Medications:   Current Outpatient Medications   Medication Sig Dispense Refill    buPROPion (WELLBUTRIN XL) 150 MG extended release tablet Take 1 tablet by mouth every morning 90 tablet 3    donepezil (ARICEPT) 10 MG tablet Take 1 tablet by mouth nightly 90 tablet 1    DULoxetine (CYMBALTA) 20 MG extended release capsule Take 1 capsule by mouth daily 90 capsule 1 glipiZIDE (GLUCOTROL) 10 MG tablet TAKE 1 TABLET BY MOUTH EVERY DAY 90 tablet 1    linaclotide (LINZESS) 145 MCG capsule Take 1 capsule by mouth every morning (before breakfast) 90 capsule 1    ELIQUIS 5 MG TABS tablet TAKE 1 TABLET BY MOUTH TWICE DAILY 180 tablet 1    rosuvastatin (CRESTOR) 40 MG tablet TAKE 1 TABLET BY MOUTH EVERY DAY 90 tablet 1    lisinopril (PRINIVIL;ZESTRIL) 10 MG tablet TAKE 1 TABLET BY MOUTH EVERY DAY 90 tablet 1    diclofenac sodium (VOLTAREN) 1 % GEL Apply 4 g topically 4 times daily 100 g 1    metoprolol tartrate (LOPRESSOR) 25 MG tablet TAKE 1 TABLET TWICE A  tablet 1    VASCEPA 1 g CAPS capsule Take 2 capsules by mouth 2 times daily 360 capsule 1    divalproex (DEPAKOTE) 250 MG DR tablet Take 2 tablets by mouth nightly 180 tablet 1    aspirin 81 MG chewable tablet Take 1 tablet by mouth daily 30 tablet 1    Calcium Carb-Cholecalciferol (CALCIUM 1000 + D) 1000-800 MG-UNIT TABS Take 1,000 mg by mouth daily 90 tablet 1    LANTUS SOLOSTAR 100 UNIT/ML injection pen Inject 40 Units into the skin nightly 15 Adjustable Dose Pre-filled Pen Syringe 2    JARDIANCE 10 MG tablet TAKE 1 TABLET BY MOUTH DAILY 90 tablet 1    levothyroxine (SYNTHROID) 100 MCG tablet Take 1 tablet daily 90 tablet 1    famotidine (PEPCID) 20 MG tablet Take 1 tablet by mouth 2 times daily 180 tablet 1    amLODIPine (NORVASC) 5 MG tablet TAKE 1 TABLET DAILY 90 tablet 1    polyethylene glycol (GLYCOLAX) 17 GM/SCOOP powder 1 powder in packet DAILY (route: oral)      insulin lispro, 1 Unit Dial, (HUMALOG KWIKPEN) 100 UNIT/ML SOPN Use on a sliding scale 3 times a day with meals. Maximum dose 30 units per day.  15 Adjustable Dose Pre-filled Pen Syringe 0    Continuous Blood Gluc Sensor (FREESTYLE ANT SENSOR SYSTEM) MISC 1 Units by Other route every 14 days Place 1 sensor on back of arm every 14 days 6 each 0    Handicap Placard MISC by Does not apply route 08/09/22 1 each 0    Continuous Blood Gluc Sensor (FREESTYLE [address]) MISC 1 box by Does not apply route 2 times daily 1 each 0    BD PEN NEEDLE MICRO U/F 32G X 6 MM MISC USE WITH LANTUS DAILY 100 each 5    METAMUCIL FIBER PO Take by mouth MiraLax instead      ondansetron (ZOFRAN) 4 MG tablet Take 1 tablet by mouth 3 times daily as needed for Nausea or Vomiting 30 tablet 0    Cyanocobalamin (B-12) 50 MCG TABS Take by mouth       Multiple Vitamins-Minerals (VITEYES COMPLETE PO) Take by mouth      latanoprost (XALATAN) 0.005 % ophthalmic solution 1 drop nightly       No current facility-administered medications for this visit. Social History:   Social History     Socioeconomic History    Marital status:      Spouse name: Not on file    Number of children: Not on file    Years of education: Not on file    Highest education level: Not on file   Occupational History    Not on file   Tobacco Use    Smoking status: Never    Smokeless tobacco: Never   Vaping Use    Vaping Use: Never used   Substance and Sexual Activity    Alcohol use: Never    Drug use: Never    Sexual activity: Not on file   Other Topics Concern    Not on file   Social History Narrative    Not on file     Social Determinants of Health     Financial Resource Strain: Low Risk     Difficulty of Paying Living Expenses: Not hard at all   Food Insecurity: No Food Insecurity    Worried About Running Out of Food in the Last Year: Never true    [address] in the Last Year: Never true   Transportation Needs: No Transportation Needs    Lack of Transportation (Medical): No    Lack of Transportation (Non-Medical):  No   Physical Activity: Insufficiently Active    Days of Exercise per Week: 2 days    Minutes of Exercise per Session: 20 min   Stress: Not on file   Social Connections: Not on file   Intimate Partner Violence: Not on file   Housing Stability: Low Risk     Unable to Pay for Housing in the Last Year: No    Number of Places Lived in the Last Year: 2    Unstable Housing in the Last Year: No TOBACCO:   reports that she has never smoked. She has never used smokeless tobacco.  ETOH:   reports no history of alcohol use. Family History:   Family History   Problem Relation Age of Onset    Diabetes Paternal Grandmother     Diabetes Father     Lung Cancer Mother     Pancreatic Cancer Brother     Diabetes Brother      A:  Patient engaged and cooperative. Denies SI. Insight and motivation are good. Diagnosis:    1. Anxiety          Diagnosis Date    Colon polyps     Diabetes mellitus ([address]     Diabetic neuropathy ([address]     Diabetic retinopathy ([address]     Essential hypertension 10/28/2019    Fall     Fatty liver     Gastritis     GERD (gastroesophageal reflux disease)     Hyperlipidemia     Hypertension     Hypothyroidism     Irritable bowel syndrome     Major depressive disorder with single episode 10/28/2019    Osteopenia     Photosensitivity disorder     chronic    RBBB     Sleep apnea     on BIPAP    Thyroid disease     Thyroid nodule     neg bx-chronic thyroiditis    Type 2 diabetes mellitus with diabetic polyneuropathy, with long-term current use of insulin ([facility] 07/23/2019    Vitamin D deficiency      Plan:  Pt interventions:  Conducted functional assessment, Roosevelt-setting to identify pt's primary goals for [facility] visit / overall health, Supportive techniques, and Emphasized self-care as important for managing overall health.     Pt Behavioral Change Plan:   See Pt Instructions

## 2022-12-05 ENCOUNTER — CARE COORDINATION (OUTPATIENT)
Dept: CASE MANAGEMENT | Age: 77
End: 2022-12-05

## 2022-12-05 ENCOUNTER — HOSPITAL ENCOUNTER (OUTPATIENT)
Dept: PHYSICAL THERAPY | Age: 77
Setting detail: THERAPIES SERIES
Discharge: HOME | End: 2022-12-05
Payer: MEDICARE

## 2022-12-05 ENCOUNTER — OFFICE VISIT (OUTPATIENT)
Dept: ORTHOPEDIC SURGERY | Age: 77
End: 2022-12-05
Payer: MEDICARE

## 2022-12-05 VITALS — HEIGHT: 63 IN | BODY MASS INDEX: 24.98 KG/M2 | WEIGHT: 141 LBS

## 2022-12-05 DIAGNOSIS — S46.011A ROTATOR CUFF STRAIN, RIGHT, INITIAL ENCOUNTER: ICD-10-CM

## 2022-12-05 DIAGNOSIS — R52 PAIN: Primary | ICD-10-CM

## 2022-12-05 DIAGNOSIS — M75.51 SUBACROMIAL BURSITIS OF RIGHT SHOULDER JOINT: ICD-10-CM

## 2022-12-05 PROCEDURE — 97110 THERAPEUTIC EXERCISES: CPT

## 2022-12-05 PROCEDURE — 1036F TOBACCO NON-USER: CPT | Performed by: PHYSICIAN ASSISTANT

## 2022-12-05 PROCEDURE — 1123F ACP DISCUSS/DSCN MKR DOCD: CPT | Performed by: PHYSICIAN ASSISTANT

## 2022-12-05 PROCEDURE — 3017F COLORECTAL CA SCREEN DOC REV: CPT | Performed by: PHYSICIAN ASSISTANT

## 2022-12-05 PROCEDURE — G8427 DOCREV CUR MEDS BY ELIG CLIN: HCPCS | Performed by: PHYSICIAN ASSISTANT

## 2022-12-05 PROCEDURE — G8400 PT W/DXA NO RESULTS DOC: HCPCS | Performed by: PHYSICIAN ASSISTANT

## 2022-12-05 PROCEDURE — 99203 OFFICE O/P NEW LOW 30 MIN: CPT | Performed by: PHYSICIAN ASSISTANT

## 2022-12-05 PROCEDURE — G8484 FLU IMMUNIZE NO ADMIN: HCPCS | Performed by: PHYSICIAN ASSISTANT

## 2022-12-05 PROCEDURE — G8420 CALC BMI NORM PARAMETERS: HCPCS | Performed by: PHYSICIAN ASSISTANT

## 2022-12-05 PROCEDURE — 1090F PRES/ABSN URINE INCON ASSESS: CPT | Performed by: PHYSICIAN ASSISTANT

## 2022-12-05 PROCEDURE — 97530 THERAPEUTIC ACTIVITIES: CPT

## 2022-12-05 NOTE — CARE COORDINATION
Remote Alert Monitoring Note      Date/Time:  12/5/2022 10:34 AM    LPN reviewed RPM regarding red alert received for weight increase (FALSE). Background: RPM For HTN Refer to 911 immediately if:  Patient unresponsive or unable to provide history  Change in cognition or sudden confusion  Patient unable to respond in complete sentences  Intense chest pain/tightness  Any concern for any clinical emergency  Red Alert: Provider response time of 1 hr required for any red alert requiring intervention  Yellow Alert: Provider response time of 3hr required for any escalated yellow alert Clinical Interventions: Reviewed and followed up on alerts and treatments-LPN reviewed RPM for FALSE alert r/t wt increase. Pt has a dropped left foot from a stroke and has to hold on to the wall or a walker when stepping up on to the scale to balance and at times the device will transmit the incorrect wt value. Writer deleted incorrect wt and all metrics are WNL today. Plan/Follow Up: Will continue to review, monitor and address alerts with follow up based on severity of symptoms and risk factors.  Tommy Petersen LPN, [facility] PH: [phone] ---- Current Patient Metrics ---- Activity: - mins Blood Pressure: 146/67, 69bpm Pulseox: 96%, 70bpm Survey: - Weight: 136.4lbs Note Created at: 12/05/2022 10:39 AM ET ---- Time-Spent: 2 minutes 0 seconds

## 2022-12-05 NOTE — PATIENT INSTRUCTIONS
Patient needs to perform home exercise program twice a day on a regular basis. She was given information on turmeric to be taken 1000 to 2000 mg a day. She should stop all over-the-counter NSAIDs since she is on Eliquis due to a recent CVA. Patient may take 2 extra strength Tylenol 2-3 times a day. Patient will follow-up as needed for potential right shoulder cortisone injection.

## 2022-12-05 NOTE — FLOWSHEET NOTE
Physical Therapy Daily Treatment Note    [x]Daily Tx Note    []Progress Note    [] Discharge Summary         Date:  2022    Patient Name:  Oneal York    :  1945  MRN: [id number]      Medical/Treatment Diagnosis Information:  Diagnosis: R26.81 (ICD-10-CM) - Unsteady gait  Treatment Diagnosis: unsteady gait    Insurance/Certification information:  PT Insurance Information: Medicare part A and B    Physician Information:  Referring Provider (secondary): [address] [place] [address] Way of care sent to provider:      []Faxed   [x]Co-signature    (attempts: 1[x] 22 2 []3[])     Plan of care signed :  []  Yes  [x] No      Is this a Progress Report:     []  Yes  [x]  No      If Yes:  Date Range for reporting period:  Beginning 2022  Ending    Progress report will be due (10 Rx or 30 days whichever is less):       Recertification will be due (POC Duration  / 90 days whichever is less): 23      Visit # Insurance Allowable Auth Required   /8  [place] []  Yes [x]  No      Functional Scale:    LEFS 28/80    Latex Allergy:  [x]NO      []YES  Preferred Language for Healthcare:   [x]English       []other:    RESTRICTIONS/PRECAUTIONS:      SUBJECTIVE:  Needs to schedule driving evaluation. Pain level:  0/10      OBJECTIVE: See eval    Exercises/Interventions:     Exercises in bold performed in department today. Items not bolded are carried forward from prior visits for continuity of the record.   Exercise/Equipment Resistance/Repetitions HEP Other comments     THERAPEUTIC EXERCISE  []      Nustep 5 min  []      HS stretch 2 x 30 sec []        []        []        []        []        []      THERAPEUTIC ACTIVITY  []        Steps  -fwd  -lateral   10 B  10 B []         []        []      []      []      []    GAIT   []    Rollator  2 laps  []      []      []      []      NEUROMUSCULAR RE-ED  []        []        []        []        []      []    MANUAL  []      []        [] other medical complications  [] Other:         PLAN: See eval  [x] Continue per plan of care [] Alter current plan (see comments above)  [] Plan of care initiated [] Hold pending MD visit [] Discharge        Therapeutic Exercise and NMR EXR  [x] (61561) Provided verbal/tactile cueing for activities related to strengthening, flexibility, endurance, ROM  for improvements in proximal strength and core control with self care, mobility, lifting and ambulation.  [] (76596) Provided verbal/tactile cueing for activities related to improving balance, coordination, kinesthetic sense, posture, motor skill, proprioception  to assist with core control in self care, mobility, lifting, and ambulation.      Therapeutic Activities and Gait:    [x] (96687 or 79571) Provided verbal/tactile cueing for activities related to improving balance, coordination, kinesthetic sense, posture, motor skill, proprioception and motor activation to allow for proper function  with self care and ADLs  [] (12363) Provided training and instruction to the patient for proper core and proximal hip recruitment and positioning with ambulation re-education     Home Exercise Program:    [] (75657) Reviewed/Progressed HEP activities related to strengthening, flexibility, endurance, ROM of core, proximal hip and LE for functional self-care, mobility, lifting and ambulation   [] (05133) Reviewed/Progressed HEP activities related to improving balance, coordination, kinesthetic sense, posture, motor skill, proprioception of core, proximal hip and LE for self care, mobility, lifting, and ambulation      Manual Treatments:  PROM / STM / Oscillations-Mobs:  G-I, II, III, IV (PA's, Inf., Post.)  [] (44710) Provided manual therapy to mobilize proximal hip and LS spine soft tissue/joints for the purpose of modulating pain, promoting relaxation,  increasing ROM, reducing/eliminating soft tissue swelling/inflammation/restriction, improving soft tissue extensibility and allowing for proper ROM for normal function with self care, mobility, lifting and ambulation. []CRP:  Canalith Repositioning procedure for the assessment, treatment and education of BPPV    Modalities:       Charges:  Timed Code Treatment Minutes: 45   Total Treatment Minutes: 45     Medicare Cap total YTD:    550    []N/A  Workers Comp Time Stamp  (Per CPT and Total Treatment) [x]N/A   Time In:   Time Out:       [] EVAL    [] Dry Needling  [x] XB(52536)   x  1   [] EStim Unattended 47770  [] NMR (38710)  x     [] Estim Attended  57799  [] Manual (17518)  x      [] [place] [address]  [x] TA    x   2  [] Ultrasound  [] Gait   x  [] Vaso  [] CRP    [] Ionto           [] Other:        Electronically signed by: Suzanne Robin, PT, DPT    Note: If patient does not return for scheduled/ recommended follow up visits, this note will serve as a discharge from care along with most recent update on progress.

## 2022-12-05 NOTE — PROGRESS NOTES
Chief Complaint    Shoulder Pain (right)      History of Present Illness:  Vianney Shipley is a 78 y.o. female presents today for evaluation of right shoulder pain. Patient states that she has been experiencing right shoulder pain for several weeks. She denies any precipitating event or trauma. She states that the pain is over the anterior lateral aspect of the right shoulder and increased with range of motion. She also finds that sleeping and rolling onto the right shoulder has significantly increased her pain. She states that the pain is there on a daily basis and radiates to her biceps. She denies any pain below the right elbow. She denies any radicular symptoms, paresthesias,  strength weakness, or difficulty with fine motor movements. Patient is right-hand dominant. 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 She does use a Rollator for ambulation. Pain Assessment  Location of Pain: Shoulder  Location Modifiers: Right  Severity of Pain: 10  Quality of Pain: Other (Comment)  Duration of Pain: Other (Comment)  Frequency of Pain: Other (Comment)    Medical History:  Patient's medications, allergies, past medical, surgical, social and family histories were reviewed and updated as appropriate. Review of Systems:  Relevant 12 point review of systems dated 12/5/2022 was reviewed and are available in the patient's chart under the media tab. Vital Signs:  Ht 5' 2.99\" (1.6 m)   Wt 141 lb (64 kg)   BMI 24.98 kg/m²     General Exam:   Constitutional: Patient is adequately groomed with no evidence of malnutrition  DTRs: Deep tendon reflexes are intact  Mental Status: The patient is oriented to time, place and person. The patient's mood and affect are appropriate. Lymphatic: The lymphatic examination bilaterally reveals all areas to be without enlargement or induration.   Vascular: Examination reveals no swelling or calf tenderness. Peripheral pulses are palpable and 2+. Neurological: The patient has good coordination. There is no weakness or sensory deficit. Right shoulder Examination:    Inspection:  No rashes, scars, or lesions. No deformity or atrophy. Palpation: Patient is tender to palpation over the the long head of the biceps into the subacromial space. Range of Motion: 0 degrees of extension, 180 degrees of forward flexion, 110 degrees of abduction, 70 degrees external rotation, and internal rotation to her waistline. Strength: Right biceps and triceps strength is -5/5. Special Tests:  Positive Atwood and Neer impingement exam.  Negative speed sign. Negative crossover examination. Skin: There are no rashes, ulcerations or lesions. Gait: Normal gait pattern    Reflex normal deep tendon reflexes    Additional Comments:       Additional Examinations:         Contralateral Exam: Examination of the left shoulder reveals no atrophy or deformity. Skin is warm and dry. Range of motion is within normal limits. There is no focal tenderness with palpation. No AC joint tenderness. Negative Neer and Atwood-Jaime exams. Strength is graded 5/5 throughout. Neck: Examination of the neck does not show any tenderness, deformity or injury. Range of motion is unremarkable. There is no gross instability. There are no rashes, ulcerations or lesions. Strength and tone are normal.    Radiology:     X-rays obtained and reviewed in office:  Views 3 views including AP, Y, axillary  Location right shoulder  Impression there is a well-maintained glenohumeral joint with minimal arthritic changes. No fractures or dislocations. Impression: Right rotator cuff strain/subacromial bursitis  Encounter Diagnoses   Name Primary?     Pain Yes    Rotator cuff strain, right, initial encounter     Subacromial bursitis of right shoulder joint        Office Procedures:  Orders Placed This Encounter   Procedures    XR SHOULDER RIGHT (MIN 2 VIEWS)     Standing Status:   Future     Number of Occurrences:   1     Standing Expiration Date:   12/5/2023       Treatment Plan: Today we discussed the diagnosis and treatment plan and the patient was given a home exercise program with education material on the exercises along with a resistive band. She should perform these exercises on a regular basis. She was also advised she should not be taking any anti-inflammatory medicine along with her Eliquis. She was given information on turmeric as an over-the-counter nutritional supplement that she should be taken on a daily basis. If she finds that she has had no durable benefit from these conservative treatments she may be a candidate for a cortisone injection into her right shoulder. She will contact the office if she would like to proceed with the injection.     Follow-up: As needed    Steffany More PA-C  Board certified by the [address]

## 2022-12-06 ENCOUNTER — CARE COORDINATION (OUTPATIENT)
Dept: CARE COORDINATION | Age: 77
End: 2022-12-06

## 2022-12-06 ENCOUNTER — TELEPHONE (OUTPATIENT)
Dept: FAMILY MEDICINE CLINIC | Age: 77
End: 2022-12-06

## 2022-12-06 NOTE — CARE COORDINATION
Remote Patient Monitoring Note      Date/Time:  12/6/2022 9:59 AM    CCSS reviewed patients reported daily Remote Patient Monitoring metrics. All reported metrics are within alert parameters. Plan/Follow Up:  Will continue to review, monitor and address alerts with follow up based on severity of symptoms and risk factors ---- Current Patient Metrics ---- Activity: - mins Blood Pressure: 141/70, 60bpm Pulseox: 94%, 59bpm Survey: - Weight: 137.0lbs Note Created at: 12/06/2022 09:59 AM ET ---- Time-Spent: 2 minutes 0 seconds

## 2022-12-06 NOTE — TELEPHONE ENCOUNTER
Jody Ask from [address] [place] [address] calling regarding Referral order for wheelchair order, asking for last two OV notes to be faxed to [address] [place] [address]    Ph. [phone]  Fax [phone]

## 2022-12-07 ENCOUNTER — CARE COORDINATION (OUTPATIENT)
Dept: CARE COORDINATION | Age: 77
End: 2022-12-07

## 2022-12-07 ENCOUNTER — HOSPITAL ENCOUNTER (OUTPATIENT)
Dept: PHYSICAL THERAPY | Age: 77
Setting detail: THERAPIES SERIES
Discharge: HOME | End: 2022-12-07
Payer: MEDICARE

## 2022-12-07 PROCEDURE — 97110 THERAPEUTIC EXERCISES: CPT

## 2022-12-07 PROCEDURE — 97112 NEUROMUSCULAR REEDUCATION: CPT

## 2022-12-07 PROCEDURE — 97530 THERAPEUTIC ACTIVITIES: CPT

## 2022-12-07 NOTE — CARE COORDINATION
Remote Patient Monitoring Note      Date/Time:  12/7/2022 3:06 PM    CCSS reviewed patients reported daily Remote Patient Monitoring metrics. All reported metrics are within alert parameters. Plan/Follow Up:  Will continue to review, monitor and address alerts with follow up based on severity of symptoms and risk factors ---- Current Patient Metrics ---- Activity: - mins Blood Pressure: 140/74, 61bpm Pulseox: 96%, 61bpm Survey: - Weight: 136.6lbs Note Created at: 12/07/2022 03:06 PM ET ---- Time-Spent: 2 minutes 0 seconds

## 2022-12-07 NOTE — FLOWSHEET NOTE
Physical Therapy Daily Treatment Note    [x]Daily Tx Note    []Progress Note    [] Discharge Summary         Date:  2022    Patient Name:  Lois Betts    :  1945  MRN: [id number]      Medical/Treatment Diagnosis Information:  Diagnosis: R26.81 (ICD-10-CM) - Unsteady gait  Treatment Diagnosis: unsteady gait    Insurance/Certification information:  PT Insurance Information: Medicare part A and B    Physician Information:  Referring Provider (secondary): [address] [place] [address] Way of care sent to provider:      []Faxed   [x]Co-signature    (attempts: 1[x] 22 2 []3[])     Plan of care signed :  []  Yes  [x] No      Is this a Progress Report:     []  Yes  [x]  No      If Yes:  Date Range for reporting period:  Beginning 2022  Ending    Progress report will be due (10 Rx or 30 days whichever is less):       Recertification will be due (POC Duration  / 90 days whichever is less): 23      Visit # Insurance Allowable Auth Required     [place] []  Yes [x]  No      Functional Scale:    LEFS 28/80    Latex Allergy:  [x]NO      []YES  Preferred Language for Healthcare:   [x]English       []other:    RESTRICTIONS/PRECAUTIONS:      SUBJECTIVE:  States that she went to the MD yesterday for her shoulder pain. They provided he with exercises to complete; follows up in 2 weeks. Arms are feeling sore today. Pain level:  0/10      OBJECTIVE: See eval    Exercises/Interventions:     Exercises in bold performed in department today. Items not bolded are carried forward from prior visits for continuity of the record.   Exercise/Equipment Resistance/Repetitions HEP Other comments     THERAPEUTIC EXERCISE  []      Nustep 5 min  []      HS stretch 2 x 30 sec []        []        []        []        []        []      THERAPEUTIC ACTIVITY  []        Steps  -fwd  -lateral  -bwd   10 B  10 B  10 B []         []        []      []      []      []    GAIT   []    Rollator  2 laps  []      [] []      []      NEUROMUSCULAR RE-ED  []      Airex  -normal balance  -NBOS  -semi tandem   30 sec  30 sec  30 sec B []      1 foot ground + 1 foot airex  -balance  -chest pass  -OH lift  -rot ball taps   30 sec B  10 B  10 B  10 B []        []        []      []    MANUAL  []      []        []      Therapeutic Exercise/Home Exercise Program:   10 minutes  Warm up       Therapeutic Activity:  10 minutes  Steps   Pt educated on upcoming driving test.   Discussed goals to focus on in therapy to address current deficits. Gait: 0 minutes    Neuromuscular Re-Education:  20 minutes  balance      Canalith Repositioning Procedure:      Manual Therapy:  0 minutes    Modalities: 0 minutes      ASSESSMENT:  Pt continues to required frequent rest breaks d/t nausea at times with activity. Added balance with airex pad today with various BUE tasks. Difficulty with step ups leading with LLE; difficulty with proprioception of placing foot in appropriate place on foot. Compensated fwd and bwd step up leading with LLE with circumduction and use of BLE. Plan to work on building endurance and strength with all functional activates. Perform PN NPV. Goals:   Short term goal 1: Pt will be indep in HEP  Short term goal 2: Pt will increase B hip strength to 4/5  Short term goal 3: Pt will increase HARRIS Balance score to > 45  Short term goal 4: Pt will ambulate with increased hip extension and glut activation BLE  Short term goal 5: Pt will return to completing all ADLs indep      Overall Progression Towards Functional goals/ Treatment Progress Update:  [] Patient is progressing as expected towards functional goals listed. [] Progression is slowed due to complexities/Impairments listed. [] Progression has been slowed due to co-morbidities.   [x] Plan just implemented, too soon to assess goals progression <30days   [] Goals require adjustment due to lack of progress  [] Patient is not progressing as expected and requires additional follow up with physician  [] Other    Prognosis for POC: [x] Good [] Fair  [] Poor    Patient requires continued skilled intervention: [x] Yes  [] No    Treatment/Activity Tolerance:  [x] Patient able to complete treatment  [x] Patient limited by fatigue  [x] Patient limited by pain    [] Patient limited by other medical complications  [] Other:         PLAN: See eval  [x] Continue per plan of care [] Alter current plan (see comments above)  [] Plan of care initiated [] Hold pending MD visit [] Discharge        Therapeutic Exercise and NMR EXR  [x] (53343) Provided verbal/tactile cueing for activities related to strengthening, flexibility, endurance, ROM  for improvements in proximal strength and core control with self care, mobility, lifting and ambulation. [x] (76094) Provided verbal/tactile cueing for activities related to improving balance, coordination, kinesthetic sense, posture, motor skill, proprioception  to assist with core control in self care, mobility, lifting, and ambulation.      Therapeutic Activities and Gait:    [x] (04607 or 11390) Provided verbal/tactile cueing for activities related to improving balance, coordination, kinesthetic sense, posture, motor skill, proprioception and motor activation to allow for proper function  with self care and ADLs  [] (10399) Provided training and instruction to the patient for proper core and proximal hip recruitment and positioning with ambulation re-education     Home Exercise Program:    [] (30499) Reviewed/Progressed HEP activities related to strengthening, flexibility, endurance, ROM of core, proximal hip and LE for functional self-care, mobility, lifting and ambulation   [] (65783) Reviewed/Progressed HEP activities related to improving balance, coordination, kinesthetic sense, posture, motor skill, proprioception of core, proximal hip and LE for self care, mobility, lifting, and ambulation      Manual Treatments:  PROM / STM / Oscillations-Mobs:  G-I, II, III, IV (Maria Antonia, Inf., Post.)  [] (49144) Provided manual therapy to mobilize proximal hip and LS spine soft tissue/joints for the purpose of modulating pain, promoting relaxation,  increasing ROM, reducing/eliminating soft tissue swelling/inflammation/restriction, improving soft tissue extensibility and allowing for proper ROM for normal function with self care, mobility, lifting and ambulation. []CRP:  Canalith Repositioning procedure for the assessment, treatment and education of BPPV    Modalities:       Charges:  Timed Code Treatment Minutes: 40   Total Treatment Minutes: 40     Medicare Cap total YTD:    606    []N/A  Workers Comp Time Stamp  (Per CPT and Total Treatment) [x]N/A   Time In:   Time Out:       [] EVAL    [] Dry Needling  [x] ZW(62565)   x  1   [] EStim Unattended 68731  [x] NMR (92366)  x   1  [] Estim Attended  20463  [] Manual (24608)  x      [] Mechanical Txn 72834  [x] TA    x   1  [] Ultrasound  [] Gait   x  [] Vaso  [] CRP    [] Ionto           [] Other:        Electronically signed by: John Colby, PT, DPT    Note: If patient does not return for scheduled/ recommended follow up visits, this note will serve as a discharge from care along with most recent update on progress.

## 2022-12-08 ENCOUNTER — CARE COORDINATION (OUTPATIENT)
Dept: CARE COORDINATION | Age: 77
End: 2022-12-08

## 2022-12-08 NOTE — CARE COORDINATION
Remote Patient Monitoring Note      Date/Time:  12/8/2022 3:14 PM    LPN reviewed patients reported daily Remote Patient Monitoring metrics. All reported metrics are within alert parameters. Plan/Follow Up:  Will continue to review, monitor and address alerts with follow up based on severity of symptoms and risk factors     Current Patient Metrics ---- Activity: - mins Blood Pressure: 140/68, 66bpm Pulseox: 96%, 65bpm Survey: - Weight: -lbs Note Created at: 12/08/2022 03:14 PM ET ---- Time-Spent: 2 minutes 0 seconds    Jocelyn Wallace, [address] [facility] Transition Nurse/ RPM  [phone]

## 2022-12-09 ENCOUNTER — CARE COORDINATION (OUTPATIENT)
Dept: CASE MANAGEMENT | Age: 77
End: 2022-12-09

## 2022-12-09 ENCOUNTER — TELEPHONE (OUTPATIENT)
Dept: ORTHOPEDIC SURGERY | Age: 77
End: 2022-12-09

## 2022-12-09 NOTE — TELEPHONE ENCOUNTER
Other PATIENT HASNT BEEN ABLE TO COMPLETE EXERCISES AND SHE IS HAVING A LOT OF PAIN AND IS UNABLE TO SLEEP.  SHE SYS TYLENOL IS NOT WORKING -[phone]

## 2022-12-09 NOTE — CARE COORDINATION
Remote Alert Monitoring Note      Date/Time:  2022 1:35 PM    LPN contacted patient by telephone regarding yellow alert received because pt has not entered wt in two days. Verified patients name and  as identifiers. Background: RPM for HTN Refer to 911 immediately if:  Patient unresponsive or unable to provide history  Change in cognition or sudden confusion  Patient unable to respond in complete sentences  Intense chest pain/tightness  Any concern for any clinical emergency  Red Alert: Provider response time of 1 hr required for any red alert requiring intervention  Yellow Alert: Provider response time of 3hr required for any escalated yellow alert Clinical Interventions: Reviewed and followed up on alerts and treatments-LPN contacted pt in regards to yellow alert as pt has not entred wt in two days. Pt stated that she \"forgot. \" Pt will enter wt metrics on tomorrow as she has already dressed and consumed a meal and beverage today. Plan/Follow Up: Will continue to review, monitor and address alerts with follow up based on severity of symptoms and risk factors.  Cande Richards LPN, [facility] PH: [phone] ---- Current Patient Metrics ---- Activity: - mins Blood Pressure: 132/67, 61bpm Pulseox: 96%, 63bpm Survey: - Weight: -lbs Note Created at: 2022 01:38 PM ET ---- Time-Spent: 5 minutes 0 seconds

## 2022-12-12 ENCOUNTER — HOSPITAL ENCOUNTER (OUTPATIENT)
Dept: PHYSICAL THERAPY | Age: 77
Setting detail: THERAPIES SERIES
Discharge: HOME | End: 2022-12-12
Payer: MEDICARE

## 2022-12-12 ENCOUNTER — CARE COORDINATION (OUTPATIENT)
Dept: CARE COORDINATION | Age: 77
End: 2022-12-12

## 2022-12-12 PROCEDURE — 97530 THERAPEUTIC ACTIVITIES: CPT

## 2022-12-12 PROCEDURE — 97110 THERAPEUTIC EXERCISES: CPT

## 2022-12-12 NOTE — CARE COORDINATION
Remote Patient Monitoring Note      Date/Time:  12/12/2022 4:11 PM    CCSS reviewed patients reported daily Remote Patient Monitoring metrics. All reported metrics are within alert parameters. Plan/Follow Up:  Will continue to review, monitor and address alerts with follow up based on severity of symptoms and risk factors  --- Current Patient Metrics ---- Activity: - mins Blood Pressure: 148/75, 64bpm Pulseox: 96%, 64bpm Survey: - Weight: 137.2lbs Note Created at: 12/12/2022 04:11 PM ET ---- Time-Spent: 2 minutes 0 seconds

## 2022-12-12 NOTE — PROGRESS NOTES
Physical Therapy Daily Treatment Note    []Daily Tx Note    []Progress Note    [x] Discharge Summary    Physical Therapy Discharge Summary    Dear Elle Forte DO  ,    We had the pleasure of treating the following patient for physical therapy services at [facility]. A summary of our findings can be found in the discharge summary below. If you have any questions or concerns regarding these findings, please do not hesitate to contact me at the office phone number checked above. Thank you for the referral.     Physician Signature:________________________________Date:__________________  By signing above (or electronic signature), therapists plan is approved by physician    Overall Response to Treatment:   []Patient is responding well to treatment and improvement is noted with regards  to goals   []Patient should continue to improve in reasonable time if they continue HEP   []Patient has plateaued and is no longer responding to skilled PT intervention    []Patient is getting worse and would benefit from return to referring MD   []Patient unable to adhere to initial POC   [x]Other: Performed PN to assess progress made towards improving strength and balance. Pt continues to have some weakness in LLE>RLE with MMT. When walking and performing exercises B knees become weak and lead to B knee flexion. With increased exertion during exercise Pt becomes nauseated at times. Improved on HARRIS balance test today, however remains a fall risk. Pt progressing towards all goals at this time. Pt to be d/c'd from therapy at this time d/t stress from moving at the end of the month and other obligations with the holidays. Plans to return at beginning of the year. Provided HEP to work on stretching and activation L DF musculature, and maintain overall functional mobility with time off from therapy. Pt to call MD for referral to therapy when ready to return.      Date range of without using hands and stabilize independently   (3) able to stand independently using hands   (2) able to stand using hands after several tries   (1) needs minimal aid to stand or to stabilize   (0) needs moderate or maximal assist to stand   Score: 4     2. STANDING UNSUPPORTED   (4) able to stand safely 2 minutes   (3) able to stand 2 minutes with supervision   (2) able to stand 30 seconds unsupported   (1) needs several tries to stand 30 seconds unsupported   (0) unable to stand 30 seconds unassisted If a subject is able to stand 2   minutes unsupported, score full points for sitting unsupported. Proceed to   item #4. Score: 4      3. SITTING WITH BACK UNSUPPORTED BUT FEET SUPPORTED   ON FLOOR OR ON A STOOL   (4) able to sit safely and securely 2 minutes   (3) able to sit 2 minutes under supervision   (2) able to sit 30 seconds   (1) able to sit 10 seconds   (0) unable to sit without support 10 seconds   Score: 4      4. STANDING TO SITTING   (4) sits safely with minimal use of hands   (3) controls descent by using hands   (2) uses back of legs against chair to control descent   (1) sits independently but has uncontrolled descent   (0) needs assistance to sit   Score: 4     5. TRANSFERS   (4) able to transfer safely with minor use of hands   (3) able to transfer safely definite need of hands   (2) able to transfer with verbal cueing and/or supervision   (1) needs one person to assist   (0) needs two people to assist or supervise to be safe   Score: 4     6. STANDING UNSUPPORTED WITH EYES CLOSED   (4) able to stand 10 seconds safely   (3) able to stand 10 seconds with supervision   (2) able to stand 3 seconds   (1) unable to keep eyes closed 3 seconds but stays steady   (0) needs help to keep from falling   Score: 3     7.  STANDING UNSUPPORTED WITH FEET TOGETHER   (4) able to place feet together independently and stand 1 minute safely   (3) able to place feet together independently and stand for 1 minute with supervision   (2) able to place feet together independently but unable to hold for 30 seconds   (1) needs help to attain position but able to stand 15 seconds feet together   (0) needs help to attain position and unable to hold for 15 seconds   Score: 4     8. REACHING FORWARD WITH OUTSTRETCHED ARM WHILE   STANDING   (4) can reach forward confidently >25 cm (10 inches)   (3) can reach forward >12 cm safely (5 inches)   (2) can reach forward >5 cm safely (2 inches)   (1) reaches forward but needs supervision   (0) loses balance while trying/requires external support   Score: 3     9.  OBJECT FROM FLOOR FROM A STANDING POSITION   (4) able to  slipper safely and easily   (3) able to  slipper but needs supervision   (2) unable to  but reaches 2-5cm (1-2 inches) from slipper and keeps   balance independently   (1) unable to  and needs supervision while trying   (0) unable to try/needs assist to keep from losing balance or falling   Score: 3     10. TURNING TO LOOK BEHIND OVER LEFT AND RIGHT   SHOULDERS WHILE STANDING   (4) looks behind from both sides and weight shifts well   (3) looks behind one side only other side shows less weight shift   (2) turns sideways only but maintains balance   (1) needs supervision when turning   (0) needs assist to keep from losing balance or falling   Score: 3     11. TURN 360 DEGREES   (4) able to turn 360 degrees safely in 4 seconds or less   (3) able to turn 360 degrees safely one side only in 4 seconds or less   (2) able to turn 360 degrees safely but slowly   (1) needs close supervision or verbal cueing   (0) needs assistance while turning   Score: 2     12.  PLACING ALTERNATE FOOT ON STEP OR STOOL WHILE   STANDING UNSUPPORTED   (4) able to stand independently and safely and complete 8 steps in 20 seconds   (3) able to stand independently and complete 8 steps >20 seconds   (2) able to complete 4 steps without aid with supervision   (1) able to complete >2 steps needs minimal assist   (0) needs assistance to keep from falling/unable to try   Score: 0     13. STANDING UNSUPPORTED ONE FOOT IN FRONT   (4) able to place foot tandem independently and hold 30 seconds   (3) able to place foot ahead of other independently and hold 30 seconds   (2) able to take small step independently and hold 30 seconds   (1) needs help to step but can hold 15 seconds   (0) loses balance while stepping or standing   Score: 0     14. STANDING ON ONE LEG   (4) able to lift leg independently and hold >10 seconds   (3) able to lift leg independently and hold 5-10 seconds   (2) able to lift leg independently and hold = or >3 seconds   (1) tries to lift leg unable to hold 3 seconds but remains standing   independently   (0) unable to try or needs assist to prevent fall   Score: 2     Max score 56,a person scoring below 45 is considered to be at risk for falling. 30 sec STS:    12/12/22: 11 STS, no UE, primarily weight shift onto RLE    Exercises/Interventions:     Exercises in bold performed in department today. Items not bolded are carried forward from prior visits for continuity of the record.   Exercise/Equipment Resistance/Repetitions HEP Other comments     THERAPEUTIC EXERCISE  []      Nustep 5 min  []      HS stretch 2 x 30 sec []        []        []        []        []        []      THERAPEUTIC ACTIVITY  []        Steps  -fwd  -lateral  -bwd   10 B  10 B  10 B []         []        []      []      []      []    GAIT   []    Rollator  2 laps  []      []      []      []      NEUROMUSCULAR RE-ED  []      Airex  -normal balance  -NBOS  -semi tandem   30 sec  30 sec  30 sec B []      1 foot ground + 1 foot airex  -balance  -chest pass  -OH lift  -rot ball taps   30 sec B  10 B  10 B  10 B []        []        []      []    MANUAL  []      []        []      Therapeutic Exercise/Home Exercise Program:   10 minutes  Updated HEP to complete during break from therapy  Access Code: [email or web address]: [email or web address]: 12/12/2022Prepared by: Sunitha Brunner   Supine Bridge - 1 x daily - 7 x weekly - 3 sets - 10 reps   Gastroc Stretch on Wall - 1 x daily - 7 x weekly - 2 sets - 30 sec hold   Seated Heel Slide - 1 x daily - 7 x weekly - 1 sets - 10 reps - 10 sec hold   Sit to Stand - 1 x daily - 7 x weekly - 2 sets - 10 reps   Standing Tandem Balance with Counter Support - 1 x daily - 7 x weekly - 2 sets - 30 sec hold   Seated Heel Raise - 1 x daily - 7 x weekly - 2 sets - 10 reps   Heel Toe Raises with Counter Support - 1 x daily - 7 x weekly - 2 sets - 10 reps   Standing Heel Raises - 1 x daily - 7 x weekly - 2 sets - 10 reps    Therapeutic Activity:  35 minutes  PN to assess strength, balance, and functional mobility. LEFS. Steps   Pt educated on upcoming driving test.   Discussed goals to focus on in therapy to address current deficits. Gait: 0 minutes    Neuromuscular Re-Education:  20 minutes  balance      Canalith Repositioning Procedure:      Manual Therapy:  0 minutes    Modalities: 0 minutes      ASSESSMENT:  see above. Goals:   Short term goal 1: Pt will be indep in HEP  Short term goal 2: Pt will increase B hip strength to 4/5  Short term goal 3: Pt will increase HARRIS Balance score to > 45  Short term goal 4: Pt will ambulate with increased hip extension and glut activation BLE  Short term goal 5: Pt will return to completing all ADLs indep      Overall Progression Towards Functional goals/ Treatment Progress Update:  [] Patient is progressing as expected towards functional goals listed. [x] Progression is slowed due to complexities/Impairments listed. [] Progression has been slowed due to co-morbidities.   [] Plan just implemented, too soon to assess goals progression <30days   [] Goals require adjustment due to lack of progress  [] Patient is not progressing as expected and requires additional follow up with physician  [] Other    Prognosis for POC: [x] Good [] Fair  [] Poor    Patient requires continued skilled intervention: [x] Yes  [] No    Treatment/Activity Tolerance:  [x] Patient able to complete treatment  [x] Patient limited by fatigue  [x] Patient limited by pain    [] Patient limited by other medical complications  [] Other:         PLAN: See eval  [x] Continue per plan of care [] Alter current plan (see comments above)  [] Plan of care initiated [] Hold pending MD visit [] Discharge        Therapeutic Exercise and NMR EXR  [] (34588) Provided verbal/tactile cueing for activities related to strengthening, flexibility, endurance, ROM  for improvements in proximal strength and core control with self care, mobility, lifting and ambulation.  [] (65253) Provided verbal/tactile cueing for activities related to improving balance, coordination, kinesthetic sense, posture, motor skill, proprioception  to assist with core control in self care, mobility, lifting, and ambulation.      Therapeutic Activities and Gait:    [x] (65333 or 83595) Provided verbal/tactile cueing for activities related to improving balance, coordination, kinesthetic sense, posture, motor skill, proprioception and motor activation to allow for proper function  with self care and ADLs  [] (50676) Provided training and instruction to the patient for proper core and proximal hip recruitment and positioning with ambulation re-education     Home Exercise Program:    [] (25781) Reviewed/Progressed HEP activities related to strengthening, flexibility, endurance, ROM of core, proximal hip and LE for functional self-care, mobility, lifting and ambulation   [] (52397) Reviewed/Progressed HEP activities related to improving balance, coordination, kinesthetic sense, posture, motor skill, proprioception of core, proximal hip and LE for self care, mobility, lifting, and ambulation      Manual Treatments:  PROM / STM / Oscillations-Mobs:  G-I, II, III, IV (PA's, Inf., Post.)  [] (18013) Provided manual therapy to mobilize proximal hip and LS spine soft tissue/joints for the purpose of modulating pain, promoting relaxation,  increasing ROM, reducing/eliminating soft tissue swelling/inflammation/restriction, improving soft tissue extensibility and allowing for proper ROM for normal function with self care, mobility, lifting and ambulation. []CRP:  Canalith Repositioning procedure for the assessment, treatment and education of BPPV    Modalities:       Charges:  Timed Code Treatment Minutes: 45   Total Treatment Minutes: 45     Medicare Cap total YTD:    650    []N/A  Workers Comp Time Stamp  (Per CPT and Total Treatment) [x]N/A   Time In:   Time Out:       [] EVAL    [] Dry Needling  [x] AJ(86000)   x  1   [] EStim Unattended 74164  [] NMR (66229)  x    [] Estim Attended  61835  [] Manual (75198)  x      [] Mechanical Txn 55017  [x] TA    x   2  [] Ultrasound  [] Gait   x  [] Vaso  [] CRP    [] Ionto           [] Other:        Electronically signed by: Vipin Alcaraz PT, DPT    Note: If patient does not return for scheduled/ recommended follow up visits, this note will serve as a discharge from care along with most recent update on progress.

## 2022-12-13 ENCOUNTER — CARE COORDINATION (OUTPATIENT)
Dept: CARE COORDINATION | Age: 77
End: 2022-12-13

## 2022-12-13 NOTE — CARE COORDINATION
Remote Patient Monitoring Note      Date/Time:  12/13/2022 11:47 AM    CCSS reviewed patients reported daily Remote Patient Monitoring metrics. All reported metrics are within alert parameters. Plan/Follow Up:  Will continue to review, monitor and address alerts with follow up based on severity of symptoms and risk factors  --- Current Patient Metrics ---- Activity: - mins Blood Pressure: 158/73, 67bpm Pulseox: 99%, 68bpm Survey: - Weight: 136.0lbs Note Created at: 12/13/2022 11:46 AM ET ---- Time-Spent: 2 minutes 0 seconds

## 2022-12-14 ENCOUNTER — TELEMEDICINE (OUTPATIENT)
Dept: FAMILY MEDICINE CLINIC | Age: 77
End: 2022-12-14
Payer: MEDICARE

## 2022-12-14 ENCOUNTER — CARE COORDINATION (OUTPATIENT)
Dept: CARE COORDINATION | Age: 77
End: 2022-12-14

## 2022-12-14 DIAGNOSIS — R68.89 DECREASED EXERCISE TOLERANCE: ICD-10-CM

## 2022-12-14 DIAGNOSIS — M21.372 LEFT FOOT DROP: Primary | ICD-10-CM

## 2022-12-14 DIAGNOSIS — Z86.73 HISTORY OF CVA (CEREBROVASCULAR ACCIDENT): ICD-10-CM

## 2022-12-14 PROCEDURE — 1090F PRES/ABSN URINE INCON ASSESS: CPT | Performed by: STUDENT IN AN ORGANIZED HEALTH CARE EDUCATION/TRAINING PROGRAM

## 2022-12-14 PROCEDURE — 99212 OFFICE O/P EST SF 10 MIN: CPT | Performed by: STUDENT IN AN ORGANIZED HEALTH CARE EDUCATION/TRAINING PROGRAM

## 2022-12-14 PROCEDURE — G8484 FLU IMMUNIZE NO ADMIN: HCPCS | Performed by: STUDENT IN AN ORGANIZED HEALTH CARE EDUCATION/TRAINING PROGRAM

## 2022-12-14 PROCEDURE — 1123F ACP DISCUSS/DSCN MKR DOCD: CPT | Performed by: STUDENT IN AN ORGANIZED HEALTH CARE EDUCATION/TRAINING PROGRAM

## 2022-12-14 PROCEDURE — G8420 CALC BMI NORM PARAMETERS: HCPCS | Performed by: STUDENT IN AN ORGANIZED HEALTH CARE EDUCATION/TRAINING PROGRAM

## 2022-12-14 PROCEDURE — 1036F TOBACCO NON-USER: CPT | Performed by: STUDENT IN AN ORGANIZED HEALTH CARE EDUCATION/TRAINING PROGRAM

## 2022-12-14 PROCEDURE — G8400 PT W/DXA NO RESULTS DOC: HCPCS | Performed by: STUDENT IN AN ORGANIZED HEALTH CARE EDUCATION/TRAINING PROGRAM

## 2022-12-14 PROCEDURE — 3017F COLORECTAL CA SCREEN DOC REV: CPT | Performed by: STUDENT IN AN ORGANIZED HEALTH CARE EDUCATION/TRAINING PROGRAM

## 2022-12-14 PROCEDURE — G8427 DOCREV CUR MEDS BY ELIG CLIN: HCPCS | Performed by: STUDENT IN AN ORGANIZED HEALTH CARE EDUCATION/TRAINING PROGRAM

## 2022-12-14 NOTE — CARE COORDINATION
Remote Patient Monitoring Note      Date/Time:  12/14/2022 2:35 PM    CCSS reviewed patients reported daily Remote Patient Monitoring metrics. All reported metrics are within alert parameters. Plan/Follow Up:  Will continue to review, monitor and address alerts with follow up based on severity of symptoms and risk factors  ---- Current Patient Metrics ---- Activity: - mins Blood Pressure: 155/67, 73bpm Pulseox: 97%, 71bpm Survey: - Weight: 136.2lbs Note Created at: 12/14/2022 02:35 PM ET ---- Time-Spent: 2 minutes 0 seconds

## 2022-12-14 NOTE — PROGRESS NOTES
Bruce Gomez (:  1945) is a Established patient, here for evaluation of the following:    Assessment & Plan   Below is the assessment and plan developed based on review of pertinent history, physical exam, labs, studies, and medications. 1. Left foot drop  2. History of CVA (cerebrovascular accident)  Comments:  22 - Right Vance Stroke  3. Decreased exercise tolerance  No follow-ups on file. Addendum:   Patient had been upgraded to the [email or web address] system and her old reader is no longer compatible and will need a new reader sent. Patient reports someone will come to her home tomorrow to do the evaluation for her scooter. Will fax me paperwork to sign and will include todays note. Subjective   HPI    [place]  Reports that she is moving to [facility] [employer] [facility] and still having difficulty walking. Reports that new apartment will be at the end of the ortiz. Reports that she feels very fatigued when making the long walk from the elevator to the apartment. Following with PT at [facility] and planning to transition to [facility] physical therapy after transition to new living situation at [facility]. PT evaluation shows 40/56 on BEG balance scale. Currently using an AFO brace on left leg  Walking with a walker currently    Stroke    Right vance effected  Has had weakness and left foot drop since this time  Working with PT. Review of Systems       Objective   Patient-Reported Vitals  Patient-Reported Weight: 135lb  Patient-Reported Height: 5'3\"       Physical Exam             Bruce Gomez, was evaluated through a synchronous (real-time) audio-video encounter. The patient (or guardian if applicable) is aware that this is a billable service, which includes applicable co-pays. This Virtual Visit was conducted with patient's (and/or legal guardian's) consent.  The visit was conducted pursuant to the emergency declaration under the [address] Emergencies Act, [facility] waiver authority and the Coronavirus Preparedness and Response Supplemental Appropriations Act. Patient identification was verified, and a caregiver was present when appropriate. The patient was located at Home: [address] [address]. Provider was located at [place] [address] (Appt Dept): [address]  [address] [place] [address] [address] [place],  [address] [place] [address] [address].         --Ruy James,

## 2022-12-15 ENCOUNTER — TELEPHONE (OUTPATIENT)
Dept: FAMILY MEDICINE CLINIC | Age: 77
End: 2022-12-15

## 2022-12-15 ENCOUNTER — CARE COORDINATION (OUTPATIENT)
Dept: CARE COORDINATION | Age: 77
End: 2022-12-15

## 2022-12-15 DIAGNOSIS — E11.65 TYPE 2 DIABETES MELLITUS WITH HYPERGLYCEMIA, WITH LONG-TERM CURRENT USE OF INSULIN (HCC): Primary | ICD-10-CM

## 2022-12-15 DIAGNOSIS — Z79.4 TYPE 2 DIABETES MELLITUS WITH HYPERGLYCEMIA, WITH LONG-TERM CURRENT USE OF INSULIN (HCC): Primary | ICD-10-CM

## 2022-12-15 RX ORDER — FLASH GLUCOSE SCANNING READER
1 EACH MISCELLANEOUS DAILY
Qty: 1 EACH | Refills: 0 | Status: SHIPPED | OUTPATIENT
Start: 2022-12-15 | End: 2022-12-15

## 2022-12-15 RX ORDER — FLASH GLUCOSE SENSOR
1 KIT MISCELLANEOUS
Qty: 6 EACH | Refills: 4
Start: 2022-12-15

## 2022-12-15 RX ORDER — FLASH GLUCOSE SCANNING READER
1 EACH MISCELLANEOUS DAILY
Qty: 1 EACH | Refills: 1 | Status: SHIPPED | OUTPATIENT
Start: 2022-12-15

## 2022-12-15 RX ORDER — FLASH GLUCOSE SCANNING READER
1 EACH MISCELLANEOUS DAILY
Qty: 1 EACH | Refills: 0 | Status: CANCELLED | OUTPATIENT
Start: 2022-12-15

## 2022-12-15 NOTE — TELEPHONE ENCOUNTER
Patient called with concerns regarding [employer] 2 sensors and receiving not compatible alert. Patient states she is using a black reader. Informed her that this reader is not the correct reader to use with the [facility] 2 system, that it is used for the older model that she used to use. Patient states when [address] [place] [address] called regarding the order she informed them she didn't need the reader because she was unaware that they were not compatible. I contacted [facility] ,[address] [place] [address] to see what we could do regarding getting patient a reader. They state we can submit an order requesting the new reader however insurance may not approve it due to supplying geetha 14 day reader last year. If they don't approve, patient can either pay for reader out of pocket which is $150 or send back her new sensors in exchange for the 14 day sensors. I will fax order for reader to [facility] [place] [address] to see if we can get approval and go from there. PCP printed and signed order. Will fax order and documents to [address] [place] [address] at [phone]. Called patient and informed of the above information. Agreeable to trying for approval on the reader and then will discuss next steps if denied.

## 2022-12-15 NOTE — TELEPHONE ENCOUNTER
Sherin from [employer] called he will be seeing the patient today to evaluate her for an electric scooter. He is asking for a copy of the latest office note to go over before seeing patient today if possible.  He also stated that he will stop by the office to drop off the needed paperwork to be signed by PCP

## 2022-12-15 NOTE — CARE COORDINATION
Remote Patient Monitoring Note      Date/Time:  12/15/2022 1:17 PM    CCSS reviewed patients reported daily Remote Patient Monitoring metrics. All reported metrics are within alert parameters. Plan/Follow Up:  Will continue to review, monitor and address alerts with follow up based on severity of symptoms and risk factors  ---- Current Patient Metrics ---- Activity: - mins Blood Pressure: 152/69, 66bpm Pulseox: 97%, 67bpm Survey: - Weight: 134.6lbs Note Created at: 12/15/2022 01:17 PM ET ---- Time-Spent: 2 minutes 0 seconds

## 2022-12-16 ENCOUNTER — CARE COORDINATION (OUTPATIENT)
Dept: CARE COORDINATION | Age: 77
End: 2022-12-16

## 2022-12-16 NOTE — CARE COORDINATION
Remote Patient Monitoring Note      Date/Time:  12/16/2022 11:50 AM    CCSS reviewed patients reported daily Remote Patient Monitoring metrics. All reported metrics are within alert parameters. Plan/Follow Up:  Will continue to review, monitor and address alerts with follow up based on severity of symptoms and risk factors  Current Patient Metrics ---- Activity: - mins Blood Pressure: 154/68, 62bpm Pulseox: 98%, 60bpm Survey: - Weight: 133.4lbs Note Created at: 12/16/2022 11:51 AM ET ---- Time-Spent: 2 minutes 0 seconds

## 2022-12-19 ENCOUNTER — CARE COORDINATION (OUTPATIENT)
Dept: CARE COORDINATION | Age: 77
End: 2022-12-19

## 2022-12-19 ENCOUNTER — OFFICE VISIT (OUTPATIENT)
Dept: ORTHOPEDIC SURGERY | Age: 77
End: 2022-12-19
Payer: MEDICARE

## 2022-12-19 VITALS — HEIGHT: 63 IN | WEIGHT: 141 LBS | BODY MASS INDEX: 24.98 KG/M2

## 2022-12-19 DIAGNOSIS — M75.51 SUBACROMIAL BURSITIS OF RIGHT SHOULDER JOINT: Primary | ICD-10-CM

## 2022-12-19 DIAGNOSIS — S46.011A ROTATOR CUFF STRAIN, RIGHT, INITIAL ENCOUNTER: ICD-10-CM

## 2022-12-19 PROCEDURE — 3017F COLORECTAL CA SCREEN DOC REV: CPT | Performed by: PHYSICIAN ASSISTANT

## 2022-12-19 PROCEDURE — 1123F ACP DISCUSS/DSCN MKR DOCD: CPT | Performed by: PHYSICIAN ASSISTANT

## 2022-12-19 PROCEDURE — G8484 FLU IMMUNIZE NO ADMIN: HCPCS | Performed by: PHYSICIAN ASSISTANT

## 2022-12-19 PROCEDURE — G8427 DOCREV CUR MEDS BY ELIG CLIN: HCPCS | Performed by: PHYSICIAN ASSISTANT

## 2022-12-19 PROCEDURE — 20610 DRAIN/INJ JOINT/BURSA W/O US: CPT | Performed by: PHYSICIAN ASSISTANT

## 2022-12-19 PROCEDURE — 1036F TOBACCO NON-USER: CPT | Performed by: PHYSICIAN ASSISTANT

## 2022-12-19 PROCEDURE — G8400 PT W/DXA NO RESULTS DOC: HCPCS | Performed by: PHYSICIAN ASSISTANT

## 2022-12-19 PROCEDURE — 1090F PRES/ABSN URINE INCON ASSESS: CPT | Performed by: PHYSICIAN ASSISTANT

## 2022-12-19 PROCEDURE — G8420 CALC BMI NORM PARAMETERS: HCPCS | Performed by: PHYSICIAN ASSISTANT

## 2022-12-19 PROCEDURE — 99213 OFFICE O/P EST LOW 20 MIN: CPT | Performed by: PHYSICIAN ASSISTANT

## 2022-12-19 RX ORDER — BUPIVACAINE HYDROCHLORIDE 5 MG/ML
3 INJECTION, SOLUTION PERINEURAL ONCE
Status: COMPLETED | OUTPATIENT
Start: 2022-12-19 | End: 2022-12-19

## 2022-12-19 RX ORDER — TRIAMCINOLONE ACETONIDE 40 MG/ML
120 INJECTION, SUSPENSION INTRA-ARTICULAR; INTRAMUSCULAR ONCE
Status: COMPLETED | OUTPATIENT
Start: 2022-12-19 | End: 2022-12-19

## 2022-12-19 RX ADMIN — BUPIVACAINE HYDROCHLORIDE 15 MG: 5 INJECTION, SOLUTION PERINEURAL at 15:55

## 2022-12-19 RX ADMIN — TRIAMCINOLONE ACETONIDE 120 MG: 40 INJECTION, SUSPENSION INTRA-ARTICULAR; INTRAMUSCULAR at 15:54

## 2022-12-19 NOTE — CARE COORDINATION
Remote Patient Monitoring Note      Date/Time:  12/19/2022 11:16 AM    CCSS reviewed patients reported daily Remote Patient Monitoring metrics. All reported metrics are within alert parameters. Plan/Follow Up:  Will continue to review, monitor and address alerts with follow up based on severity of symptoms and risk factors  --- Current Patient Metrics ---- Activity: - mins Blood Pressure: 160/75, 63bpm Pulseox: 98%, 64bpm Survey: - Weight: 132.6lbs Note Created at: 12/19/2022 11:16 AM ET ---- Time-Spent: 2 minutes 0 seconds

## 2022-12-19 NOTE — PROGRESS NOTES
Chief Complaint    Shoulder Pain (RIGHT)      History of Present Illness:  Jdaon Humphries is a 78 y.o. female presents today for follow-up of right shoulder pain and to have a right shoulder cortisone injection. Patient states that she has been experiencing right shoulder pain for several weeks. She denies any precipitating event or trauma. She states that the pain is over the anterior lateral aspect of the right shoulder and increased with range of motion. She also finds that sleeping and rolling onto the right shoulder has significantly increased her pain. She states that the pain is there on a daily basis and radiates to her biceps. She denies any pain below the right elbow. She denies any radicular symptoms, paresthesias,  strength weakness, or difficulty with fine motor movements. Patient is right-hand dominant. 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 She does use a Rollator for ambulation. Pain Assessment  Location of Pain: Shoulder  Location Modifiers: Right  Severity of Pain: 5  Quality of Pain: Other (Comment)  Duration of Pain: Other (Comment)  Frequency of Pain: Other (Comment)    Medical History:  Patient's medications, allergies, past medical, surgical, social and family histories were reviewed and updated as appropriate. Review of Systems:  Relevant 12 point review of systems dated 12/5/2022 was reviewed and are available in the patient's chart under the media tab. Vital Signs:  Ht 5' 2.99\" (1.6 m)   Wt 141 lb (64 kg)   BMI 24.98 kg/m²     General Exam:   Constitutional: Patient is adequately groomed with no evidence of malnutrition  DTRs: Deep tendon reflexes are intact  Mental Status: The patient is oriented to time, place and person. The patient's mood and affect are appropriate.   Lymphatic: The lymphatic examination bilaterally reveals all areas to be without enlargement or induration. Vascular: Examination reveals no swelling or calf tenderness. Peripheral pulses are palpable and 2+. Neurological: The patient has good coordination. There is no weakness or sensory deficit. Right shoulder Examination:    Inspection:  No rashes, scars, or lesions. No deformity or atrophy. Palpation: Patient is tender to palpation over the the long head of the biceps into the subacromial space. Range of Motion: 0 degrees of extension, 180 degrees of forward flexion, 110 degrees of abduction, 70 degrees external rotation, and internal rotation to her waistline. Strength: Right biceps and triceps strength is -5/5. Special Tests:  Positive Atwood and Neer impingement exam.  Negative speed sign. Negative crossover examination. Skin: There are no rashes, ulcerations or lesions. Gait: Normal gait pattern    Reflex normal deep tendon reflexes    Additional Comments:       Additional Examinations:         Contralateral Exam: Examination of the left shoulder reveals no atrophy or deformity. Skin is warm and dry. Range of motion is within normal limits. There is no focal tenderness with palpation. No AC joint tenderness. Negative Neer and Atwood-Jaime exams. Strength is graded 5/5 throughout. Neck: Examination of the neck does not show any tenderness, deformity or injury. Range of motion is unremarkable. There is no gross instability. There are no rashes, ulcerations or lesions. Strength and tone are normal.    Radiology:     X-rays obtained and reviewed in office:  Views 3 views including AP, Y, axillary  Location right shoulder  Impression there is a well-maintained glenohumeral joint with minimal arthritic changes. No fractures or dislocations.         Impression: Right rotator cuff strain/subacromial bursitis        Office Procedures:  I discussed in detail the risk, benefits, and complications of an injection which included but are not limited to infection, skin reactions, hot swollen joints, and anaphylaxis with the patient. The patient verbalized good understanding and gave informed consent for the injection. The skin was prepped using sterile alcohol solution. A sterile 22-gauge needle was inserted into the subacromial space and a mixture of 3ml of Kenalog and 3 mL of 0.50% Marcaine,  was injected under sterile technique. The needle was withdrawn and the puncture site sealed with a Band-Aid. The patient tolerated the injection well. The patient was instructed to call the office immediately if there is any pain, redness, warmth, fever, or chills. Treatment Plan: Today we discussed the diagnosis and treatment plan and the patient is to continue with her home exercise program and postinjection precautions were discussed again.     Follow-up: As needed    Alesia Herrera PA-C  Board certified by the [address]

## 2022-12-20 ENCOUNTER — CARE COORDINATION (OUTPATIENT)
Dept: CARE COORDINATION | Age: 77
End: 2022-12-20

## 2022-12-20 ENCOUNTER — TELEPHONE (OUTPATIENT)
Dept: FAMILY MEDICINE CLINIC | Age: 77
End: 2022-12-20

## 2022-12-20 NOTE — TELEPHONE ENCOUNTER
Called Steve Patel with [address] regarding order. He states order for reader was received and confirmed 3 days ago but he was going to check into the status and get back to me with what is going on with it currently. Will call patient once I hear from Kendra Torres to update her on the status.

## 2022-12-20 NOTE — TELEPHONE ENCOUNTER
Patient called and states she hasnt heard from [address] [place] [address] regarding her reader. I will contact [address] [place] [address] and call patient back to update her.

## 2022-12-20 NOTE — TELEPHONE ENCOUNTER
Called asked to speak with Tomas Barragan, didn't want to really speak with me at first. I sent a message in teams to Tomas Barragan. She was at the time on a call. Patient expressed concern about not being about to know here sugar due to not having a reader. Tomas Barragan was already Aware of the situation and told me to let her know that she would call to check on the status of the [place] and give patient a call back when she knows something.

## 2022-12-20 NOTE — CARE COORDINATION
Remote Patient Monitoring Note      Date/Time:  12/20/2022 12:53 PM    CCSS reviewed patients reported daily Remote Patient Monitoring metrics. All reported metrics are within alert parameters. Plan/Follow Up:  Will continue to review, monitor and address alerts with follow up based on severity of symptoms and risk factors  ---- Current Patient Metrics ---- Activity: - mins Blood Pressure: 132/68, 65bpm Pulseox: 98%, 65bpm Survey: - Weight: 130.8lbs Note Created at: 12/20/2022 12:52 PM ET ---- Time-Spent: 2 minutes 0 seconds

## 2022-12-20 NOTE — TELEPHONE ENCOUNTER
Kyle Johnson to follow up on order status. No answer, lvm for him to call me and provided my cell phone as I will be out of the office. I called patient to let her know I have followed up and they confirmed that have the order but Francine Kim was to call me back with specifics about the status. Informed patient I will be out of the office but will have coworker follow up while I'm out. Patient has been unable to check sugars since 12/14 and really needs this new reader asap.

## 2022-12-21 NOTE — TELEPHONE ENCOUNTER
Received a call from [facility] with [address] new reader will ship out today. The PT has been notified.

## 2022-12-21 NOTE — TELEPHONE ENCOUNTER
Adilson Brown called from [address], wanted the records to say why she needs a new reader replacement

## 2022-12-21 NOTE — TELEPHONE ENCOUNTER
Contacted PT to advised we have been working with Rep Juana from [employer] and PCP. An new order was put in and we are awaiting the Status.

## 2022-12-22 ENCOUNTER — CARE COORDINATION (OUTPATIENT)
Dept: CARE COORDINATION | Age: 77
End: 2022-12-22

## 2022-12-22 ENCOUNTER — TELEPHONE (OUTPATIENT)
Dept: FAMILY MEDICINE CLINIC | Age: 77
End: 2022-12-22

## 2022-12-22 DIAGNOSIS — I10 ESSENTIAL HYPERTENSION: Primary | ICD-10-CM

## 2022-12-22 NOTE — CARE COORDINATION
Remote Patient Monitoring Note  Date/Time:  2022 3:17 PM  LPN contacted patient by telephone regarding yellow alert received for blood pressure reading (172/72). Verified patients name and  as identifiers. Background: Pt enrolled in RPM r/t HTN. Clinical Interventions: Reviewed and followed up on alerts and treatments-Pt speaking in full sentences. Denies any SOB/dyspnea, chest discomfort, headachce, numbness, tingling or vision change. Verbalizes that she has all prescribed medications and taking as ordered. She is in the process of packing today for her upcoming move. Requested she recheck BP at this time with updated reading of 153/72. She would like to graduate from [facility] program.  Will update ACM. Plan/Follow Up: Will continue to review, monitor and address alerts with follow up based on severity of symptoms and risk factors.    Current Patient Metrics ---- Activity: - mins Blood Pressure: 153/72, 53bpm Pulseox: 97%, 68bpm Survey: - Weight: 131.8lbs Note Created at: 2022 03:17 PM ET ---- Time-Spent: 7 minutes 0 seconds

## 2022-12-22 NOTE — CARE COORDINATION
Patient graduated RPM program.     Return request has been submitted. Call to patient to inform of UPS  and to pack equipment in original box, patient verbalized understanding. Will update with UPS tracking once received.

## 2022-12-22 NOTE — CARE COORDINATION
Date/Time:  12/22/2022 1:20 PM    Current Patient Metrics ---- Activity: - mins Blood Pressure: 172/72, 69bpm Pulseox: 97%, 68bpm Survey: - Weight: 131.8lbs Note Created at: 12/22/2022 01:21 PM ET ---- Time-Spent: 5 minutes 0 seconds    LPN attempted to reach patient by telephone regarding yellow alert in remote symptom monitoring program. Unable to leave a  message. Will attempt to reach patient again.        Caitlin Moons, LPN  [address] [facility]/ Care Transition Nurse/RPM  [phone]

## 2022-12-22 NOTE — CARE COORDINATION
Ambulatory Care Coordination Note  12/22/2022    ACC: Ronda Calderon RN    Acm completed follow up call with patient. Patient said she will be moving to [facility] next week. Patient said she is ready to graduate from [facility] and knows when to outreach to PCP with problematic numbers. ACM will alert [facility] pool. Plan:    F/U call 3 weeks   Graduate from [address] [place] [address]  patient enrollment in the Remote Patient Monitoring (RPM) program for in-home monitoring: NA. Lab Results       None            Care Coordination Interventions    Referral from Primary Care Provider: Yes  Suggested Interventions and Community Resources  Medi Set or Pill Pack: Completed (Comment: Pill Box Medi set packs)  Senior Services: Completed (Comment: Jacques send referral over for MOW and other services patient might qualify for)  Transportation Support: Completed  Zone Management Tools: Completed          Goals Addressed                   This Visit's Progress     COMPLETED: Medication Management   Improving     I will take my medication as directed. I will notify my provider of any problems with medications, like adverse effects or side effects. I will notify my provider/Care Coordinator if I am unable to afford my medications. I will notify my provider for advice before I stop taking any of my medication. Barriers: overwhelmed by complexity of regimen  Plan for overcoming my barriers: ACM will be connecting to get pill blister packets for patient for easier administration. Confidence: 8/10  Anticipated Goal Completion Date: 11/17/22              Prior to Admission medications    Medication Sig Start Date End Date Taking?  Authorizing Provider   Continuous Blood Gluc Sensor (FREESTYLE ANT 2 SENSOR) MISC 1 Device by Does not apply route every 14 days 12/15/22   Townsend Sensing, DO   Continuous Blood Gluc  (FREESTYLE ANT 2 READER) GILMER 1 each by Does not apply route daily 12/15/22   Townsend Sensing, DO   buPROPion (WELLBUTRIN XL) 150 MG extended release tablet Take 1 tablet by mouth every morning 11/8/22   [facility], DO   donepezil (ARICEPT) 10 MG tablet Take 1 tablet by mouth nightly 11/3/22   La Jose An, DO   DULoxetine (CYMBALTA) 20 MG extended release capsule Take 1 capsule by mouth daily 11/3/22   Oscar Baires, DO   glipiZIDE (GLUCOTROL) 10 MG tablet TAKE 1 TABLET BY MOUTH EVERY DAY 11/3/22   [facility], DO   linaclotide Amy [place]) 145 MCG capsule Take 1 capsule by mouth every morning (before breakfast) 11/3/22   [facility], DO   ELIQUIS 5 MG TABS tablet TAKE 1 TABLET BY MOUTH TWICE DAILY 11/3/22   Oscar Baires, DO   rosuvastatin (CRESTOR) 40 MG tablet TAKE 1 TABLET BY MOUTH EVERY DAY 11/3/22   Oscar Baires, DO   lisinopril (PRINIVIL;ZESTRIL) 10 MG tablet TAKE 1 TABLET BY MOUTH EVERY DAY 11/3/22   La Jose An, DO   diclofenac sodium (VOLTAREN) 1 % GEL Apply 4 g topically 4 times daily 11/3/22   Oscar Baires, DO   metoprolol tartrate (LOPRESSOR) 25 MG tablet TAKE 1 TABLET TWICE A DAY 11/3/22   La Jose An, DO   VASCEPA 1 g CAPS capsule Take 2 capsules by mouth 2 times daily 11/3/22 2/1/23  sOcar Baires, DO   divalproex (DEPAKOTE) 250 MG DR tablet Take 2 tablets by mouth nightly 11/3/22   [facility], DO   aspirin 81 MG chewable tablet Take 1 tablet by mouth daily 11/3/22   Oscar Baires, DO   Calcium Carb-Cholecalciferol (CALCIUM 1000 + D) 1000-800 MG-UNIT TABS Take 1,000 mg by mouth daily 11/3/22   Oscar Baires, DO   LANTUS SOLOSTAR 100 UNIT/ML injection pen Inject 40 Units into the skin nightly 11/3/22   Oscar Baires, DO   JARDIANCE 10 MG tablet TAKE 1 TABLET BY MOUTH DAILY 11/3/22   [facility], DO   levothyroxine (SYNTHROID) 100 MCG tablet Take 1 tablet daily 10/19/22   Oscar Baires,    famotidine (PEPCID) 20 MG tablet Take 1 tablet by mouth 2 times daily 10/19/22   Oscar Baires,    amLODIPine (NORVASC) 5 MG tablet TAKE 1 TABLET DAILY 10/19/22 Lalito Baires DO   polyethylene glycol [facility]) 17 GM/SCOOP powder 1 powder in packet DAILY (route: oral) 8/29/22   Historical Provider, MD   insulin lispro, 1 Unit Dial, (HUMALOG KWIKPEN) 100 UNIT/ML SOPN Use on a sliding scale 3 times a day with meals. Maximum dose 30 units per day. 10/10/22   Roderick Grief, DO   Continuous Blood Gluc Sensor ([address] [place] [address]) MISC 1 Units by Other route every 14 days Place 1 sensor on back of arm every 14 days 10/4/22 1/2/23  Roderick Grief, DO   Handicap Placard MISC by Does not apply route 08/09/22 8/9/22   Lalito Baires, DO   Continuous Blood Gluc Sensor ([address] [place] [address]) MISC 1 box by Does not apply route 2 times daily 6/16/22   YANI Ocampo   BD PEN NEEDLE MICRO U/F 32G X 6 MM MISC USE WITH LANTUS DAILY 4/11/22   Adelita Milton MD   METAMUCIL FIBER PO Take by mouth MiraLax instead    Historical Provider, MD   ondansetron (ZOFRAN) 4 MG tablet Take 1 tablet by mouth 3 times daily as needed for Nausea or Vomiting 1/12/22   Adelita Milton MD   Cyanocobalamin (B-12) 50 MCG TABS Take by mouth     Historical Provider, MD   Multiple Vitamins-Minerals (VITEYES COMPLETE PO) Take by mouth    Historical Provider, MD   latanoprost (XALATAN) 0.005 % ophthalmic solution 1 drop nightly    Historical Provider, MD       Future Appointments   Date Time Provider Coleman Garcia   1/4/2023  1:00 PM Mike Elias, PhD [facility]   2/28/2023  3:00 PM DO KRISTIN Delgado  [facility] - [facility]   ,   Diabetes Assessment      Meal Planning: Avoidance of concentrated sweets, Calorie counting   How often do you test your blood sugar?: Daily, Meals   Do you have barriers with adherence to non-pharmacologic self-management interventions?  (Nutrition/Exercise/Self-Monitoring): No   Have you ever had to go to the ED for symptoms of low blood sugar?: No       No patient-reported symptoms        , and   General Assessment    Do you have any symptoms that are causing concern?: No

## 2022-12-22 NOTE — TELEPHONE ENCOUNTER
Patient called in with a question about her [facility] reader. Stated she can't get a reading on the sensor and has some questions about this. Are you able to help?

## 2022-12-22 NOTE — PROGRESS NOTES
12/22/22 3:54 PM     Remote Patient Order Discontinued    Received request from Emily Felder RN to discontinue order for remote patient monitoring of HTN and order completed.       Opal Farr, LAUREN, FNP-C, Remote Patient Monitoring NP, () [phone]

## 2022-12-23 NOTE — TELEPHONE ENCOUNTER
Contacted the patient advised to charge her reader, then if the old sensor dose not scan then replace sensor.

## 2022-12-29 ENCOUNTER — TELEPHONE (OUTPATIENT)
Dept: FAMILY MEDICINE CLINIC | Age: 77
End: 2022-12-29

## 2022-12-29 NOTE — TELEPHONE ENCOUNTER
Patient called stated that she is still waiting on her scooter to come to the [employer], she wanted to know how long this process will take.

## 2023-01-04 ENCOUNTER — SCHEDULED TELEPHONE ENCOUNTER (OUTPATIENT)
Dept: PSYCHOLOGY | Age: 78
End: 2023-01-04

## 2023-01-04 DIAGNOSIS — F41.9 ANXIETY: Primary | ICD-10-CM

## 2023-01-05 ENCOUNTER — TELEPHONE (OUTPATIENT)
Dept: FAMILY MEDICINE CLINIC | Age: 78
End: 2023-01-05

## 2023-01-05 NOTE — TELEPHONE ENCOUNTER
Patient called in today stating she has been trying to call [facility] regarding her scooter. She said it has finally been approved but she cant get a hold of anyone there to see where the scooter is. She is needing this wheelchair order sent somewhere else or she needs help regarding it.  She said [place] now [place] the [facility] [employer] [facility] and its a long walk for her and she just really needs something fast.

## 2023-01-06 NOTE — TELEPHONE ENCOUNTER
Spoke to patient and she stated that she isnt wanting an alternative chair, patient has been trying to get ahold of the company that was suppose to give her the scooter but hasnt heard back in the past week and the vm is full so she cannot leave a message. Patient stated that [employer] has scooters and is asking if we can send form to them to get a scooter from them.      BHARGAV

## 2023-01-06 NOTE — TELEPHONE ENCOUNTER
Called and spoke with [facility] and informed them of the situation and informed them to contact the patient if any questions also contacted the patient and informed her/

## 2023-01-09 ENCOUNTER — HOSPITAL ENCOUNTER (OUTPATIENT)
Dept: PHYSICAL THERAPY | Age: 78
Setting detail: THERAPIES SERIES
Discharge: HOME | End: 2023-01-09
Payer: MEDICARE

## 2023-01-09 PROCEDURE — 97164 PT RE-EVAL EST PLAN CARE: CPT

## 2023-01-09 PROCEDURE — 97110 THERAPEUTIC EXERCISES: CPT

## 2023-01-09 NOTE — FLOWSHEET NOTE
[facility], [facility], [address], [address]  Phone: [phone]  Fax [phone]    Physical Therapy Re-eval Note    Date:  2023    Patient: Aislinn Neff (73 y.o. female)   Examination Date: 2022   :  1945 MRN: [id number]   Auth needed? []  Yes    [x]  No        Amount authorized: Insurance: Payor: MEDICARE / Plan: MEDICARE PART A AND B / Product Type: *No Product type* /   Insurance ID: [id number] - (Medicare)  Secondary Insurance (if applicable): [facility]   Referring Physician: Azalia Espinal DO     PCP: Isabella Blake DO Medical Diagnosis: Unsteady gait [R26.81]    Preferred Language for Healthcare:     [x] English       [] other: Latex Allergy? []  Yes    [x]  No          Plan of care signed (Y/N):  sent  Visit# / total visits:      G-Code (if applicable):          LEFS   at re-eval   at eval    Medicare Cap (if applicable):  998 = total amount used, updated 2023    Time in:   11:00      Timed Treatment: 10  Total Treatment Time:  30  ________________________________________________________________________________________    Pain Level:    /10  SUBJECTIVE:  Pt reports that she has now moved to the [facility]. She had a difficult move and has had some troubles with her family. Notes that she has been very weak and tired recently. \"I'm not feeling well. \"    OBJECTIVE: [id number]    Exercise/Equipment Resistance/Repetitions Other comments   NuStep 5 min    bridge    Supine clams  SL hip ER with stabilization Green TB   Supine marches Green TB   DKTC on ball Manual resistance   Supine hip flexion    Extension Manual resistance          Standing balance    Feet together EC    Tandem    Airex balance    Stance    Mod tandem    SLS foot on ball    Side stepping Green TB   Standing hip abd Green TB   Resisted walking  Gray TB   Sit-to-stand training 13 reps 30 sec STS Other Therapeutic Activities:  Pt educated on results of HARRIS and TUG tests and how those results affect her ability to function during ADLs. Manual Treatments:         Modalities:      Test/Measurements:    TU with RW,19 sec without device    HARRIS BALANCE SCALE 14-Item Long Form Original Version   Total Score: 39/56      1. SITTING TO STANDING   (4) able to stand without using hands and stabilize independently   (3) able to stand independently using hands   (2) able to stand using hands after several tries   (1) needs minimal aid to stand or to stabilize   (0) needs moderate or maximal assist to stand   Score: 4     2. STANDING UNSUPPORTED   (4) able to stand safely 2 minutes   (3) able to stand 2 minutes with supervision   (2) able to stand 30 seconds unsupported   (1) needs several tries to stand 30 seconds unsupported   (0) unable to stand 30 seconds unassisted If a subject is able to stand 2   minutes unsupported, score full points for sitting unsupported. Proceed to   item #4. Score: 4      3. SITTING WITH BACK UNSUPPORTED BUT FEET SUPPORTED   ON FLOOR OR ON A STOOL   (4) able to sit safely and securely 2 minutes   (3) able to sit 2 minutes under supervision   (2) able to sit 30 seconds   (1) able to sit 10 seconds   (0) unable to sit without support 10 seconds   Score: 4      4. STANDING TO SITTING   (4) sits safely with minimal use of hands   (3) controls descent by using hands   (2) uses back of legs against chair to control descent   (1) sits independently but has uncontrolled descent   (0) needs assistance to sit   Score: 4     5. TRANSFERS   (4) able to transfer safely with minor use of hands   (3) able to transfer safely definite need of hands   (2) able to transfer with verbal cueing and/or supervision   (1) needs one person to assist   (0) needs two people to assist or supervise to be safe   Score: 3     6.  STANDING UNSUPPORTED WITH EYES CLOSED   (4) able to stand 10 seconds safely   (3) able to stand 10 seconds with supervision   (2) able to stand 3 seconds   (1) unable to keep eyes closed 3 seconds but stays steady   (0) needs help to keep from falling   Score: 4     7. STANDING UNSUPPORTED WITH FEET TOGETHER   (4) able to place feet together independently and stand 1 minute safely   (3) able to place feet together independently and stand for 1 minute with   supervision   (2) able to place feet together independently but unable to hold for 30 seconds   (1) needs help to attain position but able to stand 15 seconds feet together   (0) needs help to attain position and unable to hold for 15 seconds   Score: 4     8. REACHING FORWARD WITH OUTSTRETCHED ARM WHILE   STANDING   (4) can reach forward confidently >25 cm (10 inches)   (3) can reach forward >12 cm safely (5 inches)   (2) can reach forward >5 cm safely (2 inches)   (1) reaches forward but needs supervision   (0) loses balance while trying/requires external support   Score: 3     9.  OBJECT FROM FLOOR FROM A STANDING POSITION   (4) able to  slipper safely and easily   (3) able to  slipper but needs supervision   (2) unable to  but reaches 2-5cm (1-2 inches) from slipper and keeps   balance independently   (1) unable to  and needs supervision while trying   (0) unable to try/needs assist to keep from losing balance or falling   Score: 3     10. TURNING TO LOOK BEHIND OVER LEFT AND RIGHT   SHOULDERS WHILE STANDING   (4) looks behind from both sides and weight shifts well   (3) looks behind one side only other side shows less weight shift   (2) turns sideways only but maintains balance   (1) needs supervision when turning   (0) needs assist to keep from losing balance or falling   Score: 1     11.  TURN 360 DEGREES   (4) able to turn 360 degrees safely in 4 seconds or less   (3) able to turn 360 degrees safely one side only in 4 seconds or less   (2) able to turn 360 degrees safely but slowly   (1) needs close supervision or verbal cueing   (0) needs assistance while turning   Score: 2     12. PLACING ALTERNATE FOOT ON STEP OR STOOL WHILE   STANDING UNSUPPORTED   (4) able to stand independently and safely and complete 8 steps in 20 seconds   (3) able to stand independently and complete 8 steps >20 seconds   (2) able to complete 4 steps without aid with supervision   (1) able to complete >2 steps needs minimal assist   (0) needs assistance to keep from falling/unable to try   Score: 0     13. STANDING UNSUPPORTED ONE FOOT IN FRONT   (4) able to place foot tandem independently and hold 30 seconds   (3) able to place foot ahead of other independently and hold 30 seconds   (2) able to take small step independently and hold 30 seconds   (1) needs help to step but can hold 15 seconds   (0) loses balance while stepping or standing   Score: 3     14. STANDING ON ONE LEG   (4) able to lift leg independently and hold >10 seconds   (3) able to lift leg independently and hold 5-10 seconds   (2) able to lift leg independently and hold = or >3 seconds   (1) tries to lift leg unable to hold 3 seconds but remains standing   independently   (0) unable to try or needs assist to prevent fall   Score: 0       ASSESSMENT:    Pt returned to therapy following one month off of therapy during her move. Pt presents very weak and deconditioned. Poor tolerance to therapy at this time. Pt scored a 39/56 on the HARRIS Balance scale which places her into a high fall risk category. Plan is to progress pt as she tolerates.     Treatment/Activity Tolerance:   [x]Patient tolerated treatment well [x] Patient limited by fatique  []Patient limited by pain [] Patient limited by other medical complications  [] Other:     Goals:    Short term goal 1: Pt will be indep in HEP  Short term goal 2: Pt will increase B hip strength to 4/5  Short term goal 3: Pt will increase HARRIS Balance score to > 45  Short term goal 4: Pt will ambulate with increased hip extension and glut activation BLE  Short term goal 5: Pt will return to completing all ADLs indep    Plan: [x] Continue per plan of care [] Alter current plan (see comments)   [x] Plan of care initiated [] Hold pending MD visit [] Discharge      Plan for Next Session:  Biomechanical correction of problems as they present. Facilitate correct muscle firing patterns and activation with appropriate activities. Progress patient as tolerated.      Re-Certification Due Date:         Signature:  Dewayne Sheppard PT

## 2023-01-12 ENCOUNTER — APPOINTMENT (OUTPATIENT)
Dept: PHYSICAL THERAPY | Age: 78
End: 2023-01-12
Payer: MEDICARE

## 2023-01-17 ENCOUNTER — CARE COORDINATION (OUTPATIENT)
Dept: CARE COORDINATION | Age: 78
End: 2023-01-17

## 2023-01-17 ENCOUNTER — HOSPITAL ENCOUNTER (OUTPATIENT)
Dept: PHYSICAL THERAPY | Age: 78
Setting detail: THERAPIES SERIES
Discharge: HOME | End: 2023-01-17
Payer: MEDICARE

## 2023-01-17 PROCEDURE — 97112 NEUROMUSCULAR REEDUCATION: CPT

## 2023-01-17 PROCEDURE — 97110 THERAPEUTIC EXERCISES: CPT

## 2023-01-17 NOTE — CARE COORDINATION
Ambulatory Care Coordination Note  1/17/2023    ACC: Juani Guerrero, MARYAM    ACM completed follow up call with patient who is adjusting to the [facility] and the [employer]. Patient said she finished with PT today and is feeling wiped out. Patient has a follow up appt with PCP tomorrow to address weight loss and lack of energy. Patient had no other further concerns at this time. Plan:    Graduate from [address]  patient enrollment in the Remote Patient Monitoring (RPM) program for in-home monitoring: NA. Lab Results       None            Care Coordination Interventions    Referral from Primary Care Provider: Yes  Suggested Interventions and Community Resources  Medi Set or Pill Pack: Completed (Comment: Pill Box Medi set packs)  Senior Services: Completed (Comment: Jacques send referral over for MOW and other services patient might qualify for)  Transportation Support: Completed  Zone Management Tools: Completed          Goals Addressed    None         Prior to Admission medications    Medication Sig Start Date End Date Taking?  Authorizing Provider   Continuous Blood Gluc Sensor (FREESTYLE ANT 2 SENSOR) MISC 1 Device by Does not apply route every 14 days 12/15/22   Chrissy Alvarenga DO   Continuous Blood Gluc  (FREESTYLE ANT 2 READER) GILMER 1 each by Does not apply route daily 12/15/22   Chrissy Alvarenga DO   buPROPion (WELLBUTRIN XL) 150 MG extended release tablet Take 1 tablet by mouth every morning 11/8/22   Chrissy Alvarenga DO   donepezil (ARICEPT) 10 MG tablet Take 1 tablet by mouth nightly 11/3/22   Chrissy Alvarenga DO   DULoxetine (CYMBALTA) 20 MG extended release capsule Take 1 capsule by mouth daily 11/3/22   Marvin Baires DO   glipiZIDE (GLUCOTROL) 10 MG tablet TAKE 1 TABLET BY MOUTH EVERY DAY 11/3/22   Chrissy Alvarenga DO   linaclotide Gm Serene) 145 MCG capsule Take 1 capsule by mouth every morning (before breakfast) 11/3/22   Marvin Baires DO   ELIQUIS 5 MG TABS tablet TAKE 1 TABLET BY MOUTH TWICE DAILY 11/3/22   Rylan Baires,    rosuvastatin (CRESTOR) 40 MG tablet TAKE 1 TABLET BY MOUTH EVERY DAY 11/3/22   Rylan Biares, DO   lisinopril (PRINIVIL;ZESTRIL) 10 MG tablet TAKE 1 TABLET BY MOUTH EVERY DAY 11/3/22   Anthony Way, DO   diclofenac sodium (VOLTAREN) 1 % GEL Apply 4 g topically 4 times daily 11/3/22   Rylan Baires, DO   metoprolol tartrate (LOPRESSOR) 25 MG tablet TAKE 1 TABLET TWICE A DAY 11/3/22   Anthony Way, DO   VASCEPA 1 g CAPS capsule Take 2 capsules by mouth 2 times daily 11/3/22 2/1/23  Rylan Baires, DO   divalproex (DEPAKOTE) 250 MG DR tablet Take 2 tablets by mouth nightly 11/3/22   Anthony Way, DO   aspirin 81 MG chewable tablet Take 1 tablet by mouth daily 11/3/22   Rylan Baires, DO   Calcium Carb-Cholecalciferol (CALCIUM 1000 + D) 1000-800 MG-UNIT TABS Take 1,000 mg by mouth daily 11/3/22   Rylan Baires, DO   LANTUS SOLOSTAR 100 UNIT/ML injection pen Inject 40 Units into the skin nightly 11/3/22   Rylan Baires, DO   JARDIANCE 10 MG tablet TAKE 1 TABLET BY MOUTH DAILY 11/3/22   Anthony Way, DO   levothyroxine (SYNTHROID) 100 MCG tablet Take 1 tablet daily 10/19/22   Anthony Way, DO   famotidine (PEPCID) 20 MG tablet Take 1 tablet by mouth 2 times daily 10/19/22   Rylan Baires, DO   amLODIPine (NORVASC) 5 MG tablet TAKE 1 TABLET DAILY 10/19/22   Rylan Baires, DO   polyethylene glycol (GLYCOLAX) 17 GM/SCOOP powder 1 powder in packet DAILY (route: oral) 8/29/22   Historical Provider, MD   insulin lispro, 1 Unit Dial, (HUMALOG KWIKPEN) 100 UNIT/ML SOPN Use on a sliding scale 3 times a day with meals. Maximum dose 30 units per day.  10/10/22   Anthony Way, DO   Continuous Blood Gluc Sensor ([facility]) MISC 1 Units by Other route every 14 days Place 1 sensor on back of arm every 14 days 10/4/22 1/2/23  Anthony Way, DO   Handicap Placard MISC by Does not apply route 08/09/22 8/9/22   Anthony Way, DO   Continuous Blood Gluc Sensor ([employer] Wedding FavorSTYLE ANT SENSOR SYSTEM) MISC 1 box by Does not apply route 2 times daily 6/16/22   YANI Andujar   BD PEN NEEDLE MICRO U/F 32G X 6 MM MISC USE WITH LANTUS DAILY 4/11/22   Jessica Hickey MD   METAMUCIL FIBER PO Take by mouth MiraLax instead    Historical Provider, MD   ondansetron (ZOFRAN) 4 MG tablet Take 1 tablet by mouth 3 times daily as needed for Nausea or Vomiting 1/12/22   Jessica Hickey MD   Cyanocobalamin (B-12) 50 MCG TABS Take by mouth     Historical Provider, MD   Multiple Vitamins-Minerals (VITEYES COMPLETE PO) Take by mouth    Historical Provider, MD   latanoprost (XALATAN) 0.005 % ophthalmic solution 1 drop nightly    Historical Provider, MD       Future Appointments   Date Time Provider Coleman Garcia   1/18/2023 11:00 AM DO KRISTIN Dias [place] - DYBALA   1/18/2023  2:00 PM Juan Daniel Bowden, PhD [facility] PSYCHG MMA   1/20/2023 10:45 AM Mark Freeman, PT MHAZ PT Jose Guadalupe HOD   1/23/2023 10:45 AM Mark Freeman PT SANTI PT Sylvie Neff HOD   2/28/2023  3:00 PM DO KRISTIN Noriega - SYLVIA   ,   Diabetes Assessment      Meal Planning: Avoidance of concentrated sweets, Calorie counting   How often do you test your blood sugar?: Daily, Meals   Do you have barriers with adherence to non-pharmacologic self-management interventions?  (Nutrition/Exercise/Self-Monitoring): No   Have you ever had to go to the ED for symptoms of low blood sugar?: No       No patient-reported symptoms        , and   General Assessment    Do you have any symptoms that are causing concern?: No

## 2023-01-17 NOTE — FLOWSHEET NOTE
[facility], [facility], [address], [address]  Phone: [phone]  Fax [phone]    Physical Therapy Re-eval Note    Date:  2023    Patient: Olinda Chandler (21 y.o. female)   Examination Date: 2022   :  1945 MRN: [id number]   Auth needed? []  Yes    [x]  No        Amount authorized: Insurance: Payor: MEDICARE / Plan: MEDICARE PART A AND B / Product Type: *No Product type* /   Insurance ID: [id number] - (Medicare)  Secondary Insurance (if applicable): [facility]   Referring Physician: Yesica Ramos DO     PCP: Celestine Dexter DO Medical Diagnosis: Unsteady gait [R26.81]    Preferred Language for Healthcare:     [x] English       [] other: Latex Allergy? []  Yes    [x]  No          Plan of care signed (Y/N):  sent  Visit# / total visits:      G-Code (if applicable):          LEFS   at re-eval   at eval    Medicare Cap (if applicable):  523 = total amount used, updated 2023    Time in:   9:30      Timed Treatment: 40  Total Treatment Time:  40  ________________________________________________________________________________________    Pain Level:    /10  SUBJECTIVE:  Pt reports that she is still very fatigued. Notes that she is meeting with the MD tomorrow.      OBJECTIVE: [id number]    Exercise/Equipment Resistance/Repetitions Other comments   NuStep 5 min    bridge x10    Supine clams  SL hip ER with stabilization X15  X10 B Green TB   Supine marches Green TB   DKTC on ball x15 Manual resistance   Supine hip flexion    Extension Manual resistance          Standing balance    Feet together EC    Tandem    Airex balance    Stance    Mod tandem 1 min  1 min BLE    SLS foot on ball    Side stepping X4 laps Green TB   Standing hip abd 2x10 B Green TB   Resisted walking  Gray TB   Sit-to-stand training 130 sec STS                                        Other Therapeutic Activities:  Pt educated on results of HARRIS and TUG tests and how those results affect her ability to function during ADLs. Manual Treatments:         Modalities:      Test/Measurements:    TU with RW,19 sec without device    HARRIS BALANCE SCALE 14-Item Long Form Original Version   Total Score: 39/56      1. SITTING TO STANDING   (4) able to stand without using hands and stabilize independently   (3) able to stand independently using hands   (2) able to stand using hands after several tries   (1) needs minimal aid to stand or to stabilize   (0) needs moderate or maximal assist to stand   Score: 4     2. STANDING UNSUPPORTED   (4) able to stand safely 2 minutes   (3) able to stand 2 minutes with supervision   (2) able to stand 30 seconds unsupported   (1) needs several tries to stand 30 seconds unsupported   (0) unable to stand 30 seconds unassisted If a subject is able to stand 2   minutes unsupported, score full points for sitting unsupported. Proceed to   item #4. Score: 4      3. SITTING WITH BACK UNSUPPORTED BUT FEET SUPPORTED   ON FLOOR OR ON A STOOL   (4) able to sit safely and securely 2 minutes   (3) able to sit 2 minutes under supervision   (2) able to sit 30 seconds   (1) able to sit 10 seconds   (0) unable to sit without support 10 seconds   Score: 4      4. STANDING TO SITTING   (4) sits safely with minimal use of hands   (3) controls descent by using hands   (2) uses back of legs against chair to control descent   (1) sits independently but has uncontrolled descent   (0) needs assistance to sit   Score: 4     5. TRANSFERS   (4) able to transfer safely with minor use of hands   (3) able to transfer safely definite need of hands   (2) able to transfer with verbal cueing and/or supervision   (1) needs one person to assist   (0) needs two people to assist or supervise to be safe   Score: 3     6.  STANDING UNSUPPORTED WITH EYES CLOSED   (4) able to stand 10 seconds safely   (3) able to stand 10 seconds with supervision   (2) able to stand 3 seconds   (1) unable to keep eyes closed 3 seconds but stays steady   (0) needs help to keep from falling   Score: 4     7. STANDING UNSUPPORTED WITH FEET TOGETHER   (4) able to place feet together independently and stand 1 minute safely   (3) able to place feet together independently and stand for 1 minute with   supervision   (2) able to place feet together independently but unable to hold for 30 seconds   (1) needs help to attain position but able to stand 15 seconds feet together   (0) needs help to attain position and unable to hold for 15 seconds   Score: 4     8. REACHING FORWARD WITH OUTSTRETCHED ARM WHILE   STANDING   (4) can reach forward confidently >25 cm (10 inches)   (3) can reach forward >12 cm safely (5 inches)   (2) can reach forward >5 cm safely (2 inches)   (1) reaches forward but needs supervision   (0) loses balance while trying/requires external support   Score: 3     9.  OBJECT FROM FLOOR FROM A STANDING POSITION   (4) able to  slipper safely and easily   (3) able to  slipper but needs supervision   (2) unable to  but reaches 2-5cm (1-2 inches) from slipper and keeps   balance independently   (1) unable to  and needs supervision while trying   (0) unable to try/needs assist to keep from losing balance or falling   Score: 3     10. TURNING TO LOOK BEHIND OVER LEFT AND RIGHT   SHOULDERS WHILE STANDING   (4) looks behind from both sides and weight shifts well   (3) looks behind one side only other side shows less weight shift   (2) turns sideways only but maintains balance   (1) needs supervision when turning   (0) needs assist to keep from losing balance or falling   Score: 1     11.  TURN 360 DEGREES   (4) able to turn 360 degrees safely in 4 seconds or less   (3) able to turn 360 degrees safely one side only in 4 seconds or less   (2) able to turn 360 degrees safely but slowly   (1) needs close supervision or verbal cueing   (0) needs assistance while turning   Score: 2     12. PLACING ALTERNATE FOOT ON STEP OR STOOL WHILE   STANDING UNSUPPORTED   (4) able to stand independently and safely and complete 8 steps in 20 seconds   (3) able to stand independently and complete 8 steps >20 seconds   (2) able to complete 4 steps without aid with supervision   (1) able to complete >2 steps needs minimal assist   (0) needs assistance to keep from falling/unable to try   Score: 0     13. STANDING UNSUPPORTED ONE FOOT IN FRONT   (4) able to place foot tandem independently and hold 30 seconds   (3) able to place foot ahead of other independently and hold 30 seconds   (2) able to take small step independently and hold 30 seconds   (1) needs help to step but can hold 15 seconds   (0) loses balance while stepping or standing   Score: 3     14. STANDING ON ONE LEG   (4) able to lift leg independently and hold >10 seconds   (3) able to lift leg independently and hold 5-10 seconds   (2) able to lift leg independently and hold = or >3 seconds   (1) tries to lift leg unable to hold 3 seconds but remains standing   independently   (0) unable to try or needs assist to prevent fall   Score: 0       ASSESSMENT:    Pt tolerated session fair. Ended session early due to fatigue. Decreased tolerance to standing exercise.     Treatment/Activity Tolerance:   [x]Patient tolerated treatment well [x] Patient limited by fatique  []Patient limited by pain [] Patient limited by other medical complications  [] Other:     Goals:    Short term goal 1: Pt will be indep in HEP  Short term goal 2: Pt will increase B hip strength to 4/5  Short term goal 3: Pt will increase HARRIS Balance score to > 45  Short term goal 4: Pt will ambulate with increased hip extension and glut activation BLE  Short term goal 5: Pt will return to completing all ADLs indep    Plan: [x] Continue per plan of care [] Alter current plan (see comments)   [] Plan of care initiated [] Hold pending MD visit [] Discharge      Plan for Next Session:  Biomechanical correction of problems as they present. Facilitate correct muscle firing patterns and activation with appropriate activities. Progress patient as tolerated.      Re-Certification Due Date:         Signature:  Tyra Gomes, PT

## 2023-01-18 ENCOUNTER — SCHEDULED TELEPHONE ENCOUNTER (OUTPATIENT)
Dept: PSYCHOLOGY | Age: 78
End: 2023-01-18

## 2023-01-18 ENCOUNTER — OFFICE VISIT (OUTPATIENT)
Dept: FAMILY MEDICINE CLINIC | Age: 78
End: 2023-01-18

## 2023-01-18 VITALS
SYSTOLIC BLOOD PRESSURE: 102 MMHG | HEART RATE: 72 BPM | DIASTOLIC BLOOD PRESSURE: 50 MMHG | BODY MASS INDEX: 23.57 KG/M2 | WEIGHT: 133 LBS | OXYGEN SATURATION: 98 %

## 2023-01-18 DIAGNOSIS — R53.83 OTHER FATIGUE: Primary | ICD-10-CM

## 2023-01-18 DIAGNOSIS — E11.65 TYPE 2 DIABETES MELLITUS WITH HYPERGLYCEMIA, WITH LONG-TERM CURRENT USE OF INSULIN (HCC): ICD-10-CM

## 2023-01-18 DIAGNOSIS — R53.83 OTHER FATIGUE: ICD-10-CM

## 2023-01-18 DIAGNOSIS — F41.9 ANXIETY: Primary | ICD-10-CM

## 2023-01-18 DIAGNOSIS — F33.1 MAJOR DEPRESSIVE DISORDER, RECURRENT, MODERATE (HCC): ICD-10-CM

## 2023-01-18 DIAGNOSIS — Z79.4 TYPE 2 DIABETES MELLITUS WITH HYPERGLYCEMIA, WITH LONG-TERM CURRENT USE OF INSULIN (HCC): ICD-10-CM

## 2023-01-18 LAB
ANION GAP SERPL CALCULATED.3IONS-SCNC: 9 MMOL/L (ref 3–16)
BASOPHILS ABSOLUTE: 0.1 K/UL (ref 0–0.2)
BASOPHILS RELATIVE PERCENT: 0.9 %
BUN BLDV-MCNC: 25 MG/DL (ref 7–20)
CALCIUM SERPL-MCNC: 8.9 MG/DL (ref 8.3–10.6)
CHLORIDE BLD-SCNC: 104 MMOL/L (ref 99–110)
CO2: 28 MMOL/L (ref 21–32)
CREAT SERPL-MCNC: 0.8 MG/DL (ref 0.6–1.2)
EOSINOPHILS ABSOLUTE: 0.2 K/UL (ref 0–0.6)
EOSINOPHILS RELATIVE PERCENT: 3 %
GFR SERPL CREATININE-BSD FRML MDRD: >60 ML/MIN/{1.73_M2}
GLUCOSE BLD-MCNC: 157 MG/DL (ref 70–99)
HCT VFR BLD CALC: 38.9 % (ref 36–48)
HEMOGLOBIN: 12.8 G/DL (ref 12–16)
LYMPHOCYTES ABSOLUTE: 1.1 K/UL (ref 1–5.1)
LYMPHOCYTES RELATIVE PERCENT: 14.1 %
MCH RBC QN AUTO: 29.2 PG (ref 26–34)
MCHC RBC AUTO-ENTMCNC: 33 G/DL (ref 31–36)
MCV RBC AUTO: 88.5 FL (ref 80–100)
MONOCYTES ABSOLUTE: 0.9 K/UL (ref 0–1.3)
MONOCYTES RELATIVE PERCENT: 11.1 %
NEUTROPHILS ABSOLUTE: 5.8 K/UL (ref 1.7–7.7)
NEUTROPHILS RELATIVE PERCENT: 70.9 %
PDW BLD-RTO: 16.1 % (ref 12.4–15.4)
PLATELET # BLD: 162 K/UL (ref 135–450)
PMV BLD AUTO: 8.8 FL (ref 5–10.5)
POTASSIUM SERPL-SCNC: 4.5 MMOL/L (ref 3.5–5.1)
RBC # BLD: 4.4 M/UL (ref 4–5.2)
SODIUM BLD-SCNC: 141 MMOL/L (ref 136–145)
TSH REFLEX: 1.59 UIU/ML (ref 0.27–4.2)
WBC # BLD: 8.1 K/UL (ref 4–11)

## 2023-01-18 ASSESSMENT — PATIENT HEALTH QUESTIONNAIRE - PHQ9
SUM OF ALL RESPONSES TO PHQ9 QUESTIONS 1 & 2: 0
7. TROUBLE CONCENTRATING ON THINGS, SUCH AS READING THE NEWSPAPER OR WATCHING TELEVISION: 0
2. FEELING DOWN, DEPRESSED OR HOPELESS: 0
6. FEELING BAD ABOUT YOURSELF - OR THAT YOU ARE A FAILURE OR HAVE LET YOURSELF OR YOUR FAMILY DOWN: 0
3. TROUBLE FALLING OR STAYING ASLEEP: 1
8. MOVING OR SPEAKING SO SLOWLY THAT OTHER PEOPLE COULD HAVE NOTICED. OR THE OPPOSITE, BEING SO FIGETY OR RESTLESS THAT YOU HAVE BEEN MOVING AROUND A LOT MORE THAN USUAL: 0
4. FEELING TIRED OR HAVING LITTLE ENERGY: 1
9. THOUGHTS THAT YOU WOULD BE BETTER OFF DEAD, OR OF HURTING YOURSELF: 0
5. POOR APPETITE OR OVEREATING: 0
SUM OF ALL RESPONSES TO PHQ QUESTIONS 1-9: 2
SUM OF ALL RESPONSES TO PHQ QUESTIONS 1-9: 2
10. IF YOU CHECKED OFF ANY PROBLEMS, HOW DIFFICULT HAVE THESE PROBLEMS MADE IT FOR YOU TO DO YOUR WORK, TAKE CARE OF THINGS AT HOME, OR GET ALONG WITH OTHER PEOPLE: 0
SUM OF ALL RESPONSES TO PHQ QUESTIONS 1-9: 2
1. LITTLE INTEREST OR PLEASURE IN DOING THINGS: 0
SUM OF ALL RESPONSES TO PHQ QUESTIONS 1-9: 2

## 2023-01-18 NOTE — PROGRESS NOTES
Patient: Alexei Whitfield is a 78 y.o. female who presents today with the following Chief Complaint(s):  Chief Complaint   Patient presents with    Fatigue    Weight Loss         HPI    Reports that she is losing weight, feels lethargic, and noticing worsening shaking in right hand. Typically eats cereal for breakfast then lunch and dinner in main dining room. Walking less at nursing home. Typically sleeping 7-8 hours with only 1 wake up. Sleep has worsened since moving. Now waking up for several hours at time in the middle of the night.  Anxious about upcoming driving test.     Still taking cymbalta 20mg and wellbutrin    Reviewed blood sugar readings which show glucose 158 to 168 up until noon when she starts eating then average 333 (12-6), 314 (6-12)   Using sliding scale when she remembers (150-200 is 4 units)    Current Outpatient Medications   Medication Sig Dispense Refill    Continuous Blood Gluc Sensor (FREESTYLE ANT 2 SENSOR) MISC 1 Device by Does not apply route every 14 days 6 each 4    Continuous Blood Gluc  (FREESTYLE ANT 2 READER) GILMER 1 each by Does not apply route daily 1 each 1    buPROPion (WELLBUTRIN XL) 150 MG extended release tablet Take 1 tablet by mouth every morning 90 tablet 3    donepezil (ARICEPT) 10 MG tablet Take 1 tablet by mouth nightly 90 tablet 1    DULoxetine (CYMBALTA) 20 MG extended release capsule Take 1 capsule by mouth daily 90 capsule 1    glipiZIDE (GLUCOTROL) 10 MG tablet TAKE 1 TABLET BY MOUTH EVERY DAY 90 tablet 1    linaclotide (LINZESS) 145 MCG capsule Take 1 capsule by mouth every morning (before breakfast) 90 capsule 1    ELIQUIS 5 MG TABS tablet TAKE 1 TABLET BY MOUTH TWICE DAILY 180 tablet 1    rosuvastatin (CRESTOR) 40 MG tablet TAKE 1 TABLET BY MOUTH EVERY DAY 90 tablet 1    lisinopril (PRINIVIL;ZESTRIL) 10 MG tablet TAKE 1 TABLET BY MOUTH EVERY DAY 90 tablet 1    diclofenac sodium (VOLTAREN) 1 % GEL Apply 4 g topically 4 times daily 100 g 1 metoprolol tartrate (LOPRESSOR) 25 MG tablet TAKE 1 TABLET TWICE A  tablet 1    VASCEPA 1 g CAPS capsule Take 2 capsules by mouth 2 times daily 360 capsule 1    divalproex (DEPAKOTE) 250 MG DR tablet Take 2 tablets by mouth nightly 180 tablet 1    aspirin 81 MG chewable tablet Take 1 tablet by mouth daily 30 tablet 1    Calcium Carb-Cholecalciferol (CALCIUM 1000 + D) 1000-800 MG-UNIT TABS Take 1,000 mg by mouth daily 90 tablet 1    LANTUS SOLOSTAR 100 UNIT/ML injection pen Inject 40 Units into the skin nightly 15 Adjustable Dose Pre-filled Pen Syringe 2    JARDIANCE 10 MG tablet TAKE 1 TABLET BY MOUTH DAILY 90 tablet 1    levothyroxine (SYNTHROID) 100 MCG tablet Take 1 tablet daily 90 tablet 1    famotidine (PEPCID) 20 MG tablet Take 1 tablet by mouth 2 times daily 180 tablet 1    amLODIPine (NORVASC) 5 MG tablet TAKE 1 TABLET DAILY 90 tablet 1    polyethylene glycol (GLYCOLAX) 17 GM/SCOOP powder 1 powder in packet DAILY (route: oral)      insulin lispro, 1 Unit Dial, (HUMALOG KWIKPEN) 100 UNIT/ML SOPN Use on a sliding scale 3 times a day with meals. Maximum dose 30 units per day.  15 Adjustable Dose Pre-filled Pen Syringe 0    Continuous Blood Gluc Sensor ([email or web address] ANT SENSOR SYSTEM) MISC 1 Units by Other route every 14 days Place 1 sensor on back of arm every 14 days 6 each 0    Handicap Placard MISC by Does not apply route 08/09/22 1 each 0    Continuous Blood Gluc Sensor ([address] [place] [address]) MISC 1 box by Does not apply route 2 times daily 1 each 0    BD PEN NEEDLE MICRO U/F 32G X 6 MM MISC USE WITH LANTUS DAILY 100 each 5    METAMUCIL FIBER PO Take by mouth MiraLax instead      ondansetron (ZOFRAN) 4 MG tablet Take 1 tablet by mouth 3 times daily as needed for Nausea or Vomiting 30 tablet 0    Cyanocobalamin (B-12) 50 MCG TABS Take by mouth       Multiple Vitamins-Minerals (VITEYES COMPLETE PO) Take by mouth      latanoprost (XALATAN) 0.005 % ophthalmic solution 1 drop nightly       No current facility-administered medications for this visit. Patient's past medical history, surgical history, family history, medications,  andallergies  were all reviewed and updated as appropriate today. Review of Systems  All other systems reviewed and negative    Physical Exam  Vitals reviewed. Constitutional:       Appearance: Normal appearance. HENT:      Head: Normocephalic and atraumatic. Cardiovascular:      Rate and Rhythm: Normal rate and regular rhythm. Pulmonary:      Effort: Pulmonary effort is normal.      Breath sounds: Normal breath sounds. Neurological:      General: No focal deficit present. Mental Status: She is alert and oriented to person, place, and time. Psychiatric:         Behavior: Behavior normal.         Thought Content: Thought content normal.     Vitals:    01/18/23 1105   BP: (!) 102/50   Pulse: 72   SpO2: 98%       Assessment:  Encounter Diagnoses   Name Primary? Other fatigue Yes    Major depressive disorder, recurrent, moderate (HCC)     Type 2 diabetes mellitus with hyperglycemia, with long-term current use of insulin ([facility] [address]        Plan:  1. Other fatigue  2. Major depressive disorder, recurrent, moderate (HCC)  3. Type 2 diabetes mellitus with hyperglycemia, with long-term current use of insulin ([facility])  Insomnia   - TSH with Reflex; Future  - CBC with Auto Differential; Future  - Basic Metabolic Panel; Future    Discussed that fatigue may be related to very poor blodo sugar control since moving to [place]. Does admit that her diet is not diabetic friendly. Will try to make changes to choices and will adjust humalog to 5 units with lunch and dinner with additional 1:20 adjustment over 150. Has CGM. Will also check lab work as above. Will also trial low dose trazodone for sleep. No follow-ups on file.

## 2023-01-18 NOTE — PATIENT INSTRUCTIONS
Mealtime insulin - Take 5 units with lunch and dinner.  Then an additional amount based on scale below    150-170 - 1 unit  171-190 - 2 unit  191 - 210 - 3 unit  211 - 230 - 4 unit  231-250 - 5 units      Call me with location to send trazodone

## 2023-01-18 NOTE — PROGRESS NOTES
Behavioral Health Consultation  Nancy Maxwell, Ph.D.  Psychologist  1/18/2023  2:12 PM EST      Time spent with Patient: 15 minutes  This is patient's  thirteenth  [facility] appointment. Reason for Consult:    Chief Complaint   Patient presents with    Anxiety     Referring Provider: JULIET Garcia CNP  [address] [address] ([facility]) [address] [place] [address]    Feedback given to PCP. TELEHEALTH VISIT -- Audio Only (During OZSGF-46 public health emergency)    Pursuant to the emergency declaration under the [facility], [facility] waiver authority and the [employer] Act, this phone call was conducted, with patient's consent, to reduce the patient's risk of exposure to COVID-19 and provide continuity of care for an established patient. Services were provided through a phone call discussion to substitute for in-person clinic visit. Pt gave verbal informed consent to participate in telehealth services. Consent:  She and/or health care decision maker is aware that that she may receive a bill for this telephone service, depending on her insurance coverage, and has provided verbal consent to proceed: Yes    Conducted a risk-benefit analysis and determined that the patient's presenting problems are consistent with the use of telepsychology. Determined that the patient has sufficient knowledge and skills in the use of technology enabling them to adequately benefit from telepsychology. It was determined that this patient was able to be properly treated without an in-person session. Patient verified that they were currently located at the [facility] address that was provided during registration.     Verified the following information:  Patient's identification: Yes  Patient location: [address] [place] [address]  Patient's call back number: [phone]  Patient's emergency contact's name and number, as well as permission to contact them if needed: Extended Emergency Contact Information  Primary Emergency Contact: [address] [facility] Phone: [phone]  Mobile Phone: [phone]  Relation: Child  Secondary Emergency Contact: Lana Keller  Home Phone: [phone]  Relation: Daughter-in-Law   needed? No    Provider location: [facility] [place], [place] [address]celi affirm this is an episode with a patient who has not had a related appointment within my department in the past 7 days or scheduled within the next 24 hours. S:  Patient reported that she is at a meeting regarding meal times in her new facility. She expressed frustration with difficulty adjusting to schedule, environmental changes. She is upset about upcoming driving test. She saw PCP today due to concerns about her weight. Will work with kitchen staff at [facility] to adjust her diet. F/u in 2 weeks.     O:  MSE:    Attitude: cooperative and friendly  Consciousness: alert  Orientation: oriented to person, place, time, general circumstance  Memory: recent and remote memory intact  Attention/Concentration: intact during session  Speech: normal rate and volume, well-articulated  Mood: discouraged  Affect: euthymic  Perception: within normal limits  Thought Content: within normal limits  Thought Process: logical, coherent and goal-directed  Insight: good  Judgment: intact  Ability to understand instructions: Yes  Ability to respond meaningfully: Yes  Morbid Ideation: no   Suicide Assessment: no suicidal ideation, plan, or intent  Homicidal Ideation: no    History:    Medications:   Current Outpatient Medications   Medication Sig Dispense Refill    Continuous Blood Gluc Sensor (FREESTYLE ANT 2 SENSOR) MISC 1 Device by Does not apply route every 14 days 6 each 4    Continuous Blood Gluc  (FREESTYLE ANT 2 READER) GILMER 1 each by Does not apply route daily 1 each 1    buPROPion (WELLBUTRIN XL) 150 MG extended release tablet Take 1 tablet by mouth every morning 90 tablet 3    donepezil (ARICEPT) 10 MG tablet Take 1 tablet by mouth nightly 90 tablet 1    DULoxetine (CYMBALTA) 20 MG extended release capsule Take 1 capsule by mouth daily 90 capsule 1    glipiZIDE (GLUCOTROL) 10 MG tablet TAKE 1 TABLET BY MOUTH EVERY DAY 90 tablet 1    linaclotide (LINZESS) 145 MCG capsule Take 1 capsule by mouth every morning (before breakfast) 90 capsule 1    ELIQUIS 5 MG TABS tablet TAKE 1 TABLET BY MOUTH TWICE DAILY 180 tablet 1    rosuvastatin (CRESTOR) 40 MG tablet TAKE 1 TABLET BY MOUTH EVERY DAY 90 tablet 1    lisinopril (PRINIVIL;ZESTRIL) 10 MG tablet TAKE 1 TABLET BY MOUTH EVERY DAY 90 tablet 1    diclofenac sodium (VOLTAREN) 1 % GEL Apply 4 g topically 4 times daily 100 g 1    metoprolol tartrate (LOPRESSOR) 25 MG tablet TAKE 1 TABLET TWICE A  tablet 1    VASCEPA 1 g CAPS capsule Take 2 capsules by mouth 2 times daily 360 capsule 1    divalproex (DEPAKOTE) 250 MG DR tablet Take 2 tablets by mouth nightly 180 tablet 1    aspirin 81 MG chewable tablet Take 1 tablet by mouth daily 30 tablet 1    Calcium Carb-Cholecalciferol (CALCIUM 1000 + D) 1000-800 MG-UNIT TABS Take 1,000 mg by mouth daily 90 tablet 1    LANTUS SOLOSTAR 100 UNIT/ML injection pen Inject 40 Units into the skin nightly 15 Adjustable Dose Pre-filled Pen Syringe 2    JARDIANCE 10 MG tablet TAKE 1 TABLET BY MOUTH DAILY 90 tablet 1    levothyroxine (SYNTHROID) 100 MCG tablet Take 1 tablet daily 90 tablet 1    famotidine (PEPCID) 20 MG tablet Take 1 tablet by mouth 2 times daily 180 tablet 1    amLODIPine (NORVASC) 5 MG tablet TAKE 1 TABLET DAILY 90 tablet 1    polyethylene glycol (GLYCOLAX) 17 GM/SCOOP powder 1 powder in packet DAILY (route: oral)      insulin lispro, 1 Unit Dial, (HUMALOG KWIKPEN) 100 UNIT/ML SOPN Use on a sliding scale 3 times a day with meals. Maximum dose 30 units per day.  15 Adjustable Dose Pre-filled Pen Syringe 0    Continuous Blood Gluc Sensor (FREESTYLE ANT SENSOR SYSTEM) MISC 1 Units by Other route every 14 days Place 1 sensor on back of arm every 14 days 6 each 0    Handicap Placard MISC by Does not apply route 08/09/22 1 each 0    Continuous Blood Gluc Sensor ([facility]) MISC 1 box by Does not apply route 2 times daily 1 each 0    BD PEN NEEDLE MICRO U/F 32G X 6 MM MISC USE WITH LANTUS DAILY 100 each 5    METAMUCIL FIBER PO Take by mouth MiraLax instead      ondansetron (ZOFRAN) 4 MG tablet Take 1 tablet by mouth 3 times daily as needed for Nausea or Vomiting 30 tablet 0    Cyanocobalamin (B-12) 50 MCG TABS Take by mouth       Multiple Vitamins-Minerals (VITEYES COMPLETE PO) Take by mouth      latanoprost (XALATAN) 0.005 % ophthalmic solution 1 drop nightly       No current facility-administered medications for this visit. Social History:   Social History     Socioeconomic History    Marital status:      Spouse name: Not on file    Number of children: Not on file    Years of education: Not on file    Highest education level: Not on file   Occupational History    Not on file   Tobacco Use    Smoking status: Never    Smokeless tobacco: Never   Vaping Use    Vaping Use: Never used   Substance and Sexual Activity    Alcohol use: Never    Drug use: Never    Sexual activity: Not on file   Other Topics Concern    Not on file   Social History Narrative    Not on file     Social Determinants of Health     Financial Resource Strain: Low Risk     Difficulty of Paying Living Expenses: Not hard at all   Food Insecurity: No Food Insecurity    Worried About Running Out of Food in the Last Year: Never true    [address] in the Last Year: Never true   Transportation Needs: No Transportation Needs    Lack of Transportation (Medical): No    Lack of Transportation (Non-Medical):  No   Physical Activity: Insufficiently Active    Days of Exercise per Week: 2 days    Minutes of Exercise per Session: 20 min   Stress: Not on file   Social Connections: Not on file   Intimate Partner Violence: Not on file Housing Stability: Low Risk     Unable to Pay for Housing in the Last Year: No    Number of Places Lived in the Last Year: 2    Unstable Housing in the Last Year: No     TOBACCO:   reports that she has never smoked. She has never used smokeless tobacco.  ETOH:   reports no history of alcohol use. Family History:   Family History   Problem Relation Age of Onset    Diabetes Paternal Grandmother     Diabetes Father     Lung Cancer Mother     Pancreatic Cancer Brother     Diabetes Brother      A:  Patient engaged and cooperative. No SI. Insight and motivation are good. Diagnosis:    1. Anxiety          Diagnosis Date    Colon polyps     Diabetes mellitus ([address]     Diabetic neuropathy ([address]     Diabetic retinopathy ([address]     Essential hypertension 10/28/2019    Fall     Fatty liver     Gastritis     GERD (gastroesophageal reflux disease)     Hyperlipidemia     Hypertension     Hypothyroidism     Irritable bowel syndrome     Major depressive disorder with single episode 10/28/2019    Osteopenia     Photosensitivity disorder     chronic    RBBB     Sleep apnea     on BIPAP    Thyroid disease     Thyroid nodule     neg bx-chronic thyroiditis    Type 2 diabetes mellitus with diabetic polyneuropathy, with long-term current use of insulin ([facility] [address] 07/23/2019    Vitamin D deficiency      Plan:  Pt interventions:  Conducted functional assessment, [place]-setting to identify pt's primary goals for [facility] visit / overall health, Supportive techniques, and Emphasized self-care as important for managing overall health.     Pt Behavioral Change Plan:   See Pt Instructions

## 2023-01-19 ENCOUNTER — TELEPHONE (OUTPATIENT)
Dept: FAMILY MEDICINE CLINIC | Age: 78
End: 2023-01-19

## 2023-01-19 ENCOUNTER — APPOINTMENT (OUTPATIENT)
Dept: PHYSICAL THERAPY | Age: 78
End: 2023-01-19
Payer: MEDICARE

## 2023-01-19 NOTE — TELEPHONE ENCOUNTER
[address] [facility] that is in her chart I have updated it

## 2023-01-19 NOTE — TELEPHONE ENCOUNTER
Received phone call from the pt in regards that pt stated she was seen in office yestrday and was told that something could be sent in for her to [facility] she has not been asleep in 3 weeks and is asking if this can please be sent in to her pharmacy. Pt stated  that she wants this sent to [facility] please advise.      Good call back #: [phone]

## 2023-01-20 ENCOUNTER — HOSPITAL ENCOUNTER (OUTPATIENT)
Dept: PHYSICAL THERAPY | Age: 78
Setting detail: THERAPIES SERIES
Discharge: HOME | End: 2023-01-20
Payer: MEDICARE

## 2023-01-20 DIAGNOSIS — G47.00 INSOMNIA, UNSPECIFIED TYPE: Primary | ICD-10-CM

## 2023-01-20 PROCEDURE — 97112 NEUROMUSCULAR REEDUCATION: CPT

## 2023-01-20 PROCEDURE — 97110 THERAPEUTIC EXERCISES: CPT

## 2023-01-20 RX ORDER — TRAZODONE HYDROCHLORIDE 50 MG/1
50 TABLET ORAL NIGHTLY
Qty: 30 TABLET | Refills: 1 | Status: SHIPPED | OUTPATIENT
Start: 2023-01-20

## 2023-01-20 NOTE — FLOWSHEET NOTE
[address] [facility], [facility], [address], [address]  Phone: [phone]  Fax [phone]    Physical Therapy Re-eval Note    Date:  2023    Patient: Simpson Homans (79 y.o. female)   Examination Date: 2022   :  1945 MRN: [id number]   Auth needed? []  Yes    [x]  No        Amount authorized: Insurance: Payor: MEDICARE / Plan: MEDICARE PART A AND B / Product Type: *No Product type* /   Insurance ID: [id number] - (Medicare)  Secondary Insurance (if applicable): [facility]   Referring Physician: Rell Vazquez DO     PCP: Lamont Price DO Medical Diagnosis: Unsteady gait [R26.81]    Preferred Language for Healthcare:     [x] English       [] other: Latex Allergy? []  Yes    [x]  No          Plan of care signed (Y/N):  sent  Visit# / total visits:  8/8 3/8    G-Code (if applicable):          LEFS   at re-eval   at eval    Medicare Cap (if applicable):  979 = total amount used, updated 2023    Time in:   10:45      Timed Treatment: 40  Total Treatment Time:  40  ________________________________________________________________________________________    Pain Level:    /10  SUBJECTIVE:  Pt reports that she is still very fatigued. \"I didn't get a good report at the doctor. \" Pt notes that her blood pressure was low and her blood sugar was \"out of whack. \" Pt also notes that she is losing weight but eating. Also reports no BMs recently.      OBJECTIVE: [id number]    Exercise/Equipment Resistance/Repetitions Other comments   NuStep 5 min    bridge x10    Supine clams  SL hip ER with stabilization X15  X10 B Green TB   Supine marches Green TB   DKTC on ball x15 Manual resistance   Supine hip flexion    Extension Manual resistance          Standing balance    Feet together EC    Tandem    Airex balance    Stance    Mod tandem 1 min  1 min BLE    SLS foot on ball 1 min BLE    Side stepping X4 laps Green TB   Standing hip abd 2x10 B Green TB   Resisted walking  X5 laps in // bars [employer] TB   Sit-to-stand training 130 sec STS                                        Other Therapeutic Activities:  Pt educated on results of HARRIS and TUG tests and how those results affect her ability to function during ADLs. Manual Treatments:         Modalities:      Test/Measurements:    TU with RW,19 sec without device    HARRIS BALANCE SCALE 14-Item Long Form Original Version   Total Score: 39/56      1. SITTING TO STANDING   (4) able to stand without using hands and stabilize independently   (3) able to stand independently using hands   (2) able to stand using hands after several tries   (1) needs minimal aid to stand or to stabilize   (0) needs moderate or maximal assist to stand   Score: 4     2. STANDING UNSUPPORTED   (4) able to stand safely 2 minutes   (3) able to stand 2 minutes with supervision   (2) able to stand 30 seconds unsupported   (1) needs several tries to stand 30 seconds unsupported   (0) unable to stand 30 seconds unassisted If a subject is able to stand 2   minutes unsupported, score full points for sitting unsupported. Proceed to   item #4. Score: 4      3. SITTING WITH BACK UNSUPPORTED BUT FEET SUPPORTED   ON FLOOR OR ON A STOOL   (4) able to sit safely and securely 2 minutes   (3) able to sit 2 minutes under supervision   (2) able to sit 30 seconds   (1) able to sit 10 seconds   (0) unable to sit without support 10 seconds   Score: 4      4. STANDING TO SITTING   (4) sits safely with minimal use of hands   (3) controls descent by using hands   (2) uses back of legs against chair to control descent   (1) sits independently but has uncontrolled descent   (0) needs assistance to sit   Score: 4     5.  TRANSFERS   (4) able to transfer safely with minor use of hands   (3) able to transfer safely definite need of hands   (2) able to transfer with verbal cueing and/or supervision   (1) needs one person to assist (0) needs two people to assist or supervise to be safe   Score: 3     6. STANDING UNSUPPORTED WITH EYES CLOSED   (4) able to stand 10 seconds safely   (3) able to stand 10 seconds with supervision   (2) able to stand 3 seconds   (1) unable to keep eyes closed 3 seconds but stays steady   (0) needs help to keep from falling   Score: 4     7. STANDING UNSUPPORTED WITH FEET TOGETHER   (4) able to place feet together independently and stand 1 minute safely   (3) able to place feet together independently and stand for 1 minute with   supervision   (2) able to place feet together independently but unable to hold for 30 seconds   (1) needs help to attain position but able to stand 15 seconds feet together   (0) needs help to attain position and unable to hold for 15 seconds   Score: 4     8. REACHING FORWARD WITH OUTSTRETCHED ARM WHILE   STANDING   (4) can reach forward confidently >25 cm (10 inches)   (3) can reach forward >12 cm safely (5 inches)   (2) can reach forward >5 cm safely (2 inches)   (1) reaches forward but needs supervision   (0) loses balance while trying/requires external support   Score: 3     9.  OBJECT FROM FLOOR FROM A STANDING POSITION   (4) able to  slipper safely and easily   (3) able to  slipper but needs supervision   (2) unable to  but reaches 2-5cm (1-2 inches) from slipper and keeps   balance independently   (1) unable to  and needs supervision while trying   (0) unable to try/needs assist to keep from losing balance or falling   Score: 3     10. TURNING TO LOOK BEHIND OVER LEFT AND RIGHT   SHOULDERS WHILE STANDING   (4) looks behind from both sides and weight shifts well   (3) looks behind one side only other side shows less weight shift   (2) turns sideways only but maintains balance   (1) needs supervision when turning   (0) needs assist to keep from losing balance or falling   Score: 1     11.  TURN 360 DEGREES   (4) able to turn 360 degrees safely in 4 seconds or less   (3) able to turn 360 degrees safely one side only in 4 seconds or less   (2) able to turn 360 degrees safely but slowly   (1) needs close supervision or verbal cueing   (0) needs assistance while turning   Score: 2     12. PLACING ALTERNATE FOOT ON STEP OR STOOL WHILE   STANDING UNSUPPORTED   (4) able to stand independently and safely and complete 8 steps in 20 seconds   (3) able to stand independently and complete 8 steps >20 seconds   (2) able to complete 4 steps without aid with supervision   (1) able to complete >2 steps needs minimal assist   (0) needs assistance to keep from falling/unable to try   Score: 0     13. STANDING UNSUPPORTED ONE FOOT IN FRONT   (4) able to place foot tandem independently and hold 30 seconds   (3) able to place foot ahead of other independently and hold 30 seconds   (2) able to take small step independently and hold 30 seconds   (1) needs help to step but can hold 15 seconds   (0) loses balance while stepping or standing   Score: 3     14. STANDING ON ONE LEG   (4) able to lift leg independently and hold >10 seconds   (3) able to lift leg independently and hold 5-10 seconds   (2) able to lift leg independently and hold = or >3 seconds   (1) tries to lift leg unable to hold 3 seconds but remains standing   independently   (0) unable to try or needs assist to prevent fall   Score: 0       ASSESSMENT:    Pt tolerated session fair. Decreased tolerance to standing exercise.     Treatment/Activity Tolerance:   [x]Patient tolerated treatment well [x] Patient limited by fatique  []Patient limited by pain [] Patient limited by other medical complications  [] Other:     Goals:    Short term goal 1: Pt will be indep in HEP  Short term goal 2: Pt will increase B hip strength to 4/5  Short term goal 3: Pt will increase HARRIS Balance score to > 45  Short term goal 4: Pt will ambulate with increased hip extension and glut activation BLE  Short term goal 5: Pt will return to completing all ADLs indep    Plan: [x] Continue per plan of care [] Alter current plan (see comments)   [] Plan of care initiated [] Hold pending MD visit [] Discharge      Plan for Next Session:  Biomechanical correction of problems as they present. Facilitate correct muscle firing patterns and activation with appropriate activities. Progress patient as tolerated.      Re-Certification Due Date:         Signature:  Kaylynn Decker, PT

## 2023-01-23 ENCOUNTER — TELEPHONE (OUTPATIENT)
Dept: FAMILY MEDICINE CLINIC | Age: 78
End: 2023-01-23

## 2023-01-23 ENCOUNTER — HOSPITAL ENCOUNTER (OUTPATIENT)
Dept: PHYSICAL THERAPY | Age: 78
Setting detail: THERAPIES SERIES
Discharge: HOME | End: 2023-01-23
Payer: MEDICARE

## 2023-01-23 PROCEDURE — 97110 THERAPEUTIC EXERCISES: CPT

## 2023-01-23 PROCEDURE — 97112 NEUROMUSCULAR REEDUCATION: CPT

## 2023-01-23 NOTE — TELEPHONE ENCOUNTER
Per [employer] portal for [employer] device and supplies, patient uses [employer] for [employer] supplies, I have left a message for them to see if they are able to process change in address via [employer] portal.

## 2023-01-23 NOTE — TELEPHONE ENCOUNTER
Pt called in, needs diabetes supplies sent to her new address on her chart.  Pt also states she is still feeling very weak following along with the weight loss

## 2023-01-23 NOTE — FLOWSHEET NOTE
[address], [facility], [address], [address]  Phone: [phone]  Fax [phone]    Physical Therapy Re-eval Note    Date:  2023    Patient: Narendra Figueroa (62 y.o. female)   Examination Date: 2022   :  1945 MRN: [id number]   Auth needed? []  Yes    [x]  No        Amount authorized: Insurance: Payor: MEDICARE / Plan: MEDICARE PART A AND B / Product Type: *No Product type* /   Insurance ID: [id number] - (Medicare)  Secondary Insurance (if applicable): [facility]   Referring Physician: Mikaela Odom DO     PCP: Mona Smart DO Medical Diagnosis: Unsteady gait [R26.81]    Preferred Language for Healthcare:     [x] English       [] other: Latex Allergy? []  Yes    [x]  No          Plan of care signed (Y/N):  sent  Visit# / total visits:      G-Code (if applicable):          LEFS   at re-eval   at eval    Medicare Cap (if applicable):  426 = total amount used, updated 2023    Time in:   10:35      Timed Treatment: 55  Total Treatment Time:  55  ________________________________________________________________________________________    Pain Level:    /10  SUBJECTIVE:  Pt reports that she is still very fatigued.      OBJECTIVE: [id number]    Exercise/Equipment Resistance/Repetitions Other comments   NuStep 5 min    bridge X10  X10 on ball    Supine clams  SL hip ER with stabilization X15  X10 B Green TB   Supine marches Green TB   DKTC on ball x15 Manual resistance   Supine hip flexion    Extension X10 B  X10 B Manual resistance   Hooklying add squeeze 10x5\" With ball   Seated hamstring curls X15 B Green TB   LAQ X15 B Green TB       Standing balance    Feet together EC    Tandem    Airex balance    Stance    Mod tandem 1 min  1 min BLE    SLS foot on ball    Side stepping Green TB   Standing hip abd Green TB   Resisted walking  Gray TB   Sit-to-stand training 130 sec STS Other Therapeutic Activities:  Pt educated on results of HARRIS and TUG tests and how those results affect her ability to function during ADLs. Manual Treatments:         Modalities:      Test/Measurements:    TU with RW,19 sec without device    HARRIS BALANCE SCALE 14-Item Long Form Original Version   Total Score: 39/56      1. SITTING TO STANDING   (4) able to stand without using hands and stabilize independently   (3) able to stand independently using hands   (2) able to stand using hands after several tries   (1) needs minimal aid to stand or to stabilize   (0) needs moderate or maximal assist to stand   Score: 4     2. STANDING UNSUPPORTED   (4) able to stand safely 2 minutes   (3) able to stand 2 minutes with supervision   (2) able to stand 30 seconds unsupported   (1) needs several tries to stand 30 seconds unsupported   (0) unable to stand 30 seconds unassisted If a subject is able to stand 2   minutes unsupported, score full points for sitting unsupported. Proceed to   item #4. Score: 4      3. SITTING WITH BACK UNSUPPORTED BUT FEET SUPPORTED   ON FLOOR OR ON A STOOL   (4) able to sit safely and securely 2 minutes   (3) able to sit 2 minutes under supervision   (2) able to sit 30 seconds   (1) able to sit 10 seconds   (0) unable to sit without support 10 seconds   Score: 4      4. STANDING TO SITTING   (4) sits safely with minimal use of hands   (3) controls descent by using hands   (2) uses back of legs against chair to control descent   (1) sits independently but has uncontrolled descent   (0) needs assistance to sit   Score: 4     5. TRANSFERS   (4) able to transfer safely with minor use of hands   (3) able to transfer safely definite need of hands   (2) able to transfer with verbal cueing and/or supervision   (1) needs one person to assist   (0) needs two people to assist or supervise to be safe   Score: 3     6.  STANDING UNSUPPORTED WITH EYES CLOSED   (4) able to stand 10 seconds safely   (3) able to stand 10 seconds with supervision   (2) able to stand 3 seconds   (1) unable to keep eyes closed 3 seconds but stays steady   (0) needs help to keep from falling   Score: 4     7. STANDING UNSUPPORTED WITH FEET TOGETHER   (4) able to place feet together independently and stand 1 minute safely   (3) able to place feet together independently and stand for 1 minute with   supervision   (2) able to place feet together independently but unable to hold for 30 seconds   (1) needs help to attain position but able to stand 15 seconds feet together   (0) needs help to attain position and unable to hold for 15 seconds   Score: 4     8. REACHING FORWARD WITH OUTSTRETCHED ARM WHILE   STANDING   (4) can reach forward confidently >25 cm (10 inches)   (3) can reach forward >12 cm safely (5 inches)   (2) can reach forward >5 cm safely (2 inches)   (1) reaches forward but needs supervision   (0) loses balance while trying/requires external support   Score: 3     9.  OBJECT FROM FLOOR FROM A STANDING POSITION   (4) able to  slipper safely and easily   (3) able to  slipper but needs supervision   (2) unable to  but reaches 2-5cm (1-2 inches) from slipper and keeps   balance independently   (1) unable to  and needs supervision while trying   (0) unable to try/needs assist to keep from losing balance or falling   Score: 3     10. TURNING TO LOOK BEHIND OVER LEFT AND RIGHT   SHOULDERS WHILE STANDING   (4) looks behind from both sides and weight shifts well   (3) looks behind one side only other side shows less weight shift   (2) turns sideways only but maintains balance   (1) needs supervision when turning   (0) needs assist to keep from losing balance or falling   Score: 1     11.  TURN 360 DEGREES   (4) able to turn 360 degrees safely in 4 seconds or less   (3) able to turn 360 degrees safely one side only in 4 seconds or less   (2) able to turn 360 degrees safely but slowly   (1) needs close supervision or verbal cueing   (0) needs assistance while turning   Score: 2     12. PLACING ALTERNATE FOOT ON STEP OR STOOL WHILE   STANDING UNSUPPORTED   (4) able to stand independently and safely and complete 8 steps in 20 seconds   (3) able to stand independently and complete 8 steps >20 seconds   (2) able to complete 4 steps without aid with supervision   (1) able to complete >2 steps needs minimal assist   (0) needs assistance to keep from falling/unable to try   Score: 0     13. STANDING UNSUPPORTED ONE FOOT IN FRONT   (4) able to place foot tandem independently and hold 30 seconds   (3) able to place foot ahead of other independently and hold 30 seconds   (2) able to take small step independently and hold 30 seconds   (1) needs help to step but can hold 15 seconds   (0) loses balance while stepping or standing   Score: 3     14. STANDING ON ONE LEG   (4) able to lift leg independently and hold >10 seconds   (3) able to lift leg independently and hold 5-10 seconds   (2) able to lift leg independently and hold = or >3 seconds   (1) tries to lift leg unable to hold 3 seconds but remains standing   independently   (0) unable to try or needs assist to prevent fall   Score: 0       ASSESSMENT:    Pt tolerated session fair. Decreased tolerance to standing exercise.     Treatment/Activity Tolerance:   [x]Patient tolerated treatment well [x] Patient limited by fatique  []Patient limited by pain [] Patient limited by other medical complications  [] Other:     Goals:    Short term goal 1: Pt will be indep in HEP  Short term goal 2: Pt will increase B hip strength to 4/5  Short term goal 3: Pt will increase HARRIS Balance score to > 45  Short term goal 4: Pt will ambulate with increased hip extension and glut activation BLE  Short term goal 5: Pt will return to completing all ADLs indep    Plan: [x] Continue per plan of care [] Alter current plan (see comments)   [] Plan of care initiated [] Hold pending MD visit [] Discharge      Plan for Next Session:  Biomechanical correction of problems as they present. Facilitate correct muscle firing patterns and activation with appropriate activities. Progress patient as tolerated.      Re-Certification Due Date:         Signature:  Joaquim Ornelas PT

## 2023-01-25 ENCOUNTER — TELEPHONE (OUTPATIENT)
Dept: FAMILY MEDICINE CLINIC | Age: 78
End: 2023-01-25

## 2023-01-25 RX ORDER — ASPIRIN 81 MG/1
TABLET, CHEWABLE ORAL
Qty: 90 TABLET | Refills: 1 | Status: SHIPPED | OUTPATIENT
Start: 2023-01-25 | End: 2023-03-23 | Stop reason: SDUPTHER

## 2023-01-25 NOTE — TELEPHONE ENCOUNTER
Spoke to patient and stated that she is still very weak and had a fall the other day. Spoke to Dr. Sidney Roy and he stated to have patient continue PT for a few more weeks and then if still then come in for an earlier appt. Spoke to patient and informed and patient will be calling back after scheduling her last PT appts to move appt with Dr. Sidney Roy sooner.

## 2023-01-25 NOTE — TELEPHONE ENCOUNTER
Refill Request     CONFIRM preferrred pharmacy with the patient. If Mail Order Rx - Pend for 90 day refill. Last Seen: Last Seen Department: 1/18/2023  Last Seen by PCP: 1/18/2023    Last Written: 11/3/2022    If no future appointment scheduled, route STAFF MESSAGE with patient name to the [facility] for scheduling. Next Appointment:   Future Appointments   Date Time Provider Coleman Garcia   2/1/2023  1:30 PM García Lerma, PhD [facility]   2/28/2023  1:00 PM DO KRISTIN Spicer  [place] [facility]       Message sent to [address] [place] [address] to schedule appt with patient?   NO      Requested Prescriptions     Pending Prescriptions Disp Refills    HM ASPIRIN 81 MG chewable tablet [Pharmacy Med Name: HM ASPIRIN 81MG TABLET CHEWABLE] 30 tablet 5     Sig: TAKE ONE (1) TABLET BY MOUTH DAILY

## 2023-01-31 ENCOUNTER — HOSPITAL ENCOUNTER (OUTPATIENT)
Dept: PHYSICAL THERAPY | Age: 78
Setting detail: THERAPIES SERIES
Discharge: HOME | End: 2023-01-31
Payer: MEDICARE

## 2023-01-31 NOTE — PROGRESS NOTES
Physical Therapy  Cancellation/No-show Note  Patient Name:  Kimmie Peterson  :  1945   Date:  2023  Cancels to date: 1  No-shows to date: 0    For today's appointment patient:  [x] Cancelled  [] Rescheduled appointment  [] No-show     Reason given by patient:  [x] Patient ill  [] Conflicting appointment  [] No transportation    [] Conflict with work  [] No reason given  [] Other:     Comments:      Electronically signed by:  Jorge Luis Galdamez PT

## 2023-02-01 ENCOUNTER — SCHEDULED TELEPHONE ENCOUNTER (OUTPATIENT)
Dept: PSYCHOLOGY | Age: 78
End: 2023-02-01

## 2023-02-01 DIAGNOSIS — F41.9 ANXIETY: Primary | ICD-10-CM

## 2023-02-01 NOTE — PATIENT INSTRUCTIONS
Practice relaxation exercises daily, at bedtime if possible. You may want to set an alarm on your phone to remind you do to these. You could also download an maura to assist with your relaxation practice. Suggestions include \"Calm\" or \"Headspace.\" You can also search [employer] for \"progressive muscle relaxation\" or \"diaphragmatic breathing.\"       Deep Breathing     What Is Deep Breathing?   Deep breathing involves using your diaphragm muscle to help bring about a state of physiological relaxation. The diaphragm is a large muscle that rests across the bottom of your rib cage. When you inhale, the diaphragm muscle drops, opening up space so air can come in. When watching someone do this it looks like your stomach is filling with air. This type of breathing helps activate the part of your nervous system that controls relaxation. It can lead to decreased heart rate, blood pressure, decreased muscle tension, and overall feelings of relaxation.      Why Be Concerned With How I’m Breathing?    To increase your awareness of the role that breathing plays in increased physical tension and in contributing to increasing your body’s stress response.    To lower your level of stress-related arousal and tension.    To give you a method of taking calm, relaxing breaths to break the cycle of increasing arousal during stressful situations.     What Is the Best Way To Use Deep Breathing Exercises?    Use deep breathing frequently.    Take deep breaths at the first signs of stress, anxiety, physical tension, or other symptoms.    Schedule time for relaxation. My scheduled time for deep breathing will be bedtime.                                             Relaxation:  Diaphragmatic Breathing             ______________________________________________________________________________    1.  Sit in a comfortable position  2.  Place one hand on your stomach and the other on your chest  3.  Try to breathe so that only your stomach rises and  falls    As you inhale, concentrate on your chest remaining relatively still while your stomach rises. It may be helpful for you to imagine that your pants are too big and you need to push your stomach out to hold them up. When exhaling, allow your stomach to fall in and the air to fully escape. Inhale slowly. You may choose to hold the air in for about a second. Exhale slowly. Dont push the air out, but just let the natural pressure of your body slowly move it out. It is normal for this healthy method of breathing to feel a little awkward at first.  With practice, it will feel more natural.    4.  Take some deep breaths, concentrating on only moving your stomach. Hold each breath for 2 to 3 seconds before exhaling. 5.   Get your mind on your side    One other important factor in getting relaxed is your mind. Your mind and body are connected. The mind influences the body and the body influences the mind. What you do with your mind when you are trying to relax is very important. The key is to avoid thinking about stressful things. You can think about      Neutral things (e.g., counting, saying a word like calm or relax)   Pleasant things (e.g., imagining a pleasant place)    6. Return to regular breathing, continuing to breathe so that only your stomach moves. 7.  It is recommended that you practice 2 times per day, 10 minutes each time. Deep Breathing Exercises  Exercise 1:  The 4-7-8 (or Relaxing Breath) Exercise  This exercise is utterly simple, takes almost no time, requires no equipment and can be done anywhere. Although you can do the exercise in any position, sit with your back straight while learning the exercise. Place the tip of your tongue against the ridge of tissue just behind your upper front teeth, and keep it there through the entire exercise. You will be exhaling through your mouth around your tongue; try pursing your lips slightly if this seems awkward.   Exhale completely through your mouth, making a whoosh sound. Close your mouth and inhale quietly through your nose to a mental count of four. Hold your breath for a count of seven. Exhale completely through your mouth, making a whoosh sound to a count of eight. This is one breath. Now inhale again and repeat the cycle three more times for a total of four breaths. Note that you always inhale quietly through your nose and exhale audibly through your mouth. The tip of your tongue stays in position the whole time. Exhalation takes twice as long as inhalation. The absolute time you spend on each phase is not important; the ratio of 4:7:8 is important. If you have trouble holding your breath, speed the exercise up but keep to the ratio of 4:7:8 for the three phases. With practice you can slow it all down and get used to inhaling and exhaling more and more deeply. This exercise is a natural tranquilizer for the nervous system. Unlike tranquilizing drugs, which are often effective when you first take them but then lose their power over time, this exercise is subtle when you first try it but gains in power with repetition and practice. Do it at least twice a day. You cannot do it too frequently. Do NOT do more than 4 breaths at one time for the first month of practice. Later, if you wish, you can extend it to eight breaths. If you feel a little lightheaded when you first breathe this way, do not be concerned; it will pass. Once you develop this technique by practicing it every day, it will be a very useful tool that you will always have with you. Use it whenever anything upsetting happens - before you react. Use it whenever you are aware of internal tension. Use it to help you fall asleep. This exercise cannot be recommended too highly. Everyone can benefit from it.     Exercise 2: Meditation exercise  Breath Counting  If you want to get a feel for this challenging work, try your hand at breath counting, a deceptively simple technique. Sit in a comfortable position with the spine straight and head inclined slightly forward. Gently close your eyes and take a few deep breaths. Then let the breath come naturally without trying to influence it. Ideally it will be quiet and slow, but depth and rhythm may vary. To begin the exercise, count \"one\" to yourself as you exhale. The next time you exhale, count \"two,\" and so on up to [place]. \"  Then begin a new cycle, counting \"one\" on the next exhalation. Never count higher than \"five,\" and count only when you exhale. You will know your attention has wandered when you find yourself up to \"eight,\" \"12,\" even \"19. \"  Try to do 10 minutes of this form of meditation. Progressive Muscle Relaxation  Progressive Muscle Relaxation is a relaxation technique used to release stress. It can relax the muscles and lower blood pressure, heart rate, and respiration. Progressive Muscle Relaxation is the tensing and then relaxing each muscle group of the body, one group at a time. Though this technique is simple it may take several sessions before it is 'mastered.'   Some people prefer to listen to an audiotape (or CD or mp3) that guides one through progressive muscle relaxation. The scripts are usually about 20 minutes long. Progressive muscle relaxation may be done sitting or lying down. Tense up a group of muscles - tense hard but don't strain - and hold for about 5-10 seconds. Release the tension from the muscles all at once. Stay relaxed for 10 - 20 seconds. Some people prefer to count, for example:  Tense for count of 5  Release all at once  Rest for count of 10  . ..or  Tense for count of 10  Release all at once  Rest for count of 20  Pay close attention to the feeling of relaxation when you release the contracted muscles. When going through the muscle groups, some people start with the hands, others with the feet.  You may do one side of the body (hand, arm, leg, foot) at a time or do both sides at the same time. Listening to a prerecorded script that guides you through the process is helpful. Sample of Progressive Muscle Relaxation Exercise:   Hands - Clench fists  tense for 5, release, rest for 10      Right forearms and hands - Extend arm, elbow locked, and bend hand back at the wrist  tense for 5, release, rest for 10      Upper right arm - Bend arms at elbows and flex biceps  tense for 5, release, rest for 10     Forehead - wrinkle forehead into frown, tense, release, rest, and/or raise eyebrows  tense for 5, release, rest for 10      Eyes - close eyes tightly, hold and release  tense for 5, release, rest for 10      Mouth - press lips tightly together  tense for 5, release, rest for 10      Jaw - open mouth wide and stick out tongue  tense for 5, release, rest for 10      Buttocks - tense  tense for 5, release, rest for 10      Abdomen  tense for 5, release, rest for 10      Chest  tense for 5, release, rest for 10      Back - arch back  tense for 5, release, rest for 10      Neck and shoulders  tense for 5, release, rest for 10      Thighs  tense for 5, release, rest for 10      Lower legs and feet - Point toes toward shin  tense for 5, release, rest for 10      Feet - Point toes and curl them under  tense for 5, release, rest for 10     You may repeat relaxing and tensing muscle groups that have you have already done to relax them further.

## 2023-02-01 NOTE — PROGRESS NOTES
Behavioral Health Consultation  Zoey Moody, Ph.D.  Psychologist  2/1/2023  1:40 PM EST      Time spent with Patient: 20 minutes  This is patient's  fourteenth  [facility] appointment. Reason for Consult:    Chief Complaint   Patient presents with    Anxiety     Referring Provider: JULIET Grayson CNP  [address] [address] ([facility]) [address] [place] [address]    Feedback given to PCP. TELEHEALTH VISIT -- Audio Only (During AIJFE-98 public health emergency)    Pursuant to the emergency declaration under the [address] [place] [address], Pending sale to [facility] waiver authority and the [employer] Act, this phone call was conducted, with patient's consent, to reduce the patient's risk of exposure to COVID-19 and provide continuity of care for an established patient. Services were provided through a phone call discussion to substitute for in-person clinic visit. Pt gave verbal informed consent to participate in telehealth services. Consent:  She and/or health care decision maker is aware that that she may receive a bill for this telephone service, depending on her insurance coverage, and has provided verbal consent to proceed: Yes    Conducted a risk-benefit analysis and determined that the patient's presenting problems are consistent with the use of telepsychology. Determined that the patient has sufficient knowledge and skills in the use of technology enabling them to adequately benefit from telepsychology. It was determined that this patient was able to be properly treated without an in-person session. Patient verified that they were currently located at the [facility] address that was provided during registration.     Verified the following information:  Patient's identification: Yes  Patient location: [address] [place] [address]  Patient's call back number: [phone]  Patient's emergency contact's name and number, as well as permission to contact them if needed: Extended Emergency Contact Information  Primary Emergency Contact: Sissy4 [facility] Phone: [phone]  Mobile Phone: [phone]  Relation: Child  Secondary Emergency Contact: Jordana Dupont  Home Phone: [phone]  Relation: Daughter-in-Law   needed? No    Provider location: [place], Vianca Hoyt affirm this is an episode with a patient who has not had a related appointment within my department in the past 7 days or scheduled within the next 24 hours. S:  Patient reported that she has not been feeling well. Is still losing weight, has been having diarrhea. Is concerned that she is unable to follow diabetic diet while living at [facility]. Also admits that she has been eating sweets, is aware of the impact of stress on her diabetes. Processed disappointment in her living situation, concerns about her relationship with her son. She notes stress about aging, being around older people can be difficult for her.     O:  MSE:    Attitude: cooperative and friendly  Consciousness: alert  Orientation: oriented to person, place, time, general circumstance  Memory: recent and remote memory intact  Attention/Concentration: intact during session  Speech: normal rate and volume, well-articulated  Mood: stressed  Affect: dysphoric and anxious  Perception: within normal limits  Thought Content: all-or-none thinking and intrusive thoughts  Thought Process: logical, coherent and goal-directed  Insight: good  Judgment: intact  Ability to understand instructions: Yes  Ability to respond meaningfully: Yes  Morbid Ideation: no   Suicide Assessment: no suicidal ideation, plan, or intent  Homicidal Ideation: no    History:    Medications:   Current Outpatient Medications   Medication Sig Dispense Refill     ASPIRIN 81 MG chewable tablet TAKE ONE (1) TABLET BY MOUTH DAILY 90 tablet 1    traZODone (DESYREL) 50 MG tablet Take 1 tablet by mouth nightly 30 tablet 1    Continuous Blood Gluc Sensor (FREESTYLE ANT 2 SENSOR) MISC 1 Device by Does not apply route every 14 days 6 each 4    Continuous Blood Gluc  (FREESTYLE ANT 2 READER) GILMER 1 each by Does not apply route daily 1 each 1    buPROPion (WELLBUTRIN XL) 150 MG extended release tablet Take 1 tablet by mouth every morning 90 tablet 3    donepezil (ARICEPT) 10 MG tablet Take 1 tablet by mouth nightly 90 tablet 1    DULoxetine (CYMBALTA) 20 MG extended release capsule Take 1 capsule by mouth daily 90 capsule 1    glipiZIDE (GLUCOTROL) 10 MG tablet TAKE 1 TABLET BY MOUTH EVERY DAY 90 tablet 1    linaclotide (LINZESS) 145 MCG capsule Take 1 capsule by mouth every morning (before breakfast) 90 capsule 1    ELIQUIS 5 MG TABS tablet TAKE 1 TABLET BY MOUTH TWICE DAILY 180 tablet 1    rosuvastatin (CRESTOR) 40 MG tablet TAKE 1 TABLET BY MOUTH EVERY DAY 90 tablet 1    lisinopril (PRINIVIL;ZESTRIL) 10 MG tablet TAKE 1 TABLET BY MOUTH EVERY DAY 90 tablet 1    diclofenac sodium (VOLTAREN) 1 % GEL Apply 4 g topically 4 times daily 100 g 1    metoprolol tartrate (LOPRESSOR) 25 MG tablet TAKE 1 TABLET TWICE A  tablet 1    VASCEPA 1 g CAPS capsule Take 2 capsules by mouth 2 times daily 360 capsule 1    divalproex (DEPAKOTE) 250 MG DR tablet Take 2 tablets by mouth nightly 180 tablet 1    Calcium Carb-Cholecalciferol (CALCIUM 1000 + D) 1000-800 MG-UNIT TABS Take 1,000 mg by mouth daily 90 tablet 1    LANTUS SOLOSTAR 100 UNIT/ML injection pen Inject 40 Units into the skin nightly 15 Adjustable Dose Pre-filled Pen Syringe 2    JARDIANCE 10 MG tablet TAKE 1 TABLET BY MOUTH DAILY 90 tablet 1    levothyroxine (SYNTHROID) 100 MCG tablet Take 1 tablet daily 90 tablet 1    famotidine (PEPCID) 20 MG tablet Take 1 tablet by mouth 2 times daily 180 tablet 1    amLODIPine (NORVASC) 5 MG tablet TAKE 1 TABLET DAILY 90 tablet 1    polyethylene glycol (GLYCOLAX) 17 GM/SCOOP powder 1 powder in packet DAILY (route: oral)      insulin lispro, 1 Unit Dial, (HUMALOG KWIKPEN) 100 UNIT/ML SOPN Use on a sliding scale 3 times a day with meals. Maximum dose 30 units per day. 15 Adjustable Dose Pre-filled Pen Syringe 0    Continuous Blood Gluc Sensor (FREESTYLE ANT SENSOR SYSTEM) MISC 1 Units by Other route every 14 days Place 1 sensor on back of arm every 14 days 6 each 0    Handicap Placard MISC by Does not apply route 08/09/22 1 each 0    Continuous Blood Gluc Sensor ([address] [place] [address]) MISC 1 box by Does not apply route 2 times daily 1 each 0    BD PEN NEEDLE MICRO U/F 32G X 6 MM MISC USE WITH LANTUS DAILY 100 each 5    METAMUCIL FIBER PO Take by mouth MiraLax instead      ondansetron (ZOFRAN) 4 MG tablet Take 1 tablet by mouth 3 times daily as needed for Nausea or Vomiting 30 tablet 0    Cyanocobalamin (B-12) 50 MCG TABS Take by mouth       Multiple Vitamins-Minerals (VITEYES COMPLETE PO) Take by mouth      latanoprost (XALATAN) 0.005 % ophthalmic solution 1 drop nightly       No current facility-administered medications for this visit. Social History:   Social History     Socioeconomic History    Marital status:       Spouse name: Not on file    Number of children: Not on file    Years of education: Not on file    Highest education level: Not on file   Occupational History    Not on file   Tobacco Use    Smoking status: Never    Smokeless tobacco: Never   Vaping Use    Vaping Use: Never used   Substance and Sexual Activity    Alcohol use: Never    Drug use: Never    Sexual activity: Not on file   Other Topics Concern    Not on file   Social History Narrative    Not on file     Social Determinants of Health     Financial Resource Strain: Low Risk     Difficulty of Paying Living Expenses: Not hard at all   Food Insecurity: No Food Insecurity    Worried About Running Out of Food in the Last Year: Never true    [address] in the Last Year: Never true   Transportation Needs: No Transportation Needs    Lack of Transportation (Medical): No    Lack of Transportation (Non-Medical): No   Physical Activity: Insufficiently Active    Days of Exercise per Week: 2 days    Minutes of Exercise per Session: 20 min   Stress: Not on file   Social Connections: Not on file   Intimate Partner Violence: Not on file   Housing Stability: Low Risk     Unable to Pay for Housing in the Last Year: No    Number of Places Lived in the Last Year: 2    Unstable Housing in the Last Year: No     TOBACCO:   reports that she has never smoked. She has never used smokeless tobacco.  ETOH:   reports no history of alcohol use. Family History:   Family History   Problem Relation Age of Onset    Diabetes Paternal Grandmother     Diabetes Father     Lung Cancer Mother     Pancreatic Cancer Brother     Diabetes Brother      A:  Patient engaged and cooperative. Denies SI. Insight and motivation are good. Diagnosis:    1. Anxiety          Diagnosis Date    Colon polyps     Diabetes mellitus ([address]     Diabetic neuropathy ([address]     Diabetic retinopathy ([address]     Essential hypertension 10/28/2019    Fall     Fatty liver     Gastritis     GERD (gastroesophageal reflux disease)     Hyperlipidemia     Hypertension     Hypothyroidism     Irritable bowel syndrome     Major depressive disorder with single episode 10/28/2019    Osteopenia     Photosensitivity disorder     chronic    RBBB     Sleep apnea     on BIPAP    Thyroid disease     Thyroid nodule     neg bx-chronic thyroiditis    Type 2 diabetes mellitus with diabetic polyneuropathy, with long-term current use of insulin ([address] 07/23/2019    Vitamin D deficiency      Plan:  Pt interventions:  Trained in relaxation strategies, Conducted functional assessment, Lawn-setting to identify pt's primary goals for [facility] visit / overall health, Supportive techniques, Emphasized self-care as important for managing overall health, and Identified maladaptive thoughts.     Pt Behavioral Change Plan:   See Pt Instructions

## 2023-02-08 ENCOUNTER — HOSPITAL ENCOUNTER (OUTPATIENT)
Dept: PHYSICAL THERAPY | Age: 78
Setting detail: THERAPIES SERIES
Discharge: HOME | End: 2023-02-08
Payer: MEDICARE

## 2023-02-08 PROCEDURE — 97112 NEUROMUSCULAR REEDUCATION: CPT

## 2023-02-08 PROCEDURE — 97110 THERAPEUTIC EXERCISES: CPT

## 2023-02-08 NOTE — FLOWSHEET NOTE
[facility], [facility], [address], [address]  Phone: [phone]  Fax [phone]    Physical Therapy Re-eval Note    Date:  2023    Patient: Annamaria Carmichael (89 y.o. female)   Examination Date: 2022   :  1945 MRN: [id number]   Auth needed? []  Yes    [x]  No        Amount authorized: Insurance: Payor: MEDICARE / Plan: MEDICARE PART A AND B / Product Type: *No Product type* /   Insurance ID: [id number] - (Medicare)  Secondary Insurance (if applicable): [facility]   Referring Physician: Zbigniew Mccloud DO     PCP: Magalis Aranda DO Medical Diagnosis: Unsteady gait [R26.81]    Preferred Language for Healthcare:     [x] English       [] other: Latex Allergy? []  Yes    [x]  No          Plan of care signed (Y/N):  sent  Visit# / total visits:      G-Code (if applicable):          LEFS   at re-eval   at eval    Medicare Cap (if applicable):  803 = total amount used, updated 2023    Time in:   10:45     Timed Treatment: 45  Total Treatment Time:  45  ________________________________________________________________________________________    Pain Level:    /10  SUBJECTIVE:  Pt reports that she is still weak and tired. Notes that her diabetes is out of control and she does not know how to get in under control. Reports that she has to eat the food that they serve at her residence. The insulin volume she is taking is not working at this time. Also reports a \"few\" falls the other day.     OBJECTIVE: [id number]    Exercise/Equipment Resistance/Repetitions Other comments   NuStep 5 min    bridge X10  X10 on ball    Supine clams  SL hip ER with stabilization X15  X10 B Green TB   Supine marches Green TB   DKTC on ball x15 Manual resistance   Supine hip flexion    Extension X10 B  X10 B Manual resistance   Hooklying add squeeze 10x5\" With ball   Seated hamstring curls X15 B Green TB   LAQ X15 B Green TB       Standing balance    Feet together EC    Tandem    Airex balance    Stance    Mod tandem 1 min  1 min BLE    SLS foot on ball    Side stepping X4 laps Green TB   Standing hip abd Green TB   Resisted walking  Gray TB   Sit-to-stand training 130 sec STS                                        Other Therapeutic Activities:  Pt educated on results of HARRIS and TUG tests and how those results affect her ability to function during ADLs. Manual Treatments:         Modalities:      Test/Measurements:    TU with RW,19 sec without device    HARRIS BALANCE SCALE 14-Item Long Form Original Version   Total Score: 39/56      1. SITTING TO STANDING   (4) able to stand without using hands and stabilize independently   (3) able to stand independently using hands   (2) able to stand using hands after several tries   (1) needs minimal aid to stand or to stabilize   (0) needs moderate or maximal assist to stand   Score: 4     2. STANDING UNSUPPORTED   (4) able to stand safely 2 minutes   (3) able to stand 2 minutes with supervision   (2) able to stand 30 seconds unsupported   (1) needs several tries to stand 30 seconds unsupported   (0) unable to stand 30 seconds unassisted If a subject is able to stand 2   minutes unsupported, score full points for sitting unsupported. Proceed to   item #4. Score: 4      3. SITTING WITH BACK UNSUPPORTED BUT FEET SUPPORTED   ON FLOOR OR ON A STOOL   (4) able to sit safely and securely 2 minutes   (3) able to sit 2 minutes under supervision   (2) able to sit 30 seconds   (1) able to sit 10 seconds   (0) unable to sit without support 10 seconds   Score: 4      4. STANDING TO SITTING   (4) sits safely with minimal use of hands   (3) controls descent by using hands   (2) uses back of legs against chair to control descent   (1) sits independently but has uncontrolled descent   (0) needs assistance to sit   Score: 4     5.  TRANSFERS   (4) able to transfer safely with minor use of hands (3) able to transfer safely definite need of hands   (2) able to transfer with verbal cueing and/or supervision   (1) needs one person to assist   (0) needs two people to assist or supervise to be safe   Score: 3     6. STANDING UNSUPPORTED WITH EYES CLOSED   (4) able to stand 10 seconds safely   (3) able to stand 10 seconds with supervision   (2) able to stand 3 seconds   (1) unable to keep eyes closed 3 seconds but stays steady   (0) needs help to keep from falling   Score: 4     7. STANDING UNSUPPORTED WITH FEET TOGETHER   (4) able to place feet together independently and stand 1 minute safely   (3) able to place feet together independently and stand for 1 minute with   supervision   (2) able to place feet together independently but unable to hold for 30 seconds   (1) needs help to attain position but able to stand 15 seconds feet together   (0) needs help to attain position and unable to hold for 15 seconds   Score: 4     8. REACHING FORWARD WITH OUTSTRETCHED ARM WHILE   STANDING   (4) can reach forward confidently >25 cm (10 inches)   (3) can reach forward >12 cm safely (5 inches)   (2) can reach forward >5 cm safely (2 inches)   (1) reaches forward but needs supervision   (0) loses balance while trying/requires external support   Score: 3     9.  OBJECT FROM FLOOR FROM A STANDING POSITION   (4) able to  slipper safely and easily   (3) able to  slipper but needs supervision   (2) unable to  but reaches 2-5cm (1-2 inches) from slipper and keeps   balance independently   (1) unable to  and needs supervision while trying   (0) unable to try/needs assist to keep from losing balance or falling   Score: 3     10.  TURNING TO LOOK BEHIND OVER LEFT AND RIGHT   SHOULDERS WHILE STANDING   (4) looks behind from both sides and weight shifts well   (3) looks behind one side only other side shows less weight shift   (2) turns sideways only but maintains balance   (1) needs supervision when turning   (0) needs assist to keep from losing balance or falling   Score: 1     11. TURN 360 DEGREES   (4) able to turn 360 degrees safely in 4 seconds or less   (3) able to turn 360 degrees safely one side only in 4 seconds or less   (2) able to turn 360 degrees safely but slowly   (1) needs close supervision or verbal cueing   (0) needs assistance while turning   Score: 2     12. PLACING ALTERNATE FOOT ON STEP OR STOOL WHILE   STANDING UNSUPPORTED   (4) able to stand independently and safely and complete 8 steps in 20 seconds   (3) able to stand independently and complete 8 steps >20 seconds   (2) able to complete 4 steps without aid with supervision   (1) able to complete >2 steps needs minimal assist   (0) needs assistance to keep from falling/unable to try   Score: 0     13. STANDING UNSUPPORTED ONE FOOT IN FRONT   (4) able to place foot tandem independently and hold 30 seconds   (3) able to place foot ahead of other independently and hold 30 seconds   (2) able to take small step independently and hold 30 seconds   (1) needs help to step but can hold 15 seconds   (0) loses balance while stepping or standing   Score: 3     14. STANDING ON ONE LEG   (4) able to lift leg independently and hold >10 seconds   (3) able to lift leg independently and hold 5-10 seconds   (2) able to lift leg independently and hold = or >3 seconds   (1) tries to lift leg unable to hold 3 seconds but remains standing   independently   (0) unable to try or needs assist to prevent fall   Score: 0       ASSESSMENT:    Pt tolerated session fair. Decreased tolerance to standing exercise.     Treatment/Activity Tolerance:   [x]Patient tolerated treatment well [x] Patient limited by fatique  []Patient limited by pain [] Patient limited by other medical complications  [] Other:     Goals:    Short term goal 1: Pt will be indep in HEP  Short term goal 2: Pt will increase B hip strength to 4/5  Short term goal 3: Pt will increase HARRIS Balance score to > 45  Short term goal 4: Pt will ambulate with increased hip extension and glut activation BLE  Short term goal 5: Pt will return to completing all ADLs indep    Plan: [x] Continue per plan of care [] Alter current plan (see comments)   [] Plan of care initiated [] Hold pending MD visit [] Discharge      Plan for Next Session:  Biomechanical correction of problems as they present. Facilitate correct muscle firing patterns and activation with appropriate activities. Progress patient as tolerated.      Re-Certification Due Date:         Signature:  Jailene Hobson PT

## 2023-02-09 DIAGNOSIS — Z79.4 TYPE 2 DIABETES MELLITUS WITH HYPERGLYCEMIA, WITH LONG-TERM CURRENT USE OF INSULIN (HCC): ICD-10-CM

## 2023-02-09 DIAGNOSIS — E11.65 TYPE 2 DIABETES MELLITUS WITH HYPERGLYCEMIA, WITH LONG-TERM CURRENT USE OF INSULIN (HCC): ICD-10-CM

## 2023-02-09 RX ORDER — INSULIN LISPRO 100 [IU]/ML
INJECTION, SOLUTION INTRAVENOUS; SUBCUTANEOUS
Qty: 15 ADJUSTABLE DOSE PRE-FILLED PEN SYRINGE | Refills: 0 | Status: SHIPPED | OUTPATIENT
Start: 2023-02-09

## 2023-02-09 NOTE — TELEPHONE ENCOUNTER
Refill Request     CONFIRM preferrred pharmacy with the patient. If Mail Order Rx - Pend for 90 day refill. Last Seen: Last Seen Department: 1/18/2023  Last Seen by PCP: 1/18/2023    Last Written: 10/10/2022    If no future appointment scheduled, route STAFF MESSAGE with patient name to the [facility] for scheduling. Next Appointment:   Future Appointments   Date Time Provider Coleman Garcia   2/10/2023 10:00 AM Emma Arzate, PT [facility]   2/15/2023 10:45 AM Emma Arzate, PT [facility] [place]   2/16/2023  2:00 PM Lavelle Chacon, PhD [facility]   2/28/2023  1:00 PM DO KRISTIN Kaufman [place] [facility]       Message sent to [address] [place] [address] to schedule appt with patient? NO      Requested Prescriptions     Pending Prescriptions Disp Refills    insulin lispro, 1 Unit Dial, (HUMALOG KWIKPEN) 100 UNIT/ML SOPN 15 Adjustable Dose Pre-filled Pen Syringe 0     Sig: Use on a sliding scale 3 times a day with meals. Maximum dose 30 units per day.

## 2023-02-10 ENCOUNTER — HOSPITAL ENCOUNTER (OUTPATIENT)
Dept: PHYSICAL THERAPY | Age: 78
Setting detail: THERAPIES SERIES
Discharge: HOME | End: 2023-02-10
Payer: MEDICARE

## 2023-02-10 PROCEDURE — 97110 THERAPEUTIC EXERCISES: CPT

## 2023-02-10 PROCEDURE — 97112 NEUROMUSCULAR REEDUCATION: CPT

## 2023-02-15 ENCOUNTER — HOSPITAL ENCOUNTER (OUTPATIENT)
Dept: PHYSICAL THERAPY | Age: 78
Setting detail: THERAPIES SERIES
Discharge: HOME | End: 2023-02-15
Payer: MEDICARE

## 2023-02-15 PROCEDURE — 97112 NEUROMUSCULAR REEDUCATION: CPT

## 2023-02-15 PROCEDURE — 97110 THERAPEUTIC EXERCISES: CPT

## 2023-02-15 NOTE — FLOWSHEET NOTE
[facility], [facility], [address], [address]  Phone: [phone]  Fax [phone]    Physical Therapy Re-eval Note    Date:  2/15/2023    Patient: Peterson Bang (38 y.o. female)   Examination Date: 2022   :  1945 MRN: [id number]   Auth needed? []  Yes    [x]  No        Amount authorized: Insurance: Payor: MEDICARE / Plan: MEDICARE PART A AND B / Product Type: *No Product type* /   Insurance ID: [id number] - (Medicare)  Secondary Insurance (if applicable): [facility]   Referring Physician: Alexandra Gomez DO     PCP: Simeon Hughes DO Medical Diagnosis: Unsteady gait [R26.81]    Preferred Language for Healthcare:     [x] English       [] other: Latex Allergy? []  Yes    [x]  No          Plan of care signed (Y/N):  sent  Visit# / total visits:      G-Code (if applicable):          LEFS   at re-eval   at eval    Medicare Cap (if applicable):  194 = total amount used, updated 2/15/2023    Time in:   10:00     Timed Treatment: 45  Total Treatment Time:  45  ________________________________________________________________________________________    Pain Level:    /10  SUBJECTIVE:  Pt reports that she is still dealing with fatigue and out of control blood sugar. Notes that the food that she is served at her facility is not good for her diabetes.      OBJECTIVE: [id number]    Exercise/Equipment Resistance/Repetitions Other comments   NuStep 5 min    bridge X10  X10 on ball    Supine clams  SL hip ER with stabilization X15  X10 B Green TB   Supine marches Green TB   DKTC on ball x15 Manual resistance   Supine hip flexion    Extension X10 B  X10 B Manual resistance   Hooklying add squeeze 10x5\" With ball   Seated hamstring curls X15 B Green TB   LAQ X15 B Green TB       Standing balance    Feet together EC    Tandem    Airex balance    Stance    Mod tandem 1 min  1 min BLE    SLS foot on ball 1 min BLE    Side stepping X4 laps Green TB   Standing hip abd 2x10 B Green TB   Resisted walking  Gray TB   Sit-to-stand training 130 sec STS                                        Other Therapeutic Activities:  Pt educated on results of HARRIS and TUG tests and how those results affect her ability to function during ADLs. Manual Treatments:         Modalities:      Test/Measurements:    TU with RW,19 sec without device    HARRIS BALANCE SCALE 14-Item Long Form Original Version   Total Score: 39/56      1. SITTING TO STANDING   (4) able to stand without using hands and stabilize independently   (3) able to stand independently using hands   (2) able to stand using hands after several tries   (1) needs minimal aid to stand or to stabilize   (0) needs moderate or maximal assist to stand   Score: 4     2. STANDING UNSUPPORTED   (4) able to stand safely 2 minutes   (3) able to stand 2 minutes with supervision   (2) able to stand 30 seconds unsupported   (1) needs several tries to stand 30 seconds unsupported   (0) unable to stand 30 seconds unassisted If a subject is able to stand 2   minutes unsupported, score full points for sitting unsupported. Proceed to   item #4. Score: 4      3. SITTING WITH BACK UNSUPPORTED BUT FEET SUPPORTED   ON FLOOR OR ON A STOOL   (4) able to sit safely and securely 2 minutes   (3) able to sit 2 minutes under supervision   (2) able to sit 30 seconds   (1) able to sit 10 seconds   (0) unable to sit without support 10 seconds   Score: 4      4. STANDING TO SITTING   (4) sits safely with minimal use of hands   (3) controls descent by using hands   (2) uses back of legs against chair to control descent   (1) sits independently but has uncontrolled descent   (0) needs assistance to sit   Score: 4     5.  TRANSFERS   (4) able to transfer safely with minor use of hands   (3) able to transfer safely definite need of hands   (2) able to transfer with verbal cueing and/or supervision   (1) needs one person to assist   (0) needs two people to assist or supervise to be safe   Score: 3     6. STANDING UNSUPPORTED WITH EYES CLOSED   (4) able to stand 10 seconds safely   (3) able to stand 10 seconds with supervision   (2) able to stand 3 seconds   (1) unable to keep eyes closed 3 seconds but stays steady   (0) needs help to keep from falling   Score: 4     7. STANDING UNSUPPORTED WITH FEET TOGETHER   (4) able to place feet together independently and stand 1 minute safely   (3) able to place feet together independently and stand for 1 minute with   supervision   (2) able to place feet together independently but unable to hold for 30 seconds   (1) needs help to attain position but able to stand 15 seconds feet together   (0) needs help to attain position and unable to hold for 15 seconds   Score: 4     8. REACHING FORWARD WITH OUTSTRETCHED ARM WHILE   STANDING   (4) can reach forward confidently >25 cm (10 inches)   (3) can reach forward >12 cm safely (5 inches)   (2) can reach forward >5 cm safely (2 inches)   (1) reaches forward but needs supervision   (0) loses balance while trying/requires external support   Score: 3     9.  OBJECT FROM FLOOR FROM A STANDING POSITION   (4) able to  slipper safely and easily   (3) able to  slipper but needs supervision   (2) unable to  but reaches 2-5cm (1-2 inches) from slipper and keeps   balance independently   (1) unable to  and needs supervision while trying   (0) unable to try/needs assist to keep from losing balance or falling   Score: 3     10. TURNING TO LOOK BEHIND OVER LEFT AND RIGHT   SHOULDERS WHILE STANDING   (4) looks behind from both sides and weight shifts well   (3) looks behind one side only other side shows less weight shift   (2) turns sideways only but maintains balance   (1) needs supervision when turning   (0) needs assist to keep from losing balance or falling   Score: 1     11.  TURN 360 DEGREES   (4) able to turn 360 degrees safely in 4 seconds or less   (3) able to turn 360 degrees safely one side only in 4 seconds or less   (2) able to turn 360 degrees safely but slowly   (1) needs close supervision or verbal cueing   (0) needs assistance while turning   Score: 2     12. PLACING ALTERNATE FOOT ON STEP OR STOOL WHILE   STANDING UNSUPPORTED   (4) able to stand independently and safely and complete 8 steps in 20 seconds   (3) able to stand independently and complete 8 steps >20 seconds   (2) able to complete 4 steps without aid with supervision   (1) able to complete >2 steps needs minimal assist   (0) needs assistance to keep from falling/unable to try   Score: 0     13. STANDING UNSUPPORTED ONE FOOT IN FRONT   (4) able to place foot tandem independently and hold 30 seconds   (3) able to place foot ahead of other independently and hold 30 seconds   (2) able to take small step independently and hold 30 seconds   (1) needs help to step but can hold 15 seconds   (0) loses balance while stepping or standing   Score: 3     14. STANDING ON ONE LEG   (4) able to lift leg independently and hold >10 seconds   (3) able to lift leg independently and hold 5-10 seconds   (2) able to lift leg independently and hold = or >3 seconds   (1) tries to lift leg unable to hold 3 seconds but remains standing   independently   (0) unable to try or needs assist to prevent fall   Score: 0       ASSESSMENT:    Pt tolerated session fair.  Tolerance to standing exercise improved this session    Treatment/Activity Tolerance:   [x]Patient tolerated treatment well [x] Patient limited by fatique  []Patient limited by pain [] Patient limited by other medical complications  [] Other:     Goals:    Short term goal 1: Pt will be indep in HEP  Short term goal 2: Pt will increase B hip strength to 4/5  Short term goal 3: Pt will increase HARRIS Balance score to > 45  Short term goal 4: Pt will ambulate with increased hip extension and glut activation BLE  Short term goal 5: Pt will return to completing all ADLs indep    Plan: [x] Continue per plan of care [] Alter current plan (see comments)   [] Plan of care initiated [] Hold pending MD visit [] Discharge      Plan for Next Session:  Biomechanical correction of problems as they present. Facilitate correct muscle firing patterns and activation with appropriate activities. Progress patient as tolerated.      Re-Certification Due Date:         Signature:  Jazzmine Troy PT

## 2023-02-16 ENCOUNTER — SCHEDULED TELEPHONE ENCOUNTER (OUTPATIENT)
Dept: PSYCHOLOGY | Age: 78
End: 2023-02-16

## 2023-02-16 DIAGNOSIS — F41.9 ANXIETY: Primary | ICD-10-CM

## 2023-02-16 NOTE — PROGRESS NOTES
Behavioral Health Consultation  Troy Soriano, Ph.D.  Psychologist  2/16/2023  2:16 PM EST      Time spent with Patient: 20 minutes  This is patient's  fifteenth  [facility] appointment. Reason for Consult:    Chief Complaint   Patient presents with    Anxiety     Referring Provider: JULIET Aguilera CNP  [address] [address] ([facility]) [address] [place] [address]    Feedback given to PCP. TELEHEALTH VISIT -- Audio Only (During [id number]-25 public health emergency)    Pursuant to the emergency declaration under the [facility] waiver authority and the [employer] Act, this phone call was conducted, with patient's consent, to reduce the patient's risk of exposure to COVID-19 and provide continuity of care for an established patient. Services were provided through a phone call discussion to substitute for in-person clinic visit. Pt gave verbal informed consent to participate in telehealth services. Consent:  She and/or health care decision maker is aware that that she may receive a bill for this telephone service, depending on her insurance coverage, and has provided verbal consent to proceed: Yes    Conducted a risk-benefit analysis and determined that the patient's presenting problems are consistent with the use of telepsychology. Determined that the patient has sufficient knowledge and skills in the use of technology enabling them to adequately benefit from telepsychology. It was determined that this patient was able to be properly treated without an in-person session. Patient verified that they were currently located at the [facility] address that was provided during registration.     Verified the following information:  Patient's identification: Yes  Patient location: [address] [place] [address]  Patient's call back number: [phone]  Patient's emergency contact's name and number, as well as permission to contact them if needed: Extended Emergency Contact Information  [facility] Phone: [phone]  Mobile Phone: [phone]  Relation: Child  Secondary Emergency Contact: [facility]  Home Phone: [phone]  Relation: Daughter-in-Law   needed? No    Provider location: Haris Gaona[address] [place], [place] [address]celi affirm this is an episode with a patient who has not had a related appointment within my department in the past 7 days or scheduled within the next 24 hours. S:  Patient reported that she is feeling poorly today, has been tired. Is still frustrated with her living situation. Has been trying to address issues within her community, but feels helpless in some ways. Plans to take her driving test next week, which may help reduce her feelings of helplessness. She is still losing weight, feeling weak. O:  MSE:    Attitude: cooperative and friendly  Consciousness: alert  Orientation: oriented to person, place, time, general circumstance  Memory: recent and remote memory intact  Attention/Concentration: intact during session  Speech: normal rate and volume, well-articulated  Mood: stressed  Affect: irritable  Perception: within normal limits  Thought Content: all-or-none thinking and intrusive thoughts  Thought Process: logical, coherent and goal-directed  Insight: good  Judgment: intact  Ability to understand instructions: Yes  Ability to respond meaningfully: Yes  Morbid Ideation: no   Suicide Assessment: no suicidal ideation, plan, or intent  Homicidal Ideation: no    History:    Medications:   Current Outpatient Medications   Medication Sig Dispense Refill    insulin lispro, 1 Unit Dial, (HUMALOG KWIKPEN) 100 UNIT/ML SOPN Use on a sliding scale 3 times a day with meals. Maximum dose 30 units per day.  15 Adjustable Dose Pre-filled Pen Syringe 0    HM ASPIRIN 81 MG chewable tablet TAKE ONE (1) TABLET BY MOUTH DAILY 90 tablet 1    traZODone (DESYREL) 50 MG tablet Take 1 tablet by mouth nightly 30 tablet 1    Continuous Blood Gluc Sensor (FREESTYLE ANT 2 SENSOR) MISC 1 Device by Does not apply route every 14 days 6 each 4    Continuous Blood Gluc  (FREESTYLE ANT 2 READER) GILMER 1 each by Does not apply route daily 1 each 1    buPROPion (WELLBUTRIN XL) 150 MG extended release tablet Take 1 tablet by mouth every morning 90 tablet 3    donepezil (ARICEPT) 10 MG tablet Take 1 tablet by mouth nightly 90 tablet 1    DULoxetine (CYMBALTA) 20 MG extended release capsule Take 1 capsule by mouth daily 90 capsule 1    glipiZIDE (GLUCOTROL) 10 MG tablet TAKE 1 TABLET BY MOUTH EVERY DAY 90 tablet 1    linaclotide (LINZESS) 145 MCG capsule Take 1 capsule by mouth every morning (before breakfast) 90 capsule 1    ELIQUIS 5 MG TABS tablet TAKE 1 TABLET BY MOUTH TWICE DAILY 180 tablet 1    rosuvastatin (CRESTOR) 40 MG tablet TAKE 1 TABLET BY MOUTH EVERY DAY 90 tablet 1    lisinopril (PRINIVIL;ZESTRIL) 10 MG tablet TAKE 1 TABLET BY MOUTH EVERY DAY 90 tablet 1    diclofenac sodium (VOLTAREN) 1 % GEL Apply 4 g topically 4 times daily 100 g 1    metoprolol tartrate (LOPRESSOR) 25 MG tablet TAKE 1 TABLET TWICE A  tablet 1    divalproex (DEPAKOTE) 250 MG DR tablet Take 2 tablets by mouth nightly 180 tablet 1    Calcium Carb-Cholecalciferol (CALCIUM 1000 + D) 1000-800 MG-UNIT TABS Take 1,000 mg by mouth daily 90 tablet 1    LANTUS SOLOSTAR 100 UNIT/ML injection pen Inject 40 Units into the skin nightly 15 Adjustable Dose Pre-filled Pen Syringe 2    JARDIANCE 10 MG tablet TAKE 1 TABLET BY MOUTH DAILY 90 tablet 1    levothyroxine (SYNTHROID) 100 MCG tablet Take 1 tablet daily 90 tablet 1    famotidine (PEPCID) 20 MG tablet Take 1 tablet by mouth 2 times daily 180 tablet 1    amLODIPine (NORVASC) 5 MG tablet TAKE 1 TABLET DAILY 90 tablet 1    polyethylene glycol (GLYCOLAX) 17 GM/SCOOP powder 1 powder in packet DAILY (route: oral)      Continuous Blood Gluc Sensor (FREESTYLE ANT SENSOR SYSTEM) MISC 1 Units by Other route every 14 days Place 1 sensor on back of arm every 14 days 6 each 0    Handicap Placard MISC by Does not apply route 08/09/22 1 each 0    Continuous Blood Gluc Sensor ([facility]) MISC 1 box by Does not apply route 2 times daily 1 each 0    BD PEN NEEDLE MICRO U/F 32G X 6 MM MISC USE WITH LANTUS DAILY 100 each 5    METAMUCIL FIBER PO Take by mouth MiraLax instead      ondansetron (ZOFRAN) 4 MG tablet Take 1 tablet by mouth 3 times daily as needed for Nausea or Vomiting 30 tablet 0    Cyanocobalamin (B-12) 50 MCG TABS Take by mouth       Multiple Vitamins-Minerals (VITEYES COMPLETE PO) Take by mouth      latanoprost (XALATAN) 0.005 % ophthalmic solution 1 drop nightly       No current facility-administered medications for this visit. Social History:   Social History     Socioeconomic History    Marital status:      Spouse name: Not on file    Number of children: Not on file    Years of education: Not on file    Highest education level: Not on file   Occupational History    Not on file   Tobacco Use    Smoking status: Never    Smokeless tobacco: Never   Vaping Use    Vaping Use: Never used   Substance and Sexual Activity    Alcohol use: Never    Drug use: Never    Sexual activity: Not on file   Other Topics Concern    Not on file   Social History Narrative    Not on file     Social Determinants of Health     Financial Resource Strain: Low Risk     Difficulty of Paying Living Expenses: Not hard at all   Food Insecurity: No Food Insecurity    Worried About Running Out of Food in the Last Year: Never true    [address] in the Last Year: Never true   Transportation Needs: No Transportation Needs    Lack of Transportation (Medical): No    Lack of Transportation (Non-Medical):  No   Physical Activity: Insufficiently Active    Days of Exercise per Week: 2 days    Minutes of Exercise per Session: 20 min   Stress: Not on file   Social Connections: Not on file   Intimate Partner Violence: Not on file   Housing Stability: Low Risk     Unable to Pay for Housing in the Last Year: No    Number of Places Lived in the Last Year: 2    Unstable Housing in the Last Year: No     TOBACCO:   reports that she has never smoked. She has never used smokeless tobacco.  ETOH:   reports no history of alcohol use. Family History:   Family History   Problem Relation Age of Onset    Diabetes Paternal Grandmother     Diabetes Father     Lung Cancer Mother     Pancreatic Cancer Brother     Diabetes Brother      A:  Patient engaged and cooperative. Denies SI. Insight and motivation are good. Diagnosis:    1. Anxiety          Diagnosis Date    Colon polyps     Diabetes mellitus ([address]     Diabetic neuropathy ([address]     Diabetic retinopathy ([address]     Essential hypertension 10/28/2019    Fall     Fatty liver     Gastritis     GERD (gastroesophageal reflux disease)     Hyperlipidemia     Hypertension     Hypothyroidism     Irritable bowel syndrome     Major depressive disorder with single episode 10/28/2019    Osteopenia     Photosensitivity disorder     chronic    RBBB     Sleep apnea     on BIPAP    Thyroid disease     Thyroid nodule     neg bx-chronic thyroiditis    Type 2 diabetes mellitus with diabetic polyneuropathy, with long-term current use of insulin ([facility] 07/23/2019    Vitamin D deficiency      Plan:  Pt interventions:  Conducted functional assessment, [place]-setting to identify pt's primary goals for [facility] visit / overall health, Supportive techniques, and Emphasized self-care as important for managing overall health.     Pt Behavioral Change Plan:   See Pt Instructions

## 2023-02-18 ENCOUNTER — APPOINTMENT (OUTPATIENT)
Dept: GENERAL RADIOLOGY | Age: 78
End: 2023-02-18
Payer: MEDICARE

## 2023-02-18 ENCOUNTER — TELEPHONE (OUTPATIENT)
Dept: FAMILY MEDICINE CLINIC | Age: 78
End: 2023-02-18

## 2023-02-18 ENCOUNTER — HOSPITAL ENCOUNTER (EMERGENCY)
Age: 78
Discharge: HOME | End: 2023-02-18
Attending: EMERGENCY MEDICINE
Payer: MEDICARE

## 2023-02-18 VITALS
BODY MASS INDEX: 21.44 KG/M2 | TEMPERATURE: 99.8 F | HEART RATE: 80 BPM | WEIGHT: 121 LBS | RESPIRATION RATE: 11 BRPM | SYSTOLIC BLOOD PRESSURE: 140 MMHG | HEIGHT: 63 IN | DIASTOLIC BLOOD PRESSURE: 60 MMHG | OXYGEN SATURATION: 100 %

## 2023-02-18 DIAGNOSIS — U07.1 COVID-19: Primary | ICD-10-CM

## 2023-02-18 LAB
A/G RATIO: 1.4 (ref 1.1–2.2)
ALBUMIN SERPL-MCNC: 3.8 G/DL (ref 3.4–5)
ALP BLD-CCNC: 54 U/L (ref 40–129)
ALT SERPL-CCNC: 27 U/L (ref 10–40)
ANION GAP SERPL CALCULATED.3IONS-SCNC: 7 MMOL/L (ref 3–16)
AST SERPL-CCNC: 31 U/L (ref 15–37)
BASOPHILS ABSOLUTE: 0 K/UL (ref 0–0.2)
BASOPHILS RELATIVE PERCENT: 1 %
BILIRUB SERPL-MCNC: 0.6 MG/DL (ref 0–1)
BILIRUBIN URINE: NEGATIVE
BLOOD, URINE: NEGATIVE
BUN BLDV-MCNC: 16 MG/DL (ref 7–20)
CALCIUM SERPL-MCNC: 9 MG/DL (ref 8.3–10.6)
CHLORIDE BLD-SCNC: 97 MMOL/L (ref 99–110)
CLARITY: CLEAR
CO2: 29 MMOL/L (ref 21–32)
COLOR: YELLOW
CREAT SERPL-MCNC: 0.7 MG/DL (ref 0.6–1.2)
EKG ATRIAL RATE: 80 BPM
EKG DIAGNOSIS: NORMAL
EKG P AXIS: 49 DEGREES
EKG P-R INTERVAL: 138 MS
EKG Q-T INTERVAL: 388 MS
EKG QRS DURATION: 114 MS
EKG QTC CALCULATION (BAZETT): 447 MS
EKG R AXIS: -32 DEGREES
EKG T AXIS: 32 DEGREES
EKG VENTRICULAR RATE: 80 BPM
EOSINOPHILS ABSOLUTE: 0 K/UL (ref 0–0.6)
EOSINOPHILS RELATIVE PERCENT: 0.4 %
GFR SERPL CREATININE-BSD FRML MDRD: >60 ML/MIN/{1.73_M2}
GLUCOSE BLD-MCNC: 130 MG/DL (ref 70–99)
GLUCOSE URINE: >=1000 MG/DL
HCT VFR BLD CALC: 38.7 % (ref 36–48)
HEMOGLOBIN: 12.7 G/DL (ref 12–16)
INFLUENZA A: NOT DETECTED
INFLUENZA B: NOT DETECTED
KETONES, URINE: 40 MG/DL
LEUKOCYTE ESTERASE, URINE: NEGATIVE
LIPASE: 86 U/L (ref 13–60)
LYMPHOCYTES ABSOLUTE: 0.9 K/UL (ref 1–5.1)
LYMPHOCYTES RELATIVE PERCENT: 22.9 %
MCH RBC QN AUTO: 29.6 PG (ref 26–34)
MCHC RBC AUTO-ENTMCNC: 32.9 G/DL (ref 31–36)
MCV RBC AUTO: 89.8 FL (ref 80–100)
MICROSCOPIC EXAMINATION: ABNORMAL
MONOCYTES ABSOLUTE: 1 K/UL (ref 0–1.3)
MONOCYTES RELATIVE PERCENT: 26.9 %
NEUTROPHILS ABSOLUTE: 1.8 K/UL (ref 1.7–7.7)
NEUTROPHILS RELATIVE PERCENT: 48.8 %
NITRITE, URINE: NEGATIVE
PDW BLD-RTO: 17.1 % (ref 12.4–15.4)
PH UA: 7 (ref 5–8)
PLATELET # BLD: 125 K/UL (ref 135–450)
PMV BLD AUTO: 8 FL (ref 5–10.5)
POTASSIUM REFLEX MAGNESIUM: 4.6 MMOL/L (ref 3.5–5.1)
PROTEIN UA: NEGATIVE MG/DL
RBC # BLD: 4.31 M/UL (ref 4–5.2)
SARS-COV-2 RNA, RT PCR: DETECTED
SODIUM BLD-SCNC: 133 MMOL/L (ref 136–145)
SPECIFIC GRAVITY UA: 1.01 (ref 1–1.03)
TOTAL PROTEIN: 6.6 G/DL (ref 6.4–8.2)
URINE REFLEX TO CULTURE: ABNORMAL
URINE TYPE: ABNORMAL
UROBILINOGEN, URINE: 0.2 E.U./DL
WBC # BLD: 3.8 K/UL (ref 4–11)

## 2023-02-18 PROCEDURE — 99285 EMERGENCY DEPT VISIT HI MDM: CPT

## 2023-02-18 PROCEDURE — 71045 X-RAY EXAM CHEST 1 VIEW: CPT

## 2023-02-18 PROCEDURE — 85025 COMPLETE CBC W/AUTO DIFF WBC: CPT

## 2023-02-18 PROCEDURE — 87636 SARSCOV2 & INF A&B AMP PRB: CPT

## 2023-02-18 PROCEDURE — 93005 ELECTROCARDIOGRAM TRACING: CPT | Performed by: EMERGENCY MEDICINE

## 2023-02-18 PROCEDURE — 93010 ELECTROCARDIOGRAM REPORT: CPT | Performed by: INTERNAL MEDICINE

## 2023-02-18 PROCEDURE — 81003 URINALYSIS AUTO W/O SCOPE: CPT

## 2023-02-18 PROCEDURE — 80053 COMPREHEN METABOLIC PANEL: CPT

## 2023-02-18 PROCEDURE — 83690 ASSAY OF LIPASE: CPT

## 2023-02-18 ASSESSMENT — PAIN - FUNCTIONAL ASSESSMENT: PAIN_FUNCTIONAL_ASSESSMENT: NONE - DENIES PAIN

## 2023-02-18 NOTE — ED NOTES
Patient presents to the ED from the [facility]. She states she moved there in December but they have poor options for her diabetic diet and she has not been able to Mayi Zhaopin eat anything\".  Endorses increased weakness since she moved in     [facility]  02/18/23 [id number]

## 2023-02-18 NOTE — DISCHARGE INSTRUCTIONS
You are found to have COVID-19. This likely explains your general fatigue and symptoms. Please follow-up with primary care doctor. Return for worsening difficulty breathing.

## 2023-02-19 NOTE — ED NOTES
/Patient identified as a positive fall risk on the ED triage fall screening. Patient placed in fall precautions which includes:  yellow fall risk bracelet on wrist and yellow socks on feet. Patient instructed on importance of not getting out of bed or ambulating without assistance for safety. Pt verbalized understanding.      Fortino Cruz RN  02/18/23 [id number]

## 2023-02-19 NOTE — ED PROVIDER NOTES
[address] [facility]  ED  EMERGENCY DEPARTMENT ENCOUNTER        Patient Name: Rojelio Rodriguez  MRN: [id number]  Sharlagfalana 1945  Date of evaluation: 2023  Provider: Miles Umanzor MD  PCP: Elsworth Scheuermann, DO  Note Started: 9:06 PM EST 23    CHIEF COMPLAINT       Fatigue and Cough (Pt has been getting weaker and has developed a cough since December)      HISTORY OF PRESENT ILLNESS: 1 or more Elements     History from : Patient    Limitations to history : None    Rojelio Rodriguez is a 78 y.o. female who presents for evaluation of fatigue, cough. Patient states that she has been generally weak over the past several months ever since she moved into assisted living. She states over the last few days, she has developed a cough, some increased weakness. There is no focal weakness. She denies any difficulty breathing. She states that she is vaccinated against COVID. She denies any chest pain or overt difficulty breathing. Nursing Notes were all reviewed and agreed with or any disagreements were addressed in the HPI. REVIEW OF SYSTEMS :      Review of Systems    Positives and Pertinent negatives as per HPI.      SURGICAL HISTORY     Past Surgical History:   Procedure Laterality Date    APPENDECTOMY      CARPAL TUNNEL RELEASE Bilateral     CATARACT REMOVAL Bilateral      SECTION      CHOLECYSTECTOMY      COLONOSCOPY N/A 2022    COLONOSCOPY POLYPECTOMY SNARE/COLD BIOPSY performed by Shravan Wilhelm MD at [address] [facility] ([facility])      [facility]      Dr. Sandi Ty N/A 2022    EGD BIOPSY performed by Shravan Wilhelm MD at [address] [facility]       Discharge Medication List as of 2023  7:40 PM        CONTINUE these medications which have NOT CHANGED    Details   insulin lispro, 1 Unit Dial, (HUMALOG KWIKPEN) 100 UNIT/ML SOPN Use on a sliding scale 3 times a day with meals. Maximum dose 30 units per day., Disp-15 Adjustable Dose Pre-filled Pen Syringe, R-0Normal      HM ASPIRIN 81 MG chewable tablet TAKE ONE (1) TABLET BY MOUTH DAILY, Disp-90 tablet, R-1Normal      traZODone (DESYREL) 50 MG tablet Take 1 tablet by mouth nightly, Disp-30 tablet, R-1Normal      !!  Continuous Blood Gluc Sensor (FREESTYLE ANT 2 SENSOR) MISC 1 Device by Does not apply route every 14 days, Disp-6 each, R-4NO PRINT      Continuous Blood Gluc  (FREESTYLE ANT 2 READER) GILMER 1 each by Does not apply route daily, Disp-1 each, R-1Print      buPROPion (WELLBUTRIN XL) 150 MG extended release tablet Take 1 tablet by mouth every morning, Disp-90 tablet, R-3Normal      donepezil (ARICEPT) 10 MG tablet Take 1 tablet by mouth nightly, Disp-90 tablet, R-1Normal      DULoxetine (CYMBALTA) 20 MG extended release capsule Take 1 capsule by mouth daily, Disp-90 capsule, R-1Normal      glipiZIDE (GLUCOTROL) 10 MG tablet TAKE 1 TABLET BY MOUTH EVERY DAY, Disp-90 tablet, R-1Normal      linaclotide (LINZESS) 145 MCG capsule Take 1 capsule by mouth every morning (before breakfast), Disp-90 capsule, R-1Normal      ELIQUIS 5 MG TABS tablet TAKE 1 TABLET BY MOUTH TWICE DAILY, Disp-180 tablet, R-1, DAWNormal      rosuvastatin (CRESTOR) 40 MG tablet TAKE 1 TABLET BY MOUTH EVERY DAY, Disp-90 tablet, R-1Normal      lisinopril (PRINIVIL;ZESTRIL) 10 MG tablet TAKE 1 TABLET BY MOUTH EVERY DAY, Disp-90 tablet, R-1Normal      diclofenac sodium (VOLTAREN) 1 % GEL Apply 4 g topically 4 times daily, Topical, 4 TIMES DAILY Starting Thu 11/3/2022, Disp-100 g, R-1, Normal      metoprolol tartrate (LOPRESSOR) 25 MG tablet TAKE 1 TABLET TWICE A DAY, Disp-180 tablet, R-1Normal      divalproex (DEPAKOTE) 250 MG DR tablet Take 2 tablets by mouth nightly, Disp-180 tablet, R-1Normal      Calcium Carb-Cholecalciferol (CALCIUM 1000 + D) 1000-800 MG-UNIT TABS Take 1,000 mg by mouth daily, Disp-90 tablet, R-1Normal      LANTUS SOLOSTAR 100 UNIT/ML injection pen Inject 40 Units into the skin nightly, Disp-15 Adjustable Dose Pre-filled Pen Syringe, R-2, DAWNormal      JARDIANCE 10 MG tablet TAKE 1 TABLET BY MOUTH DAILY, Disp-90 tablet, R-1, DAWNormal      levothyroxine (SYNTHROID) 100 MCG tablet Take 1 tablet daily, Disp-90 tablet, R-1Normal      famotidine (PEPCID) 20 MG tablet Take 1 tablet by mouth 2 times daily, Disp-180 tablet, R-1**Patient requests 90 days supply**Normal      amLODIPine (NORVASC) 5 MG tablet TAKE 1 TABLET DAILY, Disp-90 tablet, R-1Normal      polyethylene glycol (GLYCOLAX) 17 GM/SCOOP powder 1 powder in packet DAILY (route: oral)Historical Med      Handicap Placard [facility] Starting Tue 8/9/2022, Disp-1 each, R-0, Print08/09/22      !! Continuous Blood Gluc Sensor ([place] ANT SENSOR SYSTEM) MISC 1 box by Does not apply route 2 times daily, Disp-1 each, R-0Normal      BD PEN NEEDLE MICRO U/F 32G X 6 MM MISC USE WITH LANTUS DAILY, Disp-100 each, R-5, DAWNormal      METAMUCIL FIBER PO Take by mouth MiraLax insteadHistorical Med      ondansetron (ZOFRAN) 4 MG tablet Take 1 tablet by mouth 3 times daily as needed for Nausea or Vomiting, Disp-30 tablet, R-0Normal      Cyanocobalamin (B-12) 50 MCG TABS Take by mouth Historical Med      Multiple Vitamins-Minerals (VITEYES COMPLETE PO) Take by mouthHistorical Med      latanoprost (XALATAN) 0.005 % ophthalmic solution 1 drop nightlyHistorical Med       !! - Potential duplicate medications found. Please discuss with provider.           ALLERGIES     Seasonal    FAMILYHISTORY       Family History   Problem Relation Age of Onset    Diabetes Paternal Grandmother     Diabetes Father     Lung Cancer Mother     Pancreatic Cancer Brother     Diabetes Brother         SOCIAL HISTORY       Social History     Tobacco Use    Smoking status: Never    Smokeless tobacco: Never   Vaping Use    Vaping Use: Never used   Substance Use Topics    Alcohol use: Never    Drug use: Never       SCREENINGS                         CIWA Assessment  BP: (!) 140/60  Heart Rate: 80           PHYSICAL EXAM  1 or more Elements     ED Triage Vitals   BP Temp Temp Source Heart Rate Resp SpO2 Height Weight   02/18/23 1422 02/18/23 1422 02/18/23 1422 02/18/23 1422 02/18/23 1422 02/18/23 1422 02/18/23 1414 02/18/23 1414   (!) 140/60 99.8 °F (37.7 °C) Oral 80 11 100 % 5' 3\" (1.6 m) 121 lb (54.9 kg)       General: No acute distress. Alert and Oriented. Appears stated age. HEENT: No difficulty tolerating oral secretions. Cardiac: Regular rate and rhythm. Radial pulses are intact bilaterally. Chest: No respiratory distress. Clear breath sounds bilaterally. No increased work of breathing. No use of accessory muscles for respiration. Abdomen: Soft, nontender, nondistended, non-peritonitic. Extremities:No significant lower extremity edema. Lower extremities are symmetric. Neuro: Moving all extremities. No focal deficits. Speech is clear. Skin:No rash, no erythema  Psych: Calm and cooperative.       DIAGNOSTIC RESULTS   LABS:    Labs Reviewed   COVID-19 & INFLUENZA COMBO - Abnormal; Notable for the following components:       Result Value    SARS-CoV-2 RNA, RT PCR DETECTED (*)     All other components within normal limits   CBC WITH AUTO DIFFERENTIAL - Abnormal; Notable for the following components:    WBC 3.8 (*)     RDW 17.1 (*)     Platelets 910 (*)     Lymphocytes Absolute 0.9 (*)     All other components within normal limits   COMPREHENSIVE METABOLIC PANEL W/ REFLEX TO MG FOR LOW K - Abnormal; Notable for the following components:    Sodium 133 (*)     Chloride 97 (*)     Glucose 130 (*)     All other components within normal limits   LIPASE - Abnormal; Notable for the following components:    Lipase 86.0 (*)     All other components within normal limits   URINALYSIS WITH REFLEX TO CULTURE - Abnormal; Notable for the following components:    Glucose, Ur >=1000 (*)     Ketones, Urine 40 (*)     All other components within normal limits       When ordered only abnormal lab results are displayed. All other labs were within normal range or not returned as of this dictation. EKG  The EKG, as interpreted by myself, in the emergency department in the absence of a cardiologist.  normal sinus rhythm with a rate of 80  Axis is   Left axis deviation  QTc is  within an acceptable range  Intervals and Durations are unremarkable. No specific ST-T wave changes appreciated. No evidence of acute ischemia. No significant change from prior EKG dated 4/14/2021      RADIOLOGY:   Non-plain film images such as CT, Ultrasound and MRI are read by the radiologist. Plain radiographic images are visualized and preliminarily interpreted by the ED Provider with the below findings:        Interpretation per the Radiologist below, if available at the time of this note:    XR CHEST PORTABLE   Final Result   No acute pulmonary finding. XR CHEST PORTABLE    Result Date: 2/18/2023  EXAMINATION: ONE XRAY VIEW OF THE CHEST 2/18/2023 2:36 pm COMPARISON: None. HISTORY: ORDERING SYSTEM PROVIDED HISTORY: cough, eval for PNA TECHNOLOGIST PROVIDED HISTORY: Reason for exam:->cough, eval for PNA Reason for Exam: weak and has a cough. said she hasn't been eating. FINDINGS: The heart, mediastinum and pulmonary vascularity are normal.  The lungs are lucent with mild interstitial changes that appear chronic. No acute airspace finding. No significant skeletal finding. No acute pulmonary finding. Bedside Ultrasound, as interpreted by me, if performed:    No results found.     PROCEDURES     Unless otherwise noted below, none     Procedures    CRITICAL CARE TIME     I personally spent a total of 0 minutes of critical care time in obtaining history, performing a physical exam, bedside monitoring of interventions, collecting and interpreting tests and discussion with consultants but excluding time spent performing procedures, treating other patients and teaching time. PAST MEDICAL HISTORY      has a past medical history of Colon polyps, Diabetes mellitus ([facility] [address] Diabetic neuropathy ([facility] [address] Diabetic retinopathy ([facility] [address] Essential hypertension (10/28/2019), Fall, Fatty liver, Gastritis, GERD (gastroesophageal reflux disease), Hyperlipidemia, Hypertension, Hypothyroidism, Irritable bowel syndrome, Major depressive disorder with single episode (10/28/2019), Osteopenia, Photosensitivity disorder, RBBB, Sleep apnea, Thyroid disease, Thyroid nodule, Type 2 diabetes mellitus with diabetic polyneuropathy, with long-term current use of insulin ([facility] [address] (07/23/2019), and Vitamin D deficiency. EMERGENCY DEPARTMENT COURSE and DIFFERENTIAL DIAGNOSIS/MDM:     Vitals:    Vitals:    02/18/23 1414 02/18/23 1422   BP:  (!) 140/60   Pulse:  80   Resp:  11   Temp:  99.8 °F (37.7 °C)   TempSrc:  Oral   SpO2:  100%   Weight: 121 lb (54.9 kg)    Height: 5' 3\" (1.6 m)        Patient was treated with and given the following medications:  Medications - No data to display          Is this patient to be included in the SEP-1 Core Measure due to severe sepsis or septic shock? No   Exclusion criteria - the patient is NOT to be included for SEP-1 Core Measure due to:  Viral etiology found or highly suspected (including COVID-19) without concomitant bacterial infection    CC/HPI Summary, DDx, ED Course, and Reassessment:     46-year-old female presenting for evaluation of general fatigue, cough. Patient arrives with stable vital signs no overt respiratory distress. We will obtain basic laboratory evaluation, cardiac panel, COVID and flu testing.     The differential diagnosis associated with the patient's presentation includes: Viral syndrome, renal insufficiency, electrolyte abnormality    CONSULTS: (Who and What was discussed)  None      Social Determinants : None    Patient's care impacted by chronic condition(s): HTN, HLD, DM    Records Reviewed : None    Clinical information obtained from an independent historian. Disposition Considerations (include 1 Tests not done, Shared Decision Making, Pt Expectation of Test or Tx.):     Patient is found to be COVID-19 positive. Patient was informed of these results. She had very mild hyponatremia. This likely explains her general weakness. I advised her to follow-up with her primary care doctor. As there is no clear onset of her symptoms, patient does not qualify for COVID antiviral therapy at this time as her symptoms have been going on for more than 2 days. The patient will be discharged from the emergency department. The patient was counseled on their diagnosis and any medications prescribed. They were advised on the need for PCP followup. They were counseled on the need to return to the emergency department if any of their symptoms were to worsen, change or have any other concerns. Discharged in stable condition. I am the Primary Clinician of Record. FINAL IMPRESSION      1. COVID-19          DISPOSITION/PLAN     DISPOSITION Decision To Discharge 02/18/2023 06:57:44 PM      PATIENT REFERRED TO:  DO Cm Fairchild  [phone]            DISCHARGE MEDICATIONS:  Patient was given scripts for the following medications. I counseled patient how to take these medications:  Discharge Medication List as of 2/18/2023  7:40 PM          DISCONTINUED MEDICATIONS:  Discharge Medication List as of 2/18/2023  7:40 PM                 (This chart was generated in part by using Dragon Dictation system and may contain errors related to that system including errors in grammar, punctuation, and spelling, as well as words and phrases that may be inappropriate.  If there are any questions or concerns please feel free to contact the dictating provider for clarification.)    Sruthi Barron MD Katie Sauceda MD  02/18/23 [id number]

## 2023-02-20 ENCOUNTER — CARE COORDINATION (OUTPATIENT)
Dept: CARE COORDINATION | Age: 78
End: 2023-02-20

## 2023-02-20 NOTE — CARE COORDINATION
[facility] ED Follow up Call    Reason for ED visit:  Fatigue/ Weaknes  Status:     No Change    Did you call your PCP prior to going to the ED? No      Did you receive a discharge instructions from the Emergency Room? Yes  Review of Instructions:     Understands what to report/when to return?:  Yes   Understands discharge instructions?:  Yes   Following discharge instructions?:  Yes   If not why? N/A    Are there any new complaints of pain? No  New Pain Meds? No    Constipation prophylaxis needed? N/A    If you have a wound is the dressing clean, dry, and intact? No  Understands wound care regimen? N/A    Are there any other complaints/concerns that you wish to tell your provider? ACM completed ED follow up call with patient who said she is feeling weak/ tired. Patient said she has not been able to place sensor d/t weakness so has no been administering insulin d/t not know her blood sugars. ACM reviewed after hours note from Dr. Daryl Mendenhall. Patient is agreeable for [facility] at this time to help with fatigue/ falls/ and medication management at this time. [facility] offered RPM at this time but patient declined saying she did not feel up to monitoring again with program. Patient did agree for further follow up calls with AC at this time for ongoing support. FU appts/Provider:    Future Appointments   Date Time Provider Coleman Garcia   2/22/2023  8:30 AM Kylah Williamson PT [facility]   2/28/2023  1:00 PM DO KRISTIN Workman [place] - DYD   3/6/2023  2:00 PM Asim Rodas, PhD [facility]           New Medications?:   No      Medication Reconciliation by phone - No  Understands Medications? Yes  Taking Medications? Yes- but holding insulin at this time   Can you swallow your pills? Yes    Any further needs in the home i.e. Equipment? No, patient declined glucometer at this time.      Link to services in community?:  No   Which services:  n/a

## 2023-02-21 ENCOUNTER — TELEPHONE (OUTPATIENT)
Dept: FAMILY MEDICINE CLINIC | Age: 78
End: 2023-02-21

## 2023-02-21 NOTE — TELEPHONE ENCOUNTER
Patient is calling in asking when she can get a home health nurse out to her house as she is very weak.  Please advise

## 2023-02-22 ENCOUNTER — HOSPITAL ENCOUNTER (OUTPATIENT)
Dept: PHYSICAL THERAPY | Age: 78
Setting detail: THERAPIES SERIES
Discharge: HOME | End: 2023-02-22
Payer: MEDICARE

## 2023-02-22 ENCOUNTER — TELEPHONE (OUTPATIENT)
Dept: FAMILY MEDICINE CLINIC | Age: 78
End: 2023-02-22

## 2023-02-22 ENCOUNTER — APPOINTMENT (OUTPATIENT)
Dept: GENERAL RADIOLOGY | Age: 78
DRG: 178 | End: 2023-02-22
Payer: MEDICARE

## 2023-02-22 ENCOUNTER — HOSPITAL ENCOUNTER (INPATIENT)
Age: 78
DRG: 178 | End: 2023-02-22
Attending: EMERGENCY MEDICINE | Admitting: INTERNAL MEDICINE
Payer: MEDICARE

## 2023-02-22 DIAGNOSIS — Z78.9: ICD-10-CM

## 2023-02-22 DIAGNOSIS — E86.0 DEHYDRATION: ICD-10-CM

## 2023-02-22 DIAGNOSIS — R26.2 TROUBLE WALKING: ICD-10-CM

## 2023-02-22 DIAGNOSIS — R73.9 HYPERGLYCEMIA: ICD-10-CM

## 2023-02-22 DIAGNOSIS — U07.1 COVID-19: Primary | ICD-10-CM

## 2023-02-22 PROBLEM — R53.1 GENERALIZED WEAKNESS: Status: ACTIVE | Noted: 2023-02-22

## 2023-02-22 PROBLEM — R53.81 PHYSICAL DECONDITIONING: Status: ACTIVE | Noted: 2023-02-22

## 2023-02-22 LAB
A/G RATIO: 1.3 (ref 1.1–2.2)
ALBUMIN SERPL-MCNC: 3.8 G/DL (ref 3.4–5)
ALP BLD-CCNC: 61 U/L (ref 40–129)
ALT SERPL-CCNC: 43 U/L (ref 10–40)
ANION GAP SERPL CALCULATED.3IONS-SCNC: 13 MMOL/L (ref 3–16)
AST SERPL-CCNC: 42 U/L (ref 15–37)
BASOPHILS ABSOLUTE: 0 K/UL (ref 0–0.2)
BASOPHILS RELATIVE PERCENT: 0.6 %
BILIRUB SERPL-MCNC: 0.6 MG/DL (ref 0–1)
BILIRUBIN URINE: NEGATIVE
BLOOD, URINE: NEGATIVE
BUN BLDV-MCNC: 34 MG/DL (ref 7–20)
C-REACTIVE PROTEIN: 3.8 MG/L (ref 0–5.1)
CALCIUM SERPL-MCNC: 9.5 MG/DL (ref 8.3–10.6)
CHLORIDE BLD-SCNC: 99 MMOL/L (ref 99–110)
CLARITY: CLEAR
CO2: 25 MMOL/L (ref 21–32)
COLOR: YELLOW
CREAT SERPL-MCNC: 0.9 MG/DL (ref 0.6–1.2)
EOSINOPHILS ABSOLUTE: 0 K/UL (ref 0–0.6)
EOSINOPHILS RELATIVE PERCENT: 1.1 %
GFR SERPL CREATININE-BSD FRML MDRD: >60 ML/MIN/{1.73_M2}
GLUCOSE BLD-MCNC: 149 MG/DL (ref 70–99)
GLUCOSE BLD-MCNC: 243 MG/DL (ref 70–99)
GLUCOSE URINE: >=1000 MG/DL
HCT VFR BLD CALC: 40.6 % (ref 36–48)
HEMOGLOBIN: 13.6 G/DL (ref 12–16)
KETONES, URINE: 40 MG/DL
LACTATE DEHYDROGENASE: 257 U/L (ref 100–190)
LACTIC ACID: 1.3 MMOL/L (ref 0.4–2)
LEUKOCYTE ESTERASE, URINE: NEGATIVE
LIPASE: 54 U/L (ref 13–60)
LYMPHOCYTES ABSOLUTE: 1.6 K/UL (ref 1–5.1)
LYMPHOCYTES RELATIVE PERCENT: 50.4 %
MAGNESIUM: 2.2 MG/DL (ref 1.8–2.4)
MCH RBC QN AUTO: 30 PG (ref 26–34)
MCHC RBC AUTO-ENTMCNC: 33.4 G/DL (ref 31–36)
MCV RBC AUTO: 89.7 FL (ref 80–100)
MICROSCOPIC EXAMINATION: ABNORMAL
MONOCYTES ABSOLUTE: 0.3 K/UL (ref 0–1.3)
MONOCYTES RELATIVE PERCENT: 10.7 %
NEUTROPHILS ABSOLUTE: 1.1 K/UL (ref 1.7–7.7)
NEUTROPHILS RELATIVE PERCENT: 37.2 %
NITRITE, URINE: NEGATIVE
PDW BLD-RTO: 16.4 % (ref 12.4–15.4)
PERFORMED ON: ABNORMAL
PH UA: 7.5 (ref 5–8)
PLATELET # BLD: 122 K/UL (ref 135–450)
PMV BLD AUTO: 8.9 FL (ref 5–10.5)
POTASSIUM SERPL-SCNC: 4.8 MMOL/L (ref 3.5–5.1)
PROTEIN UA: NEGATIVE MG/DL
RBC # BLD: 4.52 M/UL (ref 4–5.2)
SODIUM BLD-SCNC: 137 MMOL/L (ref 136–145)
SPECIFIC GRAVITY UA: 1.01 (ref 1–1.03)
TOTAL CK: 29 U/L (ref 26–192)
TOTAL PROTEIN: 6.8 G/DL (ref 6.4–8.2)
TROPONIN: <0.01 NG/ML
URINE REFLEX TO CULTURE: ABNORMAL
URINE TYPE: ABNORMAL
UROBILINOGEN, URINE: 0.2 E.U./DL
WBC # BLD: 3.1 K/UL (ref 4–11)

## 2023-02-22 PROCEDURE — 84484 ASSAY OF TROPONIN QUANT: CPT

## 2023-02-22 PROCEDURE — 83735 ASSAY OF MAGNESIUM: CPT

## 2023-02-22 PROCEDURE — 36415 COLL VENOUS BLD VENIPUNCTURE: CPT

## 2023-02-22 PROCEDURE — 99285 EMERGENCY DEPT VISIT HI MDM: CPT

## 2023-02-22 PROCEDURE — 6370000000 HC RX 637 (ALT 250 FOR IP): Performed by: NURSE PRACTITIONER

## 2023-02-22 PROCEDURE — 81003 URINALYSIS AUTO W/O SCOPE: CPT

## 2023-02-22 PROCEDURE — 96360 HYDRATION IV INFUSION INIT: CPT

## 2023-02-22 PROCEDURE — 71045 X-RAY EXAM CHEST 1 VIEW: CPT

## 2023-02-22 PROCEDURE — 1200000000 HC SEMI PRIVATE

## 2023-02-22 PROCEDURE — 93005 ELECTROCARDIOGRAM TRACING: CPT | Performed by: EMERGENCY MEDICINE

## 2023-02-22 PROCEDURE — 82550 ASSAY OF CK (CPK): CPT

## 2023-02-22 PROCEDURE — 2580000003 HC RX 258: Performed by: EMERGENCY MEDICINE

## 2023-02-22 PROCEDURE — 2580000003 HC RX 258: Performed by: NURSE PRACTITIONER

## 2023-02-22 PROCEDURE — 83690 ASSAY OF LIPASE: CPT

## 2023-02-22 PROCEDURE — 85025 COMPLETE CBC W/AUTO DIFF WBC: CPT

## 2023-02-22 PROCEDURE — 80053 COMPREHEN METABOLIC PANEL: CPT

## 2023-02-22 PROCEDURE — 86140 C-REACTIVE PROTEIN: CPT

## 2023-02-22 PROCEDURE — 83605 ASSAY OF LACTIC ACID: CPT

## 2023-02-22 PROCEDURE — 83615 LACTATE (LD) (LDH) ENZYME: CPT

## 2023-02-22 RX ORDER — 0.9 % SODIUM CHLORIDE 0.9 %
500 INTRAVENOUS SOLUTION INTRAVENOUS ONCE
Status: COMPLETED | OUTPATIENT
Start: 2023-02-22 | End: 2023-02-22

## 2023-02-22 RX ORDER — PROCHLORPERAZINE EDISYLATE 5 MG/ML
10 INJECTION INTRAMUSCULAR; INTRAVENOUS EVERY 6 HOURS PRN
Status: DISCONTINUED | OUTPATIENT
Start: 2023-02-22 | End: 2023-02-28 | Stop reason: HOSPADM

## 2023-02-22 RX ORDER — INSULIN LISPRO 100 [IU]/ML
0-8 INJECTION, SOLUTION INTRAVENOUS; SUBCUTANEOUS
Status: DISCONTINUED | OUTPATIENT
Start: 2023-02-23 | End: 2023-02-28 | Stop reason: HOSPADM

## 2023-02-22 RX ORDER — SODIUM CHLORIDE 0.9 % (FLUSH) 0.9 %
5-40 SYRINGE (ML) INJECTION PRN
Status: DISCONTINUED | OUTPATIENT
Start: 2023-02-22 | End: 2023-02-28 | Stop reason: HOSPADM

## 2023-02-22 RX ORDER — ASPIRIN 81 MG/1
81 TABLET, CHEWABLE ORAL DAILY
Status: DISCONTINUED | OUTPATIENT
Start: 2023-02-23 | End: 2023-02-28 | Stop reason: HOSPADM

## 2023-02-22 RX ORDER — DULOXETIN HYDROCHLORIDE 20 MG/1
20 CAPSULE, DELAYED RELEASE ORAL DAILY
Status: DISCONTINUED | OUTPATIENT
Start: 2023-02-23 | End: 2023-02-28 | Stop reason: HOSPADM

## 2023-02-22 RX ORDER — DEXTROSE MONOHYDRATE 100 MG/ML
INJECTION, SOLUTION INTRAVENOUS CONTINUOUS PRN
Status: DISCONTINUED | OUTPATIENT
Start: 2023-02-22 | End: 2023-02-28 | Stop reason: HOSPADM

## 2023-02-22 RX ORDER — LATANOPROST 50 UG/ML
1 SOLUTION/ DROPS OPHTHALMIC NIGHTLY
Status: DISCONTINUED | OUTPATIENT
Start: 2023-02-22 | End: 2023-02-28 | Stop reason: HOSPADM

## 2023-02-22 RX ORDER — INSULIN LISPRO 100 [IU]/ML
0-4 INJECTION, SOLUTION INTRAVENOUS; SUBCUTANEOUS NIGHTLY
Status: DISCONTINUED | OUTPATIENT
Start: 2023-02-22 | End: 2023-02-28 | Stop reason: HOSPADM

## 2023-02-22 RX ORDER — SODIUM CHLORIDE 9 MG/ML
INJECTION, SOLUTION INTRAVENOUS CONTINUOUS
Status: DISCONTINUED | OUTPATIENT
Start: 2023-02-22 | End: 2023-02-28 | Stop reason: HOSPADM

## 2023-02-22 RX ORDER — DIVALPROEX SODIUM 250 MG/1
500 TABLET, DELAYED RELEASE ORAL NIGHTLY
Status: DISCONTINUED | OUTPATIENT
Start: 2023-02-22 | End: 2023-02-28 | Stop reason: HOSPADM

## 2023-02-22 RX ORDER — INSULIN GLARGINE 100 [IU]/ML
40 INJECTION, SOLUTION SUBCUTANEOUS NIGHTLY
Status: DISCONTINUED | OUTPATIENT
Start: 2023-02-22 | End: 2023-02-28 | Stop reason: HOSPADM

## 2023-02-22 RX ORDER — AMLODIPINE BESYLATE 5 MG/1
5 TABLET ORAL DAILY
Status: DISCONTINUED | OUTPATIENT
Start: 2023-02-23 | End: 2023-02-28 | Stop reason: HOSPADM

## 2023-02-22 RX ORDER — TRAZODONE HYDROCHLORIDE 50 MG/1
50 TABLET ORAL NIGHTLY
Status: DISCONTINUED | OUTPATIENT
Start: 2023-02-22 | End: 2023-02-28 | Stop reason: HOSPADM

## 2023-02-22 RX ORDER — CHOLECALCIFEROL (VITAMIN D3) 125 MCG
250 CAPSULE ORAL DAILY
Status: DISCONTINUED | OUTPATIENT
Start: 2023-02-23 | End: 2023-02-28 | Stop reason: HOSPADM

## 2023-02-22 RX ORDER — POLYETHYLENE GLYCOL 3350 17 G/17G
17 POWDER, FOR SOLUTION ORAL DAILY PRN
Status: DISCONTINUED | OUTPATIENT
Start: 2023-02-22 | End: 2023-02-28 | Stop reason: HOSPADM

## 2023-02-22 RX ORDER — BUPROPION HYDROCHLORIDE 150 MG/1
150 TABLET ORAL EVERY MORNING
Status: DISCONTINUED | OUTPATIENT
Start: 2023-02-23 | End: 2023-02-28 | Stop reason: HOSPADM

## 2023-02-22 RX ORDER — CALCIUM CARBONATE-CHOLECALCIFEROL TAB 250 MG-125 UNIT 250-125 MG-UNIT
2 TAB ORAL DAILY
Status: DISCONTINUED | OUTPATIENT
Start: 2023-02-23 | End: 2023-02-28 | Stop reason: HOSPADM

## 2023-02-22 RX ORDER — GUAIFENESIN/DEXTROMETHORPHAN 100-10MG/5
5 SYRUP ORAL EVERY 4 HOURS PRN
Status: DISCONTINUED | OUTPATIENT
Start: 2023-02-22 | End: 2023-02-28

## 2023-02-22 RX ORDER — ACETAMINOPHEN 325 MG/1
650 TABLET ORAL EVERY 6 HOURS PRN
Status: DISCONTINUED | OUTPATIENT
Start: 2023-02-22 | End: 2023-02-28

## 2023-02-22 RX ORDER — SODIUM CHLORIDE 0.9 % (FLUSH) 0.9 %
5-40 SYRINGE (ML) INJECTION EVERY 12 HOURS SCHEDULED
Status: DISCONTINUED | OUTPATIENT
Start: 2023-02-22 | End: 2023-02-28 | Stop reason: HOSPADM

## 2023-02-22 RX ORDER — LEVOTHYROXINE SODIUM 0.1 MG/1
100 TABLET ORAL DAILY
Status: DISCONTINUED | OUTPATIENT
Start: 2023-02-23 | End: 2023-02-28 | Stop reason: HOSPADM

## 2023-02-22 RX ORDER — LISINOPRIL 10 MG/1
10 TABLET ORAL DAILY
Status: DISCONTINUED | OUTPATIENT
Start: 2023-02-23 | End: 2023-02-28 | Stop reason: HOSPADM

## 2023-02-22 RX ORDER — SODIUM CHLORIDE 9 MG/ML
INJECTION, SOLUTION INTRAVENOUS PRN
Status: DISCONTINUED | OUTPATIENT
Start: 2023-02-22 | End: 2023-02-28 | Stop reason: HOSPADM

## 2023-02-22 RX ORDER — ROSUVASTATIN CALCIUM 10 MG/1
40 TABLET, COATED ORAL NIGHTLY
Status: DISCONTINUED | OUTPATIENT
Start: 2023-02-22 | End: 2023-02-28 | Stop reason: HOSPADM

## 2023-02-22 RX ORDER — DONEPEZIL HYDROCHLORIDE 5 MG/1
10 TABLET, FILM COATED ORAL NIGHTLY
Status: DISCONTINUED | OUTPATIENT
Start: 2023-02-22 | End: 2023-02-28 | Stop reason: HOSPADM

## 2023-02-22 RX ORDER — ACETAMINOPHEN 650 MG/1
650 SUPPOSITORY RECTAL EVERY 6 HOURS PRN
Status: DISCONTINUED | OUTPATIENT
Start: 2023-02-22 | End: 2023-02-28

## 2023-02-22 RX ADMIN — LATANOPROST 1 DROP: 50 SOLUTION OPHTHALMIC at 22:31

## 2023-02-22 RX ADMIN — ROSUVASTATIN CALCIUM 40 MG: 10 TABLET, FILM COATED ORAL at 22:18

## 2023-02-22 RX ADMIN — SODIUM CHLORIDE, PRESERVATIVE FREE 10 ML: 5 INJECTION INTRAVENOUS at 22:35

## 2023-02-22 RX ADMIN — SODIUM CHLORIDE: 9 INJECTION, SOLUTION INTRAVENOUS at 22:31

## 2023-02-22 RX ADMIN — SODIUM CHLORIDE 500 ML: 9 INJECTION, SOLUTION INTRAVENOUS at 16:28

## 2023-02-22 RX ADMIN — APIXABAN 5 MG: 5 TABLET, FILM COATED ORAL at 22:18

## 2023-02-22 RX ADMIN — INSULIN GLARGINE 40 UNITS: 100 INJECTION, SOLUTION SUBCUTANEOUS at 22:11

## 2023-02-22 RX ADMIN — TRAZODONE HYDROCHLORIDE 50 MG: 50 TABLET ORAL at 22:19

## 2023-02-22 RX ADMIN — DIVALPROEX SODIUM 500 MG: 250 TABLET, DELAYED RELEASE ORAL at 22:18

## 2023-02-22 RX ADMIN — DONEPEZIL HYDROCHLORIDE 10 MG: 5 TABLET ORAL at 22:18

## 2023-02-22 RX ADMIN — METOPROLOL TARTRATE 25 MG: 25 TABLET, FILM COATED ORAL at 22:22

## 2023-02-22 ASSESSMENT — LIFESTYLE VARIABLES
HOW OFTEN DO YOU HAVE A DRINK CONTAINING ALCOHOL: NEVER
HOW MANY STANDARD DRINKS CONTAINING ALCOHOL DO YOU HAVE ON A TYPICAL DAY: PATIENT DOES NOT DRINK

## 2023-02-22 ASSESSMENT — PAIN - FUNCTIONAL ASSESSMENT: PAIN_FUNCTIONAL_ASSESSMENT: 0-10

## 2023-02-22 NOTE — ED NOTES
Attempted to walk patient, she is unsteady on her feet and began to stumble. Pt is placed back in bed.       Naman Kaur RN  02/22/23 [id number]

## 2023-02-22 NOTE — ED PROVIDER NOTES
EMERGENCY DEPARTMENT ENCOUNTER        Pt Name: Diann Bansal  MRN: [id number]  [place] 1945  Date of evaluation: 2/22/2023  Provider: Sheron Brandon MD  PCP: Jamey Hancock DO      CHIEF COMPLAINT       Chief Complaint   Patient presents with    Fatigue     Patient recently tested positive for covid. Reports weakness, too weak to walk to the door to get food or give herself insulin       HISTORY OFPRESENT ILLNESS   (Location/Symptom, Timing/Onset, Context/Setting, Quality, Duration, Modifying Factors,Severity)  Note limiting factors. Diann Bansal is a 78 y.o. female presenting today due to concern for worsening generalized weakness over the past few days although stating that this has been increasing over the past few weeks to months to the point where she can barely get to her door to get her food or get her insulin. She lives independently at the [facility]. She was actually seen in the emergency department a few days ago and diagnosed with COVID at that point. She denies any significant chest pain or shortness of breath although she does have a cough. No reported vomiting or diarrhea. She denies any falls or trauma. She reports eating so weak to the point where she is unable to eat much since she cannot get to the door to get her food and there is also issues with history of gastroparesis to the point where she can only eat certain things and therefore she came back to the emergency department for further help. She feels like she needs some form of rehabilitation to gain her strength back so she can get around at her home. No reported fever. She denies any significant headache. REVIEW OF SYSTEMS    (2-9 systems for level 4, 10 or more for level 5)     Review of Systems      Positives and Pertinent negatives as per HPI.       PASTMEDICAL HISTORY     Past Medical History:   Diagnosis Date    Colon polyps     Diabetes mellitus (HCC)     Diabetic neuropathy [facility])     Diabetic retinopathy ([facility] [address]     Essential hypertension 10/28/2019    Fall     Fatty liver     Gastritis     GERD (gastroesophageal reflux disease)     Hyperlipidemia     Hypertension     Hypothyroidism     Irritable bowel syndrome     Major depressive disorder with single episode 10/28/2019    Osteopenia     Photosensitivity disorder     chronic    RBBB     Sleep apnea     on BIPAP    Thyroid disease     Thyroid nodule     neg bx-chronic thyroiditis    Type 2 diabetes mellitus with diabetic polyneuropathy, with long-term current use of insulin ([facility] [address] 2019    Vitamin D deficiency          SURGICAL HISTORY       Past Surgical History:   Procedure Laterality Date    APPENDECTOMY      CARPAL TUNNEL RELEASE Bilateral     CATARACT REMOVAL Bilateral      SECTION      CHOLECYSTECTOMY      COLONOSCOPY N/A 2022    COLONOSCOPY POLYPECTOMY SNARE/COLD BIOPSY performed by Rosalinda Heart MD at [facility] ([address]) [place]      Dr. Morteza Pantoja ENDOSCOPY N/A 2022    EGD BIOPSY performed by Rosalinda Heart MD at [place] [address]       Current Discharge Medication List        CONTINUE these medications which have NOT CHANGED    Details   insulin lispro, 1 Unit Dial, (HUMALOG KWIKPEN) 100 UNIT/ML SOPN Use on a sliding scale 3 times a day with meals. Maximum dose 30 units per day. Qty: 15 Adjustable Dose Pre-filled Pen Syringe, Refills: 0    Associated Diagnoses: Type 2 diabetes mellitus with hyperglycemia, with long-term current use of insulin ([facility])      HM ASPIRIN 81 MG chewable tablet TAKE ONE (1) TABLET BY MOUTH DAILY  Qty: 90 tablet, Refills: 1      traZODone (DESYREL) 50 MG tablet Take 1 tablet by mouth nightly  Qty: 30 tablet, Refills: 1    Associated Diagnoses: Insomnia, unspecified type      !!  Continuous Blood Gluc Sensor (FREESTYLE ANT 2 SENSOR) [facility] 1 Device by Does not apply route every 14 days  Qty: 6 each, Refills: 4    Associated Diagnoses: Type 2 diabetes mellitus with hyperglycemia, with long-term current use of insulin ([facility])      Continuous Blood Gluc  (FREESTYLE ANT 2 READER) GILMER 1 each by Does not apply route daily  Qty: 1 each, Refills: 1    Associated Diagnoses: Type 2 diabetes mellitus with hyperglycemia, with long-term current use of insulin ([facility])      buPROPion (WELLBUTRIN XL) 150 MG extended release tablet Take 1 tablet by mouth every morning  Qty: 90 tablet, Refills: 3    Associated Diagnoses: Major depressive disorder, recurrent, moderate ([facility])      donepezil (ARICEPT) 10 MG tablet Take 1 tablet by mouth nightly  Qty: 90 tablet, Refills: 1      DULoxetine (CYMBALTA) 20 MG extended release capsule Take 1 capsule by mouth daily  Qty: 90 capsule, Refills: 1    Associated Diagnoses: Major depressive disorder with single episode, remission status unspecified      glipiZIDE (GLUCOTROL) 10 MG tablet TAKE 1 TABLET BY MOUTH EVERY DAY  Qty: 90 tablet, Refills: 1    Associated Diagnoses: Uncontrolled type 2 diabetes mellitus with hyperglycemia ([facility])      linaclotide (LINZESS) 145 MCG capsule Take 1 capsule by mouth every morning (before breakfast)  Qty: 90 capsule, Refills: 1    Associated Diagnoses: Irritable bowel syndrome with both constipation and diarrhea      ELIQUIS 5 MG TABS tablet TAKE 1 TABLET BY MOUTH TWICE DAILY  Qty: 180 tablet, Refills: 1      rosuvastatin (CRESTOR) 40 MG tablet TAKE 1 TABLET BY MOUTH EVERY DAY  Qty: 90 tablet, Refills: 1    Associated Diagnoses: Mixed hyperlipidemia      lisinopril (PRINIVIL;ZESTRIL) 10 MG tablet TAKE 1 TABLET BY MOUTH EVERY DAY  Qty: 90 tablet, Refills: 1    Associated Diagnoses: Hypertension, unspecified type      diclofenac sodium (VOLTAREN) 1 % GEL Apply 4 g topically 4 times daily  Qty: 100 g, Refills: 1    Associated Diagnoses: Left hip pain      metoprolol tartrate (LOPRESSOR) 25 MG tablet TAKE 1 TABLET TWICE A DAY  Qty: 180 tablet, Refills: 1    Associated Diagnoses: Essential hypertension      divalproex (DEPAKOTE) 250 MG DR tablet Take 2 tablets by mouth nightly  Qty: 180 tablet, Refills: 1    Associated Diagnoses: Major depressive disorder with single episode, remission status unspecified      Calcium Carb-Cholecalciferol (CALCIUM 1000 + D) 1000-800 MG-UNIT TABS Take 1,000 mg by mouth daily  Qty: 90 tablet, Refills: 1    Associated Diagnoses: Calcium deficiency disease      LANTUS SOLOSTAR 100 UNIT/ML injection pen Inject 40 Units into the skin nightly  Qty: 15 Adjustable Dose Pre-filled Pen Syringe, Refills: 2    Associated Diagnoses: Type 2 diabetes mellitus with diabetic polyneuropathy, with long-term current use of insulin ([facility])      JARDIANCE 10 MG tablet TAKE 1 TABLET BY MOUTH DAILY  Qty: 90 tablet, Refills: 1    Associated Diagnoses: Uncontrolled type 2 diabetes mellitus with hyperglycemia ([facility] [address] Type 2 diabetes mellitus with diabetic polyneuropathy, with long-term current use of insulin ([facility])      levothyroxine (SYNTHROID) 100 MCG tablet Take 1 tablet daily  Qty: 90 tablet, Refills: 1    Associated Diagnoses: Hypothyroidism, unspecified type      famotidine (PEPCID) 20 MG tablet Take 1 tablet by mouth 2 times daily  Qty: 180 tablet, Refills: 1    Comments: **Patient requests 90 days supply**  Associated Diagnoses: Gastroesophageal reflux disease, unspecified whether esophagitis present      amLODIPine (NORVASC) 5 MG tablet TAKE 1 TABLET DAILY  Qty: 90 tablet, Refills: 1    Associated Diagnoses: Essential hypertension      polyethylene glycol (GLYCOLAX) 17 GM/SCOOP powder 1 powder in packet DAILY (route: oral)      Handicap Placard MISC by Does not apply route 08/09/22  Qty: 1 each, Refills: 0    Associated Diagnoses: Cerebral infarction, unspecified mechanism ([address]      ! !  Continuous Blood Gluc Sensor (FREESTYLE ANT SENSOR SYSTEM) MISC 1 box by Does not apply route 2 times daily  Qty: 1 each, Refills: 0    Associated Diagnoses: Type 2 diabetes mellitus without complication, with long-term current use of insulin (HCC)      BD PEN NEEDLE MICRO U/F 32G X 6 MM MISC USE WITH LANTUS DAILY  Qty: 100 each, Refills: 5      METAMUCIL FIBER PO Take by mouth Indications: as needed MiraLax instead      ondansetron (ZOFRAN) 4 MG tablet Take 1 tablet by mouth 3 times daily as needed for Nausea or Vomiting  Qty: 30 tablet, Refills: 0      Cyanocobalamin (B-12) 50 MCG TABS Take by mouth       Multiple Vitamins-Minerals (VITEYES COMPLETE PO) Take by mouth      latanoprost (XALATAN) 0.005 % ophthalmic solution 1 drop nightly       !! - Potential duplicate medications found. Please discuss with provider. ALLERGIES     Seasonal    FAMILY HISTORY       Family History   Problem Relation Age of Onset    Diabetes Paternal Grandmother     Diabetes Father     Lung Cancer Mother     Pancreatic Cancer Brother     Diabetes Brother           SOCIAL HISTORY       Social History     Socioeconomic History    Marital status:      Spouse name: None    Number of children: None    Years of education: None    Highest education level: None   Tobacco Use    Smoking status: Never    Smokeless tobacco: Never   Vaping Use    Vaping Use: Never used   Substance and Sexual Activity    Alcohol use: Never    Drug use: Never     Social Determinants of Health     Financial Resource Strain: Low Risk     Difficulty of Paying Living Expenses: Not hard at all   Food Insecurity: No Food Insecurity    Worried About Running Out of Food in the Last Year: Never true    [address] in the Last Year: Never true   Transportation Needs: No Transportation Needs    Lack of Transportation (Medical): No    Lack of Transportation (Non-Medical):  No   Physical Activity: Insufficiently Active    Days of Exercise per Week: 2 days    Minutes of Exercise per Session: 20 min   Housing Stability: Low Risk     Unable to Pay for Housing in the Last Year: No    Number of Places Lived in the Last Year: 2    Unstable Housing in the Last Year: No       SCREENINGS    [place] Coma Scale  Eye Opening: Spontaneous  Best Verbal Response: Oriented  Best Motor Response: Obeys commands  [place] Coma Scale Score: 15           PHYSICAL EXAM    (up to 7 for level 4, 8 or more for level 5)     ED Triage Vitals   BP Temp Temp Source Heart Rate Resp SpO2 Height Weight   02/22/23 1345 02/22/23 1345 02/22/23 1345 02/22/23 1345 02/22/23 1345 02/22/23 1345 02/22/23 1344 02/22/23 1344   (!) 142/54 97.3 °F (36.3 °C) Oral 73 20 100 % 5' 3\" (1.6 m) 115 lb (52.2 kg)       Physical Exam  Vitals and nursing note reviewed. Constitutional:       General: She is awake. She is not in acute distress. Appearance: Normal appearance. She is well-developed, well-groomed and normal weight. She is not ill-appearing, toxic-appearing or diaphoretic. HENT:      Head: Normocephalic and atraumatic. Right Ear: External ear normal.      Left Ear: External ear normal.      Mouth/Throat:      Mouth: Mucous membranes are dry. Eyes:      General:         Right eye: No discharge. Left eye: No discharge. Pupils: Pupils are equal, round, and reactive to light. Cardiovascular:      Rate and Rhythm: Normal rate and regular rhythm. Pulses: Normal pulses. Pulmonary:      Effort: Pulmonary effort is normal. No respiratory distress. Breath sounds: No stridor. Rhonchi present. No wheezing or rales. Abdominal:      General: Abdomen is flat. Bowel sounds are normal. There is no distension. Palpations: Abdomen is soft. Tenderness: There is no abdominal tenderness. There is no guarding or rebound. Musculoskeletal:         General: No deformity or signs of injury. Cervical back: Normal range of motion and neck supple. No rigidity or tenderness. Skin:     General: Skin is warm and dry. Coloration: Skin is not jaundiced.    Neurological:      General: No focal deficit present. Mental Status: She is alert and oriented to person, place, and time. Mental status is at baseline. Psychiatric:         Attention and Perception: Attention normal. She is attentive. Mood and Affect: Mood is anxious and depressed. Affect is tearful. Speech: Speech normal.         Behavior: Behavior normal. Behavior is cooperative.            DIAGNOSTIC RESULTS   :    Labs Reviewed   URINALYSIS WITH REFLEX TO CULTURE - Abnormal; Notable for the following components:       Result Value    Glucose, Ur >=1000 (*)     Ketones, Urine 40 (*)     All other components within normal limits   CBC WITH AUTO DIFFERENTIAL - Abnormal; Notable for the following components:    WBC 3.1 (*)     RDW 16.4 (*)     Platelets 868 (*)     Neutrophils Absolute 1.1 (*)     All other components within normal limits   COMPREHENSIVE METABOLIC PANEL - Abnormal; Notable for the following components:    Glucose 243 (*)     BUN 34 (*)     ALT 43 (*)     AST 42 (*)     All other components within normal limits   LACTATE DEHYDROGENASE - Abnormal; Notable for the following components:     (*)     All other components within normal limits   BASIC METABOLIC PANEL W/ REFLEX TO MG FOR LOW K - Abnormal; Notable for the following components:    Glucose 117 (*)     BUN 25 (*)     All other components within normal limits   CBC WITH AUTO DIFFERENTIAL - Abnormal; Notable for the following components:    RDW 16.4 (*)     Platelets 697 (*)     Neutrophils Absolute 0.7 (*)     All other components within normal limits    Narrative:     Jenny Esquivel tel. [phone],  Hematology results called to and read back by Cordell Tovar RN, 02/23/2023  08:15, by [facility]   POCT GLUCOSE - Abnormal; Notable for the following components:    POC Glucose 149 (*)     All other components within normal limits   POCT GLUCOSE - Abnormal; Notable for the following components:    POC Glucose 182 (*)     All other components within normal limits POCT GLUCOSE - Abnormal; Notable for the following components:    POC Glucose 137 (*)     All other components within normal limits   POCT GLUCOSE - Abnormal; Notable for the following components:    POC Glucose 117 (*)     All other components within normal limits   POCT GLUCOSE - Abnormal; Notable for the following components:    POC Glucose 258 (*)     All other components within normal limits   C DIFF TOXIN/ANTIGEN   GASTROINTESTINAL PANEL, MOLECULAR   LACTIC ACID   LIPASE   MAGNESIUM   TROPONIN   CK   C-REACTIVE PROTEIN   BLOOD SMEAR REVIEW   VITAMIN D 25 HYDROXY   SOLUBLE TRANSFERRIN RECEPTOR   VITAMIN B12 & FOLATE   FERRITIN   IRON AND TIBC   HEMOGLOBIN A1C   FECAL FAT, QUALITATIVE   PANCREATIC ELASTASE, FECAL   CALPROTECTIN STOOL   POCT GLUCOSE   POCT GLUCOSE   POCT GLUCOSE   POCT GLUCOSE       All other labs were within normal range or not returned asof this dictation. EKG: All EKG's are interpreted by the Emergency Department Physician who either signs or Co-signs this chart in the absence of a cardiologist.    The Ekg interpreted by me shows  normal sinus rhythm with a rate of 72  Axis is   Normal  QTc is  within an acceptable range  Intervals and Durations are unremarkable. ST Segments: no acute change and nonspecific changes  No significant change from prior EKG dated - 2/18/23  No STEMI, RBBB present today similar to old EKG, no signs of Brugada syndrome at this time         RADIOLOGY:   Non-plain film images such as CT, Ultrasound and MRI are read by the radiologist. Ryan Notice images are visualized and preliminarily interpreted by the  ED Provider with the belowfindings:        Interpretation per the Radiologist below, if available at the time of this note:    XR CHEST PORTABLE   Final Result   No radiographic evidence of an acute cardiopulmonary process.                PROCEDURES   Unless otherwise noted below, none     Procedures    CRITICAL CARE TIME   N/A    CONSULTS: Paged hospitalist for admission. IP CONSULT TO CASE MANAGEMENT  IP CONSULT TO HOSPITALIST  IP CONSULT TO CASE MANAGEMENT  IP CONSULT TO SPIRITUAL SERVICES  IP CONSULT TO DIETITIAN  IP CONSULT TO HEM/ONC  IP CONSULT TO GI  IP CONSULT TO PHYSICAL MEDICINE [facility]    EMERGENCY DEPARTMENT COURSE and DIFFERENTIAL DIAGNOSIS/MDM:   Vitals:    Vitals:    02/22/23 2115 02/22/23 2222 02/23/23 0830 02/23/23 0932   BP: (!) 153/70 (!) 154/72 (!) 151/68 (!) 153/64   Pulse: 70 73 64 67   Resp: 18  18    Temp: 99.2 °F (37.3 °C)  97.7 °F (36.5 °C)    TempSrc: Oral  Oral    SpO2: 99%  100% 98%   Weight: 119 lb 7.8 oz (54.2 kg)      Height: 5' 3\" (1.6 m)          Patient was given the following medications:  Medications   amLODIPine (NORVASC) tablet 5 mg (5 mg Oral Given 2/23/23 0851)   buPROPion (WELLBUTRIN XL) extended release tablet 150 mg (150 mg Oral Given 2/23/23 0851)   calcium carb-cholecalciferol 250-3. 125 MG-MCG per tab 2 tablet (2 tablets Oral Given 2/23/23 0851)   vitamin B-12 (CYANOCOBALAMIN) tablet 250 mcg (250 mcg Oral Given 2/23/23 0851)   divalproex (DEPAKOTE) DR tablet 500 mg (500 mg Oral Given 2/22/23 2218)   donepezil (ARICEPT) tablet 10 mg (10 mg Oral Given 2/22/23 2218)   DULoxetine (CYMBALTA) extended release capsule 20 mg (20 mg Oral Given 2/23/23 0851)   apixaban (ELIQUIS) tablet 5 mg (5 mg Oral Given 2/23/23 0852)   aspirin chewable tablet 81 mg (81 mg Oral Given 2/23/23 0852)   insulin glargine (LANTUS) injection vial 40 Units (40 Units SubCUTAneous Given 2/22/23 2211)   latanoprost (XALATAN) 0.005 % ophthalmic solution 1 drop (1 drop Both Eyes Given 2/22/23 2231)   levothyroxine (SYNTHROID) tablet 100 mcg (100 mcg Oral Given 2/23/23 0614)   lisinopril (PRINIVIL;ZESTRIL) tablet 10 mg (10 mg Oral Given 2/23/23 0851)   metoprolol tartrate (LOPRESSOR) tablet 25 mg (25 mg Oral Given 2/23/23 0889)   rosuvastatin (CRESTOR) tablet 40 mg (40 mg Oral Given 2/22/23 2218)   traZODone (DESYREL) tablet 50 mg (50 mg Oral Given 2/22/23 2219)   glucose chewable tablet 16 g (has no administration in time range)   dextrose bolus 10% 125 mL (has no administration in time range)     Or   dextrose bolus 10% 250 mL (has no administration in time range)   glucagon (rDNA) injection 1 mg (has no administration in time range)   dextrose 10 % infusion (has no administration in time range)   sodium chloride flush 0.9 % injection 5-40 mL (10 mLs IntraVENous Given 2/23/23 0853)   sodium chloride flush 0.9 % injection 5-40 mL (has no administration in time range)   0.9 % sodium chloride infusion (has no administration in time range)   polyethylene glycol (GLYCOLAX) packet 17 g (has no administration in time range)   acetaminophen (TYLENOL) tablet 650 mg (650 mg Oral Given 2/23/23 0850)     Or   acetaminophen (TYLENOL) suppository 650 mg ( Rectal See Alternative 2/23/23 0850)   0.9 % sodium chloride infusion ( IntraVENous Rate/Dose Verify 2/23/23 0612)   prochlorperazine (COMPAZINE) injection 10 mg (has no administration in time range)   insulin lispro (HUMALOG) injection vial 0-8 Units (4 Units SubCUTAneous Given 2/23/23 1213)   insulin lispro (HUMALOG) injection vial 0-4 Units (0 Units SubCUTAneous Not Given 2/22/23 2147)   guaiFENesin-dextromethorphan (ROBITUSSIN DM) 100-10 MG/5ML syrup 5 mL (has no administration in time range)   0.9 % sodium chloride bolus (0 mLs IntraVENous Stopped 2/22/23 1747)     Patient was evaluated due to testing positive recently for COVID but complaining of increasing generalized fatigue and weakness along with having a cough. She denied any chest pain or shortness of breath and at this time I do not suspect acute coronary syndrome or pulmonary embolism. Troponin was negative. Urinalysis was concerning for dehydration but no sign of infection. Her glucose was moderately elevated although no concern for DKA at this time. She did appear to be dehydrated on exam and did receive some IV fluids.   She also was asking for something to eat and drink and I felt this was reasonable as well. After IV fluids, the nurse did attempt to walk the patient but she was very unsteady and unable to have any good gait and therefore at this point she is unsafe to go back to her independent living facility based on what she told me and would benefit from further assessment in the hospital with PT/OT evaluation and possible SNF placement. She was in no acute distress upon repeat evaluation and agreed with this plan. Vitals were stable and no need for oxygen at this time. I was the primary provider for the patient. The patient tolerated their visit well. The patient and / or the family were informed of the results of any tests, a time was given to answer questions. FINAL IMPRESSION      1. COVID-19    2. Lives independently    3. Trouble walking    4. Dehydration    5. Hyperglycemia          DISPOSITION/PLAN   DISPOSITION Admitted 02/22/2023 07:35:57 PM      PATIENT REFERRED TO:  No follow-up provider specified.     DISCHARGEMEDICATIONS:  Current Discharge Medication List          DISCONTINUED MEDICATIONS:  Current Discharge Medication List                 (Please note that portions of this note were completed with a voicerecognition program.  Efforts were made to edit the dictations but occasionally words are mis-transcribed.)    Fatmata Orta MD (electronically signed)            Fatmata Orta MD  02/23/23 0768

## 2023-02-22 NOTE — ED NOTES
Patient is given lunch tray, sugar free jello and diet drink.  Will continue to monitor     Korin Reyes RN  02/22/23 7732

## 2023-02-22 NOTE — TELEPHONE ENCOUNTER
Called and spoke with patient's son regarding concerns with his mother's recent care. Explained recent phone calls over the last few days with offered patient visits and virtual evaluations. Discussed role of dispatch health. Also discussed rapid deterioration of mothers health over the last few days at [facility] and we will plan to have her return home with son. Is currently being evaluated in the emergency department. Patient's son was friendly throughout the call and expresses understanding of communications and we will await emergency room evaluation to determine current needs.

## 2023-02-22 NOTE — PROGRESS NOTES
Physical Therapy  Cancellation/No-show Note  Patient Name:  Noman Cade  :  1945   Date:  2023  Cancels to date: 1  No-shows to date: 1    For today's appointment patient:  [] Cancelled  [] Rescheduled appointment  [x] No-show     Reason given by patient:  [] Patient ill  [] Conflicting appointment  [] No transportation    [] Conflict with work  [] No reason given  [] Other:     Comments:      Electronically signed by:  Kylah Williamson PT

## 2023-02-23 LAB
ANION GAP SERPL CALCULATED.3IONS-SCNC: 10 MMOL/L (ref 3–16)
ATYPICAL LYMPHOCYTE RELATIVE PERCENT: 1 % (ref 0–6)
BASOPHILS ABSOLUTE: 0 K/UL (ref 0–0.2)
BASOPHILS RELATIVE PERCENT: 0 %
BUN BLDV-MCNC: 25 MG/DL (ref 7–20)
CALCIUM SERPL-MCNC: 8.3 MG/DL (ref 8.3–10.6)
CHLORIDE BLD-SCNC: 107 MMOL/L (ref 99–110)
CO2: 23 MMOL/L (ref 21–32)
CREAT SERPL-MCNC: 0.6 MG/DL (ref 0.6–1.2)
EKG ATRIAL RATE: 72 BPM
EKG DIAGNOSIS: NORMAL
EKG P AXIS: 56 DEGREES
EKG P-R INTERVAL: 144 MS
EKG Q-T INTERVAL: 424 MS
EKG QRS DURATION: 112 MS
EKG QTC CALCULATION (BAZETT): 464 MS
EKG R AXIS: -9 DEGREES
EKG T AXIS: 9 DEGREES
EKG VENTRICULAR RATE: 72 BPM
EOSINOPHILS ABSOLUTE: 0.1 K/UL (ref 0–0.6)
EOSINOPHILS RELATIVE PERCENT: 2 %
FERRITIN: 247.4 NG/ML (ref 15–150)
FOLATE: 15.15 NG/ML (ref 4.78–24.2)
GFR SERPL CREATININE-BSD FRML MDRD: >60 ML/MIN/{1.73_M2}
GLUCOSE BLD-MCNC: 117 MG/DL (ref 70–99)
GLUCOSE BLD-MCNC: 117 MG/DL (ref 70–99)
GLUCOSE BLD-MCNC: 137 MG/DL (ref 70–99)
GLUCOSE BLD-MCNC: 181 MG/DL (ref 70–99)
GLUCOSE BLD-MCNC: 182 MG/DL (ref 70–99)
GLUCOSE BLD-MCNC: 226 MG/DL (ref 70–99)
GLUCOSE BLD-MCNC: 258 MG/DL (ref 70–99)
HCT VFR BLD CALC: 38.7 % (ref 36–48)
HEMATOLOGY PATH CONSULT: NORMAL
HEMATOLOGY PATH CONSULT: YES
HEMOGLOBIN: 12.7 G/DL (ref 12–16)
IRON SATURATION: 36 % (ref 15–50)
IRON: 100 UG/DL (ref 37–145)
LYMPHOCYTES ABSOLUTE: 3.4 K/UL (ref 1–5.1)
LYMPHOCYTES RELATIVE PERCENT: 78 %
MCH RBC QN AUTO: 29.7 PG (ref 26–34)
MCHC RBC AUTO-ENTMCNC: 32.7 G/DL (ref 31–36)
MCV RBC AUTO: 90.9 FL (ref 80–100)
MONOCYTES ABSOLUTE: 0.1 K/UL (ref 0–1.3)
MONOCYTES RELATIVE PERCENT: 2 %
NEUTROPHILS ABSOLUTE: 0.7 K/UL (ref 1.7–7.7)
NEUTROPHILS RELATIVE PERCENT: 17 %
PDW BLD-RTO: 16.4 % (ref 12.4–15.4)
PERFORMED ON: ABNORMAL
PLATELET # BLD: 104 K/UL (ref 135–450)
PLATELET SLIDE REVIEW: ABNORMAL
PMV BLD AUTO: 9.8 FL (ref 5–10.5)
POTASSIUM REFLEX MAGNESIUM: 4 MMOL/L (ref 3.5–5.1)
RBC # BLD: 4.26 M/UL (ref 4–5.2)
SLIDE REVIEW: ABNORMAL
SODIUM BLD-SCNC: 140 MMOL/L (ref 136–145)
TOTAL IRON BINDING CAPACITY: 274 UG/DL (ref 260–445)
VITAMIN B-12: >2000 PG/ML (ref 211–911)
VITAMIN D 25-HYDROXY: 23.2 NG/ML
WBC # BLD: 4.3 K/UL (ref 4–11)

## 2023-02-23 PROCEDURE — 1200000000 HC SEMI PRIVATE

## 2023-02-23 PROCEDURE — 83550 IRON BINDING TEST: CPT

## 2023-02-23 PROCEDURE — 83540 ASSAY OF IRON: CPT

## 2023-02-23 PROCEDURE — 97162 PT EVAL MOD COMPLEX 30 MIN: CPT

## 2023-02-23 PROCEDURE — 82607 VITAMIN B-12: CPT

## 2023-02-23 PROCEDURE — 6370000000 HC RX 637 (ALT 250 FOR IP): Performed by: NURSE PRACTITIONER

## 2023-02-23 PROCEDURE — 82728 ASSAY OF FERRITIN: CPT

## 2023-02-23 PROCEDURE — 97530 THERAPEUTIC ACTIVITIES: CPT

## 2023-02-23 PROCEDURE — 82306 VITAMIN D 25 HYDROXY: CPT

## 2023-02-23 PROCEDURE — 84238 ASSAY NONENDOCRINE RECEPTOR: CPT

## 2023-02-23 PROCEDURE — 2580000003 HC RX 258: Performed by: NURSE PRACTITIONER

## 2023-02-23 PROCEDURE — 80048 BASIC METABOLIC PNL TOTAL CA: CPT

## 2023-02-23 PROCEDURE — 82746 ASSAY OF FOLIC ACID SERUM: CPT

## 2023-02-23 PROCEDURE — 83036 HEMOGLOBIN GLYCOSYLATED A1C: CPT

## 2023-02-23 PROCEDURE — 36415 COLL VENOUS BLD VENIPUNCTURE: CPT

## 2023-02-23 PROCEDURE — 93010 ELECTROCARDIOGRAM REPORT: CPT | Performed by: INTERNAL MEDICINE

## 2023-02-23 PROCEDURE — 85025 COMPLETE CBC W/AUTO DIFF WBC: CPT

## 2023-02-23 RX ADMIN — BUPROPION HYDROCHLORIDE 150 MG: 150 TABLET, EXTENDED RELEASE ORAL at 08:51

## 2023-02-23 RX ADMIN — ASPIRIN 81 MG 81 MG: 81 TABLET ORAL at 08:52

## 2023-02-23 RX ADMIN — Medication 2 TABLET: at 08:51

## 2023-02-23 RX ADMIN — AMLODIPINE BESYLATE 5 MG: 5 TABLET ORAL at 08:51

## 2023-02-23 RX ADMIN — ACETAMINOPHEN 650 MG: 325 TABLET ORAL at 08:50

## 2023-02-23 RX ADMIN — LISINOPRIL 10 MG: 10 TABLET ORAL at 08:51

## 2023-02-23 RX ADMIN — INSULIN LISPRO 4 UNITS: 100 INJECTION, SOLUTION INTRAVENOUS; SUBCUTANEOUS at 12:13

## 2023-02-23 RX ADMIN — TRAZODONE HYDROCHLORIDE 50 MG: 50 TABLET ORAL at 20:30

## 2023-02-23 RX ADMIN — DONEPEZIL HYDROCHLORIDE 10 MG: 5 TABLET ORAL at 20:30

## 2023-02-23 RX ADMIN — ROSUVASTATIN CALCIUM 40 MG: 10 TABLET, FILM COATED ORAL at 20:29

## 2023-02-23 RX ADMIN — LATANOPROST 1 DROP: 50 SOLUTION OPHTHALMIC at 20:30

## 2023-02-23 RX ADMIN — APIXABAN 5 MG: 5 TABLET, FILM COATED ORAL at 20:28

## 2023-02-23 RX ADMIN — METOPROLOL TARTRATE 25 MG: 25 TABLET, FILM COATED ORAL at 08:52

## 2023-02-23 RX ADMIN — DULOXETINE HYDROCHLORIDE 20 MG: 20 CAPSULE, DELAYED RELEASE ORAL at 08:51

## 2023-02-23 RX ADMIN — LEVOTHYROXINE SODIUM 100 MCG: 100 TABLET ORAL at 06:14

## 2023-02-23 RX ADMIN — APIXABAN 5 MG: 5 TABLET, FILM COATED ORAL at 08:52

## 2023-02-23 RX ADMIN — INSULIN GLARGINE 40 UNITS: 100 INJECTION, SOLUTION SUBCUTANEOUS at 21:50

## 2023-02-23 RX ADMIN — DIVALPROEX SODIUM 500 MG: 250 TABLET, DELAYED RELEASE ORAL at 20:29

## 2023-02-23 RX ADMIN — SODIUM CHLORIDE, PRESERVATIVE FREE 10 ML: 5 INJECTION INTRAVENOUS at 08:53

## 2023-02-23 RX ADMIN — CYANOCOBALAMIN TAB 500 MCG 250 MCG: 500 TAB at 08:51

## 2023-02-23 RX ADMIN — METOPROLOL TARTRATE 25 MG: 25 TABLET, FILM COATED ORAL at 20:30

## 2023-02-23 RX ADMIN — GUAIFENESIN AND DEXTROMETHORPHAN 5 ML: 100; 10 SYRUP ORAL at 22:58

## 2023-02-23 ASSESSMENT — PAIN SCALES - GENERAL
PAINLEVEL_OUTOF10: 0
PAINLEVEL_OUTOF10: 2

## 2023-02-23 ASSESSMENT — PAIN DESCRIPTION - DESCRIPTORS: DESCRIPTORS: ACHING

## 2023-02-23 ASSESSMENT — PAIN DESCRIPTION - LOCATION: LOCATION: HEAD

## 2023-02-23 NOTE — PROGRESS NOTES
Hospitalist Progress Note      PCP: Armando Bojorquez DO    Date of Admission: 2/22/2023    Chief Complaint:   Cough    Hospital Course: Jerri Jose is a 68-year-old female significant past medical history of hypertension, hyperlipidemia, hypothyroidism, MDD, type 2 diabetes, gastroparesis, and remote history of CVA with mild chronic left-sided weakness who presents to [facility] emergency department via EMS with complaint of ongoing weakness ever since she became a resident at [facility] ([address] [place] [address]). She states that she moved in there sometime in December 2022, states that she feels a do not understand the special diet that diabetic with gastroparesis requires, states she has been having little oral intake ever since she moved there and feels she is getting weaker by the day. Of note, she tested positive for COVID on 2/18/2023, denies this being a trigger weakness, she endorses any respiratory complaint, cough, fever, or chest congestion. She is adamant that the problem that she is endorsing tonight has been ongoing and predates the COVID diagnosis. She also states that ever since she was diagnosed with [facility], [address] [place] [address] staff have not entered her room, leaves her food tray on a chair outside her door, and is upset by this because due to her weakness is very difficult for her to walk outside to get the food tray and walk back in. She notes previous CVA with left-sided weakness but is ambulatory with rollator walker. She denies any falls, new onset dizziness, palpitations. Her evaluation here included laboratory studies and chest x-ray. Chest x-ray tonight showed no acute cardiopulmonary process; last chest x-ray was 2/18/2023 and is unchanged. Pertinent lab values tonight include mostly unremarkable chemistry panel aside from a elevated BUN at 34 which was last known to be normal at 16 on 2/18/2023. Magnesium was 2.2, initial lactic acid 1.3, and blood sugars 243.   LFT showed mild elevation of ALT at 43 and AST at 42; were both normal on 2/18. Total CK was 29, CRP 3.8, , and troponin less than 0.01. Cell count showed WBC of 3.1 which is down from 3.8 on 2/18, hemoglobin 13.6, hematocrit 40.6, and platelet count 106. Urinalysis tonight showed clear yellow urine with greater than 1000 urinary glucose, 40 ketones, with no blood, protein, nitrites, or leukocyte esterase; microscopy was not indicated. Urinalysis unchanged from 2/18 as well. Patient received 5 cc bolus in the emergency department with no other interventions. Vital sign trend in the ED was reviewed, not requiring oxygen, no tachycardia, no sustained tachypnea, and no fever was present. Patient was attempted to ambulate in the ED, was unsteady on her feet. ED staff felt it was best to admit this patient for generalized weakness likely from deconditioning. Hospital team consulted to admit. Subjective:   Patient states she still having some weakness. She is also having a cough. She denies any increased shortness of breath. No chest pain. Patient states she would like to be retested for COVID prior to discharge.       Medications:  Reviewed    Infusion Medications    dextrose      sodium chloride      sodium chloride 50 mL/hr at 02/23/23 5089     Scheduled Medications    amLODIPine  5 mg Oral Daily    buPROPion  150 mg Oral QAM    calcium carb-cholecalciferol  2 tablet Oral Daily    vitamin B-12  250 mcg Oral Daily    divalproex  500 mg Oral Nightly    donepezil  10 mg Oral Nightly    DULoxetine  20 mg Oral Daily    apixaban  5 mg Oral BID    aspirin  81 mg Oral Daily    insulin glargine  40 Units SubCUTAneous Nightly    latanoprost  1 drop Both Eyes Nightly    levothyroxine  100 mcg Oral Daily    lisinopril  10 mg Oral Daily    metoprolol tartrate  25 mg Oral BID    rosuvastatin  40 mg Oral Nightly    traZODone  50 mg Oral Nightly    sodium chloride flush  5-40 mL IntraVENous 2 times per day    insulin lispro 0-8 Units SubCUTAneous TID WC    insulin lispro  0-4 Units SubCUTAneous Nightly     PRN Meds: glucose, dextrose bolus **OR** dextrose bolus, glucagon (rDNA), dextrose, sodium chloride flush, sodium chloride, polyethylene glycol, acetaminophen **OR** acetaminophen, prochlorperazine, guaiFENesin-dextromethorphan      Intake/Output Summary (Last 24 hours) at 2/23/2023 1852  Last data filed at 2/23/2023 0612  Gross per 24 hour   Intake 354.09 ml   Output --   Net 354.09 ml       Physical Exam Performed:    BP (!) 153/64   Pulse 67   Temp 97.7 °F (36.5 °C) (Oral)   Resp 18   Ht 5' 3\" (1.6 m)   Wt 119 lb 7.8 oz (54.2 kg)   SpO2 98%   BMI 21.17 kg/m²     General appearance: No apparent distress, appears stated age and cooperative. HEENT: Pupils equal, round, and reactive to light. Conjunctivae/corneas clear. Neck: Supple, with full range of motion. No jugular venous distention. Trachea midline. Respiratory:  Normal respiratory effort. Clear to auscultation, bilaterally without Rales/Wheezes/Rhonchi. Cardiovascular: Regular rate and rhythm with normal S1/S2 without murmurs, rubs or gallops. Abdomen: Soft, non-tender, non-distended with normal bowel sounds. Musculoskeletal: No clubbing, cyanosis or edema bilaterally. Full range of motion without deformity. Skin: Skin color, texture, turgor normal.  No rashes or lesions. Neurologic:  Neurovascularly intact without any focal sensory/motor deficits.  Cranial nerves: II-XII intact, grossly non-focal.  Psychiatric: Alert and oriented, thought content appropriate, normal insight  Capillary Refill: Brisk, 3 seconds, normal   Peripheral Pulses: +2 palpable, equal bilaterally       Labs:   Recent Labs     02/22/23  1347 02/23/23  0529   WBC 3.1* 4.3   HGB 13.6 12.7   HCT 40.6 38.7   * 104*     Recent Labs     02/22/23  1347 02/23/23  0529    140   K 4.8 4.0   CL 99 107   CO2 25 23   BUN 34* 25*   CREATININE 0.9 0.6   CALCIUM 9.5 8.3     Recent Labs 02/22/23  1347   AST 42*   ALT 43*   BILITOT 0.6   ALKPHOS 61     No results for input(s): INR in the last 72 hours. Recent Labs     02/22/23  [address]   TROPONINI <0.01       Urinalysis:      Lab Results   Component Value Date/Time    NITRU Negative 02/22/2023 05:47 PM    BLOODU Negative 02/22/2023 05:47 PM    SPECGRAV 1.010 02/22/2023 05:47 PM    GLUCOSEU >=1000 02/22/2023 05:47 PM       Radiology:  XR CHEST PORTABLE   Final Result   No radiographic evidence of an acute cardiopulmonary process. IP CONSULT TO CASE MANAGEMENT  IP CONSULT TO HOSPITALIST  IP CONSULT TO CASE MANAGEMENT  IP CONSULT TO SPIRITUAL SERVICES  IP CONSULT TO DIETITIAN  IP CONSULT TO HEM/ONC  IP CONSULT TO GI  IP CONSULT TO PHYSICAL MEDICINE REHAB    Assessment/Plan:    Active Hospital Problems    Diagnosis     Generalized weakness [R53.1]      Priority: Medium    Physical deconditioning [R53.81]      Priority: Medium    COVID-19 virus infection [U07.1]      Priority: Medium    Type 2 diabetes mellitus [E11.9]     Hypertension [I10]      1. Generalized weakness with deconditioning. Patient states she has had progressive weakness which has worsened since she had COVID. She states her COVID diagnosis has been over the last couple weeks. Patient is agreeable to therapy. 2.  COVID-19 infection. Patient with mild respiratory symptoms. She does have generalized weakness. Patient should be in isolation until 2/28. May consider repeating COVID test prior to discharge. 3.  Type 2 diabetes hyperglycemia. Patient on basal bolus correction. We will continue to monitor closely. Monitor p.o. intake. Last A1c was 6.8% in November. 4.  Hypertension. Continue patient on current hypertensive medicines including amlodipine lisinopril metoprolol. Blood pressure is labile. We will continue monitor.     Patient with 1 or more chronic illnesses with severe exacerbation or side effects of treatment and/or 1 acute or chronic illness that poses threat to life or bodily function. Decision regarding hospitalization or escalation  Patient on drug therapy that requires intensive monitoring. DVT Prophylaxis: Apixaban  Diet: ADULT DIET; Regular; 4 carb choices (60 gm/meal); Low Fat/Low Chol/High Fiber/DIANA  ADULT ORAL NUTRITION SUPPLEMENT; Breakfast, Lunch; Diabetic Oral Supplement  Code Status: Full Code  PT/OT Eval Status:  Following    Dispo -evaluate for inpatient rehab versus SNF    Appropriate for A1 Discharge Unit: Vielka Beard President, MD

## 2023-02-23 NOTE — CONSULTS
Consult placed    Who:angela  Date:2/23/2023,  Time:6:20 PM        Electronically signed by Aristides Borrego on 2/23/2023 at 6:20 PM

## 2023-02-23 NOTE — PROGRESS NOTES
This patient's Neutraphil Absolute resulted at 0.7K/ul. Thank you. Above message sent to Dr. Alana Fountain via perfect serve.

## 2023-02-23 NOTE — ACP (ADVANCE CARE PLANNING)
Advance Care Planning     General Advance Care Planning (ACP) Conversation    Date of Conversation: 2/22/2023  Conducted with: Patient with Decision Making Capacity    Healthcare Decision Maker:    Primary Decision Maker: Vj Perez Child - [phone]    Secondary Decision Maker: Sai Wynn - Daughter-in-Law - [phone]  Click here to complete Healthcare Decision Makers including selection of the Healthcare Decision Maker Relationship (ie \"Primary\"). Today we documented Decision Maker(s) consistent with Legal Next of Kin hierarchy. Content/Action Overview:   Has ACP document(s) NOT on file - requested patient to provide  Reviewed DNR/DNI and patient elects Full Code (Attempt Resuscitation)      Length of Voluntary ACP Conversation in minutes:  <16 minutes (Non-Billable)    KELSIE Wallace

## 2023-02-23 NOTE — PROGRESS NOTES
Consult Call Back    Who:Dr. Alicia Valladares   Date:2/23/2023,  Time:9:26 AM    Electronically signed by Milo Jean on 2/23/23 at 9:26 AM EST

## 2023-02-23 NOTE — PROGRESS NOTES
4 Eyes Skin Assessment     The patient is being assess for  Admission    I agree that 2 RN's have performed a thorough Head to Toe Skin Assessment on the patient. ALL assessment sites listed below have been assessed. Areas assessed by both nurses: Rose Reyna RN ;   [x]   Head, Face, and Ears   [x]   Shoulders, Back, and Chest  [x]   Arms, Elbows, and Hands   [x]   Coccyx, Sacrum, and Ischum  [x]   Legs, Feet, and Heels        Does the Patient have Skin Breakdown?   No         Liang Prevention initiated:  No   Wound Care Orders initiated:  No      [facility] nurse consulted for Pressure Injury (Stage 3,4, Unstageable, DTI, NWPT, and Complex wounds):  No      Nurse 1 eSignature: Electronically signed by Concepcion Maharaj RN on 2/22/23 at 10:16 PM EST    **SHARE this note so that the co-signing nurse is able to place an eSignature**    Nurse 2 eSignature: Electronically signed by Nelida Lowe RN on 2/22/23 at 10:23 PM EST

## 2023-02-23 NOTE — PROGRESS NOTES
Patient received from ED via stretcher. VSS, afebrile. Room air. Isolation precautions. Alert and oriented x4. Denies nausea. Denies pain. Purewick in place. IS at bedside; encouraged use. Encouraged turns. Shift 4 eyes complete. Up with assist x1, walker, gaitbelt. Bed alarm in place, bed in lowest position, call light and belongings within reach, non skid socks in place.

## 2023-02-23 NOTE — CONSULTS
[facility]    Consult Note      Requesting Physician:  Aruna Davenport    CHIEF COMPLAINT:  Neutropenia       HISTORY OF PRESENT ILLNESS:    Catracho Saavedra is a 78year old female with PMHX of HTN, HLD, hypothyroidism, MDD, T2DM, gastroparesis, and remote hx of CVA with mild chronic left sided weakness who presented to [facility] via EMS with ongoing complaint of weakness since moving to [facility] in December 2022. Has had poor PO intake since moving there due to concerns about her requiring a special diet with having gastroparesis. Tested positive for COVID on 02/18/2023. Hem/onc consulted for neutropenia. She denies family history of cancer or blood disorders. Endorses weight loss of about 50 lbs over the last few months since moving to [address] [place] [address]. States the food is poor and she has minimal choices especially with T2DM diagnosis and gastroparesis. Ms. Marlee Lux  is a 78 y.o. female we are seeing in consultation for     ICD-10-CM    1. COVID-19  U07.1       2. Lives independently  Z78.9       3. Trouble walking  R26.2       4. Dehydration  E86.0       5.  Hyperglycemia  R73.9              Past Medical History:  Past Medical History:   Diagnosis Date    Colon polyps     Diabetes mellitus ([address]     Diabetic neuropathy ([address]     Diabetic retinopathy ([address]     Essential hypertension 10/28/2019    Fall     Fatty liver     Gastritis     GERD (gastroesophageal reflux disease)     Hyperlipidemia     Hypertension     Hypothyroidism     Irritable bowel syndrome     Major depressive disorder with single episode 10/28/2019    Osteopenia     Photosensitivity disorder     chronic    RBBB     Sleep apnea     on BIPAP    Thyroid disease     Thyroid nodule     neg bx-chronic thyroiditis    Type 2 diabetes mellitus with diabetic polyneuropathy, with long-term current use of insulin ([address] 2019    Vitamin D deficiency        Past Surgical History:  Past Surgical History:   Procedure Laterality Date    APPENDECTOMY      CARPAL TUNNEL RELEASE Bilateral     CATARACT REMOVAL Bilateral      SECTION      CHOLECYSTECTOMY      COLONOSCOPY N/A 2022    COLONOSCOPY POLYPECTOMY SNARE/COLD BIOPSY performed by Jaleel Rodriguez MD at [facility] (CERVIX STATUS UNKNOWN)      Felicity Zheng ENDOSCOPY N/A 2022    EGD BIOPSY performed by Jaleel Rodriguez MD at [facility] ENDOSCOPY       Current Medications:  Current Facility-Administered Medications   Medication Dose Route Frequency Provider Last Rate Last Admin    amLODIPine (NORVASC) tablet 5 mg  5 mg Oral Daily Mechele Muse, APRN - CNP   5 mg at 23 0851    buPROPion (WELLBUTRIN XL) extended release tablet 150 mg  150 mg Oral QAM Mechele Muse, APRN - CNP   150 mg at 23 0851    calcium carb-cholecalciferol 250-3. 125 MG-MCG per tab 2 tablet  2 tablet Oral Daily Mechele Muse, APRN - CNP   2 tablet at 23 4021    vitamin B-12 (CYANOCOBALAMIN) tablet 250 mcg  250 mcg Oral Daily Mechele Muse, APRN - CNP   250 mcg at 23 0851    divalproex (DEPAKOTE) DR tablet 500 mg  500 mg Oral Nightly Mechele Muse, APRN - CNP   500 mg at 23 2218    donepezil (ARICEPT) tablet 10 mg  10 mg Oral Nightly Mechele Muse, APRN - CNP   10 mg at 23 2218    DULoxetine (CYMBALTA) extended release capsule 20 mg  20 mg Oral Daily Mechele Muse, APRN - CNP   20 mg at 23 0851    apixaban (ELIQUIS) tablet 5 mg  5 mg Oral BID Mechele Muse, APRN - CNP   5 mg at 23 3846    aspirin chewable tablet 81 mg  81 mg Oral Daily Mechele Muse, APRN - CNP   81 mg at 23 0852    insulin glargine (LANTUS) injection vial 40 Units  40 Units SubCUTAneous Nightly Mechele Muse, APRN - CNP   40 Units at 23 2211 latanoprost (XALATAN) 0.005 % ophthalmic solution 1 drop  1 drop Both Eyes Nightly Whit Saravia, APRN - CNP   1 drop at 02/22/23 2231    levothyroxine (SYNTHROID) tablet 100 mcg  100 mcg Oral Daily Whit Saravia, APRN - CNP   100 mcg at 02/23/23 9938    lisinopril (PRINIVIL;ZESTRIL) tablet 10 mg  10 mg Oral Daily Whit Saravia, APRN - CNP   10 mg at 02/23/23 0851    metoprolol tartrate (LOPRESSOR) tablet 25 mg  25 mg Oral BID Whit Saravia, APRN - CNP   25 mg at 02/23/23 9548    rosuvastatin (CRESTOR) tablet 40 mg  40 mg Oral Nightly Whit Saravia, APRN - CNP   40 mg at 02/22/23 2218    traZODone (DESYREL) tablet 50 mg  50 mg Oral Nightly Whit Saravia, APRN - CNP   50 mg at 02/22/23 2219    glucose chewable tablet 16 g  4 tablet Oral PRN Whit Saravia, APRN - CNP        dextrose bolus 10% 125 mL  125 mL IntraVENous PRN Whit Saravia, APRN - CNP        Or    dextrose bolus 10% 250 mL  250 mL IntraVENous PRN Whit Saravia, APRN - CNP        glucagon (rDNA) injection 1 mg  1 mg SubCUTAneous PRN Whit Saravia, APRN - CNP        dextrose 10 % infusion   IntraVENous Continuous PRN Whit Saravia, APRN - CNP        sodium chloride flush 0.9 % injection 5-40 mL  5-40 mL IntraVENous 2 times per day Whit Saravia, APRN - CNP   10 mL at 02/23/23 0853    sodium chloride flush 0.9 % injection 5-40 mL  5-40 mL IntraVENous PRN Whit Saravia, APRN - CNP        0.9 % sodium chloride infusion   IntraVENous PRN Whit Saravia, APRN - CNP        polyethylene glycol (GLYCOLAX) packet 17 g  17 g Oral Daily PRN Whit Saravia, APRN - CNP        acetaminophen (TYLENOL) tablet 650 mg  650 mg Oral Q6H PRN Whit Saravia, APRN - CNP   650 mg at 02/23/23 9053    Or    acetaminophen (TYLENOL) suppository 650 mg  650 mg Rectal Q6H PRN Whit Saravia, APRN - CNP        0.9 % sodium chloride infusion   IntraVENous Continuous Whit Saravia, APRN - CNP 50 mL/hr at 02/23/23 0612 Rate Verify at 02/23/23 0589 prochlorperazine (COMPAZINE) injection 10 mg  10 mg IntraVENous Q6H PRN Harshal Medal, APRN - CNP        insulin lispro (HUMALOG) injection vial 0-8 Units  0-8 Units SubCUTAneous TID GIA Acosta SHIREEN Troncoso, APRN - CNP        insulin lispro (HUMALOG) injection vial 0-4 Units  0-4 Units SubCUTAneous Nightly Harshal Medal, APRN - CNP        guaiFENesin-dextromethorphan (ROBITUSSIN DM) 100-10 MG/5ML syrup 5 mL  5 mL Oral Q4H PRN Harshal Medal, APRN - CNP           Allergies: Allergies   Allergen Reactions    Seasonal        Social History:  Social History     Socioeconomic History    Marital status:      Spouse name: Not on file    Number of children: Not on file    Years of education: Not on file    Highest education level: Not on file   Occupational History    Not on file   Tobacco Use    Smoking status: Never    Smokeless tobacco: Never   Vaping Use    Vaping Use: Never used   Substance and Sexual Activity    Alcohol use: Never    Drug use: Never    Sexual activity: Not on file   Other Topics Concern    Not on file   Social History Narrative    Not on file     Social Determinants of Health     Financial Resource Strain: Low Risk     Difficulty of Paying Living Expenses: Not hard at all   Food Insecurity: No Food Insecurity    Worried About Running Out of Food in the Last Year: Never true    [address] in the Last Year: Never true   Transportation Needs: No Transportation Needs    Lack of Transportation (Medical): No    Lack of Transportation (Non-Medical):  No   Physical Activity: Insufficiently Active    Days of Exercise per Week: 2 days    Minutes of Exercise per Session: 20 min   Stress: Not on file   Social Connections: Not on file   Intimate Partner Violence: Not on file   Housing Stability: Low Risk     Unable to Pay for Housing in the Last Year: No    Number of Places Lived in the Last Year: 2    Unstable Housing in the Last Year: No          Family History:  Family History   Problem Relation Age of Onset    Diabetes Paternal Grandmother     Diabetes Father     Lung Cancer Mother     Pancreatic Cancer Brother     Diabetes Brother        REVIEW OF SYSTEMS:      Constitutional: Weight loss, fatigue, weakness. Denies fever, sweats  Eyes: No visual changes or diplopia. No scleral icterus. Ent: No headaches, hearing loss or vertigo. No mouth sores or sore throat. Cardiovascular: No chest pain, dyspnea on exertion, palpitations or loss of consciousness. Respiratory: No cough or wheezing, no sputum production. No hemoptysis. Gastrointestinal: Diarrhea. No abdominal pain, appetite loss, blood in stools. No change in bowel habits. Genitourinary: No dysuria, trouble voiding, or hematuria. Musculoskeletal: No generalized weakness. No joint complaints. Integumentary: No rash or pruritus. Neurological: No headache, diplopia. No change in gait, balance, or coordination. No paresthesias. Endocrine: No temperature intolerance. No excessive thirst, fluid intake, urination. Hematologic/lymphatic: No abnormal bruising or ecchymosis, blood clots or swollen lymph nodes. Allergic/immunologic: No nasal congestion or hives.     PHYSICAL EXAM:      Vitals:  Patient Vitals for the past 24 hrs:   BP Temp Temp src Pulse Resp SpO2 Height Weight   02/23/23 0932 (!) 153/64 -- -- 67 -- 98 % -- --   02/23/23 0830 (!) 151/68 97.7 °F (36.5 °C) Oral 64 18 100 % -- --   02/22/23 2222 (!) 154/72 -- -- 73 -- -- -- --   02/22/23 2115 (!) 153/70 99.2 °F (37.3 °C) Oral 70 18 99 % 5' 3\" (1.6 m) 119 lb 7.8 oz (54.2 kg)   02/22/23 2045 (!) 159/65 -- -- 69 14 96 % -- --   02/22/23 2030 -- -- -- 69 19 95 % -- --   02/22/23 2015 (!) 141/61 -- -- 65 22 96 % -- --   02/22/23 1945 136/64 -- -- 71 10 96 % -- --   02/22/23 1930 -- -- -- 72 18 95 % -- --   02/22/23 1915 (!) 150/55 -- -- 73 19 96 % -- --   02/22/23 1900 -- -- -- 74 14 95 % -- --   02/22/23 1845 (!) 143/56 -- -- 73 15 97 % -- --   02/22/23 1815 (!) 143/54 -- -- 72 14 99 % -- --   02/22/23 1800 -- -- -- 66 14 95 % -- --   02/22/23 1745 (!) 128/53 -- -- 84 30 99 % -- --   02/22/23 1740 -- -- -- -- 23 100 % -- --   02/22/23 1715 (!) 139/59 -- -- 71 16 100 % -- --   02/22/23 1615 (!) 140/55 -- -- 67 14 97 % -- --   02/22/23 1500 -- -- -- 63 14 100 % -- --   02/22/23 1445 (!) 127/56 -- -- 73 19 98 % -- --   02/22/23 1345 (!) 142/54 97.3 °F (36.3 °C) Oral 73 20 100 % -- --   02/22/23 1344 -- -- -- -- -- -- 5' 3\" (1.6 m) 115 lb (52.2 kg)       Date 02/23/23 0000 - 02/23/23 2359   Shift 2657-3645 2901-3142 3504-7664 24 Hour Total   INTAKE   I.V.(mL/kg/hr) 354.1(0.8)   354.1   Shift Total(mL/kg) 354.1(6.5)   354.1(6.5)   OUTPUT   Shift Total(mL/kg)       Weight (kg) 54.2 54.2 54.2 54.2       CONSTITUTIONAL: awake, alert, cooperative, no apparent distress. Patient appears pale.   EYES: pupils equal, round and reactive to light, sclera clear and conjunctiva normal  ENT: Normocephalic, without obvious abnormality, atraumatic  NECK: supple, symmetrical, no jugular venous distension   HEMATOLOGIC/LYMPHATIC: no cervical, supraclavicular or axillary lymphadenopathy   LUNGS: no increased work of breathing and clear to auscultation   CARDIOVASCULAR: regular rate and rhythm, normal S1 and S2, no murmur noted  ABDOMEN: normal bowel sounds x 4, soft, non-distended, non-tender, no masses palpated, no hepatosplenomegaly   MUSCULOSKELETAL: full range of motion noted, tone is normal  NEUROLOGIC: awake, alert, oriented to name, place and time. Motor skills grossly intact.   SKIN: Normal skin color, texture, turgor and no jaundice. appears intact   EXTREMITIES: no LE edema     DATA:    PT/INR:    Recent Labs     02/22/23  1347 02/18/23  1426   PROT 6.8 6.6     PTT:  No results for input(s): APTT in the last 720 hours.    CMP:    Recent Labs     02/23/23  0529 02/22/23  1347    137   K 4.0 4.8    99   CO2 23 25   BUN 25* 34*   PROT  --  6.8     Mg:    Recent Labs     02/22/23  1347   MG 2.20       Lab  Results   Component Value Date    CALCIUM 8.3 2023       CBC:    Recent Labs     23  0529 23  1347 23  1426   WBC 4.3 3.1* 3.8*   NEUTROABS 0.7* 1.1* 1.8   LYMPHOPCT 78.0 50.4 22.9   RBC 4.26 4.52 4.31   HGB 12.7 13.6 12.7   HCT 38.7 40.6 38.7   MCV 90.9 89.7 89.8   MCH 29.7 30.0 29.6   MCHC 32.7 33.4 32.9   RDW 16.4* 16.4* 17.1*   * 122* 125*        LDH:  Recent Labs     23  1347   *         Radiology Review:  XR CHEST PORTABLE  Narrative: EXAMINATION:  ONE XRAY VIEW OF THE CHEST    2023 12:03 pm    COMPARISON:  2023. HISTORY:  ORDERING SYSTEM PROVIDED HISTORY: weakness  TECHNOLOGIST PROVIDED HISTORY:  Reason for exam:->weakness  Reason for Exam: weakness    FINDINGS:  The lungs and costophrenic angles are clear. The cardiomediastinal silhouette  and pulmonary vessels appear normal.  Impression: No radiographic evidence of an acute cardiopulmonary process.       Problem List  Patient Active Problem List   Diagnosis    Type 2 diabetes mellitus with hyperglycemia, with long-term current use of insulin ([facility]    Hypothyroidism    Hypertension    Hyperlipidemia    Type 2 diabetes mellitus    Cerebral infarction, unspecified (HCC)    Gastroparesis    CATA treated with BiPAP    Irritable bowel syndrome with both constipation and diarrhea    Major depressive disorder, recurrent, moderate (HCC)    Pelvic pressure in female    Generalized weakness    Physical deconditioning    COVID-19 virus infection       IMPRESSION/RECOMMENDATIONS:    Neutropenia  Thrombocytopenia   - possible isolated neutropenia etiologies: viral infection vs bacterial infection vs nutritional def vs myelodysplasia  - no obvious medications on chart review causing neutropenia   - CBC trendin2023   WBC      8.1                3.8               3.1                4.3   ANC       5.8                1.8               1.1                0.7   PLTs 162              125               122               104  - HGB/HCT/MCV remain within normal limits. Eosinophiles/lymphocytes unremarkable. - most likely that this isolated neutropenia and thrombocytopenia is due to combination of current COVID viral infection and nutritional deficiency based on onset and labs prior to admission being unremarkable  - would expect it to improve within 1-2 weeks from viral infection  - will continue to monitor CBC  - ordered B12/Folate and iron studies to check for nutritional deficiency based on poor intake      COVID  Weakness  - No respiratory symptoms; on room air   - plan for isolation until 02/28 backdated to diagnosis on 02/18  - poor PO intake at independent living    T2DM  - per IM  - A1C 11/2022 was 6.8%    Thank you for asking me to see the patient.      FATOU Flower, PA  Please Contact Through [employer]

## 2023-02-23 NOTE — PLAN OF CARE
Problem: Pain  Goal: Verbalizes/displays adequate comfort level or baseline comfort level  2/23/2023 1143 by Rosana Tipton RN  Flowsheets (Taken 2/23/2023 1143)  Verbalizes/displays adequate comfort level or baseline comfort level:   Encourage patient to monitor pain and request assistance   Assess pain using appropriate pain scale   Administer analgesics based on type and severity of pain and evaluate response  2/23/2023 0140 by Mirta Carnes RN  Outcome: Progressing  Flowsheets (Taken 2/23/2023 0140)  Verbalizes/displays adequate comfort level or baseline comfort level: Encourage patient to monitor pain and request assistance     Problem: Safety - Adult  Goal: Free from fall injury  2/23/2023 1143 by Rosana Tipton RN  Flowsheets (Taken 2/23/2023 1143)  Free From Fall Injury:   Instruct family/caregiver on patient safety   Based on caregiver fall risk screen, instruct family/caregiver to ask for assistance with transferring infant if caregiver noted to have fall risk factors  2/23/2023 0140 by Mirta Carnes RN  Outcome: Progressing  Flowsheets (Taken 2/23/2023 0140)  Free From Fall Injury: Instruct family/caregiver on patient safety     Problem: ABCDS Injury Assessment  Goal: Absence of physical injury  2/23/2023 0140 by Mirta Carnes RN  Outcome: Progressing  Flowsheets (Taken 2/23/2023 0140)  Absence of Physical Injury: Implement safety measures based on patient assessment

## 2023-02-23 NOTE — PROGRESS NOTES
Physical Therapy  Facility/Department: [facility] C3 TELE/MED SURG/ONC  Physical Therapy Initial Assessment    Name: Lucy Bartholomew  : 1945  MRN: [id number]  Date of Service: 2023    Discharge Recommendations:  IP Rehab, Patient able to tolerate 3hrs of therapy a day   PT Equipment Recommendations  Equipment Needed: No  Other: defer      Patient Diagnosis(es): The primary encounter diagnosis was COVID-19. Diagnoses of Lives independently, Trouble walking, Dehydration, and Hyperglycemia were also pertinent to this visit. Past Medical History:  has a past medical history of Colon polyps, Diabetes mellitus ([address] Diabetic neuropathy ([address] Diabetic retinopathy ([address] Essential hypertension, Fall, Fatty liver, Gastritis, GERD (gastroesophageal reflux disease), Hyperlipidemia, Hypertension, Hypothyroidism, Irritable bowel syndrome, Major depressive disorder with single episode, Osteopenia, Photosensitivity disorder, RBBB, Sleep apnea, Thyroid disease, Thyroid nodule, Type 2 diabetes mellitus with diabetic polyneuropathy, with long-term current use of insulin ([address] and Vitamin D deficiency. Past Surgical History:  has a past surgical history that includes Tonsillectomy; Appendectomy; Gynecologic cryosurgery; Hysterectomy; Cholecystectomy; Cataract removal (Bilateral); Carpal tunnel release (Bilateral); sinus surgery; Upper gastrointestinal endoscopy (N/A, 2022); Colonoscopy (N/A, 2022); and  section. Assessment   Body Structures, Functions, Activity Limitations Requiring Skilled Therapeutic Intervention: Decreased functional mobility ; Decreased strength;Decreased endurance;Decreased balance  Assessment: pt is 77 yo female admit to [facility] 2/2 generalized weakness s/p COVID +; PMH: h/o CVA with residual L weak, HTN, DM2; [place] lives [place] at [facility], prior to decline in health a few months ago pt was ambulating without AD with brace on L LE, or with RW without brace; pt presents requiring CGA to stand and to ambulate short distance of 3-5ft with RW, pt does not have brace in hospital at this time, pt will benefit from Acute Inpatient Skilled PT to address functional mobility deficits, PT recommends IPR at [place] as pt was Ind prior to admit and would tolerate the 3hrs of therapy required in IPR if diarrhea resolves  Treatment Diagnosis: decreased functional mobility  Therapy Prognosis: Good  Decision Making: Medium Complexity  Barriers to Learning: none  Requires PT Follow-Up: Yes  Activity Tolerance  Activity Tolerance: Patient tolerated evaluation without incident;Treatment limited secondary to medical complications  Activity Tolerance Comments: limited by bouts of diarrhea     Plan   Physcial Therapy Plan  General Plan: 3-5 times per week  Current Treatment Recommendations: Strengthening, ROM, Balance training, Functional mobility training, Transfer training, Endurance training, Gait training, Neuromuscular re-education, Home exercise program, Safety education & training, Patient/Caregiver education & training, Equipment evaluation, education, & procurement, Modalities, Positioning, Therapeutic activities  Safety Devices  Type of Devices:  All fall risk precautions in place, Bed alarm in place, Call light within reach, Gait belt, Patient at risk for falls, Left in bed, Nurse notified     Restrictions  Restrictions/Precautions  Restrictions/Precautions: Fall Risk, General Precautions, Contact Precautions  Position Activity Restriction  Other position/activity restrictions: COVID+, up with assist, for ambulation pt uses L custom brace that is not in hospital currently     Subjective   Pain: none at start of session  General  Chart Reviewed: Yes  Patient assessed for rehabilitation services?: Yes  Additional Pertinent Hx: HTN, DM2, gastroparesis, L weakness 2/2 h/o CVA  Response To Previous Treatment: Not applicable  Family / Caregiver Present: No  Referring Practitioner: Shane Huston  Referral Date Gurdeep Bonilla  02/22/23  Diagnosis: dehydration, gen weak  Follows Commands: Within Functional Limits  General Comment  Comments: RN cleared pt for PT  Subjective  Subjective: pt motivated to participate, limited by diarrhea         Social/Functional History  Social/Functional History  Lives With: Alone  Type of Home: Facility ([address] [place] [address])  Home Layout: One level (laundry on hallway, borrows rolling laundry basket)  Home Access: Level entry, Elevator  Bathroom Shower/Tub: Walk-in shower, Shower chair with back  Bathroom Toilet: Standard  Bathroom Equipment: Grab bars in shower, Shower chair, Hand-held shower  Bathroom Accessibility: Walker accessible  Home Equipment: Walker, rolling, Electric scooter (L LE custom 2/2 CVA with L foot drop)  Has the patient had two or more falls in the past year or any fall with injury in the past year?: Yes (couple months after rehab after stroke with back injury)  Receives Help From: Family  ADL Assistance: Independent (sink bath)  Homemaking Assistance: Independent (meals brought to room 2/2 covid diagnosis, normally goes to dining room with scooter)  Ambulation Assistance: Independent (RW if not using brace, able to ambulate approx 50-75ft with brace and no AD)  Transfer Assistance: Independent  Active : No  Patient's  Info: awaiting  test  Mode of Transportation: Car  Occupation: Retired  Vision/Hearing  Vision  Vision: Impaired  Vision Exceptions: Wears glasses for reading  Hearing  Hearing: Within functional limits    Cognition   Orientation  Orientation Level: Oriented to place;Oriented to time;Oriented to person;Oriented to situation  Cognition  Overall Cognitive Status: WFL  Cognition Comment: tearful at times 2/2 diarrhea     Objective   Heart Rate: 67  Heart Rate Source: Monitor  BP: (!) 153/64  BP Location: Right upper arm  BP Method: Automatic  Patient Position: Semi fowlers  MAP (Calculated): 94  Resp: 18  SpO2: 98 %  O2 Device: None (Room air) Observation/Palpation  Posture: Fair  Observation: bruising R lower scapula, L SI area  Gross Assessment  AROM: Within functional limits  PROM: Within functional limits  Strength: Generally decreased, functional  Coordination: Generally decreased, functional (L drop foot)  Tone: Abnormal (L foot drop)  Sensation: Impaired                    Bed mobility  Bridging: Supervision  Rolling to Left: Supervision (bed rail)  Rolling to Right: Supervision (bed rail)  Supine to Sit: Contact guard assistance  Sit to Supine: Stand by assistance  Scooting: Supervision (using B LE in bridging and B UE on upper rails)  Transfers  Sit to Stand: Contact guard assistance  Stand to Sit: Contact guard assistance  Bed to Chair: Contact guard assistance  Ambulation  Surface: Level tile  Device: Rolling Walker  Other Apparatus:  (pt usually uses an AFO which is not in hospital)  Assistance: Contact guard assistance  Quality of Gait: L LE ataxia and buckling  Gait Deviations:  (small, shuffling steps bed to and from [facility])  Distance: 3 x 2 (approx 5 steps each distance)  Comments: cervical flexion in standing  More Ambulation?: No (limited by diarrhea bouts)  Stairs/Curb  Stairs?: No     Balance  Posture: Fair  Sitting - Static: Good  Sitting - Dynamic: Good  Standing - Static: Fair  Standing - Dynamic: Fair;-;Poor;+  Exercise Treatment: supine B LE ankle DF/PF, hip flex, bridging, glute sets  x 10 reps        AM-PAC Score  AM-PAC Inpatient Mobility Raw Score : 15 (02/23/23 1115)  AM-PAC Inpatient T-Scale Score : 39.45 (02/23/23 1115)  Mobility Inpatient CMS 0-100% Score: 57.7 (02/23/23 1115)  Mobility Inpatient CMS G-Code Modifier : CK (02/23/23 1115)            Goals  Short Term Goals  Time Frame for Short Term Goals: one week 3/2 unless otherwise indicated  Short Term Goal 1: pt will transfer supine to and from sit with IND by 2/28/23  Short Term Goal 2: pt will transfer sit to and from stand with mod ind  Short Term Goal 3: pt will ambulate 30ft with RW with sup  Short Term Goal 4: pt will demonstrate and tolerate 10 reps of 3 B LE therex  Patient Goals   Patient Goals : unable to formulate, tearful with discussion       Education  Patient Education  Education Given To: Patient  Education Provided: Role of Therapy;Plan of Care;Home Exercise Program;Transfer Training;Equipment; Fall Prevention Strategies  Education Method: Demonstration;Verbal  Barriers to Learning: None  Education Outcome: Verbalized understanding;Demonstrated understanding      Therapy Time   Individual Concurrent Group Co-treatment   Time In 0915         Time Out 1010         Minutes 55         Timed Code Treatment Minutes: 40 Minutes     If pt is unable to be seen after this session, please let this note serve as discharge summary. Please see case management note for discharge disposition. Thank you.    Cliff Dodson, PT

## 2023-02-23 NOTE — PLAN OF CARE
Problem: Pain  Goal: Verbalizes/displays adequate comfort level or baseline comfort level  Outcome: Progressing  Flowsheets (Taken 2/23/2023 0140)  Verbalizes/displays adequate comfort level or baseline comfort level: Encourage patient to monitor pain and request assistance     Problem: Safety - Adult  Goal: Free from fall injury  Outcome: Progressing  Flowsheets (Taken 2/23/2023 0140)  Free From Fall Injury: Instruct family/caregiver on patient safety     Problem: ABCDS Injury Assessment  Goal: Absence of physical injury  Outcome: Progressing  Flowsheets (Taken 2/23/2023 0140)  Absence of Physical Injury: Implement safety measures based on patient assessment     Problem: Respiratory - Adult  Goal: Achieves optimal ventilation and oxygenation  Outcome: Progressing  Flowsheets (Taken 2/23/2023 0140)  Achieves optimal ventilation and oxygenation:   Position to facilitate oxygenation and minimize respiratory effort   Assess for changes in mentation and behavior   Assess for changes in respiratory status   Encourage broncho-pulmonary hygiene including cough, deep breathe, incentive spirometry     Problem: Skin/Tissue Integrity - Adult  Goal: Skin integrity remains intact  Outcome: Progressing  Flowsheets (Taken 2/23/2023 0140)  Skin Integrity Remains Intact: Monitor for areas of redness and/or skin breakdown     Problem: Musculoskeletal - Adult  Goal: Return mobility to safest level of function  Outcome: Progressing  Flowsheets (Taken 2/23/2023 0140)  Return Mobility to Safest Level of Function:   Assess patient stability and activity tolerance for standing, transferring and ambulating with or without assistive devices   Assist with transfers and ambulation using safe patient handling equipment as needed   Obtain physical therapy/occupational therapy consults as needed   Instruct patient/family in ordered activity level  Goal: Return ADL status to a safe level of function  Outcome: Progressing  Flowsheets (Taken 2/23/2023 0140)  Return ADL Status to a Safe Level of Function:   Assess activities of daily living deficits and provide assistive devices as needed   Obtain physical therapy/occupational therapy consults as needed   Assist and instruct patient to increase activity and self care as tolerated     Problem: Gastrointestinal - Adult  Goal: Maintains or returns to baseline bowel function  Outcome: Progressing  Flowsheets (Taken 2/23/2023 0140)  Maintains or returns to baseline bowel function:   Administer IV fluids as ordered to ensure adequate hydration   Encourage oral fluids to ensure adequate hydration   Encourage mobilization and activity  Goal: Maintains adequate nutritional intake  Outcome: Progressing  Flowsheets (Taken 2/23/2023 0140)  Maintains adequate nutritional intake:   Monitor percentage of each meal consumed   Identify factors contributing to decreased intake, treat as appropriate   Obtain nutritional consult as needed   Monitor intake and output, weight and lab values     Problem: Metabolic/Fluid and Electrolytes - Adult  Goal: Electrolytes maintained within normal limits  Outcome: Progressing  Flowsheets (Taken 2/23/2023 0140)  Electrolytes maintained within normal limits:   Monitor labs and assess patient for signs and symptoms of electrolyte imbalances   Administer electrolyte replacement as ordered   Monitor response to electrolyte replacements, including repeat lab results as appropriate

## 2023-02-23 NOTE — PROGRESS NOTES
This RN spoke with son about visiting policy with covid isolation. Since he is here and refusing to leave today informed him of isolation procedures and offered and N95 which he refused. He states, \"I will be back tomorrow. That is my mother and I am not leaving her sit in there alone. I reiterated isolation visiting restrictions and he repeated that he does not care and will be back to visit tomorrow.

## 2023-02-23 NOTE — CARE COORDINATION
Consulted re Inpatient Rehab      Case Management Assessment  Initial Evaluation    Date/Time of Evaluation: 2/23/2023 10:37 AM  Assessment Completed by: KELSIE Bautista    If patient is discharged prior to next notation, then this note serves as note for discharge by case management. Patient Name: Jerri Jose                   YOB: 1945  Diagnosis: Dehydration [E86.0]  Hyperglycemia [R73.9]  Generalized weakness [R53.1]  Trouble walking [R26.2]  Lives independently [Z78.9]  COVID-19 [U07.1]                   Date / Time: 2/22/2023  1:41 PM    Patient Admission Status: Inpatient   Readmission Risk (Low < 19, Mod (19-27), High > 27): Readmission Risk Score: 19.6    Current PCP: Armando Bojorquez, DO  PCP verified by CM?  Yes    Chart Reviewed: Yes      History Provided by: Patient  Patient Orientation: Alert and Oriented, Person, Place, Situation, Self    Patient Cognition: Alert    Hospitalization in the last 30 days (Readmission):  No      Advance Directives:      Code Status: Full Code   Patient's Primary Decision Maker is: Legal Next of Kin    Primary Decision MakerYvonnmeli Lockwood Child - [phone]    Secondary Decision Maker: Virginia Hargrove - Daughter-in-Law - [phone]    Discharge Planning:    Patient lives with: Alone Type of Home: Independent Living  Primary Care Giver: Self  Patient Support Systems include: Children, Family Members   Current Financial resources: Medicare  Current community resources: Transportation  Current services prior to admission: Durable Medical Equipment, Transportation            Current DME: Sebastián Star, Other (Comment) (Scooter)            Type of Home Care services:  None    ADLS  Prior functional level: Assistance with the following:, Housework, Cooking, Bathing  Current functional level: Assistance with the following:, Mobility, Cooking, Housework, Toileting, Bathing, Dressing    Pending therapy recs    Family can provide assistance at DC: No  Would you like Case Management to discuss the discharge plan with any other family members/significant others, and if so, who? No  Plans to Return to Present Housing: Unknown at present  Potential Assistance needed at discharge: [facility]  Patient expects to discharge to: [address] for transportation at discharge: Family    Financial    Payor: [address] [place] [address] / Plan: MEDICARE PART A AND B / Product Type: *No Product type* /     Does insurance require precert for SNF: No    Potential assistance Purchasing Medications: No  Meds-to-Beds request:        The [address] [place] [address] [address] [address]  [address] [place] [address]  Phone: [phone] Fax: [facility] [address] [facility], [address] [place] [address] Street [address] [place] [address] [phone] [facility] [phone]  [address] [address]  Phone: [phone] Fax: [phone]      Notes:    Factors facilitating achievement of predicted outcomes: Home is wheelchair accessible and Knowledge about rehab    Barriers to discharge: Limited family support, Lower extremity weakness, and Medical complications    Additional Case Management Notes: Patient from [address], uses RW with back brace for short distances vs scooter long distances. Patient reports increased weakness unable to bump up PCP appointment. Patient feels will benefit from increased supports at dc and aware pending IDT recs.     The Plan for Transition of Care is related to the following treatment goals of Dehydration [E86.0]  Hyperglycemia [R73.9]  Generalized weakness [R53.1]  Trouble walking [R26.2]  Lives independently [Z78.9]  COVID-19 [U07.1]      The Patient and/or Patient Representative Agree with the Discharge Plan? yes     Vicky Bryan [facility]  Case Management Department  Ph: [phone] Fax: [phone]

## 2023-02-23 NOTE — PROGRESS NOTES
Comprehensive Nutrition Assessment    Type and Reason for Visit:  Initial, Consult (weight loss unintentional)    Nutrition Recommendations/Plan:   Continue Carb control regimen, may need to liberalize cardiac diet if po intake less than 50%  Glucerna chocolate bid  Will monitor nutritional adequacy, nutrition-related labs, weights, BMs, and clinical progress      Malnutrition Assessment:  Malnutrition Status:  Severe malnutrition (02/23/23 0943)    Context:  Chronic Illness     Findings of the 6 clinical characteristics of malnutrition:  Energy Intake:  75% or less estimated energy requirements for 1 month or longer  Weight Loss:  Greater than 5% over 1 month     Body Fat Loss:  Unable to assess     Muscle Mass Loss:  Unable to assess    Fluid Accumulation:  Unable to assess     Strength:  Not Performed (patient reported being very weak)    Nutrition Assessment:    Patient admitted with generalized weakness. Currently on a carb control, heart healthy diet regimen. Also on chocolate Glucerna, patient stated will drink 1-2 per day. RD obtained patient's lunch order. Patient reported not being able to eat certain vegetables with history of gastroparesis, vegetables with shell cause bloating. At facility patient reported not being able to eat many of the vegetables. Weight has trended down significantly in the past month, 12%; weight has been trending down over the past several months as well. Nutrition Related Findings:    lytes within normal limits on 2/23/23; no BM noted yet this admission Wound Type:  (abrasions)       Current Nutrition Intake & Therapies:    Average Meal Intake: Unable to assess  Average Supplements Intake: Unable to assess  ADULT DIET; Regular; 4 carb choices (60 gm/meal);  Low Fat/Low Chol/High Fiber/DIANA  ADULT ORAL NUTRITION SUPPLEMENT; Breakfast, Lunch; Diabetic Oral Supplement    Anthropometric Measures:  Height: 5' 3\" (160 cm)  Ideal Body Weight (IBW): 115 lbs (52 kg) Current Body Weight: 119 lb 7 oz (54.2 kg),   IBW. Weight Source: Bed Scale  Current BMI (kg/m2): 21.2                          BMI Categories: Normal Weight (BMI 18.5-24. 9)    Estimated Daily Nutrient Needs:  Energy Requirements Based On: Kcal/kg  Weight Used for Energy Requirements: Current  Energy (kcal/day): 3126-9980 (35-40 kcal/54.2 kg)  Weight Used for Protein Requirements: Current  Protein (g/day): 81-98 (1.5-1.8 g/54.2 kg)  Method Used for Fluid Requirements: 1 ml/kcal  Fluid (ml/day):      Nutrition Diagnosis:   Severe malnutrition related to increase demand for energy/nutrients, lack or limited access to food (food restrictions) as evidenced by poor intake prior to admission, weight loss greater than or equal to 5% in 1 month    Nutrition Interventions:   Food and/or Nutrient Delivery: Continue Current Diet, Start Oral Nutrition Supplement  Nutrition Education/Counseling:  (discussed salt not being on the cardiac diet)  Coordination of Nutrition Care: Continue to monitor while inpatient  Plan of Care discussed with: patient    Goals:     Goals: PO intake 75% or greater       Nutrition Monitoring and Evaluation:      Food/Nutrient Intake Outcomes: Food and Nutrient Intake, Supplement Intake  Physical Signs/Symptoms Outcomes: Nutrition Focused Physical Findings, Biochemical Data    Discharge Planning:    Continue Oral Nutrition Supplement     EDY, [address] [facility], [address] [place] [address],   Contact: [phone]

## 2023-02-23 NOTE — CONSULTS
Consultation Note    Patient Name: Darrin Nurse  : 1945  Age: 78 y.o. Admitting Physician: Robbi Waddell MD   Date of Admission: 2023  1:41 PM   Primary Care Physician: DO Darrin Shields Nurse is being seen at the request of Robbi Waddell MD for gastroparesis, frequent diarrhea. History of Present Illness:  78year old F with PMH significant for diabetes mellitus, neuropathy, hypertension, gastroparesis, fatty liver, GERD, hyperlipidemia, hypothyroidism, IBS, depressive disorder, sleep apnea, and gastroparesis. Previous abdominal surgeries include appendectomy and hysterectomy. Patient presented to the ER yesterday with complaints of fatigue. She tested positive for COVID on . GI has been consulted given history of gastroparesis and patient complaining of frequent diarrhea. She was diagnosed with diabetic gastroparesis last year. She complains of intermittent nausea but no vomiting. She has been trying to be compliant with her diet, however she lives at a facility where the meals are prepared for her and many times there are foods that she cannot eat. She reports losing approximately 60 pounds over the last one year. Denies overt GI bleeding. Her biggest complaint at this time is diarrhea. She reports having a BM once every 2-3 days but the stool is always liquid. Last colonoscopy was 2022 at [facility]. She was started on Linzess at that time for alternating diarrhea and constipation. She reports that she feels she is having diarrhea because of the food that she is eating. The ladies she eats meals with have also been experiencing diarrhea. Denies fever/chills.      GI History:  2022 EGD/Colonoscopy  Findings:  Erosive gastroduodenitis, colon polyps, moderately poor prep, hemorrhoids     Past Medical History:  Past Medical History:   Diagnosis Date    Colon polyps     Diabetes mellitus ([address]     Diabetic neuropathy ([address] Diabetic retinopathy ([facility]     Essential hypertension 10/28/2019    Fall     Fatty liver     Gastritis     GERD (gastroesophageal reflux disease)     Hyperlipidemia     Hypertension     Hypothyroidism     Irritable bowel syndrome     Major depressive disorder with single episode 10/28/2019    Osteopenia     Photosensitivity disorder     chronic    RBBB     Sleep apnea     on BIPAP    Thyroid disease     Thyroid nodule     neg bx-chronic thyroiditis    Type 2 diabetes mellitus with diabetic polyneuropathy, with long-term current use of insulin ([facility] 2019    Vitamin D deficiency         Past Surgical History:  Past Surgical History:   Procedure Laterality Date    APPENDECTOMY      CARPAL TUNNEL RELEASE Bilateral     CATARACT REMOVAL Bilateral      SECTION      CHOLECYSTECTOMY      COLONOSCOPY N/A 2022    COLONOSCOPY POLYPECTOMY SNARE/COLD BIOPSY performed by Alofnso Carrasco MD at [facility]      [facility]    EGD BIOPSY performed by Alfonso Carrasco MD at [facility] ENDOSCOPY        Historical Medications:  Prior to Visit Medications    Medication Sig Taking? Authorizing Provider   insulin lispro, 1 Unit Dial, (HUMALOG KWIKPEN) 100 UNIT/ML SOPN Use on a sliding scale 3 times a day with meals. Maximum dose 30 units per day.   Alyssia Chandler MD    ASPIRIN 81 MG chewable tablet TAKE ONE (1) TABLET BY MOUTH DAILY  Oscar Baires DO   traZODone (DESYREL) 50 MG tablet Take 1 tablet by mouth nightly  Oscar Baires DO   Continuous Blood Gluc Sensor (FREESTYLE ANT 2 SENSOR) MISC 1 Device by Does not apply route every 14 days  [place] An, DO   Continuous Blood Gluc  (FREESTYLE ANT 2 READER) GILMER 1 each by Does not apply route daily  Oscar Baires DO   buPROPion (WELLBUTRIN XL) 150 MG extended release tablet Take 1 tablet by mouth every morning  Caro Baires DO   donepezil (ARICEPT) 10 MG tablet Take 1 tablet by mouth nightly  Caro Baires DO   DULoxetine (CYMBALTA) 20 MG extended release capsule Take 1 capsule by mouth daily  Caro Baires DO   glipiZIDE (GLUCOTROL) 10 MG tablet TAKE 1 TABLET BY MOUTH EVERY DAY  Caro Baires DO   linaclotide (LINZESS) 145 MCG capsule Take 1 capsule by mouth every morning (before breakfast)  Caro Baires DO   ELIQUIS 5 MG TABS tablet TAKE 1 TABLET BY MOUTH TWICE DAILY  Caro Baires DO   rosuvastatin (CRESTOR) 40 MG tablet TAKE 1 TABLET BY MOUTH EVERY DAY  Caro Baires DO   lisinopril (PRINIVIL;ZESTRIL) 10 MG tablet TAKE 1 TABLET BY MOUTH EVERY DAY  Caro Baires DO   diclofenac sodium (VOLTAREN) 1 % GEL Apply 4 g topically 4 times daily  Caro Baires DO   metoprolol tartrate (LOPRESSOR) 25 MG tablet TAKE 1 TABLET TWICE A DAY  Caro Baires DO   divalproex (DEPAKOTE) 250 MG DR tablet Take 2 tablets by mouth nightly  Caro Baires DO   Calcium Carb-Cholecalciferol (CALCIUM 1000 + D) 1000-800 MG-UNIT TABS Take 1,000 mg by mouth daily  Caro Baires DO   LANTUS SOLOSTAR 100 UNIT/ML injection pen Inject 40 Units into the skin nightly  Caro Baires DO   JARDIANCE 10 MG tablet TAKE 1 TABLET BY MOUTH DAILY  Caro Baires DO   levothyroxine (SYNTHROID) 100 MCG tablet Take 1 tablet daily  Caro Baires DO   famotidine (PEPCID) 20 MG tablet Take 1 tablet by mouth 2 times daily  Patient taking differently: Take 20 mg by mouth 2 times daily States she takes it as needed  Jackie Bennett,    amLODIPine (NORVASC) 5 MG tablet TAKE 1 TABLET DAILY  Caro Baires DO   polyethylene glycol (GLYCOLAX) 17 GM/SCOOP powder 1 powder in packet DAILY (route: oral)  Historical Provider, MD   Continuous Blood Gluc Sensor (GigaloE SENSOR SYSTEM) MISC 1 Units by Other route every 14 days Place 1 sensor on back of arm every 14 days  Jackie Bennett DO   Handtrep Re MISC by Does not apply route 08/09/22  Erica Baires DO   Continuous Blood Gluc Sensor ([address] [place] [address]) [facility] [place] [facility] 1 box by Does not apply route 2 times daily  Katya Alexander, [address]   BD PEN NEEDLE MICRO U/F 32G X 6 MM MISC USE WITH LANTUS DAILY  Maryanne Valdivia MD   METAMUCIL FIBER PO Take by mouth Indications: as needed MiraLax instead  Historical Provider, MD   ondansetron (ZOFRAN) 4 MG tablet Take 1 tablet by mouth 3 times daily as needed for Nausea or Vomiting  Maryanne Valdivia MD   Cyanocobalamin (B-12) 50 MCG TABS Take by mouth   Historical Provider, MD   Multiple Vitamins-Minerals (VITEYES COMPLETE PO) Take by mouth  Historical Provider, MD   latanoprost (XALATAN) 0.005 % ophthalmic solution 1 drop nightly  Historical Provider, MD        [facility] Medications:  Current Facility-Administered Medications: amLODIPine (NORVASC) tablet 5 mg, 5 mg, Oral, Daily  buPROPion (WELLBUTRIN XL) extended release tablet 150 mg, 150 mg, Oral, QAM  calcium carb-cholecalciferol 250-3. 125 MG-MCG per tab 2 tablet, 2 tablet, Oral, Daily  vitamin B-12 (CYANOCOBALAMIN) tablet 250 mcg, 250 mcg, Oral, Daily  divalproex (DEPAKOTE) DR tablet 500 mg, 500 mg, Oral, Nightly  donepezil (ARICEPT) tablet 10 mg, 10 mg, Oral, Nightly  DULoxetine (CYMBALTA) extended release capsule 20 mg, 20 mg, Oral, Daily  apixaban (ELIQUIS) tablet 5 mg, 5 mg, Oral, BID  aspirin chewable tablet 81 mg, 81 mg, Oral, Daily  insulin glargine (LANTUS) injection vial 40 Units, 40 Units, SubCUTAneous, Nightly  latanoprost (XALATAN) 0.005 % ophthalmic solution 1 drop, 1 drop, Both Eyes, Nightly  levothyroxine (SYNTHROID) tablet 100 mcg, 100 mcg, Oral, Daily  lisinopril (PRINIVIL;ZESTRIL) tablet 10 mg, 10 mg, Oral, Daily  metoprolol tartrate (LOPRESSOR) tablet 25 mg, 25 mg, Oral, BID  rosuvastatin (CRESTOR) tablet 40 mg, 40 mg, Oral, Nightly  traZODone (DESYREL) tablet 50 mg, 50 mg, Oral, Nightly  glucose chewable tablet 16 g, 4 tablet, Oral, PRN  dextrose bolus 10% 125 mL, 125 mL, IntraVENous, PRN **OR** dextrose bolus 10% 250 mL, 250 mL, IntraVENous, PRN  glucagon (rDNA) injection 1 mg, 1 mg, SubCUTAneous, PRN  dextrose 10 % infusion, , IntraVENous, Continuous PRN  sodium chloride flush 0.9 % injection 5-40 mL, 5-40 mL, IntraVENous, 2 times per day  sodium chloride flush 0.9 % injection 5-40 mL, 5-40 mL, IntraVENous, PRN  0.9 % sodium chloride infusion, , IntraVENous, PRN  polyethylene glycol (GLYCOLAX) packet 17 g, 17 g, Oral, Daily PRN  acetaminophen (TYLENOL) tablet 650 mg, 650 mg, Oral, Q6H PRN **OR** acetaminophen (TYLENOL) suppository 650 mg, 650 mg, Rectal, Q6H PRN  0.9 % sodium chloride infusion, , IntraVENous, Continuous  prochlorperazine (COMPAZINE) injection 10 mg, 10 mg, IntraVENous, Q6H PRN  insulin lispro (HUMALOG) injection vial 0-8 Units, 0-8 Units, SubCUTAneous, TID WC  insulin lispro (HUMALOG) injection vial 0-4 Units, 0-4 Units, SubCUTAneous, Nightly  guaiFENesin-dextromethorphan (ROBITUSSIN DM) 100-10 MG/5ML syrup 5 mL, 5 mL, Oral, Q4H PRN     Social History:   Social History       Tobacco History       Smoking Status  Never      Smokeless Tobacco Use  Never              Alcohol History       Alcohol Use Status  Never              Drug Use       Drug Use Status  Never              Sexual Activity       Sexually Active  Not Asked                     Family History:  Family History   Problem Relation Age of Onset    Diabetes Paternal Grandmother     Diabetes Father     Lung Cancer Mother     Pancreatic Cancer Brother     Diabetes Brother         Allergies:   Allergies   Allergen Reactions    Seasonal         ROS:   General: No fever or weight change  Hematologic: No unexpected submucosal bleeding or bruising  HEENT: No sore throat or facial pain  Respiratory: No cough or dyspnea  Cardiovascular: No angina or dependent edema  Gastrointestinal: See HPI  Musculoskeletal: No usual joint pain or stiffness  Skin: No skin eruptions or changing lesions  Neurologic: No focal weakness or numbness  Psychiatric: No anxiety or sleep disturbance    Physical Exam:  Vital Signs:   Vitals:    02/23/23 0932   BP: (!) 153/64   Pulse: 67   Resp:    Temp:    SpO2: 98%       General: Well-nourished, well-developed  HEENT: Sclera anicteric, mucosal membranes moist  Cardiovascular: Regular rate and rhythm. No murmurs. Respiratory: Respirations nonlabored, no crepitus  GI: Abdomen nondistended, soft, and nontender. Normal active bowel sounds. Rectal: Deferred  Musculoskeletal: No pitting edema of the lower legs. Neurological: Gross memory appears intact. Patient is alert and oriented      Recent Imaging:   XR CHEST PORTABLE  Narrative: EXAMINATION:  ONE XRAY VIEW OF THE CHEST    2/22/2023 12:03 pm    COMPARISON:  02/18/2023. HISTORY:  ORDERING SYSTEM PROVIDED HISTORY: weakness  TECHNOLOGIST PROVIDED HISTORY:  Reason for exam:->weakness  Reason for Exam: weakness    FINDINGS:  The lungs and costophrenic angles are clear. The cardiomediastinal silhouette  and pulmonary vessels appear normal.  Impression: No radiographic evidence of an acute cardiopulmonary process. Labs:   Recent Labs     02/22/23  1347 02/23/23  0529   HGB 13.6 12.7   WBC 3.1* 4.3   PROT 6.8  --    LABALBU 3.8  --    ALKPHOS 61  --    ALT 43*  --    AST 42*  --    BILITOT 0.6  --         Assessment:  78year old F with PMH significant for diabetes mellitus, neuropathy, hypertension, gastroparesis, fatty liver, GERD, hyperlipidemia, hypothyroidism, IBS, depressive disorder, sleep apnea, and gastroparesis. Admitted with fatigue, found to be COVID positive on 2/18. GI consulted for complaints of diarrhea. She was started on Linzess last year for alternating diarrhea/constipation. Denies overt bleeding. Reports ~60 pound weight loss over the last 1 year. Diarrhea may be exacerbated by current COVID infection.      Plan:  Check stool studies  Stop Linzess  Gastroparesis diet--low residue, small frequent meals  Check Hgb A1C  Supportive care       Gonzalo Limon, APRN - CNP    [facility]    [phone].  Also available via [employer]

## 2023-02-23 NOTE — PROGRESS NOTES
Occupational Therapy  Attempted OT eval and tx. Pt had recently finished PT session and returned to bed to rest. OT will reattempt later as schedule permits.   Isaias White OT

## 2023-02-23 NOTE — PROGRESS NOTES
02/23/23 1500   Encounter Summary   Encounter Overview/Reason  Spiritual/Emotional Needs   Service Provided For: Patient   Referral/Consult From: Nurse   Support System Family members   Last Encounter  02/23/23  (emotional support/prayer/scripture)   Complexity of Encounter Moderate   Begin Time 1440   End Time  1455   Total Time Calculated 15 min   Spiritual/Emotional needs   Type Spiritual Support   Assessment/Intervention/Outcome   Assessment Calm;Coping   Intervention Nurtured Hope;Prayer (assurance of)/[place]; Discussed belief system/Hoahaoism practices/jose r   Outcome Comfort;Encouraged      Odalys Yadav

## 2023-02-23 NOTE — PROGRESS NOTES
Consult Call Back    Who: Ziyad Griffin   Date:2/23/2023,  Time:10:57 AM    Electronically signed by Daniel Amador on 2/23/23 at 10:57 AM EST

## 2023-02-23 NOTE — CARE COORDINATION
[facility] - Acute Rehab Unit   After review, this patient is felt to be:       []  Appropriate for Acute Inpatient Rehab    []  Appropriate for Acute Inpatient Rehab Pending Insurance Authorization    []  Not appropriate for Acute Inpatient Rehab    [x]  Referral received and ARU reviewing patient; Evaluation ongoing. Will follow for medical progress and therapy progress       Will notify DCP with further updates.  Thank you for the referral.  Yael Gayle RN

## 2023-02-23 NOTE — H&P
Hospital Medicine History & Physical      PCP: Jonathon Ramirez DO    Date of Admission: 2/22/2023    Time of Service: 1925    Date of Service: Pt seen/examined on 2/22/2023 and admitted to Inpatient with expected LOS greater than two midnights due to medical therapy. Historian: Self, ED documentation, Epic chart review, Care Everywhere    Chief Concern:    Chief Complaint   Patient presents with    Fatigue     Patient recently tested positive for covid. Reports weakness, too weak to walk to the door to get food or give herself insulin     History Of Present Illness:      Lucy Bartholomew is a 53-year-old female significant past medical history of hypertension, hyperlipidemia, hypothyroidism, MDD, type 2 diabetes, gastroparesis, and remote history of CVA with mild chronic left-sided weakness who presents to [facility] emergency department via EMS with complaint of ongoing weakness ever since she became a resident at [facility] ([address] [place] [address]). She states that she moved in there sometime in December 2022, states that she feels a do not understand the special diet that diabetic with gastroparesis requires, states she has been having little oral intake ever since she moved there and feels she is getting weaker by the day. Of note, she tested positive for COVID on 2/18/2023, denies this being a trigger weakness, she endorses any respiratory complaint, cough, fever, or chest congestion. She is adamant that the problem that she is endorsing tonight has been ongoing and predates the COVID diagnosis. She also states that ever since she was diagnosed with [place], [address] [place] [address] staff have not entered her room, leaves her food tray on a chair outside her door, and is upset by this because due to her weakness is very difficult for her to walk outside to get the food tray and walk back in. She notes previous CVA with left-sided weakness but is ambulatory with rollator walker.   She denies any falls, new onset dizziness, palpitations. Her evaluation here included laboratory studies and chest x-ray. Chest x-ray tonight showed no acute cardiopulmonary process; last chest x-ray was 2/18/2023 and is unchanged. Pertinent lab values tonight include mostly unremarkable chemistry panel aside from a elevated BUN at 34 which was last known to be normal at 16 on 2/18/2023. Magnesium was 2.2, initial lactic acid 1.3, and blood sugars 243. LFT showed mild elevation of ALT at 43 and AST at 42; were both normal on 2/18. Total CK was 29, CRP 3.8, , and troponin less than 0.01. Cell count showed WBC of 3.1 which is down from 3.8 on 2/18, hemoglobin 13.6, hematocrit 40.6, and platelet count 790. Urinalysis tonight showed clear yellow urine with greater than 1000 urinary glucose, 40 ketones, with no blood, protein, nitrites, or leukocyte esterase; microscopy was not indicated. Urinalysis unchanged from 2/18 as well. Patient received 5 cc bolus in the emergency department with no other interventions. Vital sign trend in the ED was reviewed, not requiring oxygen, no tachycardia, no sustained tachypnea, and no fever was present. Patient was attempted to ambulate in the ED, was unsteady on her feet. ED staff felt it was best to admit this patient for generalized weakness likely from deconditioning. Hospital team consulted to admit.     Past Medical History:          Diagnosis Date    Colon polyps     Diabetes mellitus ([address]     Diabetic neuropathy ([address]     Diabetic retinopathy ([address]     Essential hypertension 10/28/2019    Fall     Fatty liver     Gastritis     GERD (gastroesophageal reflux disease)     Hyperlipidemia     Hypertension     Hypothyroidism     Irritable bowel syndrome     Major depressive disorder with single episode 10/28/2019    Osteopenia     Photosensitivity disorder     chronic    RBBB     Sleep apnea     on BIPAP    Thyroid disease     Thyroid nodule     neg bx-chronic thyroiditis    Type 2 diabetes mellitus with diabetic polyneuropathy, with long-term current use of insulin ([address] 2019    Vitamin D deficiency      Past Surgical History:          Procedure Laterality Date    APPENDECTOMY      CARPAL TUNNEL RELEASE Bilateral     CATARACT REMOVAL Bilateral      SECTION      CHOLECYSTECTOMY      COLONOSCOPY N/A 2022    COLONOSCOPY POLYPECTOMY SNARE/COLD BIOPSY performed by Karla Bullard MD at [facility] ([address] [place] [address])      Lynne Culp N/A 2022    EGD BIOPSY performed by Karla Bullard MD at [facility] ENDOSCOPY     Medications Prior to Admission:      Prior to Admission medications    Medication Sig Start Date End Date Taking? Authorizing Provider   insulin lispro, 1 Unit Dial, (HUMALOG KWIKPEN) 100 UNIT/ML SOPN Use on a sliding scale 3 times a day with meals. Maximum dose 30 units per day.  23   Tonya Spence MD    ASPIRIN 81 MG chewable tablet TAKE ONE (1) TABLET BY MOUTH DAILY 23   Kahlil Aren, DO   traZODone (DESYREL) 50 MG tablet Take 1 tablet by mouth nightly 23   Kahlil Aren, DO   Continuous Blood Gluc Sensor (FREESTYLE ANT 2 SENSOR) MISC 1 Device by Does not apply route every 14 days 12/15/22   Kahlil Aren, DO   Continuous Blood Gluc  (FREESTYLE ANT 2 READER) GILMER 1 each by Does not apply route daily 12/15/22   Kahlil Aren, DO   buPROPion (WELLBUTRIN XL) 150 MG extended release tablet Take 1 tablet by mouth every morning 22   Kahlil Aren, DO   donepezil (ARICEPT) 10 MG tablet Take 1 tablet by mouth nightly 11/3/22   Kahlil Aren, DO   DULoxetine (CYMBALTA) 20 MG extended release capsule Take 1 capsule by mouth daily 11/3/22   René Baires, DO   glipiZIDE (GLUCOTROL) 10 MG tablet TAKE 1 TABLET BY MOUTH EVERY DAY 11/3/22   Kahlil Aren, DO   linaclotide (LINZESS) 145 MCG capsule Take 1 capsule by mouth every morning (before breakfast) 11/3/22   Almas Pry, DO   ELIQUIS 5 MG TABS tablet TAKE 1 TABLET BY MOUTH TWICE DAILY 11/3/22   Nestor Baires DO   rosuvastatin (CRESTOR) 40 MG tablet TAKE 1 TABLET BY MOUTH EVERY DAY 11/3/22   Nestor Baires DO   lisinopril (PRINIVIL;ZESTRIL) 10 MG tablet TAKE 1 TABLET BY MOUTH EVERY DAY 11/3/22   Almas Pry, DO   diclofenac sodium (VOLTAREN) 1 % GEL Apply 4 g topically 4 times daily 11/3/22   Nestor Baires, DO   metoprolol tartrate (LOPRESSOR) 25 MG tablet TAKE 1 TABLET TWICE A DAY 11/3/22   Almas Pry, DO   divalproex (DEPAKOTE) 250 MG DR tablet Take 2 tablets by mouth nightly 11/3/22   Nestor Baires DO   Calcium Carb-Cholecalciferol (CALCIUM 1000 + D) 1000-800 MG-UNIT TABS Take 1,000 mg by mouth daily 11/3/22   Nestor Baires DO   LANTUS SOLOSTAR 100 UNIT/ML injection pen Inject 40 Units into the skin nightly 11/3/22   Nestor Baires DO   JARDIANCE 10 MG tablet TAKE 1 TABLET BY MOUTH DAILY 11/3/22   Almas Pascual, DO   levothyroxine (SYNTHROID) 100 MCG tablet Take 1 tablet daily 10/19/22   Nestor Baires DO   famotidine (PEPCID) 20 MG tablet Take 1 tablet by mouth 2 times daily  Patient taking differently: Take 20 mg by mouth 2 times daily States she takes it as needed 10/19/22   Nestor Baires DO   amLODIPine (NORVASC) 5 MG tablet TAKE 1 TABLET DAILY 10/19/22   Nestor Baires DO   polyethylene glycol Kirstie Adamson) 17 GM/SCOOP powder 1 powder in packet DAILY (route: oral) 8/29/22   Historical Provider, MD   Continuous Blood Gluc Sensor ([address] [place] [address]) MISC 1 Units by Other route every 14 days Place 1 sensor on back of arm every 14 days 10/4/22 1/18/23  Almas Pry, DO   Handicap Placard MISC by Does not apply route 08/09/22 8/9/22   Nestor Baires DO   Continuous Blood Gluc Sensor ([facility]) MISC 1 box by Does not apply route 2 times daily 6/16/22   YANI Gunter   BD PEN NEEDLE MICRO U/F 32G X 6 MM MISC USE WITH LANTUS DAILY 4/11/22   Nabor Emmanuel MD   METAMUCIL FIBER PO Take by mouth Indications: as needed MiraLax instead    Historical Provider, MD   ondansetron (ZOFRAN) 4 MG tablet Take 1 tablet by mouth 3 times daily as needed for Nausea or Vomiting 1/12/22   Nabor Emmanuel MD   Cyanocobalamin (B-12) 50 MCG TABS Take by mouth     Historical Provider, MD   Multiple Vitamins-Minerals (VITEYES COMPLETE PO) Take by mouth    Historical Provider, MD   latanoprost (XALATAN) 0.005 % ophthalmic solution 1 drop nightly    Historical Provider, MD     Allergies:  Seasonal    Social History:      The patient currently lives [facility]. TOBACCO:   reports that she has never smoked. She has never used smokeless tobacco.  ETOH:   reports no history of alcohol use. E-cigarette/Vaping       Questions Responses    E-cigarette/Vaping Use Never User    Start Date     Passive Exposure     Quit Date     Counseling Given     Comments           Family History:      Reviewed in detail at bedside with patient; positive as follows:        Problem Relation Age of Onset    Diabetes Paternal Grandmother     Diabetes Father     Lung Cancer Mother     Pancreatic Cancer Brother     Diabetes Brother    8. REVIEW OF SYSTEMS COMPLETED:   Pertinent positives as noted in the HPI. All other systems reviewed and negative. PHYSICAL EXAM PERFORMED:    BP (!) 154/72   Pulse 73   Temp 99.2 °F (37.3 °C) (Oral)   Resp 18   Ht 5' 3\" (1.6 m)   Wt 119 lb 7.8 oz (54.2 kg)   SpO2 99%   BMI 21.17 kg/m²     General appearance:  No apparent distress, appears stated age and cooperative. HEENT:  Normal cephalic, atraumatic without obvious deformity. Pupils equal, round, and reactive to light. Extra ocular muscles intact. Conjunctivae/corneas clear. Neck: Supple, with full range of motion. No jugular venous distention. Trachea midline. Respiratory:  Normal respiratory effort.  Clear to auscultation, bilaterally without Rales/Wheezes/Rhonchi. Cardiovascular:  Regular rate and rhythm with normal S1/S2 without murmurs, rubs or gallops. Abdomen: Soft, non-tender, non-distended with normal bowel sounds. Musculoskeletal:  No clubbing, cyanosis or edema bilaterally. Full range of motion without deformity. Skin: Skin color, texture, turgor normal.  No rashes or lesions. Neurologic:  Neurovascularly intact without any focal sensory/motor deficits. Cranial nerves: II-XII intact, grossly non-focal.  Psychiatric:  Alert and oriented x 4, thought content appropriate, normal insight, becomes tearful when discussing her concerns of poor care at [facility]. Capillary Refill: Brisk,3 seconds, normal  Peripheral Pulses: +2 palpable, equal bilaterally     Labs:     Recent Labs     02/22/23  1347   WBC 3.1*   HGB 13.6   HCT 40.6   *     Recent Labs     02/22/23  1347      K 4.8   CL 99   CO2 25   BUN 34*   CREATININE 0.9   CALCIUM 9.5     Recent Labs     02/22/23  1347   AST 42*   ALT 43*   BILITOT 0.6   ALKPHOS 61     No results for input(s): INR in the last 72 hours. Recent Labs     02/22/23  1347   CKTOTAL 29   TROPONINI <0.01       Urinalysis:      Lab Results   Component Value Date/Time    NITRU Negative 02/22/2023 05:47 PM    BLOODU Negative 02/22/2023 05:47 PM    SPECGRAV 1.010 02/22/2023 05:47 PM    GLUCOSEU >=1000 02/22/2023 05:47 PM     Radiology:     I have reviewed the radiographic results for this encounter with the following interpretation(s); see report(s) below:    XR CHEST PORTABLE   Final Result   No radiographic evidence of an acute cardiopulmonary process.            Consults:    IP CONSULT TO CASE MANAGEMENT  IP CONSULT TO HOSPITALIST  IP CONSULT TO CASE MANAGEMENT  IP CONSULT TO SPIRITUAL SERVICES  IP CONSULT TO DIETITIAN    ASSESSMENT:    Active Hospital Problems    Diagnosis Date Noted    Generalized weakness [R53.1] 02/22/2023     Priority: High    Physical deconditioning [R53.81] 02/22/2023     Priority: High COVID-19 virus infection [U07.1] 02/22/2023     Priority: High    Type 2 diabetes mellitus [E11.9] 12/24/2021     Priority: Medium    Hypertension [I10] 10/28/2019     Priority: Medium       PLAN:    Generalized Weakness/Deconditioning/Adult Failure-to-Thrive  - Progressive weakness since becoming a resident at [facility], states the food is not consistent with what she feels is the \"proper diet\" for someone with T2DM and gastroparesis. She recalls they're feeding her things like mashed sweet potatoes, chicken, which she feels starches will adversely affect her glucose  - Question if there is a dissonance between patient education on appropriate DM diet and what they're providing her so she isn't eating, no symptoms suggestive of her not tolerating intake, such as abd pain, n/v/d.  - Mild ketonuria on UA, BMP normal,  - Continue low-carb/2gNa diet  - PT/OT consulted for eval  - Consulted dietitian for assistance with identifying education barriers with meals for people with DM  - CM to eval, pt amenable with IPR    COVID Infection  - CRP negative, LD mildly elevated; no respiratory symptoms, on RA  - No symptoms  - Day 1 2/18, plan for isolation until 2/28  - Droplet Plus precautions ordered     Hyperglycemia/Type 2 DM  - Continue Lantus per home regimen  - Medium-dose SSI AC/HS  - A1c 11/2022; 6.8%    Hypertension  - Continue home antihypertensive regimen    Hypothyroidism  - TSH 1/2023; 1.59  - Continue Synthroid per home regimen    MDD  - Continue Cymbalta, Wellbutrin  - Tearful at times during interview, sad about selling her house to pay for [facility] and now she feels she is not getting quality care for her money, feels helpless/hopeless  - Perhaps depression a factor in the poor oral intake; anorexia/anhedonia? DVT Prophylaxis: Eliquis  SRMB Prophylaxis: N/A  Diet: ADULT DIET; Regular; 4 carb choices (60 gm/meal);  Low Fat/Low Chol/High Fiber/DIANA  ADULT ORAL NUTRITION SUPPLEMENT; Breakfast, Lunch; Diabetic Oral Supplement  Code Status: Full Code    PT/OT Eval Status: Both consulted    Dispo - 2-3 days per clinical improvement    Hugo Zuñiga, APRN - CNP    Thank you Jing Hannah DO for the opportunity to be involved in this patient's care.  If you have any questions or concerns please feel free to contact me at [phone].---Anticipated co-signer, Dr. Kennedi Mata    (Please note that portions of this note were completed with a voice recognition program. Efforts were made to edit the dictations but occasionally words are mis-transcribed.)

## 2023-02-24 LAB
ANION GAP SERPL CALCULATED.3IONS-SCNC: 6 MMOL/L (ref 3–16)
ANISOCYTOSIS: ABNORMAL
ATYPICAL LYMPHOCYTE RELATIVE PERCENT: 19 % (ref 0–6)
BANDED NEUTROPHILS RELATIVE PERCENT: 4 % (ref 0–7)
BASOPHILS ABSOLUTE: 0 K/UL (ref 0–0.2)
BASOPHILS RELATIVE PERCENT: 1 %
BUN BLDV-MCNC: 13 MG/DL (ref 7–20)
CALCIUM SERPL-MCNC: 8.6 MG/DL (ref 8.3–10.6)
CHLORIDE BLD-SCNC: 108 MMOL/L (ref 99–110)
CO2: 26 MMOL/L (ref 21–32)
CREAT SERPL-MCNC: <0.5 MG/DL (ref 0.6–1.2)
EOSINOPHILS ABSOLUTE: 0.1 K/UL (ref 0–0.6)
EOSINOPHILS RELATIVE PERCENT: 2 %
ESTIMATED AVERAGE GLUCOSE: 177.2 MG/DL
GFR SERPL CREATININE-BSD FRML MDRD: >60 ML/MIN/{1.73_M2}
GLUCOSE BLD-MCNC: 121 MG/DL (ref 70–99)
GLUCOSE BLD-MCNC: 124 MG/DL (ref 70–99)
GLUCOSE BLD-MCNC: 131 MG/DL (ref 70–99)
GLUCOSE BLD-MCNC: 161 MG/DL (ref 70–99)
GLUCOSE BLD-MCNC: 215 MG/DL (ref 70–99)
GLUCOSE BLD-MCNC: 294 MG/DL (ref 70–99)
HBA1C MFR BLD: 7.8 %
HCT VFR BLD CALC: 39.7 % (ref 36–48)
HEMATOLOGY PATH CONSULT: NORMAL
HEMATOLOGY PATH CONSULT: YES
HEMOGLOBIN: 13.2 G/DL (ref 12–16)
LYMPHOCYTES ABSOLUTE: 1.5 K/UL (ref 1–5.1)
LYMPHOCYTES RELATIVE PERCENT: 36 %
MCH RBC QN AUTO: 29.8 PG (ref 26–34)
MCHC RBC AUTO-ENTMCNC: 33.2 G/DL (ref 31–36)
MCV RBC AUTO: 90 FL (ref 80–100)
MONOCYTES ABSOLUTE: 0.3 K/UL (ref 0–1.3)
MONOCYTES RELATIVE PERCENT: 9 %
MYELOCYTE PERCENT: 2 %
NEUTROPHILS ABSOLUTE: 0.9 K/UL (ref 1.7–7.7)
NEUTROPHILS RELATIVE PERCENT: 27 %
PDW BLD-RTO: 16 % (ref 12.4–15.4)
PERFORMED ON: ABNORMAL
PLATELET # BLD: 112 K/UL (ref 135–450)
PMV BLD AUTO: 8.4 FL (ref 5–10.5)
POTASSIUM REFLEX MAGNESIUM: 3.8 MMOL/L (ref 3.5–5.1)
RBC # BLD: 4.42 M/UL (ref 4–5.2)
SODIUM BLD-SCNC: 140 MMOL/L (ref 136–145)
SOLUBLE TRANSFERRIN RECEPT: 2.8 MG/L (ref 1.9–4.4)
WBC # BLD: 2.8 K/UL (ref 4–11)

## 2023-02-24 PROCEDURE — 97535 SELF CARE MNGMENT TRAINING: CPT

## 2023-02-24 PROCEDURE — 2580000003 HC RX 258: Performed by: NURSE PRACTITIONER

## 2023-02-24 PROCEDURE — 1200000000 HC SEMI PRIVATE

## 2023-02-24 PROCEDURE — 97166 OT EVAL MOD COMPLEX 45 MIN: CPT

## 2023-02-24 PROCEDURE — 97530 THERAPEUTIC ACTIVITIES: CPT

## 2023-02-24 PROCEDURE — 85025 COMPLETE CBC W/AUTO DIFF WBC: CPT

## 2023-02-24 PROCEDURE — 6370000000 HC RX 637 (ALT 250 FOR IP): Performed by: NURSE PRACTITIONER

## 2023-02-24 PROCEDURE — 80048 BASIC METABOLIC PNL TOTAL CA: CPT

## 2023-02-24 RX ADMIN — LEVOTHYROXINE SODIUM 100 MCG: 100 TABLET ORAL at 06:09

## 2023-02-24 RX ADMIN — INSULIN GLARGINE 40 UNITS: 100 INJECTION, SOLUTION SUBCUTANEOUS at 22:22

## 2023-02-24 RX ADMIN — METOPROLOL TARTRATE 25 MG: 25 TABLET, FILM COATED ORAL at 09:44

## 2023-02-24 RX ADMIN — LATANOPROST 1 DROP: 50 SOLUTION OPHTHALMIC at 22:33

## 2023-02-24 RX ADMIN — DULOXETINE HYDROCHLORIDE 20 MG: 20 CAPSULE, DELAYED RELEASE ORAL at 09:44

## 2023-02-24 RX ADMIN — GUAIFENESIN AND DEXTROMETHORPHAN 5 ML: 100; 10 SYRUP ORAL at 09:51

## 2023-02-24 RX ADMIN — APIXABAN 5 MG: 5 TABLET, FILM COATED ORAL at 22:20

## 2023-02-24 RX ADMIN — ROSUVASTATIN CALCIUM 40 MG: 10 TABLET, FILM COATED ORAL at 22:19

## 2023-02-24 RX ADMIN — CYANOCOBALAMIN TAB 500 MCG 250 MCG: 500 TAB at 09:45

## 2023-02-24 RX ADMIN — TRAZODONE HYDROCHLORIDE 50 MG: 50 TABLET ORAL at 22:20

## 2023-02-24 RX ADMIN — LISINOPRIL 10 MG: 10 TABLET ORAL at 09:44

## 2023-02-24 RX ADMIN — BUPROPION HYDROCHLORIDE 150 MG: 150 TABLET, EXTENDED RELEASE ORAL at 09:45

## 2023-02-24 RX ADMIN — ASPIRIN 81 MG 81 MG: 81 TABLET ORAL at 09:45

## 2023-02-24 RX ADMIN — DONEPEZIL HYDROCHLORIDE 10 MG: 5 TABLET ORAL at 22:20

## 2023-02-24 RX ADMIN — APIXABAN 5 MG: 5 TABLET, FILM COATED ORAL at 09:45

## 2023-02-24 RX ADMIN — SODIUM CHLORIDE, PRESERVATIVE FREE 10 ML: 5 INJECTION INTRAVENOUS at 09:47

## 2023-02-24 RX ADMIN — INSULIN LISPRO 2 UNITS: 100 INJECTION, SOLUTION INTRAVENOUS; SUBCUTANEOUS at 17:04

## 2023-02-24 RX ADMIN — METOPROLOL TARTRATE 25 MG: 25 TABLET, FILM COATED ORAL at 22:20

## 2023-02-24 RX ADMIN — AMLODIPINE BESYLATE 5 MG: 5 TABLET ORAL at 09:45

## 2023-02-24 RX ADMIN — DIVALPROEX SODIUM 500 MG: 250 TABLET, DELAYED RELEASE ORAL at 22:20

## 2023-02-24 RX ADMIN — Medication 2 TABLET: at 09:45

## 2023-02-24 ASSESSMENT — PAIN SCALES - GENERAL
PAINLEVEL_OUTOF10: 0
PAINLEVEL_OUTOF10: 0

## 2023-02-24 NOTE — PROGRESS NOTES
Occupational Therapy  Facility/Department: [place] [address] TELE/MED SURG/ONC  Occupational Therapy Initial Assessment and Treatment Note     Name: Darrin Nurse  : 1945  MRN: [id number]  Date of Service: 2023    Discharge Recommendations:  IP Rehab      Patient Diagnosis(es): The primary encounter diagnosis was COVID-19. Diagnoses of Lives independently, Trouble walking, Dehydration, and Hyperglycemia were also pertinent to this visit. Past Medical History:  has a past medical history of Colon polyps, Diabetes mellitus ([address] Diabetic neuropathy ([address] Diabetic retinopathy ([address] Essential hypertension, Fall, Fatty liver, Gastritis, GERD (gastroesophageal reflux disease), Hyperlipidemia, Hypertension, Hypothyroidism, Irritable bowel syndrome, Major depressive disorder with single episode, Osteopenia, Photosensitivity disorder, RBBB, Sleep apnea, Thyroid disease, Thyroid nodule, Type 2 diabetes mellitus with diabetic polyneuropathy, with long-term current use of insulin ([facility] and Vitamin D deficiency. Past Surgical History:  has a past surgical history that includes Tonsillectomy; Appendectomy; Gynecologic cryosurgery; Hysterectomy; Cholecystectomy; Cataract removal (Bilateral); Carpal tunnel release (Bilateral); sinus surgery; Upper gastrointestinal endoscopy (N/A, 2022); Colonoscopy (N/A, 2022); and  section. Assessment   Performance deficits / Impairments: Decreased functional mobility ; Decreased ADL status; Decreased balance;Decreased endurance;Decreased safe awareness;Decreased strength  Assessment: OT eval and tx completed. Pt admitted for generalized weakness and COVID. During OT session, pt required CGA/min A for standing balance, sit to stand and transfers with RW. As she stood to complete ADL tasks, pt would c/o fatigue and weakness in BLE. Min to mod A provided for LE ADL tasks. She is independent with ADLs at baseline. Recommend IPR for skilled OT to improve function. Cont OT in acute care. Prognosis: Good  Decision Making: Medium Complexity  REQUIRES OT FOLLOW-UP: Yes  Activity Tolerance  Activity Tolerance: Patient limited by fatigue      Plan   Occupational Therapy Plan  Times Per Week: 2-3x/wk  Current Treatment Recommendations: Self-Care / ADL, Functional mobility training, Balance training, Endurance training, Strengthening, Safety education & training     Restrictions  Restrictions/Precautions  Restrictions/Precautions: Fall Risk, General Precautions, Contact Precautions  Position Activity Restriction  Other position/activity restrictions: COVID+, Cdiff r/o, up with assist, for ambulation pt uses L custom brace that is not in hospital currently    Subjective   General  Chart Reviewed: Yes  Patient assessed for rehabilitation services?: Yes  Family / Caregiver Present: No  Referring Practitioner: GUSTAVO Marlow  Diagnosis: generalized weakness, COVID  Subjective  Subjective: Pt agreeable to OT. Reports 4/10 L ribcage pain.  Pt assisted back to bed to rest.  General Comment  Comments: RN approved therapy     Social/Functional History  Social/Functional History  Lives With: Alone  Type of Home: Facility ([facility])  Home Layout: One level (laundry on hallway, borrows rolling laundry basket)  Home Access: Level entry, Elevator  Bathroom Shower/Tub: Walk-in shower, Shower chair with back  Bathroom Toilet: Standard  Bathroom Equipment: Grab bars in shower, Shower chair, Hand-held shower  Bathroom Accessibility: Walker accessible  Home Equipment: Walker, rolling, Electric scooter (L LE custom 2/2 CVA with L foot drop)  Has the patient had two or more falls in the past year or any fall with injury in the past year?: Yes (couple months after rehab after stroke with back injury)  Receives Help From: Family  ADL Assistance: Independent (sink bath)  Homemaking Assistance: Independent (meals brought to room 2/2 covid diagnosis, normally goes to dining room with scooter)  Ambulation Assistance: Independent (RW if not using brace, able to ambulate approx 50-75ft with brace and no AD)  Transfer Assistance: Independent  Active : No  Patient's  Info: awaiting  test  Mode of Transportation: Car  Occupation: Retired     Objective   Heart Rate: 63  Heart Rate Source: Monitor  BP: (!) 148/71  BP Location: Right upper arm  BP Method: Automatic  Patient Position: Semi fowlers  MAP (Calculated): 97  Resp: 18  SpO2: 100 %  O2 Device: None (Room air)         Safety Devices  Type of Devices: All fall risk precautions in place; Bed alarm in place;Call light within reach;Gait belt;Patient at risk for falls; Left in bed;Nurse notified     Toilet Transfers  Toilet - Technique: Ambulating (RW)  Equipment Used: Grab bars  Toilet Transfer: Contact guard assistance;Minimal assistance  AROM: Generally decreased, functional  Strength: Generally decreased, functional (c/o general weakness in BUE)  Coordination: Generally decreased, functional  Tone: Normal  Sensation: Intact  ADL  Feeding: Independent  Grooming: Contact guard assistance;Verbal cueing; Increased time to complete  Grooming Skilled Clinical Factors: Stood at sink to wash hands  UE Dressing: Moderate assistance  UE Dressing Skilled Clinical Factors: for gown  LE Dressing: Moderate assistance  LE Dressing Skilled Clinical Factors: for socks and brief  Toileting: Moderate assistance  Toileting Skilled Clinical Factors: Mod A for brief. Pt managed pericare while seated  Additional Comments: Low standing tolerance, c/o's fatigue when standing during ADLs      Bed mobility  Supine to Sit: Contact guard assistance  Sit to Supine: Contact guard assistance  Transfers  Stand Pivot Transfers: Contact guard assistance (RW)  Sit to stand: Minimal assistance (CGA from bed height.  Min A from chair and toilet)  Stand to sit: Minimal assistance (vc's to control descent)  Transfer Comments: Vc's for safety during mobility especially as pt becomes fatigued. Vision  Vision: Impaired  Vision Exceptions: Wears glasses for reading  Hearing  Hearing: Within functional limits  Cognition  Overall Cognitive Status: WFL  Cognition Comment: vc's for safety  Orientation  Overall Orientation Status: Within Functional Limits          Education Given To: Patient  Education Provided: Role of Therapy;Plan of Care;Transfer Training;ADL Adaptive Strategies; Energy Conservation  Education Method: Verbal  Education Outcome: Verbalized understanding;Continued education needed   Disease Specific Education: Pt educated on importance of OOB mobility, prevention of complications of bedrest, and general safety during hospitalization. Pt verbalized understanding  AM-PAC Score      AM-[facility] Inpatient Daily Activity Raw Score: 16 (02/24/23 1144)  -PAC Inpatient ADL T-Scale Score : 35.96 (02/24/23 1144)  ADL Inpatient CMS 0-100% Score: 53.32 (02/24/23 1144)  ADL Inpatient CMS G-Code Modifier : CK (02/24/23 1144)    Goals  Short Term Goals  Time Frame for Short Term Goals: 1 week (3/3) unless noted  Short Term Goal 1: Perform functional transfers with SBA and RW  Short Term Goal 2: Perform LE dressing with SBA by 2/28  Short Term Goal 3: Perform UE exer 15-20x each for endurance  Short Term Goal 4: Stand at sink for 2-3 ADL tasks with SBA  Patient Goals   Patient goals :  \"Get stronger\"     Therapy Time   Individual Concurrent Group Co-treatment   Time In 0950         Time Out 1049         Minutes 59         Timed Code Treatment Minutes: 44 Minutes (15 min eval)     Isaias White OT

## 2023-02-24 NOTE — CONSULTS
Calorie Count Note    Current diet and supplement order:    ADULT DIET; Regular; 4 carb choices (60 gm/meal); Low Fat/Low Chol/High Fiber/DIANA  ADULT ORAL NUTRITION SUPPLEMENT; Breakfast, Lunch; Diabetic Oral Supplement    Comparative Standards (Estimated Nutrition Needs):   Estimated Daily Total Kcal: 3995-0070 (35-40 kcal/54.2 kg)  Estimated Daily Protein (g): 81-98 (1.5-1.8 g/54.2 kg)    Day 1  2/24/23  Meal Calories Protein Comments   Breakfast 210 5 Ate oatmeal and drank OJ for breakfast, no Glucerna shake drank yet   Lunch pending     Dinner pending     Snacks/Supplements      Total       % Kcal needs met % Protein needs met      Day 2   Meal Calories Protein Comments   Breakfast      Lunch      Dinner      Snacks/Supplements      Total       % Kcal needs met % Protein needs met      Day 3   Meal Calories Protein Comments   Breakfast      Lunch      Dinner      Snacks/Supplements      Total       % Kcal needs met % Protein needs met      Intervention & Recommendations:   1. Continue Calorie Count  2.  Continue Glucerna, encourage please    Electronically signed by Shelia Ma RD, LD on 2/24/23 at 12:02 PM EST    Contact Number: [phone]

## 2023-02-24 NOTE — PROGRESS NOTES
Patient's Absolute neutrophils at 0.9 k/ul this morning. Just FYI. Above message sent to Dr. Tucker Mike.

## 2023-02-24 NOTE — PROGRESS NOTES
Awake on bed, alert and oriented. IVF Normal Saline at 50ml/hr on left AC. Placed call light within reach, Kept side rails up, Instructed to call for assistance. Will continue to monitor.

## 2023-02-24 NOTE — CARE COORDINATION
Hospital day 2: Patient on c3 re generalized weakness care managed by IM, GI, and Hem/Onc. Patient with IPR recs, reviewed and agreeable to ARU following, decline referrals other places or to call family reason working. Poor intake dietary following. .KELSIE Pereira

## 2023-02-24 NOTE — PLAN OF CARE
Problem: Pain  Goal: Verbalizes/displays adequate comfort level or baseline comfort level  2/24/2023 1055 by Leanne Shaw RN  Flowsheets  Taken 2/24/2023 1055 by Leanne Shaw RN  Verbalizes/displays adequate comfort level or baseline comfort level:   Encourage patient to monitor pain and request assistance   Assess pain using appropriate pain scale   Administer analgesics based on type and severity of pain and evaluate response  Taken 2/23/2023 2244 by Lawrence Townsend RN  Verbalizes/displays adequate comfort level or baseline comfort level:   Encourage patient to monitor pain and request assistance   Assess pain using appropriate pain scale   Administer analgesics based on type and severity of pain and evaluate response   Implement non-pharmacological measures as appropriate and evaluate response  2/23/2023 2244 by Alize Martinez RN  Flowsheets (Taken 2/23/2023 2244)  Verbalizes/displays adequate comfort level or baseline comfort level:   Encourage patient to monitor pain and request assistance   Assess pain using appropriate pain scale   Administer analgesics based on type and severity of pain and evaluate response   Implement non-pharmacological measures as appropriate and evaluate response     Problem: Safety - Adult  Goal: Free from fall injury  Flowsheets (Taken 2/24/2023 1055)  Free From Fall Injury:   Instruct family/caregiver on patient safety   Based on caregiver fall risk screen, instruct family/caregiver to ask for assistance with transferring infant if caregiver noted to have fall risk factors     Problem: Gastrointestinal - Adult  Goal: Maintains or returns to baseline bowel function  Flowsheets (Taken 2/24/2023 1055)  Maintains or returns to baseline bowel function:   Assess bowel function   Encourage mobilization and activity   Encourage oral fluids to ensure adequate hydration

## 2023-02-24 NOTE — CARE COORDINATION
ARU continuing to follow, await OT evaluation and will update DCP after discussing with Dr. Jazzy Whitehead.   Coleman Leblanc RN

## 2023-02-24 NOTE — PROGRESS NOTES
Hospitalist Progress Note      PCP: Jamey Hancock DO    Date of Admission: 2/22/2023    Chief Complaint:   Cough    Hospital Course: Diann Bansal is a 79-year-old female significant past medical history of hypertension, hyperlipidemia, hypothyroidism, MDD, type 2 diabetes, gastroparesis, and remote history of CVA with mild chronic left-sided weakness who presents to [facility] emergency department via EMS with complaint of ongoing weakness ever since she became a resident at [facility] ([address] [place] [address]). She states that she moved in there sometime in December 2022, states that she feels a do not understand the special diet that diabetic with gastroparesis requires, states she has been having little oral intake ever since she moved there and feels she is getting weaker by the day. Of note, she tested positive for COVID on 2/18/2023, denies this being a trigger weakness, she endorses any respiratory complaint, cough, fever, or chest congestion. She is adamant that the problem that she is endorsing tonight has been ongoing and predates the COVID diagnosis. She also states that ever since she was diagnosed with [facility], [address] [place] [address] staff have not entered her room, leaves her food tray on a chair outside her door, and is upset by this because due to her weakness is very difficult for her to walk outside to get the food tray and walk back in. She notes previous CVA with left-sided weakness but is ambulatory with rollator walker. She denies any falls, new onset dizziness, palpitations. Her evaluation here included laboratory studies and chest x-ray. Chest x-ray tonight showed no acute cardiopulmonary process; last chest x-ray was 2/18/2023 and is unchanged. Pertinent lab values tonight include mostly unremarkable chemistry panel aside from a elevated BUN at 34 which was last known to be normal at 16 on 2/18/2023. Magnesium was 2.2, initial lactic acid 1.3, and blood sugars 243.   LFT showed mild elevation of ALT at 43 and AST at 42; were both normal on 2/18. Total CK was 29, CRP 3.8, , and troponin less than 0.01. Cell count showed WBC of 3.1 which is down from 3.8 on 2/18, hemoglobin 13.6, hematocrit 40.6, and platelet count 678. Urinalysis tonight showed clear yellow urine with greater than 1000 urinary glucose, 40 ketones, with no blood, protein, nitrites, or leukocyte esterase; microscopy was not indicated. Urinalysis unchanged from 2/18 as well. Patient received 5 cc bolus in the emergency department with no other interventions. Vital sign trend in the ED was reviewed, not requiring oxygen, no tachycardia, no sustained tachypnea, and no fever was present. Patient was attempted to ambulate in the ED, was unsteady on her feet. ED staff felt it was best to admit this patient for generalized weakness likely from deconditioning. Hospital team consulted to admit. Subjective:   Patient sitting up. She still complains of cough. No nausea or vomiting. Diarrhea has improved. No fevers or chills.     Medications:  Reviewed    Infusion Medications    dextrose      sodium chloride      sodium chloride 50 mL/hr at 02/24/23 0608     Scheduled Medications    amLODIPine  5 mg Oral Daily    buPROPion  150 mg Oral QAM    calcium carb-cholecalciferol  2 tablet Oral Daily    vitamin B-12  250 mcg Oral Daily    divalproex  500 mg Oral Nightly    donepezil  10 mg Oral Nightly    DULoxetine  20 mg Oral Daily    apixaban  5 mg Oral BID    aspirin  81 mg Oral Daily    insulin glargine  40 Units SubCUTAneous Nightly    latanoprost  1 drop Both Eyes Nightly    levothyroxine  100 mcg Oral Daily    lisinopril  10 mg Oral Daily    metoprolol tartrate  25 mg Oral BID    rosuvastatin  40 mg Oral Nightly    traZODone  50 mg Oral Nightly    sodium chloride flush  5-40 mL IntraVENous 2 times per day    insulin lispro  0-8 Units SubCUTAneous TID WC    insulin lispro  0-4 Units SubCUTAneous Nightly     PRN Meds: glucose, dextrose bolus **OR** dextrose bolus, glucagon (rDNA), dextrose, sodium chloride flush, sodium chloride, polyethylene glycol, acetaminophen **OR** acetaminophen, prochlorperazine, guaiFENesin-dextromethorphan      Intake/Output Summary (Last 24 hours) at 2/24/2023 1819  Last data filed at 2/24/2023 1003  Gross per 24 hour   Intake 1368. 11 ml   Output 450 ml   Net 918.11 ml         Physical Exam Performed:    BP (!) 151/67   Pulse 64   Temp 98.4 °F (36.9 °C) (Oral)   Resp 18   Ht 5' 3\" (1.6 m)   Wt 119 lb 7.8 oz (54.2 kg)   SpO2 97%   BMI 21.17 kg/m²     General appearance: No apparent distress, appears stated age and cooperative. HEENT: Pupils equal, round, and reactive to light. Conjunctivae/corneas clear. Neck: Supple, with full range of motion. No jugular venous distention. Trachea midline. Respiratory:  Normal respiratory effort. Clear to auscultation, bilaterally without Rales/Wheezes/Rhonchi. Cardiovascular: Regular rate and rhythm with normal S1/S2 without murmurs, rubs or gallops. Abdomen: Soft, non-tender, non-distended with normal bowel sounds. Musculoskeletal: No clubbing, cyanosis or edema bilaterally. Full range of motion without deformity. Skin: Skin color, texture, turgor normal.  No rashes or lesions. Neurologic:  Neurovascularly intact without any focal sensory/motor deficits.  Cranial nerves: II-XII intact, grossly non-focal.  Psychiatric: Alert and oriented, thought content appropriate, normal insight  Capillary Refill: Brisk, 3 seconds, normal   Peripheral Pulses: +2 palpable, equal bilaterally          Labs:   Recent Labs     02/22/23  1347 02/23/23  0529 02/24/23  0525   WBC 3.1* 4.3 2.8*   HGB 13.6 12.7 13.2   HCT 40.6 38.7 39.7   * 104* 112*       Recent Labs     02/22/23  1347 02/23/23  0529 02/24/23  0525    140 140   K 4.8 4.0 3.8   CL 99 107 108   CO2 25 23 26   BUN 34* 25* 13   CREATININE 0.9 0.6 <0.5*   CALCIUM 9.5 8.3 8.6       Recent Labs 02/22/23  1347   AST 42*   ALT 43*   BILITOT 0.6   ALKPHOS 61       No results for input(s): INR in the last 72 hours. Recent Labs     02/22/23  [address]   TROPONINI <0.01         Urinalysis:      Lab Results   Component Value Date/Time    NITRU Negative 02/22/2023 05:47 PM    BLOODU Negative 02/22/2023 05:47 PM    SPECGRAV 1.010 02/22/2023 05:47 PM    GLUCOSEU >=1000 02/22/2023 05:47 PM       Radiology:  XR CHEST PORTABLE   Final Result   No radiographic evidence of an acute cardiopulmonary process. IP CONSULT TO CASE MANAGEMENT  IP CONSULT TO HOSPITALIST  IP CONSULT TO CASE MANAGEMENT  IP CONSULT TO SPIRITUAL SERVICES  IP CONSULT TO DIETITIAN  IP CONSULT TO HEM/ONC  IP CONSULT TO GI  IP CONSULT TO PHYSICAL MEDICINE REHAB  IP CONSULT TO DIETITIAN    Assessment/Plan:    Active Hospital Problems    Diagnosis     Generalized weakness [R53.1]      Priority: Medium    Physical deconditioning [R53.81]      Priority: Medium    COVID-19 virus infection [U07.1]      Priority: Medium    Type 2 diabetes mellitus [E11.9]     Hypertension [I10]      1. Generalized weakness with deconditioning. Patient states she has had progressive weakness which has worsened since she had COVID. She states her COVID diagnosis has been over the last couple weeks. PT OT consulted  2. COVID-19 infection. Patient with mild respiratory symptoms. She does have generalized weakness. Patient should be in isolation until 2/28. May consider repeating COVID test prior to discharge. 3.  Type 2 diabetes hyperglycemia. Patient on basal bolus correction. We will continue to monitor closely. Monitor p.o. intake. Last A1c was 6.8% in November. 4.  Hypertension. Continue patient on current hypertensive medicines including amlodipine lisinopril metoprolol. Blood pressure is labile. We will continue monitor. 5. Diarrhea. Consulted gastroenterology. Diarrhea has improved. Linzess was discontinued. 6.  Gastroparesis. Patient will need frequent small meals. Unsure if prokinetic would be helpful but it may worsen her diarrhea. Patient with 1 or more chronic illnesses with severe exacerbation or side effects of treatment and/or 1 acute or chronic illness that poses threat to life or bodily function. Decision regarding hospitalization or escalation  Patient on drug therapy that requires intensive monitoring. DVT Prophylaxis: Apixaban  Diet: ADULT DIET; Regular; 4 carb choices (60 gm/meal); Low Fat/Low Chol/High Fiber/DIANA  ADULT ORAL NUTRITION SUPPLEMENT; Breakfast, Lunch; Diabetic Oral Supplement  Code Status: Full Code  PT/OT Eval Status:  Following    Dispo -evaluate for inpatient rehab versus SNF    Appropriate for A1 Discharge Unit: Vielka Flaherty MD

## 2023-02-24 NOTE — PLAN OF CARE
Problem: Pain  Goal: Verbalizes/displays adequate comfort level or baseline comfort level  2/23/2023 2244 by Maria Frederick RN  Flowsheets (Taken 2/23/2023 2244)  Verbalizes/displays adequate comfort level or baseline comfort level:   Encourage patient to monitor pain and request assistance   Assess pain using appropriate pain scale   Administer analgesics based on type and severity of pain and evaluate response   Implement non-pharmacological measures as appropriate and evaluate response  2/23/2023 1143 by Sharmila Corey RN  Flowsheets (Taken 2/23/2023 1143)  Verbalizes/displays adequate comfort level or baseline comfort level:   Encourage patient to monitor pain and request assistance   Assess pain using appropriate pain scale   Administer analgesics based on type and severity of pain and evaluate response

## 2023-02-24 NOTE — PROGRESS NOTES
Progress Note    Patient Oneal York  MRN: [id number]  YOB: 1945 Age: 78 y.o. Sex: female  Room: [address] [place] [address]       Admitting Physician: Jazzy Aguila MD   Date of Admission: 2/22/2023  1:41 PM   Primary Care Physician: Deri Litten, DO     Subjective: Oneal York was seen and examined. We are following for diarrhea. -- last BM was yesterday morning. Denies any abdominal pain. No nausea or vomiting. No acute issues overnight. ROS:  Constitutional: Denies fever, no change in appetite  Respiratory: Denies cough or shortness of breath  Cardiovascular: Denies chest pain or edema    Objective:  Vital Signs:   Vitals:    02/24/23 1003   BP: (!) 148/71   Pulse: 63   Resp:    Temp:    SpO2: 100%         Physical Exam:  Constitutional: Alert and oriented x 4. No acute distress. Respiratory: Respirations nonlabored, no crepitus  GI: Abdomen nondistended, soft, and nontender. Neurological: No focal deficits noted. No asterixis. Intake/Output:    Intake/Output Summary (Last 24 hours) at 2/24/2023 1151  Last data filed at 2/24/2023 3068  Gross per 24 hour   Intake 1128.11 ml   Output 450 ml   Net 678.11 ml        Current Medications:  Current Facility-Administered Medications   Medication Dose Route Frequency Provider Last Rate Last Admin    amLODIPine (NORVASC) tablet 5 mg  5 mg Oral Daily Trey Solo APRN - CNP   5 mg at 02/24/23 0945    buPROPion (WELLBUTRIN XL) extended release tablet 150 mg  150 mg Oral QAM Trey Solo APRN - CNP   150 mg at 02/24/23 0945    calcium carb-cholecalciferol 250-3. 125 MG-MCG per tab 2 tablet  2 tablet Oral Daily Trey Solo APRN - CNP   2 tablet at 02/24/23 0945    vitamin B-12 (CYANOCOBALAMIN) tablet 250 mcg  250 mcg Oral Daily Trey Solo, APRN - CNP   250 mcg at 02/24/23 0945    divalproex (DEPAKOTE) DR tablet 500 mg  500 mg Oral Nightly Trey Solo APRN - CNP   500 mg at 02/23/23 2029    donepezil (ARICEPT) tablet 10 mg  10 mg Oral Nightly Arch Ruck, APRN - CNP   10 mg at 02/23/23 2030    DULoxetine (CYMBALTA) extended release capsule 20 mg  20 mg Oral Daily Arch Ruck, APRN - CNP   20 mg at 02/24/23 0944    apixaban (ELIQUIS) tablet 5 mg  5 mg Oral BID Arch Ruck, APRN - CNP   5 mg at 02/24/23 0945    aspirin chewable tablet 81 mg  81 mg Oral Daily Arch Ruck, APRN - CNP   81 mg at 02/24/23 0945    insulin glargine (LANTUS) injection vial 40 Units  40 Units SubCUTAneous Nightly Arch Ruck, APRN - CNP   40 Units at 02/23/23 2150    latanoprost (XALATAN) 0.005 % ophthalmic solution 1 drop  1 drop Both Eyes Nightly Arch Ruck, APRN - CNP   1 drop at 02/23/23 2030    levothyroxine (SYNTHROID) tablet 100 mcg  100 mcg Oral Daily Arch Rueliza, APRN - CNP   100 mcg at 02/24/23 0608    lisinopril (PRINIVIL;ZESTRIL) tablet 10 mg  10 mg Oral Daily Arch Ruck, APRN - CNP   10 mg at 02/24/23 0944    metoprolol tartrate (LOPRESSOR) tablet 25 mg  25 mg Oral BID Arch Ruck, APRN - CNP   25 mg at 02/24/23 0944    rosuvastatin (CRESTOR) tablet 40 mg  40 mg Oral Nightly Arch Ruck, APRN - CNP   40 mg at 02/23/23 2029    traZODone (DESYREL) tablet 50 mg  50 mg Oral Nightly Arch Ruck, APRN - CNP   50 mg at 02/23/23 2030    glucose chewable tablet 16 g  4 tablet Oral PRN Arch Ruck, APRN - CNP        dextrose bolus 10% 125 mL  125 mL IntraVENous PRN Arch Ruck, APRN - CNP        Or    dextrose bolus 10% 250 mL  250 mL IntraVENous PRN Arch Ruck, APRN - CNP        glucagon (rDNA) injection 1 mg  1 mg SubCUTAneous PRN Arch Rueliza, APRN - CNP        dextrose 10 % infusion   IntraVENous Continuous PRN Arch Carli, APRN - CNP        sodium chloride flush 0.9 % injection 5-40 mL  5-40 mL IntraVENous 2 times per day Arch Carli, APRN - CNP   10 mL at 02/24/23 0947    sodium chloride flush 0.9 % injection 5-40 mL  5-40 mL IntraVENous PRN Nabeel Boyce, APRN - CNP 0.9 % sodium chloride infusion   IntraVENous PRN Vilma Schirmer, APRN - CNP        polyethylene glycol (GLYCOLAX) packet 17 g  17 g Oral Daily PRN Vilma Schirmer, APRN - CNP        acetaminophen (TYLENOL) tablet 650 mg  650 mg Oral Q6H PRN Vilma Schirmer, APRN - CNP   650 mg at 02/23/23 3793    Or    acetaminophen (TYLENOL) suppository 650 mg  650 mg Rectal Q6H PRN Vilma Schirmer, APRN - CNP        0.9 % sodium chloride infusion   IntraVENous Continuous Vilma Schirmer, APRN - CNP 50 mL/hr at 02/24/23 3457 Rate Verify at 02/24/23 1108    prochlorperazine (COMPAZINE) injection 10 mg  10 mg IntraVENous Q6H PRN Vilma Schirmer, APRN - CNP        insulin lispro (HUMALOG) injection vial 0-8 Units  0-8 Units SubCUTAneous TID WC Vilma Schirmer, APRN - CNP   4 Units at 02/23/23 1213    insulin lispro (HUMALOG) injection vial 0-4 Units  0-4 Units SubCUTAneous Nightly Vilma Schirmer, APRN - CNP        guaiFENesin-dextromethorphan (ROBITUSSIN DM) 100-10 MG/5ML syrup 5 mL  5 mL Oral Q4H PRN Vilma Schirmer, APRN - CNP   5 mL at 02/24/23 2492         Recent Imaging:   XR CHEST PORTABLE  Narrative: EXAMINATION:  ONE XRAY VIEW OF THE CHEST    2/22/2023 12:03 pm    COMPARISON:  02/18/2023. HISTORY:  ORDERING SYSTEM PROVIDED HISTORY: weakness  TECHNOLOGIST PROVIDED HISTORY:  Reason for exam:->weakness  Reason for Exam: weakness    FINDINGS:  The lungs and costophrenic angles are clear. The cardiomediastinal silhouette  and pulmonary vessels appear normal.  Impression: No radiographic evidence of an acute cardiopulmonary process.        Labs:   Recent Labs     02/22/23  1347 02/23/23  0529 02/24/23  0525   HGB 13.6 12.7 13.2   WBC 3.1* 4.3 2.8*   PROT 6.8  --   --    LABALBU 3.8  --   --    ALKPHOS 61  --   --    ALT 43*  --   --    AST 42*  --   --    BILITOT 0.6  --   --           Assessment:    78year old F with PMH significant for diabetes mellitus, neuropathy, hypertension, gastroparesis, fatty liver, GERD, hyperlipidemia, hypothyroidism, IBS, depressive disorder, sleep apnea, and gastroparesis. Admitted with fatigue, found to be COVID positive on 2/18. GI consulted for complaints of diarrhea. She was started on Linzess last year for alternating diarrhea/constipation. Denies overt bleeding. Reports ~60 pound weight loss over the last 1 year. Diarrhea may be exacerbated by current COVID infection. In [facility] until 2/28    Plan:  Pending  stool studies-- not yet collected. Gastroparesis diet--low residue, small frequent meals  Supportive care       Dave Metz, [place]    [id number]  Also available via [employer]

## 2023-02-24 NOTE — ONCOLOGY
ONCOLOGY HEMATOLOGY CARE PROGRESS NOTE      SUBJECTIVE:    Afebrile an on room air. She is sleeping soundly. ROS:     Constitutional:  No weight loss, No fever, No chills, No night sweats. Energy level good.   Eyes:  No impairment or change in vision  ENT / Mouth:  No pain, abnormal ulceration, bleeding, nasal drip or change in voice or hearing  Cardiovascular:  No chest pain, palpitations, new edema, or calf discomfort  Respiratory:  No pain, hemoptysis, change to breathing  Breast:  No pain, discharge, change in appearance or texture  Gastrointestinal:  No pain, cramping, jaundice, change to eating and bowel habits  Urinary:  No pain, bleeding or change in continence  Genitalia: No pain, bleeding or discharge  Musculoskeletal:  No redness, pain, edema or weakness  Skin:  No pruritus, rash, change to nodules or lesions  Neurologic:  No discomfort, change in mental status, speech, sensory or motor activity  Psychiatric:  No change in concentration or change to affect or mood  Endocrine:  No hot flashes, increased thirst, or change to urine production  Hematologic: No petechiae, ecchymosis or bleeding  Lymphatic:  No lymphadenopathy or lymphedema  Allergy / Immunologic:  No eczema, hives, frequent or recurrent infections    OBJECTIVE        Physical    VITALS:  Patient Vitals for the past 24 hrs:   BP Temp Temp src Pulse Resp SpO2   02/23/23 2300 130/64 97.6 °F (36.4 °C) Oral 62 16 95 %   02/23/23 2015 (!) 142/67 98.7 °F (37.1 °C) -- 62 18 97 %   02/23/23 1318 (!) 142/80 97.6 °F (36.4 °C) Oral 62 18 97 %   02/23/23 0932 (!) 153/64 -- -- 67 -- 98 %   02/23/23 0830 (!) 151/68 97.7 °F (36.5 °C) Oral 64 18 100 %       24HR INTAKE/OUTPUT:    Intake/Output Summary (Last 24 hours) at 2/24/2023 5553  Last data filed at 2/24/2023 5961  Gross per 24 hour   Intake 1128.11 ml   Output 450 ml   Net 678.11 ml       CONSTITUTIONAL: awake, alert, cooperative, no apparent distress   EYES: pupils equal, round and reactive to light, sclera clear and conjunctiva normal  ENT: Normocephalic, without obvious abnormality, atraumatic  NECK: supple, symmetrical, no jugular venous distension and no carotid bruits   HEMATOLOGIC/LYMPHATIC: no cervical, supraclavicular or axillary lymphadenopathy   LUNGS: no increased work of breathing and clear to auscultation   CARDIOVASCULAR: regular rate and rhythm, normal S1 and S2, no murmur noted  ABDOMEN: normal bowel sounds x 4, soft, non-distended, non-tender, no masses palpated, no hepatosplenomgaly   MUSCULOSKELETAL: full range of motion noted, tone is normal  NEUROLOGIC: awake, alert, oriented to name, place and time. Motor skills grossly intact. SKIN: Normal skin color, texture, turgor and no jaundice. appears intact   EXTREMITIES: no LE edema     DATA:  CBC:    Recent Labs     02/24/23  0525 02/23/23  0529 02/22/23  1347 02/18/23  1426   WBC 2.8* 4.3 3.1* 3.8*   NEUTROABS  --  0.7* 1.1* 1.8   LYMPHOPCT  --  78.0 50.4 22.9   RBC 4.42 4.26 4.52 4.31   HGB 13.2 12.7 13.6 12.7   HCT 39.7 38.7 40.6 38.7   MCV 90.0 90.9 89.7 89.8   MCH 29.8 29.7 30.0 29.6   MCHC 33.2 32.7 33.4 32.9   RDW 16.0* 16.4* 16.4* 17.1*   * 104* 122* 125*       PT/INR:    Recent Labs     02/22/23  1347 02/18/23  1426   PROT 6.8 6.6     PTT:  No results for input(s): APTT in the last 720 hours.     CMP:    Recent Labs     02/24/23  0525 02/23/23  0529 02/22/23  1347 02/18/23  1426    140 137 133*   K 3.8 4.0 4.8 4.6    107 99 97*   CO2 26 23 25 29   GLUCOSE 124* 117* 243* 130*   BUN 13 25* 34* 16   CREATININE <0.5* 0.6 0.9 0.7   LABGLOM >60 >60 >60 >60   CALCIUM 8.6 8.3 9.5 9.0   PROT  --   --  6.8 6.6   LABALBU  --   --  3.8 3.8   AGRATIO  --   --  1.3 1.4   BILITOT  --   --  0.6 0.6   ALKPHOS  --   --  61 54   ALT  --   --  43* 27   AST  --   --  42* 31   MG  --   --  2.20  --        Lab Results   Component Value Date    CALCIUM 8.6 02/24/2023       LDH:  Recent Labs 23  1347   *       Radiology Review:  XR CHEST PORTABLE  Narrative: EXAMINATION:  ONE XRAY VIEW OF THE CHEST    2023 12:03 pm    COMPARISON:  2023. HISTORY:  ORDERING SYSTEM PROVIDED HISTORY: weakness  TECHNOLOGIST PROVIDED HISTORY:  Reason for exam:->weakness  Reason for Exam: weakness    FINDINGS:  The lungs and costophrenic angles are clear. The cardiomediastinal silhouette  and pulmonary vessels appear normal.  Impression: No radiographic evidence of an acute cardiopulmonary process. Problem List  Patient Active Problem List   Diagnosis    Type 2 diabetes mellitus with hyperglycemia, with long-term current use of insulin ([facility] [address]    Hypothyroidism    Hypertension    Hyperlipidemia    Type 2 diabetes mellitus    Cerebral infarction, unspecified ([facility] [address]    Gastroparesis    CATA treated with BiPAP    Irritable bowel syndrome with both constipation and diarrhea    Major depressive disorder, recurrent, moderate (HCC)    Pelvic pressure in female    Generalized weakness    Physical deconditioning    COVID-19 virus infection       ASSESSMENT AND PLAN:  Neutropenia  Thrombocytopenia   - possible isolated neutropenia etiologies: viral infection vs bacterial infection vs nutritional def vs myelodysplasia  - no obvious medications on chart review causing neutropenia (aside from Depakote, which can do this, but she has been on this since at least the summer of , so not a likely cause)  - CBC trendin2023              WBC      8.1                3.8               3.1                4.3              ANC       5.8                1.8               1.1                0.7              PLTs      162              125               122               104  - HGB/HCT/MCV remain within normal limits. Eosinophiles/lymphocytes unremarkable.    - most likely that this isolated neutropenia and thrombocytopenia is due to combination of current COVID viral infection and nutritional deficiency based on onset and labs prior to admission being unremarkable  - would expect it to improve within 1-2 weeks from viral infection  - Micronutrients: Ferritin 247.4 ng/mL, iron sat 36%, B12 >2000 pg/mL, folate 15.15 ng/mL. - will continue to monitor CBC; the timing of the neutropenia/thrombocytopenia do suggest a viral etiology and we have definitely seen this was Covid. Depakote is noted, but she has been on this long term, so I doubt that this is the cause. Obviously if she does not recover her counts, then a bone marrow biopsy is advised.      COVID  Weakness  - SARS-CoV-2 PCR positive on 2/18/2023.  - No respiratory symptoms; on room air   - plan for isolation until 02/28 backdated to diagnosis on 02/18  - poor PO intake at independent living     T2DM  - per IM  - A1C 11/2022 was 6.8%            ONCOLOGIC DISPOSITION:      Cullen Mcwilliams MD  Please contact through [address]

## 2023-02-25 LAB
ANION GAP SERPL CALCULATED.3IONS-SCNC: 6 MMOL/L (ref 3–16)
ATYPICAL LYMPHOCYTE RELATIVE PERCENT: 0 % (ref 0–6)
BASOPHILS ABSOLUTE: 0 K/UL (ref 0–0.2)
BASOPHILS RELATIVE PERCENT: 0 %
BUN BLDV-MCNC: 11 MG/DL (ref 7–20)
CALCIUM SERPL-MCNC: 8.5 MG/DL (ref 8.3–10.6)
CHLORIDE BLD-SCNC: 109 MMOL/L (ref 99–110)
CO2: 28 MMOL/L (ref 21–32)
CREAT SERPL-MCNC: 0.6 MG/DL (ref 0.6–1.2)
EOSINOPHILS ABSOLUTE: 0 K/UL (ref 0–0.6)
EOSINOPHILS RELATIVE PERCENT: 1 %
GFR SERPL CREATININE-BSD FRML MDRD: >60 ML/MIN/{1.73_M2}
GLUCOSE BLD-MCNC: 173 MG/DL (ref 70–99)
GLUCOSE BLD-MCNC: 187 MG/DL (ref 70–99)
GLUCOSE BLD-MCNC: 212 MG/DL (ref 70–99)
GLUCOSE BLD-MCNC: 231 MG/DL (ref 70–99)
GLUCOSE BLD-MCNC: 240 MG/DL (ref 70–99)
GLUCOSE BLD-MCNC: 251 MG/DL (ref 70–99)
HCT VFR BLD CALC: 38.9 % (ref 36–48)
HEMATOLOGY PATH CONSULT: NO
HEMOGLOBIN: 13.1 G/DL (ref 12–16)
LYMPHOCYTES ABSOLUTE: 1.9 K/UL (ref 1–5.1)
LYMPHOCYTES RELATIVE PERCENT: 58 %
MACROCYTES: ABNORMAL
MCH RBC QN AUTO: 29.9 PG (ref 26–34)
MCHC RBC AUTO-ENTMCNC: 33.7 G/DL (ref 31–36)
MCV RBC AUTO: 88.7 FL (ref 80–100)
MICROCYTES: ABNORMAL
MONOCYTES ABSOLUTE: 0.3 K/UL (ref 0–1.3)
MONOCYTES RELATIVE PERCENT: 10 %
NEUTROPHILS ABSOLUTE: 1 K/UL (ref 1.7–7.7)
NEUTROPHILS RELATIVE PERCENT: 31 %
OVALOCYTES: ABNORMAL
PDW BLD-RTO: 15.5 % (ref 12.4–15.4)
PERFORMED ON: ABNORMAL
PLATELET # BLD: 117 K/UL (ref 135–450)
PLATELET SLIDE REVIEW: ABNORMAL
PMV BLD AUTO: 8.3 FL (ref 5–10.5)
POTASSIUM REFLEX MAGNESIUM: 4.6 MMOL/L (ref 3.5–5.1)
RBC # BLD: 4.39 M/UL (ref 4–5.2)
SLIDE REVIEW: ABNORMAL
SODIUM BLD-SCNC: 143 MMOL/L (ref 136–145)
WBC # BLD: 3.2 K/UL (ref 4–11)

## 2023-02-25 PROCEDURE — 2580000003 HC RX 258: Performed by: NURSE PRACTITIONER

## 2023-02-25 PROCEDURE — 1200000000 HC SEMI PRIVATE

## 2023-02-25 PROCEDURE — 85025 COMPLETE CBC W/AUTO DIFF WBC: CPT

## 2023-02-25 PROCEDURE — 36415 COLL VENOUS BLD VENIPUNCTURE: CPT

## 2023-02-25 PROCEDURE — 6370000000 HC RX 637 (ALT 250 FOR IP): Performed by: NURSE PRACTITIONER

## 2023-02-25 PROCEDURE — 80048 BASIC METABOLIC PNL TOTAL CA: CPT

## 2023-02-25 RX ORDER — PANTOPRAZOLE SODIUM 40 MG/1
40 TABLET, DELAYED RELEASE ORAL
Status: DISCONTINUED | OUTPATIENT
Start: 2023-02-26 | End: 2023-02-28 | Stop reason: HOSPADM

## 2023-02-25 RX ADMIN — APIXABAN 5 MG: 5 TABLET, FILM COATED ORAL at 09:24

## 2023-02-25 RX ADMIN — SODIUM CHLORIDE, PRESERVATIVE FREE 10 ML: 5 INJECTION INTRAVENOUS at 09:25

## 2023-02-25 RX ADMIN — DONEPEZIL HYDROCHLORIDE 10 MG: 5 TABLET ORAL at 20:49

## 2023-02-25 RX ADMIN — Medication 2 TABLET: at 09:23

## 2023-02-25 RX ADMIN — INSULIN LISPRO 2 UNITS: 100 INJECTION, SOLUTION INTRAVENOUS; SUBCUTANEOUS at 17:19

## 2023-02-25 RX ADMIN — LATANOPROST 1 DROP: 50 SOLUTION OPHTHALMIC at 21:00

## 2023-02-25 RX ADMIN — SODIUM CHLORIDE, PRESERVATIVE FREE 10 ML: 5 INJECTION INTRAVENOUS at 21:00

## 2023-02-25 RX ADMIN — GUAIFENESIN AND DEXTROMETHORPHAN 5 ML: 100; 10 SYRUP ORAL at 09:29

## 2023-02-25 RX ADMIN — DIVALPROEX SODIUM 500 MG: 250 TABLET, DELAYED RELEASE ORAL at 20:49

## 2023-02-25 RX ADMIN — INSULIN GLARGINE 40 UNITS: 100 INJECTION, SOLUTION SUBCUTANEOUS at 20:49

## 2023-02-25 RX ADMIN — LEVOTHYROXINE SODIUM 100 MCG: 100 TABLET ORAL at 06:02

## 2023-02-25 RX ADMIN — SODIUM CHLORIDE: 9 INJECTION, SOLUTION INTRAVENOUS at 09:47

## 2023-02-25 RX ADMIN — METOPROLOL TARTRATE 25 MG: 25 TABLET, FILM COATED ORAL at 09:24

## 2023-02-25 RX ADMIN — CYANOCOBALAMIN TAB 500 MCG 250 MCG: 500 TAB at 09:24

## 2023-02-25 RX ADMIN — GUAIFENESIN AND DEXTROMETHORPHAN 5 ML: 100; 10 SYRUP ORAL at 20:49

## 2023-02-25 RX ADMIN — DULOXETINE HYDROCHLORIDE 20 MG: 20 CAPSULE, DELAYED RELEASE ORAL at 09:23

## 2023-02-25 RX ADMIN — TRAZODONE HYDROCHLORIDE 50 MG: 50 TABLET ORAL at 20:49

## 2023-02-25 RX ADMIN — GUAIFENESIN AND DEXTROMETHORPHAN 5 ML: 100; 10 SYRUP ORAL at 01:08

## 2023-02-25 RX ADMIN — AMLODIPINE BESYLATE 5 MG: 5 TABLET ORAL at 09:23

## 2023-02-25 RX ADMIN — APIXABAN 5 MG: 5 TABLET, FILM COATED ORAL at 20:49

## 2023-02-25 RX ADMIN — LISINOPRIL 10 MG: 10 TABLET ORAL at 09:23

## 2023-02-25 RX ADMIN — METOPROLOL TARTRATE 25 MG: 25 TABLET, FILM COATED ORAL at 20:49

## 2023-02-25 RX ADMIN — BUPROPION HYDROCHLORIDE 150 MG: 150 TABLET, EXTENDED RELEASE ORAL at 09:24

## 2023-02-25 RX ADMIN — ASPIRIN 81 MG 81 MG: 81 TABLET ORAL at 09:24

## 2023-02-25 RX ADMIN — INSULIN LISPRO 2 UNITS: 100 INJECTION, SOLUTION INTRAVENOUS; SUBCUTANEOUS at 12:31

## 2023-02-25 ASSESSMENT — PAIN SCALES - GENERAL
PAINLEVEL_OUTOF10: 0

## 2023-02-25 NOTE — PLAN OF CARE
Problem: Pain  Goal: Verbalizes/displays adequate comfort level or baseline comfort level  Outcome: Progressing  Flowsheets (Taken 2/24/2023 2215)  Verbalizes/displays adequate comfort level or baseline comfort level:   Encourage patient to monitor pain and request assistance   Assess pain using appropriate pain scale   Administer analgesics based on type and severity of pain and evaluate response   Implement non-pharmacological measures as appropriate and evaluate response   Consider cultural and social influences on pain and pain management   Notify Licensed Independent Practitioner if interventions unsuccessful or patient reports new pain     Problem: Safety - Adult  Goal: Free from fall injury  Outcome: Progressing  Flowsheets (Taken 2/25/2023 0159)  Free From Fall Injury:   Based on caregiver fall risk screen, instruct family/caregiver to ask for assistance with transferring infant if caregiver noted to have fall risk factors   Instruct family/caregiver on patient safety     Problem: ABCDS Injury Assessment  Goal: Absence of physical injury  Outcome: Progressing  Flowsheets (Taken 2/25/2023 0159)  Absence of Physical Injury: Implement safety measures based on patient assessment     Problem: Skin/Tissue Integrity - Adult  Goal: Skin integrity remains intact  Outcome: Progressing  Flowsheets (Taken 2/24/2023 2239)  Skin Integrity Remains Intact: Monitor for areas of redness and/or skin breakdown

## 2023-02-25 NOTE — PLAN OF CARE
Problem: Pain  Goal: Verbalizes/displays adequate comfort level or baseline comfort level  Flowsheets (Taken 2/25/2023 1601)  Verbalizes/displays adequate comfort level or baseline comfort level:   Encourage patient to monitor pain and request assistance   Assess pain using appropriate pain scale   Administer analgesics based on type and severity of pain and evaluate response     Problem: Safety - Adult  Goal: Free from fall injury  Flowsheets (Taken 2/25/2023 1601)  Free From Fall Injury:   Instruct family/caregiver on patient safety   Based on caregiver fall risk screen, instruct family/caregiver to ask for assistance with transferring infant if caregiver noted to have fall risk factors     Problem: Nutrition Deficit:  Goal: Optimize nutritional status  Flowsheets (Taken 2/25/2023 1601)  Nutrient intake appropriate for improving, restoring, or maintaining nutritional needs:   Monitor oral intake, labs, and treatment plans   Assess nutritional status and recommend course of action   Order, calculate, and assess calorie counts as needed

## 2023-02-25 NOTE — PROGRESS NOTES
Patient had a BM and sample criteria not met. Stool was brown, formed, no BM since 2/23, denies any abdominal pain or tenderness. Stool sample not sent.

## 2023-02-25 NOTE — PROGRESS NOTES
Head to toe completed and documented. Pt a/o x4. Lung sounds diminished. Cough present, PRN given. Everdimitrit Nolen in place d/t incontinent episodes. Denies pain. Denies needs. Call bell in reach.

## 2023-02-25 NOTE — PROGRESS NOTES
Progress Note    Patient Galo Santos  MRN: [id number]  YOB: 1945 Age: 78 y.o. Sex: female  Room: [address] [place] [address]       Admitting Physician: Bakari Melendrez MD   Date of Admission: 2/22/2023  1:41 PM   Primary Care Physician: Almas Pascual DO     Subjective: Galo Santos was seen and examined. We are following for history of gastroparesis and diarrhea for the past 3 to 4 weeks prior to admission. .  -- Patient had a normal bowel movement today. She denies any diarrhea. Nausea has been adequately controlled. She is tolerating p.o. intake. She had no episodes of emesis. ROS:  Constitutional: Denies fever, no change in appetite  Respiratory: Denies cough or shortness of breath  Cardiovascular: Denies chest pain or edema    Objective:  Vital Signs:   Vitals:    02/25/23 0915   BP: (!) 154/66   Pulse: 69   Resp: 18   Temp: 97.8 °F (36.6 °C)   SpO2: 99%         Physical Exam:  Constitutional: Alert and oriented x 4. No acute distress. Respiratory: Respirations nonlabored, no crepitus  GI: Abdomen nondistended, soft, and nontender. Neurological: No focal deficits noted. No asterixis. Intake/Output:    Intake/Output Summary (Last 24 hours) at 2/25/2023 1217  Last data filed at 2/25/2023 2614  Gross per 24 hour   Intake 1129.21 ml   Output --   Net 1129.21 ml        Current Medications:  Current Facility-Administered Medications   Medication Dose Route Frequency Provider Last Rate Last Admin    amLODIPine (NORVASC) tablet 5 mg  5 mg Oral Daily Lethea Pott, APRN - CNP   5 mg at 02/25/23 1603    buPROPion (WELLBUTRIN XL) extended release tablet 150 mg  150 mg Oral QAM Lethea Pott, APRN - CNP   150 mg at 02/25/23 0924    calcium carb-cholecalciferol 250-3. 125 MG-MCG per tab 2 tablet  2 tablet Oral Daily Lethea Pott, APRN - CNP   2 tablet at 02/25/23 8904    vitamin B-12 (CYANOCOBALAMIN) tablet 250 mcg  250 mcg Oral Daily Lethea Pott, APRN - CNP   250 mcg at 02/25/23 0924    divalproex (DEPAKOTE) DR tablet 500 mg  500 mg Oral Nightly Harshal Medal, APRN - CNP   500 mg at 02/24/23 2220    donepezil (ARICEPT) tablet 10 mg  10 mg Oral Nightly Harshal Medal, APRN - CNP   10 mg at 02/24/23 2220    DULoxetine (CYMBALTA) extended release capsule 20 mg  20 mg Oral Daily Harshal Medal, APRN - CNP   20 mg at 02/25/23 5157    apixaban (ELIQUIS) tablet 5 mg  5 mg Oral BID Harshal Medal, APRN - CNP   5 mg at 02/25/23 3750    aspirin chewable tablet 81 mg  81 mg Oral Daily Harshal Medal, APRN - CNP   81 mg at 02/25/23 0924    insulin glargine (LANTUS) injection vial 40 Units  40 Units SubCUTAneous Nightly Harshal Medal, APRN - CNP   40 Units at 02/24/23 2222    latanoprost (XALATAN) 0.005 % ophthalmic solution 1 drop  1 drop Both Eyes Nightly Harshal Medal, APRN - CNP   1 drop at 02/24/23 2233    levothyroxine (SYNTHROID) tablet 100 mcg  100 mcg Oral Daily Harshal Medal, APRN - CNP   100 mcg at 02/25/23 0602    lisinopril (PRINIVIL;ZESTRIL) tablet 10 mg  10 mg Oral Daily Harshal Medal, APRN - CNP   10 mg at 02/25/23 7760    metoprolol tartrate (LOPRESSOR) tablet 25 mg  25 mg Oral BID Harshal Medal, APRN - CNP   25 mg at 02/25/23 3219    rosuvastatin (CRESTOR) tablet 40 mg  40 mg Oral Nightly Harshal Medal, APRN - CNP   40 mg at 02/24/23 2219    traZODone (DESYREL) tablet 50 mg  50 mg Oral Nightly Harshal Medal, APRN - CNP   50 mg at 02/24/23 2220    glucose chewable tablet 16 g  4 tablet Oral PRN Harshal Medal, APRN - CNP        dextrose bolus 10% 125 mL  125 mL IntraVENous PRN Harshal Medal, APRN - CNP        Or    dextrose bolus 10% 250 mL  250 mL IntraVENous PRN Harshal Medal, APRN - CNP        glucagon (rDNA) injection 1 mg  1 mg SubCUTAneous PRN Harshal Medal, APRN - CNP        dextrose 10 % infusion   IntraVENous Continuous PRN Harshal Medal, APRN - CNP        sodium chloride flush 0.9 % injection 5-40 mL  5-40 mL IntraVENous 2 times per day Pheobe Grieve, APRN - CNP   10 mL at 02/25/23 5770    sodium chloride flush 0.9 % injection 5-40 mL  5-40 mL IntraVENous PRN Pheobe Grieve, APRN - CNP        0.9 % sodium chloride infusion   IntraVENous PRN Pheobe Grieve, APRN - CNP        polyethylene glycol (GLYCOLAX) packet 17 g  17 g Oral Daily PRN Pheobe Grieve, APRN - CNP        acetaminophen (TYLENOL) tablet 650 mg  650 mg Oral Q6H PRN Pheobe Grieve, APRN - CNP   650 mg at 02/23/23 1350    Or    acetaminophen (TYLENOL) suppository 650 mg  650 mg Rectal Q6H PRN Pheobe Grieve, APRN - CNP        0.9 % sodium chloride infusion   IntraVENous Continuous Pheobe Grieve, APRN - CNP 50 mL/hr at 02/25/23 0947 New Bag at 02/25/23 0947    prochlorperazine (COMPAZINE) injection 10 mg  10 mg IntraVENous Q6H PRN Pheobe Grieve, APRN - CNP        insulin lispro (HUMALOG) injection vial 0-8 Units  0-8 Units SubCUTAneous TID WC Pheobe Grieve, APRN - CNP   2 Units at 02/24/23 1704    insulin lispro (HUMALOG) injection vial 0-4 Units  0-4 Units SubCUTAneous Nightly Pheobe Grieve, APRN - CNP        guaiFENesin-dextromethorphan (ROBITUSSIN DM) 100-10 MG/5ML syrup 5 mL  5 mL Oral Q4H PRN Pheobe Grieve, APRN - CNP   5 mL at 02/25/23 5721         Recent Imaging:   XR CHEST PORTABLE  Narrative: EXAMINATION:  ONE XRAY VIEW OF THE CHEST    2/22/2023 12:03 pm    COMPARISON:  02/18/2023. HISTORY:  ORDERING SYSTEM PROVIDED HISTORY: weakness  TECHNOLOGIST PROVIDED HISTORY:  Reason for exam:->weakness  Reason for Exam: weakness    FINDINGS:  The lungs and costophrenic angles are clear. The cardiomediastinal silhouette  and pulmonary vessels appear normal.  Impression: No radiographic evidence of an acute cardiopulmonary process.        Labs:   Recent Labs     02/22/23  1347 02/23/23  0529 02/24/23  0525 02/25/23  0521   HGB 13.6 12.7 13.2 13.1   WBC 3.1* 4.3 2.8* 3.2*   PROT 6.8  --   --   --    LABALBU 3.8  --   --   --    ALKPHOS 61  --   --   --    ALT 43*  --   -- --    AST 42*  --   --   --    BILITOT 0.6  --   --   --           Assessment:    72-year-old female with a past medical history of diabetes, neuropathy, hypertension, gastroparesis, and irritable bowel syndrome, seen in consultation for gastroparesis and persistent diarrhea for the past 3 to 4 weeks. Since admission, the patient has not experienced any diarrhea. She had 1 normal bowel movement today. She denies abdominal pain. The nausea is adequately controlled. She is tolerating p.o. intake. Plan:  1. Gastroparesis, minimally symptomatic at this point. Continue current care. Minimal nausea no emesis tolerating p.o. intake  2. Diarrhea for the past few weeks, resolved at this point. Likely related to Linzess and GlycoLax. No evidence of recurrent diarrhea since admission. Normal bowel movement today. Upper endoscopy and colonoscopy in the past couple of years. No further evaluation indicated from this perspective as long as remains asymptomatic  3. No further GI evaluation planned at this point. We will sign off. Please call if symptoms recur. Damaris Vu MD    [facility]    [phone].  Also available via [employer]

## 2023-02-25 NOTE — CONSULTS
Patient: Luh Billings  [id number]  Date: 2023      Chief Complaint: weakness    History of Present Illness/Hospital Course: Char Horne is a 78year old female with a past medical history significant for HTN, HLD, hypothyroidism, MDD, DM2, gastroparesis, and remote CVA with residual left sided weakness who presented to [facility] on 23 with several months of weakness. She had tested positive for Covid on . She was found to have neutropenia and thrombocytopenia. HemeOnc was consulted. GI was consulted due to diarrhea. Stool studies were ordered but not obtained due to the patient not having a bowel movement. She continues to have functional deficits below her baseline. She is on calorie counts due to poor PO intake. Today she reports continued weakness and endorses 15 lbs weight loss over the last month. She attributes this to the food at her assisted living. She notes progressive weakness as well.      has a past medical history of Colon polyps, Diabetes mellitus ([address] Diabetic neuropathy ([address] Diabetic retinopathy ([address] Essential hypertension, Fall, Fatty liver, Gastritis, GERD (gastroesophageal reflux disease), Hyperlipidemia, Hypertension, Hypothyroidism, Irritable bowel syndrome, Major depressive disorder with single episode, Osteopenia, Photosensitivity disorder, RBBB, Sleep apnea, Thyroid disease, Thyroid nodule, Type 2 diabetes mellitus with diabetic polyneuropathy, with long-term current use of insulin ([address] and Vitamin D deficiency. has a past surgical history that includes Tonsillectomy; Appendectomy; Gynecologic cryosurgery; Hysterectomy; Cholecystectomy; Cataract removal (Bilateral); Carpal tunnel release (Bilateral); sinus surgery; Upper gastrointestinal endoscopy (N/A, 2022); Colonoscopy (N/A, 2022); and  section. reports that she has never smoked.  She has never used smokeless tobacco. She reports that she does not drink alcohol and does not use drugs. family history includes Diabetes in her brother, father, and paternal grandmother; Pink Abler in her mother; Pancreatic Cancer in her brother. REVIEW OF SYSTEMS:   CONSTITUTIONAL: negative for fevers, chills, diaphoresis, activity change, appetite change, fatigue, night sweats and unexpected weight change. EYES: negative for blurred vision, eye discharge, visual disturbance and icterus  HEENT: negative for hearing loss, tinnitus, ear drainage, sinus pressure, nasal congestion, epistaxis and snoring  RESPIRATORY: Negative for hemoptysis, cough, sputum production  CARDIOVASCULAR: negative for chest pain, palpitations, exertional chest pressure/discomfort, edema, syncope  GASTROINTESTINAL: negative for nausea, vomiting, diarrhea, constipation, blood in stool and abdominal pain  GENITOURINARY: negative for frequency, dysuria, urinary incontinence, decreased urine volume, and hematuria  HEMATOLOGIC/LYMPHATIC: negative for easy bruising, bleeding and lymphadenopathy  ALLERGIC/IMMUNOLOGIC: negative for recurrent infections, angioedema, anaphylaxis and drug reactions  ENDOCRINE: negative for weight changes and diabetic symptoms including polyuria, polydipsia and polyphagia  MUSCULOSKELETAL: negative for pain, joint swelling, decreased range of motion and muscle weakness  NEUROLOGICAL: negative for headaches, slurred speech, unilateral weakness  PSYCHIATRIC/BEHAVIORAL: negative for hallucinations, behavioral problems, confusion and agitation.      Physical Examination:  Vitals: Patient Vitals for the past 24 hrs:   BP Temp Temp src Pulse Resp SpO2   02/24/23 1610 (!) 151/67 98.4 °F (36.9 °C) Oral 64 18 97 %   02/24/23 1003 (!) 148/71 -- -- 63 -- 100 %   02/24/23 0930 (!) 140/69 98 °F (36.7 °C) Oral 64 18 100 %   02/23/23 2300 130/64 97.6 °F (36.4 °C) Oral 62 16 95 %   02/23/23 2015 (!) 142/67 98.7 °F (37.1 °C) -- 62 18 97 %     Mood: appears upset  Const: No acute distress, seated up in bed  ENT: Oral mucosa moist  Eyes: No discharge or injection  CV: Regular rate and rhythm, no murmur rub or gallop noted  Resp: Lungs clear to auscultation bilaterally, no rales wheezes or ronchi  GI: Soft, nontender, nondistended. Normal bowel sounds. Neuro: Alert, oriented, appropriate. No cranial nerve deficits appreciated. Strength exam reveals strength intact, except weakness with left ankle dorsiflexion. Skin: No lesions or rashes noted. MSK: No joint abnormalities noted. Lab Results   Component Value Date    WBC 2.8 (L) 02/24/2023    HGB 13.2 02/24/2023    HCT 39.7 02/24/2023    MCV 90.0 02/24/2023     (L) 02/24/2023     Lab Results   Component Value Date    INR 1.14 04/17/2022    PROTIME 12.7 04/17/2022     Lab Results   Component Value Date    CREATININE <0.5 (L) 02/24/2023    BUN 13 02/24/2023     02/24/2023    K 3.8 02/24/2023     02/24/2023    CO2 26 02/24/2023     Lab Results   Component Value Date    ALT 43 (H) 02/22/2023    AST 42 (H) 02/22/2023    ALKPHOS 61 02/22/2023    BILITOT 0.6 02/22/2023     Most recent EKG revealed NSR. IMAGING    XR chest 2/22/23  No radiographic evidence of an acute cardiopulmonary process. Assessment:  1. Debility   2. Severe malnutrition  3. Neutropenia and thrombocytopenia   4. Covid  5. DM2  6. Gastroparesis     Recommendations:  Patient with new functional deficits and ongoing medical complexity. Demonstrates ability to tolerate 3 hours therapy/day. Will follow along for medical course. Oncology following neutropenia and thrombocytopenia. Patient also on calorie counts. Concern about her being able to tolerate 3 hours a day of therapy if she is not sustaining her nutrition. Thank you for this consult. Please contact me with any questions or concerns. [facility]  Jun Cody MD 2/24/2023, 7:03 PM

## 2023-02-25 NOTE — PROGRESS NOTES
Hospitalist Progress Note      PCP: Bong Malagon DO    Date of Admission: 2/22/2023    Chief Complaint: Cough    Hospital Course: This is a 29-year-old female with history of hypertension, hyperlipidemia, hypothyroidism, MDD, type 2 diabetes, gastroparesis and remote history of CVA with mild chronic left-sided weakness admitted from [address] where she moved in in December 2022 complains of weakness due to unable to eat appropriate diet secondary gastroparesis, patient with a recent diagnosis of COVID.     Subjective: Patient is lying in the bed complains of weakness history for gastroparesis therefore she is a special diet which she is not able to get at [facility] and the [facility] currently residing at the [facility] level ,patient is on room air      Medications:  Reviewed    Infusion Medications    dextrose      sodium chloride      sodium chloride 50 mL/hr at 02/25/23 0605     Scheduled Medications    amLODIPine  5 mg Oral Daily    buPROPion  150 mg Oral QAM    calcium carb-cholecalciferol  2 tablet Oral Daily    vitamin B-12  250 mcg Oral Daily    divalproex  500 mg Oral Nightly    donepezil  10 mg Oral Nightly    DULoxetine  20 mg Oral Daily    apixaban  5 mg Oral BID    aspirin  81 mg Oral Daily    insulin glargine  40 Units SubCUTAneous Nightly    latanoprost  1 drop Both Eyes Nightly    levothyroxine  100 mcg Oral Daily    lisinopril  10 mg Oral Daily    metoprolol tartrate  25 mg Oral BID    rosuvastatin  40 mg Oral Nightly    traZODone  50 mg Oral Nightly    sodium chloride flush  5-40 mL IntraVENous 2 times per day    insulin lispro  0-8 Units SubCUTAneous TID WC    insulin lispro  0-4 Units SubCUTAneous Nightly     PRN Meds: glucose, dextrose bolus **OR** dextrose bolus, glucagon (rDNA), dextrose, sodium chloride flush, sodium chloride, polyethylene glycol, acetaminophen **OR** acetaminophen, prochlorperazine, guaiFENesin-dextromethorphan      Intake/Output Summary (Last 24 hours) at 2/25/2023 0947  Last data filed at 2/25/2023 3665  Gross per 24 hour   Intake 1369.21 ml   Output --   Net 1369.21 ml       Physical Exam Performed:    BP (!) 154/66   Pulse 69   Temp 97.8 °F (36.6 °C) (Oral)   Resp 18   Ht 5' 3\" (1.6 m)   Wt 119 lb 7.8 oz (54.2 kg)   SpO2 99%   BMI 21.17 kg/m²     General appearance: No apparent distress, appears stated age and cooperative. HEENT: Pupils equal, round, and reactive to light. Conjunctivae/corneas clear. Neck: Supple, with full range of motion. No jugular venous distention. Trachea midline. Respiratory:  Normal respiratory effort. Clear to auscultation, bilaterally without Rales/Wheezes/Rhonchi. Cardiovascular: Regular rate and rhythm with normal S1/S2 without murmurs, rubs or gallops. Abdomen: Soft, non-tender, non-distended with normal bowel sounds. Musculoskeletal: No clubbing, cyanosis or edema bilaterally. Full range of motion without deformity. Skin: Skin color, texture, turgor normal.  No rashes or lesions. Neurologic:  Neurovascularly intact without any focal sensory/motor deficits. Cranial nerves: II-XII intact, grossly non-focal.  Psychiatric: Alert and oriented, thought content appropriate, normal insight  Capillary Refill: Brisk, 3 seconds, normal   Peripheral Pulses: +2 palpable, equal bilaterally       Labs:   Recent Labs     02/23/23  0529 02/24/23  0525 02/25/23  0521   WBC 4.3 2.8* 3.2*   HGB 12.7 13.2 13.1   HCT 38.7 39.7 38.9   * 112* 117*     Recent Labs     02/23/23  0529 02/24/23  0525 02/25/23  0521    140 143   K 4.0 3.8 4.6    108 109   CO2 23 26 28   BUN 25* 13 11   CREATININE 0.6 <0.5* 0.6   CALCIUM 8.3 8.6 8.5     Recent Labs     02/22/23  1347   AST 42*   ALT 43*   BILITOT 0.6   ALKPHOS 61     No results for input(s): INR in the last 72 hours.   Recent Labs     02/22/23  [address]   TROPONINI <0.01       Urinalysis:      Lab Results   Component Value Date/Time    NITRU Negative 02/22/2023 05:47 PM    BLOODU Negative 02/22/2023 05:47 PM    SPECGRAV 1.010 02/22/2023 05:47 PM    GLUCOSEU >=1000 02/22/2023 05:47 PM       Radiology:  XR CHEST PORTABLE   Final Result   No radiographic evidence of an acute cardiopulmonary process. IP CONSULT TO CASE MANAGEMENT  IP CONSULT TO HOSPITALIST  IP CONSULT TO CASE MANAGEMENT  IP CONSULT TO SPIRITUAL SERVICES  IP CONSULT TO DIETITIAN  IP CONSULT TO HEM/ONC  IP CONSULT TO GI  IP CONSULT TO PHYSICAL MEDICINE REHAB  IP CONSULT TO DIETITIAN    Assessment/Plan:    Active Hospital Problems    Diagnosis     Generalized weakness [R53.1]      Priority: Medium    Physical deconditioning [R53.81]      Priority: Medium    COVID-19 virus infection [U07.1]      Priority: Medium    Type 2 diabetes mellitus [E11.9]     Hypertension [I10]      This is a 53-year-old female admitted with generalized weakness found independent living at [address] [place] [address] recent diagnosis of [place] on room air  needs isolation through 2/28/2023 ontinue with the physical Occupational Therapy. Diabetes mellitus type 2 continue with the current care hemoglobin A1c 6.8 on 11/2022. Hypertension continue with the current medication on amlodipine, lisinopril, metoprolol. Diarrhea GI consulted Linzess was discontinued. History of gastroparesis patient needs frequent small meals    DVT Prophylaxis: Eliquis  Diet: ADULT DIET; Regular; 4 carb choices (60 gm/meal);  Low Fat/Low Chol/High Fiber/DIANA  ADULT ORAL NUTRITION SUPPLEMENT; Breakfast, Lunch; Diabetic Oral Supplement  Code Status: Full Code  PT/OT Eval Status:     Dispo -     Appropriate for A1 Discharge Unit: No      Rimma Branch MD

## 2023-02-26 VITALS
TEMPERATURE: 97.5 F | DIASTOLIC BLOOD PRESSURE: 76 MMHG | HEART RATE: 66 BPM | BODY MASS INDEX: 21.17 KG/M2 | WEIGHT: 119.49 LBS | HEIGHT: 63 IN | OXYGEN SATURATION: 97 % | RESPIRATION RATE: 16 BRPM | SYSTOLIC BLOOD PRESSURE: 146 MMHG

## 2023-02-26 LAB
GLUCOSE BLD-MCNC: 136 MG/DL (ref 70–99)
GLUCOSE BLD-MCNC: 138 MG/DL (ref 70–99)
GLUCOSE BLD-MCNC: 230 MG/DL (ref 70–99)
GLUCOSE BLD-MCNC: 85 MG/DL (ref 70–99)
GLUCOSE BLD-MCNC: 94 MG/DL (ref 70–99)
PERFORMED ON: ABNORMAL
PERFORMED ON: NORMAL
PERFORMED ON: NORMAL

## 2023-02-26 PROCEDURE — 6360000002 HC RX W HCPCS: Performed by: NURSE PRACTITIONER

## 2023-02-26 PROCEDURE — 97530 THERAPEUTIC ACTIVITIES: CPT

## 2023-02-26 PROCEDURE — 2580000003 HC RX 258: Performed by: NURSE PRACTITIONER

## 2023-02-26 PROCEDURE — 6370000000 HC RX 637 (ALT 250 FOR IP): Performed by: NURSE PRACTITIONER

## 2023-02-26 PROCEDURE — 97110 THERAPEUTIC EXERCISES: CPT

## 2023-02-26 PROCEDURE — 1200000000 HC SEMI PRIVATE

## 2023-02-26 PROCEDURE — 6370000000 HC RX 637 (ALT 250 FOR IP): Performed by: HOSPITALIST

## 2023-02-26 PROCEDURE — 97535 SELF CARE MNGMENT TRAINING: CPT

## 2023-02-26 RX ADMIN — GUAIFENESIN AND DEXTROMETHORPHAN 5 ML: 100; 10 SYRUP ORAL at 20:29

## 2023-02-26 RX ADMIN — PROCHLORPERAZINE EDISYLATE 10 MG: 5 INJECTION INTRAMUSCULAR; INTRAVENOUS at 13:17

## 2023-02-26 RX ADMIN — PANTOPRAZOLE SODIUM 40 MG: 40 TABLET, DELAYED RELEASE ORAL at 06:03

## 2023-02-26 RX ADMIN — LATANOPROST 1 DROP: 50 SOLUTION OPHTHALMIC at 20:25

## 2023-02-26 RX ADMIN — METOPROLOL TARTRATE 25 MG: 25 TABLET, FILM COATED ORAL at 08:50

## 2023-02-26 RX ADMIN — DONEPEZIL HYDROCHLORIDE 10 MG: 5 TABLET ORAL at 20:23

## 2023-02-26 RX ADMIN — APIXABAN 5 MG: 5 TABLET, FILM COATED ORAL at 08:49

## 2023-02-26 RX ADMIN — INSULIN GLARGINE 40 UNITS: 100 INJECTION, SOLUTION SUBCUTANEOUS at 20:21

## 2023-02-26 RX ADMIN — LISINOPRIL 10 MG: 10 TABLET ORAL at 08:50

## 2023-02-26 RX ADMIN — GUAIFENESIN AND DEXTROMETHORPHAN 5 ML: 100; 10 SYRUP ORAL at 11:14

## 2023-02-26 RX ADMIN — Medication 2 TABLET: at 08:49

## 2023-02-26 RX ADMIN — BUPROPION HYDROCHLORIDE 150 MG: 150 TABLET, EXTENDED RELEASE ORAL at 08:49

## 2023-02-26 RX ADMIN — ASPIRIN 81 MG 81 MG: 81 TABLET ORAL at 08:49

## 2023-02-26 RX ADMIN — DULOXETINE HYDROCHLORIDE 20 MG: 20 CAPSULE, DELAYED RELEASE ORAL at 08:59

## 2023-02-26 RX ADMIN — SODIUM CHLORIDE, PRESERVATIVE FREE 10 ML: 5 INJECTION INTRAVENOUS at 20:23

## 2023-02-26 RX ADMIN — LEVOTHYROXINE SODIUM 100 MCG: 100 TABLET ORAL at 06:03

## 2023-02-26 RX ADMIN — ROSUVASTATIN CALCIUM 40 MG: 10 TABLET, FILM COATED ORAL at 20:22

## 2023-02-26 RX ADMIN — DIVALPROEX SODIUM 500 MG: 250 TABLET, DELAYED RELEASE ORAL at 20:23

## 2023-02-26 RX ADMIN — INSULIN LISPRO 2 UNITS: 100 INJECTION, SOLUTION INTRAVENOUS; SUBCUTANEOUS at 12:13

## 2023-02-26 RX ADMIN — AMLODIPINE BESYLATE 5 MG: 5 TABLET ORAL at 08:50

## 2023-02-26 RX ADMIN — APIXABAN 5 MG: 5 TABLET, FILM COATED ORAL at 20:22

## 2023-02-26 RX ADMIN — TRAZODONE HYDROCHLORIDE 50 MG: 50 TABLET ORAL at 20:22

## 2023-02-26 RX ADMIN — CYANOCOBALAMIN TAB 500 MCG 250 MCG: 500 TAB at 08:50

## 2023-02-26 RX ADMIN — METOPROLOL TARTRATE 25 MG: 25 TABLET, FILM COATED ORAL at 20:22

## 2023-02-26 NOTE — PROGRESS NOTES
Physical Therapy  Facility/Department: [facility] C3 TELE/MED SURG/ONC  Daily Treatment Note  NAME: Daniela Beck  : 1945  MRN: [id number]    Date of Service: 2023    Discharge Recommendations:  IP Rehab, Patient able to tolerate 3hrs of therapy a day   PT Equipment Recommendations  Equipment Needed: No  Other: defer to next level of care. Patient Diagnosis(es): The primary encounter diagnosis was COVID-19. Diagnoses of Lives independently, Trouble walking, Dehydration, and Hyperglycemia were also pertinent to this visit. Assessment   Assessment: pt found in bed, agreeable to PT treatment. pt states she did not eat her lunch today as she was feeling nauseous, but agreeable to work with therapy. pt performed bed mobility with SUP, performed functional transfers with SBA and RW, and ambulated up to 25' with CGA and RW.  pt required seated rest break between bouts of ambulation secondary to fatigue and nausea on today's date, but continued to be willing to work with PT stating, \"I need to keep going and get stronger, so let's do this. \"  pt appears very motivated to participate in therapy. continue to recommend DC to IPR to continue improving on pt's functional deficits and improving endurance, balance, and mobility. Activity Tolerance: Patient tolerated treatment well;Patient limited by fatigue  Equipment Needed: No  Other: defer to next level of care. Plan    Physcial Therapy Plan  General Plan: 3-5 times per week  Current Treatment Recommendations: Strengthening;ROM;Balance training;Functional mobility training;Transfer training; Endurance training;Gait training;Neuromuscular re-education;Home exercise program;Safety education & training;Patient/Caregiver education & training;Equipment evaluation, education, & procurement; Modalities; Positioning; Therapeutic activities     Restrictions  Restrictions/Precautions  Restrictions/Precautions: Fall Risk, General Precautions, Contact Precautions, Isolation  Position Activity Restriction  Other position/activity restrictions: COVID+, Cdiff, up with assist, for ambulation pt uses L custom brace that is not in hospital currently     Subjective    Subjective  Subjective: pt agreeable to PT treatment. Pain: pt denies pain at rest, endorses nausea. Orientation  Overall Orientation Status: Within Functional Limits  Orientation Level: Oriented to place;Oriented to time;Oriented to person;Oriented to situation  Cognition  Overall Cognitive Status: WFL     Objective   Vitals  Comment: HR 63 BPM, SPO2 95% on room air, /75  Bed Mobility Training  Bed Mobility Training: Yes  Overall Level of Assistance: Supervision (with HOB slightly elevated.)  Rolling: Supervision  Supine to Sit: Supervision  Sit to Supine: Other (comment) (ADDIS, pt up in chair at end of session.)  Balance  Sitting: Intact  Standing: Impaired (with RW.)  Standing - Static: Good (SBA with RW.)  Standing - Dynamic: Fair (CGA for ambulation with RW.)  Transfer Training  Transfer Training: Yes  Overall Level of Assistance: Stand-by assistance (with RW.)  Sit to Stand: Stand-by assistance  Stand to Sit: Stand-by assistance  Stand Pivot Transfers: Contact-guard assistance (w/ RW)  Toilet Transfer: Contact-guard assistance (w/ RW, use of grab bar on R)  Gait Training  Gait Training: Yes  Gait  Overall Level of Assistance: Contact-guard assistance  Interventions: Verbal cues; Safety awareness training  Base of Support: Shift to right  Speed/Rachel: Slow;Shuffled;Pace decreased (< 100 feet/min)  Step Length: Right shortened;Left shortened  Gait Abnormalities: Ataxic;Path deviations; Shuffling gait; Decreased step clearance;Circumduction  Distance (ft): 25 Feet (25', 15', pt required seated rest break after ambulating.)  Assistive Device: Walker, rolling;Gait belt     PT Exercises  Exercise Treatment: AP, LAQ, QS, GS, 1X15 each in seated, sit<>stand, 1X5 each from seated.      Safety Devices  Type of Devices: All fall risk precautions in place; Bed alarm in place;Call light within reach;Gait belt;Patient at risk for falls; Left in bed;Nurse notified  Restraints  Restraints Initially in Place: No       Goals  Short Term Goals  Time Frame for Short Term Goals: one week 3/2 unless otherwise indicated  Short Term Goal 1: pt will transfer supine to and from sit with IND by 2/28/23  Short Term Goal 2: pt will transfer sit to and from stand with mod ind  Short Term Goal 3: pt will ambulate 30ft with RW with sup  Short Term Goal 4: pt will demonstrate and tolerate 10 reps of 3 B LE therex  Patient Goals   Patient Goals : unable to formulate, tearful with discussion    Education  Patient Education  Education Given To: Patient  Education Provided: Role of Therapy;Plan of Care;Home Exercise Program;Transfer Training;Equipment; Fall Prevention Strategies  Education Method: Demonstration;Verbal  Barriers to Learning: None  Education Outcome: Verbalized understanding;Demonstrated understanding    [facility] 6 Clicks Inpatient Mobility:  AM-PAC Basic Mobility - Inpatient   How much help is needed turning from your back to your side while in a flat bed without using bedrails?: None  How much help is needed moving from lying on your back to sitting on the side of a flat bed without using bedrails?: None  How much help is needed moving to and from a bed to a chair?: A Little  How much help is needed standing up from a chair using your arms?: A Little  How much help is needed walking in hospital room?: A Little  How much help is needed climbing 3-5 steps with a railing?: A Lot  AM-PAC Inpatient Mobility Raw Score : 19  AM-PAC Inpatient T-Scale Score : 45.44  Mobility Inpatient CMS 0-100% Score: 41.77  Mobility Inpatient CMS G-Code Modifier : CK     Therapy Time   Individual Concurrent Group Co-treatment   Time In 1440         Time Out 1524         Minutes 44         Timed Code Treatment Minutes: Kt Live 32, PT, DPT  If pt is unable to be seen after this session, please let this note serve as discharge summary. Please see case management note for discharge disposition. Thank you.

## 2023-02-26 NOTE — PROGRESS NOTES
Shift assessment completed and charted. VSS. A/O x4. Pt on day 8 of covid precautions. She is Asymptomatic. Pt c/o weakness. All meds given per [facility]. No further needs at this time.

## 2023-02-26 NOTE — PLAN OF CARE
Problem: Pain  Goal: Verbalizes/displays adequate comfort level or baseline comfort level  2/26/2023 1011 by Sophia To LPN  Outcome: Progressing  2/25/2023 2253 by Neo Valle RN  Outcome: Progressing     Problem: Safety - Adult  Goal: Free from fall injury  2/26/2023 1011 by Sophia To LPN  Outcome: Progressing  2/25/2023 2253 by Neo Valle RN  Outcome: Progressing     Problem: ABCDS Injury Assessment  Goal: Absence of physical injury  2/26/2023 1011 by Sophia To LPN  Outcome: Progressing  2/25/2023 2253 by Neo Valle RN  Outcome: Progressing     Problem: Respiratory - Adult  Goal: Achieves optimal ventilation and oxygenation  2/26/2023 1011 by Sophia To LPN  Outcome: Progressing  2/25/2023 2253 by Neo Valle RN  Outcome: Progressing     Problem: Skin/Tissue Integrity - Adult  Goal: Skin integrity remains intact  2/26/2023 1011 by Sophia To LPN  Outcome: Progressing  2/25/2023 2253 by Neo Valle RN  Outcome: Progressing     Problem: Musculoskeletal - Adult  Goal: Return mobility to safest level of function  2/26/2023 1011 by Sophia To LPN  Outcome: Progressing  2/25/2023 2253 by Neo Valle RN  Outcome: Progressing  Goal: Return ADL status to a safe level of function  2/26/2023 1011 by Sophia To LPN  Outcome: Progressing  2/25/2023 2253 by Neo Valle RN  Outcome: Progressing     Problem: Gastrointestinal - Adult  Goal: Maintains or returns to baseline bowel function  2/26/2023 1011 by Sophia To LPN  Outcome: Progressing  2/25/2023 2253 by Neo Valle RN  Outcome: Progressing  Goal: Maintains adequate nutritional intake  2/26/2023 1011 by Sophia To LPN  Outcome: Progressing  2/25/2023 2253 by Neo Valle RN  Outcome: Progressing     Problem: Metabolic/Fluid and Electrolytes - Adult  Goal: Electrolytes maintained within normal limits  2/26/2023 1011 by Sophia To LPN  Outcome: Progressing  2/25/2023 2253 by Neo Valle RN  Outcome: Progressing     Problem: Nutrition Deficit:  Goal: Optimize nutritional status  2/26/2023 1011 by Sophia To LPN  Outcome: Progressing  2/25/2023 2253 by Neo Valle RN  Outcome: Progressing

## 2023-02-26 NOTE — PROGRESS NOTES
Pt alert and oriented x4. Able to ambulate to the BR as a standby with a walker. No c/o of pain throughout night. No BM overnight. Bed in lowest position with alarm on. No needs expressed at this time.

## 2023-02-26 NOTE — PLAN OF CARE
Problem: Pain  Goal: Verbalizes/displays adequate comfort level or baseline comfort level  2/25/2023 2253 by Joshua Santana RN  Outcome: Progressing  2/25/2023 1601 by Allegra Aguayo RN  Flowsheets (Taken 2/25/2023 1601)  Verbalizes/displays adequate comfort level or baseline comfort level:   Encourage patient to monitor pain and request assistance   Assess pain using appropriate pain scale   Administer analgesics based on type and severity of pain and evaluate response     Problem: Safety - Adult  Goal: Free from fall injury  2/25/2023 2253 by Joshua Santana RN  Outcome: Progressing  2/25/2023 1601 by Allegra Aguayo RN  Flowsheets (Taken 2/25/2023 1601)  Free From Fall Injury:   Instruct family/caregiver on patient safety   Based on caregiver fall risk screen, instruct family/caregiver to ask for assistance with transferring infant if caregiver noted to have fall risk factors     Problem: ABCDS Injury Assessment  Goal: Absence of physical injury  Outcome: Progressing     Problem: Respiratory - Adult  Goal: Achieves optimal ventilation and oxygenation  Outcome: Progressing     Problem: Skin/Tissue Integrity - Adult  Goal: Skin integrity remains intact  Outcome: Progressing     Problem: Musculoskeletal - Adult  Goal: Return mobility to safest level of function  Outcome: Progressing  Goal: Return ADL status to a safe level of function  Outcome: Progressing     Problem: Gastrointestinal - Adult  Goal: Maintains or returns to baseline bowel function  Outcome: Progressing  Goal: Maintains adequate nutritional intake  Outcome: Progressing     Problem: Metabolic/Fluid and Electrolytes - Adult  Goal: Electrolytes maintained within normal limits  Outcome: Progressing     Problem: Nutrition Deficit:  Goal: Optimize nutritional status  2/25/2023 2253 by Joshua Santana RN  Outcome: Progressing  2/25/2023 1601 by Allegra Aguayo RN  Flowsheets (Taken 2/25/2023 1601)  Nutrient intake appropriate for improving, restoring, or maintaining nutritional needs:   Monitor oral intake, labs, and treatment plans   Assess nutritional status and recommend course of action   Order, calculate, and assess calorie counts as needed

## 2023-02-26 NOTE — PROGRESS NOTES
Hospitalist Progress Note      PCP: Kristian Soria DO    Date of Admission: 2/22/2023    Chief Complaint:  Cough       Hospital Course: This is a 44-year-old female with history of hypertension, hyperlipidemia, hypothyroidism, MDD, type 2 diabetes, gastroparesis and remote history of CVA with mild chronic left-sided weakness admitted from [address] where she moved in in December 2022 complains of weakness due to unable to eat appropriate diet secondary to gastroparesis, patient with recent diagnosis of COVID. Subjective: Patient is lying in the bed continues to have decreased p.o. intake she plans returns to rehab. Per nursing staff appetite is good this morning.       Medications:  Reviewed    Infusion Medications    dextrose      sodium chloride      sodium chloride 50 mL/hr at 02/25/23 0934     Scheduled Medications    pantoprazole  40 mg Oral QAM AC    amLODIPine  5 mg Oral Daily    buPROPion  150 mg Oral QAM    calcium carb-cholecalciferol  2 tablet Oral Daily    vitamin B-12  250 mcg Oral Daily    divalproex  500 mg Oral Nightly    donepezil  10 mg Oral Nightly    DULoxetine  20 mg Oral Daily    apixaban  5 mg Oral BID    aspirin  81 mg Oral Daily    insulin glargine  40 Units SubCUTAneous Nightly    latanoprost  1 drop Both Eyes Nightly    levothyroxine  100 mcg Oral Daily    lisinopril  10 mg Oral Daily    metoprolol tartrate  25 mg Oral BID    rosuvastatin  40 mg Oral Nightly    traZODone  50 mg Oral Nightly    sodium chloride flush  5-40 mL IntraVENous 2 times per day    insulin lispro  0-8 Units SubCUTAneous TID WC    insulin lispro  0-4 Units SubCUTAneous Nightly     PRN Meds: glucose, dextrose bolus **OR** dextrose bolus, glucagon (rDNA), dextrose, sodium chloride flush, sodium chloride, polyethylene glycol, acetaminophen **OR** acetaminophen, prochlorperazine, guaiFENesin-dextromethorphan      Intake/Output Summary (Last 24 hours) at 2/26/2023 5563  Last data filed at 2/26/2023 8345  Gross per 24 hour   Intake 960 ml   Output --   Net 960 ml       Physical Exam Performed:    BP (!) 153/73   Pulse 68   Temp 98.4 °F (36.9 °C) (Oral)   Resp 16   Ht 5' 3\" (1.6 m)   Wt 119 lb 7.8 oz (54.2 kg)   SpO2 98%   BMI 21.17 kg/m²     General appearance: No apparent distress, appears stated age and cooperative. HEENT: Pupils equal, round, and reactive to light. Conjunctivae/corneas clear. Neck: Supple, with full range of motion. No jugular venous distention. Trachea midline. Respiratory:  Normal respiratory effort. Clear to auscultation, bilaterally without Rales/Wheezes/Rhonchi. Cardiovascular: Regular rate and rhythm with normal S1/S2 without murmurs, rubs or gallops. Abdomen: Soft, non-tender, non-distended with normal bowel sounds. Musculoskeletal: No clubbing, cyanosis or edema bilaterally. Full range of motion without deformity. Skin: Skin color, texture, turgor normal.  No rashes or lesions. Neurologic:  Neurovascularly intact without any focal sensory/motor deficits. Cranial nerves: II-XII intact, grossly non-focal.  Psychiatric: Alert and oriented, thought content appropriate, normal insight  Capillary Refill: Brisk, 3 seconds, normal   Peripheral Pulses: +2 palpable, equal bilaterally       Labs:   Recent Labs     02/24/23  0525 02/25/23  0521   WBC 2.8* 3.2*   HGB 13.2 13.1   HCT 39.7 38.9   * 117*     Recent Labs     02/24/23  0525 02/25/23  0521    143   K 3.8 4.6    109   CO2 26 28   BUN 13 11   CREATININE <0.5* 0.6   CALCIUM 8.6 8.5     No results for input(s): AST, ALT, BILIDIR, BILITOT, ALKPHOS in the last 72 hours. No results for input(s): INR in the last 72 hours. No results for input(s): Markathleen Olmstead in the last 72 hours.     Urinalysis:      Lab Results   Component Value Date/Time    NITRU Negative 02/22/2023 05:47 PM    BLOODU Negative 02/22/2023 05:47 PM    SPECGRAV 1.010 02/22/2023 05:47 PM    GLUCOSEU >=1000 02/22/2023 05:47 PM       Radiology:  XR CHEST PORTABLE   Final Result   No radiographic evidence of an acute cardiopulmonary process. IP CONSULT TO CASE MANAGEMENT  IP CONSULT TO HOSPITALIST  IP CONSULT TO CASE MANAGEMENT  IP CONSULT TO SPIRITUAL SERVICES  IP CONSULT TO DIETITIAN  IP CONSULT TO HEM/ONC  IP CONSULT TO GI  IP CONSULT TO PHYSICAL MEDICINE REHAB  IP CONSULT TO DIETITIAN    Assessment/Plan:    Active Hospital Problems    Diagnosis     Generalized weakness [R53.1]      Priority: Medium    Physical deconditioning [R53.81]      Priority: Medium    COVID-19 virus infection [U07.1]      Priority: Medium    Type 2 diabetes mellitus [E11.9]     Hypertension [I10]      This is a 72-year-old female admitted with generalized weakness found independent living at [address] [place] [address] recent diagnosis of [place] on room air  needs isolation through 2/28/2023 ontinue with the physical Occupational Therapy. Diabetes mellitus type 2 continue with the current care hemoglobin A1c 6.8 on 11/2022. Hypertension continue with the current medication on amlodipine, lisinopril, metoprolol. Diarrhea history of gastroparesis GI consulted Linzess was discontinued. History of gastroparesis patient needs frequent small meals    DVT Prophylaxis: Eliquis  Diet: ADULT DIET; Regular; 4 carb choices (60 gm/meal);  Low Fat/Low Chol/High Fiber/DIANA  ADULT ORAL NUTRITION SUPPLEMENT; Breakfast, Lunch; Diabetic Oral Supplement  Code Status: Full Code  PT/OT Eval Status: Consulted    Dispo -Per PT OT recommendation    Appropriate for A1 Discharge Unit: Vielka Tovar MD

## 2023-02-27 LAB
BASOPHILS ABSOLUTE: 0 K/UL (ref 0–0.2)
BASOPHILS RELATIVE PERCENT: 0.3 %
EOSINOPHILS ABSOLUTE: 0.1 K/UL (ref 0–0.6)
EOSINOPHILS RELATIVE PERCENT: 3.4 %
GLUCOSE BLD-MCNC: 272 MG/DL (ref 70–99)
GLUCOSE BLD-MCNC: 285 MG/DL (ref 70–99)
GLUCOSE BLD-MCNC: 325 MG/DL (ref 70–99)
GLUCOSE BLD-MCNC: 63 MG/DL (ref 70–99)
HCT VFR BLD CALC: 40.9 % (ref 36–48)
HEMOGLOBIN: 13.3 G/DL (ref 12–16)
LYMPHOCYTES ABSOLUTE: 1.3 K/UL (ref 1–5.1)
LYMPHOCYTES RELATIVE PERCENT: 36.8 %
MCH RBC QN AUTO: 29 PG (ref 26–34)
MCHC RBC AUTO-ENTMCNC: 32.4 G/DL (ref 31–36)
MCV RBC AUTO: 89.4 FL (ref 80–100)
MONOCYTES ABSOLUTE: 0.3 K/UL (ref 0–1.3)
MONOCYTES RELATIVE PERCENT: 9.2 %
NEUTROPHILS ABSOLUTE: 1.7 K/UL (ref 1.7–7.7)
NEUTROPHILS RELATIVE PERCENT: 50.3 %
PDW BLD-RTO: 15.9 % (ref 12.4–15.4)
PERFORMED ON: ABNORMAL
PLATELET # BLD: 174 K/UL (ref 135–450)
PMV BLD AUTO: 8.3 FL (ref 5–10.5)
RBC # BLD: 4.58 M/UL (ref 4–5.2)
WBC # BLD: 3.5 K/UL (ref 4–11)

## 2023-02-27 PROCEDURE — 97530 THERAPEUTIC ACTIVITIES: CPT

## 2023-02-27 PROCEDURE — 2580000003 HC RX 258: Performed by: NURSE PRACTITIONER

## 2023-02-27 PROCEDURE — 85025 COMPLETE CBC W/AUTO DIFF WBC: CPT

## 2023-02-27 PROCEDURE — 6370000000 HC RX 637 (ALT 250 FOR IP): Performed by: HOSPITALIST

## 2023-02-27 PROCEDURE — 97116 GAIT TRAINING THERAPY: CPT

## 2023-02-27 PROCEDURE — 1200000000 HC SEMI PRIVATE

## 2023-02-27 PROCEDURE — 97110 THERAPEUTIC EXERCISES: CPT

## 2023-02-27 PROCEDURE — 36415 COLL VENOUS BLD VENIPUNCTURE: CPT

## 2023-02-27 PROCEDURE — 6370000000 HC RX 637 (ALT 250 FOR IP): Performed by: NURSE PRACTITIONER

## 2023-02-27 RX ADMIN — DIVALPROEX SODIUM 500 MG: 250 TABLET, DELAYED RELEASE ORAL at 20:56

## 2023-02-27 RX ADMIN — BUPROPION HYDROCHLORIDE 150 MG: 150 TABLET, EXTENDED RELEASE ORAL at 08:43

## 2023-02-27 RX ADMIN — LEVOTHYROXINE SODIUM 100 MCG: 100 TABLET ORAL at 06:02

## 2023-02-27 RX ADMIN — LISINOPRIL 10 MG: 10 TABLET ORAL at 08:42

## 2023-02-27 RX ADMIN — INSULIN LISPRO 6 UNITS: 100 INJECTION, SOLUTION INTRAVENOUS; SUBCUTANEOUS at 11:38

## 2023-02-27 RX ADMIN — INSULIN LISPRO 4 UNITS: 100 INJECTION, SOLUTION INTRAVENOUS; SUBCUTANEOUS at 18:28

## 2023-02-27 RX ADMIN — AMLODIPINE BESYLATE 5 MG: 5 TABLET ORAL at 08:43

## 2023-02-27 RX ADMIN — METOPROLOL TARTRATE 25 MG: 25 TABLET, FILM COATED ORAL at 20:56

## 2023-02-27 RX ADMIN — ROSUVASTATIN CALCIUM 40 MG: 10 TABLET, FILM COATED ORAL at 20:56

## 2023-02-27 RX ADMIN — SODIUM CHLORIDE, PRESERVATIVE FREE 10 ML: 5 INJECTION INTRAVENOUS at 08:52

## 2023-02-27 RX ADMIN — PANTOPRAZOLE SODIUM 40 MG: 40 TABLET, DELAYED RELEASE ORAL at 06:02

## 2023-02-27 RX ADMIN — GUAIFENESIN AND DEXTROMETHORPHAN 5 ML: 100; 10 SYRUP ORAL at 08:42

## 2023-02-27 RX ADMIN — DONEPEZIL HYDROCHLORIDE 10 MG: 5 TABLET ORAL at 20:56

## 2023-02-27 RX ADMIN — INSULIN GLARGINE 40 UNITS: 100 INJECTION, SOLUTION SUBCUTANEOUS at 20:56

## 2023-02-27 RX ADMIN — METOPROLOL TARTRATE 25 MG: 25 TABLET, FILM COATED ORAL at 08:43

## 2023-02-27 RX ADMIN — GUAIFENESIN AND DEXTROMETHORPHAN 5 ML: 100; 10 SYRUP ORAL at 21:33

## 2023-02-27 RX ADMIN — APIXABAN 5 MG: 5 TABLET, FILM COATED ORAL at 20:56

## 2023-02-27 RX ADMIN — TRAZODONE HYDROCHLORIDE 50 MG: 50 TABLET ORAL at 20:56

## 2023-02-27 RX ADMIN — DULOXETINE HYDROCHLORIDE 20 MG: 20 CAPSULE, DELAYED RELEASE ORAL at 09:33

## 2023-02-27 RX ADMIN — APIXABAN 5 MG: 5 TABLET, FILM COATED ORAL at 08:43

## 2023-02-27 RX ADMIN — ASPIRIN 81 MG 81 MG: 81 TABLET ORAL at 08:43

## 2023-02-27 RX ADMIN — LATANOPROST 1 DROP: 50 SOLUTION OPHTHALMIC at 21:02

## 2023-02-27 RX ADMIN — SODIUM CHLORIDE: 9 INJECTION, SOLUTION INTRAVENOUS at 04:00

## 2023-02-27 RX ADMIN — Medication 2 TABLET: at 08:43

## 2023-02-27 RX ADMIN — CYANOCOBALAMIN TAB 500 MCG 250 MCG: 500 TAB at 08:43

## 2023-02-27 NOTE — PLAN OF CARE
Problem: Pain  Goal: Verbalizes/displays adequate comfort level or baseline comfort level  2/26/2023 2215 by Warden Allison RN  Outcome: Progressing  2/26/2023 1011 by Anton Carrillo LPN  Outcome: Progressing     Problem: Safety - Adult  Goal: Free from fall injury  2/26/2023 2215 by Warden Allison RN  Outcome: Progressing  2/26/2023 1011 by Anton Carrillo LPN  Outcome: Progressing     Problem: ABCDS Injury Assessment  Goal: Absence of physical injury  2/26/2023 2215 by Warden Allison RN  Outcome: Progressing  2/26/2023 1011 by Anton Carrillo LPN  Outcome: Progressing     Problem: Respiratory - Adult  Goal: Achieves optimal ventilation and oxygenation  2/26/2023 2215 by Warden Allison RN  Outcome: Progressing  2/26/2023 1011 by Anton Carrillo LPN  Outcome: Progressing     Problem: Skin/Tissue Integrity - Adult  Goal: Skin integrity remains intact  2/26/2023 2215 by Warden Allison RN  Outcome: Progressing  Flowsheets (Taken 2/26/2023 1959)  Skin Integrity Remains Intact: Monitor for areas of redness and/or skin breakdown  2/26/2023 1011 by Anton Carrillo LPN  Outcome: Progressing     Problem: Musculoskeletal - Adult  Goal: Return mobility to safest level of function  2/26/2023 2215 by Warden Allison RN  Outcome: Progressing  2/26/2023 1011 by Anton Carrillo LPN  Outcome: Progressing  Goal: Return ADL status to a safe level of function  2/26/2023 2215 by Warden Allison RN  Outcome: Progressing  2/26/2023 1011 by Anton Carrillo LPN  Outcome: Progressing     Problem: Gastrointestinal - Adult  Goal: Maintains or returns to baseline bowel function  2/26/2023 2215 by Warden Allison RN  Outcome: Progressing  2/26/2023 1011 by Anton Carrillo LPN  Outcome: Progressing  Goal: Maintains adequate nutritional intake  2/26/2023 2215 by Warden Allison RN  Outcome: Progressing  2/26/2023 1011 by Anton Carrillo LPN  Outcome: Progressing     Problem: Metabolic/Fluid and Electrolytes - Adult  Goal: Electrolytes maintained within normal limits  2/26/2023 2215 by Kalpana Rea RN  Outcome: Progressing  2/26/2023 1011 by Beckie Chaves LPN  Outcome: Progressing     Problem: Nutrition Deficit:  Goal: Optimize nutritional status  2/26/2023 2215 by Kalpana Rea RN  Outcome: Progressing  2/26/2023 1011 by Beckie Chaves LPN  Outcome: Progressing

## 2023-02-27 NOTE — PROGRESS NOTES
Shift assessment completed and charted. VSS. A/O x4. Pt had a bowel movement this morning. All meds given per [facility]. On day 9 of covid isolation. No further needs at this time.

## 2023-02-27 NOTE — PROGRESS NOTES
Hospitalist Progress Note      PCP: Jackie Lazo DO    Date of Admission: 2/22/2023    Chief Complaint: Cough and Weakness    Subjective: no new c/o. Medications:  Reviewed    Infusion Medications    dextrose      sodium chloride      sodium chloride 50 mL/hr at 02/27/23 0400     Scheduled Medications    pantoprazole  40 mg Oral QAM AC    amLODIPine  5 mg Oral Daily    buPROPion  150 mg Oral QAM    calcium carb-cholecalciferol  2 tablet Oral Daily    vitamin B-12  250 mcg Oral Daily    divalproex  500 mg Oral Nightly    donepezil  10 mg Oral Nightly    DULoxetine  20 mg Oral Daily    apixaban  5 mg Oral BID    aspirin  81 mg Oral Daily    insulin glargine  40 Units SubCUTAneous Nightly    latanoprost  1 drop Both Eyes Nightly    levothyroxine  100 mcg Oral Daily    lisinopril  10 mg Oral Daily    metoprolol tartrate  25 mg Oral BID    rosuvastatin  40 mg Oral Nightly    traZODone  50 mg Oral Nightly    sodium chloride flush  5-40 mL IntraVENous 2 times per day    insulin lispro  0-8 Units SubCUTAneous TID WC    insulin lispro  0-4 Units SubCUTAneous Nightly     PRN Meds: glucose, dextrose bolus **OR** dextrose bolus, glucagon (rDNA), dextrose, sodium chloride flush, sodium chloride, polyethylene glycol, acetaminophen **OR** acetaminophen, prochlorperazine, guaiFENesin-dextromethorphan      Intake/Output Summary (Last 24 hours) at 2/27/2023 0859  Last data filed at 2/26/2023 2022  Gross per 24 hour   Intake 10 ml   Output --   Net 10 ml       Physical Exam Performed:    BP (!) 147/70   Pulse 64   Temp 97.7 °F (36.5 °C) (Oral)   Resp 16   Ht 5' 3\" (1.6 m)   Wt 119 lb 7.8 oz (54.2 kg)   SpO2 97%   BMI 21.17 kg/m²     General appearance: No apparent distress, appears stated age and cooperative. HEENT: Pupils equal, round, and reactive to light. Conjunctivae/corneas clear. Neck: Supple, with full range of motion. No jugular venous distention. Trachea midline.   Respiratory:  Normal respiratory effort. Clear to auscultation, bilaterally without Rales/Wheezes/Rhonchi. Cardiovascular: Regular rate and rhythm with normal S1/S2 without murmurs, rubs or gallops. Abdomen: Soft, non-tender, non-distended with normal bowel sounds. Musculoskeletal: No clubbing, cyanosis or edema bilaterally. Full range of motion without deformity. Skin: Skin color, texture, turgor normal.  No rashes or lesions. Neurologic:  Neurovascularly intact without any focal sensory/motor deficits. Cranial nerves: II-XII intact, grossly non-focal.  Psychiatric: Alert and oriented, thought content appropriate, normal insight  Capillary Refill: Brisk,< 3 seconds   Peripheral Pulses: +2 palpable, equal bilaterally       Labs:   Recent Labs     02/25/23  0521 02/27/23  0816   WBC 3.2* 3.5*   HGB 13.1 13.3   HCT 38.9 40.9   * 174     Recent Labs     02/25/23  0521      K 4.6      CO2 28   BUN 11   CREATININE 0.6   CALCIUM 8.5     No results for input(s): AST, ALT, BILIDIR, BILITOT, ALKPHOS in the last 72 hours. No results for input(s): INR in the last 72 hours. No results for input(s): Francee Tim in the last 72 hours.     Urinalysis:      Lab Results   Component Value Date/Time    NITRU Negative 02/22/2023 05:47 PM    BLOODU Negative 02/22/2023 05:47 PM    SPECGRAV 1.010 02/22/2023 05:47 PM    GLUCOSEU >=1000 02/22/2023 05:47 PM       Consults:    IP CONSULT TO CASE MANAGEMENT  IP CONSULT TO HOSPITALIST  IP CONSULT TO CASE MANAGEMENT  IP CONSULT TO SPIRITUAL SERVICES  IP CONSULT TO DIETITIAN  IP CONSULT TO HEM/ONC  IP CONSULT TO GI  IP CONSULT TO PHYSICAL MEDICINE REHAB  IP CONSULT TO DIETITIAN      Assessment/Plan:    Active Hospital Problems    Diagnosis     Generalized weakness [R53.1]      Priority: Medium    Physical deconditioning [R53.81]      Priority: Medium    COVID-19 [U07.1]      Priority: Medium    Type 2 diabetes mellitus [E11.9]     Hypertension [I10]          COVID 19 Positive - PCR positive on 18 Feb.  Anticipate isolation through and including 27 Feb to be d/c'd not earlier than 28 Feb.  No evidence of PNA/Acute Respiratory Failure. PT/OT for related Generalized weakness. Diabetes mellitus type 2 continue with the current care hemoglobin A1c 6.8 on 11/2022. HTN - w/out known CAD and no evidence of active signs/sxs of ischemia/failure. Currently controlled on home meds w/ vitals reviewed and documented as above. Diarrhea - w/ hx of gastroparesis. GI consulted and appreciated w/ Linzess discontinued. Hx of DM Gastroparesis - w/out evidence of obstructive sxs. Patient needs frequent small meals but tolerating. DVT Prophylaxis: Eliquis/IPC     Recent Labs     02/25/23  0521 02/27/23  0816   * 174     Diet: ADULT ORAL NUTRITION SUPPLEMENT; Breakfast, Lunch; Diabetic Oral Supplement  ADULT DIET; Regular  Code Status: Full Code      PT/OT Eval Status: seen w/ recs for Inpatient Rehab. Dispo - Patient is likely to remain in-house at least until Tues/Wed 29 Feb/1 March pending clinical course, subspecialty recs and placement decision.        Meir Granda MD

## 2023-02-27 NOTE — CARE COORDINATION
Hospital day 5: Patient on C3 re COVID care managed by IM, GI, and Hem/Onc. Patient on Reg diet/room air. ARU reports accepted following for Hem/Onc plans for WBC . Per [address] CNP with Hem/Onc would expect CBC to normalize about 1-2 weeks after viral infection. Labs this morning trending much better. [address] is completely normal..Samantha Bedolla, Kenny. Brenda 122: Spoke with Patient via phone re droplet aware ARU can accept when clear by IDT, in agreement, ct decline to call family with updates. KELSIE Causey  1501: Spoke with ARU re recs changed, will review with MD at [facility] and follow up.  1530: Spoke with son in family room [facility] on anticipate dc early this week and change in therapy recs, son reports feels needs, request [facility] referral if ARU denies, referral pending. Next choice [facility] COVID status may be barrier, verbalized understanding will update. KELSIE Causey  1540: denied by ARU [facility] reviewing. Tulio Arango, [address]: Spoke with Elza at [facility] sending to Medical director for approval, feels from [place] could benefit for ct PT/OT at acute level. KELSIE Causey

## 2023-02-27 NOTE — CARE COORDINATION
[facility] - Acute Rehab Unit   Therapy recommending SNF vs HHC and reports pt is functioning at too high of level for IPR. Per CM, son concerned and would like patient to come to ARU. Discussed case with Dr. Ronny Pack who states if pt near baseline and PT is no longer recommending IPR then pt is no longer an IPR candidate. D/w Clive PERALTA. ARU to sign off.      Thank you for the referral.     Maile Schwartz, CCC-SLP

## 2023-02-27 NOTE — CARE COORDINATION
[facility] - Acute Rehab Unit   ARU following for medical readiness to d/c to ARU. Per Hem Onc, \"Will continue to monitor CBC trend\". D/w KATHRYN, Carly Mayorga. Thank you.    Chasity Cazares M.A, CCC-SLP

## 2023-02-27 NOTE — PROGRESS NOTES
Physical Therapy  Facility/Department: [facility] C3 TELE/MED SURG/ONC  Daily Treatment Note  NAME: Yani Veronica  : 1945  MRN: [id number]    Date of Service: 2023    Discharge Recommendations:  [address], 24 hour supervision or assist, Home with Home health PT (SNF vs. 24-hr sup/assist & home PT (pt now too high-level for IP rehab))   PT Equipment Recommendations  Equipment Needed: No  Other: TBD at rehab facility    [facility] 6 Clicks Inpatient Mobility:  [address] - Inpatient   How much help is needed turning from your back to your side while in a flat bed without using bedrails?: None  How much help is needed moving from lying on your back to sitting on the side of a flat bed without using bedrails?: None  How much help is needed moving to and from a bed to a chair?: A Little  How much help is needed standing up from a chair using your arms?: None  How much help is needed walking in hospital room?: A Little  How much help is needed climbing 3-5 steps with a railing?: A Little  AM-[facility] Inpatient Mobility Raw Score : 21  AM-PAC Inpatient T-Scale Score : 50.25  Mobility Inpatient CMS 0-100% Score: 28.97  Mobility Inpatient CMS G-Code Modifier : CJ    Patient Diagnosis(es): The primary encounter diagnosis was COVID-19. Diagnoses of Lives independently, Trouble walking, Dehydration, and Hyperglycemia were also pertinent to this visit. Assessment   Assessment: Pt participated well with PT today, demonstrating all functional mobility tasks at SBA/supervision level with use of RW for support with standing tasks. Pt tends to WB only on L toe due to tightness in gastroc from old CVA (pt's LLE AFO is not currently at hospital, which usually keeps L ankle/foot in a better aligned position). Pt participated well in seated LE ther ex with no rest breaks needed.   Pt still feels as if her strength and endurance are diminished compared to baseline and still feels too weak to return home to [facility]. Pt is now too high-level for IP rehab level of care. Recommend SNF vs. 24-hr sup/assist & home PT at D/C. Activity Tolerance: Patient tolerated treatment well  Equipment Needed: No  Other: TBD at rehab facility     Plan    General Plan: 3-5 times per week  Specific Instructions for Next Treatment: Progress ther ex and mobility as tolerated  Current Treatment Recommendations: Strengthening;ROM;Balance training;Functional mobility training;Transfer training; Endurance training;Gait training;Neuromuscular re-education;Home exercise program;Safety education & training;Patient/Caregiver education & training;Equipment evaluation, education, & procurement; Modalities; Positioning; Therapeutic activities     Restrictions  Restrictions/Precautions  Restrictions/Precautions: Fall Risk, General Precautions, Contact Precautions, Isolation  Position Activity Restriction  Other position/activity restrictions: COVID+, C. diff precautions, up with assist, for ambulation pt uses L ankle/foot custom brace that is not in hospital currently. Subjective    Subjective: Pt agreeable to work with PT this morning. \"I suppose I should get up and do some moving. \"  Pain: Pt denies pain at rest, c/o soreness in enu area from recent bouts of diarrhea during briefs change (did not rate on 0-10 scale).   Overall Orientation Status: Within Functional Limits     Objective   Vitals  Heart Rate: 79  Heart Rate Source: Monitor  BP: (!) 151/61  BP Location: Right upper arm  BP Method: Automatic  Patient Position: Semi fowlers  MAP (Calculated): 91  SpO2: 98 %  O2 Device: None (Room air)    Bed Mobility Training  Rolling: Supervision  Supine to Sit: Supervision  Scooting: Supervision    Balance  Sitting: Intact  Standing: Impaired  Standing - Static: Good  Standing - Dynamic: Fair;Occasional (using RW)    Transfer Training  Interventions: Safety awareness training;Verbal cues  Sit to Stand: Supervision  Stand to Sit: Supervision (min VC's needed to reach back for chair prior to sitting)  Bed to Chair: Supervision (using RW)    Gait Training  Overall Level of Assistance: Stand-by assistance  Interventions: Verbal cues; Safety awareness training  Base of Support: Shift to right  Speed/Rachel: Pace decreased (< 100 feet/min); Slow  Step Length: Right shortened;Left shortened  Gait Abnormalities: Decreased step clearance (pt keeps L heel off ground when WB'ing 2* tightness in L gastroc (baseline deficits from old CVA, pt usually wears AFO but brace not currently at hospital), appears steadier today - no LOB)  Distance (ft): 45 Feet  Assistive Device: Walker, rolling;Gait belt    PT Exercises  Exercise Treatment: x 15 BLE: Ankle pumps, glut sets, LAQ, seated marching, clamshells    Other Specialty Interventions  Other Treatments/Modalities: Pt incontinent of liquid/loose stool in bed, reporting issues with diarrhea today. PT assisted with briefs change and cleaning of eun care. Pt able to bridge multiple times during briefs change with SBA. Safety Devices  Type of Devices: All fall risk precautions in place;Call light within reach;Gait belt;Patient at risk for falls;Nurse notified; Chair alarm in place; Left in chair     Goals  Short Term Goals  Time Frame for Short Term Goals: one week 3/2 unless otherwise indicated  Short Term Goal 1: pt will transfer supine to and from sit with IND by 2/28/23  Short Term Goal 2: pt will transfer sit to and from stand with mod ind  Short Term Goal 3: pt will ambulate 30ft with RW with sup - MET 2/27/23 for distance, nearly met for assist level. Goal upgraded: Pt will ambulate x 100 feet using RW with supervision/modified(I). Short Term Goal 4: pt will demonstrate and tolerate 10 reps of 3 B LE therex - MET 2/27/23  Patient Goals   Patient Goals :  \"To get stronger\"    Education  Patient Education  Education Given To: Patient  Education Provided: Role of Therapy;Plan of Care;Home Exercise Program;Transfer Training;Equipment; Fall Prevention Strategies;Precautions  Education Method: Demonstration;Verbal  Barriers to Learning: None  Education Outcome: Verbalized understanding;Demonstrated understanding    Therapy Time   Individual Concurrent Group Co-treatment   Time In 1100         Time Out 1140         Minutes 40         Timed Code Treatment Minutes: Carlos Cordoba, [place] #[id number]    If pt is unable to be seen after this session, please let this note serve as discharge summary. Please see case management note for discharge disposition. Thank you.

## 2023-02-27 NOTE — PROGRESS NOTES
ONCOLOGY HEMATOLOGY CARE PROGRESS NOTE      SUBJECTIVE:    Bal Buitrago remains on room air. Afebrile. Has no new concerns today. ROS:     Constitutional:  No weight loss, No fever, No chills, No night sweats.    Eyes:  No impairment or change in vision  ENT / Mouth:  No pain, abnormal ulceration, bleeding, nasal drip or change in voice or hearing  Cardiovascular:  No chest pain, palpitations, new edema, or calf discomfort  Respiratory:  No pain, hemoptysis, change to breathing  Breast:  No pain, discharge, change in appearance or texture  Gastrointestinal:  No pain, cramping, jaundice, change to eating and bowel habits  Urinary:  No pain, bleeding or change in continence  Genitalia: No pain, bleeding or discharge  Musculoskeletal:  No redness, pain, edema or weakness  Skin:  No pruritus, rash, change to nodules or lesions  Neurologic:  No discomfort, change in mental status, speech, sensory or motor activity  Psychiatric:  No change in concentration or change to affect or mood  Endocrine:  No hot flashes, increased thirst, or change to urine production  Hematologic: No petechiae, ecchymosis or bleeding  Lymphatic:  No lymphadenopathy or lymphedema  Allergy / Immunologic:  No eczema, hives, frequent or recurrent infections    OBJECTIVE        Physical    VITALS:  Patient Vitals for the past 24 hrs:   BP Temp Temp src Pulse Resp SpO2   02/26/23 1959 (!) 146/76 97.5 °F (36.4 °C) Oral 66 16 97 %   02/26/23 1315 121/66 -- -- -- -- --   02/26/23 1306 (!) 174/73 97.9 °F (36.6 °C) Oral 63 16 97 %   02/26/23 0850 (!) 184/73 98.1 °F (36.7 °C) Oral 70 18 --       24HR INTAKE/OUTPUT:    Intake/Output Summary (Last 24 hours) at 2/27/2023 7002  Last data filed at 2/26/2023 2022  Gross per 24 hour   Intake 10 ml   Output --   Net 10 ml       CONSTITUTIONAL: awake, alert, cooperative, no apparent distress   EYES: pupils equal, round and reactive to light, sclera clear and conjunctiva normal  ENT: Normocephalic, without obvious abnormality, atraumatic  NECK: supple, symmetrical, no jugular venous distension and no carotid bruits   HEMATOLOGIC/LYMPHATIC: no cervical, supraclavicular or axillary lymphadenopathy   LUNGS: no increased work of breathing and clear to auscultation   CARDIOVASCULAR: regular rate and rhythm, normal S1 and S2, no murmur noted  ABDOMEN: normal bowel sounds x 4, soft, non-distended, non-tender, no masses palpated, no hepatosplenomgaly   MUSCULOSKELETAL: full range of motion noted, tone is normal  NEUROLOGIC: awake, alert, oriented to name, place and time. Motor skills grossly intact. SKIN: Normal skin color, texture, turgor and no jaundice. appears intact   EXTREMITIES: no LE edema     DATA:  CBC:    Recent Labs     02/25/23  0521 02/24/23  0525 02/23/23  0529 02/22/23  1347   WBC 3.2* 2.8* 4.3 3.1*   NEUTROABS 1.0* 0.9* 0.7* 1.1*   LYMPHOPCT 58.0 36.0 78.0 50.4   RBC 4.39 4.42 4.26 4.52   HGB 13.1 13.2 12.7 13.6   HCT 38.9 39.7 38.7 40.6   MCV 88.7 90.0 90.9 89.7   MCH 29.9 29.8 29.7 30.0   MCHC 33.7 33.2 32.7 33.4   RDW 15.5* 16.0* 16.4* 16.4*   * 112* 104* 122*       PT/INR:    Recent Labs     02/22/23  1347 02/18/23  1426   PROT 6.8 6.6     PTT:  No results for input(s): APTT in the last 720 hours.     CMP:    Recent Labs     02/25/23  0521 02/24/23  0525 02/23/23  0529 02/22/23  1347 02/18/23  1426    140 140 137 133*   K 4.6 3.8 4.0 4.8 4.6    108 107 99 97*   CO2 28 26 23 25 29   GLUCOSE 187* 124* 117* 243* 130*   BUN 11 13 25* 34* 16   CREATININE 0.6 <0.5* 0.6 0.9 0.7   LABGLOM >60 >60 >60 >60 >60   CALCIUM 8.5 8.6 8.3 9.5 9.0   PROT  --   --   --  6.8 6.6   LABALBU  --   --   --  3.8 3.8   AGRATIO  --   --   --  1.3 1.4   BILITOT  --   --   --  0.6 0.6   ALKPHOS  --   --   --  61 54   ALT  --   --   --  43* 27   AST  --   --   --  42* 31   MG  --   --   --  2.20  --        Lab Results   Component Value Date    CALCIUM 8.5 02/25/2023 LDH:  Recent Labs     23  1347   *       Radiology Review:  XR CHEST PORTABLE  Narrative: EXAMINATION:  ONE XRAY VIEW OF THE CHEST    2023 12:03 pm    COMPARISON:  2023. HISTORY:  ORDERING SYSTEM PROVIDED HISTORY: weakness  TECHNOLOGIST PROVIDED HISTORY:  Reason for exam:->weakness  Reason for Exam: weakness    FINDINGS:  The lungs and costophrenic angles are clear. The cardiomediastinal silhouette  and pulmonary vessels appear normal.  Impression: No radiographic evidence of an acute cardiopulmonary process. Problem List  Patient Active Problem List   Diagnosis    Type 2 diabetes mellitus with hyperglycemia, with long-term current use of insulin ([address]    Hypothyroidism    Hypertension    Hyperlipidemia    Type 2 diabetes mellitus    Cerebral infarction, unspecified ([facility]    Gastroparesis    CATA treated with BiPAP    Irritable bowel syndrome with both constipation and diarrhea    Major depressive disorder, recurrent, moderate (HCC)    Pelvic pressure in female    Generalized weakness    Physical deconditioning    COVID-19       ASSESSMENT AND PLAN:    Neutropenia  Thrombocytopenia   - possible isolated neutropenia etiologies: viral infection vs bacterial infection vs nutritional def vs myelodysplasia  - no obvious medications on chart review causing neutropenia (aside from Depakote, which can do this, but she has been on this since at least the summer of , so not a likely cause)  - CBC trendin2023              WBC      8.1                3.8               3.1                4.3              ANC       5.8                1.8               1.1                0.7              PLTs      162              125               122               104  - HGB/HCT/MCV remain within normal limits. Eosinophiles/lymphocytes unremarkable.    - most likely that this isolated neutropenia and thrombocytopenia is due to current COVID viral infection based on onset and labs prior to admission being unremarkable  - Obviously if she does not recover her counts, then a bone marrow biopsy is advised. - would expect it to improve within 1-2 weeks from viral infection  - Micronutrients: Ferritin 247.4 ng/mL, iron sat 36%, B12 >2000 pg/mL, folate 15.15 ng/mL. - most recent CBC on 02/25 shows WBC of 3.2, ANC 1.0, and PLTs 117 trending slightly upward from prior result  - CBC this AM shows WBC 3.5 and normal ANC of 1.7; beginning to normalize.  Will continue to monitor CBC trend     COVID  Weakness  - SARS-CoV-2 PCR positive on 2/18/2023.  - No respiratory symptoms; on room air   - plan for isolation until 02/28 backdated to diagnosis on 02/18  - poor PO intake at independent living     T2DM  - per IM  - A1C 11/2022 was 6.8%    ONCOLOGIC DISPOSITION:      YANI DALEY  Please contact through [address]

## 2023-02-27 NOTE — DISCHARGE INSTR - COC
Continuity of Care Form    Patient Name: Tuan Pop   :  1945  MRN:  [id number]    Admit date:  2023  Discharge date:  2023    Code Status Order: Full Code   Advance Directives:     Admitting Physician:  Kaiden Ahumada MD  PCP: Macho Denney DO    Discharging Nurse: [address] Unit/Room#: 1784/8455-31  Discharging Unit Phone Number: ***    Emergency Contact:   Extended Emergency Contact Information  Primary Emergency Contact: PRINCESS MIGUEL [facility] Phone: [phone]  Mobile Phone: [phone]  Relation: Child  Secondary Emergency Contact: [address] Phone: [phone]  Relation: Daughter-in-Law   needed?  No    Past Surgical History:  Past Surgical History:   Procedure Laterality Date    APPENDECTOMY      CARPAL TUNNEL RELEASE Bilateral     CATARACT REMOVAL Bilateral      SECTION      CHOLECYSTECTOMY      COLONOSCOPY N/A 2022    COLONOSCOPY POLYPECTOMY SNARE/COLD BIOPSY performed by Lord Brad MD at [facility] (CERVIX STATUS UNKNOWN)      [facility] ENDOSCOPY N/A 2022    EGD BIOPSY performed by Lord Bard MD at [facility] ENDOSCOPY       Immunization History:   Immunization History   Administered Date(s) Administered    COVID-19, MODERNA BLUE border, Primary or Immunocompromised, (age 12y+), IM, 100 mcg/0.5mL 03/15/2021, 2021, 2021, 2022    COVID-19, MODERNA Bivalent BOOSTER, (age 12y+), IM, 50 mcg/0.5 mL 2022    DT (pediatric) 2004    Influenza A ([id number]) Vaccine PF IM 12/15/2009    Influenza Virus Vaccine 2007, 2009, 2010, 10/24/2011, 2012, 10/17/2014, 10/19/2015, 2016, 2016, 2017, 10/01/2018    Influenza, FLUAD, (age 72 y+), Adjuvanted, 0.5mL 10/13/2021    Influenza, FLUZONE (age 72 y+), High Dose, 0.7mL 2022 Influenza, High Dose (Fluzone 65 yrs and older) 10/07/2020    Influenza, Triv, inactivated, subunit, adjuvanted, IM (Fluad 65 yrs and older) 10/12/2018, 09/26/2019    Pneumococcal Conjugate 13-valent ([id number]) 04/30/2017    Pneumococcal Conjugate PCV15, PF (Vaxneuvance) 04/30/2017    Pneumococcal Polysaccharide ([id number]) 12/29/2004, 12/01/2015    Td (Adult), 5 Lf Tetanus Toxoid, Pf (Tenivac, Decavac) 04/28/2015    Td vaccine (adult) 04/28/2015    Td, unspecified formulation 04/28/2015    Tdap (Boostrix, Adacel) 02/25/2019    Tetanus Toxoid, absorbed 09/22/2004    Zoster Live (Zostavax) 01/01/2016, 02/02/2016, 02/25/2019    Zoster Recombinant (Shingrix) 02/25/2019, 05/24/2019       Active Problems:  Patient Active Problem List   Diagnosis Code    Type 2 diabetes mellitus with hyperglycemia, with long-term current use of insulin ([facility] E11.65, Z79.4    Hypothyroidism E03.9    Hypertension I10    Hyperlipidemia E78.5    Type 2 diabetes mellitus E11.9    Cerebral infarction, unspecified ([facility] I63.9    Gastroparesis K31.84    CATA treated with BiPAP G47.33    Irritable bowel syndrome with both constipation and diarrhea K58.2    Major depressive disorder, recurrent, moderate (HCC) F33.1    Pelvic pressure in female R10.2    Generalized weakness R53.1    Physical deconditioning R53.81    COVID-19 U07.1       Isolation/Infection:   Isolation            Droplet Plus  C Diff Contact          Patient Infection Status       Infection Onset Added Last Indicated Last Indicated By Review Planned Expiration Resolved Resolved By    C-diff Rule Out 02/23/23 02/23/23 02/23/23 C Diff Toxin/ Antigen (Ordered) 03/02/23 03/05/23      COVID-19 02/18/23 02/18/23 02/18/23 COVID-19 & Influenza Combo 02/28/23 03/04/23      Resolved    COVID-19 (Rule Out) 02/18/23 02/18/23 02/18/23 COVID-19 & Influenza Combo (Ordered)   02/18/23 Rule-Out Test Resulted    COVID-19 (Rule Out) 01/04/22 01/04/22 01/04/22 COVID-19 Ambulatory (Ordered)   01/06/22 Rule-Out Test Resulted            Nurse Assessment:  Last Vital Signs: BP (!) 151/61   Pulse 79   Temp 97.7 °F (36.5 °C) (Oral)   Resp 16   Ht 5' 3\" (1.6 m)   Wt 119 lb 7.8 oz (54.2 kg)   SpO2 98%   BMI 21.17 kg/m²     Last documented pain score (0-10 scale): Pain Level: 0  Last Weight:   Wt Readings from Last 1 Encounters:   02/22/23 119 lb 7.8 oz (54.2 kg)     Mental Status:  oriented    IV Access:  - None    Nursing Mobility/ADLs:  Walking   Assisted  Transfer  Assisted  Bathing  Assisted  Dressing  Assisted  Toileting  Assisted  Feeding  Independent  Med Admin  Assisted  Med Delivery   whole    Wound Care Documentation and Therapy:        Elimination:  Continence: Bowel: Yes  Bladder: Yes  Urinary Catheter: None   Colostomy/Ileostomy/Ileal Conduit: No       Date of Last BM: 2/28/2023    Intake/Output Summary (Last 24 hours) at 2/27/2023 1225  Last data filed at 2/27/2023 0936  Gross per 24 hour   Intake 250 ml   Output --   Net 250 ml     I/O last 3 completed shifts: In: 370 [P.O.:360; I.V.:10]  Out: -     Safety Concerns: At Risk for Falls    Impairments/Disabilities:      None    Nutrition Therapy:  Current Nutrition Therapy:   - Oral Diet:  General    Routes of Feeding: Oral  Liquids: No Restrictions  Daily Fluid Restriction: no  Last Modified Barium Swallow with Video (Video Swallowing Test): not done    Treatments at the Time of Hospital Discharge:   Respiratory Treatments: n/a  Oxygen Therapy:  is not on home oxygen therapy.   Ventilator:    - No ventilator support    Rehab Therapies: Physical Therapy and Occupational Therapy  Weight Bearing Status/Restrictions: No weight bearing restrictions  Other Medical Equipment (for information only, NOT a DME order):  walker  Other Treatments: ***    Patient's personal belongings (please select all that are sent with patient):  None    RN SIGNATURE:  Electronically signed by Aurelio Boast, LPN on [birth year] at 5:82 PM EST    CASE MANAGEMENT/SOCIAL WORK SECTION    Inpatient Status Date: 02/22/2023    Readmission Risk Assessment Score:  Readmission Risk              Risk of Unplanned Readmission:  21         Discharging to Facility/ Agency   [place] [address]  Phone Number: [phone]   Fax: [phone]    / signature: Electronically signed by KELSIE Krishna on 2/28/23 at 11:31 AM EST    PHYSICIAN SECTION    Prognosis: Good    Condition at Discharge: Stable    Rehab Potential (if transferring to Rehab): Good    Recommended Labs or Other Treatments After Discharge: None    Physician Certification: I certify the above information and transfer of Dany Foss  is necessary for the continuing treatment of the diagnosis listed and that she requires Acute Rehab for less 30 days.      Update Admission H&P: No change in H&P    PHYSICIAN SIGNATURE:  Electronically signed by Dimple Iniguez MD on 2/28/23 at 2:17 PM EST

## 2023-02-27 NOTE — PLAN OF CARE
Problem: Pain  Goal: Verbalizes/displays adequate comfort level or baseline comfort level  2/27/2023 1008 by Jose Cruz Vizcaino LPN  Outcome: Progressing  2/26/2023 2215 by Edwige Ruelas RN  Outcome: Progressing     Problem: Safety - Adult  Goal: Free from fall injury  2/27/2023 1008 by Jose Cruz Vizcaino LPN  Outcome: Progressing  2/26/2023 2215 by Edwige Ruelas RN  Outcome: Progressing     Problem: ABCDS Injury Assessment  Goal: Absence of physical injury  2/27/2023 1008 by Jose Cruz Vizcaino LPN  Outcome: Progressing  2/26/2023 2215 by Edwige Ruelas RN  Outcome: Progressing     Problem: Respiratory - Adult  Goal: Achieves optimal ventilation and oxygenation  2/27/2023 1008 by Jose Cruz Vizcaino LPN  Outcome: Progressing  2/26/2023 2215 by Edwige uRelas RN  Outcome: Progressing     Problem: Skin/Tissue Integrity - Adult  Goal: Skin integrity remains intact  2/27/2023 1008 by Jose Cruz Vizcaino LPN  Outcome: Progressing  2/26/2023 2215 by Edwige Ruelas RN  Outcome: Progressing  Flowsheets (Taken 2/26/2023 1959)  Skin Integrity Remains Intact: Monitor for areas of redness and/or skin breakdown     Problem: Musculoskeletal - Adult  Goal: Return mobility to safest level of function  2/27/2023 1008 by Jose Cruz Vizcaino LPN  Outcome: Progressing  2/26/2023 2215 by Edwige Ruelas RN  Outcome: Progressing  Goal: Return ADL status to a safe level of function  2/27/2023 1008 by Jose Cruz Vizcaino LPN  Outcome: Progressing  2/26/2023 2215 by Edwige Ruelas RN  Outcome: Progressing     Problem: Gastrointestinal - Adult  Goal: Maintains or returns to baseline bowel function  2/27/2023 1008 by Jose Cruz Vizcaino LPN  Outcome: Progressing  2/26/2023 2215 by Edwige Ruelas RN  Outcome: Progressing  Goal: Maintains adequate nutritional intake  2/27/2023 1008 by Jose Cruz Vizcaino LPN  Outcome: Progressing  2/26/2023 2215 by Edwige Ruelas RN  Outcome: Progressing     Problem: Metabolic/Fluid and Electrolytes - Adult  Goal: Electrolytes maintained within normal limits  2/27/2023 1008 by Ekaterina Quintana LPN  Outcome: Progressing  2/26/2023 2215 by Kathi Matthew RN  Outcome: Progressing     Problem: Nutrition Deficit:  Goal: Optimize nutritional status  2/27/2023 1008 by Ekaterina Quintana LPN  Outcome: Progressing  2/26/2023 2215 by Kathi Matthew RN  Outcome: Progressing

## 2023-02-27 NOTE — PROGRESS NOTES
Comprehensive Nutrition Assessment    Type and Reason for Visit:  Reassess    Nutrition Recommendations/Plan:   Continue diet free of therapeutic restrictions to promote PO intakes  Encourage po intakes  Continue Glucerna BID  D/c calorie count as pt is in droplet plus precautions and does not have a reliable way to retrieve exact po intake information  Monitor po intakes, nutrition adequacy, weights, pertinent labs, BMs     Malnutrition Assessment:  Malnutrition Status:  Severe malnutrition (02/23/23 0943)    Context:  Chronic Illness     Findings of the 6 clinical characteristics of malnutrition:  Energy Intake:  75% or less estimated energy requirements for 1 month or longer  Weight Loss:  Greater than 5% over 1 month       Nutrition Assessment:    Follow up: Pt reports that her appetite is improving somewhat and she has been able to eat a little more. Currently on a regular diet with po intakes 1-75% per EMR. Pt also recieving Glucerna BID, pt reports that she has been drinking these. Pt denied having any nutrition questions or needs. Will monitor. Encouraged po intakes. Will d/c calorie count as pt is in droplet plus precautions and does not have a reliable way to retrieve exact po intake information. Nutrition Related Findings:    BG  mg/dL x 24 hr. BM x1 on 2/27 Wound Type:  (abrasions)       Current Nutrition Intake & Therapies:    Average Meal Intake: 1-25%, 26-50%, 51-75%  Average Supplements Intake: 51-75%, %  ADULT ORAL NUTRITION SUPPLEMENT; Breakfast, Lunch; Diabetic Oral Supplement  ADULT DIET; Regular    Anthropometric Measures:  Height: 5' 3\" (160 cm)  Ideal Body Weight (IBW): 115 lbs (52 kg)       Current Body Weight: 199 lb 7.8 oz (90.5 kg),   IBW. Weight Source: Bed Scale  Current BMI (kg/m2): 35.3                          BMI Categories: Normal Weight (BMI 18.5-24. 9)    Estimated Daily Nutrient Needs:  Energy Requirements Based On: Kcal/kg  Weight Used for Energy Requirements: Current  Energy (kcal/day): 8337-0839 (35-40 kcal/54.2 kg)  Weight Used for Protein Requirements: Current  Protein (g/day): 81-98 (1.5-1.8 g/54.2 kg)  Method Used for Fluid Requirements: 1 ml/kcal    Nutrition Diagnosis:   Severe malnutrition related to increase demand for energy/nutrients, lack or limited access to food (food restrictions) as evidenced by poor intake prior to admission, weight loss greater than or equal to 5% in 1 month    Nutrition Interventions:   Food and/or Nutrient Delivery: Continue Current Diet, Continue Oral Nutrition Supplement  Nutrition Education/Counseling:  (discussed salt not being on the cardiac diet)  Coordination of Nutrition Care: Continue to monitor while inpatient  Plan of Care discussed with: patient    Goals:  Previous Goal Met: Progressing toward Goal(s)  Goals: PO intake 50% or greater, by next RD assessment       Nutrition Monitoring and Evaluation:   Behavioral-Environmental Outcomes: None Identified  Food/Nutrient Intake Outcomes: Food and Nutrient Intake, Supplement Intake  Physical Signs/Symptoms Outcomes: Nutrition Focused Physical Findings, Biochemical Data    Discharge Planning:    Continue current diet, Continue Oral Nutrition Supplement     Catracho Vargas RD, LD  Contact: [phone]

## 2023-02-28 ENCOUNTER — TELEPHONE (OUTPATIENT)
Dept: FAMILY MEDICINE CLINIC | Age: 78
End: 2023-02-28

## 2023-02-28 VITALS
SYSTOLIC BLOOD PRESSURE: 148 MMHG | OXYGEN SATURATION: 98 % | TEMPERATURE: 97.8 F | WEIGHT: 119.49 LBS | DIASTOLIC BLOOD PRESSURE: 73 MMHG | RESPIRATION RATE: 16 BRPM | HEART RATE: 68 BPM | HEIGHT: 63 IN | BODY MASS INDEX: 21.17 KG/M2

## 2023-02-28 LAB
ALBUMIN SERPL-MCNC: 3.2 G/DL (ref 3.4–5)
ANION GAP SERPL CALCULATED.3IONS-SCNC: 6 MMOL/L (ref 3–16)
BUN BLDV-MCNC: 11 MG/DL (ref 7–20)
CALCIUM SERPL-MCNC: 8.7 MG/DL (ref 8.3–10.6)
CHLORIDE BLD-SCNC: 107 MMOL/L (ref 99–110)
CO2: 29 MMOL/L (ref 21–32)
CREAT SERPL-MCNC: <0.5 MG/DL (ref 0.6–1.2)
GFR SERPL CREATININE-BSD FRML MDRD: >60 ML/MIN/{1.73_M2}
GLUCOSE BLD-MCNC: 155 MG/DL (ref 70–99)
GLUCOSE BLD-MCNC: 175 MG/DL (ref 70–99)
GLUCOSE BLD-MCNC: 232 MG/DL (ref 70–99)
GLUCOSE BLD-MCNC: 99 MG/DL (ref 70–99)
HCT VFR BLD CALC: 36.7 % (ref 36–48)
HEMOGLOBIN: 12.2 G/DL (ref 12–16)
MCH RBC QN AUTO: 29.5 PG (ref 26–34)
MCHC RBC AUTO-ENTMCNC: 33.2 G/DL (ref 31–36)
MCV RBC AUTO: 88.7 FL (ref 80–100)
PDW BLD-RTO: 15.4 % (ref 12.4–15.4)
PERFORMED ON: ABNORMAL
PERFORMED ON: ABNORMAL
PERFORMED ON: NORMAL
PHOSPHORUS: 2.5 MG/DL (ref 2.5–4.9)
PLATELET # BLD: 166 K/UL (ref 135–450)
PMV BLD AUTO: 8.3 FL (ref 5–10.5)
POTASSIUM SERPL-SCNC: 4 MMOL/L (ref 3.5–5.1)
RBC # BLD: 4.14 M/UL (ref 4–5.2)
SODIUM BLD-SCNC: 142 MMOL/L (ref 136–145)
WBC # BLD: 4.4 K/UL (ref 4–11)

## 2023-02-28 PROCEDURE — 2580000003 HC RX 258: Performed by: NURSE PRACTITIONER

## 2023-02-28 PROCEDURE — 36415 COLL VENOUS BLD VENIPUNCTURE: CPT

## 2023-02-28 PROCEDURE — 6370000000 HC RX 637 (ALT 250 FOR IP): Performed by: NURSE PRACTITIONER

## 2023-02-28 PROCEDURE — 85027 COMPLETE CBC AUTOMATED: CPT

## 2023-02-28 PROCEDURE — 80069 RENAL FUNCTION PANEL: CPT

## 2023-02-28 PROCEDURE — 6370000000 HC RX 637 (ALT 250 FOR IP): Performed by: HOSPITALIST

## 2023-02-28 RX ORDER — PANTOPRAZOLE SODIUM 40 MG/1
40 TABLET, DELAYED RELEASE ORAL
Qty: 30 TABLET | Refills: 3 | Status: SHIPPED | OUTPATIENT
Start: 2023-03-01

## 2023-02-28 RX ADMIN — AMLODIPINE BESYLATE 5 MG: 5 TABLET ORAL at 08:45

## 2023-02-28 RX ADMIN — LISINOPRIL 10 MG: 10 TABLET ORAL at 08:45

## 2023-02-28 RX ADMIN — BUPROPION HYDROCHLORIDE 150 MG: 150 TABLET, EXTENDED RELEASE ORAL at 08:45

## 2023-02-28 RX ADMIN — SODIUM CHLORIDE, PRESERVATIVE FREE 10 ML: 5 INJECTION INTRAVENOUS at 08:45

## 2023-02-28 RX ADMIN — PANTOPRAZOLE SODIUM 40 MG: 40 TABLET, DELAYED RELEASE ORAL at 05:12

## 2023-02-28 RX ADMIN — DULOXETINE HYDROCHLORIDE 20 MG: 20 CAPSULE, DELAYED RELEASE ORAL at 08:53

## 2023-02-28 RX ADMIN — Medication 2 TABLET: at 08:45

## 2023-02-28 RX ADMIN — APIXABAN 5 MG: 5 TABLET, FILM COATED ORAL at 08:45

## 2023-02-28 RX ADMIN — CYANOCOBALAMIN TAB 500 MCG 250 MCG: 500 TAB at 08:45

## 2023-02-28 RX ADMIN — LEVOTHYROXINE SODIUM 100 MCG: 100 TABLET ORAL at 05:12

## 2023-02-28 RX ADMIN — INSULIN LISPRO 2 UNITS: 100 INJECTION, SOLUTION INTRAVENOUS; SUBCUTANEOUS at 12:28

## 2023-02-28 RX ADMIN — ASPIRIN 81 MG 81 MG: 81 TABLET ORAL at 08:44

## 2023-02-28 RX ADMIN — METOPROLOL TARTRATE 25 MG: 25 TABLET, FILM COATED ORAL at 08:44

## 2023-02-28 NOTE — PROGRESS NOTES
Hospitalist Progress Note      PCP: Macho Denney DO    Date of Admission: 2/22/2023    Chief Complaint: Cough and Weakness    Subjective: no new c/o. Medications:  Reviewed    Infusion Medications    dextrose      sodium chloride      sodium chloride 50 mL/hr at 02/27/23 0400     Scheduled Medications    pantoprazole  40 mg Oral QAM AC    amLODIPine  5 mg Oral Daily    buPROPion  150 mg Oral QAM    calcium carb-cholecalciferol  2 tablet Oral Daily    vitamin B-12  250 mcg Oral Daily    divalproex  500 mg Oral Nightly    donepezil  10 mg Oral Nightly    DULoxetine  20 mg Oral Daily    apixaban  5 mg Oral BID    aspirin  81 mg Oral Daily    insulin glargine  40 Units SubCUTAneous Nightly    latanoprost  1 drop Both Eyes Nightly    levothyroxine  100 mcg Oral Daily    lisinopril  10 mg Oral Daily    metoprolol tartrate  25 mg Oral BID    rosuvastatin  40 mg Oral Nightly    traZODone  50 mg Oral Nightly    sodium chloride flush  5-40 mL IntraVENous 2 times per day    insulin lispro  0-8 Units SubCUTAneous TID WC    insulin lispro  0-4 Units SubCUTAneous Nightly     PRN Meds: glucose, dextrose bolus **OR** dextrose bolus, glucagon (rDNA), dextrose, sodium chloride flush, sodium chloride, polyethylene glycol, prochlorperazine      Intake/Output Summary (Last 24 hours) at 2/28/2023 0940  Last data filed at 2/28/2023 0515  Gross per 24 hour   Intake --   Output 1100 ml   Net -1100 ml         Physical Exam Performed:    BP (!) 147/72   Pulse 67   Temp 97.8 °F (36.6 °C) (Oral)   Resp 16   Ht 5' 3\" (1.6 m)   Wt 119 lb 7.8 oz (54.2 kg)   SpO2 96%   BMI 21.17 kg/m²     General appearance: No apparent distress, appears stated age and cooperative. HEENT: Pupils equal, round, and reactive to light. Conjunctivae/corneas clear. Neck: Supple, with full range of motion. No jugular venous distention. Trachea midline. Respiratory:  Normal respiratory effort.  Clear to auscultation, bilaterally without Rales/Wheezes/Rhonchi. Cardiovascular: Regular rate and rhythm with normal S1/S2 without murmurs, rubs or gallops. Abdomen: Soft, non-tender, non-distended with normal bowel sounds. Musculoskeletal: No clubbing, cyanosis or edema bilaterally. Full range of motion without deformity. Skin: Skin color, texture, turgor normal.  No rashes or lesions. Neurologic:  Neurovascularly intact without any focal sensory/motor deficits. Cranial nerves: II-XII intact, grossly non-focal.  Psychiatric: Alert and oriented, thought content appropriate, normal insight  Capillary Refill: Brisk,< 3 seconds   Peripheral Pulses: +2 palpable, equal bilaterally       Labs:   Recent Labs     02/27/23  0816 02/28/23  0509   WBC 3.5* 4.4   HGB 13.3 12.2   HCT 40.9 36.7    166       Recent Labs     02/28/23  0509      K 4.0      CO2 29   BUN 11   CREATININE <0.5*   CALCIUM 8.7   PHOS 2.5       No results for input(s): AST, ALT, BILIDIR, BILITOT, ALKPHOS in the last 72 hours. No results for input(s): INR in the last 72 hours. No results for input(s): Janora Sauger in the last 72 hours.     Urinalysis:      Lab Results   Component Value Date/Time    NITRU Negative 02/22/2023 05:47 PM    BLOODU Negative 02/22/2023 05:47 PM    SPECGRAV 1.010 02/22/2023 05:47 PM    GLUCOSEU >=1000 02/22/2023 05:47 PM       Consults:    IP CONSULT TO CASE MANAGEMENT  IP CONSULT TO HOSPITALIST  IP CONSULT TO CASE MANAGEMENT  IP CONSULT TO SPIRITUAL SERVICES  IP CONSULT TO DIETITIAN  IP CONSULT TO HEM/ONC  IP CONSULT TO GI  IP CONSULT TO PHYSICAL MEDICINE REHAB  IP CONSULT TO DIETITIAN      Assessment/Plan:    Active Hospital Problems    Diagnosis     Generalized weakness [R53.1]      Priority: Medium    Physical deconditioning [R53.81]      Priority: Medium    COVID-19 [U07.1]      Priority: Medium    Type 2 diabetes mellitus [E11.9]     Hypertension [I10]          COVID 19 Positive - PCR positive on 18 Feb.  Anticipate isolation through and including 27 Feb now d/c'd. No evidence of PNA/Acute Respiratory Failure. PT/OT for related Generalized weakness. Diabetes mellitus type 2 continue with the current care hemoglobin A1c 6.8 on 11/2022. HTN - w/out known CAD and no evidence of active signs/sxs of ischemia/failure. Currently controlled on home meds w/ vitals reviewed and documented as above. Diarrhea - w/ hx of gastroparesis. GI consulted and appreciated w/ Linzess discontinued. Hx of DM Gastroparesis - w/out evidence of obstructive sxs. Patient needs frequent small meals but tolerating. DVT Prophylaxis: Eliquis/IPC     Recent Labs     02/27/23  0816 02/28/23  0509    166       Diet: ADULT ORAL NUTRITION SUPPLEMENT; Breakfast, Lunch; Diabetic Oral Supplement  ADULT DIET; Regular  Code Status: Full Code      PT/OT Eval Status: seen w/ recs for SNF vs Home w/ [address] - Patient is likely to remain in-house at least until Tues/Wed 29 Feb/1 March pending clinical course, subspecialty recs and placement decision.        Pascale Castro MD

## 2023-02-28 NOTE — PROGRESS NOTES
Shift assessment completed and charted. VSS. A/O x4. All meds given per [facility] Oncology signed off this AM. No further needs at this time.

## 2023-02-28 NOTE — CARE COORDINATION
CASE MANAGEMENT DISCHARGE SUMMARY    [facility]charge to: [facility]     IMM given: (date) 02/28/2023 1128 am    [address] ordered/agency: [facility]    Transportation: via private car son      Confirmed discharge plan with: Patient and Velma Cashing via phone [phone]    Facility/Agency, name:  [facility] [phone]    Phone number for report to facility: [phone]     Guillermo Perez LPN notified     Note: Discharging nurse to complete [facility], reconcile [facility]S, and place final copy with patient's discharge packet. LPN to ensure that written prescriptions for  Level II medications are sent with patient to the facility as per protocol.   KELSIE Hathaway

## 2023-02-28 NOTE — PLAN OF CARE
Problem: Pain  Goal: Verbalizes/displays adequate comfort level or baseline comfort level  Outcome: Progressing     Problem: Safety - Adult  Goal: Free from fall injury  Outcome: Progressing     Problem: ABCDS Injury Assessment  Goal: Absence of physical injury  Outcome: Progressing     Problem: Respiratory - Adult  Goal: Achieves optimal ventilation and oxygenation  Outcome: Progressing     Problem: Skin/Tissue Integrity - Adult  Goal: Skin integrity remains intact  Outcome: Progressing     Problem: Musculoskeletal - Adult  Goal: Return mobility to safest level of function  Outcome: Progressing  Goal: Return ADL status to a safe level of function  Outcome: Progressing     Problem: Gastrointestinal - Adult  Goal: Maintains or returns to baseline bowel function  Outcome: Progressing  Goal: Maintains adequate nutritional intake  Outcome: Progressing     Problem: Metabolic/Fluid and Electrolytes - Adult  Goal: Electrolytes maintained within normal limits  Outcome: Progressing     Problem: Nutrition Deficit:  Goal: Optimize nutritional status  Outcome: Progressing

## 2023-02-28 NOTE — PLAN OF CARE
Problem: Pain  Goal: Verbalizes/displays adequate comfort level or baseline comfort level  2/28/2023 1350 by Roman Fabian LPN  Outcome: Completed  2/28/2023 0959 by Roman Fabian LPN  Outcome: Progressing  2/28/2023 0629 by Karon Turner RN  Outcome: Progressing     Problem: Safety - Adult  Goal: Free from fall injury  2/28/2023 1350 by Roman Fabian LPN  Outcome: Completed  2/28/2023 0959 by Roman Fabian LPN  Outcome: Progressing  2/28/2023 0629 by Karon Turner RN  Outcome: Progressing     Problem: ABCDS Injury Assessment  Goal: Absence of physical injury  2/28/2023 1350 by Roman Fabian LPN  Outcome: Completed  2/28/2023 0959 by Roman Fabian LPN  Outcome: Progressing  2/28/2023 0629 by Karon Turner RN  Outcome: Progressing     Problem: Respiratory - Adult  Goal: Achieves optimal ventilation and oxygenation  2/28/2023 1350 by Roman Fabian LPN  Outcome: Completed  2/28/2023 0959 by Roman Fabian LPN  Outcome: Progressing  2/28/2023 0629 by Karon Turner RN  Outcome: Progressing     Problem: Skin/Tissue Integrity - Adult  Goal: Skin integrity remains intact  2/28/2023 1350 by Roman Fabian LPN  Outcome: Completed  2/28/2023 0959 by Roman Fabian LPN  Outcome: Progressing  2/28/2023 0629 by Karon Turner RN  Outcome: Progressing     Problem: Musculoskeletal - Adult  Goal: Return mobility to safest level of function  2/28/2023 1350 by Roman Fabian LPN  Outcome: Completed  2/28/2023 0959 by Roman Fabian LPN  Outcome: Progressing  2/28/2023 0629 by Karon Turner, RN  Outcome: Progressing  Goal: Return ADL status to a safe level of function  2/28/2023 1350 by Roman Fabian LPN  Outcome: Completed  2/28/2023 0959 by Roman Fabian LPN  Outcome: Progressing  2/28/2023 0629 by Karon Turner RN  Outcome: Progressing     Problem: Gastrointestinal - Adult  Goal: Maintains or returns to baseline bowel function  2/28/2023 1350 by Roman Fabian LPN  Outcome: Completed  2/28/2023 0959 by Roman Fabian LPN  Outcome: Progressing  2/28/2023 0629 by Lendell Homans, RN  Outcome: Progressing  Goal: Maintains adequate nutritional intake  2/28/2023 1350 by Charles Harris LPN  Outcome: Completed  2/28/2023 0959 by Charles Harris LPN  Outcome: Progressing  2/28/2023 0629 by Lendell Homans, RN  Outcome: Progressing     Problem: Metabolic/Fluid and Electrolytes - Adult  Goal: Electrolytes maintained within normal limits  2/28/2023 1350 by Charles Harris LPN  Outcome: Completed  2/28/2023 0959 by Charles Harris LPN  Outcome: Progressing  2/28/2023 0629 by Lendell Homans, RN  Outcome: Progressing     Problem: Nutrition Deficit:  Goal: Optimize nutritional status  2/28/2023 1350 by Charles Harris LPN  Outcome: Completed  2/28/2023 0959 by Charles Harris LPN  Outcome: Progressing  2/28/2023 0629 by Lendell Homans, RN  Outcome: Progressing     Problem: Chronic Conditions and Co-morbidities  Goal: Patient's chronic conditions and co-morbidity symptoms are monitored and maintained or improved  2/28/2023 1350 by Charles Harris LPN  Outcome: Completed  2/28/2023 0959 by Charles Harris LPN  Outcome: Progressing

## 2023-02-28 NOTE — CARE COORDINATION
Writer spoke with son via phone per his request updated ARU denied, but accepted at both [facility] and [facility]. Son reports want acute rehab level but will support mother decision. Writer met with Patient elected [facility], rep is bedside. SW will follow for medical clearance and update son, whom will transport. KELSIE Stewart

## 2023-02-28 NOTE — TELEPHONE ENCOUNTER
Pt called in, would like a referral to a nutritionist so she can work on eating better fr her health.  Please advise

## 2023-02-28 NOTE — PROGRESS NOTES
Physician Progress Note      PATIENT:               Leyda Jackson  CSN #:                  [id number]  :                       1945  ADMIT DATE:       2023 1:41 PM  [address] [place] DATE:  Louann Victoria  PROVIDER #:        Dimple Iniguez MD          QUERY TEXT:    Patient admitted with Covid 19. Noted to have severe protein malnutrition by   dietitian  and . If possible, please document in progress notes and   discharge summary if you are evaluating and /or treating any of the following: The medical record reflects the following:  Risk Factors: Age, DM, covid, FTT  Clinical Indicators: BMI 21  Dietitian -  \"Malnutrition Assessment:  Malnutrition Status:  Severe malnutrition (23 0943)  Context:  Chronic Illness  Findings of the 6 clinical characteristics of malnutrition:  Energy Intake:  75% or less estimated energy requirements for 1 month or   longer  Weight Loss:  Greater than 5% over 1 month\"  H&P -  \"Generalized Weakness/Deconditioning/Adult Failure-to-Thrive  - Progressive weakness since becoming a resident at [facility], states the food is   not consistent with what she feels is the \"proper diet\" for someone with T2DM   and gastroparesis. \"    Treatment: Dietitian consult, Glucerna nutritional supplements, serial labs,   and supportive care    Thank you,  Danya Lopez RN, BSN, CRCR    ASPEN Criteria:    https://aspenjournals. onlinelibrary. wooten. com/doi/full/10.1177/571540914083402  5  Options provided:  -- Protein calorie malnutrition severe  -- Protein calorie malnutrition severe ruled out  -- Other - I will add my own diagnosis  -- Disagree - Not applicable / Not valid  -- Disagree - Clinically unable to determine / Unknown  -- Refer to Clinical Documentation Reviewer    PROVIDER RESPONSE TEXT:    The diagnosis of severe protein calorie malnutrition was ruled out.     Query created by: Diana Fraser on 2023 10:20 AM      Electronically signed by:  Dimple Iniguez MD 2/28/2023 10:53 AM

## 2023-02-28 NOTE — PROGRESS NOTES
Pt discharged. Hemoc and dr Ysabel Parker signed off today. IV removed per protocol. Report called to [facility]. Pt will go via private car by son. Waiting on son to get to hospital. Pt is in stable condition.

## 2023-02-28 NOTE — PLAN OF CARE
Problem: Pain  Goal: Verbalizes/displays adequate comfort level or baseline comfort level  2/28/2023 0959 by Charles Harris LPN  Outcome: Progressing  2/28/2023 0629 by Lendell Homans, RN  Outcome: Progressing     Problem: Safety - Adult  Goal: Free from fall injury  2/28/2023 0959 by Charles Harris LPN  Outcome: Progressing  2/28/2023 0629 by Lendell Homans, RN  Outcome: Progressing     Problem: ABCDS Injury Assessment  Goal: Absence of physical injury  2/28/2023 0959 by Charles Harris LPN  Outcome: Progressing  2/28/2023 0629 by Lendell Homans, RN  Outcome: Progressing     Problem: Respiratory - Adult  Goal: Achieves optimal ventilation and oxygenation  2/28/2023 0959 by Charles Harris LPN  Outcome: Progressing  2/28/2023 0629 by Lendell Homans, RN  Outcome: Progressing     Problem: Skin/Tissue Integrity - Adult  Goal: Skin integrity remains intact  2/28/2023 0959 by Charles Harris LPN  Outcome: Progressing  2/28/2023 0629 by Lendell Homans, RN  Outcome: Progressing     Problem: Musculoskeletal - Adult  Goal: Return mobility to safest level of function  2/28/2023 0959 by Charles Harris LPN  Outcome: Progressing  2/28/2023 0629 by Lendell Homans, RN  Outcome: Progressing  Goal: Return ADL status to a safe level of function  2/28/2023 0959 by Charles Harris LPN  Outcome: Progressing  2/28/2023 0629 by Lendell Homans, RN  Outcome: Progressing     Problem: Gastrointestinal - Adult  Goal: Maintains or returns to baseline bowel function  2/28/2023 0959 by Charles Harris LPN  Outcome: Progressing  2/28/2023 0629 by Lendell Homans, RN  Outcome: Progressing  Goal: Maintains adequate nutritional intake  2/28/2023 0959 by Charles Harris LPN  Outcome: Progressing  2/28/2023 0629 by Lendell Homans, RN  Outcome: Progressing     Problem: Metabolic/Fluid and Electrolytes - Adult  Goal: Electrolytes maintained within normal limits  2/28/2023 0959 by Charles Harris LPN  Outcome: Progressing  2/28/2023 0629 by Lendell Homans, RN  Outcome: Progressing   Problem: Nutrition Deficit:  Goal: Optimize nutritional status  2/28/2023 0959 by Ezio Mcguire LPN  Outcome: Progressing  2/28/2023 0629 by Venus Crystal RN  Outcome: Progressing     Problem: Chronic Conditions and Co-morbidities  Goal: Patient's chronic conditions and co-morbidity symptoms are monitored and maintained or improved  Outcome: Progressing

## 2023-02-28 NOTE — PROGRESS NOTES
ONCOLOGY HEMATOLOGY CARE PROGRESS NOTE      SUBJECTIVE:    Afebrile. On room air. She states she feels improved from yesterday. No new concerns. ROS:     Constitutional:  No weight loss, No fever, No chills, No night sweats.    Eyes:  No impairment or change in vision  ENT / Mouth:  No pain, abnormal ulceration, bleeding, nasal drip or change in voice or hearing  Cardiovascular:  No chest pain, palpitations, new edema, or calf discomfort  Respiratory:  No pain, hemoptysis, change to breathing  Breast:  No pain, discharge, change in appearance or texture  Gastrointestinal:  No pain, cramping, jaundice, change to eating and bowel habits  Urinary:  No pain, bleeding or change in continence  Genitalia: No pain, bleeding or discharge  Musculoskeletal:  No redness, pain, edema or weakness  Skin:  No pruritus, rash, change to nodules or lesions  Neurologic:  No discomfort, change in mental status, speech, sensory or motor activity  Psychiatric:  No change in concentration or change to affect or mood  Endocrine:  No hot flashes, increased thirst, or change to urine production  Hematologic: No petechiae, ecchymosis or bleeding  Lymphatic:  No lymphadenopathy or lymphedema  Allergy / Immunologic:  No eczema, hives, frequent or recurrent infections    OBJECTIVE        Physical    VITALS:  Patient Vitals for the past 24 hrs:   BP Temp Temp src Pulse Resp SpO2   02/28/23 0326 (!) 165/83 97.7 °F (36.5 °C) Oral 61 14 96 %   02/27/23 2325 138/66 98.1 °F (36.7 °C) Oral 63 15 96 %   02/27/23 1956 (!) 145/75 98.4 °F (36.9 °C) Oral 71 16 96 %   02/27/23 1122 (!) 151/61 -- -- 79 -- 98 %   02/27/23 0842 (!) 147/70 -- -- 64 -- --   02/27/23 0800 (!) 147/70 97.7 °F (36.5 °C) Oral 64 16 97 %       24HR INTAKE/OUTPUT:    Intake/Output Summary (Last 24 hours) at 2/28/2023 0784  Last data filed at 2/28/2023 0515  Gross per 24 hour   Intake 240 ml   Output 1100 ml   Net -860 ml       CONSTITUTIONAL: awake, alert, cooperative, no apparent distress. Resting comfortably in bed   EYES: pupils equal, round and reactive to light, sclera clear and conjunctiva normal  ENT: Normocephalic, without obvious abnormality, atraumatic  NECK: supple, symmetrical, no jugular venous distension and no carotid bruits   HEMATOLOGIC/LYMPHATIC: no cervical, supraclavicular or axillary lymphadenopathy   LUNGS: no increased work of breathing and clear to auscultation   CARDIOVASCULAR: regular rate and rhythm, normal S1 and S2, no murmur noted  ABDOMEN: normal bowel sounds x 4, soft, non-distended, non-tender, no masses palpated, no hepatosplenomgaly   MUSCULOSKELETAL: full range of motion noted, tone is normal  NEUROLOGIC: awake, alert, oriented to name, place and time. Motor skills grossly intact. SKIN: Normal skin color, texture, turgor and no jaundice. appears intact   EXTREMITIES: no LE edema     DATA:  CBC:    Recent Labs     02/28/23  0509 02/27/23  0816 02/25/23  0521 02/24/23  0525 02/23/23  0529   WBC 4.4 3.5* 3.2* 2.8* 4.3   NEUTROABS  --  1.7 1.0* 0.9* 0.7*   LYMPHOPCT  --  36.8 58.0 36.0 78.0   RBC 4.14 4.58 4.39 4.42 4.26   HGB 12.2 13.3 13.1 13.2 12.7   HCT 36.7 40.9 38.9 39.7 38.7   MCV 88.7 89.4 88.7 90.0 90.9   MCH 29.5 29.0 29.9 29.8 29.7   MCHC 33.2 32.4 33.7 33.2 32.7   RDW 15.4 15.9* 15.5* 16.0* 16.4*    174 117* 112* 104*       PT/INR:    Recent Labs     02/22/23  1347 02/18/23  1426   PROT 6.8 6.6     PTT:  No results for input(s): APTT in the last 720 hours.     CMP:    Recent Labs     02/28/23  0509 02/25/23  0521 02/24/23  0525 02/23/23  0529 02/22/23  1347 02/18/23  1426    143 140 140 137 133*   K 4.0 4.6 3.8 4.0 4.8 4.6    109 108 107 99 97*   CO2 29 28 26 23 25 29   GLUCOSE 155* 187* 124* 117* 243* 130*   BUN 11 11 13 25* 34* 16   CREATININE <0.5* 0.6 <0.5* 0.6 0.9 0.7   LABGLOM >60 >60 >60 >60 >60 >60   CALCIUM 8.7 8.5 8.6 8.3 9.5 9.0   PROT  --   --   --   --  6.8 6.6   LABALBU 3.2* --   --   --  3.8 3.8   AGRATIO  --   --   --   --  1.3 1.4   BILITOT  --   --   --   --  0.6 0.6   ALKPHOS  --   --   --   --  61 54   ALT  --   --   --   --  43* 27   AST  --   --   --   --  42* 31   MG  --   --   --   --  2.20  --        Lab Results   Component Value Date    CALCIUM 8.7 2023    PHOS 2.5 2023       LDH:  Recent Labs     23  1347   *       Radiology Review:  XR CHEST PORTABLE  Narrative: EXAMINATION:  ONE XRAY VIEW OF THE CHEST    2023 12:03 pm    COMPARISON:  2023. HISTORY:  ORDERING SYSTEM PROVIDED HISTORY: weakness  TECHNOLOGIST PROVIDED HISTORY:  Reason for exam:->weakness  Reason for Exam: weakness    FINDINGS:  The lungs and costophrenic angles are clear. The cardiomediastinal silhouette  and pulmonary vessels appear normal.  Impression: No radiographic evidence of an acute cardiopulmonary process.       Problem List  Patient Active Problem List   Diagnosis    Type 2 diabetes mellitus with hyperglycemia, with long-term current use of insulin ([address]    Hypothyroidism    Hypertension    Hyperlipidemia    Type 2 diabetes mellitus    Cerebral infarction, unspecified ([address]    Gastroparesis    CATA treated with BiPAP    Irritable bowel syndrome with both constipation and diarrhea    Major depressive disorder, recurrent, moderate (HCC)    Pelvic pressure in female    Generalized weakness    Physical deconditioning    COVID-19       ASSESSMENT AND PLAN:    Neutropenia  Thrombocytopenia   - possible isolated neutropenia etiologies: viral infection vs bacterial infection vs nutritional def vs myelodysplasia  - no obvious medications on chart review causing neutropenia (aside from Depakote, which can do this, but she has been on this since at least the summer of , so not a likely cause)  - CBC trendin2023              WBC      8.1                3.8               3.1                4.3 ANC       5.8                1.8               1.1                0.7              PLTs      162              125               122               104  - HGB/HCT/MCV remain within normal limits. Eosinophiles/lymphocytes unremarkable. - most likely that this isolated neutropenia and thrombocytopenia is due to current COVID viral infection based on onset and labs prior to admission being unremarkable  - Obviously if she does not recover her counts, then a bone marrow biopsy is advised. - would expect it to improve within 1-2 weeks from viral infection  - Micronutrients: Ferritin 247.4 ng/mL, iron sat 36%, B12 >2000 pg/mL, folate 15.15 ng/mL. - CBC on 02/25 shows WBC of 3.2, ANC 1.0, and PLTs 117 trending slightly upward from prior result  - CBC over the last two days has shown significant improvement and resolution of WBC/PLTs to normal baseline for patient   - CBC this AM shows WBC 4.4 and PLTs 166.  [address] on 02/27 was 1.7  - oncology will sign off, no outpatient follow up needed as this was an isolated event secondary to COVID infection      COVID  Weakness  - SARS-CoV-2 PCR positive on 2/18/2023.  - No respiratory symptoms; on room air   - plan for isolation until 02/28 backdated to diagnosis on 02/18  - poor PO intake at independent living     T2DM  - per IM  - A1C 11/2022 was 6.8%    ONCOLOGIC DISPOSITION:  - oncology will sign off    YANI DALEY  Please contact through [facility]

## 2023-03-01 ENCOUNTER — CARE COORDINATION (OUTPATIENT)
Dept: CARE COORDINATION | Age: 78
End: 2023-03-01

## 2023-03-01 NOTE — CARE COORDINATION
ACM completed chart review patient was discharged to SNF. [facility] will place call to [facility] next week to monitor patient progress.

## 2023-03-01 NOTE — PROGRESS NOTES
Physician Progress Note      PATIENT:               Raheel Orlando  CSN #:                  [id number]  :                       1945  ADMIT DATE:       2023 1:41 PM  [facility] [place] DATE:        2023 4:27 PM  RESPONDING  PROVIDER #:        Timi Lewis MD          QUERY TEXT:    Patient admitted with Covid 19 and FTT. Severe protein malnutrition ruled out   per query. If possible, please document in progress notes and discharge   summary if you are evaluating and /or treating any of the following: The medical record reflects the following:  Risk Factors: Age, DM, covid, FTT  Clinical Indicators: BMI 21  Dietitian -  \"Malnutrition Assessment:  Malnutrition Status:  Severe malnutrition (23 0943)  Context:  Chronic Illness  Findings of the 6 clinical characteristics of malnutrition:  Energy Intake:  75% or less estimated energy requirements for 1 month or   longer  Weight Loss:  Greater than 5% over 1 month\"  H&P -  \"Generalized Weakness/Deconditioning/Adult Failure-to-Thrive  - Progressive weakness since becoming a resident at [facility], states the food is   not consistent with what she feels is the \"proper diet\" for someone with T2DM   and gastroparesis. \"    Treatment: Dietitian consult, Glucerna nutritional supplements, serial labs,   and supportive care    Thank you,  Janny Casarez RN, BSN, CRCR    ASPEN Criteria:    https://aspenjournals. onlinelibrary. wooten. com/doi/full/10.1177/396881635103475  5  Options provided:  -- Protein calorie malnutrition moderate  -- Protein calorie malnutrition mild  -- Other - I will add my own diagnosis  -- Disagree - Not applicable / Not valid  -- Disagree - Clinically unable to determine / Unknown  -- Refer to Clinical Documentation Reviewer    PROVIDER RESPONSE TEXT:    This patient has mild protein calorie malnutrition.     Query created by: Doni Singh on 3/1/2023 12:55 PM      Electronically signed by:  Timi Lewis MD 3/1/2023 2:51 PM

## 2023-03-01 NOTE — DISCHARGE SUMMARY
Hospital Medicine Discharge Summary    Patient ID: Elise Quiñonez      Patient's PCP: Liam Giles DO    Admit Date: 2/22/2023     Discharge Date: 2/28/2023      Admitting Physician: Jay Hackett MD     Discharge Physician: Ayad Sanchez MD     Discharge Diagnoses: Active Hospital Problems    Diagnosis     Generalized weakness [R53.1]      Priority: Medium    Physical deconditioning [R53.81]      Priority: Medium    COVID-19 [U07.1]      Priority: Medium    Type 2 diabetes mellitus [E11.9]     Hypertension [I10]        The patient was seen and examined on day of discharge and this discharge summary is in conjunction with any daily progress note from day of discharge. Hospital Course:       COVID 19 Positive - PCR positive on 18 Feb.  Anticipate isolation through and including 27 Feb now d/c'd. No evidence of PNA/Acute Respiratory Failure. PT/OT for related Generalized weakness. Diabetes mellitus type 2 continue with the current care hemoglobin A1c 6.8 on 11/2022. HTN - w/out known CAD and no evidence of active signs/sxs of ischemia/failure. Currently controlled on home meds      Diarrhea - w/ hx of gastroparesis. GI consulted and appreciated w/ Linzess discontinued. Hx of DM Gastroparesis - w/out evidence of obstructive sxs. Patient needs frequent small meals but tolerating. Labs:  For convenience and continuity at follow-up the following most recent labs are provided:      CBC:    Lab Results   Component Value Date/Time    WBC 4.4 02/28/2023 05:09 AM    HGB 12.2 02/28/2023 05:09 AM    HCT 36.7 02/28/2023 05:09 AM     02/28/2023 05:09 AM       Renal:    Lab Results   Component Value Date/Time     02/28/2023 05:09 AM    K 4.0 02/28/2023 05:09 AM    K 4.6 02/25/2023 05:21 AM     02/28/2023 05:09 AM    CO2 29 02/28/2023 05:09 AM    BUN 11 02/28/2023 05:09 AM    CREATININE <0.5 02/28/2023 05:09 AM    CALCIUM 8.7 02/28/2023 05:09 AM    PHOS 2.5 02/28/2023 05:09 AM         Significant Diagnostic Studies    Radiology:   XR CHEST PORTABLE   Final Result   No radiographic evidence of an acute cardiopulmonary process. Consults:     IP CONSULT TO CASE MANAGEMENT  IP CONSULT TO HOSPITALIST  IP CONSULT TO CASE MANAGEMENT  IP CONSULT TO SPIRITUAL SERVICES  IP CONSULT TO DIETITIAN  IP CONSULT TO HEM/ONC  IP CONSULT TO GI  IP CONSULT TO PHYSICAL MEDICINE REHAB  IP CONSULT TO DIETITIAN    Disposition: [facility]     Condition at Discharge: Stable    Discharge Instructions/Follow-up:  w/ PCP 1-2 weeks and subspecialists as arranged. Code Status:  Full Code    Activity: activity as tolerated    Diet: regular diet      Discharge Medications:     Discharge Medication List as of 2/28/2023  2:25 PM             Details   pantoprazole (PROTONIX) 40 MG tablet Take 1 tablet by mouth every morning (before breakfast), Disp-30 tablet, R-3Normal                Details   insulin lispro, 1 Unit Dial, (HUMALOG KWIKPEN) 100 UNIT/ML SOPN Use on a sliding scale 3 times a day with meals. Maximum dose 30 units per day., Disp-15 Adjustable Dose Pre-filled Pen Syringe, R-0Normal      HM ASPIRIN 81 MG chewable tablet TAKE ONE (1) TABLET BY MOUTH DAILY, Disp-90 tablet, R-1Normal      traZODone (DESYREL) 50 MG tablet Take 1 tablet by mouth nightly, Disp-30 tablet, R-1Normal      !!  Continuous Blood Gluc Sensor (FREESTYLE ANT 2 SENSOR) MISC 1 Device by Does not apply route every 14 days, Disp-6 each, R-4NO PRINT      Continuous Blood Gluc  (FREESTYLE ANT 2 READER) GILMER 1 each by Does not apply route daily, Disp-1 each, R-1Print      buPROPion (WELLBUTRIN XL) 150 MG extended release tablet Take 1 tablet by mouth every morning, Disp-90 tablet, R-3Normal      donepezil (ARICEPT) 10 MG tablet Take 1 tablet by mouth nightly, Disp-90 tablet, R-1Normal      DULoxetine (CYMBALTA) 20 MG extended release capsule Take 1 capsule by mouth daily, Disp-90 capsule, R-1Normal      glipiZIDE (GLUCOTROL) 10 MG tablet TAKE 1 TABLET BY MOUTH EVERY DAY, Disp-90 tablet, R-1Normal      ELIQUIS 5 MG TABS tablet TAKE 1 TABLET BY MOUTH TWICE DAILY, Disp-180 tablet, R-1, DAWNormal      rosuvastatin (CRESTOR) 40 MG tablet TAKE 1 TABLET BY MOUTH EVERY DAY, Disp-90 tablet, R-1Normal      lisinopril (PRINIVIL;ZESTRIL) 10 MG tablet TAKE 1 TABLET BY MOUTH EVERY DAY, Disp-90 tablet, R-1Normal      diclofenac sodium (VOLTAREN) 1 % GEL Apply 4 g topically 4 times daily, Topical, 4 TIMES DAILY Starting Thu 11/3/2022, Disp-100 g, R-1, Normal      metoprolol tartrate (LOPRESSOR) 25 MG tablet TAKE 1 TABLET TWICE A DAY, Disp-180 tablet, R-1Normal      divalproex (DEPAKOTE) 250 MG DR tablet Take 2 tablets by mouth nightly, Disp-180 tablet, R-1Normal      Calcium Carb-Cholecalciferol (CALCIUM 1000 + D) 1000-800 MG-UNIT TABS Take 1,000 mg by mouth daily, Disp-90 tablet, R-1Normal      LANTUS SOLOSTAR 100 UNIT/ML injection pen Inject 40 Units into the skin nightly, Disp-15 Adjustable Dose Pre-filled Pen Syringe, R-2, DAWNormal      JARDIANCE 10 MG tablet TAKE 1 TABLET BY MOUTH DAILY, Disp-90 tablet, R-1, DAWNormal      levothyroxine (SYNTHROID) 100 MCG tablet Take 1 tablet daily, Disp-90 tablet, R-1Normal      amLODIPine (NORVASC) 5 MG tablet TAKE 1 TABLET DAILY, Disp-90 tablet, R-1Normal      polyethylene glycol (GLYCOLAX) 17 GM/SCOOP powder 1 powder in packet DAILY (route: oral)Historical Med      Handicap Placard [facility] Starting Tue 8/9/2022, Disp-1 each, R-0, Print08/09/22      !!  Continuous Blood Gluc Sensor (FREESTYLE ANT SENSOR SYSTEM) MISC 1 box by Does not apply route 2 times daily, Disp-1 each, R-0Normal      BD PEN NEEDLE MICRO U/F 32G X 6 MM MISC USE WITH LANTUS DAILY, Disp-100 each, R-5, DAWNormal      METAMUCIL FIBER PO Take by mouth Indications: as needed MiraLax insteadHistorical Med      ondansetron (ZOFRAN) 4 MG tablet Take 1 tablet by mouth 3 times daily as needed for Nausea or Vomiting, Disp-30 tablet, R-0Normal      Cyanocobalamin (B-12) 50 MCG TABS Take by mouth Historical Med      Multiple Vitamins-Minerals (VITEYES COMPLETE PO) Take by mouthHistorical Med      latanoprost (XALATAN) 0.005 % ophthalmic solution 1 drop nightlyHistorical Med       !! - Potential duplicate medications found. Please discuss with provider. Time Spent on discharge is more than 40 minutes in the examination, evaluation, counseling and review of medications and discharge plan. Signed:    Niranjan Berry MD   3/1/2023      Thank you Dayna Gomez DO for the opportunity to be involved in this patient's care. If you have any questions or concerns please feel free to contact me at [phone].

## 2023-03-02 NOTE — TELEPHONE ENCOUNTER
Yes, once she is out of Snf I can refer patient to Brendan Bowen our dietician that works on [employer]. Thanks. ankle pain/injury

## 2023-03-08 ENCOUNTER — CARE COORDINATION (OUTPATIENT)
Dept: CARE COORDINATION | Age: 78
End: 2023-03-08

## 2023-03-08 NOTE — CARE COORDINATION
M called [facility] and spoke to Charlotte who said patient will be d/c home on Friday March 10. [facility] will make outreach call to patient on Monday 3/13/23.

## 2023-03-09 ENCOUNTER — TELEPHONE (OUTPATIENT)
Dept: FAMILY MEDICINE CLINIC | Age: 78
End: 2023-03-09

## 2023-03-09 NOTE — TELEPHONE ENCOUNTER
[facility] Transitions Initial Follow Up Call    Outreach made within 2 business days of discharge: Yes    Patient: Alessia Franco Patient : 1945   MRN: [id number]  Reason for Admission: There are no discharge diagnoses documented for the most recent discharge. Discharge Date: 23       Spoke with: SNF PT will be DC from them 03/10/2023, made TCM with PCP. Discharge department/facility: SNF    Non-face-to-face services provided:  Scheduled appointment with PCP-03/15/2023  Obtained and reviewed discharge summary and/or continuity of care documents    TCM Interactive Patient Contact:  Was patient able to fill all prescriptions: Yes  Was patient instructed to bring all medications to the follow-up visit: Yes  Is patient taking all medications as directed in the discharge summary?  Yes  Does patient understand their discharge instructions: Yes  Does patient have questions or concerns that need addressed prior to 7-14 day follow up office visit: no    Scheduled appointment with PCP within 7-14 days    Follow Up  Future Appointments   Date Time Provider Coleman Garcia   3/15/2023 11:00 AM DO KRISTIN Hylton [place] - [facility]   3/15/2023  1:30 PM Leonora Lucio, PhD [facility] JULIA Matt LPN

## 2023-03-13 ENCOUNTER — CARE COORDINATION (OUTPATIENT)
Dept: CARE COORDINATION | Age: 78
End: 2023-03-13

## 2023-03-13 NOTE — CARE COORDINATION
Ambulatory Care Coordination Note  3/13/2023    Patient Current Location:  Home: [address] [place] [address]     ACM contacted the patient by telephone. Verified name and  with patient as identifiers. Provided introduction to self, and explanation of the ACM role. Challenges to be reviewed by the provider   Additional needs identified to be addressed with provider: No  none               Method of communication with provider: chart routing. ACM: Dash Fernandez, RN     ACM completed follow up call with patient who said she is not doing well since being d/c from [facility]. Patient said she did not have much support from her son as he is buys. Patient felt she was discharged to early and needed to stay longer at the [facility] but was told if the appeal was denied patient would have thousands of dollars in costs so patient decided to go back to the [facility]. Patient said [facility] PT/OT have started and hopeful she will regain strength. Patient said she is having pain In her hip ever since she fell [place]. ACM encouraged patient to discuss this on follow up appt with Dr. Brittany Alfonso. Patient became tearful as she did not think she would be able to follow up with a doctor in person d/t fear of falling on the bus again. ACM said to discuss hip pain during VV with Dr. Brittany Alfonso patient verbalized understanding. Plan:    F/U call 1 week  Complete enrollment   Med Rec, education, DM assessment    Offered patient enrollment in the Remote Patient Monitoring (RPM) program for in-home monitoring:  Did not discuss during this call . Lab Results       None            Care Coordination Interventions    Referral from Primary Care Provider: No  Suggested Interventions and [address] [place] [address] Hwy: In Process (Comment: [facility] PT/OT)  Zone Management Tools:  In Process (Comment: DM Zone tools to be sent)          Goals Addressed                   This Visit's Progress     Conditions and Symptoms        I will schedule office visits, as directed by my provider. I will keep my appointment or reschedule if I have to cancel. I will notify my provider of any barriers to my plan of care. I will follow my Zone Management tool to seek urgent or emergent care. I will notify my provider of any symptoms that indicate a worsening of my condition. Barriers: lack of support and overwhelmed by complexity of regimen  Plan for overcoming my barriers: ACM will continue follow up calls with patient to keep patient engaged in healthcare. Confidence: 8/10  Anticipated Goal Completion Date: 4/13/23                Future Appointments   Date Time Provider Coleman Garcia   3/15/2023 11:00 AM DO KRISTIN Joseph [place] - DYBALA   3/15/2023  1:30 PM Mattie Butcher, PhD [facility] PSYCHG MMA   ,   Diabetes Assessment      Meal Planning: Avoidance of concentrated sweets, Calorie counting   How often do you test your blood sugar?: Daily, Meals   Do you have barriers with adherence to non-pharmacologic self-management interventions?  (Nutrition/Exercise/Self-Monitoring): No   Have you ever had to go to the ED for symptoms of low blood sugar?: No            , and   General Assessment    Do you have any symptoms that are causing concern?: Yes  Progression since Onset: Intermittent - Waxing/Waning  Reported Symptoms: Weakness

## 2023-03-14 ENCOUNTER — TELEPHONE (OUTPATIENT)
Dept: FAMILY MEDICINE CLINIC | Age: 78
End: 2023-03-14

## 2023-03-14 NOTE — TELEPHONE ENCOUNTER
Nazario Davidson called with care coordination needing orders for OT for two times a week for four weeks and then once a week for four weeks.  Please advise and call mathew back at [phone]

## 2023-03-15 ENCOUNTER — TELEMEDICINE (OUTPATIENT)
Dept: FAMILY MEDICINE CLINIC | Age: 78
End: 2023-03-15

## 2023-03-15 ENCOUNTER — SCHEDULED TELEPHONE ENCOUNTER (OUTPATIENT)
Dept: PSYCHOLOGY | Age: 78
End: 2023-03-15

## 2023-03-15 DIAGNOSIS — E11.65 TYPE 2 DIABETES MELLITUS WITH HYPERGLYCEMIA, WITH LONG-TERM CURRENT USE OF INSULIN (HCC): ICD-10-CM

## 2023-03-15 DIAGNOSIS — F41.9 ANXIETY: Primary | ICD-10-CM

## 2023-03-15 DIAGNOSIS — I63.9 CEREBRAL INFARCTION, UNSPECIFIED MECHANISM (HCC): ICD-10-CM

## 2023-03-15 DIAGNOSIS — U07.1 COVID-19: ICD-10-CM

## 2023-03-15 DIAGNOSIS — R53.81 PHYSICAL DEBILITY: ICD-10-CM

## 2023-03-15 DIAGNOSIS — F33.1 MAJOR DEPRESSIVE DISORDER, RECURRENT, MODERATE (HCC): ICD-10-CM

## 2023-03-15 DIAGNOSIS — Z09 HOSPITAL DISCHARGE FOLLOW-UP: Primary | ICD-10-CM

## 2023-03-15 DIAGNOSIS — Z79.4 TYPE 2 DIABETES MELLITUS WITH HYPERGLYCEMIA, WITH LONG-TERM CURRENT USE OF INSULIN (HCC): ICD-10-CM

## 2023-03-15 NOTE — PROGRESS NOTES
Behavioral Health Consultation  Angela Chambers, Ph.D.  Psychologist  3/15/2023  1:48 PM EDT      Time spent with Patient: 20 minutes  This is patient's  sixteenth  [facility] appointment. Reason for Consult:    Chief Complaint   Patient presents with    Anxiety     Referring Provider: Elizabeth Mcclain, JULIET - [place]  [address] [address] ([facility]) [address] [place] [address]    Feedback given to PCP. TELEHEALTH VISIT -- Audio Only (During LNEJE-66 public health emergency)    Pursuant to the emergency declaration under the [facility], [facility] waiver authority and the [employer] Act, this phone call was conducted, with patient's consent, to reduce the patient's risk of exposure to COVID-19 and provide continuity of care for an established patient. Services were provided through a phone call discussion to substitute for in-person clinic visit. Pt gave verbal informed consent to participate in telehealth services. Consent:  She and/or health care decision maker is aware that that she may receive a bill for this telephone service, depending on her insurance coverage, and has provided verbal consent to proceed: Yes    Conducted a risk-benefit analysis and determined that the patient's presenting problems are consistent with the use of telepsychology. Determined that the patient has sufficient knowledge and skills in the use of technology enabling them to adequately benefit from telepsychology. It was determined that this patient was able to be properly treated without an in-person session. Patient verified that they were currently located at the [facility] address that was provided during registration.     Verified the following information:  Patient's identification: Yes  Patient location: [address] [place] [address]  Patient's call back number: [phone]  Patient's emergency contact's name and number, as well as permission to contact them if needed: Extended Emergency Contact Information  Primary Emergency Contact: PRINCESS GUTIERREZ [facility] Phone: [phone]  Mobile Phone: [phone]  Relation: Child  Secondary Emergency Contact: [facility] Phone: [phone]  Relation: Daughter-in-Law   needed? No    Provider location: Vianca Gaonat Lai affirm this is an episode with a patient who has not had a related appointment within my department in the past 7 days or scheduled within the next 24 hours. S:  Patient reported that she is back at the [facility] and will start OT/PT for home health tomorrow. Looks forward to making progress, feeling more in control. She is still struggling with her nutrition, but started having groceries delivered. Praised patient for focusing on factors within her control. Processed relationship dynamics with her son, encouraged her to focus on honesty and boundaries in their relationship. O:  MSE:    Attitude: cooperative and friendly  Consciousness: alert  Orientation: oriented to person, place, time, general circumstance  Memory: recent and remote memory intact  Attention/Concentration: intact during session  Speech: normal rate and volume, well-articulated  Mood: discouraged  Affect: euthymic  Perception: within normal limits  Thought Content: all-or-none thinking and intrusive thoughts  Thought Process: logical, coherent and goal-directed  Insight: good  Judgment: intact  Ability to understand instructions: Yes  Ability to respond meaningfully: Yes  Morbid Ideation: no   Suicide Assessment: no suicidal ideation, plan, or intent  Homicidal Ideation: no    History:    Medications:   Current Outpatient Medications   Medication Sig Dispense Refill    pantoprazole (PROTONIX) 40 MG tablet Take 1 tablet by mouth every morning (before breakfast) 30 tablet 3    insulin lispro, 1 Unit Dial, (HUMALOG KWIKPEN) 100 UNIT/ML SOPN Use on a sliding scale 3 times a day with meals.  Maximum dose 30 units per day.  15 Adjustable Dose Pre-filled Pen Syringe 0    HM ASPIRIN 81 MG chewable tablet TAKE ONE (1) TABLET BY MOUTH DAILY 90 tablet 1    traZODone (DESYREL) 50 MG tablet Take 1 tablet by mouth nightly 30 tablet 1    Continuous Blood Gluc Sensor (FREESTYLE ANT 2 SENSOR) MISC 1 Device by Does not apply route every 14 days 6 each 4    Continuous Blood Gluc  (FREESTYLE ANT 2 READER) GILMER 1 each by Does not apply route daily 1 each 1    buPROPion (WELLBUTRIN XL) 150 MG extended release tablet Take 1 tablet by mouth every morning 90 tablet 3    donepezil (ARICEPT) 10 MG tablet Take 1 tablet by mouth nightly 90 tablet 1    DULoxetine (CYMBALTA) 20 MG extended release capsule Take 1 capsule by mouth daily 90 capsule 1    glipiZIDE (GLUCOTROL) 10 MG tablet TAKE 1 TABLET BY MOUTH EVERY DAY 90 tablet 1    ELIQUIS 5 MG TABS tablet TAKE 1 TABLET BY MOUTH TWICE DAILY 180 tablet 1    rosuvastatin (CRESTOR) 40 MG tablet TAKE 1 TABLET BY MOUTH EVERY DAY 90 tablet 1    lisinopril (PRINIVIL;ZESTRIL) 10 MG tablet TAKE 1 TABLET BY MOUTH EVERY DAY 90 tablet 1    diclofenac sodium (VOLTAREN) 1 % GEL Apply 4 g topically 4 times daily 100 g 1    metoprolol tartrate (LOPRESSOR) 25 MG tablet TAKE 1 TABLET TWICE A  tablet 1    divalproex (DEPAKOTE) 250 MG DR tablet Take 2 tablets by mouth nightly 180 tablet 1    Calcium Carb-Cholecalciferol (CALCIUM 1000 + D) 1000-800 MG-UNIT TABS Take 1,000 mg by mouth daily 90 tablet 1    LANTUS SOLOSTAR 100 UNIT/ML injection pen Inject 40 Units into the skin nightly 15 Adjustable Dose Pre-filled Pen Syringe 2    JARDIANCE 10 MG tablet TAKE 1 TABLET BY MOUTH DAILY 90 tablet 1    levothyroxine (SYNTHROID) 100 MCG tablet Take 1 tablet daily 90 tablet 1    amLODIPine (NORVASC) 5 MG tablet TAKE 1 TABLET DAILY 90 tablet 1    polyethylene glycol (GLYCOLAX) 17 GM/SCOOP powder 1 powder in packet DAILY (route: oral)      Continuous Blood Gluc Sensor (FREESTYLE ANT SENSOR SYSTEM) MISC 1 Units by Other route every 14 days Place 1 sensor on back of arm every 14 days 6 each 0    Handicap Placard MISC by Does not apply route 08/09/22 1 each 0    Continuous Blood Gluc Sensor ([facility]) MISC 1 box by Does not apply route 2 times daily 1 each 0    BD PEN NEEDLE MICRO U/F 32G X 6 MM MISC USE WITH LANTUS DAILY 100 each 5    METAMUCIL FIBER PO Take by mouth Indications: as needed MiraLax instead      ondansetron (ZOFRAN) 4 MG tablet Take 1 tablet by mouth 3 times daily as needed for Nausea or Vomiting 30 tablet 0    Cyanocobalamin (B-12) 50 MCG TABS Take by mouth       Multiple Vitamins-Minerals (VITEYES COMPLETE PO) Take by mouth      latanoprost (XALATAN) 0.005 % ophthalmic solution 1 drop nightly       No current facility-administered medications for this visit. Social History:   Social History     Socioeconomic History    Marital status:      Spouse name: Not on file    Number of children: Not on file    Years of education: Not on file    Highest education level: Not on file   Occupational History    Not on file   Tobacco Use    Smoking status: Never    Smokeless tobacco: Never   Vaping Use    Vaping Use: Never used   Substance and Sexual Activity    Alcohol use: Never    Drug use: Never    Sexual activity: Not on file   Other Topics Concern    Not on file   Social History Narrative    Not on file     Social Determinants of Health     Financial Resource Strain: Low Risk     Difficulty of Paying Living Expenses: Not hard at all   Food Insecurity: No Food Insecurity    Worried About Running Out of Food in the Last Year: Never true    [address] in the Last Year: Never true   Transportation Needs: No Transportation Needs    Lack of Transportation (Medical): No    Lack of Transportation (Non-Medical):  No   Physical Activity: Insufficiently Active    Days of Exercise per Week: 2 days    Minutes of Exercise per Session: 20 min   Stress: Not on file   Social Connections: Not on file Intimate Partner Violence: Not on file   Housing Stability: Low Risk     Unable to Pay for Housing in the Last Year: No    Number of Places Lived in the Last Year: 2    Unstable Housing in the Last Year: No     TOBACCO:   reports that she has never smoked. She has never used smokeless tobacco.  ETOH:   reports no history of alcohol use. Family History:   Family History   Problem Relation Age of Onset    Diabetes Paternal Grandmother     Diabetes Father     Lung Cancer Mother     Pancreatic Cancer Brother     Diabetes Brother      A:  Patient engaged and cooperative. Denies SI. Insight and motivation are good. Diagnosis:    1. Anxiety          Diagnosis Date    Colon polyps     Diabetes mellitus ([address]     Diabetic neuropathy ([address]     Diabetic retinopathy ([address]     Essential hypertension 10/28/2019    Fall     Fatty liver     Gastritis     GERD (gastroesophageal reflux disease)     Hyperlipidemia     Hypertension     Hypothyroidism     Irritable bowel syndrome     Major depressive disorder with single episode 10/28/2019    Osteopenia     Photosensitivity disorder     chronic    RBBB     Sleep apnea     on BIPAP    Thyroid disease     Thyroid nodule     neg bx-chronic thyroiditis    Type 2 diabetes mellitus with diabetic polyneuropathy, with long-term current use of insulin ([address] 07/23/2019    Vitamin D deficiency      Plan:  Pt interventions:  Conducted functional assessment, [place]-setting to identify pt's primary goals for [facility] visit / overall health, Supportive techniques, and Emphasized self-care as important for managing overall health.     Pt Behavioral Change Plan:   See Pt Instructions

## 2023-03-15 NOTE — Clinical Note
Are you able to reach out to [employer] or pill box to find out what medications have been changed.  Thank you

## 2023-03-15 NOTE — PROGRESS NOTES
responded that day discussing it was related to diabetes and recommended PT. Also that if this continued that     1/18/23  Called after hours. Had been seen in ED. Offered visit that Monday at 11:30    2/21 Phone call  Offered office, virtual, or dispatch health eval. Declines    Patient discharged from hospital to SNF. Then returned to [facility]    Interval history/Current status:     DM2  Using freestyle egetha  Reports that BS is stable currently  Using 40 units lantus nightly    Planning to have OT/Pt/Nursing care for next 6-8 weeks at [address] [place] [address] that medications were changed during her SNF treatmeent. Does not know which medications were changed or what was added. Patient Active Problem List   Diagnosis    Type 2 diabetes mellitus with hyperglycemia, with long-term current use of insulin ([facility]    Hypothyroidism    Hypertension    Hyperlipidemia    Type 2 diabetes mellitus    Cerebral infarction, unspecified ([address]    Gastroparesis    CATA treated with BiPAP    Irritable bowel syndrome with both constipation and diarrhea    Major depressive disorder, recurrent, moderate (HCC)    Pelvic pressure in female    Generalized weakness    Physical deconditioning    COVID-19       Medications listed as ordered at the time of discharge from hospital     Medication List            Accurate as of March 15, 2023 11:59 PM. If you have any questions, ask your nurse or doctor. CONTINUE taking these medications      amLODIPine 5 MG tablet  Commonly known as: NORVASC  TAKE 1 TABLET DAILY     B-12 50 MCG Tabs     BD Pen Needle Micro U/F 32G X 6 MM Misc  Generic drug: Insulin Pen Needle  USE WITH LANTUS DAILY     buPROPion 150 MG extended release tablet  Commonly known as:  Wellbutrin XL  Take 1 tablet by mouth every morning     Calcium 1000 + D 1000-20 MG-MCG Tabs  Generic drug: Calcium Carb-Cholecalciferol  Take 1,000 mg by mouth daily     diclofenac sodium 1 %

## 2023-03-15 NOTE — Clinical Note
Patient is asking if there are any meal delivery options available to her. I discussed that there may not be any additional options but she wished to have a message sent. Thank you for any help.

## 2023-03-17 ENCOUNTER — TELEPHONE (OUTPATIENT)
Dept: FAMILY MEDICINE CLINIC | Age: 78
End: 2023-03-17

## 2023-03-17 NOTE — TELEPHONE ENCOUNTER
Enmanuel Yoder with [facility] called she saw the pt today and patient stated she is constipated and is taking stool softener and Jez Bhardwaj wants to know if the doctor will prescribed Murelax and Sennakot for the patient to regulate her bowls . please advise and call saurabh back at [phone]

## 2023-03-20 NOTE — TELEPHONE ENCOUNTER
Haley Meraz returned phone call stating pt passed BM on Friday 3/17 but was not easy and wasn't a lot. Haley Meraz is asking if there is any way we can contact pt to discuss with her.

## 2023-03-20 NOTE — TELEPHONE ENCOUNTER
Spoke with patient, she abruptly stated- Larry Rileyless is the issue? \" I stated that Ambar Evans had called us to let us know that she had a bowel movement. Patient stated \" Yes I did. What are you asking about? \"     I let her know that Dr. Nohemy Sanders would like her to increase her water intake in try a supplement over the counter. Patient said she has Metamucil at home and she can start taking it- right away. Patient asked if that was all that I needed- and I said \"yes. I will let Dr. Stephanie Canas know that I talked with you. \"    Patient said \"okay well bye bye\"   Call ended, patient hung up

## 2023-03-21 ENCOUNTER — CARE COORDINATION (OUTPATIENT)
Dept: CARE COORDINATION | Age: 78
End: 2023-03-21

## 2023-03-21 NOTE — CARE COORDINATION
delivered that are accomadating for her dietary restrictions 3/21/23)  Medi Set or Pill Pack: In Process (Comment: ACM placed call to the pill box to confirm med list)  Zone Management Tools: In Process (Comment: DM Zone tools to be sent)          Goals Addressed    None         Future Appointments   Date Time Provider Coleman Garcia   4/5/2023  3:00 PM Mark Martinez, PhD [facility]   ,   Diabetes Assessment      Meal Planning: Avoidance of concentrated sweets, Calorie counting   How often do you test your blood sugar?: Daily, Meals   Do you have barriers with adherence to non-pharmacologic self-management interventions?  (Nutrition/Exercise/Self-Monitoring): No   Have you ever had to go to the ED for symptoms of low blood sugar?: No       Last Blood Sugar Value: 209        , and   General Assessment    Do you have any symptoms that are causing concern?: Yes  Progression since Onset: Gradually Worsening  Reported Symptoms: Pain (Comment: Right arm pain tremor)

## 2023-03-23 ENCOUNTER — TELEPHONE (OUTPATIENT)
Dept: FAMILY MEDICINE CLINIC | Age: 78
End: 2023-03-23

## 2023-03-23 DIAGNOSIS — I10 HYPERTENSION, UNSPECIFIED TYPE: ICD-10-CM

## 2023-03-23 DIAGNOSIS — E11.42 TYPE 2 DIABETES MELLITUS WITH DIABETIC POLYNEUROPATHY, WITH LONG-TERM CURRENT USE OF INSULIN (HCC): ICD-10-CM

## 2023-03-23 DIAGNOSIS — G47.00 INSOMNIA, UNSPECIFIED TYPE: ICD-10-CM

## 2023-03-23 DIAGNOSIS — F32.9 MAJOR DEPRESSIVE DISORDER WITH SINGLE EPISODE, REMISSION STATUS UNSPECIFIED: ICD-10-CM

## 2023-03-23 DIAGNOSIS — K59.00 CONSTIPATION, UNSPECIFIED CONSTIPATION TYPE: Primary | ICD-10-CM

## 2023-03-23 DIAGNOSIS — I10 ESSENTIAL HYPERTENSION: ICD-10-CM

## 2023-03-23 DIAGNOSIS — E03.9 HYPOTHYROIDISM, UNSPECIFIED TYPE: ICD-10-CM

## 2023-03-23 DIAGNOSIS — E11.65 UNCONTROLLED TYPE 2 DIABETES MELLITUS WITH HYPERGLYCEMIA (HCC): ICD-10-CM

## 2023-03-23 DIAGNOSIS — E78.2 MIXED HYPERLIPIDEMIA: ICD-10-CM

## 2023-03-23 DIAGNOSIS — Z79.4 TYPE 2 DIABETES MELLITUS WITH DIABETIC POLYNEUROPATHY, WITH LONG-TERM CURRENT USE OF INSULIN (HCC): ICD-10-CM

## 2023-03-23 DIAGNOSIS — F33.1 MAJOR DEPRESSIVE DISORDER, RECURRENT, MODERATE (HCC): ICD-10-CM

## 2023-03-23 RX ORDER — APIXABAN 5 MG/1
TABLET, FILM COATED ORAL
Qty: 180 TABLET | Refills: 1 | Status: SHIPPED | OUTPATIENT
Start: 2023-03-23

## 2023-03-23 RX ORDER — DULOXETIN HYDROCHLORIDE 20 MG/1
20 CAPSULE, DELAYED RELEASE ORAL DAILY
Qty: 90 CAPSULE | Refills: 1 | Status: SHIPPED | OUTPATIENT
Start: 2023-03-23

## 2023-03-23 RX ORDER — AMLODIPINE BESYLATE 5 MG/1
TABLET ORAL
Qty: 90 TABLET | Refills: 1 | Status: SHIPPED | OUTPATIENT
Start: 2023-03-23

## 2023-03-23 RX ORDER — DOCUSATE SODIUM 100 MG/1
100 CAPSULE, LIQUID FILLED ORAL DAILY PRN
Qty: 90 CAPSULE | Refills: 0 | Status: SHIPPED | OUTPATIENT
Start: 2023-03-23

## 2023-03-23 RX ORDER — DONEPEZIL HYDROCHLORIDE 10 MG/1
10 TABLET, FILM COATED ORAL NIGHTLY
Qty: 90 TABLET | Refills: 1 | Status: SHIPPED | OUTPATIENT
Start: 2023-03-23

## 2023-03-23 RX ORDER — EMPAGLIFLOZIN 10 MG/1
TABLET, FILM COATED ORAL
Qty: 90 TABLET | Refills: 1 | Status: SHIPPED | OUTPATIENT
Start: 2023-03-23

## 2023-03-23 RX ORDER — TRAZODONE HYDROCHLORIDE 50 MG/1
50 TABLET ORAL NIGHTLY
Qty: 30 TABLET | Refills: 1 | Status: SHIPPED | OUTPATIENT
Start: 2023-03-23

## 2023-03-23 RX ORDER — DIVALPROEX SODIUM 250 MG/1
500 TABLET, DELAYED RELEASE ORAL NIGHTLY
Qty: 180 TABLET | Refills: 1 | Status: SHIPPED | OUTPATIENT
Start: 2023-03-23

## 2023-03-23 RX ORDER — LISINOPRIL 10 MG/1
TABLET ORAL
Qty: 90 TABLET | Refills: 1 | Status: SHIPPED | OUTPATIENT
Start: 2023-03-23

## 2023-03-23 RX ORDER — GLIPIZIDE 10 MG/1
TABLET ORAL
Qty: 90 TABLET | Refills: 1 | Status: SHIPPED | OUTPATIENT
Start: 2023-03-23

## 2023-03-23 RX ORDER — BUPROPION HYDROCHLORIDE 150 MG/1
150 TABLET ORAL EVERY MORNING
Qty: 90 TABLET | Refills: 3 | Status: SHIPPED | OUTPATIENT
Start: 2023-03-23

## 2023-03-23 RX ORDER — ASPIRIN 81 MG/1
TABLET, CHEWABLE ORAL
Qty: 90 TABLET | Refills: 1 | Status: SHIPPED | OUTPATIENT
Start: 2023-03-23

## 2023-03-23 RX ORDER — PANTOPRAZOLE SODIUM 40 MG/1
40 TABLET, DELAYED RELEASE ORAL
Qty: 90 TABLET | Refills: 1 | Status: SHIPPED | OUTPATIENT
Start: 2023-03-23

## 2023-03-23 RX ORDER — LEVOTHYROXINE SODIUM 0.1 MG/1
TABLET ORAL
Qty: 90 TABLET | Refills: 1 | Status: SHIPPED | OUTPATIENT
Start: 2023-03-23

## 2023-03-23 RX ORDER — ROSUVASTATIN CALCIUM 40 MG/1
TABLET, COATED ORAL
Qty: 90 TABLET | Refills: 1 | Status: SHIPPED | OUTPATIENT
Start: 2023-03-23

## 2023-03-23 NOTE — TELEPHONE ENCOUNTER
[facility] [address], called in to let us know that she is currently with the pt and she has a horrible pain in left leg. Pt had fall on bus and has giant knot in left pelvis, was told bone spur possibly could be going on. Ysabel Tafoya is asking if some Lidoderm patches can be called in and something for muscle pain. Not pain relievers just an anti-inflammatory per Ysabel Tafoya. If questions she left [phone] as her call back number.

## 2023-03-23 NOTE — TELEPHONE ENCOUNTER
Adding additional information provided by home health, patient experiencing tremors, believes this is due to recently started Bupropion 150mg daily. Please advise.

## 2023-03-24 NOTE — TELEPHONE ENCOUNTER
Scheduled PT for a VV due to her transportation is out of town. She wants to review her medications.

## 2023-04-05 ENCOUNTER — SCHEDULED TELEPHONE ENCOUNTER (OUTPATIENT)
Dept: PSYCHOLOGY | Age: 78
End: 2023-04-05

## 2023-04-05 ENCOUNTER — CARE COORDINATION (OUTPATIENT)
Dept: CARE COORDINATION | Age: 78
End: 2023-04-05

## 2023-04-05 ENCOUNTER — TELEPHONE (OUTPATIENT)
Dept: FAMILY MEDICINE CLINIC | Age: 78
End: 2023-04-05

## 2023-04-05 DIAGNOSIS — F41.1 GAD (GENERALIZED ANXIETY DISORDER): Primary | ICD-10-CM

## 2023-04-05 NOTE — CARE COORDINATION
monitoring: Patient declined. Lab Results       None            Care Coordination Interventions    Referral from Primary Care Provider: No  Suggested Interventions and Community Resources  Behavorial Health: In Process (Comment: active with Dr. Karla Rodriguez 4/5/23)  [address]: In Process (Comment: [facility] PT/OT)  Meals on Wheels: Completed (Comment: Patient would like meals to be delivered that are accomadating for her dietary restrictions 3/21/23)  Medi Set or Pill Pack: In Process (Comment: ACM placed call to the pill box to confirm med list)  Zone Management Tools: Completed (Comment: DM Zone tools to be sent)          Goals Addressed    None         Future Appointments   Date Time Provider Coleman Garcia   4/5/2023  3:00 PM John Chavez, PhD [place] [facility]  PSYCH[facility]   4/11/2023 11:00 AM DO KRISTIN Julio [place] - DYD   ,   Diabetes Assessment      Meal Planning: Avoidance of concentrated sweets, Calorie counting   How often do you test your blood sugar?: Daily, Meals   Do you have barriers with adherence to non-pharmacologic self-management interventions?  (Nutrition/Exercise/Self-Monitoring): No   Have you ever had to go to the ED for symptoms of low blood sugar?: No            , and   General Assessment    Do you have any symptoms that are causing concern?: Yes  Progression since Onset: Gradually Worsening  Reported Symptoms: Other (Comment: Anxiety/ stress/ fear)

## 2023-04-05 NOTE — Clinical Note
Patient isn't sure her Wellbutrin is in rx from [facility]. Can you please print the AVS from this visit when she sees you in person next week? She cannot access [employer].

## 2023-04-05 NOTE — PATIENT INSTRUCTIONS
Deep Breathing     What Is Deep Breathing? Deep breathing involves using your diaphragm muscle to help bring about a state of physiological relaxation. The diaphragm is a large muscle that rests across the bottom of your rib cage. When you inhale, the diaphragm muscle drops, opening up space so air can come in. When watching someone do this it looks like your stomach is filling with air. This type of breathing helps activate the part of your nervous system that controls relaxation. It can lead to decreased heart rate, blood pressure, decreased muscle tension, and overall feelings of relaxation. Why Be Concerned With How Im Breathing? To increase your awareness of the role that breathing plays in increased physical tension and in contributing to increasing your bodys stress response. To lower your level of stress-related arousal and tension. To give you a method of taking calm, relaxing breaths to break the cycle of increasing arousal during stressful situations. What Is the Best Way To Use Deep Breathing Exercises? Use deep breathing frequently. Take deep breaths at the first signs of stress, anxiety, physical tension, or other symptoms. Schedule time for relaxation. My scheduled time for deep breathing will be bedtime. Relaxation:  Diaphragmatic Breathing             ______________________________________________________________________________    1. Sit in a comfortable position  2. Place one hand on your stomach and the other on your chest  3. Try to breathe so that only your stomach rises and falls    As you inhale, concentrate on your chest remaining relatively still while your stomach rises. It may be helpful for you to imagine that your pants are too big and you need to push your stomach out to hold them up. When exhaling, allow your stomach to fall in and the air to fully escape. Inhale slowly.   You may choose to hold the air

## 2023-04-05 NOTE — PROGRESS NOTES
visit / overall health, Supportive techniques, and Emphasized self-care as important for managing overall health.     Pt Behavioral Change Plan:   See Pt Instructions

## 2023-04-05 NOTE — TELEPHONE ENCOUNTER
Fatuma called with [facility] needing verbal orders for a  and wanting to increase OT from 1 time a week for 4 weeks to two times a week for 4 weeks.  Please advise and call dane back at [phone]

## 2023-04-10 ENCOUNTER — TELEPHONE (OUTPATIENT)
Dept: FAMILY MEDICINE CLINIC | Age: 78
End: 2023-04-10

## 2023-04-14 ENCOUNTER — TELEPHONE (OUTPATIENT)
Dept: FAMILY MEDICINE CLINIC | Age: 78
End: 2023-04-14

## 2023-04-16 PROBLEM — E11.69 HYPERLIPIDEMIA ASSOCIATED WITH TYPE 2 DIABETES MELLITUS (HCC): Status: ACTIVE | Noted: 2019-10-28

## 2023-04-16 PROBLEM — R10.2 PELVIC PRESSURE IN FEMALE: Status: RESOLVED | Noted: 2022-10-26 | Resolved: 2023-04-16

## 2023-04-16 PROBLEM — U07.1 COVID-19: Status: RESOLVED | Noted: 2023-02-22 | Resolved: 2023-04-16

## 2023-04-18 ENCOUNTER — OFFICE VISIT (OUTPATIENT)
Dept: FAMILY MEDICINE CLINIC | Age: 78
End: 2023-04-18

## 2023-04-18 ENCOUNTER — HOSPITAL ENCOUNTER (OUTPATIENT)
Dept: GENERAL RADIOLOGY | Age: 78
Discharge: HOME | End: 2023-04-18
Payer: MEDICARE

## 2023-04-18 ENCOUNTER — HOSPITAL ENCOUNTER (OUTPATIENT)
Age: 78
Discharge: HOME | End: 2023-04-18
Payer: MEDICARE

## 2023-04-18 VITALS — SYSTOLIC BLOOD PRESSURE: 130 MMHG | HEART RATE: 68 BPM | OXYGEN SATURATION: 100 % | DIASTOLIC BLOOD PRESSURE: 60 MMHG

## 2023-04-18 DIAGNOSIS — Z79.4 TYPE 2 DIABETES MELLITUS WITH HYPERGLYCEMIA, WITH LONG-TERM CURRENT USE OF INSULIN (HCC): Primary | ICD-10-CM

## 2023-04-18 DIAGNOSIS — R53.81 PHYSICAL DECONDITIONING: ICD-10-CM

## 2023-04-18 DIAGNOSIS — F33.1 MAJOR DEPRESSIVE DISORDER, RECURRENT, MODERATE (HCC): ICD-10-CM

## 2023-04-18 DIAGNOSIS — E11.65 TYPE 2 DIABETES MELLITUS WITH HYPERGLYCEMIA, WITH LONG-TERM CURRENT USE OF INSULIN (HCC): Primary | ICD-10-CM
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
## 2023-04-18 PROCEDURE — 73521 X-RAY EXAM HIPS BI 2 VIEWS: CPT

## 2023-04-18 NOTE — PROGRESS NOTES
Patient: Adamaris Melgoza is a 70 y.o. female who presents today with the following Chief Complaint(s):  Chief Complaint   Patient presents with    Fall         HPI    Worsening tremor in right hand  Having difficulty writing now  Started a few months ago    Anxiety  Has had severe anxiety and depression since feeling that she has lost all independence    DM2  Taking sliding scale    Has home health nurse coming to help with ulceration on top of her feet    Reports that she had a fall 5-6 months ago when she was attempting to get onto the transport bus from her housing facility. Reports taht at the time she fell onto her buttocks and did not have immediate pain however as the day went on she felt more pain. Now feels that she has a new bony prominence and tenderess/pain when sitting for prolonged time.    Current Outpatient Medications   Medication Sig Dispense Refill    amLODIPine (NORVASC) 5 MG tablet TAKE 1 TABLET DAILY 90 tablet 1    aspirin (HM ASPIRIN) 81 MG chewable tablet TAKE ONE (1) TABLET BY MOUTH DAILY 90 tablet 1    buPROPion (WELLBUTRIN XL) 150 MG extended release tablet Take 1 tablet by mouth every morning 90 tablet 3    divalproex (DEPAKOTE) 250 MG DR tablet Take 2 tablets by mouth nightly 180 tablet 1    donepezil (ARICEPT) 10 MG tablet Take 1 tablet by mouth nightly 90 tablet 1    levothyroxine (SYNTHROID) 100 MCG tablet Take 1 tablet daily 90 tablet 1    ELIQUIS 5 MG TABS tablet TAKE 1 TABLET BY MOUTH TWICE DAILY 180 tablet 1    glipiZIDE (GLUCOTROL) 10 MG tablet TAKE 1 TABLET BY MOUTH EVERY DAY 90 tablet 1    JARDIANCE 10 MG tablet TAKE 1 TABLET BY MOUTH DAILY 90 tablet 1    lisinopril (PRINIVIL;ZESTRIL) 10 MG tablet TAKE 1 TABLET BY MOUTH EVERY DAY 90 tablet 1    pantoprazole (PROTONIX) 40 MG tablet Take 1 tablet by mouth every morning (before breakfast) 90 tablet 1    metoprolol tartrate (LOPRESSOR) 25 MG tablet TAKE 1 TABLET TWICE A  tablet 1    rosuvastatin (CRESTOR) 40 MG tablet

## 2023-04-19 ENCOUNTER — CARE COORDINATION (OUTPATIENT)
Dept: CARE COORDINATION | Age: 78
End: 2023-04-19

## 2023-04-21 ENCOUNTER — TELEPHONE (OUTPATIENT)
Dept: FAMILY MEDICINE CLINIC | Age: 78
End: 2023-04-21

## 2023-04-21 ENCOUNTER — SCHEDULED TELEPHONE ENCOUNTER (OUTPATIENT)
Dept: PSYCHOLOGY | Age: 78
End: 2023-04-21

## 2023-04-21 DIAGNOSIS — F41.1 GAD (GENERALIZED ANXIETY DISORDER): Primary | ICD-10-CM

## 2023-04-23 DIAGNOSIS — F32.9 MAJOR DEPRESSIVE DISORDER WITH SINGLE EPISODE, REMISSION STATUS UNSPECIFIED: ICD-10-CM

## 2023-04-23 RX ORDER — DIVALPROEX SODIUM 250 MG/1
TABLET, DELAYED RELEASE ORAL
Qty: 77 TABLET | Refills: 0 | Status: SHIPPED | OUTPATIENT
Start: 2023-04-23 | End: 2023-05-14

## 2023-04-25 ENCOUNTER — TELEPHONE (OUTPATIENT)
Dept: FAMILY MEDICINE CLINIC | Age: 78
End: 2023-04-25

## 2023-04-25 ENCOUNTER — CARE COORDINATION (OUTPATIENT)
Dept: CARE COORDINATION | Age: 78
End: 2023-04-25

## 2023-04-25 NOTE — CARE COORDINATION
[address] [place] [address] regarding Dietitian referral. Patient answered, RD explained reason for call and role in care. Conversation with patient was brief as she had a visitor a few minutes after our call was initiated. Patient states she would like information on foods she can eat for Gastroparesis. RD offered to send information and verified patient's home address. RD will send the following handouts (Diet for Gastroparesis, Gastroparesis Diet Tips, Gastroparesis Nutrition Therapy, Sample Meal Plan for Gastroparesis) and plan to follow up in three weeks to ensure handouts were received and answer any nutrition-related questions or concerns at that time.        Flakita May, [address] [place] [address],    [phone]

## 2023-04-26 ENCOUNTER — TELEMEDICINE (OUTPATIENT)
Dept: FAMILY MEDICINE CLINIC | Age: 78
End: 2023-04-26

## 2023-04-26 DIAGNOSIS — Z00.00 MEDICARE ANNUAL WELLNESS VISIT, SUBSEQUENT: Primary | ICD-10-CM

## 2023-04-26 ASSESSMENT — LIFESTYLE VARIABLES: HOW OFTEN DO YOU HAVE A DRINK CONTAINING ALCOHOL: NEVER

## 2023-04-26 NOTE — PATIENT INSTRUCTIONS
Personalized Preventive Plan for Ilene Amador - 4/26/2023  Medicare offers a range of preventive health benefits. Some of the tests and screenings are paid in full while other may be subject to a deductible, co-insurance, and/or copay. Some of these benefits include a comprehensive review of your medical history including lifestyle, illnesses that may run in your family, and various assessments and screenings as appropriate. After reviewing your medical record and screening and assessments performed today your provider may have ordered immunizations, labs, imaging, and/or referrals for you. A list of these orders (if applicable) as well as your Preventive Care list are included within your After Visit Summary for your review. Other Preventive Recommendations:    A preventive eye exam performed by an eye specialist is recommended every 1-2 years to screen for glaucoma; cataracts, macular degeneration, and other eye disorders. A preventive dental visit is recommended every 6 months. Try to get at least 150 minutes of exercise per week or 10,000 steps per day on a pedometer . Order or download the FREE \"Exercise & Physical Activity: Your Everyday Guide\" from [employer]. Call 1-819.484.5495 or search [employer] online. You need 6433-5400 mg of calcium and 8252-8483 IU of vitamin D per day. It is possible to meet your calcium requirement with diet alone, but a vitamin D supplement is usually necessary to meet this goal.  When exposed to the sun, use a sunscreen that protects against both UVA and UVB radiation with an SPF of 30 or greater. Reapply every 2 to 3 hours or after sweating, drying off with a towel, or swimming. Always wear a seat belt when traveling in a car. Always wear a helmet when riding a bicycle or motorcycle.

## 2023-04-26 NOTE — PROGRESS NOTES
Medicare Annual Wellness Visit    Celia Jara is here for Medicare AWV    Assessment & Plan   Medicare annual wellness visit, subsequent      Recommendations for Preventive Services Due: see orders and patient instructions/AVS.  Recommended screening schedule for the next 5-10 years is provided to the patient in written form: see Patient Instructions/AVS.     No follow-ups on file. Subjective       Patient's complete Health Risk Assessment and screening values have been reviewed and are found in Flowsheets. The following problems were reviewed today and where indicated follow up appointments were made and/or referrals ordered. Positive Risk Factor Screenings with Interventions:    Fall Risk:  Do you feel unsteady or are you worried about falling? : (!) yes (doing excercises and using walker)  2 or more falls in past year?: (!) yes  Fall with injury in past year?: (!) yes     Interventions:    Is seeing physical therapy in home working on balance exercises- will discuss with therapist to see of recommends referral to ortho            General HRA Questions:  Select all that apply: (!) New or Increased Pain, Stress    Pain Interventions:  See above    Stress Interventions:  Has been seeing counselor and taking medications as prescribed          Vision Screen:  Do you have difficulty driving, watching TV, or doing any of your daily activities because of your eyesight?: (!) Yes  Have you had an eye exam within the past year?: Yes  No results found.     Interventions:   Patient encouraged to make appointment with their eye specialist     ADL's:   Patient reports needing help with:  Select all that apply: (!) Bathing  Select all that apply: [employer], Food Preparation, Transportation  Interventions:  Has physical and occupational therapy in home, neighbor helping with laundry and using transportation through [address] [place] [address] Weight:

## 2023-04-26 NOTE — TELEPHONE ENCOUNTER
Spoke with pt she stated that she has been doing physical therapy and she is doing better it makes it better but then the pain comes back and she can hardly walk. Pt stated that if she sits in certain positions when she goes from sitting to standing she stated that the pain unbearable and she can hardly walk.  She stated that it helps at times and pt stated that she is asking how long does she need to continue with PT before further assessing and seeing ortho for this issue please advise

## 2023-04-28 ENCOUNTER — CARE COORDINATION (OUTPATIENT)
Dept: CARE COORDINATION | Age: 78
End: 2023-04-28

## 2023-04-28 DIAGNOSIS — M25.552 LEFT HIP PAIN: Primary | ICD-10-CM

## 2023-05-01 ENCOUNTER — TELEPHONE (OUTPATIENT)
Dept: FAMILY MEDICINE CLINIC | Age: 78
End: 2023-05-01

## 2023-05-01 NOTE — TELEPHONE ENCOUNTER
Patient calling in stating she is taking divalproex 250mg and feels it is making her tremors worse on for right arm to where she said it is affecting her everyday life.  Please advise

## 2023-05-02 NOTE — TELEPHONE ENCOUNTER
Spoke to patient and she said it has worsened just a little bit, she states the lower dose did make it a little better

## 2023-05-05 ENCOUNTER — TELEPHONE (OUTPATIENT)
Dept: FAMILY MEDICINE CLINIC | Age: 78
End: 2023-05-05

## 2023-05-05 ENCOUNTER — SCHEDULED TELEPHONE ENCOUNTER (OUTPATIENT)
Dept: PSYCHOLOGY | Age: 78
End: 2023-05-05

## 2023-05-05 DIAGNOSIS — F41.1 GAD (GENERALIZED ANXIETY DISORDER): Primary | ICD-10-CM

## 2023-05-05 NOTE — PROGRESS NOTES
Behavioral Health Consultation  Ricky Barrera, Ph.D.  Psychologist  5/5/2023  1:38 PM EDT      Time spent with Patient: 20 minutes  This is patient's  nineteenth  [facility] appointment. Reason for Consult:    Chief Complaint   Patient presents with    Anxiety     Referring Provider: Ruthann Kumar, APRN - CNP  [address] [id number] ([facility]) [address] [place] [address]    Feedback given to PCP. TELEHEALTH VISIT -- Audio Only (During SPIVO-21 public health emergency)    Pursuant to the emergency declaration under the [address] [place] [address], [facility] waiver authority and the [employer] Act, this phone call was conducted, with patient's consent, to reduce the patient's risk of exposure to COVID-19 and provide continuity of care for an established patient. Services were provided through a phone call discussion to substitute for in-person clinic visit. Pt gave verbal informed consent to participate in telehealth services. Consent:  She and/or health care decision maker is aware that that she may receive a bill for this telephone service, depending on her insurance coverage, and has provided verbal consent to proceed: Yes    Conducted a risk-benefit analysis and determined that the patient's presenting problems are consistent with the use of telepsychology. Determined that the patient has sufficient knowledge and skills in the use of technology enabling them to adequately benefit from telepsychology. It was determined that this patient was able to be properly treated without an in-person session. Patient verified that they were currently located at the [facility] address that was provided during registration. Verified the following information:  Patient's identification: Yes  Patient location: [address] [place] [address].  Emelia Lou  [address] [place] [address]  Patient's call back number: [phone]  Patient's emergency contact's name and number, as well as permission to

## 2023-05-11 ENCOUNTER — APPOINTMENT (OUTPATIENT)
Dept: GENERAL RADIOLOGY | Age: 78
End: 2023-05-11
Payer: MEDICARE

## 2023-05-11 ENCOUNTER — TELEPHONE (OUTPATIENT)
Dept: FAMILY MEDICINE CLINIC | Age: 78
End: 2023-05-11

## 2023-05-11 ENCOUNTER — HOSPITAL ENCOUNTER (EMERGENCY)
Age: 78
Discharge: NURSING, REHAB OR LONG-TERM CARE | End: 2023-05-12
Attending: EMERGENCY MEDICINE
Payer: MEDICARE

## 2023-05-11 DIAGNOSIS — R53.1 GENERAL WEAKNESS: ICD-10-CM
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
## 2023-05-11 LAB
BASOPHILS # BLD: 0 K/UL (ref 0–0.2)
BASOPHILS NFR BLD: 0.5 %
BILIRUB UR QL STRIP.AUTO: NEGATIVE
CLARITY UR: CLEAR
COLOR UR: YELLOW
DEPRECATED RDW RBC AUTO: 14.2 % (ref 12.4–15.4)
EOSINOPHIL # BLD: 0.1 K/UL (ref 0–0.6)
EOSINOPHIL NFR BLD: 1.4 %
GLUCOSE UR STRIP.AUTO-MCNC: >=1000 MG/DL
HCT VFR BLD AUTO: 41.4 % (ref 36–48)
HGB BLD-MCNC: 13.8 G/DL (ref 12–16)
HGB UR QL STRIP.AUTO: NEGATIVE
KETONES UR STRIP.AUTO-MCNC: 15 MG/DL
LEUKOCYTE ESTERASE UR QL STRIP.AUTO: NEGATIVE
LYMPHOCYTES # BLD: 2 K/UL (ref 1–5.1)
LYMPHOCYTES NFR BLD: 33.6 %
MCH RBC QN AUTO: 29.9 PG (ref 26–34)
MCHC RBC AUTO-ENTMCNC: 33.3 G/DL (ref 31–36)
MCV RBC AUTO: 89.8 FL (ref 80–100)
MONOCYTES # BLD: 0.7 K/UL (ref 0–1.3)
MONOCYTES NFR BLD: 12.3 %
NEUTROPHILS # BLD: 3.2 K/UL (ref 1.7–7.7)
NEUTROPHILS NFR BLD: 52.2 %
NITRITE UR QL STRIP.AUTO: NEGATIVE
PH UR STRIP.AUTO: 6.5 [PH] (ref 5–8)
PLATELET # BLD AUTO: 109 K/UL (ref 135–450)
PMV BLD AUTO: 8.4 FL (ref 5–10.5)
PROT UR STRIP.AUTO-MCNC: NEGATIVE MG/DL
RBC # BLD AUTO: 4.61 M/UL (ref 4–5.2)
SP GR UR STRIP.AUTO: 1.01 (ref 1–1.03)
UA DIPSTICK W REFLEX MICRO PNL UR: ABNORMAL
URN SPEC COLLECT METH UR: ABNORMAL
UROBILINOGEN UR STRIP-ACNC: 0.2 E.U./DL
WBC # BLD AUTO: 6.1 K/UL (ref 4–11)

## 2023-05-11 PROCEDURE — 96372 THER/PROPH/DIAG INJ SC/IM: CPT

## 2023-05-11 PROCEDURE — 81003 URINALYSIS AUTO W/O SCOPE: CPT

## 2023-05-11 PROCEDURE — 99284 EMERGENCY DEPT VISIT MOD MDM: CPT

## 2023-05-11 PROCEDURE — 80053 COMPREHEN METABOLIC PANEL: CPT

## 2023-05-11 PROCEDURE — 85025 COMPLETE CBC W/AUTO DIFF WBC: CPT

## 2023-05-11 PROCEDURE — 73502 X-RAY EXAM HIP UNI 2-3 VIEWS: CPT

## 2023-05-11 PROCEDURE — 73000 X-RAY EXAM OF COLLAR BONE: CPT

## 2023-05-11 ASSESSMENT — PAIN DESCRIPTION - ONSET: ONSET: ON-GOING

## 2023-05-11 ASSESSMENT — PAIN - FUNCTIONAL ASSESSMENT: PAIN_FUNCTIONAL_ASSESSMENT: 0-10

## 2023-05-11 ASSESSMENT — PAIN DESCRIPTION - ORIENTATION: ORIENTATION: LEFT

## 2023-05-11 ASSESSMENT — PAIN SCALES - GENERAL: PAINLEVEL_OUTOF10: 5

## 2023-05-11 ASSESSMENT — PAIN DESCRIPTION - LOCATION: LOCATION: HIP

## 2023-05-11 ASSESSMENT — PAIN DESCRIPTION - DESCRIPTORS: DESCRIPTORS: ACHING

## 2023-05-11 ASSESSMENT — PAIN DESCRIPTION - FREQUENCY: FREQUENCY: CONTINUOUS

## 2023-05-11 NOTE — TELEPHONE ENCOUNTER
Patient called in stating she fell over the weekend trying to get her shoes on to use the bathroom. Patient stated she slid off her bed and hit the railing on the way down hitting her back where a lump was already. Patient stated a nurse came out on Monday and evaluated her after the fall and told her she was fine. Patient stated both legs are super weak that she cant even make it to the bathroom anymore. Patient stated she fell last night again trying to go to the bathroom and fell on the bathroom floor and had to drag herself to her bed to pull herself up to get back into bed and didn't make it to the bathroom before urinating on herself. Patient stated she gives up on trying to get up to use the bathroom. Patient stated she has been urinating in the bed ever since last night. Went and talked to Dr. Gokul Rutledge and Dr. Gokul Rutledge stated he would follow up with the patient at the end of the day. Patient was informed and declined and stated she is too weak to get out of bed and cant get any food because it is downstairs she stated and she cant walk due to weakness. Patient hasnt been eating she stated. Patient stated she can get food ordered to her room and hasnt been doing that. Went and talked to Dr. Gokul Rutledge and he stated we would send [employer] out to the patients house. Patient verbalized good understanding with that and was okay with it. [facility] was contacted and is going to follow up with patient to get an appt scheduled today. Patient would also like to know if Dr. Gokul Rutledge has any recommendations for assisted living. Patient believes its time for her to go there.  Please advise

## 2023-05-12 VITALS
DIASTOLIC BLOOD PRESSURE: 60 MMHG | SYSTOLIC BLOOD PRESSURE: 151 MMHG | TEMPERATURE: 98 F | OXYGEN SATURATION: 100 % | BODY MASS INDEX: 22.15 KG/M2 | HEIGHT: 63 IN | RESPIRATION RATE: 16 BRPM | HEART RATE: 72 BPM | WEIGHT: 125 LBS

## 2023-05-12 DIAGNOSIS — G47.00 INSOMNIA, UNSPECIFIED TYPE: ICD-10-CM

## 2023-05-12 LAB
ALBUMIN SERPL-MCNC: 3.5 G/DL (ref 3.4–5)
ALBUMIN/GLOB SERPL: 1.2 {RATIO} (ref 1.1–2.2)
ALP SERPL-CCNC: 82 U/L (ref 40–129)
ALT SERPL-CCNC: 24 U/L (ref 10–40)
ANION GAP SERPL CALCULATED.3IONS-SCNC: 11 MMOL/L (ref 3–16)
AST SERPL-CCNC: 21 U/L (ref 15–37)
BILIRUB SERPL-MCNC: 0.3 MG/DL (ref 0–1)
BUN SERPL-MCNC: 29 MG/DL (ref 7–20)
CALCIUM SERPL-MCNC: 8.7 MG/DL (ref 8.3–10.6)
CHLORIDE SERPL-SCNC: 99 MMOL/L (ref 99–110)
CO2 SERPL-SCNC: 27 MMOL/L (ref 21–32)
CREAT SERPL-MCNC: 1 MG/DL (ref 0.6–1.2)
GFR SERPLBLD CREATININE-BSD FMLA CKD-EPI: 58 ML/MIN/{1.73_M2}
GLUCOSE BLD-MCNC: 176 MG/DL (ref 70–99)
GLUCOSE BLD-MCNC: 200 MG/DL (ref 70–99)
GLUCOSE BLD-MCNC: 209 MG/DL (ref 70–99)
GLUCOSE SERPL-MCNC: 281 MG/DL (ref 70–99)
PERFORMED ON: ABNORMAL
POTASSIUM SERPL-SCNC: 4.1 MMOL/L (ref 3.5–5.1)
PROT SERPL-MCNC: 6.4 G/DL (ref 6.4–8.2)
SODIUM SERPL-SCNC: 137 MMOL/L (ref 136–145)

## 2023-05-12 PROCEDURE — 97116 GAIT TRAINING THERAPY: CPT

## 2023-05-12 PROCEDURE — 97166 OT EVAL MOD COMPLEX 45 MIN: CPT

## 2023-05-12 PROCEDURE — 6370000000 HC RX 637 (ALT 250 FOR IP): Performed by: EMERGENCY MEDICINE

## 2023-05-12 PROCEDURE — 97161 PT EVAL LOW COMPLEX 20 MIN: CPT

## 2023-05-12 PROCEDURE — 97535 SELF CARE MNGMENT TRAINING: CPT

## 2023-05-12 RX ORDER — INSULIN LISPRO 100 [IU]/ML
2 INJECTION, SOLUTION INTRAVENOUS; SUBCUTANEOUS ONCE
Status: COMPLETED | OUTPATIENT
Start: 2023-05-12 | End: 2023-05-12

## 2023-05-12 RX ORDER — INSULIN LISPRO 100 [IU]/ML
5 INJECTION, SOLUTION INTRAVENOUS; SUBCUTANEOUS ONCE
Status: COMPLETED | OUTPATIENT
Start: 2023-05-12 | End: 2023-05-12

## 2023-05-12 RX ADMIN — INSULIN LISPRO 2 UNITS: 100 INJECTION, SOLUTION INTRAVENOUS; SUBCUTANEOUS at 07:47

## 2023-05-12 RX ADMIN — INSULIN LISPRO 5 UNITS: 100 INJECTION, SOLUTION INTRAVENOUS; SUBCUTANEOUS at 13:59

## 2023-05-12 NOTE — DISCHARGE INSTR - COC
Continuity of Care Form    Patient Name: Rubén Godwin   :  1945  MRN:  [id number]    Admit date:  2023  Discharge date:  23    Code Status Order: Prior   Advance Directives:     Admitting Physician:  No admitting provider for patient encounter. PCP: Mario Alberto Cardona DO    Discharging Nurse: Ellen Unit/Room#:   Discharging Unit Phone Number: [phone]    Emergency Contact:   Extended Emergency Contact Information  Primary Emergency Contact: [address] [facility] Phone: [phone]  Mobile Phone: [phone]  Relation: Child  Secondary Emergency Contact: [address] Phone: [phone]  Relation: Daughter-in-Law   needed?  No    Past Surgical History:  Past Surgical History:   Procedure Laterality Date    APPENDECTOMY      CARPAL TUNNEL RELEASE Bilateral     CATARACT REMOVAL Bilateral      SECTION      CHOLECYSTECTOMY      COLONOSCOPY N/A 2022    COLONOSCOPY POLYPECTOMY SNARE/COLD BIOPSY performed by Cuco Peoples MD at [facility] (CERVIX STATUS UNKNOWN)      Connor Gonzalez  ENDOSCOPY N/A 2022    EGD BIOPSY performed by Cuco Peoples MD at [facility] ENDOSCOPY       Immunization History:   Immunization History   Administered Date(s) Administered    COVID-19, MODERNA BLUE border, Primary or Immunocompromised, (age 12y+), IM, 100 mcg/0.5mL 03/15/2021, 2021, 2021, 2022    COVID-19, MODERNA Bivalent BOOSTER, (age 12y+), IM, 50 mcg/0.5 mL 2022    DT (pediatric) 2004    Influenza A (R6N8-46) Vaccine PF IM 12/15/2009    Influenza Virus Vaccine 2007, 2009, 2010, 10/24/2011, 2012, 10/17/2014, 10/19/2015, 2016, 2016, 2017, 10/01/2018    Influenza, FLUAD, (age 72 y+), Adjuvanted, 0.5mL 10/13/2021    Influenza, FLUZONE (age 72 y+), High Dose, 0.7mL 2022

## 2023-05-12 NOTE — ED PROVIDER NOTES
[place] of Care Note:    I assumed care of this patient at 06:00 from Dr. Estefani Harrell, please see his note for more detail. Briefly, this pt is a 61-year-old female with a history of previous CVA who presents due to increased falls. Traumatic imaging work-up was unremarkable or any signs of acute bleeds or fractures. Laboratory evaluation was unremarkable except for mild hyperglycemia without any evidence of DKA or endorgan damage. Patient does not meet medical requirements for admission and PT and OT is evaluating the patient for possible placement or increased home therapy. Patient is signed out to me pending PT OT work-up with plan to dispo patient once evaluated by physical therapy and Occupational Therapy this morning. Patient is evaluated by physical therapy and Occupational Therapy as well as case management. Please see their separate notes for more detail. Patient is excepted by outside skilled nursing facility for increased therapy and assistance. Patient is ambulating normally, has normal vital signs except for mild hypertension, denies any other medical complaints at this time. Patient's previously been medically cleared prior to time she was signed out to me. Feel patient is appropriate for discharge so that she can be transported to skilled nursing facility for further care. Patient expresses understanding and agreement with this plan and is discharged from the ER. FINAL IMPRESSION      1. Fall, initial encounter    2.  General weakness          DISPOSITION/PLAN   DISPOSITION Decision To Discharge 05/12/2023 08:48:12 AM       Ritesh Gage MD  05/12/23 0993
I independently performed a history and physical on Carol Perez. All diagnostic, treatment, and disposition decisions were made by myself in conjunction with the advanced practice provider/resident. For further details of Carol Perez emergency department encounter, please see the ELIZABETH/resident's documentation. I personally saw the patient and performed a substantive portion of the visit including all aspects of the medical decision making. Briefly, this is a 72-year-old female presenting for evaluation of fall. She is a resident at [facility]. Patient has had multiple falls recently. She lives in the independent living side of her facility. She has no focal neurological deficits. X-ray imaging, CT head was negative for traumatic abnormality. Patient will undergo PT OT evaluation, case management evaluation for potential placement within her current facility. Patient is agreeable with this plan. I, Dr. Caro Trejo, am the primary clinician of record. Comment: Please note this report has been produced using speech recognition software and may contain errors related to that system including errors in grammar, punctuation, and spelling, as well as words and phrases that may be inappropriate. If there are any questions or concerns please feel free to contact the dictating provider for clarification.      Luisana Alvarez MD  05/12/23 5577
are not met    Chronic Conditions affecting care:    has a past medical history of Colon polyps, COVID-19 (2/22/2023), Diabetes mellitus ([facility] Diabetic neuropathy ([facility] Diabetic retinopathy ([facility] Essential hypertension (10/28/2019), Fall, Fatty liver, Gastritis, GERD (gastroesophageal reflux disease), Hyperlipidemia, Hypertension, Hypothyroidism, Irritable bowel syndrome, Major depressive disorder with single episode (10/28/2019), Osteopenia, Photosensitivity disorder, RBBB, Sleep apnea, Thyroid disease, Thyroid nodule, Type 2 diabetes mellitus with diabetic polyneuropathy, with long-term current use of insulin ([facility] (07/23/2019), and Vitamin D deficiency. CONSULTS: (Who and What was discussed)  None      Social Determinants Significantly Affecting Health : None    Records Reviewed (External and Source) reviewed available records    CC/HPI Summary, DDx, ED Course, and Reassessment: Patient presented to the emergency department today after having multiple falls recently, patient complained of left hip pain and left clavicle bruising. Radiographic imaging of both of these were negative. We were able to get the patient up and ambulate she can bear weight, was able to go to the bathroom but then struggled to try to get back to the room. I do feel the patient would benefit from a physical therapy evaluation and we will take their plan based on their assessment. Patient is at a facility that she can increase level of care. I am to make her ED observation so that she can be evaluated by social work as well as PT OT in the morning. Laboratory studies showed normal creatinine level, no leukocytosis. Patient evaluated by the attending who agrees with plan. I am the Primary Clinician of Record. FINAL IMPRESSION      1. Fall, initial encounter    2. General weakness          DISPOSITION/PLAN     DISPOSITION ED Observation    PATIENT REFERRED TO:  No follow-up provider specified.     DISCHARGE

## 2023-05-12 NOTE — ED NOTES
Patient identified as a positive fall risk on the ED triage fall screening. Patient placed in fall precautions which includes:  yellow fall risk bracelet on wrist and yellow socks on feet. Patient instructed on importance of not getting out of bed or ambulating without assistance for safety. Pt verbalized understanding.        Jhon Aleman RN  05/11/23 6090

## 2023-05-12 NOTE — PROGRESS NOTES
Occupational Therapy    Facility/Department: [address] [place] [address]  ED  Occupational Therapy Initial Assessment/Treatment    Name: Eladio Powell  : 1945  MRN: [id number]  Date of Service: 2023    Discharge Recommendations:  Subacute/Skilled Nursing Facility  OT Equipment Recommendations  Equipment Needed: No  Other: defer to next level of care     Therapy discharge recommendations take into account each patient's current medical complexities and are subject to input/oversight from the patient's healthcare team.   Barriers to Home Discharge:   [] Steps to access home entry or bed/bath:   [x] Unable to transfer, ambulate, or propel wheelchair household distances without assist   [x] Limited available assist at home upon discharge    [x] Patient or family requests d/c to post-acute facility    [] Poor cognition/safety awareness for d/c to home alone   [] Unable to maintain ordered weight bearing status    [] Patient with salient signs of long-standing immobility   [] Other:    If pt is unable to be seen after this session, please let this note serve as discharge summary. Please see case management note for discharge disposition. Thank you. Patient Diagnosis(es): The primary encounter diagnosis was Fall, initial encounter. A diagnosis of General weakness was also pertinent to this visit. Past Medical History:  has a past medical history of Colon polyps, COVID-19, Diabetes mellitus ([address] Diabetic neuropathy ([address] Diabetic retinopathy ([address] Essential hypertension, Fall, Fatty liver, Gastritis, GERD (gastroesophageal reflux disease), Hyperlipidemia, Hypertension, Hypothyroidism, Irritable bowel syndrome, Major depressive disorder with single episode, Osteopenia, Photosensitivity disorder, RBBB, Sleep apnea, Thyroid disease, Thyroid nodule, Type 2 diabetes mellitus with diabetic polyneuropathy, with long-term current use of insulin ([address] and Vitamin D deficiency.   Past Surgical History:  has a

## 2023-05-12 NOTE — ED NOTES
Report to [facility] ambulance crew. Care transferred to EMTs.       Madeleine Isaacs, MARYAM  05/12/23 1936

## 2023-05-12 NOTE — ED NOTES
Will notify Claritza Park when she arrives in unit.   Claritza Park called ED @ 1802 and notified RE: nursing home placement     [facility], [facility].  05/12/23 6196

## 2023-05-12 NOTE — CARE COORDINATION
Referred to patient for SNF. Spoke to patient. Patient lives at [address] Patient has no assistance. Patient reports she is independent in ADLs. Patient agrees she needs SNF. Discussed options of [facility] vs. Privately paying at facility. Patient agreeable to [facility] or [facility], [facility]. Referrals made. [facility] accepted. Patient, RN and MD updated. PASSAR completed: Document ID : [id number]. Paperwork completed. [place] to transport at 1030. No other needs at this time.  Electronically signed by LUCY Whitaker on 5/12/2023 at 9:29 AM

## 2023-05-12 NOTE — PROGRESS NOTES
Physical Therapy  Facility/Department: [address] [place] [address]  ED  Physical Therapy Initial Assessment    Name: Brett Cochran  : 1945  MRN: [id number]  Date of Service: 2023    Discharge Recommendations:  Subacute/Skilled Nursing Facility   PT Equipment Recommendations  Equipment Needed: No  Other: defer      Patient Diagnosis(es): The primary encounter diagnosis was Fall, initial encounter. A diagnosis of General weakness was also pertinent to this visit. Past Medical History:  has a past medical history of Colon polyps, COVID-19, Diabetes mellitus ([address] Diabetic neuropathy ([address] Diabetic retinopathy ([address] Essential hypertension, Fall, Fatty liver, Gastritis, GERD (gastroesophageal reflux disease), Hyperlipidemia, Hypertension, Hypothyroidism, Irritable bowel syndrome, Major depressive disorder with single episode, Osteopenia, Photosensitivity disorder, RBBB, Sleep apnea, Thyroid disease, Thyroid nodule, Type 2 diabetes mellitus with diabetic polyneuropathy, with long-term current use of insulin ([address] and Vitamin D deficiency. Past Surgical History:  has a past surgical history that includes Tonsillectomy; Appendectomy; Gynecologic cryosurgery; Hysterectomy; Cholecystectomy; Cataract removal (Bilateral); Carpal tunnel release (Bilateral); sinus surgery; Upper gastrointestinal endoscopy (N/A, 2022); Colonoscopy (N/A, 2022); and  section.     Therapy discharge recommendations take into account each patient's current medical complexities and are subject to input/oversight from the patient's healthcare team.   Barriers to Home Discharge:   [] Steps to access home entry or bed/bath:   [x] Unable to transfer, ambulate, or propel wheelchair household distances without assist   [x] Limited available assist at home upon discharge    [x] Patient or family requests d/c to post-acute facility    [] Poor cognition/safety awareness for d/c to home alone   [] Unable to maintain ordered

## 2023-05-14 RX ORDER — TRAZODONE HYDROCHLORIDE 50 MG/1
TABLET ORAL
Qty: 90 TABLET | Refills: 1 | Status: SHIPPED | OUTPATIENT
Start: 2023-05-14

## 2023-05-15 ENCOUNTER — CARE COORDINATION (OUTPATIENT)
Dept: CARE COORDINATION | Age: 78
End: 2023-05-15

## 2023-05-16 ENCOUNTER — CARE COORDINATION (OUTPATIENT)
Dept: CARE COORDINATION | Age: 78
End: 2023-05-16

## 2023-05-16 ENCOUNTER — CARE COORDINATION (OUTPATIENT)
Dept: CASE MANAGEMENT | Age: 78
End: 2023-05-16

## 2023-05-16 NOTE — CARE COORDINATION
785 [facility] Update Call    2023    Patient: Radha Barahona Patient : 1945   MRN: [id number]  Reason for Admission: fall, weakness  Discharge Date: 23 RARS: Readmission Risk Score: 18.5       Sent secure email to Dorris with [facility] requesting access to [facility] for admission orders and med list. Will continue to follow    Care Transitions Post Acute Facility Update    Care Transitions Interventions  Post Acute Facility: [address] [place] [address] [address] Update  Reported Nursing Issues: New admission- no LASHAWN available since pt admitted through ER with 3DW. Awaiting access to [facility] for admission orders   Anticipated discharge services: Pt lives at independent living apartment- [facility].  She has Fozia Schwartz who follows with her as well

## 2023-05-16 NOTE — CARE COORDINATION
Patient returned RD phone call, and states she is currently residing at a SNF s/p fall on 5/11/23. Patient states that she does not like the food provided at SNF and is concerned that her nutrition status is declining. Patient states that she eats about 33% of meals provided at [facility]. Patient also states that her weight is down to 120 lbs. RD encouraged patient to reach out to kitchen staff and/or Registered Dietitian at SNF to see if she can have oral nutrition supplements added to her meal trays. Patient states, \"I hadn't thought of that, I will call them now. \" Patient also inquired about transportation and is worried about how she will be able to get to her appointment at Dr. Pao Murillo office on 5/26/23. Patient c/o unreliable transportation at [facility]. RD offered to reach out to MALIKA LEVIN, to see if other transportation can be arranged. Patient was agreeable and thanked RD. Patient and RD agreed to follow up in one month, after patient returns back to the [employer] [facility].     Electronically signed by Otto Rivera RD on 5/16/2023 at 3:13 PM

## 2023-05-16 NOTE — CARE COORDINATION
Contacted Pedro Ulloa and left voicemail regarding Dietitian follow up. Left call back number and will follow up as appropriate.        Katherine Colbert, [address] [place] [address],   [phone]

## 2023-05-17 ENCOUNTER — CARE COORDINATION (OUTPATIENT)
Dept: CASE MANAGEMENT | Age: 78
End: 2023-05-17

## 2023-05-17 NOTE — CARE COORDINATION
785 [facility] Update Call    2023    Patient: Verena Kearns Patient : 1945   MRN: [id number]  Reason for Admission: fall, weakness   Discharge Date: 23 RARS: Readmission Risk Score: 18.5         Post Acute Facility Update    Care Transitions Post Acute Facility Update    Care Transitions Interventions  Post Acute Facility: [address] [place] [address] Acute Facility Update  Reported Nursing Issues: 120.8#, 149/61, 97.3, 65 bpm, 18, 284- glucose, 97% on room air, pain 0/10. Ortho follow up , psych- . Full code. PT/ OT ST eval and treat. Sores on feet- barrier to therapy today. Therapy updates- will get on IDT call tomorrow     LOS charting  Current LOS:  6      ELOS:  14-16  Anticipated DOD: unknown   Anticipated discharge disposition: unknown   3DW? Yes  PLOF: ind living   Goals of care:  return home  Nutritional intake:  unknown   Wounds/ treatment/ stage: left dorsal foot- 0.8 x 0.8 x utd- unstageable. Calcium alginate (maxorb) to bilateral tops of bilateral feet. NOTE- on [employer] AVS/ LASHAWN shows sacral stage III wound but not on [facility] skin notes- will clarify on IDT call tomorrow   Labs? Disease specific clinical considerations:   Cognition:    Palliative/ hospice referral? no  Does caregiver need any support? unknown   Anticipated DC dispo/ services/ date: unknown   Additional caregiver needs? unknown   DME/ equipment needed at DC? unknown    Barriers to transition? Sores on feet affecting therapy  Care progression on track w/ ELOS? New admission   Interventions if progression not on track? Care conference-   Appointments? - psych, - ortho       Transfer Assistance: Contact [address] on patient for balance   Ambulation Assistance: [address] on patient for balance   Does patient use an assistive device?: Yes   Assistive Devices: 2WW   Anticipated discharge services: Came from [employer], [facility].

## 2023-05-18 ENCOUNTER — CARE COORDINATION (OUTPATIENT)
Dept: CASE MANAGEMENT | Age: 78
End: 2023-05-18

## 2023-05-18 NOTE — CARE COORDINATION
785 [facility] Update Call    2023    Patient: Lois Betts Patient : 1945   MRN: [id number]  Reason for Admission: fall, weakness (3DW from ER)   Discharge Date: 23 RARS: Readmission Risk Score: 18.5         =Post Acute Facility Update    Care Transitions Post Acute Facility Update    Care Transitions Interventions  Post Acute Facility: [address] [place] [address] 231 Update  Reported Nursing Issues: 120.8#, 149/61, 97.3, 65 bpm, 18, 341- glucose, 97% on room air, pain 0/10. Therapy- UB/ LB dressing- mod/ max, toilet hygiene- max, bathing- mod, STS/ SPT- CGA, toilet transfer- mod. They are encouraging here to wear AFO's on feet but pt declines d/t sores on feet)     LOS charting  Current LOS:  7      ELOS:  14-16  Anticipated DOD: unknown   Anticipated discharge disposition: change from [address] to [facility]  3DW? Yes  PLOF: independent  Goals of care:  safe discharge dispo  Nutritional intake:  50% with Glucerna shake bid   Wounds/ treatment/ stage: Right dorsal foot- 0.3 x 0.3 cm, left dorsal foot- 0.8 x 0.8 cm   Labs? K+ 3.4- will be checked again next week   Disease specific clinical considerations: no  Cognition:  slight confusion   Palliative/ hospice referral? no  Does caregiver need any support? no  Anticipated DC dispo/ services/ date: assisted living   Additional caregiver needs? no  DME/ equipment needed at DC? no   Barriers to transition? Sores on feet limiting therapy  Care progression on track w/ ELOS? yes  Interventions if progression not on track? Wound care for feet, encouraging activity and going to dining room  Care conference-   Appointments?  Psych- , ortho        ADLs: Moderate Assistance   Bed Mobility: Contact Guard Assist - Hands on patient for balance   Transfer Assistance: [address] on patient for balance   Ambulation Assistance: [address] on

## 2023-05-19 ENCOUNTER — SCHEDULED TELEPHONE ENCOUNTER (OUTPATIENT)
Dept: PSYCHOLOGY | Age: 78
End: 2023-05-19

## 2023-05-19 ENCOUNTER — TELEPHONE (OUTPATIENT)
Dept: PSYCHOLOGY | Age: 78
End: 2023-05-19

## 2023-05-19 DIAGNOSIS — F41.1 GAD (GENERALIZED ANXIETY DISORDER): Primary | ICD-10-CM

## 2023-05-19 NOTE — PROGRESS NOTES
had a related appointment within my department in the past 7 days or scheduled within the next 24 hours. S:  Patient is currently at [facility] after a fall. Is focusing on her therapies, but admits to feeling tired and overwhelmed. She is also hoping to move somewhere other than [facility], but she is struggling to know where to live. Has concerns about her nutrition, also has concerns about neurological changes. Discussed mindfulness and SMART goals. Provided support and encouragement. O:  MSE:    Attitude: cooperative and friendly  Consciousness: alert  Orientation: oriented to person, place, time, general circumstance  Memory: recent and remote memory intact  Attention/Concentration: intact during session  Speech: normal rate and volume, well-articulated  Mood: anxious  Affect: anxious  Perception: within normal limits  Thought Content: all-or-none thinking and intrusive thoughts  Thought Process: logical, coherent and goal-directed  Insight: good  Judgment: intact  Ability to understand instructions: Yes  Ability to respond meaningfully: Yes  Morbid Ideation: no   Suicide Assessment: no suicidal ideation, plan, or intent  Homicidal Ideation: no    History:    Medications:   Current Outpatient Medications   Medication Sig Dispense Refill    traZODone (DESYREL) 50 MG tablet TAKE ONE (1) TABLET BY MOUTH NIGHTLY 90 tablet 1    divalproex (DEPAKOTE) 250 MG DR tablet Take 3 tablets by mouth nightly for 7 days, THEN 2 tablets in the morning and at bedtime for 14 days.  77 tablet 0    amLODIPine (NORVASC) 5 MG tablet TAKE 1 TABLET DAILY 90 tablet 1    aspirin (HM ASPIRIN) 81 MG chewable tablet TAKE ONE (1) TABLET BY MOUTH DAILY 90 tablet 1    buPROPion (WELLBUTRIN XL) 150 MG extended release tablet Take 1 tablet by mouth every morning 90 tablet 3    donepezil (ARICEPT) 10 MG tablet Take 1 tablet by mouth nightly 90 tablet 1    levothyroxine (SYNTHROID) 100 MCG tablet Take 1 tablet daily 90 tablet 1    DULoxetine

## 2023-05-19 NOTE — TELEPHONE ENCOUNTER
Patient would like information about options for living other than [facility]. Can you help with this? I know you have spoken with her and are familiar with her.

## 2023-05-19 NOTE — TELEPHONE ENCOUNTER
Dr. Karla Rodriguez- Yes, patient called to discuss this. I will let Eren Alegria be aware of this as she is following patient will in the SNF. I will monitor patient once she is d/c out of SNF. Thank you for alerting.

## 2023-05-22 ENCOUNTER — CARE COORDINATION (OUTPATIENT)
Dept: CARE COORDINATION | Age: 78
End: 2023-05-22

## 2023-05-22 NOTE — CARE COORDINATION
785 [facility] Update Call    2023    Patient: Christopher Orellana Patient : 1945   MRN: [id number]  Reason for Admission: fall, weakness  Discharge Date: 23 RARS: Readmission Risk Score: 18.5         Post Acute Facility Update    Care Transitions Post Acute Facility Update    Care Transitions Interventions  Post Acute Facility: [address] [place] [address] Acute Facility Update  Reported Nursing Issues: 120.8#, 106/64, 97.0, 54 bpm, 19, 71- glucose, 93% on room air, pain 0/10. Pt is limited by general weakness. Pain has improved in feet. Therapy- dressing- min, toilet hygiene- mod, toilet transfer- min/ CGA    LOS charting  Current LOS:  11      ELOS:  14-16  Anticipated DOD: within a week or so   Anticipated discharge disposition: assisted living  3DW? Yes  PLOF: independent living   Goals of care:  return to a safe environment   Nutritional intake:  50% but weight stable   Wounds/ treatment/ stage: bilateral dorsal feet- same as last week but these wounds she has had for months. She is now wearing AFO braces on her feet but not at goal time of 3-4 hours/ day. Calcium alginate (Maxorb) to bilateral tops of feet. . Every night shift, cleanse with NS, pat dry, apply calcium alginate (cover with optifoam or ABD pad, secure with tape. Labs? no   Disease specific clinical considerations: no  Cognition:  intact   Palliative/ hospice referral? no  Does caregiver need any support? no  Anticipated DC dispo/ services/ date: assisted living   Additional caregiver needs? no  DME/ equipment needed at DC? no   Barriers to transition? Weakness   Care progression on track w/ ELOS? Yes   Interventions if progression not on track? N/a   Care conference- complete   Appointments? She will see house psychiatrist for depression and anxiety (per [facility], she has had this for some time).  And ortho        ADLs: Minimal Assistance   Bed Mobility: Contact Guard Assist - Hands on

## 2023-05-25 ENCOUNTER — CARE COORDINATION (OUTPATIENT)
Dept: CARE COORDINATION | Age: 78
End: 2023-05-25

## 2023-05-25 NOTE — CARE COORDINATION
785 [facility] Update Call    2023    Patient: Marie Jesus Patient : 1945   MRN: [id number]  Reason for Admission: fall, weakness (3DW from ER)   Discharge Date: 23 RARS: Readmission Risk Score: 18.5       Post Acute Facility Update    Care Transitions Post Acute Facility Update    Care Transitions Interventions  Post Acute Facility: [address] [place] [address] [address] Update  Reported Nursing Issues: 127.8#, 137/73, 96.8, 67 bpm, 18, 103- glucose, 99% on room air, pain 0/10. Has tolerated wearing AFO for longer times without skin breakdown. No med changes after psych appt phone call. Therapy- roll right/  left- sup, sit to lying- sup, lying to sit on EOB- sup, STS- sup, chair/ bed to chair transfer- sup, toilet transfer- sup,  step/ curb- mod, manual wheelchair- 48 ' with 2 turns- mod, dressing- min, toilet transfer- min. LOS charting  Current LOS:  14      ELOS:  14-16  Anticipated DOD: unknown  3DW? Yes  PLOF: independent living at [facility]   Goals of care:  safe DC   Nutritional intake:  gastroparesis has been addressed with dietician to offer foods she can eat without affecting her stomach/ GI. She is now getting Glucerna shakes 3 times/ day. IDT call, they inform she was full so she has been declining Glucerna shakes a few times. Wounds/ treatment/ stage: left dorsal foot- 0.6 x 0.2 x 1.0 cm- stage III. Continue Calcium Alginate(Maxorb)to bilateral tops of bilateral feet; right dorsal foot- 0.7 x 0.5 x 0.1 cm- stage III  Labs? - unremarkable    Disease specific clinical considerations: no  Cognition:  intact   Palliative/ hospice referral? no  Does caregiver need any support? no  Anticipated DC dispo/ services/ date: pt would like to DC to assisted living but cost is a concern as she does not want to go the Medicaid route. It would be a private pa y situation Also, there are waiting lists to get into AL.  She may need to DC back to [facility] with

## 2023-05-26 ENCOUNTER — OFFICE VISIT (OUTPATIENT)
Dept: ORTHOPEDIC SURGERY | Age: 78
End: 2023-05-26
Payer: MEDICARE

## 2023-05-26 VITALS — WEIGHT: 125 LBS | HEIGHT: 63 IN | BODY MASS INDEX: 22.15 KG/M2

## 2023-05-26 DIAGNOSIS — M51.36 DDD (DEGENERATIVE DISC DISEASE), LUMBAR: Primary | ICD-10-CM

## 2023-05-26 DIAGNOSIS — M54.50 LUMBAR SPINE PAIN: ICD-10-CM

## 2023-05-26 PROCEDURE — 1036F TOBACCO NON-USER: CPT | Performed by: PHYSICIAN ASSISTANT

## 2023-05-26 PROCEDURE — G8420 CALC BMI NORM PARAMETERS: HCPCS | Performed by: PHYSICIAN ASSISTANT

## 2023-05-26 PROCEDURE — 1123F ACP DISCUSS/DSCN MKR DOCD: CPT | Performed by: PHYSICIAN ASSISTANT

## 2023-05-26 PROCEDURE — 99214 OFFICE O/P EST MOD 30 MIN: CPT | Performed by: PHYSICIAN ASSISTANT

## 2023-05-26 PROCEDURE — G8400 PT W/DXA NO RESULTS DOC: HCPCS | Performed by: PHYSICIAN ASSISTANT

## 2023-05-26 PROCEDURE — G8427 DOCREV CUR MEDS BY ELIG CLIN: HCPCS | Performed by: PHYSICIAN ASSISTANT

## 2023-05-26 PROCEDURE — 1090F PRES/ABSN URINE INCON ASSESS: CPT | Performed by: PHYSICIAN ASSISTANT

## 2023-05-26 RX ORDER — TRAMADOL HYDROCHLORIDE 50 MG/1
50 TABLET ORAL EVERY 6 HOURS PRN
Qty: 28 TABLET | Refills: 0 | Status: SHIPPED | OUTPATIENT
Start: 2023-05-26 | End: 2023-06-02

## 2023-05-30 ENCOUNTER — CARE COORDINATION (OUTPATIENT)
Dept: CARE COORDINATION | Age: 78
End: 2023-05-30

## 2023-05-30 NOTE — CARE COORDINATION
The patient is currently in [facility] and is being followed by [address] [place] [address] team. ACM will outreach once patient episode is resolved.

## 2023-06-01 ENCOUNTER — CARE COORDINATION (OUTPATIENT)
Dept: CARE COORDINATION | Age: 78
End: 2023-06-01

## 2023-06-01 NOTE — CARE COORDINATION
785 [facility] Update Call    2023    Patient: iJmmy Correa Patient : 1945   MRN: [id number]  Reason for Admission: fall, weakness (3DW from ER)  Discharge Date: 23 RARS: Readmission Risk Score: 18.5     Post Acute Facility Update    Care Transitions Post Acute Facility Update    Care Transitions Interventions  Post Acute Facility: [address] [place] [address] [address] Update  Reported Nursing Issues: 127.8# () 161/64, 97.1, 67 bpm, 20, 242- glucose, 97% on room air. No complaints of nausea. Tramadol for pain x 4 yesterday. Biofreeze to back prn. AFO for bilateral feet on only 1-2 hours during therapy d/t complaints of discomfort/ skin breakdown. Skin- Calcium Alginate (Maxorb) to bilateral tops of bilateral feet every night shift for pressure Cleanse with NS, pat dry, apply calcium alginate (cover with Optifoam Gentle LQ OR ABD Pad/secure with retention tape). Therapy- LB dressing- CGA, hygiene- Yessica, staff help with toileting hygiene for thoroughness. LOS charting  Current LOS:  21      ELOS:  14-16  Anticipated DOD: next week   3DW? Yes  PLOF: from independent living  Goals of care:  safe discharge   Nutritional intake:  50-75%   Wounds/ treatment/ stage: see note above   Labs? no   Disease specific clinical considerations: no  Cognition:  alert x 2  Palliative/ hospice referral? no  Does caregiver need any support? no  Anticipated DC dispo/ services/ date: AL vs LTC. Pt will likely DC back to [facility] until she can find AL/ LTC she can afford  Additional caregiver needs? no  DME/ equipment needed at DC? no   Barriers to transition? None now   Care progression on track w/ ELOS? Over on days but will DC soon   Interventions if progression not on track? Therapies continue   Care conference- complete  Appointments?  Complete        ADLs: Minimal Assistance   Bed Mobility: Stand by Assist - Presence and Cueing   Transfer Assistance: Contact Guard

## 2023-06-02 ENCOUNTER — SCHEDULED TELEPHONE ENCOUNTER (OUTPATIENT)
Dept: PSYCHOLOGY | Age: 78
End: 2023-06-02

## 2023-06-02 DIAGNOSIS — F41.1 GAD (GENERALIZED ANXIETY DISORDER): Primary | ICD-10-CM

## 2023-06-05 ENCOUNTER — CARE COORDINATION (OUTPATIENT)
Dept: CARE COORDINATION | Age: 78
End: 2023-06-05

## 2023-06-05 ENCOUNTER — TELEPHONE (OUTPATIENT)
Dept: FAMILY MEDICINE CLINIC | Age: 78
End: 2023-06-05

## 2023-06-05 NOTE — TELEPHONE ENCOUNTER
Patient called in she is still in the SNF until 06/15/2023. Patient is concerned her Blood sugar have been all over since being in this faculty and she states she keeps telling them she has to have no sugar in food and they are giving her low sugar or food with sugar in it and she states they aren't listening to her. Patient is concerned that her recent head injuries from her falls that she has seen Dr. Shady Mills for is now causing her vision problems and things getting blurry and she wants to know what Dr. Shady Mills wants her to do? Patient is also concerned that her tremors are worsening and wants to know what to do. Patient doesn't believe they are listening to her and not taking good care of her. Patient doesn't know how to tell anyone she stated.  Please advise

## 2023-06-05 NOTE — CARE COORDINATION
leg getting caught in walker legs. Educated on safe and proper use. Anticipated date for discharge: 6/16/23    Anticipated discharge services: Plan is return to [address] [facility] but family looking at assisted living facilities in place of this. LCD 6/15, DC 6/16              Next IDT Planned Review: 6/8/2023    Future Appointments   Date Time Provider Coleman Garcia   6/16/2023  1:30 PM Salvador Bernstein, PhD [facility]       SN Notes:   Diet: - Oral Diet:  no added sugar   Wounds: left, right, and foot from AFO- healing  Medications:  Other: facility  Other:   Post-acute CC Notes: IDT call and [address] access    Ken Warner RN

## 2023-06-08 ENCOUNTER — TELEPHONE (OUTPATIENT)
Dept: ORTHOPEDIC SURGERY | Age: 78
End: 2023-06-08

## 2023-06-08 ENCOUNTER — CARE COORDINATION (OUTPATIENT)
Dept: CARE COORDINATION | Age: 78
End: 2023-06-08

## 2023-06-08 NOTE — CARE COORDINATION
785 [facility] Update Call    2023    Patient: Robert Median Patient : 1945   MRN: [id number]  Reason for Admission: fall, weakness (3DW from ER)   Discharge Date: 23 RARS: Readmission Risk Score: 18.5       Email to IDT group this morning informing patients for the call today at [address] Update    Care Transitions Interventions  Post Acute Facility: [address] [place] [address] [address] Update  Reported Nursing Issues: 128.4#, 156/97, 96.8, 72 bpm, 18, 120- glucose, 99% on room air, pain 0/10. No call from IDT (she had called but writer was on a call with another SNF. Multiple attempts to call back unsuccessful. Email to Lety, therapy director to schedule another time to talk)    Anticipated date for discharge: 23    Anticipated discharge services: LCD 6/15, DC . Pt from [address] \[facility]\"".  Family to be looking for assisted living facilities in the area (per previous call)

## 2023-06-08 NOTE — TELEPHONE ENCOUNTER
General Question     Subject: PT RETURNED CALL  Patient and /or Facility Request: Kiya Sommers"  Contact Number: [phone]      THE PT RECEIVED A MESSAGE FROM YOUR OFFICE BUT SHE WAS CONFUSED ABOUT THE MESSAGE BECAUSE IT SAID SOMETHING ABOUT GETTING THERAPY. SHE'S CURRENTLY IN AN IP REHAB FACILITY, SO IF YOU COULD CALL HER BACK AT THE ABOVE #.  IF SHE DOESN'T ANSWER PLEASE LEAVE A MESSAGE.

## 2023-06-08 NOTE — TELEPHONE ENCOUNTER
Patient called back in and stated Dr. Cecilia Paz wants her to go to a rehab unit after she is released from the SNF. Patient wants to know what Dr. Mickey Jones thinks and dr didn't say if he wants it in home or outpatient if its outpatient. Patient stated she is going to have trouble with transportation so she would rather go somewhere if possible and stay there. Patient has physical therapy at 930am if patient isnt available to answer phone. She will call back per patient.  Please advise

## 2023-06-14 ENCOUNTER — CARE COORDINATION (OUTPATIENT)
Dept: CARE COORDINATION | Age: 78
End: 2023-06-14

## 2023-06-20 ENCOUNTER — CARE COORDINATION (OUTPATIENT)
Dept: CARE COORDINATION | Age: 78
End: 2023-06-20

## 2023-06-20 NOTE — CARE COORDINATION
Ambulatory Care Coordination Note  2023    Patient Current Location:  Home: [address] [place] [address]. [address]     ACM contacted the patient by telephone. Verified name and  with patient as identifiers. Provided introduction to self, and explanation of the ACM role. Challenges to be reviewed by the provider   Additional needs identified to be addressed with provider: Yes  none               Method of communication with provider: chart routing. ACM: Swapna Ndiaye RN     ACM completed follow up call with patient who is now back at the [facility]. Patient said she has a few focus areas for follow up appt with PCP. Patient said she has a tremor in right hand that is worsening that she would like to discuss. Patient said she is having vision changes after watching TV or being on her phone for a extended period of time. Patient is wanting to ask for a MRI of her brain d/t the vision changes. Patient said she is having shoulder pain which was x-rayed which was negative per patient report. Patient is wanting to discuss further imaging of shoulder. Patient said these are the things she would like to discuss during next follow up appt with PCP. Plan:    F/U call 2 weeks    Offered patient enrollment in the Remote Patient Monitoring (RPM) program for in-home monitoring: Patient declined. Lab Results       None            Care Coordination Interventions    Referral from Primary Care Provider: No  Suggested Interventions and Community Resources  [facility]: Completed (Comment: active with Dr. Yonis Byrne 23)  [address]:  In Process (Comment: [facility] PT/OT)  Meals on Wheels: Completed (Comment: Patient would like meals to be delivered that are accomadating for her dietary restrictions 3/21/23)  Medi Set or Pill Pack: Completed (Comment: ACM placed call to the pill box to confirm med list)  Zone Management Tools: Completed (Comment: DM Zone tools to be sent)          Goals

## 2023-06-20 NOTE — CARE COORDINATION
Contacted Manuel Goodwin and left voicemail regarding Dietitian follow up. Left call back number and will follow up as appropriate.        Carey Lobo, [address] [facility],   [phone]

## 2023-06-20 NOTE — CARE COORDINATION
Jimmy Correa  6/20/2023    Registered Dietitian Progress Note for Care Coordination    Assessment: Javier Lorenz is a 70 y.o. female. RD referred for gastroparesis. RD spoke with patient for initial nutrition assessment on 4/25/23. RD called to follow up with patient today, 6/20/23. RD discussed previous goals with patient. Patient states she was discharged from SNF on Friday, 6/16/23. Patient then states that she had a fall on Sunday, 6/18/23. Patient reports that she is in a lot of pain and c/o trouble with walking. Patient attributes fall to lack of nutrition. Patient reports that her appetite is okay, and that she has been eating two meals/day, on average. Patient states that she recently purchased a 24-pack of Ensure Max Protein, and was wondering how much she should drink. RD instructed patient to drink one daily, for now. Note Ensure Max Protein contains 150 calories and 30 g protein per 11 fl oz bottle. RD offered to send patient Ensure coupons in the mail, patient was agreeable. RD verified patient's home address. Patient reports a current weight of 125-126 lbs, which is improved from 120 lbs while at the SNF.     24-Hour Food Recall:   [address] [facility] Crunch cereal  Lunch - None   Dinner - Egg salad sandwich     Plan of Care:  RD encouraged patient to keep working toward goals set. RD will plan to send Ensure coupons. RD will follow up with patient to ensure coupons were received, discuss any questions patient has and check the progress toward goals. Follow Up:    RD will call patient in four weeks to follow up and answer any nutrition related questions at that time.      Debbie Chávez, [address] [place] [address],    [phone]

## 2023-06-20 NOTE — CARE COORDINATION
I agree with the Care Coordinator's Plan of Care  Will discuss concerns at upcoming visit    Dr. Rukhsana Orozco

## 2023-06-23 ENCOUNTER — TELEPHONE (OUTPATIENT)
Dept: FAMILY MEDICINE CLINIC | Age: 78
End: 2023-06-23

## 2023-06-26 SDOH — ECONOMIC STABILITY: TRANSPORTATION INSECURITY
IN THE PAST 12 MONTHS, HAS LACK OF TRANSPORTATION KEPT YOU FROM MEETINGS, WORK, OR FROM GETTING THINGS NEEDED FOR DAILY LIVING?: YES

## 2023-06-26 SDOH — ECONOMIC STABILITY: INCOME INSECURITY: HOW HARD IS IT FOR YOU TO PAY FOR THE VERY BASICS LIKE FOOD, HOUSING, MEDICAL CARE, AND HEATING?: NOT VERY HARD

## 2023-06-26 SDOH — ECONOMIC STABILITY: FOOD INSECURITY: WITHIN THE PAST 12 MONTHS, YOU WORRIED THAT YOUR FOOD WOULD RUN OUT BEFORE YOU GOT MONEY TO BUY MORE.: NEVER TRUE

## 2023-06-26 SDOH — ECONOMIC STABILITY: FOOD INSECURITY: WITHIN THE PAST 12 MONTHS, THE FOOD YOU BOUGHT JUST DIDN'T LAST AND YOU DIDN'T HAVE MONEY TO GET MORE.: NEVER TRUE

## 2023-06-27 ENCOUNTER — TELEMEDICINE (OUTPATIENT)
Dept: FAMILY MEDICINE CLINIC | Age: 78
End: 2023-06-27

## 2023-06-27 DIAGNOSIS — F33.1 MAJOR DEPRESSIVE DISORDER, RECURRENT, MODERATE (HCC): ICD-10-CM

## 2023-06-27 DIAGNOSIS — R53.81 PHYSICAL DECONDITIONING: ICD-10-CM

## 2023-06-27 DIAGNOSIS — Z79.4 TYPE 2 DIABETES MELLITUS WITH HYPERGLYCEMIA, WITH LONG-TERM CURRENT USE OF INSULIN (HCC): ICD-10-CM

## 2023-06-27 DIAGNOSIS — M25.511 ACUTE PAIN OF RIGHT SHOULDER: ICD-10-CM

## 2023-06-27 DIAGNOSIS — R53.1 GENERALIZED WEAKNESS: Primary | ICD-10-CM

## 2023-06-27 DIAGNOSIS — R25.1 TREMOR: ICD-10-CM

## 2023-06-27 DIAGNOSIS — E11.65 TYPE 2 DIABETES MELLITUS WITH HYPERGLYCEMIA, WITH LONG-TERM CURRENT USE OF INSULIN (HCC): ICD-10-CM

## 2023-06-27 DIAGNOSIS — I63.9 CEREBRAL INFARCTION, UNSPECIFIED MECHANISM (HCC): ICD-10-CM

## 2023-06-27 RX ORDER — INSULIN LISPRO 100 [IU]/ML
INJECTION, SOLUTION INTRAVENOUS; SUBCUTANEOUS
Qty: 15 ADJUSTABLE DOSE PRE-FILLED PEN SYRINGE | Refills: 0 | Status: SHIPPED | OUTPATIENT
Start: 2023-06-27

## 2023-07-05 ENCOUNTER — CARE COORDINATION (OUTPATIENT)
Dept: CARE COORDINATION | Age: 78
End: 2023-07-05

## 2023-07-05 NOTE — CARE COORDINATION
Ambulatory Care Coordination Note  2023    Patient Current Location:  Home: [address] [facility]. [address] [facility]     ACM contacted the patient by telephone. Verified name and  with patient as identifiers. Provided introduction to self, and explanation of the ACM role. Challenges to be reviewed by the provider   Additional needs identified to be addressed with provider: No  none               Method of communication with provider: chart routing. ACM: Pinky Oneil RN     ACM completed follow up call with patient who said she is doing well at this time. Patient has MRI scheduled for . Patient said she will be moving to a new assisted living and has signed the lease for [facility]. Patient is meeting with [facility] who will be assisting to obtain documents needed prior to move in at [address] [facility] [address]. Patient said [address] has started working with her and has no other questions. Plan:    F/U call 3 weeks     Offered patient enrollment in the Remote Patient Monitoring (RPM) program for in-home monitoring: Patient declined. Lab Results       None            Care Coordination Interventions    Referral from Primary Care Provider: No  Suggested Interventions and Community Resources  Behavorial Health: Completed (Comment: active with Dr. Delmy Huston 23)  [facility]: In Process (Comment: [facility] PT/OT)  Meals on Wheels: Completed (Comment: Patient would like meals to be delivered that are accomadating for her dietary restrictions 3/21/23)  Medi Set or Pill Pack: Completed (Comment: ACM placed call to the pill box to confirm med list)  Zone Management Tools: Completed (Comment: DM Zone tools to be sent)          Goals Addressed                   This Visit's Progress     Conditions and Symptoms   Improving     I will schedule office visits, as directed by my provider. I will keep my appointment or reschedule if I have to cancel.   I will notify my provider of any barriers to my

## 2023-07-07 ENCOUNTER — HOSPITAL ENCOUNTER (OUTPATIENT)
Dept: MRI IMAGING | Age: 78
Discharge: HOME | End: 2023-07-07
Payer: MEDICARE

## 2023-07-07 DIAGNOSIS — I63.9 CEREBRAL INFARCTION, UNSPECIFIED MECHANISM (HCC): ICD-10-CM

## 2023-07-07 DIAGNOSIS — M25.511 ACUTE PAIN OF RIGHT SHOULDER: ICD-10-CM

## 2023-07-07 DIAGNOSIS — R25.1 TREMOR: ICD-10-CM

## 2023-07-07 PROCEDURE — 73221 MRI JOINT UPR EXTREM W/O DYE: CPT

## 2023-07-07 PROCEDURE — 70551 MRI BRAIN STEM W/O DYE: CPT

## 2023-07-10 DIAGNOSIS — M75.121 NONTRAUMATIC COMPLETE TEAR OF RIGHT ROTATOR CUFF: Primary | ICD-10-CM

## 2023-07-18 ENCOUNTER — SCHEDULED TELEPHONE ENCOUNTER (OUTPATIENT)
Dept: PSYCHOLOGY | Age: 78
End: 2023-07-18

## 2023-07-18 DIAGNOSIS — F41.1 GAD (GENERALIZED ANXIETY DISORDER): Primary | ICD-10-CM

## 2023-07-18 NOTE — PROGRESS NOTES
Grandmother     Diabetes Father     Lung Cancer Mother     Pancreatic Cancer Brother     Diabetes Brother      A:  Patient engaged and cooperative. Denies SI. Insight and motivation are good. Diagnosis:    1. JOSE RAUL (generalized anxiety disorder)          Diagnosis Date    Colon polyps     COVID-19 2/22/2023    Diabetes mellitus ([address])     Diabetic neuropathy ([address])     Diabetic retinopathy ([address])     Essential hypertension 10/28/2019    Fall     Fatty liver     Gastritis     GERD (gastroesophageal reflux disease)     Hyperlipidemia     Hypertension     Hypothyroidism     Irritable bowel syndrome     Major depressive disorder with single episode 10/28/2019    Osteopenia     Photosensitivity disorder     chronic    RBBB     Sleep apnea     on BIPAP    Thyroid disease     Thyroid nodule     neg bx-chronic thyroiditis    Type 2 diabetes mellitus with diabetic polyneuropathy, with long-term current use of insulin ([address]) 07/23/2019    Vitamin D deficiency      Plan:  Pt interventions:  Conducted functional assessment, [place]-setting to identify pt's primary goals for [facility] visit / overall health, Supportive techniques, and Emphasized self-care as important for managing overall health.     Pt Behavioral Change Plan:   See Pt Instructions

## 2023-07-20 ENCOUNTER — CARE COORDINATION (OUTPATIENT)
Dept: CARE COORDINATION | Age: 78
End: 2023-07-20

## 2023-07-20 NOTE — CARE COORDINATION
Contacted Vignesh Harrington and left voicemail regarding Dietitian follow up. Left call back number and will follow up as appropriate.        Teddy Calvert, [facility],   [phone]

## 2023-07-24 RX ORDER — PEN NEEDLE, DIABETIC 32 GX 1/4"
NEEDLE, DISPOSABLE MISCELLANEOUS
Qty: 100 EACH | Refills: 3 | OUTPATIENT
Start: 2023-07-24

## 2023-07-25 ENCOUNTER — CARE COORDINATION (OUTPATIENT)
Dept: CARE COORDINATION | Age: 78
End: 2023-07-25

## 2023-07-25 NOTE — CARE COORDINATION
Contacted Aramis Bains and left voicemail regarding Dietitian follow up. Left call back number and will follow up as appropriate.        Annika Perdomo, [facility],   [phone]

## 2023-07-26 ENCOUNTER — CARE COORDINATION (OUTPATIENT)
Dept: CARE COORDINATION | Age: 78
End: 2023-07-26

## 2023-07-26 ENCOUNTER — OFFICE VISIT (OUTPATIENT)
Dept: ORTHOPEDIC SURGERY | Age: 78
End: 2023-07-26
Payer: MEDICARE

## 2023-07-26 VITALS — WEIGHT: 125 LBS | HEIGHT: 62 IN | BODY MASS INDEX: 23 KG/M2

## 2023-07-26 DIAGNOSIS — M75.121 NONTRAUMATIC COMPLETE TEAR OF RIGHT ROTATOR CUFF: ICD-10-CM

## 2023-07-26 DIAGNOSIS — M75.111 NONTRAUMATIC INCOMPLETE TEAR OF RIGHT ROTATOR CUFF: Primary | ICD-10-CM

## 2023-07-26 PROCEDURE — G8427 DOCREV CUR MEDS BY ELIG CLIN: HCPCS | Performed by: PHYSICIAN ASSISTANT

## 2023-07-26 PROCEDURE — 1036F TOBACCO NON-USER: CPT | Performed by: PHYSICIAN ASSISTANT

## 2023-07-26 PROCEDURE — G8420 CALC BMI NORM PARAMETERS: HCPCS | Performed by: PHYSICIAN ASSISTANT

## 2023-07-26 PROCEDURE — 99214 OFFICE O/P EST MOD 30 MIN: CPT | Performed by: PHYSICIAN ASSISTANT

## 2023-07-26 PROCEDURE — 1123F ACP DISCUSS/DSCN MKR DOCD: CPT | Performed by: PHYSICIAN ASSISTANT

## 2023-07-26 PROCEDURE — G8400 PT W/DXA NO RESULTS DOC: HCPCS | Performed by: PHYSICIAN ASSISTANT

## 2023-07-26 PROCEDURE — 1090F PRES/ABSN URINE INCON ASSESS: CPT | Performed by: PHYSICIAN ASSISTANT

## 2023-07-26 RX ORDER — PEN NEEDLE, DIABETIC 32 GX 1/4"
NEEDLE, DISPOSABLE MISCELLANEOUS
Qty: 100 EACH | Refills: 3 | OUTPATIENT
Start: 2023-07-26

## 2023-07-26 SDOH — ECONOMIC STABILITY: FOOD INSECURITY: WITHIN THE PAST 12 MONTHS, THE FOOD YOU BOUGHT JUST DIDN'T LAST AND YOU DIDN'T HAVE MONEY TO GET MORE.: NEVER TRUE

## 2023-07-26 SDOH — ECONOMIC STABILITY: FOOD INSECURITY: WITHIN THE PAST 12 MONTHS, YOU WORRIED THAT YOUR FOOD WOULD RUN OUT BEFORE YOU GOT MONEY TO BUY MORE.: NEVER TRUE

## 2023-07-26 SDOH — HEALTH STABILITY: PHYSICAL HEALTH: ON AVERAGE, HOW MANY DAYS PER WEEK DO YOU ENGAGE IN MODERATE TO STRENUOUS EXERCISE (LIKE A BRISK WALK)?: 0 DAYS

## 2023-07-26 ASSESSMENT — SOCIAL DETERMINANTS OF HEALTH (SDOH)
HOW OFTEN DO YOU ATTENT MEETINGS OF THE CLUB OR ORGANIZATION YOU BELONG TO?: NEVER
DO YOU BELONG TO ANY CLUBS OR ORGANIZATIONS SUCH AS CHURCH GROUPS UNIONS, FRATERNAL OR ATHLETIC GROUPS, OR SCHOOL GROUPS?: NO
HOW OFTEN DO YOU ATTEND CHURCH OR RELIGIOUS SERVICES?: NEVER
HOW OFTEN DO YOU GET TOGETHER WITH FRIENDS OR RELATIVES?: ONCE A WEEK
IN A TYPICAL WEEK, HOW MANY TIMES DO YOU TALK ON THE PHONE WITH FAMILY, FRIENDS, OR NEIGHBORS?: ONCE A WEEK
HOW HARD IS IT FOR YOU TO PAY FOR THE VERY BASICS LIKE FOOD, HOUSING, MEDICAL CARE, AND HEATING?: NOT HARD AT ALL

## 2023-07-26 NOTE — CARE COORDINATION
Ambulatory Care Coordination Note  2023    Patient Current Location:  Home: [address] [place] [address]. [address] [facility]     ACM contacted the patient by telephone. Verified name and  with patient as identifiers. Provided introduction to self, and explanation of the ACM role. Challenges to be reviewed by the provider   Additional needs identified to be addressed with provider: No  none               Method of communication with provider: chart routing. ACM: Cherrie Mancia RN     aCM completed follow up call with patient who said she has moved into [facility]. Patient at this time said things are going well for her there but still trying to find things. ACM reviewed that RD has tried outreach a few times ACM provided call back number for RD to patient. Patient has no other additional concerns at this time. Plan:    F/U call 1 month    Offered patient enrollment in the Remote Patient Monitoring (RPM) program for in-home monitoring: Patient declined. Lab Results       None            Care Coordination Interventions    Referral from Primary Care Provider: No  Suggested Interventions and Community Resources  Behavorial Health: Completed (Comment: active with Dr. Yecenia Hoang 23)  [facility]:  In Process (Comment: [facility] PT/OT)  Meals on Wheels: Completed (Comment: Patient would like meals to be delivered that are accomadating for her dietary restrictions 3/21/23)  Medi Set or Pill Pack: Completed (Comment: ACM placed call to the pill box to confirm med list)  Zone Management Tools: Completed (Comment: DM Zone tools to be sent)          Goals Addressed    None         Future Appointments   Date Time Provider [address]  [facility]   2023  2:00 PM Hernandez Ndiaye PA-C [facility]   ,   Diabetes Assessment      Meal Planning: Avoidance of concentrated sweets, Calorie counting   How often do you test your blood sugar?: Daily, Meals   Do you have barriers with adherence to

## 2023-07-26 NOTE — PROGRESS NOTES
Dr Alba Dolan      Date /Time 2023             2:00 PM EDT  Name Danielle Graham             1945   Location  [facility]  MRN [id number]                Chief Complaint   Patient presents with    Shoulder Pain     N Right Shoulder        History of Present Illness    Danielle Graham is a 70 y.o. female who presents with  right Shoulder pain. Sent in consultation by Oneyda Mora DO, . Injury Mechanism:  none. Worker's Comp. & legal issues:   none. Previous Treatments: Ice, Heat, and NSAIDs    Patient presents the office today for a new problem. Patient here with a chief complaint of right shoulder pain. Patient's right shoulder has been painful for several months. No specific injury or trauma. Her pain is mostly concentrated over her lateral shoulder. She does have increased pain with overhead activities. She denies any cervical pain or upper extremity radicular symptoms.     Past Medical History  Past Medical History:   Diagnosis Date    Colon polyps     COVID-19 2023    Diabetes mellitus ([address])     Diabetic neuropathy ([address])     Diabetic retinopathy ([address])     Essential hypertension 10/28/2019    Fall     Fatty liver     Gastritis     GERD (gastroesophageal reflux disease)     Hyperlipidemia     Hypertension     Hypothyroidism     Irritable bowel syndrome     Major depressive disorder with single episode 10/28/2019    Osteopenia     Photosensitivity disorder     chronic    RBBB     Sleep apnea     on BIPAP    Thyroid disease     Thyroid nodule     neg bx-chronic thyroiditis    Type 2 diabetes mellitus with diabetic polyneuropathy, with long-term current use of insulin ([address]) 2019    Vitamin D deficiency      Past Surgical History:   Procedure Laterality Date    APPENDECTOMY      CARPAL TUNNEL RELEASE Bilateral     CATARACT REMOVAL Bilateral      SECTION      CHOLECYSTECTOMY      COLONOSCOPY N/A 2022    COLONOSCOPY POLYPECTOMY SNARE/COLD BIOPSY

## 2023-07-31 ENCOUNTER — CARE COORDINATION (OUTPATIENT)
Dept: CARE COORDINATION | Age: 78
End: 2023-07-31

## 2023-08-01 NOTE — CARE COORDINATION
Cezar De La Torre  7/31/2023    Registered Dietitian Progress Note for Care Coordination    Assessment: Gordo Garcia is a 70 y.o. female. RD referred for gastroparesis. RD spoke with patient for initial nutrition assessment on 4/25/23. RD called to follow up with patient today, 7/31/23. RD discussed previous goals with patient. Patient states that she is in a new assisted living facility, [facility]. Patient provided RD with new address, RD will update information in chart. Patient indicates that she is not happy at the new facility, and that the food is not very good. Patient states that she recently spoke with kitchen staff about her dietary needs, and they will try to accommodate her. RD offered to send letter to facility outlining patient's dietary needs. Patient was agreeable, and would like letter sent to her address, and she will give to facility. Patient continues to drink Ensure Max Protein to fill in gaps in her nutrition. Patient notes that she eats breakfast in her apartment, and then eats lunch and dinner in the public dining room. Patient has no nutrition-related questions/concerns this date. RD will sign off. RD encouraged patient to reach out should any nutrition-related questions/concerns arise. Patient verbalized understanding and thanked RD. Plan of Care:  RD encouraged patient to keep working toward goals set. RD will mail letter to patient regarding her disease and dietary needs. Follow Up:    None.     Yolis Hester, [address] [place] [address],    [phone]

## 2023-08-02 ENCOUNTER — TELEPHONE (OUTPATIENT)
Dept: FAMILY MEDICINE CLINIC | Age: 78
End: 2023-08-02

## 2023-08-02 DIAGNOSIS — M21.372 LEFT FOOT DROP: Primary | ICD-10-CM

## 2023-08-02 NOTE — TELEPHONE ENCOUNTER
[facility] called asking if they could get an order for a Dynasplint for the patient, she has a left foot drop. Please fax order to [phone], if you have any questions call [facility] at [phone]. Thanks.

## 2023-08-07 ENCOUNTER — APPOINTMENT (OUTPATIENT)
Dept: CT IMAGING | Age: 78
End: 2023-08-07
Payer: MEDICARE

## 2023-08-07 ENCOUNTER — APPOINTMENT (OUTPATIENT)
Dept: GENERAL RADIOLOGY | Age: 78
End: 2023-08-07
Payer: MEDICARE

## 2023-08-07 ENCOUNTER — HOSPITAL ENCOUNTER (EMERGENCY)
Age: 78
Discharge: HOME | End: 2023-08-07
Attending: EMERGENCY MEDICINE
Payer: MEDICARE

## 2023-08-07 VITALS
OXYGEN SATURATION: 100 % | WEIGHT: 130 LBS | RESPIRATION RATE: 16 BRPM | DIASTOLIC BLOOD PRESSURE: 64 MMHG | SYSTOLIC BLOOD PRESSURE: 132 MMHG | HEIGHT: 64 IN | HEART RATE: 87 BPM | TEMPERATURE: 98.2 F | BODY MASS INDEX: 22.2 KG/M2

## 2023-08-07 DIAGNOSIS — M25.552 LEFT HIP PAIN: Primary | ICD-10-CM

## 2023-08-07 PROCEDURE — 6370000000 HC RX 637 (ALT 250 FOR IP): Performed by: EMERGENCY MEDICINE

## 2023-08-07 PROCEDURE — 72192 CT PELVIS W/O DYE: CPT

## 2023-08-07 PROCEDURE — 73552 X-RAY EXAM OF FEMUR 2/>: CPT

## 2023-08-07 PROCEDURE — 73700 CT LOWER EXTREMITY W/O DYE: CPT

## 2023-08-07 PROCEDURE — 99284 EMERGENCY DEPT VISIT MOD MDM: CPT

## 2023-08-07 RX ORDER — ACETAMINOPHEN 325 MG/1
650 TABLET ORAL ONCE
Status: COMPLETED | OUTPATIENT
Start: 2023-08-07 | End: 2023-08-07

## 2023-08-07 RX ADMIN — ACETAMINOPHEN 650 MG: 325 TABLET ORAL at 15:39

## 2023-08-07 NOTE — DISCHARGE INSTRUCTIONS
Your CAT scan showed signs of a healed pelvic fracture. You should follow-up with Dr. Ebony Loja with orthopedic surgery for further evaluation. Take Tylenol or ibuprofen as needed for pain. Return to the emergency department for new or worsening symptoms.

## 2023-08-07 NOTE — ED TRIAGE NOTES
Pt coming from [facility] assisted living for CT of L hip. Pt fell 6/17 and since then has not been able to walk at baseline. Hx stroke a year ago. XR was taken at facility was inconclusive.

## 2023-08-07 NOTE — ED PROVIDER NOTES
TABLET BY MOUTH DAILY, Disp-90 tablet, R-1, DAWNormal      lisinopril (PRINIVIL;ZESTRIL) 10 MG tablet TAKE 1 TABLET BY MOUTH EVERY DAY, Disp-90 tablet, R-1Normal      pantoprazole (PROTONIX) 40 MG tablet Take 1 tablet by mouth every morning (before breakfast), Disp-90 tablet, R-1Normal      metoprolol tartrate (LOPRESSOR) 25 MG tablet TAKE 1 TABLET TWICE A DAY, Disp-180 tablet, R-1Normal      rosuvastatin (CRESTOR) 40 MG tablet TAKE 1 TABLET BY MOUTH EVERY DAY, Disp-90 tablet, R-1Normal      cyanocobalamin ([employer]S VITAMIN B12) 1000 MCG tablet Take 1 tablet by mouth daily, Disp-90 tablet, R-1Ok to substitute brandNormal      docusate sodium (COLACE) 100 MG capsule Take 1 capsule by mouth daily as needed for Constipation, Disp-90 capsule, R-0Normal      !! Continuous Blood Gluc Sensor (FREESTYLE ANT 2 SENSOR) [facility] 1 Device by Does not apply route every 14 days, Disp-6 each, R-4NO PRINT      Continuous Blood Gluc  (FREESTYLE ANT 2 READER) GILMER 1 each by Does not apply route daily, Disp-1 each, R-1Print      diclofenac sodium (VOLTAREN) 1 % GEL Apply 4 g topically 4 times daily, Topical, 4 TIMES DAILY Starting Thu 11/3/2022, Disp-100 g, R-1, Normal      Calcium Carb-Cholecalciferol (CALCIUM 1000 + D) 1000-800 MG-UNIT TABS Take 1,000 mg by mouth daily, Disp-90 tablet, R-1Normal      LANTUS SOLOSTAR 100 UNIT/ML injection pen Inject 40 Units into the skin nightly, Disp-15 Adjustable Dose Pre-filled Pen Syringe, R-2, DAWNormal      polyethylene glycol (GLYCOLAX) 17 GM/SCOOP powder 1 powder in packet DAILY (route: oral)Historical Med      Handicap Placard [facility] Starting Tue 8/9/2022, Disp-1 each, R-0, Print08/09/22      !!  Continuous Blood Gluc Sensor (FREESTYLE ANT SENSOR SYSTEM) MISC 1 box by Does not apply route 2 times daily, Disp-1 each, R-0Normal      BD PEN NEEDLE MICRO U/F 32G X 6 MM MISC USE WITH LANTUS DAILY, Disp-100 each, R-5, DAWNormal      METAMUCIL FIBER PO Take by mouth Indications: as needed

## 2023-08-08 ASSESSMENT — ENCOUNTER SYMPTOMS
WHEEZING: 0
VOMITING: 0
ABDOMINAL PAIN: 0
COUGH: 0
DIARRHEA: 0
EYE PAIN: 0
SHORTNESS OF BREATH: 0
NAUSEA: 0

## 2023-08-15 SDOH — HEALTH STABILITY: PHYSICAL HEALTH: ON AVERAGE, HOW MANY MINUTES DO YOU ENGAGE IN EXERCISE AT THIS LEVEL?: 10 MIN

## 2023-08-15 SDOH — HEALTH STABILITY: PHYSICAL HEALTH: ON AVERAGE, HOW MANY DAYS PER WEEK DO YOU ENGAGE IN MODERATE TO STRENUOUS EXERCISE (LIKE A BRISK WALK)?: 0 DAYS

## 2023-08-16 ENCOUNTER — OFFICE VISIT (OUTPATIENT)
Dept: ORTHOPEDIC SURGERY | Age: 78
End: 2023-08-16
Payer: MEDICARE

## 2023-08-16 VITALS — BODY MASS INDEX: 22.2 KG/M2 | WEIGHT: 130 LBS | HEIGHT: 64 IN

## 2023-08-16 DIAGNOSIS — R29.898 LEFT LEG WEAKNESS: ICD-10-CM

## 2023-08-16 DIAGNOSIS — M54.32 LEFT SIDED SCIATICA: ICD-10-CM

## 2023-08-16 DIAGNOSIS — M67.02 TIGHT HEEL CORD DUE TO NEUROLOGIC CAUSE, LEFT: ICD-10-CM

## 2023-08-16 DIAGNOSIS — I69.30 HISTORY OF CVA WITH RESIDUAL DEFICIT: ICD-10-CM

## 2023-08-16 DIAGNOSIS — M21.372 FOOT DROP, LEFT: Primary | ICD-10-CM

## 2023-08-16 PROCEDURE — 99214 OFFICE O/P EST MOD 30 MIN: CPT | Performed by: NURSE PRACTITIONER

## 2023-08-16 PROCEDURE — G8420 CALC BMI NORM PARAMETERS: HCPCS | Performed by: NURSE PRACTITIONER

## 2023-08-16 PROCEDURE — G8400 PT W/DXA NO RESULTS DOC: HCPCS | Performed by: NURSE PRACTITIONER

## 2023-08-16 PROCEDURE — 1036F TOBACCO NON-USER: CPT | Performed by: NURSE PRACTITIONER

## 2023-08-16 PROCEDURE — 1090F PRES/ABSN URINE INCON ASSESS: CPT | Performed by: NURSE PRACTITIONER

## 2023-08-16 PROCEDURE — G8427 DOCREV CUR MEDS BY ELIG CLIN: HCPCS | Performed by: NURSE PRACTITIONER

## 2023-08-16 PROCEDURE — 1123F ACP DISCUSS/DSCN MKR DOCD: CPT | Performed by: NURSE PRACTITIONER

## 2023-08-17 NOTE — PROGRESS NOTES
tablet 1    lisinopril (PRINIVIL;ZESTRIL) 10 MG tablet TAKE 1 TABLET BY MOUTH EVERY DAY 90 tablet 1    pantoprazole (PROTONIX) 40 MG tablet Take 1 tablet by mouth every morning (before breakfast) 90 tablet 1    metoprolol tartrate (LOPRESSOR) 25 MG tablet TAKE 1 TABLET TWICE A  tablet 1    rosuvastatin (CRESTOR) 40 MG tablet TAKE 1 TABLET BY MOUTH EVERY DAY 90 tablet 1    cyanocobalamin ([employer]S VITAMIN B12) 1000 MCG tablet Take 1 tablet by mouth daily 90 tablet 1    docusate sodium (COLACE) 100 MG capsule Take 1 capsule by mouth daily as needed for Constipation 90 capsule 0    Continuous Blood Gluc Sensor (FREESTYLE ANT 2 SENSOR) MISC 1 Device by Does not apply route every 14 days 6 each 4    Continuous Blood Gluc  (FREESTYLE ANT 2 READER) GILMER 1 each by Does not apply route daily 1 each 1    diclofenac sodium (VOLTAREN) 1 % GEL Apply 4 g topically 4 times daily (Patient not taking: Reported on 4/11/2023) 100 g 1    Calcium Carb-Cholecalciferol (CALCIUM 1000 + D) 1000-800 MG-UNIT TABS Take 1,000 mg by mouth daily 90 tablet 1    LANTUS SOLOSTAR 100 UNIT/ML injection pen Inject 40 Units into the skin nightly 15 Adjustable Dose Pre-filled Pen Syringe 2    polyethylene glycol (GLYCOLAX) 17 GM/SCOOP powder 1 powder in packet DAILY (route: oral) (Patient not taking: Reported on 4/11/2023)      Handicap Placard MISC by Does not apply route 08/09/22 1 each 0    Continuous Blood Gluc Sensor (FREESTYLE ANT SENSOR SYSTEM) MISC 1 box by Does not apply route 2 times daily 1 each 0    BD PEN NEEDLE MICRO U/F 32G X 6 MM MISC USE WITH LANTUS DAILY 100 each 5    METAMUCIL FIBER PO Take by mouth Indications: as needed MiraLax instead      Multiple Vitamins-Minerals (VITEYES COMPLETE PO) Take by mouth      latanoprost (XALATAN) 0.005 % ophthalmic solution 1 drop nightly       No current facility-administered medications on file prior to visit.        Pertinent items are noted in HPI  Review of systems reviewed from

## 2023-08-23 ENCOUNTER — TELEPHONE (OUTPATIENT)
Dept: FAMILY MEDICINE CLINIC | Age: 78
End: 2023-08-23

## 2023-08-23 NOTE — TELEPHONE ENCOUNTER
Patient called the office stating that she is not at the [facility] in [place]. She is wanting to have PT/OT through their facility but they are needing an order. Left a message at the nurse station for a nurse to call the office back to see if there are any particular forms they might need. Reception states that we can send over a \"basic order\".        Lilliana      Ph. [phone]   Fx. [phone]

## 2023-08-23 NOTE — TELEPHONE ENCOUNTER
[facility] from [facility] called the office stating that the patient will start seeing the NP Brandee at their facility. They will have Brandee NP see her tomorrow and place the orders of PT/OT. I thanked Dawn Davey for this information and will notify Dr. Jose Alejandro Iniguez.

## 2023-08-25 ENCOUNTER — TELEPHONE (OUTPATIENT)
Dept: CARDIOLOGY CLINIC | Age: 78
End: 2023-08-25

## 2023-08-25 ENCOUNTER — CARE COORDINATION (OUTPATIENT)
Dept: CARE COORDINATION | Age: 78
End: 2023-08-25

## 2023-08-25 DIAGNOSIS — E11.69 HYPERLIPIDEMIA ASSOCIATED WITH TYPE 2 DIABETES MELLITUS (HCC): ICD-10-CM

## 2023-08-25 DIAGNOSIS — E11.59 HYPERTENSION ASSOCIATED WITH TYPE 2 DIABETES MELLITUS (HCC): Primary | ICD-10-CM

## 2023-08-25 DIAGNOSIS — E78.5 HYPERLIPIDEMIA ASSOCIATED WITH TYPE 2 DIABETES MELLITUS (HCC): ICD-10-CM

## 2023-08-25 DIAGNOSIS — I15.2 HYPERTENSION ASSOCIATED WITH TYPE 2 DIABETES MELLITUS (HCC): Primary | ICD-10-CM

## 2023-08-25 NOTE — CARE COORDINATION
ACM completed chart review and it appears [facility] has established with [facility] NP. ACM will resolve episode at this time.

## 2023-08-25 NOTE — TELEPHONE ENCOUNTER
PT called and stated she has moved to the [facility]. PT would like [facility] to send a referral for a new cardiologist closer to the assisted living home she is now living.  Pts new address:  [address] [place] [address]

## 2023-08-28 NOTE — TELEPHONE ENCOUNTER
We will set her up with Dr. Matthias Ryder if accepting patients. She will like him a lot. I wish her the best, and please thank her for giving me the opportunity to take care of her while living on this side of town previously.  ELISSA GU

## 2023-08-29 ENCOUNTER — OFFICE VISIT (OUTPATIENT)
Dept: ORTHOPEDIC SURGERY | Age: 78
End: 2023-08-29
Payer: MEDICARE

## 2023-08-29 VITALS — HEIGHT: 63 IN | WEIGHT: 128 LBS | BODY MASS INDEX: 22.68 KG/M2

## 2023-08-29 DIAGNOSIS — Z91.81 RISK FOR FALLS: ICD-10-CM

## 2023-08-29 DIAGNOSIS — S76.012A STRAIN OF HIP, LEFT, INITIAL ENCOUNTER: Primary | ICD-10-CM

## 2023-08-29 PROCEDURE — 1036F TOBACCO NON-USER: CPT | Performed by: ORTHOPAEDIC SURGERY

## 2023-08-29 PROCEDURE — G8427 DOCREV CUR MEDS BY ELIG CLIN: HCPCS | Performed by: ORTHOPAEDIC SURGERY

## 2023-08-29 PROCEDURE — 1090F PRES/ABSN URINE INCON ASSESS: CPT | Performed by: ORTHOPAEDIC SURGERY

## 2023-08-29 PROCEDURE — 99203 OFFICE O/P NEW LOW 30 MIN: CPT | Performed by: ORTHOPAEDIC SURGERY

## 2023-08-29 PROCEDURE — G8420 CALC BMI NORM PARAMETERS: HCPCS | Performed by: ORTHOPAEDIC SURGERY

## 2023-08-29 PROCEDURE — G8400 PT W/DXA NO RESULTS DOC: HCPCS | Performed by: ORTHOPAEDIC SURGERY

## 2023-08-29 PROCEDURE — 1123F ACP DISCUSS/DSCN MKR DOCD: CPT | Performed by: ORTHOPAEDIC SURGERY

## 2023-08-29 NOTE — PROGRESS NOTES
Dr Guillaume Gibsb      Date /Time 2023       2:25 PM EDT  Name Michelle Mayer             1945   Location  [facility]  MRN [id number]                Chief Complaint   Patient presents with    Hip Injury     Cozette Spare a few times. Left hip pain         History of Present Illness    Michelle Mayer is a 70 y.o. female who presents with  left hip pain. Occupation: Retired  Occupational activities: clerical work. Athletic/exercise activity: no sports. Injury Mechanism:  none. Worker's Comp. & legal issues:   none. Previous Treatments: Ice, Heat, NSAIDs, and pain medication    She has a history of a recent stroke about 1-1/2 years ago. She has recently transferred from one facility to another from independent living to assisted living. She seems upset about the food intake at one place but the problem continues at the next place. She has fallen multiple times on her left hip directly. The left lower extremity was the side of the stroke affected last April in .     Past History  Past Medical History:   Diagnosis Date    Colon polyps     COVID-19 2023    Diabetes mellitus ([address])     Diabetic neuropathy ([address])     Diabetic retinopathy ([address])     Essential hypertension 10/28/2019    Fall     Fatty liver     Gastritis     GERD (gastroesophageal reflux disease)     Hyperlipidemia     Hypertension     Hypothyroidism     Irritable bowel syndrome     Major depressive disorder with single episode 10/28/2019    Osteopenia     Photosensitivity disorder     chronic    RBBB     Sleep apnea     on BIPAP    Thyroid disease     Thyroid nodule     neg bx-chronic thyroiditis    Type 2 diabetes mellitus with diabetic polyneuropathy, with long-term current use of insulin ([address]) 2019    Vitamin D deficiency      Past Surgical History:   Procedure Laterality Date    APPENDECTOMY      CARPAL TUNNEL RELEASE Bilateral     CATARACT REMOVAL Bilateral      SECTION      CHOLECYSTECTOMY

## 2023-08-31 ENCOUNTER — HOSPITAL ENCOUNTER (OUTPATIENT)
Dept: MRI IMAGING | Age: 78
Discharge: HOME | End: 2023-08-31
Attending: ORTHOPAEDIC SURGERY
Payer: MEDICARE

## 2023-08-31 DIAGNOSIS — Z91.81 RISK FOR FALLS: ICD-10-CM

## 2023-08-31 DIAGNOSIS — S76.012A STRAIN OF HIP, LEFT, INITIAL ENCOUNTER: ICD-10-CM

## 2023-08-31 PROCEDURE — 73721 MRI JNT OF LWR EXTRE W/O DYE: CPT

## 2023-09-08 ENCOUNTER — TELEPHONE (OUTPATIENT)
Dept: ORTHOPEDIC SURGERY | Age: 78
End: 2023-09-08

## 2023-09-08 ENCOUNTER — TELEPHONE (OUTPATIENT)
Dept: OBGYN CLINIC | Age: 78
End: 2023-09-08

## 2023-09-08 NOTE — TELEPHONE ENCOUNTER
Other [facility] correction IS CALLING IN REQUESTING THE OFFICE TO FAX MRI TEST RESULTS FOR HIP.  [id number] [phone]

## 2023-09-08 NOTE — TELEPHONE ENCOUNTER
Patient calling to request a GYN in the [place] area. She just [place] in [place]. Emailed Dr. Morataya Lob information to patient per her request at [email or web address] Routing to Dr. Odin Castle as Merary Michael.

## 2023-09-11 ENCOUNTER — TELEPHONE (OUTPATIENT)
Dept: ORTHOPEDIC SURGERY | Age: 78
End: 2023-09-11

## 2023-09-11 NOTE — TELEPHONE ENCOUNTER
General Question     Subject: LT HIP MRI RESULTS  Patient and /or Facility Request: Janice Carter"  Contact Number:  [phone]    PATIENT CALLING TO SEE IF SHE CAN GET HER RESULTS FOR HER LT HIP MRI OVER THE PHONE.    SHE'S IN [facility] AND SHE WOULD HAVE TO GET TRANSPORTATION. PATIENT HAS APPOINTMENT SCHEDULED WITH [facility] ON SEPTEMBER 20, 2023 FOR HER SHOULDER, BUT WOULD LIKE TO HAVE RESULTS PRIOR TO HER VISIT. PLEASE CALL BACK AT THE ABOVE NUMBER.

## 2023-09-11 NOTE — TELEPHONE ENCOUNTER
Spoke to patient. Having trouble with blood glucose. She has no fracture.    F/U in the office once glucose under control

## 2023-09-11 NOTE — TELEPHONE ENCOUNTER
General Question     Subject: Patient is requesting a call back from [facility] regarding her blood pressure. Patient is having a hard time keeping blood pressure down.   Patient and /or Facility Request: Sai Godoy"  Contact Number: [phone]

## 2023-09-18 SDOH — HEALTH STABILITY: PHYSICAL HEALTH: ON AVERAGE, HOW MANY DAYS PER WEEK DO YOU ENGAGE IN MODERATE TO STRENUOUS EXERCISE (LIKE A BRISK WALK)?: 0 DAYS

## 2023-09-19 ENCOUNTER — OFFICE VISIT (OUTPATIENT)
Dept: FAMILY MEDICINE CLINIC | Age: 78
End: 2023-09-19

## 2023-09-19 VITALS
BODY MASS INDEX: 22.36 KG/M2 | SYSTOLIC BLOOD PRESSURE: 110 MMHG | OXYGEN SATURATION: 97 % | HEIGHT: 63 IN | TEMPERATURE: 97 F | WEIGHT: 126.2 LBS | HEART RATE: 70 BPM | DIASTOLIC BLOOD PRESSURE: 70 MMHG

## 2023-09-19 DIAGNOSIS — R53.1 GENERALIZED WEAKNESS: ICD-10-CM

## 2023-09-19 DIAGNOSIS — Z79.4 TYPE 2 DIABETES MELLITUS WITH HYPERGLYCEMIA, WITH LONG-TERM CURRENT USE OF INSULIN (HCC): Primary | ICD-10-CM

## 2023-09-19 DIAGNOSIS — E11.65 TYPE 2 DIABETES MELLITUS WITH HYPERGLYCEMIA, WITH LONG-TERM CURRENT USE OF INSULIN (HCC): Primary | ICD-10-CM

## 2023-09-19 DIAGNOSIS — Z79.4 TYPE 2 DIABETES MELLITUS WITH HYPERGLYCEMIA, WITH LONG-TERM CURRENT USE OF INSULIN (HCC): ICD-10-CM

## 2023-09-19 DIAGNOSIS — G89.29 CHRONIC LEFT HIP PAIN: ICD-10-CM

## 2023-09-19 DIAGNOSIS — Z91.89 AT RISK FOR POLYPHARMACY: ICD-10-CM

## 2023-09-19 DIAGNOSIS — F33.1 MAJOR DEPRESSIVE DISORDER, RECURRENT, MODERATE (HCC): ICD-10-CM

## 2023-09-19 DIAGNOSIS — I69.30 PERSONAL HISTORY OF STROKE WITH CURRENT RESIDUAL EFFECTS: ICD-10-CM

## 2023-09-19 DIAGNOSIS — E11.65 TYPE 2 DIABETES MELLITUS WITH HYPERGLYCEMIA, WITH LONG-TERM CURRENT USE OF INSULIN (HCC): ICD-10-CM

## 2023-09-19 DIAGNOSIS — M25.511 CHRONIC RIGHT SHOULDER PAIN: ICD-10-CM

## 2023-09-19 DIAGNOSIS — N18.31 STAGE 3A CHRONIC KIDNEY DISEASE (HCC): ICD-10-CM

## 2023-09-19 DIAGNOSIS — M25.552 CHRONIC LEFT HIP PAIN: ICD-10-CM

## 2023-09-19 DIAGNOSIS — G89.29 CHRONIC RIGHT SHOULDER PAIN: ICD-10-CM

## 2023-09-19 DIAGNOSIS — I10 ESSENTIAL HYPERTENSION: ICD-10-CM

## 2023-09-19 DIAGNOSIS — R11.0 NAUSEA: ICD-10-CM

## 2023-09-19 LAB
BASOPHILS # BLD: 0.1 K/UL (ref 0–0.2)
BASOPHILS NFR BLD: 1.8 %
DEPRECATED RDW RBC AUTO: 13.8 % (ref 12.4–15.4)
EOSINOPHIL # BLD: 0.1 K/UL (ref 0–0.6)
EOSINOPHIL NFR BLD: 1 %
HCT VFR BLD AUTO: 40.4 % (ref 36–48)
HGB BLD-MCNC: 13.7 G/DL (ref 12–16)
LYMPHOCYTES # BLD: 2 K/UL (ref 1–5.1)
LYMPHOCYTES NFR BLD: 28.4 %
MCH RBC QN AUTO: 29.7 PG (ref 26–34)
MCHC RBC AUTO-ENTMCNC: 33.8 G/DL (ref 31–36)
MCV RBC AUTO: 87.9 FL (ref 80–100)
MONOCYTES # BLD: 0.4 K/UL (ref 0–1.3)
MONOCYTES NFR BLD: 6.1 %
NEUTROPHILS # BLD: 4.3 K/UL (ref 1.7–7.7)
NEUTROPHILS NFR BLD: 62.7 %
PLATELET # BLD AUTO: 236 K/UL (ref 135–450)
PMV BLD AUTO: 8.7 FL (ref 5–10.5)
RBC # BLD AUTO: 4.59 M/UL (ref 4–5.2)
WBC # BLD AUTO: 6.9 K/UL (ref 4–11)

## 2023-09-20 ENCOUNTER — OFFICE VISIT (OUTPATIENT)
Dept: ORTHOPEDIC SURGERY | Age: 78
End: 2023-09-20

## 2023-09-20 VITALS — WEIGHT: 126 LBS | HEIGHT: 63 IN | BODY MASS INDEX: 22.32 KG/M2

## 2023-09-20 DIAGNOSIS — S76.012A STRAIN OF HIP, LEFT, INITIAL ENCOUNTER: ICD-10-CM

## 2023-09-20 DIAGNOSIS — Z91.81 RISK FOR FALLS: ICD-10-CM

## 2023-09-20 DIAGNOSIS — M75.111 NONTRAUMATIC INCOMPLETE TEAR OF RIGHT ROTATOR CUFF: Primary | ICD-10-CM

## 2023-09-20 LAB
ALBUMIN SERPL-MCNC: 4.3 G/DL (ref 3.4–5)
ALBUMIN/GLOB SERPL: 1.4 {RATIO} (ref 1.1–2.2)
ALP SERPL-CCNC: 81 U/L (ref 40–129)
ALT SERPL-CCNC: 18 U/L (ref 10–40)
ANION GAP SERPL CALCULATED.3IONS-SCNC: 10 MMOL/L (ref 3–16)
AST SERPL-CCNC: 23 U/L (ref 15–37)
BILIRUB SERPL-MCNC: 0.3 MG/DL (ref 0–1)
BUN SERPL-MCNC: 27 MG/DL (ref 7–20)
CALCIUM SERPL-MCNC: 9.4 MG/DL (ref 8.3–10.6)
CHLORIDE SERPL-SCNC: 98 MMOL/L (ref 99–110)
CO2 SERPL-SCNC: 28 MMOL/L (ref 21–32)
CREAT SERPL-MCNC: 1 MG/DL (ref 0.6–1.2)
GFR SERPLBLD CREATININE-BSD FMLA CKD-EPI: 58 ML/MIN/{1.73_M2}
GLUCOSE SERPL-MCNC: 129 MG/DL (ref 70–99)
POTASSIUM SERPL-SCNC: 5.1 MMOL/L (ref 3.5–5.1)
PROT SERPL-MCNC: 7.3 G/DL (ref 6.4–8.2)
SODIUM SERPL-SCNC: 136 MMOL/L (ref 136–145)
T4 FREE SERPL-MCNC: 0.9 NG/DL (ref 0.9–1.8)
TSH SERPL DL<=0.005 MIU/L-ACNC: 9.96 UIU/ML (ref 0.27–4.2)

## 2023-09-20 RX ORDER — BUPIVACAINE HYDROCHLORIDE 2.5 MG/ML
30 INJECTION, SOLUTION INFILTRATION; PERINEURAL ONCE
Status: COMPLETED | OUTPATIENT
Start: 2023-09-20 | End: 2023-09-20

## 2023-09-20 RX ORDER — LIDOCAINE HYDROCHLORIDE 10 MG/ML
5 INJECTION, SOLUTION INFILTRATION; PERINEURAL ONCE
Status: COMPLETED | OUTPATIENT
Start: 2023-09-20 | End: 2023-09-20

## 2023-09-20 RX ORDER — TRIAMCINOLONE ACETONIDE 40 MG/ML
40 INJECTION, SUSPENSION INTRA-ARTICULAR; INTRAMUSCULAR ONCE
Status: COMPLETED | OUTPATIENT
Start: 2023-09-20 | End: 2023-09-20

## 2023-09-20 RX ADMIN — BUPIVACAINE HYDROCHLORIDE 75 MG: 2.5 INJECTION, SOLUTION INFILTRATION; PERINEURAL at 13:37

## 2023-09-20 RX ADMIN — LIDOCAINE HYDROCHLORIDE 5 ML: 10 INJECTION, SOLUTION INFILTRATION; PERINEURAL at 13:37

## 2023-09-20 RX ADMIN — TRIAMCINOLONE ACETONIDE 40 MG: 40 INJECTION, SUSPENSION INTRA-ARTICULAR; INTRAMUSCULAR at 13:38

## 2023-09-20 NOTE — PROGRESS NOTES
Dr Danna Doshi      Date /Time 9/20/2023             2:00 PM EDT  Name Nayla House             1945   Location  [facility]  MRN [id number]                Chief Complaint   Patient presents with    Follow-up     CK Right Shoulder         History of Present Illness    Nayla House is a 70 y.o. female who presents with  right Shoulder pain. Injury Mechanism:  none. Worker's Comp. & legal issues:   none. Previous Treatments: Ice, Heat, and NSAIDs    Patient presents to the office today for follow-up visit. Patient being treated for complete rotator cuff tear right shoulder. She has gotten better control of her blood glucose. It was 145 this morning. She also has adjusted her insulin medication with her medical provider at her facility. Pain symptoms as below    Patient would also like to be seen for hip. She has had pain for approximately a year and a half. She has been diagnosed previously with a pubic rami fracture. We did order her an MRI at the last visit to rule out fracture. She is here to review results. She does take Eliquis. Previous history: Patient presents the office today for a new problem. Patient here with a chief complaint of right shoulder pain. Patient's right shoulder has been painful for several months. No specific injury or trauma. Her pain is mostly concentrated over her lateral shoulder. She does have increased pain with overhead activities. She denies any cervical pain or upper extremity radicular symptoms.     Past Medical History  Past Medical History:   Diagnosis Date    Colon polyps     COVID-19 2/22/2023    Diabetes mellitus ([address])     Diabetic neuropathy ([address])     Diabetic retinopathy ([address])     Essential hypertension 10/28/2019    Fall     Fatty liver     Gastritis     GERD (gastroesophageal reflux disease)     Hyperlipidemia     Hypertension     Hypothyroidism     Irritable bowel syndrome     Major depressive disorder with

## 2023-09-21 PROBLEM — I15.2 HYPERTENSION ASSOCIATED WITH TYPE 2 DIABETES MELLITUS (HCC): Status: RESOLVED | Noted: 2019-10-28 | Resolved: 2023-09-21

## 2023-09-21 PROBLEM — E11.59 HYPERTENSION ASSOCIATED WITH TYPE 2 DIABETES MELLITUS (HCC): Status: RESOLVED | Noted: 2019-10-28 | Resolved: 2023-09-21

## 2023-09-21 PROBLEM — N18.30 CHRONIC RENAL DISEASE, STAGE III (HCC): Status: ACTIVE | Noted: 2023-09-21

## 2023-09-21 PROBLEM — I63.9 CEREBRAL INFARCTION, UNSPECIFIED (HCC): Status: RESOLVED | Noted: 2022-04-19 | Resolved: 2023-09-21

## 2023-09-21 PROBLEM — I69.30 PERSONAL HISTORY OF STROKE WITH CURRENT RESIDUAL EFFECTS: Status: ACTIVE | Noted: 2023-09-21

## 2023-09-21 LAB
EST. AVERAGE GLUCOSE BLD GHB EST-MCNC: 188.6 MG/DL
HBA1C MFR BLD: 8.2 %

## 2023-09-21 RX ORDER — DULOXETIN HYDROCHLORIDE 20 MG/1
CAPSULE, DELAYED RELEASE ORAL
COMMUNITY
Start: 2023-09-09

## 2023-09-21 NOTE — PROGRESS NOTES
Portions of this chart may have been created with voice recognition software. Occasional wrong-word or \"sound-alike\" substitutions may have occurred due to the inherent limitations of voice recognition software. Read the chart carefully and recognize, using context, where these substitutions have occurred      Chief Complaint     New Patient (Est. Pt is at assisted living facility.) and Nausea & Vomiting (Intermittent nausea and vomiting x month, possibly due to medication. )       Oanh Dawn is a 70 y.o. female here for establishing care with me as well as follow-up of chronic medical problems. 1. Type 2 diabetes mellitus with hyperglycemia, with long-term current use of insulin (HCC)    Diabetic ROS -   medication compliance: compliant most of the time,   diabetic diet compliance: compliant most of the time,   home glucose monitoring: is not performed,   further diabetic ROS: no polyuria or polydipsia, no chest pain, dyspnea or TIA's, no numbness, tingling or pain in extremities, no unusual visual symptoms, no   hypoglycemia, no medication side effects noted,   New concerns: recheck labs       Cardiovascular risk analysis - The 10-year ASCVD risk score (Andriy DEAL, et al., 2019) is: 30.6%    Values used to calculate the score:      Age: 70 years      Sex: Female      Is Non- : No      Diabetic: Yes      Tobacco smoker: No      Systolic Blood Pressure: 880 mmHg      Is BP treated: Yes      HDL Cholesterol: 45 mg/dL      Total Cholesterol: 133 mg/dL    LDL goal is under 100   ROS: taking medications as instructed, no medication side effects noted, no TIA's, no chest pain on exertion, no dyspnea on exertion, no swelling of ankles. New concerns: none. Plan: current treatment plan is effective, no change in therapy  lab results and schedule of future lab studies reviewed with patient     - CBC with Auto Differential; Future  - Comprehensive Metabolic Panel;  Future  -

## 2023-09-25 ENCOUNTER — TELEPHONE (OUTPATIENT)
Dept: FAMILY MEDICINE CLINIC | Age: 78
End: 2023-09-25

## 2023-09-25 NOTE — TELEPHONE ENCOUNTER
Pt saw a product called Blue Vibe gummies that supposedly cleans your blood vessels. Pt is double checking with PCP to see if this is just a fad/scam or legitimately will help.

## 2023-10-16 ENCOUNTER — CARE COORDINATION (OUTPATIENT)
Dept: CARE COORDINATION | Age: 78
End: 2023-10-16

## 2023-10-16 RX ORDER — BRIMONIDINE TARTRATE 2 MG/ML
1 SOLUTION/ DROPS OPHTHALMIC NIGHTLY
COMMUNITY

## 2023-10-16 RX ORDER — AMOXICILLIN 250 MG
2 CAPSULE ORAL NIGHTLY
COMMUNITY

## 2023-10-16 NOTE — CARE COORDINATION
Care Transitions Post-Acute Facility Update Call    10/16/2023    Patient: Mayra Ledesma Patient : 1945   MRN: [id number]  Reason for Admission: diabetic foot ulcer left foot (non-operable, - osteomyelitis)   Discharge Date: 23 RARS: Readmission Risk Score: 18.5       Post Acute Facility Update    Care Transitions Post Acute Facility Update    Care Transitions Interventions                                Post Acute Facility Update   Post Acute Facility: [facility] at [address] [facility],[address]: 17 days      Nursing   Prescribed diet: no concentrated sweets    Wounds: Pos   Wound Location: left foot/ ball of foot   Wound Care Therapies: Cleanse left foot wound with NS, apply Adaptic, Betadine gauze, Kerlix and Ace wrap.every night shift   Reported Nursing Updates: 125#, 125/62, 97.7, 82 bpm, 18, 343- glucose, 95% on room air, pain 0/10. Blood glucose- 240-343 since admission- checking daily. NWB on LLE. Full code. Skin- diabetic ulcer to ball of left foot- 2.5 x 2.5 x 0.5 cm       Rehab/Functional   Prior Level of Functioning: from [facility] [place]       SW/Discharge Planning   Discharge Planning / Barriers: Pt from [address] [facility]. Pt has rolling walker and scooter    Care Progression on Track?: Pos  Next IDT Planned Review: 10/18/23           Next IDT Planned Review: 10/18/2023    Future Appointments   Date Time Provider [address]   10/18/2023  1:45 PM Vignesh Shah MD [facility] [address] [place] - DYD   10/25/2023 11:00 AM Wendy Carr MD [facility]       SN Notes:   Diet: - Oral Diet:  reduced concentrated sweets   Wounds: left and foot diabetic foot ulcer- stage not listed   Medications:  Other: med rec complete   Other:    Post-acute CC Notes: [facility] access for admission     Dax Ayala RN

## 2023-10-17 ENCOUNTER — CARE COORDINATION (OUTPATIENT)
Dept: CARE COORDINATION | Age: 78
End: 2023-10-17

## 2023-10-17 NOTE — CARE COORDINATION
Care Transitions Post-Acute Facility Update Call    10/17/2023    Patient: Ethan Tinoco Patient : 1945   MRN: [id number]  Reason for Admission: diabetic foot ulcer ball of left foot (non-operable, - osteomyelitis)   Discharge Date: 23 RARS: Readmission Risk Score: 18.5       Post Acute Facility Update    Care Transitions Post Acute Facility Update    Care Transitions Interventions                                Post Acute Facility Update   Post Acute Facility: [facility] at [address] [place] [address],[address]: 17 days      Nursing   Prescribed diet: reduced conc sweets    Nutrition intake assessment: %   Wounds: Pos   Wound Location: left foot ulcer   Wound Care Therapies: being seen by wound care NP today   Reported Nursing Updates: 125#, 146/69, 97.1, 83 bpm, 18, 343- glucose, 97% on room air, pain 0/10. Writer inquired about monitoring blood glucose more than once daily, especially regarding wound healing- they will speak with NP for the order. No complaints of pain for left foot. Ball of left foot- NWB. Pt able to be weight bearing on left heel. Skin- Cleanse left foot wound with NS, apply Adaptic, Betadine gauze, Kerlix and Ace wrap. every night shift for wound care. SNF wound care NP will follow for wound care. LOS charting  Current LOS:  3      ELOS:  14-17  Anticipated DOD: unknown   3DW?  no  PLOF: assisted living at [address] of care:  return home   Nutritional intake:  %   Wounds/ treatment/ stage: left ball of foot diabetic foot ulcer  Labs? CBC, CMP, TSH, FOLIC ACID, A8Q, VIT D, VIT B12, IRON FOR NEW ADMISSION LABS 10/18/23  Disease specific clinical considerations: diabetic foot ulcer- need to better control her blood glucose   Cognition:  oriented   Palliative/ hospice referral? no  Does caregiver need any support/ have needs? no  Anticipated DC dispo/ services/ date: plan to return to [place]  DME/ equipment needed at DC?  Unknown    Barriers to transition? weakness  Care

## 2023-10-24 ENCOUNTER — CARE COORDINATION (OUTPATIENT)
Dept: CARE COORDINATION | Age: 78
End: 2023-10-24

## 2023-10-24 NOTE — CARE COORDINATION
Care Transitions Post-Acute Facility Update Call    10/24/2023    Patient: Ba Solares Patient : 1945   MRN: [id number]  Reason for Admission: diabetic foot ulcer ball of left foot (non-operable, - osteomyelitis)    Discharge Date: 23 RARS: Readmission Risk Score: 18.5         Post Acute Facility Update    Care Transitions Post Acute Facility Update    Care Transitions Interventions                                Post Acute Facility Update   [facility] at [address] [facility],[address]: 17 days      Nursing   Prescribed diet: no concentrated sweets    Nutrition intake assessment: %   Labs: Abnormal   Abnormal Lab Notes: U/A +   Skilled Medication therapies: macrobid   Reported Nursing Updates: 125#, 138/72, 97.8, 72 bpm, 18, 174- glucose, 97% room air, pain 0/10. BMP ordered for 10/25. Macrobid twice daily until 10/30 for UTI. Skin- Cleanse L dorsal foot with NSS. Pat dry and apply abbey amt. Medihoney to wound bed. Cover with Mepliex and change 3x weekly on  and Saturday on night shift every night shift every Tue, Thu, Sat for wound care. 2.8 x 2 x 0.2 diabetic foot ulcer \"Wound improved since admission\". Improving with pt riding scooter and maintaining weight bearing status. LOS charting  Current LOS:  10      ELOS:  14-17  Anticipated DOD: 2023- possibly   3DW?   no  PLOF: assisted living at [address] of care:  return home   Nutritional intake:  good   Wounds/ treatment/ stage: left ball of foot diabetic foot ulcer (non operable)   Labs? Positive u/a- abx ordered    Disease specific clinical considerations: no  Cognition:  oriented, forgetful at times   Palliative/ hospice referral? no  Does caregiver need any support/ have needs? no  Anticipated DC dispo/ services/ date: likely next week- closing in on baseline   DME/ equipment needed at DC? no   Barriers to transition?  Weakness, NWB on ball of left foot d/t foot drop to right foot   Care progression on

## 2023-10-31 ENCOUNTER — CARE COORDINATION (OUTPATIENT)
Dept: CARE COORDINATION | Age: 78
End: 2023-10-31

## 2023-10-31 NOTE — CARE COORDINATION
Care Transitions Post-Acute Facility Update Call    10/31/2023    Patient: Sundeep Frye Patient : 1945   MRN: [id number]  Reason for Admission: diabetic foot ulcer ball of left foot (non-operable, - osteomyelitis)    Discharge Date: 23 RARS: Readmission Risk Score: 18.5         Post Acute Facility Update    Care Transitions Post Acute Facility Update    Care Transitions Interventions                                Post Acute Facility Update   Post Acute Facility: [facility] at [address] [facility]: 17 days      Nursing   Prescribed diet: noconcentrated sweets    Nutrition intake assessment: 51-75%   Wounds: Pos   Wound Location: keft foot diabetic ulcer   Wound Care Therapies: Cleanse L dorsal foot with NSS. Pat dry and apply abbey amt. Medihoney and alginate to wound bed, cover with abd and wrap with kerlix and ace wrap nightly and as needed. Labs: Normal   Reported Nursing Updates: 125#, 141/63, 96.9, 78 bpm, 18, 70- glucose (ranges from ), 99% on room air, pain 1/10. Doxycycline 100 mg ordered two times/ day for 7 days (started 10/27). LOS charting  Current LOS:  17      ELOS:  14-17  Anticipated DOD: unknown   3DW?  no  PLOF: Assisted living [facility]  Goals of care:  return to AL   Nutritional intake:  51-75%  Wounds/ treatment/ stage: Cleanse L dorsal foot with NSS. Pat dry and apply abbey amt. Medihoney and alginate to wound bed, cover with abd and wrap with kerlix and ace wrap nightly and as needed every night shift (this is new since antibiotic ordered for wound infection). Labs? NL    Disease specific clinical considerations: diabetic foot wound  Cognition:  min/ mod impairment   Palliative/ hospice referral? no  Does caregiver need any support/ have needs? no  Anticipated DC dispo/ services/ date: unknown date but will return to AL. Will need wound care at least 3 times/ week   DME/ equipment needed at DC? no   Barriers to transition?  Wound infection, not ambulating   Care

## 2023-11-07 ENCOUNTER — CARE COORDINATION (OUTPATIENT)
Dept: CARE COORDINATION | Age: 78
End: 2023-11-07

## 2023-11-07 NOTE — CARE COORDINATION
Care Transitions Post-Acute Facility Update Call    2023    Patient: Ba Solares Patient : 1945   MRN: [id number]  Reason for Admission: diabetic foot ulcer ball of left foot (non-operable, - osteomyelitis)    Discharge Date: 23 RARS: Readmission Risk Score: 18.5       Post Acute Facility Update    Care Transitions Post Acute Facility Update    Care Transitions Interventions                                Post Acute Facility Update   Post Acute Facility: [facility] at [address] [facility],[address]: 17 days      Nursing   Prescribed diet: reduced concentrated sweets    Wounds: Pos   Wound Location: diabetic foot wound- left foot   Labs: Normal   Reported Nursing Updates: 122/65, 96.9, 78 bpm, 16, 131- glucose, 95% on room air, pain 0/10. Wound care seeing her today- will send measurements later today. LOS charting  Current LOS:  24      ELOS:  14-17  Anticipated DOD: unknown   3DW?   no  PLOF: assisted living   Goals of care:  return home, wound healing   Nutritional intake:  %   Wounds/ treatment/ stage: left foot wound per wound care   Labs? Wound cultures taken 10/31 but need to be redone. BMP/ CRP unremarkable   Disease specific clinical considerations:  diabetic foot wound  Cognition:  min mod cog impairment   Palliative/ hospice referral? no  Does caregiver need any support/ have needs? no  Anticipated DC dispo/ services/ date: unknown. Needs to have wound dressing change more like 3 times a week to return to [facility]  DME/ equipment needed at DC? no   Barriers to transition? Wound/ daily dressing changes   Care progression on track w/ ELOS? Over on days   Interventions if progression not on track? Wound care/ therapies continue   Care conference- complete   Appointments?  Optometrist tomorrow, cardiology            Rehab/Functional   Prior Level of Functioning: [facility]   Cognitive function assessment: min mod cog impairment   ADLs: Stand by Assist - Presence and Cueing   Bed Mobility: Stand by

## 2023-11-07 NOTE — PROGRESS NOTES
[address] [facility]     Outpatient Cardiology         Patient Name:  Jorge Mills  Requesting Physician: No admitting provider for patient encounter. Primary Care Physician: Katya Hwang MD    Reason for Consultation/Chief Complaint:   Chief Complaint   Patient presents with    Atrial Fibrillation         History of Present Illness:    HPI     Jorge Mills a 70 y.o. female with PMH of atrial fibrillation, HTN, HLD, CATA, DM II, GERD, CVA 4/2022, RBBB, hypothyroidism, Coronary artery disease by CT, mild to moderate aortic insufficiency. Here to establish care. Recently moved to [facility] and formerly seen by Dr. John Salmeron. pAF, on lopressor 25 mg twice a day, eliquis 5 mg twice a day, remains in sinus on EKG today confirmed  CAD, on aspirin 81 mg daily, chronic ossifications on CT, had follow-up normal stress test.  Asymptomatic. HTN, on lisinopril 10 mg daily, norvasc 5 mg daily, well-controlled  HLD, on crestor 40 mg daily   AI, mild to moderate, remains asymptomatic.       PMH  Past Medical History:   Diagnosis Date    Colon polyps     COVID-19 2/22/2023    Diabetes mellitus ([address])     Diabetic neuropathy ([address])     Diabetic retinopathy ([address])     Essential hypertension 10/28/2019    Fall     Fatty liver     Gastritis     GERD (gastroesophageal reflux disease)     Hyperlipidemia     Hypertension     Hypothyroidism     Irritable bowel syndrome     Major depressive disorder with single episode 10/28/2019    Nonocclusive coronary atherosclerosis of native coronary artery 11/9/2023    Osteopenia     Photosensitivity disorder     chronic    RBBB     Sleep apnea     on BIPAP    Thyroid disease     Thyroid nodule     neg bx-chronic thyroiditis    Type 2 diabetes mellitus with diabetic polyneuropathy, with long-term current use of insulin ([address]) 07/23/2019    Vitamin D deficiency        PSH  Past Surgical History:   Procedure Laterality Date    APPENDECTOMY      CARPAL TUNNEL RELEASE

## 2023-11-09 ENCOUNTER — OFFICE VISIT (OUTPATIENT)
Dept: CARDIOLOGY CLINIC | Age: 78
End: 2023-11-09
Payer: MEDICARE

## 2023-11-09 VITALS
WEIGHT: 126 LBS | DIASTOLIC BLOOD PRESSURE: 60 MMHG | SYSTOLIC BLOOD PRESSURE: 124 MMHG | HEART RATE: 76 BPM | BODY MASS INDEX: 22.32 KG/M2

## 2023-11-09 DIAGNOSIS — E78.5 HYPERLIPIDEMIA ASSOCIATED WITH TYPE 2 DIABETES MELLITUS (HCC): ICD-10-CM

## 2023-11-09 DIAGNOSIS — I25.10 NONOCCLUSIVE CORONARY ATHEROSCLEROSIS OF NATIVE CORONARY ARTERY: ICD-10-CM

## 2023-11-09 DIAGNOSIS — I48.0 PAROXYSMAL ATRIAL FIBRILLATION (HCC): ICD-10-CM

## 2023-11-09 DIAGNOSIS — E11.69 HYPERLIPIDEMIA ASSOCIATED WITH TYPE 2 DIABETES MELLITUS (HCC): ICD-10-CM

## 2023-11-09 DIAGNOSIS — I35.1 NONRHEUMATIC AORTIC VALVE INSUFFICIENCY: ICD-10-CM

## 2023-11-09 DIAGNOSIS — I45.6 WPW (WOLFF-PARKINSON-WHITE SYNDROME): Primary | ICD-10-CM

## 2023-11-09 PROCEDURE — 1090F PRES/ABSN URINE INCON ASSESS: CPT | Performed by: INTERNAL MEDICINE

## 2023-11-09 PROCEDURE — 1036F TOBACCO NON-USER: CPT | Performed by: INTERNAL MEDICINE

## 2023-11-09 PROCEDURE — 99214 OFFICE O/P EST MOD 30 MIN: CPT | Performed by: INTERNAL MEDICINE

## 2023-11-09 PROCEDURE — G8420 CALC BMI NORM PARAMETERS: HCPCS | Performed by: INTERNAL MEDICINE

## 2023-11-09 PROCEDURE — 3052F HG A1C>EQUAL 8.0%<EQUAL 9.0%: CPT | Performed by: INTERNAL MEDICINE

## 2023-11-09 PROCEDURE — G8400 PT W/DXA NO RESULTS DOC: HCPCS | Performed by: INTERNAL MEDICINE

## 2023-11-09 PROCEDURE — G8427 DOCREV CUR MEDS BY ELIG CLIN: HCPCS | Performed by: INTERNAL MEDICINE

## 2023-11-09 PROCEDURE — G8484 FLU IMMUNIZE NO ADMIN: HCPCS | Performed by: INTERNAL MEDICINE

## 2023-11-09 PROCEDURE — 93000 ELECTROCARDIOGRAM COMPLETE: CPT | Performed by: INTERNAL MEDICINE

## 2023-11-09 PROCEDURE — 1123F ACP DISCUSS/DSCN MKR DOCD: CPT | Performed by: INTERNAL MEDICINE

## 2023-11-09 ASSESSMENT — ENCOUNTER SYMPTOMS
COLOR CHANGE: 0
ABDOMINAL DISTENTION: 0
WHEEZING: 0
CHEST TIGHTNESS: 0
COUGH: 0
FACIAL SWELLING: 0
ABDOMINAL PAIN: 0
BACK PAIN: 0
BLOOD IN STOOL: 0
EYE DISCHARGE: 0
SHORTNESS OF BREATH: 0
VOMITING: 0

## 2023-11-14 ENCOUNTER — CARE COORDINATION (OUTPATIENT)
Dept: CARE COORDINATION | Age: 78
End: 2023-11-14

## 2023-11-14 NOTE — CARE COORDINATION
diabetes- diabetic foot wound ulcer   Cognition:  oriented  Palliative/ hospice referral? no  Does caregiver need any support/ have needs? no  Anticipated DC dispo/ services/ date: will return to AL at [facility]   DME/ equipment needed at DC? no   Barriers to transition? Daily foot wound dressing changes   Care progression on track w/ ELOS? Over on days   Interventions if progression not on track? Daily dressing changes for foot wound  Care conference- complete   Appointments? Wound care seeing pt at SNF            Rehab/Functional   Prior Level of Functioning: [facility]   Cognitive function assessment: oriented   ADLs: Stand by Assist - Presence and Cueing   Bed Mobility: Stand by Assist - Presence and Cueing   Transfer Assistance: Stand by Assist - Presence and Cueing   Ambulation Assistance: Maximum Assistance, Moderate Assistance   How far (in feet) is the patient ambulating?: 2   Does patient use an assistive device?: Yes   Assistive Devices: 2WW, wheelchair, electric scooter    Rehab Notes: Supine to sit- SBA, SPT- SBA, sit to supine- SBA, toilet transfer- SBA, stand- 3 min    Surgical shoe when up. WB only on heel for transfers. Working on scooter safety. SW/Discharge Planning   Palliative/Hospice Referral indicated: Neg   Discharge Planning / Barriers: Pt from [address] [facility]. Pt has rolling walker and scooter    Next IDT Planned Review: 11/21/23           Next IDT Planned Review: 11/21/2023    Future Appointments   Date Time Provider [address]   11/7/2024  2:00 PM Rubén Dorsey MD [facility]       SN Notes:   Diet: - Oral Diet:  RCS   - Oral Nutrition Supplement:  prostat and Boost glucose control once daily   Wounds: left and foot diabetic foot wound   Medications:  Other: facility  Other:    Post-acute CC Notes: IDT call and [facility] access    Bryant Zuñiga, RN

## 2023-11-15 ENCOUNTER — TRANSCRIBE ORDERS (OUTPATIENT)
Dept: ADMINISTRATIVE | Age: 78
End: 2023-11-15

## 2023-11-15 DIAGNOSIS — M86.8X7 OTHER OSTEOMYELITIS, ANKLE AND FOOT (HCC): Primary | ICD-10-CM

## 2023-11-21 ENCOUNTER — CARE COORDINATION (OUTPATIENT)
Dept: CARE COORDINATION | Age: 78
End: 2023-11-21

## 2023-11-21 NOTE — CARE COORDINATION
Care Transitions Post-Acute Facility Discharge Call    2023    Patient: Annelise Boogie Patient : 1945   MRN: [id number]  Reason for Admission: diabetic foot ulcer ball of left foot (non-operable, negative osteomyelitis)  Discharge Date: 23 RARS: Readmission Risk Score: 18.5     Post Acute Facility Update    Care Transitions Post Acute Facility Update    Care Transitions Interventions                                Post Acute Facility Update   Post Acute Facility: [facility] at [address] [facility]: 17 days      Nursing   Prescribed diet: [facility]    Nutrition intake assessment: %   Wounds: Pos   Wound Location: see notes   Labs: Normal   Reported Nursing Updates: 154/68, 98.0, 88 bpm, 18, 84- glucose, 95% on room air, pain 2/10. Resident to have MRI with and without contrast to L foot to rule out osteomyelitis. Wounds/ treatment/ stage: left foot wound (3.5 x 2.2 x 1.0 cm)- Santyl External Ointment 250 UNIT/GM (Collagenase)- Apply to bottom of left foot topically every day and night shift for wound care. and then apply santyl to Dakins moistened gauze and place over wound bed, cover with ABD, and wrap with kerlix BID. % necrotic including adherent stringy slough. surgical shoe when up, heel weight bearing, prostat and other nutritional supplements, debridement weekly per NP prn, weekly wound rounds, weekly labs. Weekly Wound NP assessment and debridement of foot L ulcer. Wound showing no improvement. Tx changed slightly to add Dakins. NP referral to [facility].   eun wound-  apply calmoseptine to eun wound      LOS charting  Current LOS:  38      ELOS:  14-17  Anticipated DOD: unknown- delayed now d/t dressing changes twice daily and MRI for foot to eval for osteomyelitis  3DW?  no  PLOF: group home at [facility]  Goals of care:  return to group home, wound to heal   Nutritional intake:  %   Labs?  Unremarkable    Disease specific clinical considerations: diabetic foot

## 2023-11-22 ENCOUNTER — HOSPITAL ENCOUNTER (OUTPATIENT)
Dept: MRI IMAGING | Age: 78
Discharge: HOME | End: 2023-11-22
Payer: MEDICARE

## 2023-11-22 DIAGNOSIS — M86.8X7 OTHER OSTEOMYELITIS, ANKLE AND FOOT (HCC): ICD-10-CM

## 2023-11-22 PROCEDURE — 6360000004 HC RX CONTRAST MEDICATION: Performed by: PODIATRIST

## 2023-11-22 PROCEDURE — A9576 INJ PROHANCE MULTIPACK: HCPCS | Performed by: PODIATRIST

## 2023-11-22 PROCEDURE — 73720 MRI LWR EXTREMITY W/O&W/DYE: CPT

## 2023-11-22 RX ADMIN — GADOTERIDOL 13 ML: 279.3 INJECTION, SOLUTION INTRAVENOUS at 08:19

## 2023-11-28 ENCOUNTER — CARE COORDINATION (OUTPATIENT)
Dept: CARE COORDINATION | Age: 78
End: 2023-11-28

## 2023-11-28 NOTE — CARE COORDINATION
Care Transitions Post-Acute Facility Discharge Call    2023    Patient: Ethan Tinoco Patient : 1945   MRN: [id number]  Reason for Admission: diabetic foot ulcer ball of left foot (non-operable, negative osteomyelitis)   Discharge Date: 23 RARS: Readmission Risk Score: 18.5     Post Acute Facility Update    Care Transitions Post Acute Facility Update    Care Transitions Interventions                                Post Acute Facility Update   Post Acute Facility: [facility] at [address] [facility]: 17 days      Nursing   Prescribed diet: [facility]    Nutrition intake assessment: %   Wounds: Pos   Wound Location: left foot   Wound Care Therapies: see note below   Reported Nursing Updates: 134/72, 97.1, 77 bpm, 18, 227- glucose, 97% on room air, pain 0/10. PT now going to wound care on  with dressing changes at these appointments only. Left foot wound- 3 x 2 x 1.2 cm. Patient to go to wound clinic every Monday for weekly dressing changes. Change dressing only if soiled or falls off: Cleanse with normal saline, Medihoney Calcium alginate, ABD, and kerlix. LOS charting  Current LOS:  39      ELOS:  14-17  Anticipated DOD: should be soon   3DW?   no  PLOF: [facility]   Goals of care:  return  home   Nutritional intake:  %   Wounds/ treatment/ stage: left bottom foot- see note above  Labs?  no  Disease specific clinical considerations: no  Cognition:  oriented   Palliative/ hospice referral? no  Does caregiver need any support/ have needs? no  Anticipated DC dispo/ services/ date: plan is to return to [facility]  DME/ equipment needed at DC? no   Barriers to transition? Weakness, wound care (daily wound care is resolved)   Care progression on track w/ ELOS? Over on days   Interventions if progression not on track? Therapies continue, wound care now following weekly rather than wound care at [facility]   Care conference- complete   Appointments?  Wound care            Rehab/Functional   Prior Level of

## 2023-12-05 ENCOUNTER — CARE COORDINATION (OUTPATIENT)
Dept: CARE COORDINATION | Age: 78
End: 2023-12-05

## 2023-12-05 NOTE — CARE COORDINATION
Care Transitions Post-Acute Facility Discharge Call    2023    Patient: Annelise Boogie Patient : 1945   MRN: [id number]  Reason for Admission: diabetic foot ulcer ball of left foot (non-operable, negative osteomyelitis)    Discharge Date: 23 RARS: Readmission Risk Score: 18.5       Post Acute Facility Update    Care Transitions Post Acute Facility Update    Care Transitions Interventions                                Post Acute Facility Update   Post Acute Facility: [facility] at [address] [facility],[address]: 17 days      Nursing       Reported Nursing Updates: 124. 2#, 140/76, 97.8, 70 bpm, 16, 264- glucose, 99% on room air, pain 0/10. MRI left foot is now showing osteomyelitis. Wound clinic still handling the diabetic foot wound weekly. Pt to see ID  to eval for the new osteomyelitis. Pt currently on oral abx. LOS charting  Current LOS:  46      ELOS:  14-17  Anticipated DOD: unknown   3DW?  no  PLOF: assisted living   Goals of care:  return home, wound healing   Nutritional intake:  fair- adjusting foods d/t elevated blood sugars   Wounds/ treatment/ stage: per wound clinic (they have not sent notes for this week and last week with measurements)   Labs? Unremarkable   Disease specific clinical considerations: diabetes   Cognition:  oriented   Palliative/ hospice referral? no  Does caregiver need any support/ have needs? no  Anticipated DC dispo/ services/ date: plan is to return to [facility]  DME/ equipment needed at DC? no   Barriers to transition? Foot wound, NWB on left foot, wound infection   Care progression on track w/ ELOS? Well over on days   Interventions if progression not on track? Therapies and wound care continue   Care conference- complete   Appointments?  ID            Rehab/Functional   Prior Level of Functioning: [facility]   Cognitive function assessment: oriented   ADLs: Stand by Assist - Presence and Cueing   Bed Mobility: Stand by Assist - Presence and Cueing   Transfer Assistance:

## 2023-12-12 ENCOUNTER — CARE COORDINATION (OUTPATIENT)
Dept: CARE COORDINATION | Age: 78
End: 2023-12-12

## 2023-12-12 NOTE — CARE COORDINATION
Care Transitions Post-Acute Facility Discharge Call    2023    Patient: Eryn Claros Patient : 1945   MRN: [id number]  Reason for Admission: diabetic foot ulcer ball of left foot (non-operable, negative osteomyelitis)    Discharge Date: 23 RARS: Readmission Risk Score: 18.5       Post Acute Facility Update    Care Transitions Post Acute Facility Update    Care Transitions Interventions                                Post Acute Facility Update   Post Acute Facility: [facility] at [address] [facility],[address]: 17 days      Nursing   Prescribed diet: [facility]    Nutrition intake assessment: good   Wounds: Pos   Wound Location: left foot diabetic foot wound   Wound Care Therapies: IV abx 2 meds) for 6 weeks   Labs: Normal   Reported Nursing Updates: 125. 5#, 149/61, 97.0, 86 bpm, 18, 124- glucose, 97% on room air, pain 2/10. Osteomyelitis on MRI confirmed- 2 IV abx ordered for 6 weeks (through ). LOS charting  Current LOS:  61      ELOS:  14-17  Anticipated DOD: no  3DW?   no  PLOF: [facility]   Goals of care:  foot wound healing   Nutritional intake:  good   Wounds/ treatment/ stage: diabetic foot wound- no measurements from wound care doc for over a month  Labs? Normal    Disease specific clinical considerations: diabetes  Cognition:  oriented  Palliative/ hospice referral? no  Does caregiver need any support/ have needs? no  Anticipated DC dispo/ services/ date: [facility]   DME/ equipment needed at DC? no   Barriers to transition? Osteomyelitis/ IV abx   Care progression on track w/ ELOS? Over on days- will go through the 100 days d/t 6 weeks of abx   Interventions if progression not on track? Nothing I can do  Care conference- complete   Appointments?  Wound care weekly           Rehab/Functional   Prior Level of Functioning: [facility]   Cognitive function assessment: oriented   ADLs: Stand by Assist - Presence and Cueing   Bed Mobility: Stand by Assist - Presence and Cueing   Transfer Assistance: Stand by Assist -

## 2024-01-02 ENCOUNTER — CARE COORDINATION (OUTPATIENT)
Dept: CARE COORDINATION | Age: 79
End: 2024-01-02

## 2024-01-02 NOTE — CARE COORDINATION
Care Transitions Post-Acute Facility Discharge Call    2024    Patient: Kimmie Peterson Patient : 1945   MRN: [id number]  Reason for Admission: diabetic foot ulcer ball of left foot (non-operable, negative osteomyelitis)      Discharge Date: 23 RARS: Readmission Risk Score: 18.5       Post Acute Facility Update    Care Transitions Post Acute Facility Update    Care Transitions Interventions      Post Acute Facility Update   Post Acute Facility: [facility]    ELOS: 17 days      Nursing   Prescribed diet: RCS    Wounds: Pos   Wound Location: left foot- see note below   Labs: Normal   Reported Nursing Updates: 144/61, 97.3, 88 bpm, 160- glucose, 97% on room air, pain 3/10. IV abx complete     LOS charting  Current LOS:  80      ELOS:  14-17  Anticipated DOD: after IV abx complete   3DW facility?  no  PLOF: [facility]  Goals of care:  return home, wound healing   Wounds/ treatment/ stage: left ball of foot- diabetic foot wound   Labs?    Disease specific clinical considerations: no  Palliative/ hospice referral? no  Does caregiver need any support/ have needs? no  Anticipated DC dispo/ services/ date: [facility]- will DC back after IB abx complete   DME/ equipment needed at DC? no   Barriers to transition? Foot wound/ IV abx  Care progression on track w/ ELOS? No   Interventions if progression not on track? Therapies and wound care complete   Care conference- complete   Appointments? Wound today and again            Rehab/Functional   Prior Level of Functioning: [facility]   Cognitive function assessment: oriented   ADLs: Stand by Assist - Presence and Cueing   Bed Mobility: Stand by Assist - Presence and Cueing   Transfer Assistance: Stand by Assist - Presence and Cueing   Ambulation Assistance: Moderate Assistance, Maximum Assistance   Does patient use an assistive device?: Yes   Assistive Devices: Motorized scooter, w/c, 2WW for transfers   Rehab Notes: Stand balance- min 5 minutes,

## 2024-01-09 ENCOUNTER — CARE COORDINATION (OUTPATIENT)
Dept: CARE COORDINATION | Age: 79
End: 2024-01-09

## 2024-01-09 NOTE — CARE COORDINATION
Assistance: Stand by Assist - Presence and Cueing   Ambulation Assistance: Stand by Assist - Presence and Cueing   Does patient use an assistive device?: Yes   Assistive Devices: Wheelchair, 2WW for transfers, electric scooter   Rehab Notes: Pivot from w/c to bed- sup, supine to sit- sup, toileting- sup, quiana shoe/ squat pivot transfer- sup       SW/Discharge Planning   Palliative/Hospice Referral indicated: Neg   Discharge Planning / Barriers: Pt from [facility] [place]. Pt has rolling walker and scooter      MRI of left foot now shows osteomyelitis- PO abx continue. This new finding on MRI will delay discharge yet again. IV abx through 1/23 (Daptomycin and ertapenem)     Care Progression on Track?: Neg  If No, Plan for Intervention: iv abx and therapy continues  Next IDT Planned Review: 1/16/24           Next IDT Planned Review: 1/16/2024    Future Appointments   Date Time Provider Department Center   11/7/2024  2:00 PM Jasen Loredo MD [facility]       SN Notes:   Diet: - Oral Diet:  RCS   - Oral Nutrition Supplement:  Praneeth twice daily   Wounds: left and foot diabetic  Medications: Other: facility  Other:    Post-acute CC Notes: IDT and PCC access    MARTÍNEZ MAKI RN

## 2024-01-16 ENCOUNTER — CARE COORDINATION (OUTPATIENT)
Dept: CARE COORDINATION | Age: 79
End: 2024-01-16

## 2024-01-16 NOTE — CARE COORDINATION
Care Transitions Post-Acute Facility Discharge Call    2024    Patient: Kimmie Peterson Patient : 1945   MRN: [id number]  Reason for Admission: diabetic foot ulcer ball of left foot (non-operable, negative osteomyelitis)   Discharge Date: 23 RARS: Readmission Risk Score: 18.5       Post Acute Facility Update    Care Transitions Post Acute Facility Update    Care Transitions Interventions      Post Acute Facility Update   Post Acute Facility: [facility]    ELOS: 17 days      Nursing   Prescribed diet: [facility]    Nutrition intake assessment: %   Wounds: Pos   Wound Location: left ball of foot diabetic wound   Wound Care Therapies: wound care on    Labs: Normal   Reported Nursing Updates: 119.4#, 151/62, 97.7, 12, 18, 92- glucose, 95% on room air, pain 0/10.      Plateau with therapy until she gets WB status increase from wound doc. Pt staying private pay after IV abx complete . LCD , DC     LOS charting  Current LOS:  94      ELOS:    Anticipated DOD: private pay   3DW facility?  no  PLOF: [facility]   Goals of care:  return home, wound healing   Wounds/ treatment/ stage: left foot wound managed by wound doc  Labs? Unremarkable   Disease specific clinical considerations: no  Palliative/ hospice referral? no  Does caregiver need any support/ have needs? no  Anticipated DC dispo/ services/ date: plan will be to return to AL  DME/ equipment needed at DC? Unknown   Barriers to transition? Wound care, WB status   Care progression on track w/ ELOS? Over on days- staying 100+ days.   Interventions if progression not on track? Wound care continues on    Care conference- complete  Appointments? Wound-               Rehab/Functional   Prior Level of Functioning: [facility]   Cognitive function assessment: oriented   How far (in feet) is the patient ambulating?: 0   Does patient use an assistive device?: Yes   Assistive Devices: Wheelchair and 2WW for SPT   Rehab Notes:

## 2024-03-20 ENCOUNTER — TELEPHONE (OUTPATIENT)
Dept: ORTHOPEDIC SURGERY | Age: 79
End: 2024-03-20

## 2024-03-20 NOTE — TELEPHONE ENCOUNTER
Medical Facility Question     Facility Name: CARE COORDINATION  Contact Name: KOLE  Contact Number: [phone]  Request or Information: HAS PAPERWORK FOR DG TO SIGN AND IS WONDERING WHEN HE CAN DROP IT OFF.

## 2024-09-03 ENCOUNTER — COMMUNITY CARE MANAGEMENT (OUTPATIENT)
Facility: CLINIC | Age: 79
End: 2024-09-03

## 2024-09-03 NOTE — PROGRESS NOTES
Initial assessment complete patient identified by name date of birth and mailing address, disclaimer provided     CKD3 pt was not aware of this dx.  9/19/2023 bun 27, CREATININE 1, gfr 58, pt is being seen by the house doctor at the facility she lives at. pt is in PT for foot drop and they are doing stretches, pt does no other exercises.  pt has been unable to walk since 9/23.  pt states she drinks about 60 oz of water a day and she drinks iced tea with her meals.  pt is not on a specific diet.  this condition needs education and monitoring as pt was not aware of the fact she has CKD     DMII pt was dx 1994.   a1C FROM 9/19/2023 8.2.  pt has been checking her blood sugars daily, states they are running in the 240-300.  States the facility she is at is not adhering to her diabetic diet.  pt states the doctor that comes to see her has not made any adjustments to her diabetic medications.  pt is not with a [facility] doctor.  CC tasked to call facility and get recent labs.  this condition needs education and monitoring.      HYPOTHYROIDISM dx about 20 years ago, she is on medications.  pt states she has not had the doctor mention concerns.      HYPERLIPIDEMIA dx about 20 years ago.  pt is on medications and states she is eating a lot of bread and they make fried foods at [facility]. pt believes this condition is stable.      GASTROPERESIS.  pt was dx last year and is not allowed to eat salads, nuts, seeds or shells or fried foods, concerned because this is what she is is available at her assisted living.  pt states she eats green beans and carrots, if they have them.  pt can't have broccoli or hard to digest foods. this condition is stable but pt feels very limited.         CAD pt had a stroke in 2022, no further issues at this time.        Pt would like to walk, pt is taking therapy     pt states she understands she needs to try to get her blood sugars under better control to help with the healing of her ulcer on

## 2024-10-01 ENCOUNTER — COMMUNITY CARE MANAGEMENT (OUTPATIENT)
Facility: CLINIC | Age: 79
End: 2024-10-01

## 2024-10-01 NOTE — PROGRESS NOTES
f/u assessment complete. pt identified by name,  and address, disclaimer provided LABS IN THE SYSTEM ARE A YEAR OLD. pt states she has had other labs and now she is assisted living is that the house doctor is getting her labs. CC tasked to call the [facility] and have labs faxed over. pt states blood sugars are liable, states they average in the 200s, states they have been high. pt medications were adjusted latanaprost 50 mg at hs and humalog before meals. pt states they have not been accommodating with her dietary restrictions at the facility she is at. states at dinner they did not have anything she requested, states they only had white bread and she had a tuna sandwich on white bread and ice cream. pt has gastroparesis so she cannot digest foods like broccoli. pt states she has had issues with skin breakdown on her foot due to ill fitting shoes. states she had a blood infection and osteomyelitis pt states her foot healed after 4 months of care. she now has a drop foot and she has a splint now for that she received in September. pt states she has met with the  over the food choices and inability for it to be low CHO Pt is very upset about the care that is available to her and the other people that live at her facility. offered social work to see if there may be some help for her. pt is agreeable and a consult was written for this pt and she is scheduled for Thursday 10/3 at 0900 by phone [email or web address] [phone] pt has [facility] after hours number

## 2024-10-18 ENCOUNTER — OFFICE VISIT (OUTPATIENT)
Dept: ORTHOPEDIC SURGERY | Age: 79
End: 2024-10-18

## 2024-10-18 VITALS — HEIGHT: 63 IN | BODY MASS INDEX: 22.32 KG/M2 | WEIGHT: 126 LBS

## 2024-10-18 DIAGNOSIS — M75.111 NONTRAUMATIC INCOMPLETE TEAR OF RIGHT ROTATOR CUFF: ICD-10-CM

## 2024-10-18 DIAGNOSIS — R52 PAIN: Primary | ICD-10-CM

## 2024-10-18 NOTE — PROGRESS NOTES
Dr Phil Louis      Date /Time 10/18/2024             2:00 PM EDT  Name Kimmie Peterson             1945   Location  [facility]  MRN [id number]                Chief Complaint   Patient presents with    Shoulder Pain     CK Right Shoulder (Cortisone 12/20/2023)         History of Present Illness    Kimmie Peterson is a 79 y.o. female who presents with  right Shoulder pain.          Injury Mechanism:  none.  Worker's Comp. & legal issues:   none.  Previous Treatments: Ice, Heat, and NSAIDs    Patient presents to the office today for a follow-up visit.  Patient being treated for rotator cuff arthropathy.  Patient has received a cortisone injection in the past.  The injection has worked well.  Her pain has returned.  Her pain is global in nature.  Increased pain with activities and improvement with rest.  She has not had an x-ray in over a year.  Unfortunately she has had several complications since I seen her last.  She has had an increase in her A1c and states that her blood glucose levels are very inconsistent and due to splenic high numbers.  In addition she has suffered a foot infection which resulted in osteomyelitis and prolonged treatment with wound care.      Previous History: Patient presents to the office today for follow-up visit.  Patient being treated for complete rotator cuff tear right shoulder.  She has gotten better control of her blood glucose.  She also has adjusted her insulin medication with her medical provider at her facility.  Pain symptoms as below    Patient would also like to be seen for hip.  She has had pain for approximately a year and a half.  She has been diagnosed previously with a pubic rami fracture.  We did order her an MRI at the last visit to rule out fracture.  She is here to review results.  She does take Eliquis.    Previous history: Patient presents the office today for a new problem.  Patient here with a chief complaint of right shoulder pain.

## 2024-11-07 ENCOUNTER — OFFICE VISIT (OUTPATIENT)
Dept: CARDIOLOGY CLINIC | Age: 79
End: 2024-11-07

## 2024-11-07 VITALS
SYSTOLIC BLOOD PRESSURE: 110 MMHG | BODY MASS INDEX: 26.82 KG/M2 | HEART RATE: 92 BPM | WEIGHT: 151.4 LBS | DIASTOLIC BLOOD PRESSURE: 60 MMHG

## 2024-11-07 DIAGNOSIS — I35.1 NONRHEUMATIC AORTIC VALVE INSUFFICIENCY: Primary | ICD-10-CM

## 2024-11-07 DIAGNOSIS — E78.5 HYPERLIPIDEMIA ASSOCIATED WITH TYPE 2 DIABETES MELLITUS (HCC): ICD-10-CM

## 2024-11-07 DIAGNOSIS — I48.0 PAROXYSMAL ATRIAL FIBRILLATION (HCC): ICD-10-CM

## 2024-11-07 DIAGNOSIS — E11.69 HYPERLIPIDEMIA ASSOCIATED WITH TYPE 2 DIABETES MELLITUS (HCC): ICD-10-CM

## 2024-11-07 DIAGNOSIS — I25.10 NONOCCLUSIVE CORONARY ATHEROSCLEROSIS OF NATIVE CORONARY ARTERY: ICD-10-CM

## 2024-11-07 DIAGNOSIS — I10 PRIMARY HYPERTENSION: ICD-10-CM

## 2024-11-07 ASSESSMENT — ENCOUNTER SYMPTOMS
BLOOD IN STOOL: 0
ABDOMINAL PAIN: 0
COLOR CHANGE: 0
WHEEZING: 0
EYE DISCHARGE: 0
ABDOMINAL DISTENTION: 0
COUGH: 0
VOMITING: 0
FACIAL SWELLING: 0
SHORTNESS OF BREATH: 0
BACK PAIN: 0
CHEST TIGHTNESS: 0

## 2024-11-07 NOTE — PROGRESS NOTES
[facility]     Outpatient Cardiology         Patient Name:  Kimmie Peterson  Requesting Physician: No admitting provider for patient encounter.  Primary Care Physician: Alyssa Quinteros MD    Reason for Consultation/Chief Complaint:   Chief Complaint   Patient presents with    Hypertension         History of Present Illness:    HPI     Kimmie Howard a 79 y.o. female with PMH of atrial fibrillation, HTN, HLD, CATA, DM II, GERD, CVA 2022, RBBB, hypothyroidism, Coronary artery disease by CT, mild to moderate aortic insufficiency.   Here for follow up.     pAF, on lopressor 25 mg twice a day, eliquis 5 mg twice a day, EKG today shows normal sinus rhythm  CAD, nonocclusive, on aspirin 81 mg daily, no angina, doing well  HTN, on lisinopril 10 mg daily, norvasc 5 mg daily, well-controlled  HLD, on crestor 40 mg daily last LDL 62 2022, no myalgias  AI, mild to moderate, remains asymptomatic.  Discussed repeating echo      PMH  Past Medical History:   Diagnosis Date    Colon polyps     COVID-19 2023    Diabetes mellitus (HCC)     Diabetic neuropathy (HCC)     Diabetic retinopathy (HCC)     Essential hypertension 10/28/2019    Fall     Fatty liver     Gastritis     GERD (gastroesophageal reflux disease)     Hyperlipidemia     Hypertension     Hypothyroidism     Irritable bowel syndrome     Major depressive disorder with single episode 10/28/2019    Nonocclusive coronary atherosclerosis of native coronary artery 2023    Osteopenia     Photosensitivity disorder     chronic    RBBB     Sleep apnea     on BIPAP    Thyroid disease     Thyroid nodule     neg bx-chronic thyroiditis    Type 2 diabetes mellitus with diabetic polyneuropathy, with long-term current use of insulin (HCC) 2019    Vitamin D deficiency        PSH  Past Surgical History:   Procedure Laterality Date    APPENDECTOMY      CARPAL TUNNEL RELEASE Bilateral     CATARACT REMOVAL Bilateral      SECTION

## 2024-11-11 ENCOUNTER — COMMUNITY CARE MANAGEMENT (OUTPATIENT)
Facility: CLINIC | Age: 79
End: 2024-11-11

## 2024-11-11 NOTE — PROGRESS NOTES
her foot is healed and she is back in her splint.     Pt is very upset about the care that is available to her and the other people that live at her facility.  offered social work to see if there may be some help for her.  pt is agreeable and a consult was written for this pt and she is scheduled for Thursday 10/3 at 0900 by phone     SW has been working with pt based on her concerns.          pt states her cardiologist told her she does nto have to come back for a year.      pt states she has the health department coming in to check her facility next.     [email or web address]  [phone]  pt has [facility] after hours number

## 2024-12-02 ENCOUNTER — HOSPITAL ENCOUNTER (OUTPATIENT)
Age: 79
Discharge: HOME | End: 2024-12-04
Attending: INTERNAL MEDICINE
Payer: MEDICARE

## 2024-12-02 VITALS — SYSTOLIC BLOOD PRESSURE: 110 MMHG | DIASTOLIC BLOOD PRESSURE: 60 MMHG

## 2024-12-02 DIAGNOSIS — I35.1 NONRHEUMATIC AORTIC VALVE INSUFFICIENCY: ICD-10-CM

## 2024-12-02 LAB
ECHO AO ROOT DIAM: 2.9 CM
ECHO AR MAX VEL PISA: 3.2 M/S
ECHO AV AREA PEAK VELOCITY: 3.2 CM2
ECHO AV AREA VTI: 3 CM2
ECHO AV MEAN GRADIENT: 3 MMHG
ECHO AV MEAN VELOCITY: 0.8 M/S
ECHO AV PEAK GRADIENT: 6 MMHG
ECHO AV PEAK VELOCITY: 1.3 M/S
ECHO AV REGURGITANT PHT: 398 MS
ECHO AV VELOCITY RATIO: 1
ECHO AV VTI: 17.9 CM
ECHO IVC PROX: 1.2 CM
ECHO LA AREA 4C: 11.4 CM2
ECHO LA DIAMETER: 3 CM
ECHO LA MAJOR AXIS: 4.2 CM
ECHO LA TO AORTIC ROOT RATIO: 1.03
ECHO LA VOL MOD A4C: 24 ML (ref 22–52)
ECHO LV EDV A4C: 48 ML
ECHO LV EF PHYSICIAN: 57 %
ECHO LV EJECTION FRACTION A4C: 66 %
ECHO LV ESV A4C: 16 ML
ECHO LV FRACTIONAL SHORTENING: 44 % (ref 28–44)
ECHO LV INTERNAL DIMENSION DIASTOLIC: 5 CM (ref 3.9–5.3)
ECHO LV INTERNAL DIMENSION SYSTOLIC: 2.8 CM
ECHO LV IVSD: 1 CM (ref 0.6–0.9)
ECHO LV MASS 2D: 158.2 G (ref 67–162)
ECHO LV POSTERIOR WALL DIASTOLIC: 0.8 CM (ref 0.6–0.9)
ECHO LV RELATIVE WALL THICKNESS RATIO: 0.32
ECHO LVOT AREA: 3.1 CM2
ECHO LVOT AV VTI INDEX: 0.94
ECHO LVOT DIAM: 2 CM
ECHO LVOT MEAN GRADIENT: 3 MMHG
ECHO LVOT PEAK GRADIENT: 7 MMHG
ECHO LVOT PEAK VELOCITY: 1.3 M/S
ECHO LVOT SV: 53.1 ML
ECHO LVOT VTI: 16.9 CM
ECHO RA AREA 4C: 5.9 CM2
ECHO RA VOLUME: 8 ML
ECHO RV BASAL DIMENSION: 2.6 CM
ECHO RV FREE WALL PEAK S': 14.5 CM/S
ECHO RV LONGITUDINAL DIMENSION: 5.3 CM
ECHO RV MID DIMENSION: 2.1 CM
ECHO RV TAPSE: 2.2 CM (ref 1.7–?)

## 2024-12-02 PROCEDURE — 93306 TTE W/DOPPLER COMPLETE: CPT

## 2024-12-02 PROCEDURE — 93306 TTE W/DOPPLER COMPLETE: CPT | Performed by: INTERNAL MEDICINE

## 2024-12-12 ENCOUNTER — COMMUNITY CARE MANAGEMENT (OUTPATIENT)
Facility: CLINIC | Age: 79
End: 2024-12-12

## 2024-12-12 NOTE — PROGRESS NOTES
f/u assessment complete. pt identified by name,  and address, disclaimer provided     pt states blood sugars are liable, states they average in the 200s, states they have been high. pt states they do not work with her diabetes, so she ends up with high CHO foods, pt states her portions last night were tiny and they were out of everything.  pt states she goes to get a check with her doctor next month, states she is having issues with her diabetic diet due to them not having diabetic friendly options. States that she is hoping the new doctor will help her to get it under better control.      pt states her urinary frequency is back to normal and denies issues     pt states lantus was changed to 56 units.  states that her sugars are a little better with this change, this was before our last visit.  states it still reaches 200s after she eats.  states she frequently is getting readings at 241.     pt states her son is never available, states that the  had to take her to this because the buses were being used for a trip.      pt states the state came and they were written up for a number of things at the facility she is living at.     SW has been working with pt based on her concerns.     pt saw her nephrologist yesterday and she is happy with the care she is getting.      pt is aware of the [employer] contract expiring at the end of this month, she is aware that she has the [facility] after hours hotline available to her until .  pt has been instructed to call her doctor after that day.      [email or web address]  [phone]  pt has [employer] after hours number

## 2024-12-20 ENCOUNTER — TELEPHONE (OUTPATIENT)
Dept: SURGERY | Age: 79
End: 2024-12-20

## 2024-12-20 NOTE — TELEPHONE ENCOUNTER
Called patient regarding 3 yr colonoscopy recall.  Did not see that patient is currently residing in [place].  Patient states does not want any future colonoscopies.

## 2025-03-19 ENCOUNTER — OFFICE VISIT (OUTPATIENT)
Dept: ORTHOPEDIC SURGERY | Age: 80
End: 2025-03-19

## 2025-03-19 VITALS — WEIGHT: 151 LBS | HEIGHT: 63 IN | BODY MASS INDEX: 26.75 KG/M2

## 2025-03-19 DIAGNOSIS — M65.30 TRIGGER FINGER, ACQUIRED: Primary | ICD-10-CM

## 2025-03-19 RX ORDER — TRIAMCINOLONE ACETONIDE 40 MG/ML
20 INJECTION, SUSPENSION INTRA-ARTICULAR; INTRAMUSCULAR ONCE
Status: COMPLETED | OUTPATIENT
Start: 2025-03-19 | End: 2025-03-19

## 2025-03-19 RX ORDER — LIDOCAINE HYDROCHLORIDE 10 MG/ML
0.5 INJECTION, SOLUTION INFILTRATION; PERINEURAL ONCE
Status: COMPLETED | OUTPATIENT
Start: 2025-03-19 | End: 2025-03-19

## 2025-03-19 RX ADMIN — TRIAMCINOLONE ACETONIDE 20 MG: 40 INJECTION, SUSPENSION INTRA-ARTICULAR; INTRAMUSCULAR at 13:09

## 2025-03-19 RX ADMIN — LIDOCAINE HYDROCHLORIDE 0.5 ML: 10 INJECTION, SOLUTION INFILTRATION; PERINEURAL at 13:08

## 2025-03-19 NOTE — PROGRESS NOTES
This 80 y.o., right hand dominant retired woman  is seen in referral for Alyssa Quinteros MD with a chief complaint of pain, swelling, stiffness and intermittant snapping of the right index finger.  Symptoms have been present for 3 weeks.  The digit is stiff, especially in the morning and will frequently stick in the palm when flexed and pop when extended.  This is often associated with pain.  Mild swelling has been noticed.  The patient denies discoloration or history of injury or overuse.  Treatment has not been prescribed.  The problem has worsened recently.  The pain assessment has been reviewed and is correct as stated..    The patient's social history, past medical history, family history, medications, allergies and review of systems, entered 3/19/25, have been reviewed, and dated and are recorded in the chart.    On examination the patient is Height: 160 cm (5' 3\") tall and weighs Weight - Scale: 68.5 kg (151 lb). Respirations are 18 per minute.  The patient is well nourished, is oriented to time and place, demonstrates appropriate mood and affect.  She is examined in her wheeled chair.  There is mild soft tissue swelling of the digit.  There is no deformity. There is tenderness, thickening and nodularity at the base of the flexor tendon sheath.  Range of motion is slightly limited in flexion and extension.  The digit sticks in flexion and pops into extension, accompanied by some pain. Skin is intact without lesions.  Distal circulation and sensation are intact.  Muscle strength and coordination are normal.  Reflexes are present bilaterally.  Joints are stable.  There are no subcutaneous nodules or enlarged epitrochlear lymph nodes.    I have personally reviewed and interpreted all previous external imaging studies(DEXA Scan), laboratory tests CBC+Diff, HbA1c, CMP, TSH+Reflex), diagnostic procedures and medical encounters pertinent to this patient's visit today.    Impression: Right index finger trigger digit.

## 2025-06-10 ENCOUNTER — TELEPHONE (OUTPATIENT)
Dept: INTERNAL MEDICINE CLINIC | Age: 80
End: 2025-06-10

## 2025-06-10 NOTE — TELEPHONE ENCOUNTER
Pt was referred by Carolina cheung and is requesting to see Dr. Kong as her PCP. She is currently living at the courtyard at the [facility] and isn't happy with her current PA. Please call an advise.

## 2025-06-12 NOTE — TELEPHONE ENCOUNTER
Call returned I spoke to the patient directly.     is unable to take a new patient at this time he is at capacity.    I have given the patient the number to the new patient line.    [phone]

## 2025-06-13 SDOH — HEALTH STABILITY: PHYSICAL HEALTH: ON AVERAGE, HOW MANY MINUTES DO YOU ENGAGE IN EXERCISE AT THIS LEVEL?: 40 MIN

## 2025-06-13 SDOH — HEALTH STABILITY: PHYSICAL HEALTH: ON AVERAGE, HOW MANY DAYS PER WEEK DO YOU ENGAGE IN MODERATE TO STRENUOUS EXERCISE (LIKE A BRISK WALK)?: 3 DAYS

## 2025-06-16 ENCOUNTER — OFFICE VISIT (OUTPATIENT)
Dept: FAMILY MEDICINE CLINIC | Age: 80
End: 2025-06-16
Payer: MEDICARE

## 2025-06-16 VITALS
SYSTOLIC BLOOD PRESSURE: 120 MMHG | BODY MASS INDEX: 26.57 KG/M2 | OXYGEN SATURATION: 98 % | HEART RATE: 87 BPM | WEIGHT: 150 LBS | DIASTOLIC BLOOD PRESSURE: 80 MMHG

## 2025-06-16 DIAGNOSIS — E11.69 HYPERLIPIDEMIA ASSOCIATED WITH TYPE 2 DIABETES MELLITUS (HCC): ICD-10-CM

## 2025-06-16 DIAGNOSIS — E78.5 HYPERLIPIDEMIA ASSOCIATED WITH TYPE 2 DIABETES MELLITUS (HCC): ICD-10-CM

## 2025-06-16 DIAGNOSIS — Z79.4 TYPE 2 DIABETES MELLITUS WITH HYPERGLYCEMIA, WITH LONG-TERM CURRENT USE OF INSULIN (HCC): ICD-10-CM

## 2025-06-16 DIAGNOSIS — Z79.4 TYPE 2 DIABETES MELLITUS WITH HYPERGLYCEMIA, WITH LONG-TERM CURRENT USE OF INSULIN (HCC): Primary | ICD-10-CM

## 2025-06-16 DIAGNOSIS — F33.1 MAJOR DEPRESSIVE DISORDER, RECURRENT, MODERATE (HCC): ICD-10-CM

## 2025-06-16 DIAGNOSIS — I48.0 PAROXYSMAL ATRIAL FIBRILLATION (HCC): ICD-10-CM

## 2025-06-16 DIAGNOSIS — E11.65 TYPE 2 DIABETES MELLITUS WITH HYPERGLYCEMIA, WITH LONG-TERM CURRENT USE OF INSULIN (HCC): ICD-10-CM

## 2025-06-16 DIAGNOSIS — N18.31 STAGE 3A CHRONIC KIDNEY DISEASE (HCC): ICD-10-CM

## 2025-06-16 DIAGNOSIS — E11.65 TYPE 2 DIABETES MELLITUS WITH HYPERGLYCEMIA, WITH LONG-TERM CURRENT USE OF INSULIN (HCC): Primary | ICD-10-CM

## 2025-06-16 DIAGNOSIS — E78.2 MIXED HYPERLIPIDEMIA: ICD-10-CM

## 2025-06-16 PROCEDURE — G8400 PT W/DXA NO RESULTS DOC: HCPCS | Performed by: STUDENT IN AN ORGANIZED HEALTH CARE EDUCATION/TRAINING PROGRAM

## 2025-06-16 PROCEDURE — 3079F DIAST BP 80-89 MM HG: CPT | Performed by: STUDENT IN AN ORGANIZED HEALTH CARE EDUCATION/TRAINING PROGRAM

## 2025-06-16 PROCEDURE — 1090F PRES/ABSN URINE INCON ASSESS: CPT | Performed by: STUDENT IN AN ORGANIZED HEALTH CARE EDUCATION/TRAINING PROGRAM

## 2025-06-16 PROCEDURE — 1123F ACP DISCUSS/DSCN MKR DOCD: CPT | Performed by: STUDENT IN AN ORGANIZED HEALTH CARE EDUCATION/TRAINING PROGRAM

## 2025-06-16 PROCEDURE — 3074F SYST BP LT 130 MM HG: CPT | Performed by: STUDENT IN AN ORGANIZED HEALTH CARE EDUCATION/TRAINING PROGRAM

## 2025-06-16 PROCEDURE — 99214 OFFICE O/P EST MOD 30 MIN: CPT | Performed by: STUDENT IN AN ORGANIZED HEALTH CARE EDUCATION/TRAINING PROGRAM

## 2025-06-16 PROCEDURE — G2211 COMPLEX E/M VISIT ADD ON: HCPCS | Performed by: STUDENT IN AN ORGANIZED HEALTH CARE EDUCATION/TRAINING PROGRAM

## 2025-06-16 PROCEDURE — G8427 DOCREV CUR MEDS BY ELIG CLIN: HCPCS | Performed by: STUDENT IN AN ORGANIZED HEALTH CARE EDUCATION/TRAINING PROGRAM

## 2025-06-16 PROCEDURE — 1160F RVW MEDS BY RX/DR IN RCRD: CPT | Performed by: STUDENT IN AN ORGANIZED HEALTH CARE EDUCATION/TRAINING PROGRAM

## 2025-06-16 PROCEDURE — G8419 CALC BMI OUT NRM PARAM NOF/U: HCPCS | Performed by: STUDENT IN AN ORGANIZED HEALTH CARE EDUCATION/TRAINING PROGRAM

## 2025-06-16 PROCEDURE — 1159F MED LIST DOCD IN RCRD: CPT | Performed by: STUDENT IN AN ORGANIZED HEALTH CARE EDUCATION/TRAINING PROGRAM

## 2025-06-16 PROCEDURE — 1036F TOBACCO NON-USER: CPT | Performed by: STUDENT IN AN ORGANIZED HEALTH CARE EDUCATION/TRAINING PROGRAM

## 2025-06-16 RX ORDER — ROSUVASTATIN CALCIUM 40 MG/1
TABLET, COATED ORAL
Qty: 90 TABLET | Refills: 0 | Status: SHIPPED | OUTPATIENT
Start: 2025-06-16

## 2025-06-16 SDOH — ECONOMIC STABILITY: FOOD INSECURITY: WITHIN THE PAST 12 MONTHS, THE FOOD YOU BOUGHT JUST DIDN'T LAST AND YOU DIDN'T HAVE MONEY TO GET MORE.: NEVER TRUE

## 2025-06-16 SDOH — ECONOMIC STABILITY: FOOD INSECURITY: WITHIN THE PAST 12 MONTHS, YOU WORRIED THAT YOUR FOOD WOULD RUN OUT BEFORE YOU GOT MONEY TO BUY MORE.: NEVER TRUE

## 2025-06-16 ASSESSMENT — PATIENT HEALTH QUESTIONNAIRE - PHQ9
8. MOVING OR SPEAKING SO SLOWLY THAT OTHER PEOPLE COULD HAVE NOTICED. OR THE OPPOSITE, BEING SO FIGETY OR RESTLESS THAT YOU HAVE BEEN MOVING AROUND A LOT MORE THAN USUAL: NOT AT ALL
1. LITTLE INTEREST OR PLEASURE IN DOING THINGS: NOT AT ALL
6. FEELING BAD ABOUT YOURSELF - OR THAT YOU ARE A FAILURE OR HAVE LET YOURSELF OR YOUR FAMILY DOWN: SEVERAL DAYS
2. FEELING DOWN, DEPRESSED OR HOPELESS: SEVERAL DAYS
SUM OF ALL RESPONSES TO PHQ QUESTIONS 1-9: 4
4. FEELING TIRED OR HAVING LITTLE ENERGY: SEVERAL DAYS
5. POOR APPETITE OR OVEREATING: NOT AT ALL
SUM OF ALL RESPONSES TO PHQ QUESTIONS 1-9: 4
9. THOUGHTS THAT YOU WOULD BE BETTER OFF DEAD, OR OF HURTING YOURSELF: NOT AT ALL
10. IF YOU CHECKED OFF ANY PROBLEMS, HOW DIFFICULT HAVE THESE PROBLEMS MADE IT FOR YOU TO DO YOUR WORK, TAKE CARE OF THINGS AT HOME, OR GET ALONG WITH OTHER PEOPLE: SOMEWHAT DIFFICULT
SUM OF ALL RESPONSES TO PHQ QUESTIONS 1-9: 4
SUM OF ALL RESPONSES TO PHQ QUESTIONS 1-9: 4
3. TROUBLE FALLING OR STAYING ASLEEP: SEVERAL DAYS
7. TROUBLE CONCENTRATING ON THINGS, SUCH AS READING THE NEWSPAPER OR WATCHING TELEVISION: NOT AT ALL

## 2025-06-16 NOTE — PROGRESS NOTES
Chief Complaint   Patient presents with    Establish Care     New to provider          HPI: Kimmie Peterson is a 80 y.o. female who presents for New Patient     #Dm2  - Last A1c below, patient is on amlodipine 5 mg daily, Wellbutrin 150 mg daily was 5 mg twice daily, glipizide 10 mg daily, insulin lispro sliding scale 3 times daily, Lantus 62 units nightly, , lisinopril 10 mg daily, Synthroid 100 mcg daily, metoprolol tartrate 25 mg twice daily, follows with nephrology, cardiology, ophthalmology, patient reports not taking lisinopril,  not currently taking statin, blood pressure well-controlled today, pt is currently in assisted living, pt is not on Lisinopril currently, has been off for 1 month,  pt not sure if is on statin, needs new prescription, takes SSI, denies FH of thyroid or other endocrine cancers,     Hemoglobin A1C   Date Value Ref Range Status   09/19/2023 8.2 See comment % Final     Comment:     Comment:  Diagnosis of Diabetes: > or = 6.5%  Increased risk of diabetes (Prediabetes): 5.7-6.4%  Glycemic Control: Nonpregnant Adults: <7.0%                    Pregnant: <6.0%            Past Medical History:   Diagnosis Date    Cerebral artery occlusion with cerebral infarction (HCC) 4/13/22    Mild    Cerebrovascular disease 4/13/2022    Chronic back pain 12/2022 After slipping on Bus step.    Colon polyps     COVID-19 02/22/2023    Diabetes mellitus (HCC)     Diabetic neuropathy (HCC)     Diabetic retinopathy (HCC)     Essential hypertension 10/28/2019    Fall     Fatty liver     Gastritis     GERD (gastroesophageal reflux disease)     Hyperlipidemia     Hypertension     Hypothyroidism     Irritable bowel syndrome     Major depressive disorder with single episode 10/28/2019    Nonocclusive coronary atherosclerosis of native coronary artery 11/09/2023    Osteopenia     Photosensitivity disorder     chronic    RBBB     Sleep apnea     on BIPAP    Thyroid disease     Thyroid nodule     neg

## 2025-06-17 ENCOUNTER — RESULTS FOLLOW-UP (OUTPATIENT)
Dept: FAMILY MEDICINE CLINIC | Age: 80
End: 2025-06-17

## 2025-06-17 LAB
ALBUMIN SERPL-MCNC: 4.4 G/DL (ref 3.4–5)
ALBUMIN/GLOB SERPL: 1.6 {RATIO} (ref 1.1–2.2)
ALP SERPL-CCNC: 115 U/L (ref 40–129)
ALT SERPL-CCNC: 21 U/L (ref 10–40)
ANION GAP SERPL CALCULATED.3IONS-SCNC: 12 MMOL/L (ref 3–16)
AST SERPL-CCNC: 24 U/L (ref 15–37)
BASOPHILS # BLD: 0.1 K/UL (ref 0–0.2)
BASOPHILS NFR BLD: 0.8 %
BILIRUB SERPL-MCNC: 0.5 MG/DL (ref 0–1)
BUN SERPL-MCNC: 26 MG/DL (ref 7–20)
CALCIUM SERPL-MCNC: 9.7 MG/DL (ref 8.3–10.6)
CHLORIDE SERPL-SCNC: 102 MMOL/L (ref 99–110)
CHOLEST SERPL-MCNC: 156 MG/DL (ref 0–199)
CO2 SERPL-SCNC: 26 MMOL/L (ref 21–32)
CREAT SERPL-MCNC: 1 MG/DL (ref 0.6–1.2)
CREAT UR-MCNC: 59.5 MG/DL (ref 28–259)
DEPRECATED RDW RBC AUTO: 15.4 % (ref 12.4–15.4)
EOSINOPHIL # BLD: 0 K/UL (ref 0–0.6)
EOSINOPHIL NFR BLD: 0.7 %
EST. AVERAGE GLUCOSE BLD GHB EST-MCNC: 220.2 MG/DL
GFR SERPLBLD CREATININE-BSD FMLA CKD-EPI: 58 ML/MIN/{1.73_M2}
GLUCOSE SERPL-MCNC: 170 MG/DL (ref 70–99)
HBA1C MFR BLD: 9.3 %
HCT VFR BLD AUTO: 39.3 % (ref 36–48)
HDLC SERPL-MCNC: 65 MG/DL (ref 40–60)
HGB BLD-MCNC: 13.3 G/DL (ref 12–16)
LDLC SERPL CALC-MCNC: 45 MG/DL
LYMPHOCYTES # BLD: 1.8 K/UL (ref 1–5.1)
LYMPHOCYTES NFR BLD: 24.9 %
MCH RBC QN AUTO: 29.4 PG (ref 26–34)
MCHC RBC AUTO-ENTMCNC: 33.8 G/DL (ref 31–36)
MCV RBC AUTO: 86.9 FL (ref 80–100)
MICROALBUMIN UR DL<=1MG/L-MCNC: 1.46 MG/DL
MICROALBUMIN/CREAT UR: 24.5 MG/G (ref 0–30)
MONOCYTES # BLD: 0.7 K/UL (ref 0–1.3)
MONOCYTES NFR BLD: 9.4 %
NEUTROPHILS # BLD: 4.7 K/UL (ref 1.7–7.7)
NEUTROPHILS NFR BLD: 64.2 %
PLATELET # BLD AUTO: 173 K/UL (ref 135–450)
PMV BLD AUTO: 9.2 FL (ref 5–10.5)
POTASSIUM SERPL-SCNC: 4.9 MMOL/L (ref 3.5–5.1)
PROT SERPL-MCNC: 7.2 G/DL (ref 6.4–8.2)
RBC # BLD AUTO: 4.52 M/UL (ref 4–5.2)
SODIUM SERPL-SCNC: 140 MMOL/L (ref 136–145)
T4 FREE SERPL-MCNC: 1.4 NG/DL (ref 0.9–1.8)
TRIGL SERPL-MCNC: 231 MG/DL (ref 0–150)
TSH SERPL DL<=0.005 MIU/L-ACNC: 2.88 UIU/ML (ref 0.27–4.2)
VLDLC SERPL CALC-MCNC: 46 MG/DL
WBC # BLD AUTO: 7.3 K/UL (ref 4–11)

## 2025-06-19 DIAGNOSIS — E11.42 TYPE 2 DIABETES MELLITUS WITH DIABETIC POLYNEUROPATHY, WITH LONG-TERM CURRENT USE OF INSULIN (HCC): ICD-10-CM

## 2025-06-19 DIAGNOSIS — Z79.4 TYPE 2 DIABETES MELLITUS WITH DIABETIC POLYNEUROPATHY, WITH LONG-TERM CURRENT USE OF INSULIN (HCC): ICD-10-CM

## 2025-06-19 DIAGNOSIS — E11.65 UNCONTROLLED TYPE 2 DIABETES MELLITUS WITH HYPERGLYCEMIA (HCC): ICD-10-CM

## 2025-06-23 ENCOUNTER — TELEPHONE (OUTPATIENT)
Dept: FAMILY MEDICINE CLINIC | Age: 80
End: 2025-06-23

## 2025-06-23 NOTE — TELEPHONE ENCOUNTER
Spoke with [facility] and let her know patient should stop atorvastatin and continue with rosuvastatin. [facility] is also going to fax over a current med list.

## 2025-06-23 NOTE — TELEPHONE ENCOUNTER
Pt was given an order for Rosuvastatin the other day. [facility] wanted to clarify if she should be taking this as she is already on Atorvastatin.   Which one is the patient supposed to be taking. Please advise

## 2025-06-28 ENCOUNTER — HOSPITAL ENCOUNTER (EMERGENCY)
Age: 80
Discharge: HOME | End: 2025-06-28
Attending: STUDENT IN AN ORGANIZED HEALTH CARE EDUCATION/TRAINING PROGRAM
Payer: MEDICARE

## 2025-06-28 VITALS
WEIGHT: 160 LBS | DIASTOLIC BLOOD PRESSURE: 71 MMHG | SYSTOLIC BLOOD PRESSURE: 146 MMHG | BODY MASS INDEX: 28.35 KG/M2 | OXYGEN SATURATION: 96 % | HEIGHT: 63 IN | RESPIRATION RATE: 16 BRPM | TEMPERATURE: 97.8 F | HEART RATE: 87 BPM

## 2025-06-28 DIAGNOSIS — K62.5 RECTAL BLEEDING: Primary | ICD-10-CM

## 2025-06-28 LAB
ANION GAP SERPL CALCULATED.3IONS-SCNC: 10 MMOL/L (ref 3–16)
BASOPHILS # BLD: 0.1 K/UL (ref 0–0.2)
BASOPHILS NFR BLD: 1 %
BUN SERPL-MCNC: 21 MG/DL (ref 7–20)
CALCIUM SERPL-MCNC: 9.4 MG/DL (ref 8.3–10.6)
CHLORIDE SERPL-SCNC: 101 MMOL/L (ref 99–110)
CO2 SERPL-SCNC: 26 MMOL/L (ref 21–32)
CREAT SERPL-MCNC: 1.2 MG/DL (ref 0.6–1.2)
DEPRECATED RDW RBC AUTO: 16.2 % (ref 12.4–15.4)
EOSINOPHIL # BLD: 0.1 K/UL (ref 0–0.6)
EOSINOPHIL NFR BLD: 0.9 %
GFR SERPLBLD CREATININE-BSD FMLA CKD-EPI: 46 ML/MIN/{1.73_M2}
GLUCOSE SERPL-MCNC: 324 MG/DL (ref 70–99)
HCT VFR BLD AUTO: 38.6 % (ref 36–48)
HGB BLD-MCNC: 13.1 G/DL (ref 12–16)
LYMPHOCYTES # BLD: 2.2 K/UL (ref 1–5.1)
LYMPHOCYTES NFR BLD: 34 %
MCH RBC QN AUTO: 29.6 PG (ref 26–34)
MCHC RBC AUTO-ENTMCNC: 34 G/DL (ref 31–36)
MCV RBC AUTO: 87.2 FL (ref 80–100)
MONOCYTES # BLD: 0.6 K/UL (ref 0–1.3)
MONOCYTES NFR BLD: 9.6 %
NEUTROPHILS # BLD: 3.5 K/UL (ref 1.7–7.7)
NEUTROPHILS NFR BLD: 54.5 %
PLATELET # BLD AUTO: 205 K/UL (ref 135–450)
PMV BLD AUTO: 8.3 FL (ref 5–10.5)
POTASSIUM SERPL-SCNC: 5 MMOL/L (ref 3.5–5.1)
RBC # BLD AUTO: 4.42 M/UL (ref 4–5.2)
SODIUM SERPL-SCNC: 137 MMOL/L (ref 136–145)
WBC # BLD AUTO: 6.5 K/UL (ref 4–11)

## 2025-06-28 PROCEDURE — 99283 EMERGENCY DEPT VISIT LOW MDM: CPT

## 2025-06-28 PROCEDURE — 80048 BASIC METABOLIC PNL TOTAL CA: CPT

## 2025-06-28 PROCEDURE — 85025 COMPLETE CBC W/AUTO DIFF WBC: CPT

## 2025-06-28 ASSESSMENT — LIFESTYLE VARIABLES
HOW MANY STANDARD DRINKS CONTAINING ALCOHOL DO YOU HAVE ON A TYPICAL DAY: PATIENT DOES NOT DRINK
HOW OFTEN DO YOU HAVE A DRINK CONTAINING ALCOHOL: NEVER

## 2025-06-28 ASSESSMENT — PAIN - FUNCTIONAL ASSESSMENT: PAIN_FUNCTIONAL_ASSESSMENT: NONE - DENIES PAIN

## 2025-06-28 NOTE — ED NOTES
Patient prepared for and ready to be discharged. Patient discharged at this time in no acute distress after verbalizing understanding of discharge instructions. Patient left after receiving After Visit Summary instructions. IV removed prior to discharge.       Evelio Marquez RN  06/28/25 [id number]

## 2025-06-28 NOTE — ED NOTES
Patient was discharged to [facility] and discussed discharge instructions       Evelio Marquez RN  06/28/25 [id number]

## 2025-06-28 NOTE — FLOWSHEET NOTE
Patient arrived via EMS from the [facility], stated that she noticed diarrhea and rectal bleeding tonight, denies any pain.

## 2025-07-02 ENCOUNTER — OFFICE VISIT (OUTPATIENT)
Dept: FAMILY MEDICINE CLINIC | Age: 80
End: 2025-07-02
Payer: MEDICARE

## 2025-07-02 VITALS
TEMPERATURE: 97.9 F | BODY MASS INDEX: 28.34 KG/M2 | DIASTOLIC BLOOD PRESSURE: 58 MMHG | HEIGHT: 63 IN | HEART RATE: 104 BPM | OXYGEN SATURATION: 97 % | SYSTOLIC BLOOD PRESSURE: 130 MMHG

## 2025-07-02 DIAGNOSIS — K62.5 RECTAL BLEEDING: Primary | ICD-10-CM

## 2025-07-02 PROCEDURE — G8427 DOCREV CUR MEDS BY ELIG CLIN: HCPCS | Performed by: STUDENT IN AN ORGANIZED HEALTH CARE EDUCATION/TRAINING PROGRAM

## 2025-07-02 PROCEDURE — 1123F ACP DISCUSS/DSCN MKR DOCD: CPT | Performed by: STUDENT IN AN ORGANIZED HEALTH CARE EDUCATION/TRAINING PROGRAM

## 2025-07-02 PROCEDURE — 3075F SYST BP GE 130 - 139MM HG: CPT | Performed by: STUDENT IN AN ORGANIZED HEALTH CARE EDUCATION/TRAINING PROGRAM

## 2025-07-02 PROCEDURE — 1090F PRES/ABSN URINE INCON ASSESS: CPT | Performed by: STUDENT IN AN ORGANIZED HEALTH CARE EDUCATION/TRAINING PROGRAM

## 2025-07-02 PROCEDURE — 1036F TOBACCO NON-USER: CPT | Performed by: STUDENT IN AN ORGANIZED HEALTH CARE EDUCATION/TRAINING PROGRAM

## 2025-07-02 PROCEDURE — 1160F RVW MEDS BY RX/DR IN RCRD: CPT | Performed by: STUDENT IN AN ORGANIZED HEALTH CARE EDUCATION/TRAINING PROGRAM

## 2025-07-02 PROCEDURE — 3078F DIAST BP <80 MM HG: CPT | Performed by: STUDENT IN AN ORGANIZED HEALTH CARE EDUCATION/TRAINING PROGRAM

## 2025-07-02 PROCEDURE — 1159F MED LIST DOCD IN RCRD: CPT | Performed by: STUDENT IN AN ORGANIZED HEALTH CARE EDUCATION/TRAINING PROGRAM

## 2025-07-02 PROCEDURE — 99213 OFFICE O/P EST LOW 20 MIN: CPT | Performed by: STUDENT IN AN ORGANIZED HEALTH CARE EDUCATION/TRAINING PROGRAM

## 2025-07-02 PROCEDURE — G8400 PT W/DXA NO RESULTS DOC: HCPCS | Performed by: STUDENT IN AN ORGANIZED HEALTH CARE EDUCATION/TRAINING PROGRAM

## 2025-07-02 PROCEDURE — G8419 CALC BMI OUT NRM PARAM NOF/U: HCPCS | Performed by: STUDENT IN AN ORGANIZED HEALTH CARE EDUCATION/TRAINING PROGRAM

## 2025-07-02 PROCEDURE — G2211 COMPLEX E/M VISIT ADD ON: HCPCS | Performed by: STUDENT IN AN ORGANIZED HEALTH CARE EDUCATION/TRAINING PROGRAM

## 2025-07-02 NOTE — PROGRESS NOTES
DULoxetine (CYMBALTA) 20 MG extended release capsule       insulin lispro, 1 Unit Dial, (HUMALOG KWIKPEN) 100 UNIT/ML SOPN Use on a sliding scale 3 times a day with meals. Maximum dose 30 units per day. 15 Adjustable Dose Pre-filled Pen Syringe 0    traZODone (DESYREL) 50 MG tablet TAKE ONE (1) TABLET BY MOUTH NIGHTLY 90 tablet 1    amLODIPine (NORVASC) 5 MG tablet TAKE 1 TABLET DAILY 90 tablet 1    aspirin (HM ASPIRIN) 81 MG chewable tablet TAKE ONE (1) TABLET BY MOUTH DAILY 90 tablet 1    buPROPion (WELLBUTRIN XL) 150 MG extended release tablet Take 1 tablet by mouth every morning 90 tablet 3    levothyroxine (SYNTHROID) 100 MCG tablet Take 1 tablet daily 90 tablet 1    ELIQUIS 5 MG TABS tablet TAKE 1 TABLET BY MOUTH TWICE DAILY 180 tablet 1    glipiZIDE (GLUCOTROL) 10 MG tablet TAKE 1 TABLET BY MOUTH EVERY DAY 90 tablet 1    lisinopril (PRINIVIL;ZESTRIL) 10 MG tablet TAKE 1 TABLET BY MOUTH EVERY DAY (Patient not taking: Reported on 6/16/2025) 90 tablet 1    metoprolol tartrate (LOPRESSOR) 25 MG tablet TAKE 1 TABLET TWICE A  tablet 1    cyanocobalamin ([employer] VITAMIN B12) 1000 MCG tablet Take 1 tablet by mouth daily 90 tablet 1    docusate sodium (COLACE) 100 MG capsule Take 1 capsule by mouth daily as needed for Constipation 90 capsule 0    Continuous Blood Gluc Sensor (FREESTYLE ANT 2 SENSOR) [facility] 1 Device by Does not apply route every 14 days 6 each 4    Continuous Blood Gluc  (FREESTYLE ANT 2 READER) [facility]IL[facility] 1 each by Does not apply route daily 1 each 1    Calcium Carb-Cholecalciferol (CALCIUM 1000 + D) 1000-800 MG-UNIT TABS Take 1,000 mg by mouth daily 90 tablet 1    LANTUS SOLOSTAR 100 UNIT/ML injection pen Inject 40 Units into the skin nightly (Patient taking differently: Inject 46 Units into the skin nightly) 15 Adjustable Dose Pre-filled Pen Syringe 2    Handicap Placard MISC by Does not apply route 08/09/22 1 each 0    Continuous Blood Gluc Sensor (FREESTYLE ANT SENSOR SYSTEM) [facility] 1 box

## 2025-07-03 DIAGNOSIS — K62.5 RECTAL BLEEDING: ICD-10-CM

## 2025-07-03 LAB
BASOPHILS # BLD: 0 K/UL (ref 0–0.2)
BASOPHILS NFR BLD: 0.6 %
DEPRECATED RDW RBC AUTO: 15.9 % (ref 12.4–15.4)
EOSINOPHIL # BLD: 0.1 K/UL (ref 0–0.6)
EOSINOPHIL NFR BLD: 1.5 %
HCT VFR BLD AUTO: 40.9 % (ref 36–48)
HGB BLD-MCNC: 13.4 G/DL (ref 12–16)
LYMPHOCYTES # BLD: 1.7 K/UL (ref 1–5.1)
LYMPHOCYTES NFR BLD: 32.3 %
MCH RBC QN AUTO: 29.3 PG (ref 26–34)
MCHC RBC AUTO-ENTMCNC: 32.9 G/DL (ref 31–36)
MCV RBC AUTO: 89.1 FL (ref 80–100)
MONOCYTES # BLD: 0.5 K/UL (ref 0–1.3)
MONOCYTES NFR BLD: 10.3 %
NEUTROPHILS # BLD: 2.8 K/UL (ref 1.7–7.7)
NEUTROPHILS NFR BLD: 55.3 %
PLATELET # BLD AUTO: 183 K/UL (ref 135–450)
PMV BLD AUTO: 8.8 FL (ref 5–10.5)
RBC # BLD AUTO: 4.59 M/UL (ref 4–5.2)
WBC # BLD AUTO: 5.1 K/UL (ref 4–11)

## 2025-07-04 ENCOUNTER — RESULTS FOLLOW-UP (OUTPATIENT)
Dept: FAMILY MEDICINE CLINIC | Age: 80
End: 2025-07-04

## 2025-07-09 ENCOUNTER — OFFICE VISIT (OUTPATIENT)
Dept: SURGERY | Age: 80
End: 2025-07-09
Payer: MEDICARE

## 2025-07-09 VITALS
HEART RATE: 95 BPM | TEMPERATURE: 97.3 F | OXYGEN SATURATION: 99 % | SYSTOLIC BLOOD PRESSURE: 119 MMHG | BODY MASS INDEX: 28.34 KG/M2 | HEIGHT: 63 IN | DIASTOLIC BLOOD PRESSURE: 68 MMHG

## 2025-07-09 DIAGNOSIS — K64.2 GRADE III HEMORRHOIDS: Primary | ICD-10-CM

## 2025-07-09 DIAGNOSIS — K62.5 RECTAL BLEEDING: ICD-10-CM

## 2025-07-09 DIAGNOSIS — Z79.01 ANTICOAGULATED: ICD-10-CM

## 2025-07-09 PROCEDURE — G8427 DOCREV CUR MEDS BY ELIG CLIN: HCPCS | Performed by: SURGERY

## 2025-07-09 PROCEDURE — 3074F SYST BP LT 130 MM HG: CPT | Performed by: SURGERY

## 2025-07-09 PROCEDURE — 1090F PRES/ABSN URINE INCON ASSESS: CPT | Performed by: SURGERY

## 2025-07-09 PROCEDURE — 1159F MED LIST DOCD IN RCRD: CPT | Performed by: SURGERY

## 2025-07-09 PROCEDURE — G8419 CALC BMI OUT NRM PARAM NOF/U: HCPCS | Performed by: SURGERY

## 2025-07-09 PROCEDURE — 99204 OFFICE O/P NEW MOD 45 MIN: CPT | Performed by: SURGERY

## 2025-07-09 PROCEDURE — 1123F ACP DISCUSS/DSCN MKR DOCD: CPT | Performed by: SURGERY

## 2025-07-09 PROCEDURE — 3078F DIAST BP <80 MM HG: CPT | Performed by: SURGERY

## 2025-07-09 PROCEDURE — 1036F TOBACCO NON-USER: CPT | Performed by: SURGERY

## 2025-07-09 PROCEDURE — G8400 PT W/DXA NO RESULTS DOC: HCPCS | Performed by: SURGERY

## 2025-07-09 NOTE — PROGRESS NOTES
[facility] PHYSICIANS [place] [facility] COLORECTAL SURGERY  [address] [place] [address]  [address]  [place] [address]  Dept: [phone]  Dept Fax: [phone]  Loc: [phone]    Visit Date: 7/9/2025    Kimmie Peterson is a 80 y.o. female who presents today for: New Patient (hemorrhoids)      HPI:       Kimmie Peterson is a 80 y.o. female referred by Dr. Mack for hemorrhoids.    Is having intermittent bleeding, especially with BMs.  Is on anticoagulation for history of stroke    Had a bad experience with her last colonoscopy which she thinks resulted in her stroke.  Refuses any future colonoscopies.    Past Medical History:   Diagnosis Date    Cerebral artery occlusion with cerebral infarction (HCC) 4/13/22    Mild    Cerebrovascular disease 4/13/2022    Chronic back pain 12/2022 After slipping on Bus step.    Colon polyps     COVID-19 02/22/2023    Diabetes mellitus (HCC)     Diabetic neuropathy (HCC)     Diabetic retinopathy (HCC)     Essential hypertension 10/28/2019    Fall     Fatty liver     Gastritis     GERD (gastroesophageal reflux disease)     Hyperlipidemia     Hypertension     Hypothyroidism     Irritable bowel syndrome     Major depressive disorder with single episode 10/28/2019    Nonocclusive coronary atherosclerosis of native coronary artery 11/09/2023    Osteopenia     Photosensitivity disorder     chronic    RBBB     Sleep apnea     on BIPAP    Thyroid disease     Thyroid nodule     neg bx-chronic thyroiditis    Type 2 diabetes mellitus with diabetic polyneuropathy, with long-term current use of insulin (HCC) 07/23/2019    Urinary incontinence 4/20/22    Began after Stroke.    Vitamin D deficiency        Social History:   Social History     Tobacco Use    Smoking status: Never    Smokeless tobacco: Never   Substance Use Topics    Alcohol use: Never      Tobacco cessation counseling provided as appropriate.    Date of last Colonoscopy: 4/6/2022   No cologuard

## 2025-07-15 ENCOUNTER — OFFICE VISIT (OUTPATIENT)
Dept: SURGERY | Age: 80
End: 2025-07-15
Payer: MEDICARE

## 2025-07-15 VITALS
BODY MASS INDEX: 28.34 KG/M2 | HEART RATE: 83 BPM | TEMPERATURE: 98.1 F | HEIGHT: 63 IN | OXYGEN SATURATION: 100 % | DIASTOLIC BLOOD PRESSURE: 65 MMHG | SYSTOLIC BLOOD PRESSURE: 139 MMHG

## 2025-07-15 DIAGNOSIS — K64.2 GRADE III HEMORRHOIDS: Primary | ICD-10-CM

## 2025-07-15 PROCEDURE — 46221 LIGATION OF HEMORRHOID(S): CPT | Performed by: SURGERY

## 2025-07-15 NOTE — PROGRESS NOTES
INTERNAL HEMORRHOID RUBBER BAND LIGATION PROCEDURE NOTE:    Patient presented with symptomatic internal hemorrhoids unresponsive to conservative management. See previous office notes for details of previous history and treatments.     Risks of rubber band ligation explained to patient, including but not limited to: immediate and delayed bleeding, pain, infection, external hemorrhoids thrombosis, pelvic sepsis, urinary retention, sphincter dysfunction, need for additional procedures, and recurrence. Patient was previously provided with information in office AVS (after visit summary) and given opportunity to ask any questions and bring up any concerns.     Chaperone/MA present in room during entire procedure.    Patient was placed in either lateral or knee-chest positioning depending on patient preference. Exam table manipulated for proper visualization and lighting. Buttocks spread. Digital exam and anoscopy performed confirming internal hemorrhoids.    Suction rubber band ligator used to place band in 3 positions, 1 cm proximal to the dentate line, at the apex of the internal hemorrhoid.    Anoscope removed. Patient tolerated the procedure well without complication. EBL minimal. Post procedure expectations and instructions explained to patient. Written instructions provided as well.    Disposition: Follow up in 4 weeks for symptom reassessment.    Electronically signed by Viktor Sesay MD on 7/15/2025 at 11:21 AM

## 2025-07-16 ENCOUNTER — OFFICE VISIT (OUTPATIENT)
Dept: FAMILY MEDICINE CLINIC | Age: 80
End: 2025-07-16
Payer: MEDICARE

## 2025-07-16 VITALS
SYSTOLIC BLOOD PRESSURE: 124 MMHG | BODY MASS INDEX: 28.34 KG/M2 | OXYGEN SATURATION: 97 % | HEART RATE: 94 BPM | HEIGHT: 63 IN | TEMPERATURE: 98 F | DIASTOLIC BLOOD PRESSURE: 68 MMHG

## 2025-07-16 DIAGNOSIS — Z79.4 TYPE 2 DIABETES MELLITUS WITH HYPERGLYCEMIA, WITH LONG-TERM CURRENT USE OF INSULIN (HCC): ICD-10-CM

## 2025-07-16 DIAGNOSIS — Z00.00 MEDICARE ANNUAL WELLNESS VISIT, SUBSEQUENT: Primary | ICD-10-CM

## 2025-07-16 DIAGNOSIS — E11.65 TYPE 2 DIABETES MELLITUS WITH HYPERGLYCEMIA, WITH LONG-TERM CURRENT USE OF INSULIN (HCC): ICD-10-CM

## 2025-07-16 DIAGNOSIS — R82.90 CLOUDY URINE: ICD-10-CM

## 2025-07-16 PROCEDURE — 1159F MED LIST DOCD IN RCRD: CPT | Performed by: STUDENT IN AN ORGANIZED HEALTH CARE EDUCATION/TRAINING PROGRAM

## 2025-07-16 PROCEDURE — 3078F DIAST BP <80 MM HG: CPT | Performed by: STUDENT IN AN ORGANIZED HEALTH CARE EDUCATION/TRAINING PROGRAM

## 2025-07-16 PROCEDURE — G0439 PPPS, SUBSEQ VISIT: HCPCS | Performed by: STUDENT IN AN ORGANIZED HEALTH CARE EDUCATION/TRAINING PROGRAM

## 2025-07-16 PROCEDURE — 1123F ACP DISCUSS/DSCN MKR DOCD: CPT | Performed by: STUDENT IN AN ORGANIZED HEALTH CARE EDUCATION/TRAINING PROGRAM

## 2025-07-16 PROCEDURE — 3046F HEMOGLOBIN A1C LEVEL >9.0%: CPT | Performed by: STUDENT IN AN ORGANIZED HEALTH CARE EDUCATION/TRAINING PROGRAM

## 2025-07-16 PROCEDURE — 1160F RVW MEDS BY RX/DR IN RCRD: CPT | Performed by: STUDENT IN AN ORGANIZED HEALTH CARE EDUCATION/TRAINING PROGRAM

## 2025-07-16 PROCEDURE — 3074F SYST BP LT 130 MM HG: CPT | Performed by: STUDENT IN AN ORGANIZED HEALTH CARE EDUCATION/TRAINING PROGRAM

## 2025-07-16 ASSESSMENT — PATIENT HEALTH QUESTIONNAIRE - PHQ9
10. IF YOU CHECKED OFF ANY PROBLEMS, HOW DIFFICULT HAVE THESE PROBLEMS MADE IT FOR YOU TO DO YOUR WORK, TAKE CARE OF THINGS AT HOME, OR GET ALONG WITH OTHER PEOPLE: NOT DIFFICULT AT ALL
SUM OF ALL RESPONSES TO PHQ QUESTIONS 1-9: 5
3. TROUBLE FALLING OR STAYING ASLEEP: SEVERAL DAYS
1. LITTLE INTEREST OR PLEASURE IN DOING THINGS: SEVERAL DAYS
SUM OF ALL RESPONSES TO PHQ QUESTIONS 1-9: 5
8. MOVING OR SPEAKING SO SLOWLY THAT OTHER PEOPLE COULD HAVE NOTICED. OR THE OPPOSITE, BEING SO FIGETY OR RESTLESS THAT YOU HAVE BEEN MOVING AROUND A LOT MORE THAN USUAL: NOT AT ALL
SUM OF ALL RESPONSES TO PHQ QUESTIONS 1-9: 5
6. FEELING BAD ABOUT YOURSELF - OR THAT YOU ARE A FAILURE OR HAVE LET YOURSELF OR YOUR FAMILY DOWN: NOT AT ALL
SUM OF ALL RESPONSES TO PHQ QUESTIONS 1-9: 5
4. FEELING TIRED OR HAVING LITTLE ENERGY: SEVERAL DAYS
7. TROUBLE CONCENTRATING ON THINGS, SUCH AS READING THE NEWSPAPER OR WATCHING TELEVISION: SEVERAL DAYS
9. THOUGHTS THAT YOU WOULD BE BETTER OFF DEAD, OR OF HURTING YOURSELF: NOT AT ALL
5. POOR APPETITE OR OVEREATING: NOT AT ALL
2. FEELING DOWN, DEPRESSED OR HOPELESS: SEVERAL DAYS

## 2025-07-16 NOTE — PROGRESS NOTES
Medicare Annual Wellness Visit    Kimmie Peterson is here for Medicare AWV    Assessment & Plan   Medicare annual wellness visit, subsequent  Type 2 diabetes mellitus with hyperglycemia, with long-term current use of insulin (HCC)  Cloudy urine  -     Urinalysis; Future  -     Culture, Urine  - Patient overall doing well today, on meds for depression, already has home care and facility, already working with physical therapy patient already has an eye doctor, will do a follow-up visit in 1 month once patient starts taking GLP-1 medication, will also perform workup today given her cloudy urine to rule out UTI, patient overall vitally stable today, nontoxic-appearing     Return in 4 weeks (on 8/13/2025) for Follow up Diabetes .     Subjective   #HM  - Patient due for colon cancer screening, COVID-vaccine, currently on stock of COVID-vaccine, blood pressure well-controlled today, last A1c below, patient currently on Lantus 46 units nightly, glipizide, Jardiance which is increased to 25 mg daily at last visit, also started on semaglutide 0.25 mg q. weekly, is also on lispro sliding scale, patient states that she has not started taking the GLP-1 med yet, patient also has some cloudy urine, denies dysuria    Lab Results   Component Value Date    CREATININE 1.2 06/28/2025    BUN 21 (H) 06/28/2025     06/28/2025    K 5.0 06/28/2025     06/28/2025    CO2 26 06/28/2025        Hemoglobin A1C   Date Value Ref Range Status   06/16/2025 9.3 See comment % Final     Comment:     Comment:  Diagnosis of Diabetes: > or = 6.5%  Increased risk of diabetes (Prediabetes): 5.7-6.4%  Glycemic Control: Nonpregnant Adults: <7.0%                    Pregnant: <6.0%            Patient's complete Health Risk Assessment and screening values have been reviewed and are found in Flowsheets. The following problems were reviewed today and where indicated follow up appointments were made and/or referrals ordered.    Positive Risk Factor

## 2025-07-17 ENCOUNTER — RESULTS FOLLOW-UP (OUTPATIENT)
Dept: FAMILY MEDICINE CLINIC | Age: 80
End: 2025-07-17

## 2025-07-17 DIAGNOSIS — R82.90 CLOUDY URINE: Primary | ICD-10-CM

## 2025-07-17 LAB
BACTERIA URNS QL MICRO: ABNORMAL /HPF
BILIRUB UR QL STRIP.AUTO: NEGATIVE
CLARITY UR: ABNORMAL
COLOR UR: YELLOW
EPI CELLS #/AREA URNS AUTO: 1 /HPF (ref 0–5)
GLUCOSE UR STRIP.AUTO-MCNC: >=1000 MG/DL
HGB UR QL STRIP.AUTO: ABNORMAL
HYALINE CASTS #/AREA URNS AUTO: 1 /LPF (ref 0–8)
KETONES UR STRIP.AUTO-MCNC: NEGATIVE MG/DL
LEUKOCYTE ESTERASE UR QL STRIP.AUTO: ABNORMAL
NITRITE UR QL STRIP.AUTO: POSITIVE
PH UR STRIP.AUTO: 5.5 [PH] (ref 5–8)
PROT UR STRIP.AUTO-MCNC: ABNORMAL MG/DL
RBC CLUMPS #/AREA URNS AUTO: 2 /HPF (ref 0–4)
SP GR UR STRIP.AUTO: 1.03 (ref 1–1.03)
UA DIPSTICK W REFLEX MICRO PNL UR: YES
URN SPEC COLLECT METH UR: ABNORMAL
UROBILINOGEN UR STRIP-ACNC: 0.2 E.U./DL
WBC #/AREA URNS AUTO: 727 /HPF (ref 0–5)

## 2025-07-17 RX ORDER — SULFAMETHOXAZOLE AND TRIMETHOPRIM 800; 160 MG/1; MG/1
1 TABLET ORAL 2 TIMES DAILY
Qty: 6 TABLET | Refills: 0 | Status: SHIPPED | OUTPATIENT
Start: 2025-07-17 | End: 2025-07-20

## 2025-07-29 ENCOUNTER — TELEPHONE (OUTPATIENT)
Dept: FAMILY MEDICINE CLINIC | Age: 80
End: 2025-07-29

## 2025-07-29 NOTE — TELEPHONE ENCOUNTER
Spoke with patient who made a video visit today at 1045am but is not familiar with My Chart and has a hard time. She is going to attempt it

## 2025-07-29 NOTE — TELEPHONE ENCOUNTER
Patient called stating that she's injured her right thumb by pulling it / straining it. It's hard to move it or do anything with it due to pain / swelling.     She got evaluated at her assisted living home and they advised tylenol / aleive but didn't leave an order for her to get it from their pharmacy.     She's requesting that a prescription be sent to this address if possible please:     [address], [address], [place], [place], [address]

## 2025-07-30 ENCOUNTER — TELEPHONE (OUTPATIENT)
Dept: FAMILY MEDICINE CLINIC | Age: 80
End: 2025-07-30

## 2025-07-30 NOTE — TELEPHONE ENCOUNTER
Patient called with an update to state that she prefers all of her medications to go through [facility] - [place] [place] - [address] [facility] [address] - P [phone] - F [phone]  .    She reports that she is still wanting to keep [facility] on file if she needs a medication to be filled sooner.

## 2025-08-13 ENCOUNTER — OFFICE VISIT (OUTPATIENT)
Dept: FAMILY MEDICINE CLINIC | Age: 80
End: 2025-08-13
Payer: MEDICARE

## 2025-08-13 VITALS
HEART RATE: 78 BPM | WEIGHT: 147 LBS | DIASTOLIC BLOOD PRESSURE: 70 MMHG | BODY MASS INDEX: 26.04 KG/M2 | SYSTOLIC BLOOD PRESSURE: 118 MMHG | OXYGEN SATURATION: 97 %

## 2025-08-13 DIAGNOSIS — Z79.4 TYPE 2 DIABETES MELLITUS WITH DIABETIC POLYNEUROPATHY, WITH LONG-TERM CURRENT USE OF INSULIN (HCC): ICD-10-CM

## 2025-08-13 DIAGNOSIS — I10 HYPERTENSION, UNSPECIFIED TYPE: Primary | ICD-10-CM

## 2025-08-13 DIAGNOSIS — E11.42 TYPE 2 DIABETES MELLITUS WITH DIABETIC POLYNEUROPATHY, WITH LONG-TERM CURRENT USE OF INSULIN (HCC): ICD-10-CM

## 2025-08-13 PROCEDURE — 3046F HEMOGLOBIN A1C LEVEL >9.0%: CPT | Performed by: STUDENT IN AN ORGANIZED HEALTH CARE EDUCATION/TRAINING PROGRAM

## 2025-08-13 PROCEDURE — 3078F DIAST BP <80 MM HG: CPT | Performed by: STUDENT IN AN ORGANIZED HEALTH CARE EDUCATION/TRAINING PROGRAM

## 2025-08-13 PROCEDURE — G8400 PT W/DXA NO RESULTS DOC: HCPCS | Performed by: STUDENT IN AN ORGANIZED HEALTH CARE EDUCATION/TRAINING PROGRAM

## 2025-08-13 PROCEDURE — G2211 COMPLEX E/M VISIT ADD ON: HCPCS | Performed by: STUDENT IN AN ORGANIZED HEALTH CARE EDUCATION/TRAINING PROGRAM

## 2025-08-13 PROCEDURE — 1090F PRES/ABSN URINE INCON ASSESS: CPT | Performed by: STUDENT IN AN ORGANIZED HEALTH CARE EDUCATION/TRAINING PROGRAM

## 2025-08-13 PROCEDURE — 1123F ACP DISCUSS/DSCN MKR DOCD: CPT | Performed by: STUDENT IN AN ORGANIZED HEALTH CARE EDUCATION/TRAINING PROGRAM

## 2025-08-13 PROCEDURE — G8419 CALC BMI OUT NRM PARAM NOF/U: HCPCS | Performed by: STUDENT IN AN ORGANIZED HEALTH CARE EDUCATION/TRAINING PROGRAM

## 2025-08-13 PROCEDURE — 99214 OFFICE O/P EST MOD 30 MIN: CPT | Performed by: STUDENT IN AN ORGANIZED HEALTH CARE EDUCATION/TRAINING PROGRAM

## 2025-08-13 PROCEDURE — 3074F SYST BP LT 130 MM HG: CPT | Performed by: STUDENT IN AN ORGANIZED HEALTH CARE EDUCATION/TRAINING PROGRAM

## 2025-08-13 PROCEDURE — 1036F TOBACCO NON-USER: CPT | Performed by: STUDENT IN AN ORGANIZED HEALTH CARE EDUCATION/TRAINING PROGRAM

## 2025-08-13 PROCEDURE — 1159F MED LIST DOCD IN RCRD: CPT | Performed by: STUDENT IN AN ORGANIZED HEALTH CARE EDUCATION/TRAINING PROGRAM

## 2025-08-13 PROCEDURE — G8427 DOCREV CUR MEDS BY ELIG CLIN: HCPCS | Performed by: STUDENT IN AN ORGANIZED HEALTH CARE EDUCATION/TRAINING PROGRAM

## 2025-08-13 RX ORDER — OMEPRAZOLE 20 MG/1
CAPSULE, DELAYED RELEASE ORAL
COMMUNITY

## 2025-08-13 RX ORDER — LISINOPRIL 10 MG/1
TABLET ORAL
Qty: 90 TABLET | Refills: 0 | Status: SHIPPED | OUTPATIENT
Start: 2025-08-13

## 2025-08-13 RX ORDER — BISACODYL 5 MG/1
TABLET, DELAYED RELEASE ORAL
COMMUNITY

## 2025-08-13 RX ORDER — INSULIN GLARGINE 100 [IU]/ML
62 INJECTION, SOLUTION SUBCUTANEOUS NIGHTLY
Qty: 15 ADJUSTABLE DOSE PRE-FILLED PEN SYRINGE | Refills: 2 | Status: SHIPPED | OUTPATIENT
Start: 2025-08-13

## 2025-08-19 ENCOUNTER — TELEPHONE (OUTPATIENT)
Dept: FAMILY MEDICINE CLINIC | Age: 80
End: 2025-08-19

## 2025-08-21 ENCOUNTER — TELEMEDICINE (OUTPATIENT)
Dept: FAMILY MEDICINE CLINIC | Age: 80
End: 2025-08-21
Payer: MEDICARE

## 2025-08-21 DIAGNOSIS — R11.0 NAUSEA: Primary | ICD-10-CM

## 2025-08-21 DIAGNOSIS — Z79.4 TYPE 2 DIABETES MELLITUS WITH DIABETIC POLYNEUROPATHY, WITH LONG-TERM CURRENT USE OF INSULIN (HCC): ICD-10-CM

## 2025-08-21 DIAGNOSIS — E11.42 TYPE 2 DIABETES MELLITUS WITH DIABETIC POLYNEUROPATHY, WITH LONG-TERM CURRENT USE OF INSULIN (HCC): ICD-10-CM

## 2025-08-21 PROCEDURE — G2211 COMPLEX E/M VISIT ADD ON: HCPCS | Performed by: STUDENT IN AN ORGANIZED HEALTH CARE EDUCATION/TRAINING PROGRAM

## 2025-08-21 PROCEDURE — 99214 OFFICE O/P EST MOD 30 MIN: CPT | Performed by: STUDENT IN AN ORGANIZED HEALTH CARE EDUCATION/TRAINING PROGRAM

## 2025-08-21 RX ORDER — ONDANSETRON 4 MG/1
4 TABLET, ORALLY DISINTEGRATING ORAL 3 TIMES DAILY PRN
Qty: 21 TABLET | Refills: 0 | Status: SHIPPED | OUTPATIENT
Start: 2025-08-21

## 2025-08-22 ENCOUNTER — HOSPITAL ENCOUNTER (EMERGENCY)
Age: 80
Discharge: HOME | End: 2025-08-22
Attending: STUDENT IN AN ORGANIZED HEALTH CARE EDUCATION/TRAINING PROGRAM
Payer: MEDICARE

## 2025-08-22 VITALS
TEMPERATURE: 98.7 F | OXYGEN SATURATION: 96 % | BODY MASS INDEX: 26.05 KG/M2 | DIASTOLIC BLOOD PRESSURE: 83 MMHG | HEIGHT: 63 IN | SYSTOLIC BLOOD PRESSURE: 160 MMHG | RESPIRATION RATE: 18 BRPM | WEIGHT: 147 LBS | HEART RATE: 100 BPM

## 2025-08-22 DIAGNOSIS — R11.2 NAUSEA AND VOMITING, UNSPECIFIED VOMITING TYPE: Primary | ICD-10-CM

## 2025-08-22 LAB
ALBUMIN SERPL-MCNC: 3.8 G/DL (ref 3.4–5)
ALBUMIN/GLOB SERPL: 1 {RATIO} (ref 1.1–2.2)
ALP SERPL-CCNC: 98 U/L (ref 40–129)
ALT SERPL-CCNC: 9 U/L (ref 10–40)
ALT SERPL-CCNC: ABNORMAL U/L (ref 10–40)
ANION GAP SERPL CALCULATED.3IONS-SCNC: 14 MMOL/L (ref 3–16)
AST SERPL-CCNC: 53 U/L (ref 15–37)
BASE EXCESS BLDV CALC-SCNC: 2.7 MMOL/L (ref -2–3)
BASOPHILS # BLD: 0 K/UL (ref 0–0.2)
BASOPHILS NFR BLD: 0.6 %
BILIRUB SERPL-MCNC: 0.6 MG/DL (ref 0–1)
BILIRUB UR QL STRIP.AUTO: NEGATIVE
BUN SERPL-MCNC: 17 MG/DL (ref 7–20)
CALCIUM SERPL-MCNC: 9.6 MG/DL (ref 8.3–10.6)
CHLORIDE SERPL-SCNC: 99 MMOL/L (ref 99–110)
CLARITY UR: CLEAR
CO2 BLDV-SCNC: 26 MMOL/L
CO2 SERPL-SCNC: 21 MMOL/L (ref 21–32)
COHGB MFR BLDV: 1.1 % (ref 0–1.5)
COLOR UR: YELLOW
CREAT SERPL-MCNC: 1.2 MG/DL (ref 0.6–1.2)
DEPRECATED RDW RBC AUTO: 16.4 % (ref 12.4–15.4)
DO-HGB MFR BLDV: 31.1 %
EKG ATRIAL RATE: 115 BPM
EKG DIAGNOSIS: NORMAL
EKG P AXIS: 91 DEGREES
EKG P-R INTERVAL: 160 MS
EKG Q-T INTERVAL: 350 MS
EKG QRS DURATION: 122 MS
EKG QTC CALCULATION (BAZETT): 484 MS
EKG R AXIS: -63 DEGREES
EKG T AXIS: 53 DEGREES
EKG VENTRICULAR RATE: 115 BPM
EOSINOPHIL # BLD: 0 K/UL (ref 0–0.6)
EOSINOPHIL NFR BLD: 0.4 %
GFR SERPLBLD CREATININE-BSD FMLA CKD-EPI: 46 ML/MIN/{1.73_M2}
GLUCOSE SERPL-MCNC: 280 MG/DL (ref 70–99)
GLUCOSE UR STRIP.AUTO-MCNC: >=1000 MG/DL
HCO3 BLDV-SCNC: 24.7 MMOL/L (ref 24–28)
HCT VFR BLD AUTO: 39.8 % (ref 36–48)
HGB BLD-MCNC: 13.5 G/DL (ref 12–16)
HGB UR QL STRIP.AUTO: NEGATIVE
KETONES UR STRIP.AUTO-MCNC: ABNORMAL MG/DL
LEUKOCYTE ESTERASE UR QL STRIP.AUTO: NEGATIVE
LIPASE SERPL-CCNC: 18 U/L (ref 13–60)
LYMPHOCYTES # BLD: 1.7 K/UL (ref 1–5.1)
LYMPHOCYTES NFR BLD: 27.3 %
MCH RBC QN AUTO: 29.9 PG (ref 26–34)
MCHC RBC AUTO-ENTMCNC: 33.8 G/DL (ref 31–36)
MCV RBC AUTO: 88.3 FL (ref 80–100)
METHGB MFR BLDV: 0.1 % (ref 0–1.5)
MONOCYTES # BLD: 0.8 K/UL (ref 0–1.3)
MONOCYTES NFR BLD: 12.2 %
NEUTROPHILS # BLD: 3.8 K/UL (ref 1.7–7.7)
NEUTROPHILS NFR BLD: 59.5 %
NITRITE UR QL STRIP.AUTO: NEGATIVE
PCO2 BLDV: 29.7 MMHG (ref 41–51)
PH BLDV: 7.53 [PH] (ref 7.35–7.45)
PH UR STRIP.AUTO: 7 [PH] (ref 5–8)
PLATELET # BLD AUTO: 224 K/UL (ref 135–450)
PMV BLD AUTO: 8.9 FL (ref 5–10.5)
PO2 BLDV: 33.9 MMHG (ref 25–40)
POTASSIUM SERPL-SCNC: 4 MMOL/L (ref 3.5–5.1)
POTASSIUM SERPL-SCNC: ABNORMAL MMOL/L (ref 3.5–5.1)
PROT SERPL-MCNC: 7.7 G/DL (ref 6.4–8.2)
PROT UR STRIP.AUTO-MCNC: NEGATIVE MG/DL
RBC # BLD AUTO: 4.5 M/UL (ref 4–5.2)
SAO2 % BLDV: 69 %
SODIUM SERPL-SCNC: 134 MMOL/L (ref 136–145)
SP GR UR STRIP.AUTO: 1.01 (ref 1–1.03)
UA DIPSTICK W REFLEX MICRO PNL UR: ABNORMAL
URN SPEC COLLECT METH UR: ABNORMAL
UROBILINOGEN UR STRIP-ACNC: 0.2 E.U./DL
WBC # BLD AUTO: 6.3 K/UL (ref 4–11)

## 2025-08-22 PROCEDURE — 80053 COMPREHEN METABOLIC PANEL: CPT

## 2025-08-22 PROCEDURE — 93005 ELECTROCARDIOGRAM TRACING: CPT | Performed by: STUDENT IN AN ORGANIZED HEALTH CARE EDUCATION/TRAINING PROGRAM

## 2025-08-22 PROCEDURE — 83690 ASSAY OF LIPASE: CPT

## 2025-08-22 PROCEDURE — 82803 BLOOD GASES ANY COMBINATION: CPT

## 2025-08-22 PROCEDURE — 2580000003 HC RX 258: Performed by: STUDENT IN AN ORGANIZED HEALTH CARE EDUCATION/TRAINING PROGRAM

## 2025-08-22 PROCEDURE — 81003 URINALYSIS AUTO W/O SCOPE: CPT

## 2025-08-22 PROCEDURE — 96360 HYDRATION IV INFUSION INIT: CPT

## 2025-08-22 PROCEDURE — 6370000000 HC RX 637 (ALT 250 FOR IP): Performed by: STUDENT IN AN ORGANIZED HEALTH CARE EDUCATION/TRAINING PROGRAM

## 2025-08-22 PROCEDURE — 85025 COMPLETE CBC W/AUTO DIFF WBC: CPT

## 2025-08-22 PROCEDURE — 99284 EMERGENCY DEPT VISIT MOD MDM: CPT

## 2025-08-22 PROCEDURE — 36415 COLL VENOUS BLD VENIPUNCTURE: CPT

## 2025-08-22 PROCEDURE — 96361 HYDRATE IV INFUSION ADD-ON: CPT

## 2025-08-22 RX ORDER — ACETAMINOPHEN 500 MG
1000 TABLET ORAL ONCE
Status: COMPLETED | OUTPATIENT
Start: 2025-08-22 | End: 2025-08-22

## 2025-08-22 RX ORDER — SODIUM CHLORIDE, SODIUM LACTATE, POTASSIUM CHLORIDE, AND CALCIUM CHLORIDE .6; .31; .03; .02 G/100ML; G/100ML; G/100ML; G/100ML
1000 INJECTION, SOLUTION INTRAVENOUS ONCE
Status: COMPLETED | OUTPATIENT
Start: 2025-08-22 | End: 2025-08-22

## 2025-08-22 RX ORDER — METOPROLOL TARTRATE 25 MG/1
25 TABLET, FILM COATED ORAL ONCE
Status: COMPLETED | OUTPATIENT
Start: 2025-08-22 | End: 2025-08-22

## 2025-08-22 RX ADMIN — ACETAMINOPHEN 1000 MG: 500 TABLET ORAL at 19:45

## 2025-08-22 RX ADMIN — SODIUM CHLORIDE, SODIUM LACTATE, POTASSIUM CHLORIDE, AND CALCIUM CHLORIDE 1000 ML: .6; .31; .03; .02 INJECTION, SOLUTION INTRAVENOUS at 14:51

## 2025-08-22 RX ADMIN — METOPROLOL TARTRATE 25 MG: 25 TABLET, FILM COATED ORAL at 16:10

## 2025-08-22 RX ADMIN — SODIUM CHLORIDE, SODIUM LACTATE, POTASSIUM CHLORIDE, AND CALCIUM CHLORIDE 1000 ML: .6; .31; .03; .02 INJECTION, SOLUTION INTRAVENOUS at 16:10

## 2025-08-22 ASSESSMENT — PAIN SCALES - GENERAL
PAINLEVEL_OUTOF10: 9
PAINLEVEL_OUTOF10: 2

## 2025-08-22 ASSESSMENT — PAIN - FUNCTIONAL ASSESSMENT
PAIN_FUNCTIONAL_ASSESSMENT: 0-10
PAIN_FUNCTIONAL_ASSESSMENT: 0-10
PAIN_FUNCTIONAL_ASSESSMENT: PREVENTS OR INTERFERES SOME ACTIVE ACTIVITIES AND ADLS

## 2025-08-22 ASSESSMENT — PAIN DESCRIPTION - LOCATION
LOCATION: ABDOMEN
LOCATION: HIP

## 2025-08-22 ASSESSMENT — PAIN DESCRIPTION - ORIENTATION: ORIENTATION: LEFT;RIGHT

## 2025-08-22 ASSESSMENT — PAIN DESCRIPTION - DESCRIPTORS
DESCRIPTORS: ACHING
DESCRIPTORS: CRAMPING

## 2025-08-22 ASSESSMENT — PAIN DESCRIPTION - PAIN TYPE: TYPE: ACUTE PAIN

## (undated) DEVICE — SNARE ENDOSCP L240CM LOOP W13MM SHTH DIA2.4MM SM OVL FLX

## (undated) DEVICE — FORCEPS BX L240CM JAW DIA2.4MM ORNG L CAP W/ NDL DISP RAD

## (undated) DEVICE — GRADUATE TRIANG MEASURE 1000ML BLK PRNT

## (undated) DEVICE — SPONGE GZ W4XL4IN RAYON POLY FILL CVR W/ NONWOVEN FAB

## (undated) DEVICE — FORCEPS BX OVL CUP FEN DISPOSABLE CAP L 160CM RAD JAW 4

## (undated) DEVICE — BLOCK BITE 60FR RUBBER ADLT DENTAL

## (undated) DEVICE — TRAP POLYP ETRAP